# Patient Record
Sex: MALE | Race: WHITE | HISPANIC OR LATINO
[De-identification: names, ages, dates, MRNs, and addresses within clinical notes are randomized per-mention and may not be internally consistent; named-entity substitution may affect disease eponyms.]

---

## 2017-03-13 ENCOUNTER — RX RENEWAL (OUTPATIENT)
Age: 47
End: 2017-03-13

## 2017-03-17 ENCOUNTER — LABORATORY RESULT (OUTPATIENT)
Age: 47
End: 2017-03-17

## 2017-03-17 ENCOUNTER — APPOINTMENT (OUTPATIENT)
Dept: INTERNAL MEDICINE | Facility: CLINIC | Age: 47
End: 2017-03-17

## 2017-03-20 LAB
25(OH)D3 SERPL-MCNC: 33.1 NG/ML
ALBUMIN SERPL ELPH-MCNC: 4.1 G/DL
ALP BLD-CCNC: 69 U/L
ALT SERPL-CCNC: 16 U/L
ANION GAP SERPL CALC-SCNC: 18 MMOL/L
AST SERPL-CCNC: 28 U/L
BASOPHILS # BLD AUTO: 0.03 K/UL
BASOPHILS NFR BLD AUTO: 0.3 %
BILIRUB SERPL-MCNC: 0.3 MG/DL
BUN SERPL-MCNC: 67 MG/DL
CALCIUM SERPL-MCNC: 10.1 MG/DL
CHLORIDE SERPL-SCNC: 101 MMOL/L
CHOLEST SERPL-MCNC: 108 MG/DL
CHOLEST/HDLC SERPL: 3.1 RATIO
CO2 SERPL-SCNC: 22 MMOL/L
CREAT SERPL-MCNC: 4 MG/DL
EOSINOPHIL # BLD AUTO: 0.1 K/UL
EOSINOPHIL NFR BLD AUTO: 0.9 %
GLUCOSE SERPL-MCNC: 85 MG/DL
HBA1C MFR BLD HPLC: 5.8 %
HCT VFR BLD CALC: 32.2 %
HDLC SERPL-MCNC: 35 MG/DL
HGB BLD-MCNC: 9.9 G/DL
IMM GRANULOCYTES NFR BLD AUTO: 0.4 %
LDLC SERPL CALC-MCNC: 55 MG/DL
LYMPHOCYTES # BLD AUTO: 1.51 K/UL
LYMPHOCYTES NFR BLD AUTO: 13.6 %
MAN DIFF?: NORMAL
MCHC RBC-ENTMCNC: 30.6 PG
MCHC RBC-ENTMCNC: 30.7 GM/DL
MCV RBC AUTO: 99.4 FL
MONOCYTES # BLD AUTO: 1 K/UL
MONOCYTES NFR BLD AUTO: 9 %
NEUTROPHILS # BLD AUTO: 8.46 K/UL
NEUTROPHILS NFR BLD AUTO: 75.8 %
PLATELET # BLD AUTO: 368 K/UL
POTASSIUM SERPL-SCNC: 4.2 MMOL/L
PROT SERPL-MCNC: 8.2 G/DL
RBC # BLD: 3.24 M/UL
RBC # FLD: 14.7 %
SAVE SPECIMEN: NORMAL
SODIUM SERPL-SCNC: 141 MMOL/L
T3RU NFR SERPL: 1.06 INDEX
T4 SERPL-MCNC: 7 UG/DL
TRIGL SERPL-MCNC: 91 MG/DL
TSH SERPL-ACNC: 2.98 UIU/ML
URATE SERPL-MCNC: 7.2 MG/DL
WBC # FLD AUTO: 11.14 K/UL

## 2017-03-21 ENCOUNTER — LABORATORY RESULT (OUTPATIENT)
Age: 47
End: 2017-03-21

## 2017-03-21 ENCOUNTER — APPOINTMENT (OUTPATIENT)
Dept: NEPHROLOGY | Facility: CLINIC | Age: 47
End: 2017-03-21

## 2017-03-21 VITALS — HEART RATE: 72 BPM | DIASTOLIC BLOOD PRESSURE: 68 MMHG | RESPIRATION RATE: 14 BRPM | SYSTOLIC BLOOD PRESSURE: 140 MMHG

## 2017-03-23 LAB
ALBUMIN MFR SERPL ELPH: 50.8 %
ALBUMIN SERPL ELPH-MCNC: 4.2 G/DL
ALBUMIN SERPL-MCNC: 3.8 G/DL
ALBUMIN/GLOB SERPL: 1.1 RATIO
ALPHA1 GLOB MFR SERPL ELPH: 4.9 %
ALPHA1 GLOB SERPL ELPH-MCNC: 0.4 G/DL
ALPHA2 GLOB MFR SERPL ELPH: 12.6 %
ALPHA2 GLOB SERPL ELPH-MCNC: 0.9 G/DL
ANA PAT FLD IF-IMP: ABNORMAL
ANA SER IF-ACNC: ABNORMAL
ANION GAP SERPL CALC-SCNC: 19 MMOL/L
APPEARANCE: CLEAR
B-GLOBULIN MFR SERPL ELPH: 11.5 %
B-GLOBULIN SERPL ELPH-MCNC: 0.9 G/DL
BACTERIA: NEGATIVE
BILIRUBIN URINE: NEGATIVE
BLOOD URINE: ABNORMAL
BUN SERPL-MCNC: 61 MG/DL
C3 SERPL-MCNC: 139 MG/DL
C4 SERPL-MCNC: 30 MG/DL
CALCIUM SERPL-MCNC: 10 MG/DL
CHLORIDE SERPL-SCNC: 101 MMOL/L
CMV DNA SPEC QL NAA+PROBE: NOT DETECTED IU/ML
CMVPCR LOG: NOT DETECTED LOGIU/ML
CO2 SERPL-SCNC: 22 MMOL/L
COLOR: YELLOW
CREAT SERPL-MCNC: 4.08 MG/DL
CREAT SPEC-SCNC: 67 MG/DL
CREAT/PROT UR: 0.9 RATIO
DEPRECATED KAPPA LC FREE/LAMBDA SER: 3.87 RATIO
DSDNA AB SER-ACNC: <12 IU/ML
GAMMA GLOB FLD ELPH-MCNC: 1.5 G/DL
GAMMA GLOB MFR SERPL ELPH: 20.2 %
GLUCOSE QUALITATIVE U: 100 MG/DL
GLUCOSE SERPL-MCNC: 97 MG/DL
HBV CORE IGG+IGM SER QL: NONREACTIVE
HBV SURFACE AB SER QL: NONREACTIVE
HBV SURFACE AB SERPL IA-ACNC: <3 MIU/ML
HBV SURFACE AG SER QL: NONREACTIVE
HCV AB SER QL: NONREACTIVE
HCV S/CO RATIO: 0.14 S/CO
HYALINE CASTS: 1 /LPF
INTERPRETATION SERPL IEP-IMP: NORMAL
KAPPA LC CSF-MCNC: 4.94 MG/DL
KAPPA LC SERPL-MCNC: 19.1 MG/DL
KETONES URINE: NEGATIVE
LEUKOCYTE ESTERASE URINE: NEGATIVE
MICROSCOPIC-UA: NORMAL
MPO AB + PR3 PNL SER: NORMAL
NITRITE URINE: NEGATIVE
PH URINE: 7
PHOSPHATE SERPL-MCNC: 5.1 MG/DL
POTASSIUM SERPL-SCNC: 3.8 MMOL/L
PROT SERPL-MCNC: 7.4 G/DL
PROT SERPL-MCNC: 7.4 G/DL
PROT UR-MCNC: 60 MG/DL
PROTEIN URINE: 100 MG/DL
RED BLOOD CELLS URINE: 1 /HPF
SODIUM SERPL-SCNC: 142 MMOL/L
SPECIFIC GRAVITY URINE: 1.01
SQUAMOUS EPITHELIAL CELLS: 0 /HPF
UROBILINOGEN URINE: NORMAL MG/DL
WHITE BLOOD CELLS URINE: 2 /HPF

## 2017-03-24 LAB
BKV DNA SPEC QL NAA+PROBE: NORMAL
RPR SER-TITR: NORMAL

## 2017-04-12 ENCOUNTER — APPOINTMENT (OUTPATIENT)
Dept: INTERNAL MEDICINE | Facility: CLINIC | Age: 47
End: 2017-04-12

## 2017-04-12 VITALS — WEIGHT: 207 LBS | HEIGHT: 64 IN | BODY MASS INDEX: 35.34 KG/M2

## 2017-04-12 VITALS — DIASTOLIC BLOOD PRESSURE: 72 MMHG | SYSTOLIC BLOOD PRESSURE: 130 MMHG

## 2017-04-12 VITALS — DIASTOLIC BLOOD PRESSURE: 60 MMHG | SYSTOLIC BLOOD PRESSURE: 120 MMHG

## 2017-05-16 ENCOUNTER — APPOINTMENT (OUTPATIENT)
Dept: TRANSPLANT | Facility: CLINIC | Age: 47
End: 2017-05-16

## 2017-05-16 ENCOUNTER — APPOINTMENT (OUTPATIENT)
Dept: NEPHROLOGY | Facility: CLINIC | Age: 47
End: 2017-05-16

## 2017-05-26 ENCOUNTER — MEDICATION RENEWAL (OUTPATIENT)
Age: 47
End: 2017-05-26

## 2017-05-30 ENCOUNTER — MEDICATION RENEWAL (OUTPATIENT)
Age: 47
End: 2017-05-30

## 2017-06-21 ENCOUNTER — APPOINTMENT (OUTPATIENT)
Dept: NEPHROLOGY | Facility: CLINIC | Age: 47
End: 2017-06-21

## 2017-06-21 VITALS
DIASTOLIC BLOOD PRESSURE: 73 MMHG | OXYGEN SATURATION: 98 % | WEIGHT: 207.23 LBS | SYSTOLIC BLOOD PRESSURE: 140 MMHG | HEIGHT: 64 IN | BODY MASS INDEX: 35.38 KG/M2 | HEART RATE: 51 BPM

## 2017-06-28 LAB
24R-OH-CALCIDIOL SERPL-MCNC: 29.7 PG/ML
25(OH)D3 SERPL-MCNC: 42.1 NG/ML
ALBUMIN MFR SERPL ELPH: 52.2 %
ALBUMIN SERPL ELPH-MCNC: 4.1 G/DL
ALBUMIN SERPL-MCNC: 3.9 G/DL
ALBUMIN/GLOB SERPL: 1.1 RATIO
ALPHA1 GLOB MFR SERPL ELPH: 4.4 %
ALPHA1 GLOB SERPL ELPH-MCNC: 0.3 G/DL
ALPHA2 GLOB MFR SERPL ELPH: 12.1 %
ALPHA2 GLOB SERPL ELPH-MCNC: 0.9 G/DL
ANION GAP SERPL CALC-SCNC: 19 MMOL/L
B-GLOBULIN MFR SERPL ELPH: 10.8 %
B-GLOBULIN SERPL ELPH-MCNC: 0.8 G/DL
BASOPHILS # BLD AUTO: 0.04 K/UL
BASOPHILS NFR BLD AUTO: 0.4 %
BUN SERPL-MCNC: 60 MG/DL
CALCIUM SERPL-MCNC: 10.2 MG/DL
CALCIUM SERPL-MCNC: 10.2 MG/DL
CHLORIDE SERPL-SCNC: 102 MMOL/L
CO2 SERPL-SCNC: 21 MMOL/L
CREAT SERPL-MCNC: 4.19 MG/DL
DEPRECATED KAPPA LC FREE/LAMBDA SER: 2.62 RATIO
EOSINOPHIL # BLD AUTO: 0.12 K/UL
EOSINOPHIL NFR BLD AUTO: 1.1 %
GAMMA GLOB FLD ELPH-MCNC: 1.5 G/DL
GAMMA GLOB MFR SERPL ELPH: 20.5 %
GLUCOSE SERPL-MCNC: 113 MG/DL
HCT VFR BLD CALC: 28.2 %
HGB BLD-MCNC: 9.4 G/DL
IMM GRANULOCYTES NFR BLD AUTO: 0.5 %
INTERPRETATION SERPL IEP-IMP: NORMAL
KAPPA LC CSF-MCNC: 4.61 MG/DL
KAPPA LC SERPL-MCNC: 12.1 MG/DL
LYMPHOCYTES # BLD AUTO: 1.6 K/UL
LYMPHOCYTES NFR BLD AUTO: 14.6 %
M PROTEIN SPEC IFE-MCNC: NORMAL
MAN DIFF?: NORMAL
MCHC RBC-ENTMCNC: 31.8 PG
MCHC RBC-ENTMCNC: 33.3 GM/DL
MCV RBC AUTO: 95.3 FL
MONOCYTES # BLD AUTO: 1 K/UL
MONOCYTES NFR BLD AUTO: 9.1 %
NEUTROPHILS # BLD AUTO: 8.16 K/UL
NEUTROPHILS NFR BLD AUTO: 74.3 %
PARATHYROID HORMONE INTACT: 76 PG/ML
PHOSPHATE SERPL-MCNC: 5.7 MG/DL
PLATELET # BLD AUTO: 377 K/UL
POTASSIUM SERPL-SCNC: 4.1 MMOL/L
PROT SERPL-MCNC: 7.4 G/DL
PROT SERPL-MCNC: 7.4 G/DL
RBC # BLD: 2.96 M/UL
RBC # FLD: 14.2 %
SODIUM SERPL-SCNC: 142 MMOL/L
WBC # FLD AUTO: 10.97 K/UL

## 2017-07-20 PROBLEM — Z87.448 HISTORY OF HEMATURIA: Status: RESOLVED | Noted: 2017-07-20 | Resolved: 2017-07-20

## 2017-07-21 ENCOUNTER — APPOINTMENT (OUTPATIENT)
Dept: INTERNAL MEDICINE | Facility: CLINIC | Age: 47
End: 2017-07-21

## 2017-07-21 ENCOUNTER — LABORATORY RESULT (OUTPATIENT)
Age: 47
End: 2017-07-21

## 2017-07-21 ENCOUNTER — NON-APPOINTMENT (OUTPATIENT)
Age: 47
End: 2017-07-21

## 2017-07-21 VITALS — DIASTOLIC BLOOD PRESSURE: 73 MMHG | SYSTOLIC BLOOD PRESSURE: 140 MMHG

## 2017-07-21 VITALS — DIASTOLIC BLOOD PRESSURE: 60 MMHG | SYSTOLIC BLOOD PRESSURE: 120 MMHG

## 2017-07-21 VITALS — HEIGHT: 64 IN | BODY MASS INDEX: 35.68 KG/M2 | WEIGHT: 209 LBS

## 2017-07-21 DIAGNOSIS — Z87.448 PERSONAL HISTORY OF OTHER DISEASES OF URINARY SYSTEM: ICD-10-CM

## 2017-07-23 LAB
25(OH)D3 SERPL-MCNC: 40 NG/ML
ALBUMIN SERPL ELPH-MCNC: 3.8 G/DL
ALP BLD-CCNC: 65 U/L
ALT SERPL-CCNC: 12 U/L
ANION GAP SERPL CALC-SCNC: 21 MMOL/L
AST SERPL-CCNC: 25 U/L
BASOPHILS # BLD AUTO: 0.04 K/UL
BASOPHILS NFR BLD AUTO: 0.4 %
BILIRUB SERPL-MCNC: 0.4 MG/DL
BILIRUB UR QL STRIP: NORMAL
BUN SERPL-MCNC: 55 MG/DL
CALCIUM SERPL-MCNC: 10.3 MG/DL
CALCIUM SERPL-MCNC: 10.3 MG/DL
CHLORIDE SERPL-SCNC: 102 MMOL/L
CHOLEST SERPL-MCNC: 133 MG/DL
CHOLEST/HDLC SERPL: 4.3 RATIO
CLARITY UR: CLEAR
CO2 SERPL-SCNC: 21 MMOL/L
COLLECTION METHOD: NORMAL
CREAT SERPL-MCNC: 4.47 MG/DL
EOSINOPHIL # BLD AUTO: 0.11 K/UL
EOSINOPHIL NFR BLD AUTO: 1.1 %
GLUCOSE SERPL-MCNC: 90 MG/DL
GLUCOSE UR-MCNC: NORMAL
HBA1C MFR BLD HPLC: 5.6 %
HCG UR QL: 0.2 EU/DL
HCT VFR BLD CALC: 29.1 %
HDLC SERPL-MCNC: 31 MG/DL
HGB BLD-MCNC: 9.7 G/DL
HGB UR QL STRIP.AUTO: NORMAL
IMM GRANULOCYTES NFR BLD AUTO: 0.6 %
KETONES UR-MCNC: NORMAL
LDLC SERPL CALC-MCNC: 65 MG/DL
LEUKOCYTE ESTERASE UR QL STRIP: NORMAL
LYMPHOCYTES # BLD AUTO: 1.64 K/UL
LYMPHOCYTES NFR BLD AUTO: 15.9 %
MAN DIFF?: NORMAL
MCHC RBC-ENTMCNC: 32.2 PG
MCHC RBC-ENTMCNC: 33.3 GM/DL
MCV RBC AUTO: 96.7 FL
MONOCYTES # BLD AUTO: 0.95 K/UL
MONOCYTES NFR BLD AUTO: 9.2 %
NEUTROPHILS # BLD AUTO: 7.54 K/UL
NEUTROPHILS NFR BLD AUTO: 72.8 %
NITRITE UR QL STRIP: NORMAL
PARATHYROID HORMONE INTACT: 54 PG/ML
PH UR STRIP: 6.5
PHOSPHATE SERPL-MCNC: 4.6 MG/DL
PLATELET # BLD AUTO: 397 K/UL
POTASSIUM SERPL-SCNC: 3.7 MMOL/L
PROT SERPL-MCNC: 8 G/DL
PROT UR STRIP-MCNC: NORMAL
RBC # BLD: 3.01 M/UL
RBC # FLD: 14.5 %
SAVE SPECIMEN: NORMAL
SODIUM SERPL-SCNC: 144 MMOL/L
SP GR UR STRIP: 1.01
T3RU NFR SERPL: 0.99 INDEX
T4 SERPL-MCNC: 7.9 UG/DL
TRIGL SERPL-MCNC: 185 MG/DL
TSH SERPL-ACNC: 2.5 UIU/ML
URATE SERPL-MCNC: 7.7 MG/DL
WBC # FLD AUTO: 10.34 K/UL

## 2017-09-13 ENCOUNTER — APPOINTMENT (OUTPATIENT)
Dept: NEPHROLOGY | Facility: CLINIC | Age: 47
End: 2017-09-13
Payer: COMMERCIAL

## 2017-09-13 VITALS
WEIGHT: 202 LBS | HEIGHT: 64 IN | OXYGEN SATURATION: 97 % | DIASTOLIC BLOOD PRESSURE: 80 MMHG | BODY MASS INDEX: 34.49 KG/M2 | HEART RATE: 51 BPM | SYSTOLIC BLOOD PRESSURE: 141 MMHG

## 2017-09-13 VITALS — DIASTOLIC BLOOD PRESSURE: 80 MMHG | SYSTOLIC BLOOD PRESSURE: 142 MMHG

## 2017-09-13 PROCEDURE — 99214 OFFICE O/P EST MOD 30 MIN: CPT | Mod: GC

## 2017-09-15 LAB
25(OH)D3 SERPL-MCNC: 33.9 NG/ML
ALBUMIN SERPL ELPH-MCNC: 3.8 G/DL
ALP BLD-CCNC: 59 U/L
ALT SERPL-CCNC: 13 U/L
ANION GAP SERPL CALC-SCNC: 17 MMOL/L
AST SERPL-CCNC: 18 U/L
BASOPHILS # BLD AUTO: 0.03 K/UL
BASOPHILS NFR BLD AUTO: 0.3 %
BILIRUB SERPL-MCNC: 0.4 MG/DL
BUN SERPL-MCNC: 63 MG/DL
CALCIUM SERPL-MCNC: 10.5 MG/DL
CALCIUM SERPL-MCNC: 10.5 MG/DL
CHLORIDE SERPL-SCNC: 105 MMOL/L
CO2 SERPL-SCNC: 21 MMOL/L
CREAT SERPL-MCNC: 4.7 MG/DL
EOSINOPHIL # BLD AUTO: 0.15 K/UL
EOSINOPHIL NFR BLD AUTO: 1.3 %
GLUCOSE SERPL-MCNC: 90 MG/DL
HCT VFR BLD CALC: 28.8 %
HGB BLD-MCNC: 9.8 G/DL
IMM GRANULOCYTES NFR BLD AUTO: 0.4 %
LYMPHOCYTES # BLD AUTO: 1.68 K/UL
LYMPHOCYTES NFR BLD AUTO: 14.8 %
MAN DIFF?: NORMAL
MCHC RBC-ENTMCNC: 31.9 PG
MCHC RBC-ENTMCNC: 34 GM/DL
MCV RBC AUTO: 93.8 FL
MONOCYTES # BLD AUTO: 1 K/UL
MONOCYTES NFR BLD AUTO: 8.8 %
NEUTROPHILS # BLD AUTO: 8.44 K/UL
NEUTROPHILS NFR BLD AUTO: 74.4 %
PARATHYROID HORMONE INTACT: 56 PG/ML
PLATELET # BLD AUTO: 386 K/UL
POTASSIUM SERPL-SCNC: 4.3 MMOL/L
PROT SERPL-MCNC: 7.5 G/DL
RBC # BLD: 3.07 M/UL
RBC # FLD: 13.6 %
SODIUM SERPL-SCNC: 143 MMOL/L
WBC # FLD AUTO: 11.34 K/UL

## 2017-09-17 ENCOUNTER — RX RENEWAL (OUTPATIENT)
Age: 47
End: 2017-09-17

## 2017-09-21 ENCOUNTER — APPOINTMENT (OUTPATIENT)
Dept: DERMATOLOGY | Facility: CLINIC | Age: 47
End: 2017-09-21

## 2017-09-28 ENCOUNTER — APPOINTMENT (OUTPATIENT)
Dept: DERMATOLOGY | Facility: CLINIC | Age: 47
End: 2017-09-28
Payer: COMMERCIAL

## 2017-09-28 VITALS — SYSTOLIC BLOOD PRESSURE: 130 MMHG | DIASTOLIC BLOOD PRESSURE: 70 MMHG

## 2017-09-28 DIAGNOSIS — Z12.83 ENCOUNTER FOR SCREENING FOR MALIGNANT NEOPLASM OF SKIN: ICD-10-CM

## 2017-09-28 PROCEDURE — 99213 OFFICE O/P EST LOW 20 MIN: CPT

## 2017-10-25 ENCOUNTER — RX RENEWAL (OUTPATIENT)
Age: 47
End: 2017-10-25

## 2017-10-31 ENCOUNTER — APPOINTMENT (OUTPATIENT)
Dept: OPHTHALMOLOGY | Facility: CLINIC | Age: 47
End: 2017-10-31
Payer: COMMERCIAL

## 2017-10-31 PROCEDURE — 92012 INTRM OPH EXAM EST PATIENT: CPT

## 2017-11-30 ENCOUNTER — APPOINTMENT (OUTPATIENT)
Dept: INTERNAL MEDICINE | Facility: CLINIC | Age: 47
End: 2017-11-30
Payer: COMMERCIAL

## 2017-11-30 ENCOUNTER — MED ADMIN CHARGE (OUTPATIENT)
Age: 47
End: 2017-11-30

## 2017-11-30 PROCEDURE — G0008: CPT

## 2017-11-30 PROCEDURE — 90686 IIV4 VACC NO PRSV 0.5 ML IM: CPT

## 2017-12-20 ENCOUNTER — APPOINTMENT (OUTPATIENT)
Dept: NEPHROLOGY | Facility: CLINIC | Age: 47
End: 2017-12-20
Payer: COMMERCIAL

## 2017-12-20 VITALS
WEIGHT: 204 LBS | OXYGEN SATURATION: 99 % | DIASTOLIC BLOOD PRESSURE: 76 MMHG | SYSTOLIC BLOOD PRESSURE: 151 MMHG | HEIGHT: 64 IN | HEART RATE: 49 BPM | BODY MASS INDEX: 34.83 KG/M2

## 2017-12-20 VITALS — SYSTOLIC BLOOD PRESSURE: 154 MMHG | DIASTOLIC BLOOD PRESSURE: 86 MMHG

## 2017-12-20 PROCEDURE — 99215 OFFICE O/P EST HI 40 MIN: CPT | Mod: 25

## 2017-12-20 PROCEDURE — G0010: CPT

## 2017-12-20 PROCEDURE — 90740 HEPB VACC 3 DOSE IMMUNSUP IM: CPT

## 2017-12-22 LAB
25(OH)D3 SERPL-MCNC: 36 NG/ML
ALBUMIN SERPL ELPH-MCNC: 4.3 G/DL
ALP BLD-CCNC: 62 U/L
ALT SERPL-CCNC: 17 U/L
ANION GAP SERPL CALC-SCNC: 16 MMOL/L
AST SERPL-CCNC: 23 U/L
BASOPHILS # BLD AUTO: 0.03 K/UL
BASOPHILS NFR BLD AUTO: 0.3 %
BILIRUB SERPL-MCNC: 0.3 MG/DL
BUN SERPL-MCNC: 58 MG/DL
CALCIUM SERPL-MCNC: 10.3 MG/DL
CALCIUM SERPL-MCNC: 10.3 MG/DL
CHLORIDE SERPL-SCNC: 106 MMOL/L
CO2 SERPL-SCNC: 22 MMOL/L
CREAT SERPL-MCNC: 5.02 MG/DL
EOSINOPHIL # BLD AUTO: 0.11 K/UL
EOSINOPHIL NFR BLD AUTO: 1.1 %
FERRITIN SERPL-MCNC: 211 NG/ML
GLUCOSE SERPL-MCNC: 103 MG/DL
HBA1C MFR BLD HPLC: 5.5 %
HCT VFR BLD CALC: 29.4 %
HGB BLD-MCNC: 9.8 G/DL
IMM GRANULOCYTES NFR BLD AUTO: 0.5 %
IRON SATN MFR SERPL: 37 %
IRON SERPL-MCNC: 92 UG/DL
LYMPHOCYTES # BLD AUTO: 1.26 K/UL
LYMPHOCYTES NFR BLD AUTO: 13.1 %
MAGNESIUM SERPL-MCNC: 2.4 MG/DL
MAN DIFF?: NORMAL
MCHC RBC-ENTMCNC: 31.7 PG
MCHC RBC-ENTMCNC: 33.3 GM/DL
MCV RBC AUTO: 95.1 FL
MONOCYTES # BLD AUTO: 0.97 K/UL
MONOCYTES NFR BLD AUTO: 10.1 %
NEUTROPHILS # BLD AUTO: 7.19 K/UL
NEUTROPHILS NFR BLD AUTO: 74.9 %
PARATHYROID HORMONE INTACT: 99 PG/ML
PHOSPHATE SERPL-MCNC: 5.7 MG/DL
PLATELET # BLD AUTO: 413 K/UL
POTASSIUM SERPL-SCNC: 4.2 MMOL/L
PROT SERPL-MCNC: 7.8 G/DL
RBC # BLD: 3.09 M/UL
RBC # FLD: 13.9 %
SODIUM SERPL-SCNC: 144 MMOL/L
TIBC SERPL-MCNC: 251 UG/DL
UIBC SERPL-MCNC: 159 UG/DL
WBC # FLD AUTO: 9.61 K/UL

## 2017-12-26 ENCOUNTER — APPOINTMENT (OUTPATIENT)
Dept: INTERNAL MEDICINE | Facility: CLINIC | Age: 47
End: 2017-12-26
Payer: COMMERCIAL

## 2017-12-26 VITALS — HEIGHT: 64 IN | WEIGHT: 204 LBS | BODY MASS INDEX: 34.83 KG/M2

## 2017-12-26 VITALS — DIASTOLIC BLOOD PRESSURE: 74 MMHG | SYSTOLIC BLOOD PRESSURE: 110 MMHG

## 2017-12-26 VITALS — SYSTOLIC BLOOD PRESSURE: 110 MMHG | DIASTOLIC BLOOD PRESSURE: 70 MMHG

## 2017-12-26 PROCEDURE — 99214 OFFICE O/P EST MOD 30 MIN: CPT

## 2018-01-02 ENCOUNTER — RX RENEWAL (OUTPATIENT)
Age: 48
End: 2018-01-02

## 2018-01-06 ENCOUNTER — RX RENEWAL (OUTPATIENT)
Age: 48
End: 2018-01-06

## 2018-01-23 ENCOUNTER — APPOINTMENT (OUTPATIENT)
Dept: NEPHROLOGY | Facility: CLINIC | Age: 48
End: 2018-01-23
Payer: COMMERCIAL

## 2018-01-23 ENCOUNTER — RX RENEWAL (OUTPATIENT)
Age: 48
End: 2018-01-23

## 2018-01-23 VITALS
WEIGHT: 199.96 LBS | OXYGEN SATURATION: 99 % | BODY MASS INDEX: 34.14 KG/M2 | HEIGHT: 64 IN | SYSTOLIC BLOOD PRESSURE: 142 MMHG | DIASTOLIC BLOOD PRESSURE: 76 MMHG | HEART RATE: 65 BPM

## 2018-01-23 VITALS — SYSTOLIC BLOOD PRESSURE: 130 MMHG | DIASTOLIC BLOOD PRESSURE: 72 MMHG

## 2018-01-23 DIAGNOSIS — Z23 ENCOUNTER FOR IMMUNIZATION: ICD-10-CM

## 2018-01-23 PROCEDURE — G0010: CPT

## 2018-01-23 PROCEDURE — 99215 OFFICE O/P EST HI 40 MIN: CPT | Mod: 25

## 2018-01-23 PROCEDURE — 90740 HEPB VACC 3 DOSE IMMUNSUP IM: CPT | Mod: GA

## 2018-01-29 LAB
25(OH)D3 SERPL-MCNC: 33.5 NG/ML
ADJUSTED MITOGEN: 3.58 IU/ML
ADJUSTED TB AG: 0 IU/ML
ALBUMIN MFR SERPL ELPH: 53.1 %
ALBUMIN SERPL ELPH-MCNC: 3.8 G/DL
ALBUMIN SERPL-MCNC: 3.9 G/DL
ALBUMIN/GLOB SERPL: 1.1 RATIO
ALPHA1 GLOB MFR SERPL ELPH: 4.6 %
ALPHA1 GLOB SERPL ELPH-MCNC: 0.3 G/DL
ALPHA2 GLOB MFR SERPL ELPH: 11.6 %
ALPHA2 GLOB SERPL ELPH-MCNC: 0.9 G/DL
ANION GAP SERPL CALC-SCNC: 17 MMOL/L
B-GLOBULIN MFR SERPL ELPH: 10.7 %
B-GLOBULIN SERPL ELPH-MCNC: 0.8 G/DL
BASOPHILS # BLD AUTO: 0.03 K/UL
BASOPHILS NFR BLD AUTO: 0.4 %
BUN SERPL-MCNC: 46 MG/DL
CALCIUM SERPL-MCNC: 10.3 MG/DL
CALCIUM SERPL-MCNC: 10.3 MG/DL
CHLORIDE SERPL-SCNC: 105 MMOL/L
CO2 SERPL-SCNC: 22 MMOL/L
CREAT SERPL-MCNC: 5.09 MG/DL
CREAT SPEC-SCNC: 74 MG/DL
CREAT/PROT UR: 0.5 RATIO
DEPRECATED KAPPA LC FREE/LAMBDA SER: 1.83 RATIO
EOSINOPHIL # BLD AUTO: 0.08 K/UL
EOSINOPHIL NFR BLD AUTO: 1.1 %
FERRITIN SERPL-MCNC: 310 NG/ML
GAMMA GLOB FLD ELPH-MCNC: 1.5 G/DL
GAMMA GLOB MFR SERPL ELPH: 20 %
GLUCOSE SERPL-MCNC: 90 MG/DL
HCT VFR BLD CALC: 25 %
HGB BLD-MCNC: 8.3 G/DL
IMM GRANULOCYTES NFR BLD AUTO: 0.1 %
INTERPRETATION SERPL IEP-IMP: NORMAL
IRON SATN MFR SERPL: 66 %
IRON SERPL-MCNC: 145 UG/DL
KAPPA LC CSF-MCNC: 4.35 MG/DL
KAPPA LC SERPL-MCNC: 7.97 MG/DL
LYMPHOCYTES # BLD AUTO: 1.51 K/UL
LYMPHOCYTES NFR BLD AUTO: 21.2 %
M PROTEIN SPEC IFE-MCNC: NORMAL
M TB IFN-G BLD-IMP: NEGATIVE
MAN DIFF?: NORMAL
MCHC RBC-ENTMCNC: 32.4 PG
MCHC RBC-ENTMCNC: 33.2 GM/DL
MCV RBC AUTO: 97.7 FL
MONOCYTES # BLD AUTO: 0.39 K/UL
MONOCYTES NFR BLD AUTO: 5.5 %
NEUTROPHILS # BLD AUTO: 5.11 K/UL
NEUTROPHILS NFR BLD AUTO: 71.7 %
PARATHYROID HORMONE INTACT: 91 PG/ML
PHOSPHATE SERPL-MCNC: 4.3 MG/DL
PLATELET # BLD AUTO: 370 K/UL
POTASSIUM SERPL-SCNC: 4.8 MMOL/L
PROT SERPL-MCNC: 7.4 G/DL
PROT SERPL-MCNC: 7.4 G/DL
PROT UR-MCNC: 39 MG/DL
QUANTIFERON GOLD NIL: 0.02 IU/ML
RBC # BLD: 2.56 M/UL
RBC # FLD: 13.7 %
SODIUM SERPL-SCNC: 144 MMOL/L
TIBC SERPL-MCNC: 221 UG/DL
UIBC SERPL-MCNC: 76 UG/DL
WBC # FLD AUTO: 7.13 K/UL

## 2018-02-07 ENCOUNTER — APPOINTMENT (OUTPATIENT)
Dept: NEPHROLOGY | Facility: CLINIC | Age: 48
End: 2018-02-07
Payer: COMMERCIAL

## 2018-02-07 VITALS
BODY MASS INDEX: 34.44 KG/M2 | DIASTOLIC BLOOD PRESSURE: 50 MMHG | SYSTOLIC BLOOD PRESSURE: 127 MMHG | WEIGHT: 201.72 LBS | HEIGHT: 64 IN | OXYGEN SATURATION: 98 % | HEART RATE: 57 BPM

## 2018-02-07 PROCEDURE — 96372 THER/PROPH/DIAG INJ SC/IM: CPT

## 2018-02-07 RX ORDER — DARBEPOETIN ALFA 40 UG/ML
40 SOLUTION INTRAVENOUS; SUBCUTANEOUS
Qty: 0 | Refills: 0 | Status: COMPLETED | OUTPATIENT
Start: 2018-02-07

## 2018-02-07 RX ADMIN — DARBEPOETIN ALFA 40 MCG/ML: 40 SOLUTION INTRAVENOUS; SUBCUTANEOUS at 00:00

## 2018-02-28 ENCOUNTER — APPOINTMENT (OUTPATIENT)
Dept: NEPHROLOGY | Facility: CLINIC | Age: 48
End: 2018-02-28
Payer: COMMERCIAL

## 2018-02-28 VITALS
BODY MASS INDEX: 34.14 KG/M2 | HEART RATE: 49 BPM | HEIGHT: 64 IN | DIASTOLIC BLOOD PRESSURE: 64 MMHG | SYSTOLIC BLOOD PRESSURE: 131 MMHG | OXYGEN SATURATION: 99 % | WEIGHT: 199.96 LBS

## 2018-02-28 PROCEDURE — 96372 THER/PROPH/DIAG INJ SC/IM: CPT

## 2018-02-28 PROCEDURE — 99215 OFFICE O/P EST HI 40 MIN: CPT | Mod: 25

## 2018-03-02 LAB
25(OH)D3 SERPL-MCNC: 29.4 NG/ML
ALBUMIN SERPL ELPH-MCNC: 4.1 G/DL
ALP BLD-CCNC: 57 U/L
ALT SERPL-CCNC: 13 U/L
ANION GAP SERPL CALC-SCNC: 15 MMOL/L
APPEARANCE: CLEAR
AST SERPL-CCNC: 19 U/L
BACTERIA: NEGATIVE
BASOPHILS # BLD AUTO: 0.03 K/UL
BASOPHILS NFR BLD AUTO: 0.3 %
BILIRUB SERPL-MCNC: 0.3 MG/DL
BILIRUBIN URINE: NEGATIVE
BLOOD URINE: NEGATIVE
BUN SERPL-MCNC: 51 MG/DL
CALCIUM SERPL-MCNC: 10 MG/DL
CALCIUM SERPL-MCNC: 10 MG/DL
CHLORIDE SERPL-SCNC: 103 MMOL/L
CO2 SERPL-SCNC: 23 MMOL/L
COLOR: YELLOW
CREAT SERPL-MCNC: 4.46 MG/DL
EOSINOPHIL # BLD AUTO: 0.14 K/UL
EOSINOPHIL NFR BLD AUTO: 1.5 %
FERRITIN SERPL-MCNC: 245 NG/ML
GLUCOSE QUALITATIVE U: 100 MG/DL
GLUCOSE SERPL-MCNC: 98 MG/DL
HCT VFR BLD CALC: 25.7 %
HGB BLD-MCNC: 8.4 G/DL
HYALINE CASTS: 0 /LPF
IMM GRANULOCYTES NFR BLD AUTO: 0.3 %
IRON SATN MFR SERPL: 39 %
IRON SERPL-MCNC: 92 UG/DL
KETONES URINE: NEGATIVE
LDH SERPL-CCNC: 170 U/L
LEUKOCYTE ESTERASE URINE: NEGATIVE
LYMPHOCYTES # BLD AUTO: 1.61 K/UL
LYMPHOCYTES NFR BLD AUTO: 17.7 %
MAGNESIUM SERPL-MCNC: 2.3 MG/DL
MAN DIFF?: NORMAL
MCHC RBC-ENTMCNC: 32.4 PG
MCHC RBC-ENTMCNC: 32.7 GM/DL
MCV RBC AUTO: 99.2 FL
MICROSCOPIC-UA: NORMAL
MONOCYTES # BLD AUTO: 0.73 K/UL
MONOCYTES NFR BLD AUTO: 8 %
NEUTROPHILS # BLD AUTO: 6.56 K/UL
NEUTROPHILS NFR BLD AUTO: 72.2 %
NITRITE URINE: NEGATIVE
PARATHYROID HORMONE INTACT: 86 PG/ML
PH URINE: 7
PHOSPHATE SERPL-MCNC: 4.8 MG/DL
PLATELET # BLD AUTO: 287 K/UL
POTASSIUM SERPL-SCNC: 4.4 MMOL/L
PROT SERPL-MCNC: 7.1 G/DL
PROTEIN URINE: 30 MG/DL
RBC # BLD: 2.59 M/UL
RBC # FLD: 15.7 %
RED BLOOD CELLS URINE: 1 /HPF
SODIUM SERPL-SCNC: 141 MMOL/L
SPECIFIC GRAVITY URINE: 1.01
SQUAMOUS EPITHELIAL CELLS: 1 /HPF
TACROLIMUS SERPL-MCNC: <2 NG/ML
TIBC SERPL-MCNC: 238 UG/DL
UIBC SERPL-MCNC: 146 UG/DL
UROBILINOGEN URINE: NEGATIVE MG/DL
WBC # FLD AUTO: 9.1 K/UL
WHITE BLOOD CELLS URINE: 5 /HPF

## 2018-03-09 ENCOUNTER — APPOINTMENT (OUTPATIENT)
Dept: NEPHROLOGY | Facility: CLINIC | Age: 48
End: 2018-03-09
Payer: COMMERCIAL

## 2018-03-09 VITALS
WEIGHT: 196 LBS | OXYGEN SATURATION: 99 % | DIASTOLIC BLOOD PRESSURE: 68 MMHG | SYSTOLIC BLOOD PRESSURE: 133 MMHG | HEART RATE: 43 BPM | BODY MASS INDEX: 33.46 KG/M2 | HEIGHT: 64 IN

## 2018-03-09 PROCEDURE — 96372 THER/PROPH/DIAG INJ SC/IM: CPT

## 2018-03-09 RX ORDER — DARBEPOETIN ALFA 40 UG/ML
40 SOLUTION INTRAVENOUS; SUBCUTANEOUS
Qty: 0 | Refills: 0 | Status: COMPLETED | OUTPATIENT
Start: 2018-03-02

## 2018-03-12 ENCOUNTER — RX RENEWAL (OUTPATIENT)
Age: 48
End: 2018-03-12

## 2018-03-21 ENCOUNTER — APPOINTMENT (OUTPATIENT)
Dept: NEPHROLOGY | Facility: CLINIC | Age: 48
End: 2018-03-21

## 2018-03-23 ENCOUNTER — LABORATORY RESULT (OUTPATIENT)
Age: 48
End: 2018-03-23

## 2018-03-23 ENCOUNTER — APPOINTMENT (OUTPATIENT)
Dept: INTERNAL MEDICINE | Facility: CLINIC | Age: 48
End: 2018-03-23
Payer: COMMERCIAL

## 2018-03-23 ENCOUNTER — NON-APPOINTMENT (OUTPATIENT)
Age: 48
End: 2018-03-23

## 2018-03-23 VITALS
HEIGHT: 64 IN | DIASTOLIC BLOOD PRESSURE: 82 MMHG | BODY MASS INDEX: 33.8 KG/M2 | WEIGHT: 198 LBS | SYSTOLIC BLOOD PRESSURE: 134 MMHG

## 2018-03-23 VITALS — DIASTOLIC BLOOD PRESSURE: 70 MMHG | SYSTOLIC BLOOD PRESSURE: 120 MMHG

## 2018-03-23 PROCEDURE — 36415 COLL VENOUS BLD VENIPUNCTURE: CPT

## 2018-03-23 PROCEDURE — 99214 OFFICE O/P EST MOD 30 MIN: CPT | Mod: 25

## 2018-03-23 PROCEDURE — 93000 ELECTROCARDIOGRAM COMPLETE: CPT

## 2018-03-23 RX ADMIN — DARBEPOETIN ALFA 1 MCG/ML: 60 SOLUTION INTRAVENOUS; SUBCUTANEOUS at 00:00

## 2018-03-26 ENCOUNTER — APPOINTMENT (OUTPATIENT)
Dept: NEPHROLOGY | Facility: CLINIC | Age: 48
End: 2018-03-26

## 2018-03-26 ENCOUNTER — APPOINTMENT (OUTPATIENT)
Dept: NEPHROLOGY | Facility: CLINIC | Age: 48
End: 2018-03-26
Payer: COMMERCIAL

## 2018-03-26 VITALS
SYSTOLIC BLOOD PRESSURE: 154 MMHG | OXYGEN SATURATION: 100 % | WEIGHT: 200.62 LBS | DIASTOLIC BLOOD PRESSURE: 69 MMHG | HEART RATE: 51 BPM | BODY MASS INDEX: 34.25 KG/M2 | HEIGHT: 64 IN

## 2018-03-26 LAB
25(OH)D3 SERPL-MCNC: 32.6 NG/ML
ALBUMIN SERPL ELPH-MCNC: 3.9 G/DL
ALP BLD-CCNC: 60 U/L
ALT SERPL-CCNC: 10 U/L
ANION GAP SERPL CALC-SCNC: 16 MMOL/L
AST SERPL-CCNC: 25 U/L
BASOPHILS # BLD AUTO: 0.04 K/UL
BASOPHILS NFR BLD AUTO: 0.4 %
BILIRUB SERPL-MCNC: 0.2 MG/DL
BUN SERPL-MCNC: 66 MG/DL
CALCIUM SERPL-MCNC: 10.6 MG/DL
CALCIUM SERPL-MCNC: 10.6 MG/DL
CHLORIDE SERPL-SCNC: 106 MMOL/L
CHOLEST SERPL-MCNC: 95 MG/DL
CHOLEST/HDLC SERPL: 3.3 RATIO
CO2 SERPL-SCNC: 22 MMOL/L
CREAT SERPL-MCNC: 5.38 MG/DL
EOSINOPHIL # BLD AUTO: 0.11 K/UL
EOSINOPHIL NFR BLD AUTO: 1.2 %
FERRITIN SERPL-MCNC: 196 NG/ML
GLUCOSE SERPL-MCNC: 92 MG/DL
HBA1C MFR BLD HPLC: 5.2 %
HCT VFR BLD CALC: 27.5 %
HDLC SERPL-MCNC: 29 MG/DL
HGB BLD-MCNC: 8.6 G/DL
IMM GRANULOCYTES NFR BLD AUTO: 0.3 %
IRON SATN MFR SERPL: 24 %
IRON SERPL-MCNC: 60 UG/DL
LDLC SERPL CALC-MCNC: 46 MG/DL
LYMPHOCYTES # BLD AUTO: 0.9 K/UL
LYMPHOCYTES NFR BLD AUTO: 10.1 %
MAN DIFF?: NORMAL
MCHC RBC-ENTMCNC: 31.3 GM/DL
MCHC RBC-ENTMCNC: 32.7 PG
MCV RBC AUTO: 104.6 FL
MONOCYTES # BLD AUTO: 0.85 K/UL
MONOCYTES NFR BLD AUTO: 9.5 %
NEUTROPHILS # BLD AUTO: 7.01 K/UL
NEUTROPHILS NFR BLD AUTO: 78.5 %
PARATHYROID HORMONE INTACT: 84 PG/ML
PHOSPHATE SERPL-MCNC: 5.4 MG/DL
PLATELET # BLD AUTO: 371 K/UL
POTASSIUM SERPL-SCNC: 4.4 MMOL/L
PROT SERPL-MCNC: 7.7 G/DL
RBC # BLD: 2.63 M/UL
RBC # FLD: 16.7 %
SAVE SPECIMEN: NORMAL
SODIUM SERPL-SCNC: 144 MMOL/L
T3RU NFR SERPL: 0.99 INDEX
T4 SERPL-MCNC: 7.5 UG/DL
TIBC SERPL-MCNC: 250 UG/DL
TRIGL SERPL-MCNC: 100 MG/DL
TSH SERPL-ACNC: 3.59 UIU/ML
UIBC SERPL-MCNC: 190 UG/DL
URATE SERPL-MCNC: 7.3 MG/DL
WBC # FLD AUTO: 8.94 K/UL

## 2018-03-26 PROCEDURE — 96372 THER/PROPH/DIAG INJ SC/IM: CPT

## 2018-03-26 RX ORDER — DARBEPOETIN ALFA 60 UG/ML
60 SOLUTION INTRAVENOUS; SUBCUTANEOUS
Qty: 0 | Refills: 0 | Status: COMPLETED | OUTPATIENT
Start: 2018-03-23

## 2018-04-02 ENCOUNTER — APPOINTMENT (OUTPATIENT)
Dept: NEPHROLOGY | Facility: CLINIC | Age: 48
End: 2018-04-02
Payer: COMMERCIAL

## 2018-04-02 VITALS
HEART RATE: 50 BPM | SYSTOLIC BLOOD PRESSURE: 136 MMHG | WEIGHT: 199 LBS | BODY MASS INDEX: 33.97 KG/M2 | HEIGHT: 64 IN | DIASTOLIC BLOOD PRESSURE: 66 MMHG | OXYGEN SATURATION: 99 %

## 2018-04-02 PROCEDURE — 96372 THER/PROPH/DIAG INJ SC/IM: CPT | Mod: GC

## 2018-04-12 ENCOUNTER — APPOINTMENT (OUTPATIENT)
Dept: NEPHROLOGY | Facility: CLINIC | Age: 48
End: 2018-04-12
Payer: COMMERCIAL

## 2018-04-12 ENCOUNTER — APPOINTMENT (OUTPATIENT)
Dept: NEPHROLOGY | Facility: CLINIC | Age: 48
End: 2018-04-12

## 2018-04-12 VITALS
SYSTOLIC BLOOD PRESSURE: 137 MMHG | BODY MASS INDEX: 34.15 KG/M2 | HEART RATE: 53 BPM | HEIGHT: 64 IN | WEIGHT: 200 LBS | OXYGEN SATURATION: 98 % | DIASTOLIC BLOOD PRESSURE: 72 MMHG

## 2018-04-12 PROCEDURE — 96372 THER/PROPH/DIAG INJ SC/IM: CPT

## 2018-04-12 RX ORDER — DARBEPOETIN ALFA 60 UG/ML
60 SOLUTION INTRAVENOUS; SUBCUTANEOUS
Qty: 0 | Refills: 0 | Status: COMPLETED | OUTPATIENT
Start: 2018-04-12

## 2018-04-12 RX ADMIN — DARBEPOETIN ALFA MCG/ML: 60 SOLUTION INTRAVENOUS; SUBCUTANEOUS at 00:00

## 2018-04-24 LAB
ALBUMIN SERPL ELPH-MCNC: 3.8 G/DL
ANION GAP SERPL CALC-SCNC: 12 MMOL/L
BASOPHILS # BLD AUTO: 0.05 K/UL
BASOPHILS NFR BLD AUTO: 0.6 %
BUN SERPL-MCNC: 61 MG/DL
CALCIUM SERPL-MCNC: 10.3 MG/DL
CHLORIDE SERPL-SCNC: 104 MMOL/L
CO2 SERPL-SCNC: 25 MMOL/L
CREAT SERPL-MCNC: 4.6 MG/DL
EOSINOPHIL # BLD AUTO: 0.12 K/UL
EOSINOPHIL NFR BLD AUTO: 1.4 %
GLUCOSE SERPL-MCNC: 98 MG/DL
HCT VFR BLD CALC: 28.3 %
HGB BLD-MCNC: 9.3 G/DL
IMM GRANULOCYTES NFR BLD AUTO: 0.1 %
LYMPHOCYTES # BLD AUTO: 1.49 K/UL
LYMPHOCYTES NFR BLD AUTO: 17.2 %
MAN DIFF?: NORMAL
MCHC RBC-ENTMCNC: 32.9 GM/DL
MCHC RBC-ENTMCNC: 33 PG
MCV RBC AUTO: 100.4 FL
MONOCYTES # BLD AUTO: 0.7 K/UL
MONOCYTES NFR BLD AUTO: 8.1 %
NEUTROPHILS # BLD AUTO: 6.27 K/UL
NEUTROPHILS NFR BLD AUTO: 72.6 %
PHOSPHATE SERPL-MCNC: 4.7 MG/DL
PLATELET # BLD AUTO: 366 K/UL
POTASSIUM SERPL-SCNC: 4 MMOL/L
RBC # BLD: 2.82 M/UL
RBC # FLD: 14.6 %
SODIUM SERPL-SCNC: 141 MMOL/L
WBC # FLD AUTO: 8.64 K/UL

## 2018-05-23 ENCOUNTER — APPOINTMENT (OUTPATIENT)
Dept: NEPHROLOGY | Facility: CLINIC | Age: 48
End: 2018-05-23
Payer: COMMERCIAL

## 2018-05-23 VITALS
OXYGEN SATURATION: 99 % | HEART RATE: 45 BPM | DIASTOLIC BLOOD PRESSURE: 82 MMHG | BODY MASS INDEX: 34.33 KG/M2 | SYSTOLIC BLOOD PRESSURE: 147 MMHG | WEIGHT: 200 LBS

## 2018-05-23 PROCEDURE — 99215 OFFICE O/P EST HI 40 MIN: CPT | Mod: GC

## 2018-05-23 PROCEDURE — 96372 THER/PROPH/DIAG INJ SC/IM: CPT | Mod: GC

## 2018-05-23 RX ORDER — DARBEPOETIN ALFA 60 UG/ML
60 SOLUTION INTRAVENOUS; SUBCUTANEOUS
Qty: 0 | Refills: 0 | Status: COMPLETED | OUTPATIENT
Start: 2018-05-23

## 2018-05-23 RX ADMIN — DARBEPOETIN ALFA 60 MCG/ML: 60 SOLUTION INTRAVENOUS; SUBCUTANEOUS at 00:00

## 2018-05-25 LAB
25(OH)D3 SERPL-MCNC: 33.3 NG/ML
ALBUMIN SERPL ELPH-MCNC: 4.1 G/DL
ANION GAP SERPL CALC-SCNC: 14 MMOL/L
BASOPHILS # BLD AUTO: 0.03 K/UL
BASOPHILS NFR BLD AUTO: 0.4 %
BUN SERPL-MCNC: 61 MG/DL
CALCIUM SERPL-MCNC: 10.1 MG/DL
CALCIUM SERPL-MCNC: 10.1 MG/DL
CHLORIDE SERPL-SCNC: 107 MMOL/L
CO2 SERPL-SCNC: 23 MMOL/L
CREAT SERPL-MCNC: 4.8 MG/DL
EOSINOPHIL # BLD AUTO: 0.14 K/UL
EOSINOPHIL NFR BLD AUTO: 1.7 %
GLUCOSE SERPL-MCNC: 101 MG/DL
HCT VFR BLD CALC: 26.3 %
HGB BLD-MCNC: 8.7 G/DL
IMM GRANULOCYTES NFR BLD AUTO: 0.4 %
LYMPHOCYTES # BLD AUTO: 1.17 K/UL
LYMPHOCYTES NFR BLD AUTO: 14.5 %
MAN DIFF?: NORMAL
MCHC RBC-ENTMCNC: 32.7 PG
MCHC RBC-ENTMCNC: 33.1 GM/DL
MCV RBC AUTO: 98.9 FL
MONOCYTES # BLD AUTO: 0.84 K/UL
MONOCYTES NFR BLD AUTO: 10.4 %
NEUTROPHILS # BLD AUTO: 5.84 K/UL
NEUTROPHILS NFR BLD AUTO: 72.6 %
PARATHYROID HORMONE INTACT: 77 PG/ML
PHOSPHATE SERPL-MCNC: 5 MG/DL
PLATELET # BLD AUTO: 313 K/UL
POTASSIUM SERPL-SCNC: 4.4 MMOL/L
RBC # BLD: 2.66 M/UL
RBC # FLD: 14.1 %
SODIUM SERPL-SCNC: 144 MMOL/L
WBC # FLD AUTO: 8.05 K/UL

## 2018-05-31 ENCOUNTER — RX RENEWAL (OUTPATIENT)
Age: 48
End: 2018-05-31

## 2018-06-06 ENCOUNTER — APPOINTMENT (OUTPATIENT)
Dept: NEPHROLOGY | Facility: CLINIC | Age: 48
End: 2018-06-06
Payer: COMMERCIAL

## 2018-06-06 VITALS
DIASTOLIC BLOOD PRESSURE: 72 MMHG | WEIGHT: 204 LBS | HEIGHT: 64 IN | OXYGEN SATURATION: 98 % | BODY MASS INDEX: 34.83 KG/M2 | SYSTOLIC BLOOD PRESSURE: 144 MMHG | HEART RATE: 59 BPM

## 2018-06-06 PROCEDURE — 96372 THER/PROPH/DIAG INJ SC/IM: CPT

## 2018-06-06 RX ORDER — DARBEPOETIN ALFA 60 UG/ML
60 SOLUTION INTRAVENOUS; SUBCUTANEOUS
Qty: 0 | Refills: 0 | Status: COMPLETED | OUTPATIENT
Start: 2018-05-24

## 2018-06-07 RX ADMIN — DARBEPOETIN ALFA 60 MCG/ML: 60 SOLUTION INTRAVENOUS; SUBCUTANEOUS at 00:00

## 2018-06-11 LAB
ALBUMIN SERPL ELPH-MCNC: 4.1 G/DL
ANION GAP SERPL CALC-SCNC: 19 MMOL/L
BASOPHILS # BLD AUTO: 0.02 K/UL
BASOPHILS NFR BLD AUTO: 0.2 %
BUN SERPL-MCNC: 59 MG/DL
CALCIUM SERPL-MCNC: 9.8 MG/DL
CHLORIDE SERPL-SCNC: 107 MMOL/L
CO2 SERPL-SCNC: 19 MMOL/L
CREAT SERPL-MCNC: 4.63 MG/DL
EOSINOPHIL # BLD AUTO: 0.14 K/UL
EOSINOPHIL NFR BLD AUTO: 1.6 %
GLUCOSE SERPL-MCNC: 120 MG/DL
HCT VFR BLD CALC: 28.6 %
HGB BLD-MCNC: 9.2 G/DL
IMM GRANULOCYTES NFR BLD AUTO: 0.5 %
LYMPHOCYTES # BLD AUTO: 1.33 K/UL
LYMPHOCYTES NFR BLD AUTO: 15.6 %
MAN DIFF?: NORMAL
MCHC RBC-ENTMCNC: 32.2 GM/DL
MCHC RBC-ENTMCNC: 32.3 PG
MCV RBC AUTO: 100.4 FL
MONOCYTES # BLD AUTO: 0.68 K/UL
MONOCYTES NFR BLD AUTO: 8 %
NEUTROPHILS # BLD AUTO: 6.3 K/UL
NEUTROPHILS NFR BLD AUTO: 74.1 %
PHOSPHATE SERPL-MCNC: 5.8 MG/DL
PLATELET # BLD AUTO: 362 K/UL
POTASSIUM SERPL-SCNC: 4.9 MMOL/L
RBC # BLD: 2.85 M/UL
RBC # FLD: 14.5 %
SODIUM SERPL-SCNC: 145 MMOL/L
WBC # FLD AUTO: 8.51 K/UL

## 2018-06-19 ENCOUNTER — APPOINTMENT (OUTPATIENT)
Dept: NEPHROLOGY | Facility: CLINIC | Age: 48
End: 2018-06-19
Payer: COMMERCIAL

## 2018-06-19 VITALS
HEIGHT: 64 IN | WEIGHT: 205.47 LBS | DIASTOLIC BLOOD PRESSURE: 74 MMHG | OXYGEN SATURATION: 99 % | BODY MASS INDEX: 35.08 KG/M2 | SYSTOLIC BLOOD PRESSURE: 146 MMHG | HEART RATE: 51 BPM

## 2018-06-19 PROCEDURE — G0010: CPT

## 2018-06-19 PROCEDURE — 96372 THER/PROPH/DIAG INJ SC/IM: CPT

## 2018-06-19 PROCEDURE — 90740 HEPB VACC 3 DOSE IMMUNSUP IM: CPT

## 2018-06-19 RX ORDER — DARBEPOETIN ALFA 60 UG/ML
60 SOLUTION INTRAVENOUS; SUBCUTANEOUS
Qty: 0 | Refills: 0 | Status: COMPLETED | OUTPATIENT
Start: 2018-06-07

## 2018-07-18 PROBLEM — R79.89 AZOTEMIA: Status: RESOLVED | Noted: 2018-07-18 | Resolved: 2018-07-18

## 2018-07-18 PROBLEM — Z92.89 H/O ECHOCARDIOGRAM: Status: RESOLVED | Noted: 2018-07-18 | Resolved: 2018-07-18

## 2018-07-23 ENCOUNTER — LABORATORY RESULT (OUTPATIENT)
Age: 48
End: 2018-07-23

## 2018-07-23 ENCOUNTER — APPOINTMENT (OUTPATIENT)
Dept: INTERNAL MEDICINE | Facility: CLINIC | Age: 48
End: 2018-07-23
Payer: COMMERCIAL

## 2018-07-23 ENCOUNTER — NON-APPOINTMENT (OUTPATIENT)
Age: 48
End: 2018-07-23

## 2018-07-23 ENCOUNTER — MEDICATION RENEWAL (OUTPATIENT)
Age: 48
End: 2018-07-23

## 2018-07-23 VITALS
DIASTOLIC BLOOD PRESSURE: 83 MMHG | SYSTOLIC BLOOD PRESSURE: 138 MMHG | HEIGHT: 64.5 IN | BODY MASS INDEX: 33.73 KG/M2 | WEIGHT: 200 LBS

## 2018-07-23 VITALS — DIASTOLIC BLOOD PRESSURE: 72 MMHG | SYSTOLIC BLOOD PRESSURE: 132 MMHG

## 2018-07-23 DIAGNOSIS — Z92.89 PERSONAL HISTORY OF OTHER MEDICAL TREATMENT: ICD-10-CM

## 2018-07-23 DIAGNOSIS — R79.89 OTHER SPECIFIED ABNORMAL FINDINGS OF BLOOD CHEMISTRY: ICD-10-CM

## 2018-07-23 LAB
BILIRUB UR QL STRIP: NORMAL
CLARITY UR: CLEAR
COLLECTION METHOD: NORMAL
GLUCOSE UR-MCNC: 100
HCG UR QL: 0.2 EU/DL
HGB UR QL STRIP.AUTO: NORMAL
KETONES UR-MCNC: NORMAL
LEUKOCYTE ESTERASE UR QL STRIP: NORMAL
NITRITE UR QL STRIP: NORMAL
PH UR STRIP: 7
PROT UR STRIP-MCNC: 100
SP GR UR STRIP: 1.01

## 2018-07-23 PROCEDURE — 99396 PREV VISIT EST AGE 40-64: CPT | Mod: 25

## 2018-07-23 PROCEDURE — 93000 ELECTROCARDIOGRAM COMPLETE: CPT

## 2018-07-23 PROCEDURE — 82270 OCCULT BLOOD FECES: CPT

## 2018-07-23 PROCEDURE — 36415 COLL VENOUS BLD VENIPUNCTURE: CPT

## 2018-07-23 PROCEDURE — 81003 URINALYSIS AUTO W/O SCOPE: CPT | Mod: QW

## 2018-07-23 NOTE — PHYSICAL EXAM

## 2018-07-23 NOTE — HEALTH RISK ASSESSMENT
[Good] : ~his/her~  mood as  good [Two or more falls in past year] : Patient reported two or more falls in the past year [0] : 2) Feeling down, depressed, or hopeless: Not at all (0) [Hepatitis C test offered] : Hepatitis C test offered [] : No

## 2018-07-23 NOTE — PLAN
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 He is now on the transplant list. He has had a recent stress test which was negative. He is without any uremic symptoms. He is followed jointly by nephrology. Appropriate blood tests including a phosphorus and parathyroid hormone were drawn today\par \par He has a history of hypertension his blood pressure is under good control he has no orthostasis no medication changes\par \par We continued to treat him for his hypercholesterolemia. Her cholesterol profile has been drawn today medication changes May be made predicated on his results\par \par He does have a history of fatty liver he continues to endeavor to lose weight.\par \par He does have metabolic acidosis and is on bicarbonate for serum bicarbonate was low\par \par He has multiple cutaneous lesions he does followup with dermatology\par \par He is sexually active appropriate blood tests have been drawn

## 2018-07-23 NOTE — HISTORY OF PRESENT ILLNESS
[FreeTextEntry1] : This is a 47-year-old male for annual health assessment [de-identified] : Specifically we will address his history of chronic renal failure status post transplant hypertension cutaneous lesions fatty liver\par \par Patient feels remarkably well he has no systemic complaints

## 2018-08-03 LAB
FERRITIN SERPL-MCNC: 233 NG/ML
IRON SATN MFR SERPL: 30 %
IRON SERPL-MCNC: 67 UG/DL
TIBC SERPL-MCNC: 224 UG/DL
UIBC SERPL-MCNC: 157 UG/DL

## 2018-08-21 ENCOUNTER — APPOINTMENT (OUTPATIENT)
Dept: NEPHROLOGY | Facility: CLINIC | Age: 48
End: 2018-08-21
Payer: COMMERCIAL

## 2018-08-21 VITALS
WEIGHT: 201.72 LBS | BODY MASS INDEX: 34.02 KG/M2 | SYSTOLIC BLOOD PRESSURE: 150 MMHG | HEIGHT: 64.5 IN | HEART RATE: 58 BPM | OXYGEN SATURATION: 98 % | DIASTOLIC BLOOD PRESSURE: 91 MMHG

## 2018-08-21 PROCEDURE — 96372 THER/PROPH/DIAG INJ SC/IM: CPT

## 2018-08-22 ENCOUNTER — APPOINTMENT (OUTPATIENT)
Dept: NEPHROLOGY | Facility: CLINIC | Age: 48
End: 2018-08-22

## 2018-10-02 ENCOUNTER — MEDICATION RENEWAL (OUTPATIENT)
Age: 48
End: 2018-10-02

## 2018-10-22 ENCOUNTER — LABORATORY RESULT (OUTPATIENT)
Age: 48
End: 2018-10-22

## 2018-10-22 ENCOUNTER — RX RENEWAL (OUTPATIENT)
Age: 48
End: 2018-10-22

## 2018-10-22 ENCOUNTER — APPOINTMENT (OUTPATIENT)
Dept: INTERNAL MEDICINE | Facility: CLINIC | Age: 48
End: 2018-10-22
Payer: COMMERCIAL

## 2018-10-22 VITALS — WEIGHT: 201 LBS | HEIGHT: 64.5 IN | BODY MASS INDEX: 33.9 KG/M2

## 2018-10-22 LAB
25(OH)D3 SERPL-MCNC: 30 NG/ML
ALBUMIN SERPL ELPH-MCNC: 4.1 G/DL
ALP BLD-CCNC: 53 U/L
ALT SERPL-CCNC: 14 U/L
ANION GAP SERPL CALC-SCNC: 14 MMOL/L
AST SERPL-CCNC: 21 U/L
BASOPHILS # BLD AUTO: 0.02 K/UL
BASOPHILS NFR BLD AUTO: 0.3 %
BILIRUB SERPL-MCNC: 0.3 MG/DL
BUN SERPL-MCNC: 53 MG/DL
CALCIUM SERPL-MCNC: 9.5 MG/DL
CALCIUM SERPL-MCNC: 9.5 MG/DL
CHLORIDE SERPL-SCNC: 107 MMOL/L
CHOLEST SERPL-MCNC: 100 MG/DL
CHOLEST/HDLC SERPL: 3.7 RATIO
CO2 SERPL-SCNC: 24 MMOL/L
CREAT SERPL-MCNC: 4.34 MG/DL
EOSINOPHIL # BLD AUTO: 0.12 K/UL
EOSINOPHIL NFR BLD AUTO: 1.6 %
GLUCOSE SERPL-MCNC: 117 MG/DL
HBA1C MFR BLD HPLC: 5.5 %
HCT VFR BLD CALC: 26.1 %
HCV AB SER QL: NONREACTIVE
HCV S/CO RATIO: 0.11 S/CO
HDLC SERPL-MCNC: 27 MG/DL
HGB BLD-MCNC: 8.4 G/DL
HIV1+2 AB SPEC QL IA.RAPID: NONREACTIVE
IMM GRANULOCYTES NFR BLD AUTO: 0.5 %
LDLC SERPL CALC-MCNC: 50 MG/DL
LYMPHOCYTES # BLD AUTO: 1.31 K/UL
LYMPHOCYTES NFR BLD AUTO: 17.2 %
MAN DIFF?: NORMAL
MCHC RBC-ENTMCNC: 32.2 GM/DL
MCHC RBC-ENTMCNC: 32.4 PG
MCV RBC AUTO: 100.8 FL
MONOCYTES # BLD AUTO: 0.45 K/UL
MONOCYTES NFR BLD AUTO: 5.9 %
NEUTROPHILS # BLD AUTO: 5.69 K/UL
NEUTROPHILS NFR BLD AUTO: 74.5 %
PARATHYROID HORMONE INTACT: 248 PG/ML
PHOSPHATE SERPL-MCNC: 4.5 MG/DL
PLATELET # BLD AUTO: 377 K/UL
POTASSIUM SERPL-SCNC: 4.5 MMOL/L
PROT SERPL-MCNC: 7.4 G/DL
RBC # BLD: 2.59 M/UL
RBC # FLD: 14.7 %
SAVE SPECIMEN: NORMAL
SODIUM SERPL-SCNC: 144 MMOL/L
T PALLIDUM AB SER QL IA: NEGATIVE
T3RU NFR SERPL: 1.05 INDEX
T4 SERPL-MCNC: 7.6 UG/DL
TRIGL SERPL-MCNC: 117 MG/DL
TSH SERPL-ACNC: 4.78 UIU/ML
URATE SERPL-MCNC: 6.3 MG/DL
WBC # FLD AUTO: 7.63 K/UL

## 2018-10-22 PROCEDURE — G0008: CPT

## 2018-10-22 PROCEDURE — 90686 IIV4 VACC NO PRSV 0.5 ML IM: CPT

## 2018-10-22 PROCEDURE — 36415 COLL VENOUS BLD VENIPUNCTURE: CPT

## 2018-10-22 PROCEDURE — 99214 OFFICE O/P EST MOD 30 MIN: CPT | Mod: 25

## 2018-10-22 NOTE — PHYSICAL EXAM
[No Acute Distress] : no acute distress [Well Nourished] : well nourished [Well Developed] : well developed [Normal Sclera/Conjunctiva] : normal sclera/conjunctiva [No JVD] : no jugular venous distention [No Respiratory Distress] : no respiratory distress  [Clear to Auscultation] : lungs were clear to auscultation bilaterally [No Accessory Muscle Use] : no accessory muscle use [Normal Percussion] : the chest was normal to percussion [Normal Rate] : normal rate  [Regular Rhythm] : with a regular rhythm [Normal S1, S2] : normal S1 and S2 [No Edema] : there was no peripheral edema [No HSM] : no HSM [de-identified] : unchanged

## 2018-10-22 NOTE — HISTORY OF PRESENT ILLNESS
[FreeTextEntry1] : This is a 40-year-old male for evaluation and treatment of his azotemia status post transplant hypertension chronic anemia acidosis and fatty liver [de-identified] : Patient remains remarkably upbeat and feels well. He denies any uremic symptoms lethargy anorexia.\par \par He did lose weight but this stabilized

## 2018-10-22 NOTE — ASSESSMENT
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 Appropriate blood tests have been drawn\par \par He has a history of renal acidosis and is on bicarbonate serum bicarbonate of course has been obtained\par \par He has a history of hypertension his blood pressure is under excellent control he has no orthostasis no medication changes\par \par He has a history of anemia he is on erythropoietin Repeat CBC iron and ferritin have been drawn\par \par remains waiting for transplant he will be seeing renal tomorrow\par \par A flu shot will be given

## 2018-10-23 ENCOUNTER — APPOINTMENT (OUTPATIENT)
Dept: NEPHROLOGY | Facility: CLINIC | Age: 48
End: 2018-10-23
Payer: COMMERCIAL

## 2018-10-23 VITALS
OXYGEN SATURATION: 98 % | WEIGHT: 201 LBS | HEIGHT: 64.5 IN | SYSTOLIC BLOOD PRESSURE: 135 MMHG | BODY MASS INDEX: 33.9 KG/M2 | DIASTOLIC BLOOD PRESSURE: 68 MMHG

## 2018-10-23 PROCEDURE — 96372 THER/PROPH/DIAG INJ SC/IM: CPT | Mod: GC

## 2018-10-23 PROCEDURE — 99215 OFFICE O/P EST HI 40 MIN: CPT | Mod: 25,GC

## 2018-10-23 RX ORDER — DARBEPOETIN ALFA 40 UG/ML
40 SOLUTION INTRAVENOUS; SUBCUTANEOUS
Refills: 0 | Status: COMPLETED | OUTPATIENT
Start: 2018-10-23

## 2018-10-23 RX ADMIN — DARBEPOETIN ALFA 40 MCG/ML: 40 SOLUTION INTRAVENOUS; SUBCUTANEOUS at 00:00

## 2018-11-07 ENCOUNTER — APPOINTMENT (OUTPATIENT)
Dept: NEPHROLOGY | Facility: CLINIC | Age: 48
End: 2018-11-07
Payer: COMMERCIAL

## 2018-11-07 PROCEDURE — 96372 THER/PROPH/DIAG INJ SC/IM: CPT

## 2018-11-09 LAB
ALBUMIN SERPL ELPH-MCNC: 3.9 G/DL
ANION GAP SERPL CALC-SCNC: 16 MMOL/L
BASOPHILS # BLD AUTO: 0.02 K/UL
BASOPHILS NFR BLD AUTO: 0.3 %
BUN SERPL-MCNC: 60 MG/DL
CALCIUM SERPL-MCNC: 9.7 MG/DL
CHLORIDE SERPL-SCNC: 109 MMOL/L
CO2 SERPL-SCNC: 20 MMOL/L
CREAT SERPL-MCNC: 4.41 MG/DL
EOSINOPHIL # BLD AUTO: 0.09 K/UL
EOSINOPHIL NFR BLD AUTO: 1.2 %
GLUCOSE SERPL-MCNC: 104 MG/DL
HCT VFR BLD CALC: 26.4 %
HGB BLD-MCNC: 8.4 G/DL
IMM GRANULOCYTES NFR BLD AUTO: 0.4 %
LYMPHOCYTES # BLD AUTO: 1.18 K/UL
LYMPHOCYTES NFR BLD AUTO: 15.5 %
MAN DIFF?: NORMAL
MCHC RBC-ENTMCNC: 31.6 PG
MCHC RBC-ENTMCNC: 31.8 GM/DL
MCV RBC AUTO: 99.2 FL
MONOCYTES # BLD AUTO: 0.51 K/UL
MONOCYTES NFR BLD AUTO: 6.7 %
NEUTROPHILS # BLD AUTO: 5.8 K/UL
NEUTROPHILS NFR BLD AUTO: 75.9 %
PHOSPHATE SERPL-MCNC: 4.3 MG/DL
PLATELET # BLD AUTO: 368 K/UL
POTASSIUM SERPL-SCNC: 4.3 MMOL/L
RBC # BLD: 2.66 M/UL
RBC # FLD: 14.9 %
SODIUM SERPL-SCNC: 145 MMOL/L
WBC # FLD AUTO: 7.63 K/UL

## 2018-11-21 ENCOUNTER — APPOINTMENT (OUTPATIENT)
Dept: NEPHROLOGY | Facility: CLINIC | Age: 48
End: 2018-11-21
Payer: COMMERCIAL

## 2018-11-21 VITALS — RESPIRATION RATE: 14 BRPM | SYSTOLIC BLOOD PRESSURE: 140 MMHG | HEART RATE: 76 BPM | DIASTOLIC BLOOD PRESSURE: 70 MMHG

## 2018-11-21 PROCEDURE — 99215 OFFICE O/P EST HI 40 MIN: CPT | Mod: 25

## 2018-11-21 PROCEDURE — 96372 THER/PROPH/DIAG INJ SC/IM: CPT

## 2018-12-12 ENCOUNTER — APPOINTMENT (OUTPATIENT)
Dept: NEPHROLOGY | Facility: CLINIC | Age: 48
End: 2018-12-12
Payer: COMMERCIAL

## 2018-12-12 VITALS
DIASTOLIC BLOOD PRESSURE: 80 MMHG | HEART RATE: 58 BPM | SYSTOLIC BLOOD PRESSURE: 146 MMHG | OXYGEN SATURATION: 98 % | HEIGHT: 64.5 IN | BODY MASS INDEX: 34.58 KG/M2 | WEIGHT: 205.03 LBS

## 2018-12-12 PROCEDURE — 96372 THER/PROPH/DIAG INJ SC/IM: CPT

## 2018-12-14 LAB
ALBUMIN SERPL ELPH-MCNC: 3.9 G/DL
ANION GAP SERPL CALC-SCNC: 15 MMOL/L
BASOPHILS # BLD AUTO: 0.03 K/UL
BASOPHILS NFR BLD AUTO: 0.4 %
BUN SERPL-MCNC: 55 MG/DL
CALCIUM SERPL-MCNC: 9.8 MG/DL
CHLORIDE SERPL-SCNC: 110 MMOL/L
CO2 SERPL-SCNC: 20 MMOL/L
CREAT SERPL-MCNC: 3.67 MG/DL
EOSINOPHIL # BLD AUTO: 0.09 K/UL
EOSINOPHIL NFR BLD AUTO: 1.2 %
FERRITIN SERPL-MCNC: 221 NG/ML
GLUCOSE SERPL-MCNC: 90 MG/DL
HBV CORE IGG+IGM SER QL: NONREACTIVE
HBV CORE IGM SER QL: NONREACTIVE
HBV SURFACE AB SER QL: REACTIVE
HBV SURFACE AB SERPL IA-ACNC: 434.5 MIU/ML
HBV SURFACE AG SER QL: NONREACTIVE
HCT VFR BLD CALC: 27.1 %
HGB BLD-MCNC: 8.8 G/DL
IMM GRANULOCYTES NFR BLD AUTO: 0.4 %
IRON SATN MFR SERPL: 29 %
IRON SERPL-MCNC: 71 UG/DL
LYMPHOCYTES # BLD AUTO: 1 K/UL
LYMPHOCYTES NFR BLD AUTO: 13.4 %
MAN DIFF?: NORMAL
MCHC RBC-ENTMCNC: 32.5 GM/DL
MCHC RBC-ENTMCNC: 32.7 PG
MCV RBC AUTO: 100.7 FL
MONOCYTES # BLD AUTO: 0.39 K/UL
MONOCYTES NFR BLD AUTO: 5.2 %
NEUTROPHILS # BLD AUTO: 5.95 K/UL
NEUTROPHILS NFR BLD AUTO: 79.4 %
PHOSPHATE SERPL-MCNC: 4.3 MG/DL
PLATELET # BLD AUTO: 388 K/UL
POTASSIUM SERPL-SCNC: 4.7 MMOL/L
RBC # BLD: 2.69 M/UL
RBC # FLD: 14.7 %
SODIUM SERPL-SCNC: 145 MMOL/L
TIBC SERPL-MCNC: 241 UG/DL
UIBC SERPL-MCNC: 170 UG/DL
WBC # FLD AUTO: 7.49 K/UL

## 2019-01-02 ENCOUNTER — APPOINTMENT (OUTPATIENT)
Dept: NEPHROLOGY | Facility: CLINIC | Age: 49
End: 2019-01-02
Payer: COMMERCIAL

## 2019-01-02 VITALS — DIASTOLIC BLOOD PRESSURE: 70 MMHG | SYSTOLIC BLOOD PRESSURE: 148 MMHG

## 2019-01-02 PROCEDURE — 96372 THER/PROPH/DIAG INJ SC/IM: CPT

## 2019-01-21 ENCOUNTER — RX RENEWAL (OUTPATIENT)
Age: 49
End: 2019-01-21

## 2019-01-23 ENCOUNTER — APPOINTMENT (OUTPATIENT)
Dept: NEPHROLOGY | Facility: CLINIC | Age: 49
End: 2019-01-23
Payer: COMMERCIAL

## 2019-01-23 VITALS
HEART RATE: 57 BPM | BODY MASS INDEX: 34.4 KG/M2 | HEIGHT: 64.5 IN | SYSTOLIC BLOOD PRESSURE: 143 MMHG | OXYGEN SATURATION: 98 % | DIASTOLIC BLOOD PRESSURE: 63 MMHG | WEIGHT: 204 LBS

## 2019-01-23 PROCEDURE — 96372 THER/PROPH/DIAG INJ SC/IM: CPT

## 2019-01-23 PROCEDURE — 99214 OFFICE O/P EST MOD 30 MIN: CPT | Mod: 25,GC

## 2019-01-23 RX ORDER — DARBEPOETIN ALFA 40 UG/ML
40 SOLUTION INTRAVENOUS; SUBCUTANEOUS
Refills: 0 | Status: COMPLETED | OUTPATIENT
Start: 2019-01-23

## 2019-01-23 RX ADMIN — DARBEPOETIN ALFA 40 MCG/ML: 40 SOLUTION INTRAVENOUS; SUBCUTANEOUS at 00:00

## 2019-01-23 NOTE — PROCEDURE
[FreeTextEntry1] : Pt given Aranesp 40mcg in left deltoid.\par Tolerated [procedure well. \par BP stable.

## 2019-01-24 LAB
25(OH)D3 SERPL-MCNC: 29.9 NG/ML
ALBUMIN SERPL ELPH-MCNC: 4.2 G/DL
ANION GAP SERPL CALC-SCNC: 14 MMOL/L
APPEARANCE: CLEAR
BACTERIA: NEGATIVE
BASOPHILS # BLD AUTO: 0.03 K/UL
BASOPHILS NFR BLD AUTO: 0.4 %
BILIRUBIN URINE: NEGATIVE
BLOOD URINE: NEGATIVE
BUN SERPL-MCNC: 44 MG/DL
CALCIUM SERPL-MCNC: 10 MG/DL
CALCIUM SERPL-MCNC: 10 MG/DL
CHLORIDE SERPL-SCNC: 108 MMOL/L
CO2 SERPL-SCNC: 24 MMOL/L
COLOR: YELLOW
CREAT SERPL-MCNC: 4.32 MG/DL
CREAT SPEC-SCNC: 76 MG/DL
CREAT/PROT UR: 0.9 RATIO
EOSINOPHIL # BLD AUTO: 0.1 K/UL
EOSINOPHIL NFR BLD AUTO: 1.3 %
FERRITIN SERPL-MCNC: 213 NG/ML
GLUCOSE QUALITATIVE U: 100 MG/DL
GLUCOSE SERPL-MCNC: 96 MG/DL
HCT VFR BLD CALC: 29 %
HGB BLD-MCNC: 9.2 G/DL
HYALINE CASTS: 0 /LPF
IMM GRANULOCYTES NFR BLD AUTO: 0.1 %
IRON SATN MFR SERPL: 28 %
IRON SERPL-MCNC: 64 UG/DL
KETONES URINE: NEGATIVE
LEUKOCYTE ESTERASE URINE: NEGATIVE
LYMPHOCYTES # BLD AUTO: 1.03 K/UL
LYMPHOCYTES NFR BLD AUTO: 13.6 %
MAN DIFF?: NORMAL
MCHC RBC-ENTMCNC: 31.7 GM/DL
MCHC RBC-ENTMCNC: 32.1 PG
MCV RBC AUTO: 101 FL
MICROSCOPIC-UA: NORMAL
MONOCYTES # BLD AUTO: 0.55 K/UL
MONOCYTES NFR BLD AUTO: 7.3 %
NEUTROPHILS # BLD AUTO: 5.85 K/UL
NEUTROPHILS NFR BLD AUTO: 77.3 %
NITRITE URINE: NEGATIVE
PARATHYROID HORMONE INTACT: 254 PG/ML
PH URINE: 8
PHOSPHATE SERPL-MCNC: 4.6 MG/DL
PLATELET # BLD AUTO: 355 K/UL
POTASSIUM SERPL-SCNC: 4.9 MMOL/L
PROT UR-MCNC: 71 MG/DL
PROTEIN URINE: 100 MG/DL
RBC # BLD: 2.87 M/UL
RBC # FLD: 14.9 %
RED BLOOD CELLS URINE: 1 /HPF
SODIUM SERPL-SCNC: 146 MMOL/L
SPECIFIC GRAVITY URINE: 1.01
SQUAMOUS EPITHELIAL CELLS: 1 /HPF
TIBC SERPL-MCNC: 230 UG/DL
UIBC SERPL-MCNC: 166 UG/DL
UROBILINOGEN URINE: NEGATIVE MG/DL
WBC # FLD AUTO: 7.57 K/UL
WHITE BLOOD CELLS URINE: 4 /HPF

## 2019-01-24 NOTE — HISTORY OF PRESENT ILLNESS
[FreeTextEntry1] : Here for follow up.\par Here with mother.\par \par Feels well.\par Still working (is a ).\par Sleeping well\par Denies any leg edema, sob, or change in urinary frequency.\par Denies metallic taste in mouth; appetite is the same.\par Denies n/v.\par \par All other ROS negative.

## 2019-01-24 NOTE — ASSESSMENT
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\par - Renal transplant with likely CTG, 38 years\par - Uptrending/stable cr over time, no uremic symptoms \par - following with [place] for transplant\par - initial eval for PD catheter done; pt prefers PD if needed\par - following w/ HT, Jie\par - check renal panel, UA, urine pro/cr\par \par Immunosuppression\par - c/w pred 5 and imuran 150mg (refilled) \par - following w/ Derm for skin lesions\par \par HTN- controlled\par - cont atenolol, nifedipine and hydralazine 25mg tid (refilled)\par \par Anemia due to CKD \par - Hgb low to 8s\par - iron panel acceptable\par - given Aranesp 40mcg, last in 1/2\par - will give another Aranesp 40mcg today\par - check CBC\par \par elevated calcium on prior labs/hypercalcemia\par - last Ca wnl, though on higher end\par - last iPTH uptrended to 276; monitor for now\par - keep off rocaltrol\par - check vit D, Ca, pth \par \par HCM \par - PNA vaccine 2015\par - Influenza vaccine done 10/22/18\par - HBV series: Dec #1, Jan #2, June 19th #3; Hep B titres done in 12/2018

## 2019-01-24 NOTE — REVIEW OF SYSTEMS
[Skin Lesions] : skin lesion [Fever] : no fever [Chills] : no chills [Feeling Poorly] : not feeling poorly [Feeling Tired] : not feeling tired [Eye Pain] : no eye pain [Heart Rate Is Slow] : the heart rate was not slow [Heart Rate Is Fast] : the heart rate was not fast [Chest Pain] : no chest pain [Palpitations] : no palpitations [Lower Ext Edema] : no extremity edema [Shortness Of Breath] : no shortness of breath [Wheezing] : no wheezing [Cough] : no cough [SOB on Exertion] : no shortness of breath during exertion [Abdominal Pain] : no abdominal pain [Vomiting] : no vomiting [Constipation] : no constipation [Dysuria] : no dysuria [Arthralgias] : no arthralgias [Joint Pain] : no joint pain [Dizziness] : no dizziness

## 2019-01-24 NOTE — REASON FOR VISIT
[Follow-Up] : a follow-up visit [Procedure: _________] : a [unfilled] procedure visit [Parent] : parent [FreeTextEntry1] : CKD Stage 1V/V

## 2019-01-24 NOTE — PHYSICAL EXAM
[General Appearance - Alert] : alert [General Appearance - In No Acute Distress] : in no acute distress [General Appearance - Well Nourished] : well nourished [General Appearance - Well Developed] : well developed [Sclera] : the sclera and conjunctiva were normal [Outer Ear] : the ears and nose were normal in appearance [Neck Appearance] : the appearance of the neck was normal [Respiration, Rhythm And Depth] : normal respiratory rhythm and effort [Auscultation Breath Sounds / Voice Sounds] : lungs were clear to auscultation bilaterally [Apical Impulse] : the apical impulse was normal [Heart Rate And Rhythm] : heart rate was normal and rhythm regular [Heart Sounds] : normal S1 and S2 [Bowel Sounds] : normal bowel sounds [Abdomen Soft] : soft [No CVA Tenderness] : no ~M costovertebral angle tenderness [Skin Color & Pigmentation] : normal skin color and pigmentation [Skin Turgor] : normal skin turgor [] : no rash [Oriented To Time, Place, And Person] : oriented to person, place, and time [Exaggerated Use Of Accessory Muscles For Inspiration] : no accessory muscle use [Heart Sounds Pericardial Friction Rub] : no pericardial rub [Abdomen Tenderness] : non-tender [Involuntary Movements] : no involuntary movements were seen [No Focal Deficits] : no focal deficits [FreeTextEntry1] : multiple moles over torso, extremities

## 2019-01-24 NOTE — ASSESSMENT
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\par - Renal transplant with likely CTG, 38 years\par - Uptrending/stable cr over time, no uremic symptoms \par - following with [place] for transplant\par - initial eval for PD catheter done; pt prefers PD if needed\par - following w/ HT, Jie\par - check renal panel, UA, urine pro/cr\par \par Immunosuppression\par - c/w pred 5 and imuran 150mg (refilled) \par - following w/ Derm for skin lesions\par \par HTN- controlled\par - cont atenolol, nifedipine and hydralazine 25mg tid (refilled)\par \par Anemia due to CKD \par - Hgb low to 8s\par - iron panel acceptable\par - given Aranesp 40mcg, last in 1/2\par - will give another Aranesp 40mcg today\par - check CBC\par \par elevated calcium on prior labs/hypercalcemia\par - last Ca wnl, though on higher end\par - last iPTH uptrended to 276; monitor for now\par - keep off rocaltrol\par - check vit D, Ca, pth \par \par HCM \par - PNA vaccine 2015\par - Influenza vaccine done 10/22/18\par - HBV series: Dec #1, Jan #2, June 19th #3; Hep B titres done in 12/2018

## 2019-02-02 LAB
25(OH)D3 SERPL-MCNC: 27.5 NG/ML
ALBUMIN SERPL ELPH-MCNC: 4.3 G/DL
ALP BLD-CCNC: 62 U/L
ALT SERPL-CCNC: 14 U/L
ANION GAP SERPL CALC-SCNC: 26 MMOL/L
AST SERPL-CCNC: 19 U/L
BASOPHILS # BLD AUTO: 0.04 K/UL
BASOPHILS NFR BLD AUTO: 0.5 %
BILIRUB SERPL-MCNC: 0.4 MG/DL
BUN SERPL-MCNC: 63 MG/DL
CALCIUM SERPL-MCNC: 9.8 MG/DL
CALCIUM SERPL-MCNC: 9.8 MG/DL
CHLORIDE SERPL-SCNC: 106 MMOL/L
CHOLEST SERPL-MCNC: 122 MG/DL
CHOLEST/HDLC SERPL: 3.8 RATIO
CO2 SERPL-SCNC: 16 MMOL/L
CREAT SERPL-MCNC: 5.22 MG/DL
EOSINOPHIL # BLD AUTO: 0.07 K/UL
EOSINOPHIL NFR BLD AUTO: 1 %
FERRITIN SERPL-MCNC: 264 NG/ML
GLUCOSE SERPL-MCNC: 121 MG/DL
HBA1C MFR BLD HPLC: 5.5 %
HCT VFR BLD CALC: 25.3 %
HDLC SERPL-MCNC: 32 MG/DL
HGB BLD-MCNC: 8.1 G/DL
IMM GRANULOCYTES NFR BLD AUTO: 0.7 %
IRON SATN MFR SERPL: 41 %
IRON SERPL-MCNC: 96 UG/DL
LDLC SERPL CALC-MCNC: 61 MG/DL
LYMPHOCYTES # BLD AUTO: 1.42 K/UL
LYMPHOCYTES NFR BLD AUTO: 19.5 %
MAN DIFF?: NORMAL
MCHC RBC-ENTMCNC: 32 GM/DL
MCHC RBC-ENTMCNC: 32.8 PG
MCV RBC AUTO: 102.4 FL
MONOCYTES # BLD AUTO: 0.45 K/UL
MONOCYTES NFR BLD AUTO: 6.2 %
NEUTROPHILS # BLD AUTO: 5.25 K/UL
NEUTROPHILS NFR BLD AUTO: 72.1 %
PARATHYROID HORMONE INTACT: 276 PG/ML
PHOSPHATE SERPL-MCNC: 5 MG/DL
PLATELET # BLD AUTO: 374 K/UL
POTASSIUM SERPL-SCNC: 4.3 MMOL/L
PROT SERPL-MCNC: 7.1 G/DL
RBC # BLD: 2.47 M/UL
RBC # FLD: 15.4 %
SAVE SPECIMEN: NORMAL
SODIUM SERPL-SCNC: 147 MMOL/L
T3RU NFR SERPL: 1.04 INDEX
T4 SERPL-MCNC: 6.5 UG/DL
TIBC SERPL-MCNC: 237 UG/DL
TRIGL SERPL-MCNC: 147 MG/DL
TSH SERPL-ACNC: 2.67 UIU/ML
UIBC SERPL-MCNC: 141 UG/DL
URATE SERPL-MCNC: 7.4 MG/DL
WBC # FLD AUTO: 7.28 K/UL

## 2019-02-27 ENCOUNTER — APPOINTMENT (OUTPATIENT)
Dept: NEPHROLOGY | Facility: CLINIC | Age: 49
End: 2019-02-27
Payer: COMMERCIAL

## 2019-02-27 VITALS
BODY MASS INDEX: 34.21 KG/M2 | SYSTOLIC BLOOD PRESSURE: 133 MMHG | DIASTOLIC BLOOD PRESSURE: 65 MMHG | HEIGHT: 64.5 IN | OXYGEN SATURATION: 98 % | HEART RATE: 58 BPM | WEIGHT: 202.82 LBS

## 2019-02-27 DIAGNOSIS — E55.9 VITAMIN D DEFICIENCY, UNSPECIFIED: ICD-10-CM

## 2019-02-27 PROCEDURE — 99214 OFFICE O/P EST MOD 30 MIN: CPT | Mod: 25,GC

## 2019-02-27 PROCEDURE — 96372 THER/PROPH/DIAG INJ SC/IM: CPT

## 2019-02-27 RX ORDER — DARBEPOETIN ALFA 40 UG/ML
40 SOLUTION INTRAVENOUS; SUBCUTANEOUS
Refills: 0 | Status: COMPLETED | OUTPATIENT
Start: 2019-02-27

## 2019-02-27 RX ADMIN — DARBEPOETIN ALFA 40 MCG/ML: 40 SOLUTION INTRAVENOUS; SUBCUTANEOUS at 00:00

## 2019-02-27 NOTE — PROCEDURE
[FreeTextEntry1] : Pt given 40mcg Aranesp in left shoulder SQ.\par Aranesp given for anemia 2/2 CKD, and pt w/ CKDIV. \par BP stable.\par Pt tolerate procedure well.

## 2019-02-28 LAB
25(OH)D3 SERPL-MCNC: 29.1 NG/ML
ALBUMIN SERPL ELPH-MCNC: 4.2 G/DL
ANION GAP SERPL CALC-SCNC: 18 MMOL/L
BASOPHILS # BLD AUTO: 0.06 K/UL
BASOPHILS NFR BLD AUTO: 0.7 %
BUN SERPL-MCNC: 59 MG/DL
CALCIUM SERPL-MCNC: 9.6 MG/DL
CHLORIDE SERPL-SCNC: 102 MMOL/L
CO2 SERPL-SCNC: 20 MMOL/L
CREAT SERPL-MCNC: 4.31 MG/DL
CREAT SPEC-SCNC: 115 MG/DL
CREAT/PROT UR: 0.6 RATIO
EOSINOPHIL # BLD AUTO: 0.06 K/UL
EOSINOPHIL NFR BLD AUTO: 0.7 %
FERRITIN SERPL-MCNC: 307 NG/ML
GLUCOSE SERPL-MCNC: 104 MG/DL
HCT VFR BLD CALC: 27.4 %
HGB BLD-MCNC: 8.9 G/DL
IMM GRANULOCYTES NFR BLD AUTO: 0.4 %
IRON SATN MFR SERPL: 55 %
IRON SERPL-MCNC: 126 UG/DL
LYMPHOCYTES # BLD AUTO: 1.31 K/UL
LYMPHOCYTES NFR BLD AUTO: 15.5 %
MAN DIFF?: NORMAL
MCHC RBC-ENTMCNC: 32.5 GM/DL
MCHC RBC-ENTMCNC: 32.6 PG
MCV RBC AUTO: 100.4 FL
MONOCYTES # BLD AUTO: 0.51 K/UL
MONOCYTES NFR BLD AUTO: 6 %
NEUTROPHILS # BLD AUTO: 6.49 K/UL
NEUTROPHILS NFR BLD AUTO: 76.7 %
PHOSPHATE SERPL-MCNC: 5.1 MG/DL
PLATELET # BLD AUTO: 393 K/UL
POTASSIUM SERPL-SCNC: 4.7 MMOL/L
PROT UR-MCNC: 66 MG/DL
RBC # BLD: 2.73 M/UL
RBC # FLD: 14.2 %
SODIUM SERPL-SCNC: 140 MMOL/L
TIBC SERPL-MCNC: 229 UG/DL
UIBC SERPL-MCNC: 103 UG/DL
WBC # FLD AUTO: 8.46 K/UL

## 2019-02-28 RX ADMIN — DARBEPOETIN ALFA 60 MCG/ML: 60 SOLUTION INTRAVENOUS; SUBCUTANEOUS at 00:00

## 2019-02-28 NOTE — HISTORY OF PRESENT ILLNESS
[FreeTextEntry1] : Here for follow up.\par \par Feels well. Appetite is very good. Sleeping well. Denies any fatigue.\par Still working as a  with night shifts- no issues.  \par \par Denies any leg edema, sob, or change in urinary frequency.\par \par Has not seen any other doctors since last visit\par \par All other ROS negative.

## 2019-02-28 NOTE — REASON FOR VISIT
[Follow-Up] : a follow-up visit [Procedure: _________] : a [unfilled] procedure visit [FreeTextEntry1] : CKD Stage 1V/V

## 2019-02-28 NOTE — ASSESSMENT
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\par - Renal transplant with likely CTG, 38 years\par - Uptrending/stable cr over time, no uremic symptoms \par - following with [place] for transplant\par - initial eval for PD catheter done; pt prefers PD if needed\par - following w/ HT, Jie\par - check renal panel\par \par Immunosuppression\par - c/w pred 5 and imuran 150mg \par - following w/ Derm yearly for skin lesions\par \par HTN - controlled\par - c/w atenolol, nifedipine and hydralazine 25mg tid \par \par Anemia due to CKD \par - Hgb improved from 8 to 9 range\par - given Aranesp 40mcg today\par - check CBC, iron panel\par \par elevated calcium on prior labs/hypercalcemia\par - last Ca wnl, though on higher end\par - on low dose vitD supplement\par - last iPTH uptrended to 276; monitor for now\par - keep off rocaltrol\par - check vit D, Ca\par \par HCM \par - PNA vaccine 2015\par - Influenza vaccine done 10/22/18\par - HBV series: Dec #1, Jan #2, June 19th #3; Hep B titres done in 12/2018\par - Will see Derm this year

## 2019-02-28 NOTE — ASSESSMENT
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\par - Renal transplant with likely CTG, 38 years\par - Uptrending/stable cr over time, no uremic symptoms \par - following with [place] for transplant\par - initial eval for PD catheter done; pt prefers PD if needed\par - following w/ HT, Jie\par - check renal panel\par \par Immunosuppression\par - c/w pred 5 and imuran 150mg \par - following w/ Derm yearly for skin lesions\par \par HTN - controlled\par - c/w atenolol, nifedipine and hydralazine 25mg tid \par \par Anemia due to CKD \par - Hgb improved from 8 to 9 range\par - given Aranesp 40mcg today\par - check CBC, iron panel\par \par elevated calcium on prior labs/hypercalcemia\par - last Ca wnl, though on higher end\par - on low dose vitD supplement\par - last iPTH uptrended to 276; monitor for now\par - keep off rocaltrol\par - check vit D, Ca\par \par HCM \par - PNA vaccine 2015\par - Influenza vaccine done 10/22/18\par - HBV series: Dec #1, Jan #2, June 19th #3; Hep B titres done in 12/2018\par - Will see Derm this year

## 2019-02-28 NOTE — PHYSICAL EXAM
[General Appearance - Alert] : alert [General Appearance - In No Acute Distress] : in no acute distress [General Appearance - Well Nourished] : well nourished [General Appearance - Well Developed] : well developed [Sclera] : the sclera and conjunctiva were normal [Outer Ear] : the ears and nose were normal in appearance [Neck Appearance] : the appearance of the neck was normal [Respiration, Rhythm And Depth] : normal respiratory rhythm and effort [Exaggerated Use Of Accessory Muscles For Inspiration] : no accessory muscle use [Auscultation Breath Sounds / Voice Sounds] : lungs were clear to auscultation bilaterally [Apical Impulse] : the apical impulse was normal [Heart Rate And Rhythm] : heart rate was normal and rhythm regular [Heart Sounds] : normal S1 and S2 [Heart Sounds Pericardial Friction Rub] : no pericardial rub [Bowel Sounds] : normal bowel sounds [Abdomen Soft] : soft [Abdomen Tenderness] : non-tender [No CVA Tenderness] : no ~M costovertebral angle tenderness [Involuntary Movements] : no involuntary movements were seen [Skin Color & Pigmentation] : normal skin color and pigmentation [Skin Turgor] : normal skin turgor [] : no rash [No Focal Deficits] : no focal deficits [Oriented To Time, Place, And Person] : oriented to person, place, and time [Edema] : there was no peripheral edema [FreeTextEntry1] : multiple moles over torso, extremities

## 2019-03-01 ENCOUNTER — APPOINTMENT (OUTPATIENT)
Dept: INTERNAL MEDICINE | Facility: CLINIC | Age: 49
End: 2019-03-01
Payer: COMMERCIAL

## 2019-03-01 VITALS — BODY MASS INDEX: 32.72 KG/M2 | HEIGHT: 64.5 IN | WEIGHT: 194 LBS

## 2019-03-01 VITALS — SYSTOLIC BLOOD PRESSURE: 120 MMHG | DIASTOLIC BLOOD PRESSURE: 70 MMHG

## 2019-03-01 PROCEDURE — 99214 OFFICE O/P EST MOD 30 MIN: CPT

## 2019-03-01 NOTE — HISTORY OF PRESENT ILLNESS
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 He has lost a substantial amount of weight. He walks every day. He has no cardiovascular complaints

## 2019-03-01 NOTE — PHYSICAL EXAM
[No Acute Distress] : no acute distress [Well Nourished] : well nourished [Well Developed] : well developed [Normal Sclera/Conjunctiva] : normal sclera/conjunctiva [No JVD] : no jugular venous distention [Supple] : supple [No Lymphadenopathy] : no lymphadenopathy [No Respiratory Distress] : no respiratory distress  [Clear to Auscultation] : lungs were clear to auscultation bilaterally [No Accessory Muscle Use] : no accessory muscle use [Normal Rate] : normal rate  [Regular Rhythm] : with a regular rhythm [Normal S1, S2] : normal S1 and S2 [No Edema] : there was no peripheral edema [Soft] : abdomen soft [No HSM] : no HSM [No Focal Deficits] : no focal deficits [Normal Affect] : the affect was normal [de-identified] : unchanged

## 2019-03-01 NOTE — ASSESSMENT
[FreeTextEntry1] : This is an upbeat 48-year-old male whose history has been reviewed above\par \par He has a history of hypertension his blood pressure is under excellent control the support is due to his weight loss no medication changes\par \par He has a history of hypercholesterolemia remains on a statin for cholesterol profiles were obtained medication changes predicated on the results\par \par He has a history of multiple cutaneous lesions these are followed periodically by dermatology he has an appointment pending\par \par In terms of his weight he has made a significant dent in his overall mass he continues to exercise and diet.\par \par He is on the waiting list for a renal transplant at [facility]. He is contemplating peritoneal dialysis if necessary prior to transplantation\par \par I did review his latest laboratories creatinine is stable at 4.32\par \par Interestingly he is no longer acidotic he is on bicarbonate\par \par He will require erythropoietin for his anemia this has been scheduled

## 2019-03-13 ENCOUNTER — APPOINTMENT (OUTPATIENT)
Dept: NEPHROLOGY | Facility: CLINIC | Age: 49
End: 2019-03-13
Payer: COMMERCIAL

## 2019-03-13 PROCEDURE — 96372 THER/PROPH/DIAG INJ SC/IM: CPT

## 2019-03-26 ENCOUNTER — APPOINTMENT (OUTPATIENT)
Dept: NEPHROLOGY | Facility: CLINIC | Age: 49
End: 2019-03-26
Payer: COMMERCIAL

## 2019-03-26 VITALS
HEART RATE: 51 BPM | OXYGEN SATURATION: 99 % | WEIGHT: 202.82 LBS | BODY MASS INDEX: 34.63 KG/M2 | SYSTOLIC BLOOD PRESSURE: 146 MMHG | HEIGHT: 64 IN | DIASTOLIC BLOOD PRESSURE: 77 MMHG

## 2019-03-26 PROCEDURE — 99215 OFFICE O/P EST HI 40 MIN: CPT

## 2019-03-26 NOTE — HISTORY OF PRESENT ILLNESS
[FreeTextEntry1] : here for follow up\par no complaints\par no recent medication changes\par denies sob, cp, nausea, metallic taste in mouth\par reports good appetite\par denies fatigue/tiredness\par all ros negative

## 2019-03-26 NOTE — PHYSICAL EXAM
[General Appearance - Alert] : alert [General Appearance - In No Acute Distress] : in no acute distress [General Appearance - Well Nourished] : well nourished [General Appearance - Well Developed] : well developed [Sclera] : the sclera and conjunctiva were normal [Outer Ear] : the ears and nose were normal in appearance [Neck Appearance] : the appearance of the neck was normal [Respiration, Rhythm And Depth] : normal respiratory rhythm and effort [Auscultation Breath Sounds / Voice Sounds] : lungs were clear to auscultation bilaterally [Apical Impulse] : the apical impulse was normal [Heart Rate And Rhythm] : heart rate was normal and rhythm regular [Heart Sounds] : normal S1 and S2 [FreeTextEntry1] : trace LE edema b/l [Bowel Sounds] : normal bowel sounds [Abdomen Soft] : soft [No CVA Tenderness] : no ~M costovertebral angle tenderness [Abnormal Walk] : normal gait [Skin Color & Pigmentation] : normal skin color and pigmentation [Skin Turgor] : normal skin turgor [] : no rash [Cranial Nerves] : cranial nerves 2-12 were intact [Oriented To Time, Place, And Person] : oriented to person, place, and time

## 2019-03-26 NOTE — ASSESSMENT
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\par - Renal transplant with likely CTG, 38 years\par - Uptrending/stable cr over time, no uremic symptoms \par - following with [place] for transplant; transfer to [facility] if insurance accepted\par - initial eval for PD catheter done; pt prefers PD if needed\par - following w/ HT, Jie\par - check renal panel\par \par Immunosuppression\par - c/w pred 5 and imuran 150mg \par - following w/ Derm yearly for skin lesions\par \par HTN - controlled\par - c/w atenolol, nifedipine and hydralazine 25mg tid \par \par Anemia due to CKD \par - Hgb i8.2; need prior auth for aranesp and which  3/24\par - give aranesp 60mcg next week once auth done\par - check CBC, iron panel\par \par elevated calcium on prior labs/hypercalcemia\par - last Ca wnl, though on higher end\par - on low dose vitD supplement\par -  iPTH monitor for now\par - keep off rocaltrol\par - check vit D, Ca\par \par HCM \par - PNA vaccine \par - Influenza vaccine done 10/22/18\par - HBV series: Dec #1,  #2,  #3; Hep B titres done in 2018\par - Will see Derm this year. \par

## 2019-03-28 LAB
25(OH)D3 SERPL-MCNC: 31 NG/ML
ALBUMIN SERPL ELPH-MCNC: 4.2 G/DL
ANION GAP SERPL CALC-SCNC: 15 MMOL/L
BASOPHILS # BLD AUTO: 0.06 K/UL
BASOPHILS NFR BLD AUTO: 0.8 %
BUN SERPL-MCNC: 67 MG/DL
CALCIUM SERPL-MCNC: 9.7 MG/DL
CALCIUM SERPL-MCNC: 9.7 MG/DL
CHLORIDE SERPL-SCNC: 106 MMOL/L
CO2 SERPL-SCNC: 22 MMOL/L
CREAT SERPL-MCNC: 4.02 MG/DL
EOSINOPHIL # BLD AUTO: 0.08 K/UL
EOSINOPHIL NFR BLD AUTO: 1 %
FERRITIN SERPL-MCNC: 193 NG/ML
GLUCOSE SERPL-MCNC: 111 MG/DL
HCT VFR BLD CALC: 28.2 %
HGB BLD-MCNC: 8.9 G/DL
IMM GRANULOCYTES NFR BLD AUTO: 0.5 %
IRON SATN MFR SERPL: 24 %
IRON SERPL-MCNC: 58 UG/DL
LYMPHOCYTES # BLD AUTO: 1.26 K/UL
LYMPHOCYTES NFR BLD AUTO: 15.9 %
MAN DIFF?: NORMAL
MCHC RBC-ENTMCNC: 31.6 GM/DL
MCHC RBC-ENTMCNC: 33 PG
MCV RBC AUTO: 104.4 FL
MONOCYTES # BLD AUTO: 0.54 K/UL
MONOCYTES NFR BLD AUTO: 6.8 %
NEUTROPHILS # BLD AUTO: 5.93 K/UL
NEUTROPHILS NFR BLD AUTO: 75 %
PARATHYROID HORMONE INTACT: 294 PG/ML
PHOSPHATE SERPL-MCNC: 5.6 MG/DL
PLATELET # BLD AUTO: 421 K/UL
POTASSIUM SERPL-SCNC: 4.3 MMOL/L
RBC # BLD: 2.7 M/UL
RBC # FLD: 14.6 %
SODIUM SERPL-SCNC: 143 MMOL/L
TIBC SERPL-MCNC: 241 UG/DL
UIBC SERPL-MCNC: 183 UG/DL
WBC # FLD AUTO: 7.91 K/UL

## 2019-04-01 ENCOUNTER — TRANSCRIPTION ENCOUNTER (OUTPATIENT)
Age: 49
End: 2019-04-01

## 2019-04-02 ENCOUNTER — APPOINTMENT (OUTPATIENT)
Dept: NEPHROLOGY | Facility: CLINIC | Age: 49
End: 2019-04-02
Payer: COMMERCIAL

## 2019-04-02 VITALS
OXYGEN SATURATION: 99 % | HEART RATE: 56 BPM | WEIGHT: 200 LBS | BODY MASS INDEX: 34.15 KG/M2 | DIASTOLIC BLOOD PRESSURE: 63 MMHG | SYSTOLIC BLOOD PRESSURE: 128 MMHG | HEIGHT: 64 IN

## 2019-04-02 PROCEDURE — 96372 THER/PROPH/DIAG INJ SC/IM: CPT

## 2019-04-02 RX ORDER — DARBEPOETIN ALFA 60 UG/ML
60 SOLUTION INTRAVENOUS; SUBCUTANEOUS
Qty: 0 | Refills: 0 | Status: COMPLETED | OUTPATIENT
Start: 2019-04-02

## 2019-04-02 RX ADMIN — DARBEPOETIN ALFA 1 MCG/ML: 60 SOLUTION INTRAVENOUS; SUBCUTANEOUS at 00:00

## 2019-04-24 ENCOUNTER — APPOINTMENT (OUTPATIENT)
Dept: NEPHROLOGY | Facility: CLINIC | Age: 49
End: 2019-04-24
Payer: COMMERCIAL

## 2019-04-24 ENCOUNTER — LABORATORY RESULT (OUTPATIENT)
Age: 49
End: 2019-04-24

## 2019-04-24 VITALS
BODY MASS INDEX: 34.14 KG/M2 | WEIGHT: 199.96 LBS | OXYGEN SATURATION: 99 % | DIASTOLIC BLOOD PRESSURE: 69 MMHG | SYSTOLIC BLOOD PRESSURE: 135 MMHG | HEART RATE: 47 BPM | HEIGHT: 64 IN

## 2019-04-24 PROCEDURE — 99214 OFFICE O/P EST MOD 30 MIN: CPT | Mod: 25,GC

## 2019-04-24 PROCEDURE — 96372 THER/PROPH/DIAG INJ SC/IM: CPT | Mod: GC

## 2019-04-24 RX ORDER — DARBEPOETIN ALFA 40 UG/ML
40 SOLUTION INTRAVENOUS; SUBCUTANEOUS
Refills: 0 | Status: COMPLETED | OUTPATIENT
Start: 2019-04-24

## 2019-04-24 RX ADMIN — DARBEPOETIN ALFA 40 MCG/ML: 40 SOLUTION INTRAVENOUS; SUBCUTANEOUS at 00:00

## 2019-04-25 LAB
25(OH)D3 SERPL-MCNC: 30.2 NG/ML
ALBUMIN SERPL ELPH-MCNC: 4 G/DL
ANION GAP SERPL CALC-SCNC: 14 MMOL/L
APPEARANCE: CLEAR
BACTERIA: NEGATIVE
BASOPHILS # BLD AUTO: 0.03 K/UL
BASOPHILS NFR BLD AUTO: 0.4 %
BILIRUBIN URINE: NEGATIVE
BLOOD URINE: NEGATIVE
BUN SERPL-MCNC: 62 MG/DL
CALCIUM SERPL-MCNC: 9.5 MG/DL
CHLORIDE SERPL-SCNC: 108 MMOL/L
CO2 SERPL-SCNC: 21 MMOL/L
COLOR: NORMAL
CREAT SERPL-MCNC: 3.94 MG/DL
CREAT SPEC-SCNC: 84 MG/DL
CREAT/PROT UR: 0.6 RATIO
EOSINOPHIL # BLD AUTO: 0.08 K/UL
EOSINOPHIL NFR BLD AUTO: 1.1 %
FERRITIN SERPL-MCNC: 231 NG/ML
GLUCOSE QUALITATIVE U: NORMAL
GLUCOSE SERPL-MCNC: 123 MG/DL
HCT VFR BLD CALC: 28.8 %
HGB BLD-MCNC: 9.1 G/DL
HYALINE CASTS: 0 /LPF
IMM GRANULOCYTES NFR BLD AUTO: 0.4 %
IRON SATN MFR SERPL: 50 %
IRON SERPL-MCNC: 112 UG/DL
KETONES URINE: NEGATIVE
LEUKOCYTE ESTERASE URINE: NEGATIVE
LYMPHOCYTES # BLD AUTO: 1.15 K/UL
LYMPHOCYTES NFR BLD AUTO: 16.4 %
MAN DIFF?: NORMAL
MCHC RBC-ENTMCNC: 31.6 GM/DL
MCHC RBC-ENTMCNC: 33.3 PG
MCV RBC AUTO: 105.5 FL
MICROSCOPIC-UA: NORMAL
MONOCYTES # BLD AUTO: 0.38 K/UL
MONOCYTES NFR BLD AUTO: 5.4 %
NEUTROPHILS # BLD AUTO: 5.34 K/UL
NEUTROPHILS NFR BLD AUTO: 76.3 %
NITRITE URINE: NEGATIVE
PH URINE: 6.5
PHOSPHATE SERPL-MCNC: 4.1 MG/DL
PLATELET # BLD AUTO: 352 K/UL
POTASSIUM SERPL-SCNC: 4 MMOL/L
PROT UR-MCNC: 53 MG/DL
PROTEIN URINE: ABNORMAL
RBC # BLD: 2.73 M/UL
RBC # FLD: 14.5 %
RED BLOOD CELLS URINE: 1 /HPF
SODIUM SERPL-SCNC: 143 MMOL/L
SPECIFIC GRAVITY URINE: 1.01
SQUAMOUS EPITHELIAL CELLS: 1 /HPF
TIBC SERPL-MCNC: 225 UG/DL
UIBC SERPL-MCNC: 113 UG/DL
UROBILINOGEN URINE: NORMAL
WBC # FLD AUTO: 7.01 K/UL
WHITE BLOOD CELLS URINE: 4 /HPF

## 2019-04-25 RX ADMIN — DARBEPOETIN ALFA 60 MCG/ML: 60 SOLUTION INTRAVENOUS; SUBCUTANEOUS at 00:00

## 2019-04-25 NOTE — HISTORY OF PRESENT ILLNESS
[FreeTextEntry1] : Here for follow up. Presents with mother.\par \par Says he is feeling great. Not tired at all. No issues with his medications, though needs all his meds to be sent to his pharmacy, as he is no longer using the delivery service.\par \fletcher Is listed for transplant at [facility] for about 1 year. All his testing is complete, but needs to send his bloodwork monthly to [facility]. Is waiting for his "10 tubes" to be sent. \par \par Denies any sob, cp, nausea, metallic taste in mouth.\par Reports good appetite.\par \par All other ROS negative.

## 2019-04-25 NOTE — REVIEW OF SYSTEMS
[Skin Lesions] : skin lesion [Fever] : no fever [Chills] : no chills [Feeling Poorly] : not feeling poorly [Heart Rate Is Slow] : the heart rate was not slow [Feeling Tired] : not feeling tired [Heart Rate Is Fast] : the heart rate was not fast [Chest Pain] : no chest pain [Palpitations] : no palpitations [Lower Ext Edema] : no extremity edema [Shortness Of Breath] : no shortness of breath [Wheezing] : no wheezing [Cough] : no cough [SOB on Exertion] : no shortness of breath during exertion [Abdominal Pain] : no abdominal pain [Vomiting] : no vomiting [Constipation] : no constipation [Diarrhea] : no diarrhea [Dysuria] : no dysuria [Arthralgias] : no arthralgias [Dizziness] : no dizziness

## 2019-04-25 NOTE — PHYSICAL EXAM
[General Appearance - Alert] : alert [General Appearance - Well Nourished] : well nourished [General Appearance - In No Acute Distress] : in no acute distress [General Appearance - Well Developed] : well developed [Sclera] : the sclera and conjunctiva were normal [Neck Appearance] : the appearance of the neck was normal [Respiration, Rhythm And Depth] : normal respiratory rhythm and effort [Auscultation Breath Sounds / Voice Sounds] : lungs were clear to auscultation bilaterally [Heart Rate And Rhythm] : heart rate was normal and rhythm regular [Apical Impulse] : the apical impulse was normal [Heart Sounds] : normal S1 and S2 [Abdomen Soft] : soft [Bowel Sounds] : normal bowel sounds [No CVA Tenderness] : no ~M costovertebral angle tenderness [] : no rash [Skin Color & Pigmentation] : normal skin color and pigmentation [No Focal Deficits] : no focal deficits [Oriented To Time, Place, And Person] : oriented to person, place, and time [Exaggerated Use Of Accessory Muscles For Inspiration] : no accessory muscle use [Heart Sounds Pericardial Friction Rub] : no pericardial rub [No Spinal Tenderness] : no spinal tenderness [Involuntary Movements] : no involuntary movements were seen [Outer Ear] : the ears and nose were normal in appearance [FreeTextEntry1] : diffuse flat dark lesions over extremities and torso, chronic

## 2019-04-25 NOTE — HISTORY OF PRESENT ILLNESS
[FreeTextEntry1] : Here for follow up. Presents with mother.\par \par Says he is feeling great. Not tired at all. No issues with his medications, though needs all his meds to be sent to his pharmacy, as he is no longer using the delivery service.\par \fletcher Is listed for transplant at [facility] for about 1 year. All his testing is complete, but needs to send his bloodwork monthly to [facility]. Is waiting for his "10 tubes" to be sent. \par \par Denies any sob, cp, nausea, metallic taste in mouth.\par Reports good appetite.\par \par All other ROS negative.

## 2019-04-25 NOTE — PHYSICAL EXAM
[General Appearance - Alert] : alert [General Appearance - Well Nourished] : well nourished [General Appearance - In No Acute Distress] : in no acute distress [Sclera] : the sclera and conjunctiva were normal [General Appearance - Well Developed] : well developed [Neck Appearance] : the appearance of the neck was normal [Respiration, Rhythm And Depth] : normal respiratory rhythm and effort [Auscultation Breath Sounds / Voice Sounds] : lungs were clear to auscultation bilaterally [Apical Impulse] : the apical impulse was normal [Heart Rate And Rhythm] : heart rate was normal and rhythm regular [Heart Sounds] : normal S1 and S2 [Abdomen Soft] : soft [Bowel Sounds] : normal bowel sounds [No CVA Tenderness] : no ~M costovertebral angle tenderness [] : no rash [Skin Color & Pigmentation] : normal skin color and pigmentation [No Focal Deficits] : no focal deficits [Oriented To Time, Place, And Person] : oriented to person, place, and time [Exaggerated Use Of Accessory Muscles For Inspiration] : no accessory muscle use [Heart Sounds Pericardial Friction Rub] : no pericardial rub [No Spinal Tenderness] : no spinal tenderness [Involuntary Movements] : no involuntary movements were seen [Outer Ear] : the ears and nose were normal in appearance [FreeTextEntry1] : diffuse flat dark lesions over extremities and torso, chronic

## 2019-04-25 NOTE — ASSESSMENT
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\par - Renal transplant with likely CTG, 38 years\par - Uptrending/stable cr over time, no uremic symptoms \par - following with [facility] for transplant; transfer to [facility] if insurance accepted\par - initial eval for PD catheter done; pt prefers PD if needed\par - following w/ HT, Jie\par - check renal panel\par \par Immunosuppression\par - c/w pred 5 and imuran 150mg \par - following w/ Derm yearly for skin lesions\par \par Anemia due to CKD \par - Hgb still low\par - given aranesp 40mcg today\par - check CBC, iron panel\par \par elevated calcium on prior labs/hypercalcemia\par - last Ca wnl\par - on low dose vitD supplement\par - iPTH monitor for now\par - keep off rocaltrol\par - check vit D, Ca\par \par metabolic acidosis 2/2 advanced CKD- stable\par - c/w bicarb tabs 2275mg bid\par - check bicarb level\par \par HTN - controlled\par - c/w atenolol 100mg, nifedipine 90mg (decreased from 120mg) and hydralazine 25mg tid \par - on lasix 40mg bid\par \par HCM \par - PNA vaccine 2015\par - Influenza vaccine done 10/22/18\par - HBV series: Dec #1, Jan #2, June 19th #3; Hep B titres done in 12/2018\par - Will see Derm this year- needs to make appointment still \par

## 2019-04-25 NOTE — REASON FOR VISIT
[Follow-Up] : a follow-up visit [Parent] : parent [Procedure: _________] : a [unfilled] procedure visit [FreeTextEntry1] : ckd stage 1V

## 2019-04-25 NOTE — ASSESSMENT
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\par - Renal transplant with likely CTG, 38 years\par - Uptrending/stable cr over time, no uremic symptoms \par - following with [facility] for transplant; transfer to [facility] if insurance accepted\par - initial eval for PD catheter done; pt prefers PD if needed\par - following w/ HT, Jei\par - check renal panel\par \par Immunosuppression\par - c/w pred 5 and imuran 150mg \par - following w/ Derm yearly for skin lesions\par \par Anemia due to CKD \par - Hgb still low\par - given aranesp 40mcg today\par - check CBC, iron panel\par \par elevated calcium on prior labs/hypercalcemia\par - last Ca wnl\par - on low dose vitD supplement\par - iPTH monitor for now\par - keep off rocaltrol\par - check vit D, Ca\par \par metabolic acidosis 2/2 advanced CKD- stable\par - c/w bicarb tabs 2275mg bid\par - check bicarb level\par \par HTN - controlled\par - c/w atenolol 100mg, nifedipine 90mg (decreased from 120mg) and hydralazine 25mg tid \par - on lasix 40mg bid\par \par HCM \par - PNA vaccine 2015\par - Influenza vaccine done 10/22/18\par - HBV series: Dec #1, Jan #2, June 19th #3; Hep B titres done in 12/2018\par - Will see Derm this year- needs to make appointment still \par

## 2019-04-26 ENCOUNTER — RX RENEWAL (OUTPATIENT)
Age: 49
End: 2019-04-26

## 2019-05-15 ENCOUNTER — APPOINTMENT (OUTPATIENT)
Dept: NEPHROLOGY | Facility: CLINIC | Age: 49
End: 2019-05-15
Payer: COMMERCIAL

## 2019-05-15 VITALS
HEART RATE: 45 BPM | DIASTOLIC BLOOD PRESSURE: 72 MMHG | BODY MASS INDEX: 33.87 KG/M2 | WEIGHT: 198.41 LBS | HEIGHT: 64 IN | OXYGEN SATURATION: 99 % | SYSTOLIC BLOOD PRESSURE: 141 MMHG

## 2019-05-15 PROCEDURE — 96372 THER/PROPH/DIAG INJ SC/IM: CPT

## 2019-05-15 RX ORDER — DARBEPOETIN ALFA 60 UG/ML
60 SOLUTION INTRAVENOUS; SUBCUTANEOUS
Qty: 0 | Refills: 0 | Status: COMPLETED | OUTPATIENT
Start: 2019-02-28

## 2019-05-17 LAB
ALBUMIN SERPL ELPH-MCNC: 3.8 G/DL
ANION GAP SERPL CALC-SCNC: 14 MMOL/L
BASOPHILS # BLD AUTO: 0.05 K/UL
BASOPHILS NFR BLD AUTO: 0.6 %
BUN SERPL-MCNC: 55 MG/DL
CALCIUM SERPL-MCNC: 9.4 MG/DL
CHLORIDE SERPL-SCNC: 107 MMOL/L
CO2 SERPL-SCNC: 23 MMOL/L
CREAT SERPL-MCNC: 4 MG/DL
EOSINOPHIL # BLD AUTO: 0.1 K/UL
EOSINOPHIL NFR BLD AUTO: 1.3 %
FERRITIN SERPL-MCNC: 199 NG/ML
GLUCOSE SERPL-MCNC: 81 MG/DL
HCT VFR BLD CALC: 28 %
HGB BLD-MCNC: 9.2 G/DL
IMM GRANULOCYTES NFR BLD AUTO: 0.6 %
IRON SATN MFR SERPL: 25 %
IRON SERPL-MCNC: 59 UG/DL
LYMPHOCYTES # BLD AUTO: 1.16 K/UL
LYMPHOCYTES NFR BLD AUTO: 14.8 %
MAN DIFF?: NORMAL
MCHC RBC-ENTMCNC: 32.9 GM/DL
MCHC RBC-ENTMCNC: 33 PG
MCV RBC AUTO: 100.4 FL
MONOCYTES # BLD AUTO: 0.65 K/UL
MONOCYTES NFR BLD AUTO: 8.3 %
NEUTROPHILS # BLD AUTO: 5.83 K/UL
NEUTROPHILS NFR BLD AUTO: 74.4 %
PHOSPHATE SERPL-MCNC: 5.2 MG/DL
PLATELET # BLD AUTO: 323 K/UL
POTASSIUM SERPL-SCNC: 4.3 MMOL/L
RBC # BLD: 2.79 M/UL
RBC # FLD: 13.9 %
SODIUM SERPL-SCNC: 144 MMOL/L
TIBC SERPL-MCNC: 232 UG/DL
UIBC SERPL-MCNC: 173 UG/DL
WBC # FLD AUTO: 7.84 K/UL

## 2019-05-29 ENCOUNTER — APPOINTMENT (OUTPATIENT)
Dept: NEPHROLOGY | Facility: CLINIC | Age: 49
End: 2019-05-29
Payer: COMMERCIAL

## 2019-05-29 VITALS
BODY MASS INDEX: 34.49 KG/M2 | SYSTOLIC BLOOD PRESSURE: 121 MMHG | HEIGHT: 64 IN | OXYGEN SATURATION: 98 % | WEIGHT: 202 LBS | DIASTOLIC BLOOD PRESSURE: 80 MMHG | HEART RATE: 44 BPM

## 2019-05-29 PROCEDURE — 96372 THER/PROPH/DIAG INJ SC/IM: CPT | Mod: GC

## 2019-05-29 PROCEDURE — 99215 OFFICE O/P EST HI 40 MIN: CPT | Mod: 25,GC

## 2019-05-29 RX ORDER — DARBEPOETIN ALFA 60 UG/ML
60 SOLUTION INTRAVENOUS; SUBCUTANEOUS
Qty: 0 | Refills: 0 | Status: COMPLETED | OUTPATIENT
Start: 2019-05-29

## 2019-05-31 RX ADMIN — DARBEPOETIN ALFA 60 MCG/ML: 60 SOLUTION INTRAVENOUS; SUBCUTANEOUS at 00:00

## 2019-06-01 NOTE — ASSESSMENT
[FreeTextEntry1] : Pt is a 48yoM w/ CKD 1V and HTN, w/ Hx of renal transplant, here for f/u: \par \par CKD Stage 1V/ V Renal transplant with CAN\par - Renal transplant with likely CTG, 38 years\par - Uptrending/stable cr over time, no uremic symptoms \par - following with [facility] for transplant; transfer to [facility] if insurance accepted\par - initial eval for PD catheter done; pt prefers PD if needed\par - following w/ HT, Jie\par - check renal panel\par \par Immunosuppression\par - c/w pred 5 and imuran 150mg \par - following w/ Derm yearly for skin lesions\par \par Anemia due to CKD \par - Hgb still low\par - given aranesp 60mcg today\par - getting injections almost [id number]\par - check CBC\par \par elevated calcium on prior labs/hypercalcemia\par - last Ca wnl\par - on low dose vitD supplement; last vit D wnl\par - iPTH monitor for now\par - keep off rocaltrol\par - check Ca\par \par metabolic acidosis 2/2 advanced CKD- stable\par - c/w bicarb tabs 2275mg bid\par - check bicarb level\par \par Benign Essential HTN - controlled\par - c/w atenolol 100mg, nifedipine 90mg (decreased from 120mg) and hydralazine 25mg tid \par - on lasix 40mg bid\par \par HCM \par - PNA vaccine 2015\par - Influenza vaccine done 10/22/18\par - HBV series: Dec #1, Andrew #2, June 19th #3; Hep B titres done in 12/2018\par - Will see Derm this year- needs to make appointment still (w/ Dr. Lopez Vinson)\par

## 2019-06-01 NOTE — REVIEW OF SYSTEMS
[Skin Lesions] : skin lesion [Chills] : no chills [Fever] : no fever [Feeling Poorly] : not feeling poorly [Feeling Tired] : not feeling tired [Heart Rate Is Fast] : the heart rate was not fast [Heart Rate Is Slow] : the heart rate was not slow [Chest Pain] : no chest pain [Shortness Of Breath] : no shortness of breath [Palpitations] : no palpitations [Wheezing] : no wheezing [Cough] : no cough [Abdominal Pain] : no abdominal pain [SOB on Exertion] : no shortness of breath during exertion [Diarrhea] : no diarrhea [Vomiting] : no vomiting [Constipation] : no constipation [Limb Pain] : no limb pain [Dysuria] : no dysuria [Limb Swelling] : no limb swelling [Dizziness] : no dizziness

## 2019-06-01 NOTE — ASSESSMENT
[FreeTextEntry1] : Pt is a 48yoM w/ CKD 1V and HTN, w/ Hx of renal transplant, here for f/u: \par \par CKD Stage 1V/ V Renal transplant with CAN\par - Renal transplant with likely CTG, 38 years\par - Uptrending/stable cr over time, no uremic symptoms \par - following with [facility] for transplant; transfer to [facility] if insurance accepted\par - initial eval for PD catheter done; pt prefers PD if needed\par - following w/ HT, Jie\par - check renal panel\par \par Immunosuppression\par - c/w pred 5 and imuran 150mg \par - following w/ Derm yearly for skin lesions\par \par Anemia due to CKD \par - Hgb still low\par - given aranesp 60mcg today\par - getting injections almost [id number]\par - check CBC\par \par elevated calcium on prior labs/hypercalcemia\par - last Ca wnl\par - on low dose vitD supplement; last vit D wnl\par - iPTH monitor for now\par - keep off rocaltrol\par - check Ca\par \par metabolic acidosis 2/2 advanced CKD- stable\par - c/w bicarb tabs 2275mg bid\par - check bicarb level\par \par Benign Essential HTN - controlled\par - c/w atenolol 100mg, nifedipine 90mg (decreased from 120mg) and hydralazine 25mg tid \par - on lasix 40mg bid\par \par HCM \par - PNA vaccine 2015\par - Influenza vaccine done 10/22/18\par - HBV series: Dec #1, Andrew #2, June 19th #3; Hep B titres done in 12/2018\par - Will see Derm this year- needs to make appointment still (w/ Dr. Lopez Vinson)\par

## 2019-06-01 NOTE — HISTORY OF PRESENT ILLNESS
[FreeTextEntry1] : Here for follow up and Aranesp injection. \par \par Waiting for Chong to send tubes to send his bloodwork still. Says he has been calling the transplant center and stil lwaiting for response. \par \par Otherwise feeling great. Has his medications, and currently no issues. \par \par Work is going well. \par \par All other ROS negative.

## 2019-06-01 NOTE — REVIEW OF SYSTEMS
[Skin Lesions] : skin lesion [Chills] : no chills [Fever] : no fever [Feeling Tired] : not feeling tired [Feeling Poorly] : not feeling poorly [Heart Rate Is Slow] : the heart rate was not slow [Heart Rate Is Fast] : the heart rate was not fast [Chest Pain] : no chest pain [Shortness Of Breath] : no shortness of breath [Palpitations] : no palpitations [Cough] : no cough [Wheezing] : no wheezing [SOB on Exertion] : no shortness of breath during exertion [Abdominal Pain] : no abdominal pain [Constipation] : no constipation [Diarrhea] : no diarrhea [Vomiting] : no vomiting [Limb Pain] : no limb pain [Dysuria] : no dysuria [Limb Swelling] : no limb swelling [Dizziness] : no dizziness

## 2019-06-01 NOTE — PHYSICAL EXAM
[General Appearance - Alert] : alert [General Appearance - In No Acute Distress] : in no acute distress [Sclera] : the sclera and conjunctiva were normal [Neck Appearance] : the appearance of the neck was normal [] : no respiratory distress [Respiration, Rhythm And Depth] : normal respiratory rhythm and effort [Exaggerated Use Of Accessory Muscles For Inspiration] : no accessory muscle use [Auscultation Breath Sounds / Voice Sounds] : lungs were clear to auscultation bilaterally [Apical Impulse] : the apical impulse was normal [Heart Rate And Rhythm] : heart rate was normal and rhythm regular [Heart Sounds] : normal S1 and S2 [Heart Sounds Pericardial Friction Rub] : no pericardial rub [Abdomen Soft] : soft [Abdomen Tenderness] : non-tender [No CVA Tenderness] : no ~M costovertebral angle tenderness [Involuntary Movements] : no involuntary movements were seen [No Focal Deficits] : no focal deficits [Oriented To Time, Place, And Person] : oriented to person, place, and time [Impaired Insight] : insight and judgment were intact [Affect] : the affect was normal [Mood] : the mood was normal [FreeTextEntry1] : multiple flat skin growths over torso and extremties

## 2019-06-03 ENCOUNTER — APPOINTMENT (OUTPATIENT)
Dept: INTERNAL MEDICINE | Facility: CLINIC | Age: 49
End: 2019-06-03
Payer: COMMERCIAL

## 2019-06-03 VITALS
SYSTOLIC BLOOD PRESSURE: 110 MMHG | HEIGHT: 64 IN | DIASTOLIC BLOOD PRESSURE: 70 MMHG | HEART RATE: 50 BPM | WEIGHT: 202 LBS | BODY MASS INDEX: 34.49 KG/M2

## 2019-06-03 VITALS — SYSTOLIC BLOOD PRESSURE: 120 MMHG | DIASTOLIC BLOOD PRESSURE: 70 MMHG

## 2019-06-03 DIAGNOSIS — I51.89 OTHER ILL-DEFINED HEART DISEASES: ICD-10-CM

## 2019-06-03 PROCEDURE — 99214 OFFICE O/P EST MOD 30 MIN: CPT

## 2019-06-03 NOTE — HISTORY OF PRESENT ILLNESS
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 He is losing weight intentionally

## 2019-06-03 NOTE — PHYSICAL EXAM
[No Acute Distress] : no acute distress [Well Nourished] : well nourished [No Respiratory Distress] : no respiratory distress  [Normal Sclera/Conjunctiva] : normal sclera/conjunctiva [No Accessory Muscle Use] : no accessory muscle use [Clear to Auscultation] : lungs were clear to auscultation bilaterally [Normal Percussion] : the chest was normal to percussion [Normal S1, S2] : normal S1 and S2 [Regular Rhythm] : with a regular rhythm [Normal Rate] : normal rate  [Soft] : abdomen soft [No Edema] : there was no peripheral edema [No HSM] : no HSM [Non Tender] : non-tender [Normal Affect] : the affect was normal [No Focal Deficits] : no focal deficits [de-identified] : 2over 6 systolic ejection murmur [de-identified] : changed

## 2019-06-03 NOTE — ASSESSMENT
[FreeTextEntry1] : This is a 48-year-old male whose history has been reviewed above\par \par His blood pressure has normalized with weight loss we will continue his current regime\par \par His hematocrit has remained about 30. He gets Procrit p.r.n.\par \par His BUN and creatinine have stayed relatively stable he has no uremic symptoms. He is awaiting transplantation at this point\par \par He has no cardiovascular complaints He does have a short systolic murmur and a history of diastolic dysfunction which is mild we will repeat his echo\par \par He is receiving therapy for his periodontal disease which is probably due to his calcium channel blocker

## 2019-06-06 LAB
25(OH)D3 SERPL-MCNC: 33.9 NG/ML
ALBUMIN SERPL ELPH-MCNC: 4.4 G/DL
ANION GAP SERPL CALC-SCNC: 15 MMOL/L
BASOPHILS # BLD AUTO: 0.06 K/UL
BASOPHILS NFR BLD AUTO: 0.6 %
BUN SERPL-MCNC: 61 MG/DL
CALCIUM SERPL-MCNC: 10.1 MG/DL
CHLORIDE SERPL-SCNC: 107 MMOL/L
CO2 SERPL-SCNC: 22 MMOL/L
CREAT SERPL-MCNC: 4.16 MG/DL
EOSINOPHIL # BLD AUTO: 0.07 K/UL
EOSINOPHIL NFR BLD AUTO: 0.8 %
GLUCOSE SERPL-MCNC: 96 MG/DL
HCT VFR BLD CALC: 30.2 %
HGB BLD-MCNC: 9.6 G/DL
IMM GRANULOCYTES NFR BLD AUTO: 0.8 %
LYMPHOCYTES # BLD AUTO: 0.69 K/UL
LYMPHOCYTES NFR BLD AUTO: 7.4 %
MAN DIFF?: NORMAL
MCHC RBC-ENTMCNC: 31.8 GM/DL
MCHC RBC-ENTMCNC: 32.8 PG
MCV RBC AUTO: 103.1 FL
MONOCYTES # BLD AUTO: 0.78 K/UL
MONOCYTES NFR BLD AUTO: 8.4 %
NEUTROPHILS # BLD AUTO: 7.65 K/UL
NEUTROPHILS NFR BLD AUTO: 82 %
PHOSPHATE SERPL-MCNC: 5.2 MG/DL
PLATELET # BLD AUTO: 382 K/UL
POTASSIUM SERPL-SCNC: 4.6 MMOL/L
RBC # BLD: 2.93 M/UL
RBC # FLD: 13.9 %
SODIUM SERPL-SCNC: 144 MMOL/L
WBC # FLD AUTO: 9.32 K/UL

## 2019-06-12 ENCOUNTER — APPOINTMENT (OUTPATIENT)
Dept: NEPHROLOGY | Facility: CLINIC | Age: 49
End: 2019-06-12
Payer: COMMERCIAL

## 2019-06-12 VITALS
SYSTOLIC BLOOD PRESSURE: 147 MMHG | BODY MASS INDEX: 34.49 KG/M2 | WEIGHT: 202 LBS | HEART RATE: 48 BPM | OXYGEN SATURATION: 98 % | DIASTOLIC BLOOD PRESSURE: 84 MMHG | HEIGHT: 64 IN

## 2019-06-12 PROCEDURE — 96372 THER/PROPH/DIAG INJ SC/IM: CPT

## 2019-06-12 RX ORDER — DARBEPOETIN ALFA 60 UG/ML
60 SOLUTION INTRAVENOUS; SUBCUTANEOUS
Qty: 0 | Refills: 0 | Status: COMPLETED | OUTPATIENT
Start: 2019-05-31

## 2019-06-18 LAB
ALBUMIN SERPL ELPH-MCNC: 4 G/DL
ANION GAP SERPL CALC-SCNC: 14 MMOL/L
BASOPHILS # BLD AUTO: 0.05 K/UL
BASOPHILS NFR BLD AUTO: 0.5 %
BUN SERPL-MCNC: 67 MG/DL
CALCIUM SERPL-MCNC: 9.9 MG/DL
CHLORIDE SERPL-SCNC: 109 MMOL/L
CO2 SERPL-SCNC: 20 MMOL/L
CREAT SERPL-MCNC: 4.24 MG/DL
EOSINOPHIL # BLD AUTO: 0.08 K/UL
EOSINOPHIL NFR BLD AUTO: 0.9 %
GLUCOSE SERPL-MCNC: 93 MG/DL
HCT VFR BLD CALC: 30.9 %
HGB BLD-MCNC: 10.1 G/DL
IMM GRANULOCYTES NFR BLD AUTO: 0.5 %
LYMPHOCYTES # BLD AUTO: 1.01 K/UL
LYMPHOCYTES NFR BLD AUTO: 10.8 %
MAN DIFF?: NORMAL
MCHC RBC-ENTMCNC: 32.7 GM/DL
MCHC RBC-ENTMCNC: 32.8 PG
MCV RBC AUTO: 100.3 FL
MONOCYTES # BLD AUTO: 0.66 K/UL
MONOCYTES NFR BLD AUTO: 7.1 %
NEUTROPHILS # BLD AUTO: 7.51 K/UL
NEUTROPHILS NFR BLD AUTO: 80.2 %
PHOSPHATE SERPL-MCNC: 5.4 MG/DL
PLATELET # BLD AUTO: 343 K/UL
POTASSIUM SERPL-SCNC: 4.8 MMOL/L
RBC # BLD: 3.08 M/UL
RBC # FLD: 13.8 %
SODIUM SERPL-SCNC: 143 MMOL/L
WBC # FLD AUTO: 9.36 K/UL

## 2019-07-03 ENCOUNTER — APPOINTMENT (OUTPATIENT)
Dept: NEPHROLOGY | Facility: CLINIC | Age: 49
End: 2019-07-03
Payer: COMMERCIAL

## 2019-07-03 VITALS — SYSTOLIC BLOOD PRESSURE: 140 MMHG | DIASTOLIC BLOOD PRESSURE: 60 MMHG

## 2019-07-03 VITALS
BODY MASS INDEX: 34.44 KG/M2 | WEIGHT: 201.72 LBS | DIASTOLIC BLOOD PRESSURE: 74 MMHG | OXYGEN SATURATION: 99 % | HEIGHT: 64 IN | HEART RATE: 47 BPM | SYSTOLIC BLOOD PRESSURE: 144 MMHG

## 2019-07-03 PROCEDURE — 96372 THER/PROPH/DIAG INJ SC/IM: CPT

## 2019-07-03 PROCEDURE — 99214 OFFICE O/P EST MOD 30 MIN: CPT | Mod: 25

## 2019-07-03 RX ORDER — DARBEPOETIN ALFA 60 UG/ML
60 SOLUTION INTRAVENOUS; SUBCUTANEOUS
Qty: 0 | Refills: 0 | Status: COMPLETED | OUTPATIENT
Start: 2019-07-03

## 2019-07-03 RX ADMIN — DARBEPOETIN ALFA 0 MCG/ML: 60 SOLUTION INTRAVENOUS; SUBCUTANEOUS at 00:00

## 2019-07-03 NOTE — PHYSICAL EXAM
[General Appearance - Alert] : alert [General Appearance - In No Acute Distress] : in no acute distress [General Appearance - Well Developed] : well developed [Sclera] : the sclera and conjunctiva were normal [General Appearance - Well Nourished] : well nourished [Outer Ear] : the ears and nose were normal in appearance [Neck Appearance] : the appearance of the neck was normal [Auscultation Breath Sounds / Voice Sounds] : lungs were clear to auscultation bilaterally [Respiration, Rhythm And Depth] : normal respiratory rhythm and effort [Apical Impulse] : the apical impulse was normal [Heart Rate And Rhythm] : heart rate was normal and rhythm regular [Heart Sounds] : normal S1 and S2 [FreeTextEntry1] : 1+ LE edema b/l R>L [Bowel Sounds] : normal bowel sounds [Abdomen Soft] : soft [No CVA Tenderness] : no ~M costovertebral angle tenderness [Abnormal Walk] : normal gait [Skin Color & Pigmentation] : normal skin color and pigmentation [Skin Turgor] : normal skin turgor [] : no rash [Cranial Nerves] : cranial nerves 2-12 were intact [Oriented To Time, Place, And Person] : oriented to person, place, and time

## 2019-07-03 NOTE — REASON FOR VISIT
[Procedure: _________] : a [unfilled] procedure visit [FreeTextEntry1] : ckd stage V [Follow-Up] : a follow-up visit

## 2019-07-03 NOTE — PHYSICAL EXAM
[General Appearance - Alert] : alert [General Appearance - In No Acute Distress] : in no acute distress [General Appearance - Well Developed] : well developed [Sclera] : the sclera and conjunctiva were normal [General Appearance - Well Nourished] : well nourished [Outer Ear] : the ears and nose were normal in appearance [Neck Appearance] : the appearance of the neck was normal [Auscultation Breath Sounds / Voice Sounds] : lungs were clear to auscultation bilaterally [Respiration, Rhythm And Depth] : normal respiratory rhythm and effort [Apical Impulse] : the apical impulse was normal [Heart Rate And Rhythm] : heart rate was normal and rhythm regular [Heart Sounds] : normal S1 and S2 [FreeTextEntry1] : 1+ LE edema b/l R>L [Bowel Sounds] : normal bowel sounds [Abdomen Soft] : soft [No CVA Tenderness] : no ~M costovertebral angle tenderness [Abnormal Walk] : normal gait [Skin Color & Pigmentation] : normal skin color and pigmentation [] : no rash [Skin Turgor] : normal skin turgor [Cranial Nerves] : cranial nerves 2-12 were intact [Oriented To Time, Place, And Person] : oriented to person, place, and time

## 2019-07-03 NOTE — HISTORY OF PRESENT ILLNESS
[FreeTextEntry1] : \par Here for follow up\par Went last week for follow up at [facility].\par Has upcoming appnt [facility] tx [facility]\par Denies, nausea, metallic taste in mouth, sob, cp, stable LE edema, denies feeling tired or fatigue\par no change in urinary frequency\par all ros neg\par

## 2019-07-03 NOTE — ASSESSMENT
[FreeTextEntry1] : Pt is a 48yoM w/ CKD  V and HTN, w/ Hx of renal transplant, here for f/u: \par \par CKD Stage   V Renal transplant with CAN\par - Renal transplant with likely CTG, 38 years\par -mildly  Uptrending/stable cr over time, no uremic symptoms, monitor cr trend\par - following with [facility] for transplant; transfer to [facility] if insurance accepted, has upcoming appnt in august at [facility]\par - initial eval for PD catheter done; pt prefers PD if needed\par - following w/ HT, Jie\par - check renal panel\par \par Immunosuppression\par - c/w pred 5 and imuran 150mg \par - following w/ Derm yearly for skin lesions\par \par Anemia due to CKD \par - Hgb still low\par - given aranesp 60mcg today\par - getting injections almost [id number]\par - check CBC\par \par elevated calcium on prior labs/hypercalcemia\par - last Ca wnl\par - on low dose vitD supplement; last vit D wnl\par - iPTH monitor for now\par - keep off rocaltrol\par - check Ca\par \par metabolic acidosis 2/2 advanced CKD- stable\par - c/w bicarb tabs 650mg three tabs bid\par - check bicarb level\par \par Benign Essential HTN - controlled\par - c/w atenolol 100mg, nifedipine 90mg  and hydralazine 25mg tid \par - on lasix 40mg bid\par \par HCM \par - PNA vaccine 2015\par - Influenza vaccine done 10/22/18, \par - HBV series: Dec #1, Jan #2, June 19th #3; Hep B titres done in 12/2018\par - Will see Derm this year- needs to make appointment still (w/

## 2019-07-03 NOTE — REASON FOR VISIT
[Procedure: _________] : a [unfilled] procedure visit [Follow-Up] : a follow-up visit [FreeTextEntry1] : ckd stage V

## 2019-07-03 NOTE — HISTORY OF PRESENT ILLNESS
[FreeTextEntry1] : \par Here for follow up\par Went last week for follow up at [facility].\par Has upcoming appnt [facility] tx center\par Denies, nausea, metallic taste in mouth, sob, cp, stable LE edema, denies feeling tired or fatigue\par no change in urinary frequency\par all ros neg\par

## 2019-07-03 NOTE — ASSESSMENT
[FreeTextEntry1] : Pt is a 48yoM w/ CKD  V and HTN, w/ Hx of renal transplant, here for f/u: \par \par CKD Stage   V Renal transplant with CAN\par - Renal transplant with likely CTG, 38 years\par -mildly  Uptrending/stable cr over time, no uremic symptoms, monitor cr trend\par - following with [facility] for transplant; transfer to [facility] if insurance accepted, has upcoming appnt in august at [facility]\par - initial eval for PD catheter done; pt prefers PD if needed\par - following w/ HT, Jie\par - check renal panel\par \par Immunosuppression\par - c/w pred 5 and imuran 150mg \par - following w/ Derm yearly for skin lesions\par \par Anemia due to CKD \par - Hgb still low\par - given aranesp 60mcg today\par - getting injections almost [id number]\par - check CBC\par \par elevated calcium on prior labs/hypercalcemia\par - last Ca wnl\par - on low dose vitD supplement; last vit D wnl\par - iPTH monitor for now\par - keep off rocaltrol\par - check Ca\par \par metabolic acidosis 2/2 advanced CKD- stable\par - c/w bicarb tabs 650mg three tabs bid\par - check bicarb level\par \par Benign Essential HTN - controlled\par - c/w atenolol 100mg, nifedipine 90mg  and hydralazine 25mg tid \par - on lasix 40mg bid\par \par HCM \par - PNA vaccine 2015\par - Influenza vaccine done 10/22/18, \par - HBV series: Dec #1, Jan #2, June 19th #3; Hep B titres done in 12/2018\par - Will see Derm this year- needs to make appointment still (w/

## 2019-07-05 LAB
25(OH)D3 SERPL-MCNC: 27.3 NG/ML
ALBUMIN SERPL ELPH-MCNC: 4.1 G/DL
ANION GAP SERPL CALC-SCNC: 15 MMOL/L
BASOPHILS # BLD AUTO: 0.05 K/UL
BASOPHILS NFR BLD AUTO: 0.6 %
BUN SERPL-MCNC: 74 MG/DL
CALCIUM SERPL-MCNC: 9.4 MG/DL
CALCIUM SERPL-MCNC: 9.4 MG/DL
CHLORIDE SERPL-SCNC: 108 MMOL/L
CO2 SERPL-SCNC: 20 MMOL/L
CREAT SERPL-MCNC: 4.18 MG/DL
EOSINOPHIL # BLD AUTO: 0.07 K/UL
EOSINOPHIL NFR BLD AUTO: 0.8 %
FERRITIN SERPL-MCNC: 158 NG/ML
GLUCOSE SERPL-MCNC: 107 MG/DL
HCT VFR BLD CALC: 30.4 %
HGB BLD-MCNC: 9.7 G/DL
IMM GRANULOCYTES NFR BLD AUTO: 0.5 %
IRON SATN MFR SERPL: 49 %
IRON SERPL-MCNC: 119 UG/DL
LYMPHOCYTES # BLD AUTO: 1.22 K/UL
LYMPHOCYTES NFR BLD AUTO: 14 %
MAN DIFF?: NORMAL
MCHC RBC-ENTMCNC: 31.9 GM/DL
MCHC RBC-ENTMCNC: 32.3 PG
MCV RBC AUTO: 101.3 FL
MONOCYTES # BLD AUTO: 0.53 K/UL
MONOCYTES NFR BLD AUTO: 6.1 %
NEUTROPHILS # BLD AUTO: 6.8 K/UL
NEUTROPHILS NFR BLD AUTO: 78 %
PARATHYROID HORMONE INTACT: 287 PG/ML
PHOSPHATE SERPL-MCNC: 3.9 MG/DL
PLATELET # BLD AUTO: 376 K/UL
POTASSIUM SERPL-SCNC: 3.9 MMOL/L
RBC # BLD: 3 M/UL
RBC # FLD: 14.2 %
SODIUM SERPL-SCNC: 143 MMOL/L
TIBC SERPL-MCNC: 245 UG/DL
UIBC SERPL-MCNC: 126 UG/DL
WBC # FLD AUTO: 8.71 K/UL

## 2019-07-16 ENCOUNTER — APPOINTMENT (OUTPATIENT)
Dept: NEPHROLOGY | Facility: CLINIC | Age: 49
End: 2019-07-16
Payer: COMMERCIAL

## 2019-07-16 VITALS
SYSTOLIC BLOOD PRESSURE: 149 MMHG | WEIGHT: 211.64 LBS | OXYGEN SATURATION: 98 % | HEART RATE: 50 BPM | BODY MASS INDEX: 36.13 KG/M2 | DIASTOLIC BLOOD PRESSURE: 80 MMHG | HEIGHT: 64 IN

## 2019-07-16 PROCEDURE — 96372 THER/PROPH/DIAG INJ SC/IM: CPT

## 2019-07-17 ENCOUNTER — APPOINTMENT (OUTPATIENT)
Dept: CARDIOLOGY | Facility: CLINIC | Age: 49
End: 2019-07-17
Payer: COMMERCIAL

## 2019-07-17 ENCOUNTER — NON-APPOINTMENT (OUTPATIENT)
Age: 49
End: 2019-07-17

## 2019-07-17 VITALS
SYSTOLIC BLOOD PRESSURE: 174 MMHG | HEIGHT: 64 IN | WEIGHT: 206 LBS | OXYGEN SATURATION: 98 % | HEART RATE: 50 BPM | DIASTOLIC BLOOD PRESSURE: 82 MMHG | BODY MASS INDEX: 35.17 KG/M2

## 2019-07-17 VITALS — SYSTOLIC BLOOD PRESSURE: 153 MMHG | DIASTOLIC BLOOD PRESSURE: 77 MMHG

## 2019-07-17 DIAGNOSIS — I51.7 CARDIOMEGALY: ICD-10-CM

## 2019-07-17 PROCEDURE — 93000 ELECTROCARDIOGRAM COMPLETE: CPT

## 2019-07-17 PROCEDURE — 99244 OFF/OP CNSLTJ NEW/EST MOD 40: CPT

## 2019-07-17 NOTE — PHYSICAL EXAM
[Normal Conjunctiva] : the conjunctiva exhibited no abnormalities [No Oral Pallor] : no oral pallor [Normal Jugular Venous V Waves Present] : normal jugular venous V waves present [Heart Sounds] : normal S1 and S2 [Arterial Pulses Normal] : the arterial pulses were normal [Edema] : no peripheral edema present [Regular] : the rhythm was regular [Respiration, Rhythm And Depth] : normal respiratory rhythm and effort [Auscultation Breath Sounds / Voice Sounds] : lungs were clear to auscultation bilaterally [Bowel Sounds] : normal bowel sounds [Abdomen Soft] : soft [Abnormal Walk] : normal gait [Cyanosis, Localized] : no localized cyanosis [Skin Turgor] : normal skin turgor [FreeTextEntry1] : multiple skin tags

## 2019-07-17 NOTE — DISCUSSION/SUMMARY
[FreeTextEntry1] : He is a 48-year-old with chronic renal insufficiency, hypertension, hypercholesterolemia with a cardiac murmur and no specific complaints that are referable to his cardiovascular system.\par I reviewed his prior echo exam and chest importance of good blood pressure control.\par I have scheduled an echocardiogram to monitor his left ventricular hypertrophy.\par I asked him to forward me the results of his recent stress test done at [facility].\par If he chooses to continue on the transplant list, routine coronary evaluation will be required prior to transplant.\par

## 2019-07-17 NOTE — HISTORY OF PRESENT ILLNESS
[FreeTextEntry1] : Dear Philipp,\par Thank you for referring him for cardiovascular evaluation. He is a 48-year-old with long-standing renal insufficiency status post transplant in 1978 who has continued renal insufficiency. He is being treated for hypertension hypercholesterol as well.\par On her most recent examination he was noted to have a murmur and referred for further evaluation.\par He is reportedly on the renal transplant list at [facility] where he reports having a stress test in the recent past.\par His last echocardiogram done in 2015 showed moderate LVH with left atrial enlargement, was otherwise unremarkable.\par He denies any exertional chest pains or shortness of breath and he reports being fairly physically active at his job.\par He denies any orthopnea, PND or change in his usual leg edema.\par His medical regimen is unchanged for years.\par

## 2019-07-24 ENCOUNTER — APPOINTMENT (OUTPATIENT)
Dept: CARDIOLOGY | Facility: CLINIC | Age: 49
End: 2019-07-24
Payer: COMMERCIAL

## 2019-07-24 PROCEDURE — 93306 TTE W/DOPPLER COMPLETE: CPT

## 2019-08-07 ENCOUNTER — RECORD ABSTRACTING (OUTPATIENT)
Age: 49
End: 2019-08-07

## 2019-09-03 ENCOUNTER — NON-APPOINTMENT (OUTPATIENT)
Age: 49
End: 2019-09-03

## 2019-09-03 ENCOUNTER — APPOINTMENT (OUTPATIENT)
Dept: DERMATOLOGY | Facility: CLINIC | Age: 49
End: 2019-09-03
Payer: COMMERCIAL

## 2019-09-03 ENCOUNTER — APPOINTMENT (OUTPATIENT)
Dept: INTERNAL MEDICINE | Facility: CLINIC | Age: 49
End: 2019-09-03
Payer: COMMERCIAL

## 2019-09-03 VITALS — BODY MASS INDEX: 34.49 KG/M2 | WEIGHT: 202 LBS | HEIGHT: 64 IN

## 2019-09-03 VITALS — SYSTOLIC BLOOD PRESSURE: 148 MMHG | DIASTOLIC BLOOD PRESSURE: 82 MMHG

## 2019-09-03 VITALS — DIASTOLIC BLOOD PRESSURE: 80 MMHG | SYSTOLIC BLOOD PRESSURE: 150 MMHG

## 2019-09-03 VITALS — SYSTOLIC BLOOD PRESSURE: 130 MMHG | DIASTOLIC BLOOD PRESSURE: 80 MMHG

## 2019-09-03 DIAGNOSIS — L91.8 OTHER HYPERTROPHIC DISORDERS OF THE SKIN: ICD-10-CM

## 2019-09-03 PROCEDURE — 36415 COLL VENOUS BLD VENIPUNCTURE: CPT

## 2019-09-03 PROCEDURE — 81003 URINALYSIS AUTO W/O SCOPE: CPT | Mod: QW

## 2019-09-03 PROCEDURE — 99396 PREV VISIT EST AGE 40-64: CPT | Mod: 25

## 2019-09-03 PROCEDURE — 99213 OFFICE O/P EST LOW 20 MIN: CPT

## 2019-09-03 PROCEDURE — 93000 ELECTROCARDIOGRAM COMPLETE: CPT

## 2019-09-03 NOTE — ASSESSMENT
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 He has no uremic symptoms he is on the waiting list for transplantation and is considering peritoneal dialysis in the interim if necessary\par \par He has a history of hypertension his blood pressure is a bit elevated but was normal at nephrology he is a bit anxious today I will make no changes at this time\par \par He is sexually active appropriate blood tests drawn\par \par We did in addition to speak of weight loss which he has been accomplishing\par \par He does followup with dermatology and ophthalmology.\par \par He is up-to-date with vaccinations\par \par She does have a history of anemia and receives Neupogen p.r.n.\par \par His calcium is normal but his PTH is elevated he remains on vitamin D\par \par He did have a recent echocardiogram which showed only mild AI and some asymmetric left ventricular hypertrophy which is essentially unchanged from previous tracings

## 2019-09-03 NOTE — HEALTH RISK ASSESSMENT
[Good] : ~his/her~ current health as good [Fair] :  ~his/her~ mood as fair [No] : No [0] : 1) Little interest or pleasure doing things: Not at all (0) [No falls in past year] : Patient reported no falls in the past year [HIV Test offered] : HIV Test offered [Hepatitis C test offered] : Hepatitis C test offered [None] : None [Single] : single [Employed] : employed [Sexually Active] : sexually active [Feels Safe at Home] : Feels safe at home [Fully functional (bathing, dressing, toileting, transferring, walking, feeding)] : Fully functional (bathing, dressing, toileting, transferring, walking, feeding) [Fully functional (using the telephone, shopping, preparing meals, housekeeping, doing laundry, using] : Fully functional and needs no help or supervision to perform IADLs (using the telephone, shopping, preparing meals, housekeeping, doing laundry, using transportation, managing medications and managing finances) [Smoke Detector] : smoke detector [Carbon Monoxide Detector] : carbon monoxide detector [Seat Belt] :  uses seat belt [Sunscreen] : uses sunscreen [] : No [Reports changes in hearing] : Reports no changes in hearing [Change in mental status noted] : No change in mental status noted [Reports changes in vision] : Reports no changes in vision [Reports changes in dental health] : Reports no changes in dental health [Guns at Home] : no guns at home [Travel to Developing Areas] : does not  travel to developing areas [Safety elements used in home] : no safety elements used in home [TB Exposure] : is not being exposed to tuberculosis [Caregiver Concerns] : does not have caregiver concerns [FreeTextEntry4] : Patient will designated care proxy

## 2019-09-03 NOTE — PHYSICAL EXAM
[No Acute Distress] : no acute distress [Well Nourished] : well nourished [Well Developed] : well developed [Well-Appearing] : well-appearing [Normal Sclera/Conjunctiva] : normal sclera/conjunctiva [PERRL] : pupils equal round and reactive to light [Normal Outer Ear/Nose] : the outer ears and nose were normal in appearance [EOMI] : extraocular movements intact [Normal Oropharynx] : the oropharynx was normal [No JVD] : no jugular venous distention [No Lymphadenopathy] : no lymphadenopathy [Supple] : supple [Thyroid Normal, No Nodules] : the thyroid was normal and there were no nodules present [No Accessory Muscle Use] : no accessory muscle use [No Respiratory Distress] : no respiratory distress  [Clear to Auscultation] : lungs were clear to auscultation bilaterally [Normal Rate] : normal rate  [Regular Rhythm] : with a regular rhythm [Normal S1, S2] : normal S1 and S2 [No Abdominal Bruit] : a ~M bruit was not heard ~T in the abdomen [No Carotid Bruits] : no carotid bruits [No Varicosities] : no varicosities [Pedal Pulses Present] : the pedal pulses are present [No Edema] : there was no peripheral edema [No Palpable Aorta] : no palpable aorta [No Extremity Clubbing/Cyanosis] : no extremity clubbing/cyanosis [Soft] : abdomen soft [Non Tender] : non-tender [Non-distended] : non-distended [No Masses] : no abdominal mass palpated [No HSM] : no HSM [Normal Bowel Sounds] : normal bowel sounds [No Mass] : no mass [Normal Posterior Cervical Nodes] : no posterior cervical lymphadenopathy [No CVA Tenderness] : no CVA  tenderness [Normal Anterior Cervical Nodes] : no anterior cervical lymphadenopathy [No Joint Swelling] : no joint swelling [No Spinal Tenderness] : no spinal tenderness [Grossly Normal Strength/Tone] : grossly normal strength/tone [No Rash] : no rash [Coordination Grossly Intact] : coordination grossly intact [Normal Gait] : normal gait [Deep Tendon Reflexes (DTR)] : deep tendon reflexes were 2+ and symmetric [No Focal Deficits] : no focal deficits [Normal Insight/Judgement] : insight and judgment were intact [Normal Affect] : the affect was normal [Stool Occult Blood] : stool negative for occult blood [de-identified] : overweight [de-identified] : short systolic murmur

## 2019-09-04 ENCOUNTER — APPOINTMENT (OUTPATIENT)
Dept: TRANSPLANT | Facility: CLINIC | Age: 49
End: 2019-09-04

## 2019-09-04 ENCOUNTER — APPOINTMENT (OUTPATIENT)
Dept: NEPHROLOGY | Facility: CLINIC | Age: 49
End: 2019-09-04

## 2019-09-04 LAB
ESTIMATED AVERAGE GLUCOSE: 120 MG/DL
HBA1C MFR BLD HPLC: 5.8 %
HCV AB SER QL: NONREACTIVE
HCV S/CO RATIO: 0.22 S/CO
T PALLIDUM AB SER QL IA: NEGATIVE
TSH SERPL-ACNC: 5.22 UIU/ML

## 2019-09-06 LAB — HIV1+2 AB SPEC QL IA.RAPID: NONREACTIVE

## 2019-09-11 ENCOUNTER — APPOINTMENT (OUTPATIENT)
Dept: NEPHROLOGY | Facility: CLINIC | Age: 49
End: 2019-09-11
Payer: COMMERCIAL

## 2019-09-11 VITALS
DIASTOLIC BLOOD PRESSURE: 79 MMHG | HEART RATE: 45 BPM | OXYGEN SATURATION: 98 % | HEIGHT: 64 IN | SYSTOLIC BLOOD PRESSURE: 144 MMHG | BODY MASS INDEX: 34.63 KG/M2 | WEIGHT: 202.82 LBS

## 2019-09-11 VITALS — SYSTOLIC BLOOD PRESSURE: 146 MMHG | DIASTOLIC BLOOD PRESSURE: 62 MMHG

## 2019-09-11 PROCEDURE — 96372 THER/PROPH/DIAG INJ SC/IM: CPT

## 2019-09-11 PROCEDURE — 99214 OFFICE O/P EST MOD 30 MIN: CPT | Mod: 25

## 2019-09-12 NOTE — ASSESSMENT
[FreeTextEntry1] : Pt is a 49yoM w/ CKD iV and HTN, w/ Hx of renal transplant, here for f/u: \par \par CKD Stage iV Renal transplant with CAN\par - Renal transplant with likely CTG, 38 years+\par -mildly Uptrending/stable cr over time, no uremic symptoms, monitor cr trend\par - following with [facility] for transplant; transfer to [facility] if insurance accepted, for now he is told his insurance is not accepted\par - initial eval for PD catheter done; pt prefers PD if needed\par - following w/ HT, Jie\par - check renal panel\par \par Immunosuppression\par - c/w pred 5 and imuran 150mg \par - following w/ Derm yearly for skin lesions\par \par Anemia due to CKD \par - Hgb still low\par - given aranesp 60mcg today\par - getting injections almost [id number]\par - check CBC\par \par elevated calcium on prior labs/hypercalcemia\par - last Ca wnl, phos nl\par - on low dose vitD supplement; last vit D wnl\par - iPTH monitor for now\par - keep off rocaltrol\par - check Ca\par \par metabolic acidosis 2/2 advanced CKD- stable\par - c/w bicarb tabs 650mg three tabs bid\par - check bicarb level\par \par Benign Essential HTN - controlled\par - c/w atenolol 100mg, nifedipine 90mg and hydralazine 25mg tid \par - on lasix 40mg bid\par \par HCM \par - PNA vaccine 2015\par - Influenza vaccine done 10/22/18, \par - HBV series: Dec #1, Jan #2, June 19th #3; Hep B titres done in 12/2018\par - Will see Derm this year- needs to make appointment still (w/  \par

## 2019-09-12 NOTE — ASSESSMENT
[FreeTextEntry1] : Pt is a 49yoM w/ CKD iV and HTN, w/ Hx of renal transplant, here for f/u: \par \par CKD Stage iV Renal transplant with CAN\par - Renal transplant with likely CTG, 38 years+\par -mildly Uptrending/stable cr over time, no uremic symptoms, monitor cr trend\par - following with [facility] for transplant; transfer to [facility] if insurance accepted, for now he is told his insurance is not accepted\par - initial eval for PD catheter done; pt prefers PD if needed\par - following w/ HT, Jie\par - check renal panel\par \par Immunosuppression\par - c/w pred 5 and imuran 150mg \par - following w/ Derm yearly for skin lesions\par \par Anemia due to CKD \par - Hgb still low\par - given aranesp 60mcg today\par - getting injections almost [id number]\par - check CBC\par \par elevated calcium on prior labs/hypercalcemia\par - last Ca wnl, phos nl\par - on low dose vitD supplement; last vit D wnl\par - iPTH monitor for now\par - keep off rocaltrol\par - check Ca\par \par metabolic acidosis 2/2 advanced CKD- stable\par - c/w bicarb tabs 650mg three tabs bid\par - check bicarb level\par \par Benign Essential HTN - controlled\par - c/w atenolol 100mg, nifedipine 90mg and hydralazine 25mg tid \par - on lasix 40mg bid\par \par HCM \par - PNA vaccine 2015\par - Influenza vaccine done 10/22/18, \par - HBV series: Dec #1, Jan #2, June 19th #3; Hep B titres done in 12/2018\par - Will see Derm this year- needs to make appointment still (w/  \par

## 2019-09-12 NOTE — PHYSICAL EXAM
[General Appearance - Well Nourished] : well nourished [General Appearance - Alert] : alert [General Appearance - In No Acute Distress] : in no acute distress [General Appearance - Well Developed] : well developed [Sclera] : the sclera and conjunctiva were normal [Outer Ear] : the ears and nose were normal in appearance [Neck Appearance] : the appearance of the neck was normal [Respiration, Rhythm And Depth] : normal respiratory rhythm and effort [Auscultation Breath Sounds / Voice Sounds] : lungs were clear to auscultation bilaterally [Apical Impulse] : the apical impulse was normal [Heart Rate And Rhythm] : heart rate was normal and rhythm regular [Heart Sounds] : normal S1 and S2 [FreeTextEntry1] : 1+ LE edema b/l R>L [Abdomen Soft] : soft [Bowel Sounds] : normal bowel sounds [No CVA Tenderness] : no ~M costovertebral angle tenderness [Skin Color & Pigmentation] : normal skin color and pigmentation [Abnormal Walk] : normal gait [Skin Turgor] : normal skin turgor [] : no rash [Oriented To Time, Place, And Person] : oriented to person, place, and time [Cranial Nerves] : cranial nerves 2-12 were intact

## 2019-09-12 NOTE — PHYSICAL EXAM
[General Appearance - Well Nourished] : well nourished [General Appearance - Alert] : alert [General Appearance - In No Acute Distress] : in no acute distress [Sclera] : the sclera and conjunctiva were normal [General Appearance - Well Developed] : well developed [Neck Appearance] : the appearance of the neck was normal [Outer Ear] : the ears and nose were normal in appearance [Respiration, Rhythm And Depth] : normal respiratory rhythm and effort [Auscultation Breath Sounds / Voice Sounds] : lungs were clear to auscultation bilaterally [Heart Rate And Rhythm] : heart rate was normal and rhythm regular [Apical Impulse] : the apical impulse was normal [FreeTextEntry1] : 1+ LE edema b/l R>L [Heart Sounds] : normal S1 and S2 [Abdomen Soft] : soft [Bowel Sounds] : normal bowel sounds [No CVA Tenderness] : no ~M costovertebral angle tenderness [Skin Color & Pigmentation] : normal skin color and pigmentation [Abnormal Walk] : normal gait [] : no rash [Skin Turgor] : normal skin turgor [Cranial Nerves] : cranial nerves 2-12 were intact [Oriented To Time, Place, And Person] : oriented to person, place, and time

## 2019-09-12 NOTE — HISTORY OF PRESENT ILLNESS
[FreeTextEntry1] : Since last visit has seen PMD, Dr Hemphill, all stable\par \par No c/o fatigue, tired\par Denies metallic taste in mouth, denies nausea, vomiting, no weight loss/gain, good appetite\par denies sob\par urinating same amount, no change\par no burning or pain with urination\par same LE edema\par no changes in medications\par does not check BP at home\par other ros neg\par

## 2019-09-16 LAB
25(OH)D3 SERPL-MCNC: 22.7 NG/ML
ALBUMIN SERPL ELPH-MCNC: 3.9 G/DL
ANION GAP SERPL CALC-SCNC: 13 MMOL/L
BASOPHILS # BLD AUTO: 0.04 K/UL
BASOPHILS NFR BLD AUTO: 0.4 %
BUN SERPL-MCNC: 58 MG/DL
CALCIUM SERPL-MCNC: 9.8 MG/DL
CHLORIDE SERPL-SCNC: 109 MMOL/L
CO2 SERPL-SCNC: 20 MMOL/L
CREAT SERPL-MCNC: 4.17 MG/DL
EOSINOPHIL # BLD AUTO: 0.1 K/UL
EOSINOPHIL NFR BLD AUTO: 1 %
FERRITIN SERPL-MCNC: 215 NG/ML
GLUCOSE SERPL-MCNC: 115 MG/DL
HCT VFR BLD CALC: 28.3 %
HGB BLD-MCNC: 9.1 G/DL
IMM GRANULOCYTES NFR BLD AUTO: 0.9 %
IRON SATN MFR SERPL: 35 %
IRON SERPL-MCNC: 81 UG/DL
LYMPHOCYTES # BLD AUTO: 0.75 K/UL
LYMPHOCYTES NFR BLD AUTO: 7.7 %
MAN DIFF?: NORMAL
MCHC RBC-ENTMCNC: 31.7 PG
MCHC RBC-ENTMCNC: 32.2 GM/DL
MCV RBC AUTO: 98.6 FL
MONOCYTES # BLD AUTO: 0.59 K/UL
MONOCYTES NFR BLD AUTO: 6 %
NEUTROPHILS # BLD AUTO: 8.21 K/UL
NEUTROPHILS NFR BLD AUTO: 84 %
PHOSPHATE SERPL-MCNC: 3.6 MG/DL
PLATELET # BLD AUTO: 340 K/UL
POTASSIUM SERPL-SCNC: 4.4 MMOL/L
RBC # BLD: 2.87 M/UL
RBC # FLD: 14.6 %
SODIUM SERPL-SCNC: 142 MMOL/L
TIBC SERPL-MCNC: 229 UG/DL
UIBC SERPL-MCNC: 148 UG/DL
WBC # FLD AUTO: 9.78 K/UL

## 2019-09-24 ENCOUNTER — APPOINTMENT (OUTPATIENT)
Dept: NEPHROLOGY | Facility: CLINIC | Age: 49
End: 2019-09-24
Payer: COMMERCIAL

## 2019-09-24 VITALS
HEART RATE: 55 BPM | OXYGEN SATURATION: 98 % | HEIGHT: 64 IN | DIASTOLIC BLOOD PRESSURE: 78 MMHG | SYSTOLIC BLOOD PRESSURE: 147 MMHG | BODY MASS INDEX: 34.15 KG/M2 | WEIGHT: 200 LBS

## 2019-09-24 PROCEDURE — 96372 THER/PROPH/DIAG INJ SC/IM: CPT

## 2019-10-09 ENCOUNTER — APPOINTMENT (OUTPATIENT)
Dept: NEPHROLOGY | Facility: CLINIC | Age: 49
End: 2019-10-09
Payer: COMMERCIAL

## 2019-10-09 ENCOUNTER — MED ADMIN CHARGE (OUTPATIENT)
Age: 49
End: 2019-10-09

## 2019-10-09 VITALS
OXYGEN SATURATION: 98 % | HEIGHT: 64 IN | BODY MASS INDEX: 34.25 KG/M2 | DIASTOLIC BLOOD PRESSURE: 77 MMHG | SYSTOLIC BLOOD PRESSURE: 151 MMHG | WEIGHT: 200.62 LBS | HEART RATE: 53 BPM

## 2019-10-09 VITALS — DIASTOLIC BLOOD PRESSURE: 60 MMHG | SYSTOLIC BLOOD PRESSURE: 138 MMHG

## 2019-10-09 PROCEDURE — 96372 THER/PROPH/DIAG INJ SC/IM: CPT | Mod: GC

## 2019-10-09 PROCEDURE — 90686 IIV4 VACC NO PRSV 0.5 ML IM: CPT | Mod: GC

## 2019-10-09 PROCEDURE — 99214 OFFICE O/P EST MOD 30 MIN: CPT | Mod: 25,GC

## 2019-10-09 PROCEDURE — G0008: CPT | Mod: GC

## 2019-10-17 NOTE — PHYSICAL EXAM
[General Appearance - Alert] : alert [General Appearance - Well Developed] : well developed [General Appearance - In No Acute Distress] : in no acute distress [General Appearance - Well-Appearing] : healthy appearing [General Appearance - Well Nourished] : well nourished [Sclera] : the sclera and conjunctiva were normal [Oropharynx] : the oropharynx was normal [Neck Appearance] : the appearance of the neck was normal [Jugular Venous Distention Increased] : there was no jugular-venous distention [Respiration, Rhythm And Depth] : normal respiratory rhythm and effort [] : no respiratory distress [Auscultation Breath Sounds / Voice Sounds] : lungs were clear to auscultation bilaterally [Heart Rate And Rhythm] : heart rate was normal and rhythm regular [Exaggerated Use Of Accessory Muscles For Inspiration] : no accessory muscle use [Heart Sounds Gallop] : no gallops [Heart Sounds] : normal S1 and S2 [Murmurs] : no murmurs [Edema] : there was no peripheral edema [Heart Sounds Pericardial Friction Rub] : no pericardial rub [Bowel Sounds] : normal bowel sounds [No CVA Tenderness] : no ~M costovertebral angle tenderness [Abdomen Soft] : soft [Abdomen Tenderness] : non-tender [Abnormal Walk] : normal gait [Musculoskeletal - Swelling] : no joint swelling seen [No Spinal Tenderness] : no spinal tenderness [No Focal Deficits] : no focal deficits [Skin Turgor] : normal skin turgor [Oriented To Time, Place, And Person] : oriented to person, place, and time

## 2019-10-17 NOTE — REVIEW OF SYSTEMS
[Fever] : no fever [Chills] : no chills [Feeling Poorly] : not feeling poorly [Feeling Tired] : not feeling tired [Chest Pain] : no chest pain [Palpitations] : no palpitations [Lower Ext Edema] : no extremity edema [Shortness Of Breath] : no shortness of breath [Wheezing] : no wheezing [Cough] : no cough [SOB on Exertion] : no shortness of breath during exertion [Orthopnea] : no orthopnea [Abdominal Pain] : no abdominal pain [Vomiting] : no vomiting [Diarrhea] : no diarrhea [Constipation] : no constipation [Heartburn] : no heartburn [Dysuria] : no dysuria [Skin Lesions] : no skin lesions [Itching] : no itching [FreeTextEntry3] : no blurry vision [FreeTextEntry7] : no metallic taste in mouth [FreeTextEntry8] : no urinary freq/decrease in volume

## 2019-10-17 NOTE — ASSESSMENT
[FreeTextEntry1] : Pt is a 49yoM w/ CKD iV and HTN, w/ Hx of renal transplant, here for f/u: \par \par CKD Stage IV, hx  Renal transplant with CAN\par - Renal transplant with likely CTG, 38 years+\par - mildly Up trending/stable cr over time, no uremic symptoms, monitor cr trend\par - currently following/enlisted with [facility] for transplant; insurance not accepted to transfer to [facility] \par - initial evaluate for PD catheter done; pt prefers PD if needed\par - following w/ HT, Jie\par - check renal panel, urinalysis, Upro/cr ratio\par \par Immunosuppression\par - c/w pred 5 and imuran 150mg \par - following w/ Derm yearly for skin lesions\par \par Anemia due to CKD \par - Hgb stable 9-10\par - given aranesp 60mcg today 10/9\par - getting injections almost [id number]\par - check CBC, iron studies\par \par Hx elevated calcium on prior labs/hypercalcemia\par - last Ca wnl, phos nl\par - on low dose vitD supplement; last vit D wnl\par - iPTH monitor for now\par - keep off rocaltrol\par - check Ca, vit D level\par \par metabolic acidosis 2/2 advanced CKD- stable\par - c/w bicarb tabs 650mg three tabs bid\par - check bicarb level\par \par Benign Essential HTN - controlled\par - c/w atenolol 100mg, nifedipine 90mg and hydralazine 25mg tid\par - on lasix 40mg bid\par \par HCM \par - PNA vaccine 2015\par - Influenza vaccine done 10/9/19\par - HBV series: Dec #1, Jan #2, June 19th #3; Hep B titres done in 12/2018\par - Will see Derm this year- needs to make appointment still\par

## 2019-10-17 NOTE — HISTORY OF PRESENT ILLNESS
[FreeTextEntry1] : Last visit on 9/11 for routine f/u:\par \par No changes since last visit.  Patient has no compliant today and denies any uremic symptoms (loss appetite, nausea, vomiting, metallic taste, itching), chest pain, sob, abdominal pain, dysuria, urinary/bowel changes, fever, chills.  Still makes urine, no decrease in urine output.  Patient checks BP at home average 125-135/60s.  Compliant with all medications.  Patient wants flu vaccination today.

## 2019-10-17 NOTE — ASSESSMENT
[FreeTextEntry1] : Pt is a 49yoM w/ CKD iV and HTN, w/ Hx of renal transplant, here for f/u: \par \par CKD Stage IV, hx  Renal transplant with CAN\par - Renal transplant with likely CTG, 38 years+\par - mildly Up trending/stable cr over time, no uremic symptoms, monitor cr trend\par - currently following/enlisted with [facility] for transplant; insurance not accepted to transfer to [facility] \par - initial evaluate for PD catheter done; pt prefers PD if needed\par - following w/ HT, Jie\par - check renal panel, urinalysis, Upro/cr ratio\par \par Immunosuppression\par - c/w pred 5 and imuran 150mg \par - following w/ Derm yearly for skin lesions\par \par Anemia due to CKD \par - Hgb stable 9-10\par - given aranesp 60mcg today 10/9\par - getting injections almost [id number]\par - check CBC, iron studies\par \par Hx elevated calcium on prior labs/hypercalcemia\par - last Ca wnl, phos nl\par - on low dose vitD supplement; last vit D wnl\par - iPTH monitor for now\par - keep off rocaltrol\par - check Ca, vit D level\par \par metabolic acidosis 2/2 advanced CKD- stable\par - c/w bicarb tabs 650mg three tabs bid\par - check bicarb level\par \par Benign Essential HTN - controlled\par - c/w atenolol 100mg, nifedipine 90mg and hydralazine 25mg tid\par - on lasix 40mg bid\par \par HCM \par - PNA vaccine 2015\par - Influenza vaccine done 10/9/19\par - HBV series: Dec #1, Jan #2, June 19th #3; Hep B titres done in 12/2018\par - Will see Derm this year- needs to make appointment still\par

## 2019-10-17 NOTE — PHYSICAL EXAM
[General Appearance - Alert] : alert [General Appearance - Well Nourished] : well nourished [General Appearance - Well Developed] : well developed [General Appearance - Well-Appearing] : healthy appearing [General Appearance - In No Acute Distress] : in no acute distress [Sclera] : the sclera and conjunctiva were normal [Oropharynx] : the oropharynx was normal [Jugular Venous Distention Increased] : there was no jugular-venous distention [Neck Appearance] : the appearance of the neck was normal [Respiration, Rhythm And Depth] : normal respiratory rhythm and effort [] : no respiratory distress [Auscultation Breath Sounds / Voice Sounds] : lungs were clear to auscultation bilaterally [Heart Rate And Rhythm] : heart rate was normal and rhythm regular [Exaggerated Use Of Accessory Muscles For Inspiration] : no accessory muscle use [Murmurs] : no murmurs [Heart Sounds Gallop] : no gallops [Heart Sounds] : normal S1 and S2 [Edema] : there was no peripheral edema [Bowel Sounds] : normal bowel sounds [Heart Sounds Pericardial Friction Rub] : no pericardial rub [Abdomen Soft] : soft [No CVA Tenderness] : no ~M costovertebral angle tenderness [Abdomen Tenderness] : non-tender [Musculoskeletal - Swelling] : no joint swelling seen [Abnormal Walk] : normal gait [No Spinal Tenderness] : no spinal tenderness [No Focal Deficits] : no focal deficits [Skin Turgor] : normal skin turgor [Oriented To Time, Place, And Person] : oriented to person, place, and time

## 2019-10-17 NOTE — REVIEW OF SYSTEMS
[Fever] : no fever [Chills] : no chills [Feeling Poorly] : not feeling poorly [Feeling Tired] : not feeling tired [Chest Pain] : no chest pain [Palpitations] : no palpitations [Lower Ext Edema] : no extremity edema [Shortness Of Breath] : no shortness of breath [Wheezing] : no wheezing [Cough] : no cough [SOB on Exertion] : no shortness of breath during exertion [Orthopnea] : no orthopnea [Abdominal Pain] : no abdominal pain [Vomiting] : no vomiting [Constipation] : no constipation [Diarrhea] : no diarrhea [Heartburn] : no heartburn [Dysuria] : no dysuria [Skin Lesions] : no skin lesions [Itching] : no itching [FreeTextEntry3] : no blurry vision [FreeTextEntry7] : no metallic taste in mouth [FreeTextEntry8] : no urinary freq/decrease in volume

## 2019-10-18 LAB
25(OH)D3 SERPL-MCNC: 28.1 NG/ML
ALBUMIN SERPL ELPH-MCNC: 4.3 G/DL
ANION GAP SERPL CALC-SCNC: 16 MMOL/L
APPEARANCE: CLEAR
BACTERIA: NEGATIVE
BASOPHILS # BLD AUTO: 0.05 K/UL
BASOPHILS NFR BLD AUTO: 0.6 %
BILIRUBIN URINE: NEGATIVE
BLOOD URINE: NORMAL
BUN SERPL-MCNC: 62 MG/DL
CALCIUM SERPL-MCNC: 9.8 MG/DL
CHLORIDE SERPL-SCNC: 108 MMOL/L
CO2 SERPL-SCNC: 19 MMOL/L
COLOR: NORMAL
CREAT SERPL-MCNC: 4.22 MG/DL
CREAT SPEC-SCNC: 90 MG/DL
CREAT/PROT UR: 1 RATIO
EOSINOPHIL # BLD AUTO: 0.1 K/UL
EOSINOPHIL NFR BLD AUTO: 1.1 %
FERRITIN SERPL-MCNC: 196 NG/ML
GLUCOSE QUALITATIVE U: ABNORMAL
GLUCOSE SERPL-MCNC: 99 MG/DL
HCT VFR BLD CALC: 30 %
HGB BLD-MCNC: 9.8 G/DL
HYALINE CASTS: 0 /LPF
IMM GRANULOCYTES NFR BLD AUTO: 0.5 %
IRON SATN MFR SERPL: 33 %
IRON SERPL-MCNC: 83 UG/DL
KETONES URINE: NEGATIVE
LEUKOCYTE ESTERASE URINE: NEGATIVE
LYMPHOCYTES # BLD AUTO: 0.72 K/UL
LYMPHOCYTES NFR BLD AUTO: 8.3 %
MAN DIFF?: NORMAL
MCHC RBC-ENTMCNC: 32.7 GM/DL
MCHC RBC-ENTMCNC: 32.7 PG
MCV RBC AUTO: 100 FL
MICROSCOPIC-UA: NORMAL
MONOCYTES # BLD AUTO: 0.57 K/UL
MONOCYTES NFR BLD AUTO: 6.6 %
NEUTROPHILS # BLD AUTO: 7.22 K/UL
NEUTROPHILS NFR BLD AUTO: 82.9 %
NITRITE URINE: NEGATIVE
PH URINE: 7
PHOSPHATE SERPL-MCNC: 4.8 MG/DL
PLATELET # BLD AUTO: 367 K/UL
POTASSIUM SERPL-SCNC: 4.6 MMOL/L
PROT UR-MCNC: 87 MG/DL
PROTEIN URINE: ABNORMAL
RBC # BLD: 3 M/UL
RBC # FLD: 14.9 %
RED BLOOD CELLS URINE: 2 /HPF
SODIUM SERPL-SCNC: 143 MMOL/L
SPECIFIC GRAVITY URINE: 1.01
SQUAMOUS EPITHELIAL CELLS: 1 /HPF
TIBC SERPL-MCNC: 253 UG/DL
TRANSFERRIN SERPL-MCNC: 199 MG/DL
UIBC SERPL-MCNC: 170 UG/DL
UROBILINOGEN URINE: NORMAL
WBC # FLD AUTO: 8.7 K/UL
WHITE BLOOD CELLS URINE: 2 /HPF

## 2019-10-28 ENCOUNTER — APPOINTMENT (OUTPATIENT)
Dept: NEPHROLOGY | Facility: CLINIC | Age: 49
End: 2019-10-28
Payer: COMMERCIAL

## 2019-10-28 ENCOUNTER — RX CHANGE (OUTPATIENT)
Age: 49
End: 2019-10-28

## 2019-10-28 PROCEDURE — 96372 THER/PROPH/DIAG INJ SC/IM: CPT

## 2019-10-29 ENCOUNTER — RX CHANGE (OUTPATIENT)
Age: 49
End: 2019-10-29

## 2019-11-01 NOTE — PROCEDURE
[FreeTextEntry1] : aranesp 60mcg given sc right deltoid\par lot [id number]\par exp 6/21 \par vitals stable\par no complications

## 2019-11-06 ENCOUNTER — APPOINTMENT (OUTPATIENT)
Dept: NEPHROLOGY | Facility: CLINIC | Age: 49
End: 2019-11-06
Payer: COMMERCIAL

## 2019-11-06 VITALS
HEART RATE: 55 BPM | OXYGEN SATURATION: 97 % | WEIGHT: 205.03 LBS | SYSTOLIC BLOOD PRESSURE: 134 MMHG | HEIGHT: 64 IN | BODY MASS INDEX: 35 KG/M2 | DIASTOLIC BLOOD PRESSURE: 65 MMHG

## 2019-11-06 PROCEDURE — 99214 OFFICE O/P EST MOD 30 MIN: CPT | Mod: 25

## 2019-11-06 PROCEDURE — 96372 THER/PROPH/DIAG INJ SC/IM: CPT

## 2019-11-06 NOTE — HISTORY OF PRESENT ILLNESS
[FreeTextEntry1] : Here for follow up\par \par Checks BP at home; average 130-140/66; /66 today and last week 140/66\par States LE swelling is the same\par Patient denies nausea, metallic taste in mouth, change in appetite, sob, leg swelling, change in urine frequency or output\par Denies diarrhea or vomiting\par Denies any pain or burning with urination\par other ros neg

## 2019-11-06 NOTE — ASSESSMENT
[FreeTextEntry1] : Pt is a 49yoM w/ CKD iV and HTN, w/ Hx of renal transplant, here for f/u: \par \par CKD Stage IV, hx Renal transplant with CAN\par - Renal transplant with likely CTG, 38 years+\par - mildly Uptrending/stable cr over time, no uremic symptoms, monitor cr trend\par - currently following/enlisted with [facility] for transplant; insurance not accepted to transfer to [facility] \par - initial evaluate for PD catheter done; pt prefers PD if needed\par - following w/ HT, Jie\par - check renal panel\par \par Immunosuppression\par - c/w pred 5 and imuran 150mg \par - following w/ Derm yearly for skin lesions\par \par Anemia due to CKD \par - Hgb stable 9-10\par - given aranesp 60mcg today  \par - getting injections almost [id number]\par - check CBC, iron studies\par \par Hx elevated calcium on prior labs/hypercalcemia\par - last Ca wnl, phos nl\par - on low dose vitD supplement; last vit D wnl\par - iPTH monitor for now\par - keep off rocaltrol\par - check Ca, vit D level\par \par metabolic acidosis 2/2 advanced CKD- stable\par - c/w bicarb tabs 650mg three tabs bid\par - check bicarb level\par \par Benign Essential HTN - controlled\par - c/w atenolol 100mg, nifedipine 90mg and hydralazine 25mg tid\par - on lasix 40mg bid\par \par HCM \par - PNA vaccine 2015\par - Influenza vaccine done 10/9/19\par - HBV series: Dec #1, Jan #2, June 19th #3; Hep B titres done in 12/2018\par - Will see Derm this year- needs to make appointment still\par . \par  \par \par \par \par

## 2019-11-06 NOTE — PHYSICAL EXAM
[General Appearance - Alert] : alert [General Appearance - In No Acute Distress] : in no acute distress [General Appearance - Well Nourished] : well nourished [General Appearance - Well Developed] : well developed [Sclera] : the sclera and conjunctiva were normal [Outer Ear] : the ears and nose were normal in appearance [Neck Appearance] : the appearance of the neck was normal [Respiration, Rhythm And Depth] : normal respiratory rhythm and effort [Auscultation Breath Sounds / Voice Sounds] : lungs were clear to auscultation bilaterally [Apical Impulse] : the apical impulse was normal [Heart Rate And Rhythm] : heart rate was normal and rhythm regular [Heart Sounds] : normal S1 and S2 [FreeTextEntry1] : 1+ LE edema b/l R>L [Bowel Sounds] : normal bowel sounds [Abdomen Soft] : soft [No CVA Tenderness] : no ~M costovertebral angle tenderness [Abnormal Walk] : normal gait [Skin Color & Pigmentation] : normal skin color and pigmentation [Skin Turgor] : normal skin turgor [] : no rash [Cranial Nerves] : cranial nerves 2-12 were intact [Oriented To Time, Place, And Person] : oriented to person, place, and time

## 2019-11-06 NOTE — ASSESSMENT
[FreeTextEntry1] : Pt is a 49yoM w/ CKD iV and HTN, w/ Hx of renal transplant, here for f/u: \par \par CKD Stage IV, hx Renal transplant with CAN\par - Renal transplant with likely CTG, 38 years+\par - mildly Uptrending/stable cr over time, no uremic symptoms, monitor cr trend\par - currently following/enlisted with [facility] for transplant; insurance not accepted to transfer to [facility] \par - initial evaluate for PD catheter done; pt prefers PD if needed\par - following w/ HT, Jie\par - check renal panel\par \par Immunosuppression\par - c/w pred 5 and imuran 150mg \par - following w/ Derm yearly for skin lesions\par \par Anemia due to CKD \par - Hgb stable 9-10\par - given aranesp 60mcg today  \par - getting injections almost [id number]\par - check CBC, iron studies\par \par Hx elevated calcium on prior labs/hypercalcemia\par - last Ca wnl, phos nl\par - on low dose vitD supplement; last vit D wnl\par - iPTH monitor for now\par - keep off rocaltrol\par - check Ca, vit D level\par \par metabolic acidosis 2/2 advanced CKD- stable\par - c/w bicarb tabs 650mg three tabs bid\par - check bicarb level\par \par Benign Essential HTN - controlled\par - c/w atenolol 100mg, nifedipine 90mg and hydralazine 25mg tid\par - on lasix 40mg bid\par \par HCM \par - PNA vaccine 2015\par - Influenza vaccine done 10/9/19\par - HBV series: Dec #1, Jan #2, June 19th #3; Hep B titres done in 12/2018\par - Will see Derm this year- needs to make appointment still\par . \par  \par \par \par \par

## 2019-11-07 LAB
25(OH)D3 SERPL-MCNC: 23.7 NG/ML
ALBUMIN SERPL ELPH-MCNC: 4 G/DL
ANION GAP SERPL CALC-SCNC: 16 MMOL/L
BASOPHILS # BLD AUTO: 0.06 K/UL
BASOPHILS NFR BLD AUTO: 0.6 %
BUN SERPL-MCNC: 64 MG/DL
CALCIUM SERPL-MCNC: 9.3 MG/DL
CHLORIDE SERPL-SCNC: 103 MMOL/L
CO2 SERPL-SCNC: 21 MMOL/L
CREAT SERPL-MCNC: 4.53 MG/DL
EOSINOPHIL # BLD AUTO: 0.12 K/UL
EOSINOPHIL NFR BLD AUTO: 1.3 %
FERRITIN SERPL-MCNC: 165 NG/ML
GLUCOSE SERPL-MCNC: 115 MG/DL
HCT VFR BLD CALC: 28.9 %
HGB BLD-MCNC: 9.4 G/DL
IMM GRANULOCYTES NFR BLD AUTO: 1.1 %
IRON SATN MFR SERPL: 23 %
IRON SERPL-MCNC: 51 UG/DL
LYMPHOCYTES # BLD AUTO: 0.91 K/UL
LYMPHOCYTES NFR BLD AUTO: 9.7 %
MAN DIFF?: NORMAL
MCHC RBC-ENTMCNC: 32.5 GM/DL
MCHC RBC-ENTMCNC: 32.6 PG
MCV RBC AUTO: 100.3 FL
MONOCYTES # BLD AUTO: 0.8 K/UL
MONOCYTES NFR BLD AUTO: 8.5 %
NEUTROPHILS # BLD AUTO: 7.38 K/UL
NEUTROPHILS NFR BLD AUTO: 78.8 %
PHOSPHATE SERPL-MCNC: 5.1 MG/DL
PLATELET # BLD AUTO: 363 K/UL
POTASSIUM SERPL-SCNC: 4.2 MMOL/L
RBC # BLD: 2.88 M/UL
RBC # FLD: 14.3 %
SODIUM SERPL-SCNC: 140 MMOL/L
TIBC SERPL-MCNC: 225 UG/DL
UIBC SERPL-MCNC: 174 UG/DL
WBC # FLD AUTO: 9.37 K/UL

## 2019-11-27 ENCOUNTER — APPOINTMENT (OUTPATIENT)
Dept: NEPHROLOGY | Facility: CLINIC | Age: 49
End: 2019-11-27
Payer: COMMERCIAL

## 2019-11-27 VITALS
HEIGHT: 64 IN | BODY MASS INDEX: 35.38 KG/M2 | OXYGEN SATURATION: 98 % | WEIGHT: 207.23 LBS | SYSTOLIC BLOOD PRESSURE: 146 MMHG | DIASTOLIC BLOOD PRESSURE: 77 MMHG | HEART RATE: 56 BPM

## 2019-11-27 PROCEDURE — 96372 THER/PROPH/DIAG INJ SC/IM: CPT

## 2019-11-27 RX ORDER — DARBEPOETIN ALFA 40 UG/.4ML
40 INJECTION, SOLUTION INTRAVENOUS; SUBCUTANEOUS
Qty: 0 | Refills: 0 | Status: COMPLETED | OUTPATIENT
Start: 2019-11-27

## 2019-11-27 RX ADMIN — DARBEPOETIN ALFA 80 MCG/0.4ML: 40 SOLUTION INTRAVENOUS; SUBCUTANEOUS at 00:00

## 2019-12-04 ENCOUNTER — LABORATORY RESULT (OUTPATIENT)
Age: 49
End: 2019-12-04

## 2019-12-04 ENCOUNTER — APPOINTMENT (OUTPATIENT)
Dept: INTERNAL MEDICINE | Facility: CLINIC | Age: 49
End: 2019-12-04
Payer: COMMERCIAL

## 2019-12-04 VITALS
WEIGHT: 200 LBS | SYSTOLIC BLOOD PRESSURE: 140 MMHG | HEIGHT: 64 IN | BODY MASS INDEX: 34.15 KG/M2 | DIASTOLIC BLOOD PRESSURE: 70 MMHG

## 2019-12-04 PROCEDURE — 99214 OFFICE O/P EST MOD 30 MIN: CPT | Mod: 25

## 2019-12-04 PROCEDURE — 36415 COLL VENOUS BLD VENIPUNCTURE: CPT

## 2019-12-04 NOTE — PHYSICAL EXAM
[Normal Sclera/Conjunctiva] : normal sclera/conjunctiva [No JVD] : no jugular venous distention [No Edema] : there was no peripheral edema [Normal] : affect was normal and insight and judgment were intact [de-identified] : overweight [de-identified] : ultiple cutaneous lesions

## 2019-12-04 NOTE — ASSESSMENT
[FreeTextEntry1] : This is an upbeat 49-year-old male with chronic renal failure secondary to failing transplant at 42 years\par \par He has a history of hypertension his blood pressure is under excellent control he has no orthostasis no medication changes\par \par He has a metabolic  acidosis remains on bicarbonate his last serum bicarbonate done by renal was acceptable this was repeated since we were drawing bloods anyway\par \par He has a history of anemia he is on Epogen. A repeat CBC was obtained\par \par He has a history of fatty liver a liver profile was obtained he was also asked to lose weight\par \par He has a history of hypercholesterolemia remains on a statin a cholesterol profile was obtained medication changes may be predicated on the results

## 2019-12-04 NOTE — HISTORY OF PRESENT ILLNESS
[FreeTextEntry1] : This is a 49-year-old male for evaluation and treatment of his chronic renal disease status post transplant hypertension hypercholesterolemia fatty liver and weight [de-identified] : Patient remarkably has no complaints. His appetite is good has no pruritus and no abnormalities of taste\par \par

## 2019-12-05 LAB
25(OH)D3 SERPL-MCNC: 27.3 NG/ML
ALBUMIN SERPL ELPH-MCNC: 4.1 G/DL
ALP BLD-CCNC: 87 U/L
ALT SERPL-CCNC: 34 U/L
ANION GAP SERPL CALC-SCNC: 16 MMOL/L
AST SERPL-CCNC: 31 U/L
BASOPHILS # BLD AUTO: 0.05 K/UL
BASOPHILS NFR BLD AUTO: 0.6 %
BILIRUB SERPL-MCNC: 0.4 MG/DL
BUN SERPL-MCNC: 69 MG/DL
CALCIUM SERPL-MCNC: 9.9 MG/DL
CHLORIDE SERPL-SCNC: 103 MMOL/L
CHOLEST SERPL-MCNC: 111 MG/DL
CHOLEST/HDLC SERPL: 3.8 RATIO
CO2 SERPL-SCNC: 24 MMOL/L
CREAT SERPL-MCNC: 4.27 MG/DL
EOSINOPHIL # BLD AUTO: 0.07 K/UL
EOSINOPHIL NFR BLD AUTO: 0.8 %
ESTIMATED AVERAGE GLUCOSE: 108 MG/DL
GLUCOSE SERPL-MCNC: 117 MG/DL
HBA1C MFR BLD HPLC: 5.4 %
HCT VFR BLD CALC: 33.1 %
HDLC SERPL-MCNC: 29 MG/DL
HGB BLD-MCNC: 10.5 G/DL
IMM GRANULOCYTES NFR BLD AUTO: 0.8 %
LDLC SERPL CALC-MCNC: 38 MG/DL
LYMPHOCYTES # BLD AUTO: 1.04 K/UL
LYMPHOCYTES NFR BLD AUTO: 12.2 %
MAN DIFF?: NORMAL
MCHC RBC-ENTMCNC: 31.7 GM/DL
MCHC RBC-ENTMCNC: 32.4 PG
MCV RBC AUTO: 102.2 FL
MONOCYTES # BLD AUTO: 0.55 K/UL
MONOCYTES NFR BLD AUTO: 6.5 %
NEUTROPHILS # BLD AUTO: 6.71 K/UL
NEUTROPHILS NFR BLD AUTO: 79.1 %
PLATELET # BLD AUTO: 442 K/UL
POTASSIUM SERPL-SCNC: 4.6 MMOL/L
PROT SERPL-MCNC: 7 G/DL
RBC # BLD: 3.24 M/UL
RBC # FLD: 14.4 %
SAVE SPECIMEN: NORMAL
SODIUM SERPL-SCNC: 143 MMOL/L
T3RU NFR SERPL: 1 TBI
T4 SERPL-MCNC: 7.3 UG/DL
TRIGL SERPL-MCNC: 221 MG/DL
TSH SERPL-ACNC: 3.44 UIU/ML
URATE SERPL-MCNC: 8.6 MG/DL
WBC # FLD AUTO: 8.49 K/UL

## 2019-12-27 ENCOUNTER — RX CHANGE (OUTPATIENT)
Age: 49
End: 2019-12-27

## 2020-01-28 ENCOUNTER — APPOINTMENT (OUTPATIENT)
Dept: NEPHROLOGY | Facility: CLINIC | Age: 50
End: 2020-01-28

## 2020-01-29 ENCOUNTER — APPOINTMENT (OUTPATIENT)
Dept: NEPHROLOGY | Facility: CLINIC | Age: 50
End: 2020-01-29
Payer: COMMERCIAL

## 2020-01-29 VITALS
BODY MASS INDEX: 34.81 KG/M2 | DIASTOLIC BLOOD PRESSURE: 72 MMHG | SYSTOLIC BLOOD PRESSURE: 135 MMHG | OXYGEN SATURATION: 98 % | WEIGHT: 202.82 LBS | HEART RATE: 84 BPM

## 2020-01-29 PROCEDURE — 99214 OFFICE O/P EST MOD 30 MIN: CPT | Mod: GC

## 2020-01-29 RX ORDER — ATENOLOL 100 MG/1
100 TABLET ORAL
Qty: 90 | Refills: 3 | Status: DISCONTINUED | COMMUNITY
Start: 2018-01-02 | End: 2020-01-29

## 2020-01-31 LAB
25(OH)D3 SERPL-MCNC: 25.3 NG/ML
ALBUMIN SERPL ELPH-MCNC: 4.5 G/DL
ANION GAP SERPL CALC-SCNC: 15 MMOL/L
APPEARANCE: CLEAR
BACTERIA: NEGATIVE
BILIRUBIN URINE: NEGATIVE
BLOOD URINE: NORMAL
BUN SERPL-MCNC: 52 MG/DL
CALCIUM SERPL-MCNC: 9.9 MG/DL
CHLORIDE SERPL-SCNC: 106 MMOL/L
CHOLEST SERPL-MCNC: 117 MG/DL
CHOLEST/HDLC SERPL: 3.4 RATIO
CO2 SERPL-SCNC: 20 MMOL/L
COLOR: NORMAL
CREAT SERPL-MCNC: 3.98 MG/DL
CREAT SPEC-SCNC: 76 MG/DL
CREAT/PROT UR: 1.1 RATIO
FERRITIN SERPL-MCNC: 282 NG/ML
GLUCOSE QUALITATIVE U: NORMAL
GLUCOSE SERPL-MCNC: 99 MG/DL
HDLC SERPL-MCNC: 35 MG/DL
HYALINE CASTS: 0 /LPF
IRON SATN MFR SERPL: 42 %
IRON SERPL-MCNC: 91 UG/DL
KETONES URINE: NEGATIVE
LDLC SERPL CALC-MCNC: 51 MG/DL
LEUKOCYTE ESTERASE URINE: NEGATIVE
MICROSCOPIC-UA: NORMAL
NITRITE URINE: NEGATIVE
PH URINE: 7.5
PHOSPHATE SERPL-MCNC: 4.3 MG/DL
POTASSIUM SERPL-SCNC: 4.3 MMOL/L
PROT UR-MCNC: 84 MG/DL
PROTEIN URINE: ABNORMAL
RED BLOOD CELLS URINE: 0 /HPF
SODIUM SERPL-SCNC: 141 MMOL/L
SPECIFIC GRAVITY URINE: 1.01
SQUAMOUS EPITHELIAL CELLS: 1 /HPF
TIBC SERPL-MCNC: 218 UG/DL
TRIGL SERPL-MCNC: 157 MG/DL
UIBC SERPL-MCNC: 126 UG/DL
UROBILINOGEN URINE: NORMAL
WHITE BLOOD CELLS URINE: 4 /HPF

## 2020-02-14 LAB
BASOPHILS # BLD AUTO: 0.06 K/UL
BASOPHILS NFR BLD AUTO: 0.9 %
EOSINOPHIL # BLD AUTO: 0.08 K/UL
EOSINOPHIL NFR BLD AUTO: 1.2 %
HCT VFR BLD CALC: 24.8 %
HGB BLD-MCNC: 8.3 G/DL
IMM GRANULOCYTES NFR BLD AUTO: 0.6 %
LYMPHOCYTES # BLD AUTO: 1.02 K/UL
LYMPHOCYTES NFR BLD AUTO: 14.9 %
MAN DIFF?: NORMAL
MCHC RBC-ENTMCNC: 33.1 PG
MCHC RBC-ENTMCNC: 33.5 GM/DL
MCV RBC AUTO: 98.8 FL
MONOCYTES # BLD AUTO: 0.46 K/UL
MONOCYTES NFR BLD AUTO: 6.7 %
NEUTROPHILS # BLD AUTO: 5.2 K/UL
NEUTROPHILS NFR BLD AUTO: 75.7 %
PLATELET # BLD AUTO: 403 K/UL
RBC # BLD: 2.51 M/UL
RBC # FLD: 14.9 %
WBC # FLD AUTO: 6.86 K/UL

## 2020-02-19 ENCOUNTER — APPOINTMENT (OUTPATIENT)
Dept: NEPHROLOGY | Facility: CLINIC | Age: 50
End: 2020-02-19
Payer: COMMERCIAL

## 2020-02-19 VITALS — SYSTOLIC BLOOD PRESSURE: 150 MMHG | DIASTOLIC BLOOD PRESSURE: 80 MMHG | HEART RATE: 91 BPM | OXYGEN SATURATION: 98 %

## 2020-02-19 PROCEDURE — 96372 THER/PROPH/DIAG INJ SC/IM: CPT

## 2020-02-19 RX ORDER — DARBEPOETIN ALFA 60 UG/ML
60 SOLUTION INTRAVENOUS; SUBCUTANEOUS
Qty: 0 | Refills: 0 | Status: COMPLETED | OUTPATIENT
Start: 2020-02-19

## 2020-02-26 ENCOUNTER — APPOINTMENT (OUTPATIENT)
Dept: NEPHROLOGY | Facility: CLINIC | Age: 50
End: 2020-02-26
Payer: COMMERCIAL

## 2020-02-26 VITALS
OXYGEN SATURATION: 99 % | BODY MASS INDEX: 32.92 KG/M2 | DIASTOLIC BLOOD PRESSURE: 83 MMHG | HEART RATE: 88 BPM | SYSTOLIC BLOOD PRESSURE: 129 MMHG | WEIGHT: 191.8 LBS

## 2020-02-26 PROCEDURE — 96372 THER/PROPH/DIAG INJ SC/IM: CPT

## 2020-02-26 RX ORDER — DARBEPOETIN ALFA 60 UG/ML
60 SOLUTION INTRAVENOUS; SUBCUTANEOUS
Qty: 0 | Refills: 0 | Status: COMPLETED | OUTPATIENT
Start: 2020-02-14

## 2020-02-26 NOTE — HISTORY OF PRESENT ILLNESS
[FreeTextEntry1] : here for aranesp\par states not feeling well\par myalgias since yesterday, nausea\par denies chills, fever, sore throat\par appetite okay\par \par

## 2020-02-27 LAB
ALBUMIN SERPL ELPH-MCNC: 4.4 G/DL
ANION GAP SERPL CALC-SCNC: 15 MMOL/L
BASOPHILS # BLD AUTO: 0.04 K/UL
BASOPHILS NFR BLD AUTO: 0.6 %
BUN SERPL-MCNC: 59 MG/DL
CALCIUM SERPL-MCNC: 9.8 MG/DL
CHLORIDE SERPL-SCNC: 107 MMOL/L
CO2 SERPL-SCNC: 21 MMOL/L
CREAT SERPL-MCNC: 4.32 MG/DL
EOSINOPHIL # BLD AUTO: 0.06 K/UL
EOSINOPHIL NFR BLD AUTO: 0.9 %
FLU A RESULT: NOT DETECTED
FLU B RESULT: NOT DETECTED
GLUCOSE SERPL-MCNC: 120 MG/DL
HCT VFR BLD CALC: 25.5 %
HGB BLD-MCNC: 8.8 G/DL
IMM GRANULOCYTES NFR BLD AUTO: 0.6 %
LYMPHOCYTES # BLD AUTO: 0.75 K/UL
LYMPHOCYTES NFR BLD AUTO: 11.3 %
MAN DIFF?: NORMAL
MCHC RBC-ENTMCNC: 34.5 GM/DL
MCHC RBC-ENTMCNC: 35.3 PG
MCV RBC AUTO: 102.4 FL
MONOCYTES # BLD AUTO: 0.35 K/UL
MONOCYTES NFR BLD AUTO: 5.3 %
NEUTROPHILS # BLD AUTO: 5.38 K/UL
NEUTROPHILS NFR BLD AUTO: 81.3 %
PHOSPHATE SERPL-MCNC: 3.4 MG/DL
PLATELET # BLD AUTO: 327 K/UL
POTASSIUM SERPL-SCNC: 4.3 MMOL/L
RBC # BLD: 2.49 M/UL
RBC # FLD: 16.7 %
RSV RESULT: NOT DETECTED
SODIUM SERPL-SCNC: 143 MMOL/L
WBC # FLD AUTO: 6.62 K/UL

## 2020-03-04 ENCOUNTER — APPOINTMENT (OUTPATIENT)
Dept: NEPHROLOGY | Facility: CLINIC | Age: 50
End: 2020-03-04
Payer: COMMERCIAL

## 2020-03-04 VITALS
HEART RATE: 118 BPM | DIASTOLIC BLOOD PRESSURE: 73 MMHG | BODY MASS INDEX: 32.27 KG/M2 | OXYGEN SATURATION: 98 % | HEIGHT: 64 IN | SYSTOLIC BLOOD PRESSURE: 168 MMHG | WEIGHT: 189 LBS

## 2020-03-04 VITALS — SYSTOLIC BLOOD PRESSURE: 144 MMHG | DIASTOLIC BLOOD PRESSURE: 60 MMHG

## 2020-03-04 VITALS — HEART RATE: 80 BPM

## 2020-03-04 PROCEDURE — 99214 OFFICE O/P EST MOD 30 MIN: CPT | Mod: 25,GC

## 2020-03-04 PROCEDURE — 96372 THER/PROPH/DIAG INJ SC/IM: CPT

## 2020-03-05 LAB
ALBUMIN SERPL ELPH-MCNC: 4.4 G/DL
ANION GAP SERPL CALC-SCNC: 18 MMOL/L
BASOPHILS # BLD AUTO: 0.05 K/UL
BASOPHILS NFR BLD AUTO: 0.7 %
BUN SERPL-MCNC: 55 MG/DL
CALCIUM SERPL-MCNC: 9.9 MG/DL
CHLORIDE SERPL-SCNC: 103 MMOL/L
CO2 SERPL-SCNC: 22 MMOL/L
CREAT SERPL-MCNC: 4.44 MG/DL
EOSINOPHIL # BLD AUTO: 0.04 K/UL
EOSINOPHIL NFR BLD AUTO: 0.6 %
GLUCOSE SERPL-MCNC: 104 MG/DL
HCT VFR BLD CALC: 30.2 %
HGB BLD-MCNC: 9.6 G/DL
IMM GRANULOCYTES NFR BLD AUTO: 0.3 %
LYMPHOCYTES # BLD AUTO: 0.97 K/UL
LYMPHOCYTES NFR BLD AUTO: 14 %
MAN DIFF?: NORMAL
MCHC RBC-ENTMCNC: 31.8 GM/DL
MCHC RBC-ENTMCNC: 32.8 PG
MCV RBC AUTO: 103.1 FL
MONOCYTES # BLD AUTO: 0.42 K/UL
MONOCYTES NFR BLD AUTO: 6.1 %
NEUTROPHILS # BLD AUTO: 5.44 K/UL
NEUTROPHILS NFR BLD AUTO: 78.3 %
PHOSPHATE SERPL-MCNC: 3.5 MG/DL
PLATELET # BLD AUTO: 495 K/UL
POTASSIUM SERPL-SCNC: 4.1 MMOL/L
RBC # BLD: 2.93 M/UL
RBC # FLD: 16.2 %
SODIUM SERPL-SCNC: 142 MMOL/L
WBC # FLD AUTO: 6.94 K/UL

## 2020-03-05 NOTE — HISTORY OF PRESENT ILLNESS
[FreeTextEntry1] : Last seen in nephrology clinic on 2/26/2020 for Aranesp.  Since last visit, patient seen cardiologist (at [facility]) on 2/24/2020 who increased hydralazine dose 25 to 50 TID, added carvedilol (hasn’t started or picked it up from pharmacy yet) because elevated BP in office per patient.\par \par Today patient reports overall feeling well with recent malaise/nausea resolved/viral syndrome resolved.  Reports his mother diagnosed/hospitalized for pneumonia since last visit now improving and his sister diagnosed with bronchitis.  Otherwise patient reports energy level improved after last Aranesp dose and denies any chest pain, sob, fever, chills, cough, n/v/d, etc.  Checks BP at home with average BP 140s/60-70s.  Reports compliance with all medications. other ros neg

## 2020-03-05 NOTE — PHYSICAL EXAM
[General Appearance - Alert] : alert [General Appearance - In No Acute Distress] : in no acute distress [General Appearance - Well Nourished] : well nourished [General Appearance - Well Developed] : well developed [General Appearance - Well-Appearing] : healthy appearing [Sclera] : the sclera and conjunctiva were normal [Outer Ear] : the ears and nose were normal in appearance [Neck Appearance] : the appearance of the neck was normal [] : no respiratory distress [Respiration, Rhythm And Depth] : normal respiratory rhythm and effort [Exaggerated Use Of Accessory Muscles For Inspiration] : no accessory muscle use [Auscultation Breath Sounds / Voice Sounds] : lungs were clear to auscultation bilaterally [Heart Rate And Rhythm] : heart rate was normal and rhythm regular [Heart Sounds] : normal S1 and S2 [Heart Sounds Gallop] : no gallops [Murmurs] : no murmurs [Heart Sounds Pericardial Friction Rub] : no pericardial rub [Edema] : there was no peripheral edema [Bowel Sounds] : normal bowel sounds [Abdomen Soft] : soft [Abdomen Tenderness] : non-tender [No CVA Tenderness] : no ~M costovertebral angle tenderness [No Spinal Tenderness] : no spinal tenderness [Abnormal Walk] : normal gait [No Focal Deficits] : no focal deficits [Affect] : the affect was normal [Mood] : the mood was normal [FreeTextEntry1] : RLQ well healed surgical scar no erythema/tender/swelling noted

## 2020-03-05 NOTE — ASSESSMENT
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, now with CKD5\par - Renal transplant with likely CTG, 38 years+, baseline sCr 4-4.2\par - mildly Uptrending/stable cr over time, no uremic symptoms\par - currently following/listed with [facility] for transplant (awaiting list); insurance not accepted to transfer to [facility]\par - initial evaluate for PD catheter done; pt prefers PD if needed\par - following w/ HT, Jie\par - check renal panel\par \par Immunosuppression\par - c/w pred 5 and Imuran 150mg \par - following w/ Derm yearly for skin lesions\par \par Anemia due to CKD \par - Hgb baseline 9-10, currently 8.8 \par - today given Aranesp 60mcg currently [id number] drop H/H w/ anemia symptomatic\par - check CBC\par \par Hx elevated calcium on prior labs/hypercalcemia\par - last Ca wnl, phos nl, last Vit D wnl\par - off vit D supplement and Rocaltrol currently\par \par metabolic acidosis 2/2 advanced CKD- stable\par - c/w bicarb tabs 650 mg three tabs bid\par - check bicarb level\par \par Benign Essential HTN - controlled\par - private cardiologist d/c atenolol, plan start carvedilol\par - c/w nifedipine 90mg and hydralazine 50mg tid, lasix 40mg bid\par - cont monitor BP at home, keep log\par \par HCM \par - prior lipids (jan 2020) LDL 51/ \par - PNA vaccine 2015\par - Influenza vaccine done 10/9/19\par - HBV series: Dec #1, Jan #2, June 19th #3; Hep B titres done in 12/2018\par - Will see Derm this year- needs to make appointment still.

## 2020-03-05 NOTE — ASSESSMENT
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, now with CKD5\par - Renal transplant with likely CTG, 38 years+, baseline sCr 4-4.2\par - mildly Uptrending/stable cr over time, no uremic symptoms\par - currently following/listed with [facility] for transplant (awaiting list); insurance not accepted to transfer to [facility]\par - initial evaluate for PD catheter done; pt prefers PD if needed\par - following w/ HT, Jie\par - check renal panel\par \par Immunosuppression\par - c/w pred 5 and Imuran 150mg \par - following w/ Derm yearly for skin lesions\par \par Anemia due to CKD \par - Hgb baseline 9-10, currently 8.8 \par - today given Aranesp 60mcg currently [id number] drop H/H w/ anemia symptomatic\par - check CBC\par \par Hx elevated calcium on prior labs/hypercalcemia\par - last Ca wnl, phos nl, last Vit D wnl\par - off vit D supplement and Rocaltrol currently\par \par metabolic acidosis 2/2 advanced CKD- stable\par - c/w bicarb tabs 650 mg three tabs bid\par - check bicarb level\par \par Benign Essential HTN - controlled\par - private cardiologist d/c atenolol, plan start carvedilol\par - c/w nifedipine 90mg and hydralazine 50mg tid, lasix 40mg bid\par - cont monitor BP at home, keep log\par \par HCM \par - prior lipids (jan 2020) LDL 51/ \par - PNA vaccine 2015\par - Influenza vaccine done 10/9/19\par - HBV series: Dec #1, Jan #2, June 19th #3; Hep B titres done in 12/2018\par - Will see Derm this year- needs to make appointment still.

## 2020-03-05 NOTE — HISTORY OF PRESENT ILLNESS
[FreeTextEntry1] : Last seen in nephrology clinic on 2/26/2020 for Aranesp.  Since last visit, patient seen cardiologist (at [facility]) on 2/24/2020 who increased hydralazine dose 25 to 50 TID, added carvedilol (hasn’t started or picked it up from pharmacy yet) because elevated BP in office per patient.\par \par Today patient reports overall feeling well with recent malaise/nausea resolved/viral syndrome resolved.  Reports his mother diagnosed/hospitalized for pneumonia since last visit now improving and his sister diagnosed with bronchitis.  Otherwise patient reports energy level improved after last Aranesp dose and denies any chest pain, sob, fever, chills, cough, n/v/d, etc.  Checks BP at home with average BP 140s/60-70s.  Reports compliance with all medications. other ros neg

## 2020-03-05 NOTE — REVIEW OF SYSTEMS
[Fever] : no fever [Chills] : no chills [Feeling Poorly] : not feeling poorly [Feeling Tired] : not feeling tired [Sore Throat] : no sore throat [Chest Pain] : no chest pain [Palpitations] : no palpitations [Lower Ext Edema] : no extremity edema [Shortness Of Breath] : no shortness of breath [Wheezing] : no wheezing [Cough] : no cough [SOB on Exertion] : no shortness of breath during exertion [Orthopnea] : no orthopnea [Abdominal Pain] : no abdominal pain [Vomiting] : no vomiting [Constipation] : no constipation [Diarrhea] : no diarrhea [Dysuria] : no dysuria [FreeTextEntry3] : no blurry vision [FreeTextEntry8] : no hematuria

## 2020-03-11 ENCOUNTER — APPOINTMENT (OUTPATIENT)
Dept: NEPHROLOGY | Facility: CLINIC | Age: 50
End: 2020-03-11
Payer: COMMERCIAL

## 2020-03-11 VITALS
BODY MASS INDEX: 32.92 KG/M2 | HEART RATE: 78 BPM | DIASTOLIC BLOOD PRESSURE: 74 MMHG | SYSTOLIC BLOOD PRESSURE: 126 MMHG | OXYGEN SATURATION: 100 % | WEIGHT: 191.8 LBS

## 2020-03-11 VITALS — SYSTOLIC BLOOD PRESSURE: 122 MMHG | DIASTOLIC BLOOD PRESSURE: 78 MMHG

## 2020-03-11 PROCEDURE — 96372 THER/PROPH/DIAG INJ SC/IM: CPT

## 2020-03-11 RX ORDER — DARBEPOETIN ALFA 60 UG/ML
60 SOLUTION INTRAVENOUS; SUBCUTANEOUS
Qty: 0 | Refills: 0 | Status: COMPLETED | OUTPATIENT
Start: 2020-03-11

## 2020-03-11 RX ADMIN — DARBEPOETIN ALFA 60 MCG/ML: 60 SOLUTION INTRAVENOUS; SUBCUTANEOUS at 00:00

## 2020-04-01 ENCOUNTER — APPOINTMENT (OUTPATIENT)
Dept: INTERNAL MEDICINE | Facility: CLINIC | Age: 50
End: 2020-04-01
Payer: COMMERCIAL

## 2020-04-01 VITALS
DIASTOLIC BLOOD PRESSURE: 70 MMHG | SYSTOLIC BLOOD PRESSURE: 135 MMHG | BODY MASS INDEX: 28.93 KG/M2 | HEIGHT: 66 IN | WEIGHT: 180 LBS

## 2020-04-01 PROCEDURE — 99214 OFFICE O/P EST MOD 30 MIN: CPT

## 2020-04-01 NOTE — ASSESSMENT
[FreeTextEntry1] : This is a 49-year-old male whose history has been reviewed above\par \par He has history of hypertension his blood pressure recorded at home was 175/70 and 135/70. There was some discussion that he was to be started on carvedilol by a cardiologist . I told him review of his current blood pressure I don't think this is necessary and he has not started it because of insurance issues. I asked him to discuss this with cardiology before he makes any intervention. If the cardiologist still wants him to take it to call me first\par \par Terms of his renal function his creatinine is 4.4 at the beginning of March. This is remarkably stable with an estimated GFR of 17 which is unchanged from his last blood test.\par \par His serum bicarbonate is 18 which is acceptable we will continue his current dose \par \par He did receive a dose of erythropoietin prior to his last CBC his hematocrit is 30 and will be followed\par \par He reiterates remarkably free of any symptoms of uremia no pruritus shortness of breath loss of appetite. Continue current regime\par \par I did review his skin as best as possible there are no ominous lesions he does follow up with dermatology\par \par In terms of transplantation we continue to urge him to try to find a donor. At this point he is opting for peritoneal dialysis if necessary\par \par Calcium and phosphorus remained in balance

## 2020-04-01 NOTE — PHYSICAL EXAM
[Normal] : affect was normal and insight and judgment were intact [de-identified] : remains overweight [de-identified] : skin lesions look unchanged [de-identified] : grossly normal by observation

## 2020-04-01 NOTE — HISTORY OF PRESENT ILLNESS
[FreeTextEntry1] : This is a 49-year-old male for evaluation and treatment of his chronic renal failure hypertension dermatitis azotemia and difficulties with weight [de-identified] : Patient is remarkably symptom-free he denies any symptoms of uremia or fatigue\par \par He has recently been seen by full time renal and appropriate blood tests were drawn\par \par

## 2020-05-11 ENCOUNTER — RX RENEWAL (OUTPATIENT)
Age: 50
End: 2020-05-11

## 2020-05-11 ENCOUNTER — LABORATORY RESULT (OUTPATIENT)
Age: 50
End: 2020-05-11

## 2020-05-12 ENCOUNTER — APPOINTMENT (OUTPATIENT)
Dept: NEPHROLOGY | Facility: CLINIC | Age: 50
End: 2020-05-12
Payer: COMMERCIAL

## 2020-05-12 VITALS
WEIGHT: 178 LBS | DIASTOLIC BLOOD PRESSURE: 81 MMHG | OXYGEN SATURATION: 98 % | HEIGHT: 66 IN | BODY MASS INDEX: 28.61 KG/M2 | SYSTOLIC BLOOD PRESSURE: 158 MMHG | HEART RATE: 108 BPM

## 2020-05-12 LAB
25(OH)D3 SERPL-MCNC: 38 NG/ML
ALBUMIN SERPL ELPH-MCNC: 3.9 G/DL
ANION GAP SERPL CALC-SCNC: 15 MMOL/L
BASOPHILS # BLD AUTO: 0.02 K/UL
BASOPHILS NFR BLD AUTO: 0.5 %
BUN SERPL-MCNC: 61 MG/DL
CALCIUM SERPL-MCNC: 9.4 MG/DL
CALCIUM SERPL-MCNC: 9.4 MG/DL
CHLORIDE SERPL-SCNC: 104 MMOL/L
CO2 SERPL-SCNC: 22 MMOL/L
CREAT SERPL-MCNC: 3.71 MG/DL
EOSINOPHIL # BLD AUTO: 0.1 K/UL
EOSINOPHIL NFR BLD AUTO: 2.5 %
FERRITIN SERPL-MCNC: 532 NG/ML
GLUCOSE SERPL-MCNC: 132 MG/DL
HCT VFR BLD CALC: 22.9 %
HGB BLD-MCNC: 7.4 G/DL
IMM GRANULOCYTES NFR BLD AUTO: 0.3 %
IRON SATN MFR SERPL: 45 %
IRON SERPL-MCNC: 94 UG/DL
LYMPHOCYTES # BLD AUTO: 0.82 K/UL
LYMPHOCYTES NFR BLD AUTO: 20.6 %
MAN DIFF?: NORMAL
MCHC RBC-ENTMCNC: 32.3 GM/DL
MCHC RBC-ENTMCNC: 33.6 PG
MCV RBC AUTO: 104.1 FL
MONOCYTES # BLD AUTO: 0.17 K/UL
MONOCYTES NFR BLD AUTO: 4.3 %
NEUTROPHILS # BLD AUTO: 2.87 K/UL
NEUTROPHILS NFR BLD AUTO: 71.8 %
PARATHYROID HORMONE INTACT: 387 PG/ML
PHOSPHATE SERPL-MCNC: 4.6 MG/DL
PLATELET # BLD AUTO: 301 K/UL
POTASSIUM SERPL-SCNC: 4.4 MMOL/L
RBC # BLD: 2.2 M/UL
RBC # FLD: 14.3 %
SODIUM SERPL-SCNC: 141 MMOL/L
TIBC SERPL-MCNC: 209 UG/DL
UIBC SERPL-MCNC: 114 UG/DL
WBC # FLD AUTO: 3.99 K/UL

## 2020-05-12 PROCEDURE — 96372 THER/PROPH/DIAG INJ SC/IM: CPT

## 2020-05-12 RX ORDER — DARBEPOETIN ALFA 60 UG/.3ML
60 INJECTION, SOLUTION INTRAVENOUS; SUBCUTANEOUS ONCE
Qty: 1 | Refills: 0 | Status: COMPLETED | COMMUNITY
Start: 2020-05-12 | End: 2020-05-12

## 2020-05-12 NOTE — PROCEDURE
[FreeTextEntry1] : 60 mg of Aranesp injected SQ in the posterior aspect of the left upper arm. \par Patient's BP not a contraindication to JOSEPH. \par May 11 labs reviewed prior to administration.

## 2020-05-13 RX ORDER — DARBEPOETIN ALFA 60 UG/ML
60 SOLUTION INTRAVENOUS; SUBCUTANEOUS
Refills: 0 | Status: COMPLETED | OUTPATIENT
Start: 2020-05-13

## 2020-05-13 RX ADMIN — DARBEPOETIN ALFA 60 MCG/ML: 60 SOLUTION INTRAVENOUS; SUBCUTANEOUS at 00:00

## 2020-05-20 ENCOUNTER — APPOINTMENT (OUTPATIENT)
Dept: NEPHROLOGY | Facility: CLINIC | Age: 50
End: 2020-05-20
Payer: COMMERCIAL

## 2020-05-20 VITALS
HEART RATE: 99 BPM | BODY MASS INDEX: 28.47 KG/M2 | SYSTOLIC BLOOD PRESSURE: 137 MMHG | DIASTOLIC BLOOD PRESSURE: 70 MMHG | OXYGEN SATURATION: 99 % | WEIGHT: 176.37 LBS

## 2020-05-20 PROCEDURE — 96372 THER/PROPH/DIAG INJ SC/IM: CPT

## 2020-05-20 RX ORDER — DARBEPOETIN ALFA 60 UG/ML
60 SOLUTION INTRAVENOUS; SUBCUTANEOUS
Qty: 0 | Refills: 0 | Status: COMPLETED | OUTPATIENT
Start: 2019-07-05

## 2020-05-26 ENCOUNTER — APPOINTMENT (OUTPATIENT)
Dept: NEPHROLOGY | Facility: CLINIC | Age: 50
End: 2020-05-26
Payer: COMMERCIAL

## 2020-05-26 VITALS
SYSTOLIC BLOOD PRESSURE: 148 MMHG | DIASTOLIC BLOOD PRESSURE: 75 MMHG | HEART RATE: 84 BPM | OXYGEN SATURATION: 100 % | BODY MASS INDEX: 27.99 KG/M2 | WEIGHT: 174.16 LBS | HEIGHT: 66 IN

## 2020-05-26 PROCEDURE — 96372 THER/PROPH/DIAG INJ SC/IM: CPT

## 2020-06-03 ENCOUNTER — APPOINTMENT (OUTPATIENT)
Dept: NEPHROLOGY | Facility: CLINIC | Age: 50
End: 2020-06-03
Payer: COMMERCIAL

## 2020-06-03 VITALS
OXYGEN SATURATION: 100 % | BODY MASS INDEX: 28.57 KG/M2 | SYSTOLIC BLOOD PRESSURE: 140 MMHG | DIASTOLIC BLOOD PRESSURE: 74 MMHG | WEIGHT: 177 LBS | HEART RATE: 94 BPM

## 2020-06-03 PROCEDURE — 96372 THER/PROPH/DIAG INJ SC/IM: CPT

## 2020-06-03 RX ORDER — DARBEPOETIN ALFA 60 UG/ML
60 SOLUTION INTRAVENOUS; SUBCUTANEOUS
Qty: 0 | Refills: 0 | Status: COMPLETED | OUTPATIENT
Start: 2020-05-26

## 2020-06-10 ENCOUNTER — APPOINTMENT (OUTPATIENT)
Dept: NEPHROLOGY | Facility: CLINIC | Age: 50
End: 2020-06-10
Payer: COMMERCIAL

## 2020-06-10 VITALS
HEART RATE: 109 BPM | SYSTOLIC BLOOD PRESSURE: 146 MMHG | OXYGEN SATURATION: 100 % | DIASTOLIC BLOOD PRESSURE: 77 MMHG | WEIGHT: 176.37 LBS | BODY MASS INDEX: 28.34 KG/M2 | HEIGHT: 66 IN

## 2020-06-10 PROCEDURE — 96372 THER/PROPH/DIAG INJ SC/IM: CPT

## 2020-06-10 RX ADMIN — DARBEPOETIN ALFA 1 MCG/ML: 60 SOLUTION INTRAVENOUS; SUBCUTANEOUS at 00:00

## 2020-06-17 ENCOUNTER — APPOINTMENT (OUTPATIENT)
Dept: NEPHROLOGY | Facility: CLINIC | Age: 50
End: 2020-06-17
Payer: COMMERCIAL

## 2020-06-17 VITALS
HEART RATE: 80 BPM | SYSTOLIC BLOOD PRESSURE: 154 MMHG | BODY MASS INDEX: 28.28 KG/M2 | OXYGEN SATURATION: 98 % | HEIGHT: 66 IN | DIASTOLIC BLOOD PRESSURE: 79 MMHG | WEIGHT: 176 LBS

## 2020-06-17 PROCEDURE — 96372 THER/PROPH/DIAG INJ SC/IM: CPT

## 2020-06-17 RX ADMIN — DARBEPOETIN ALFA 1 MCG/ML: 60 SOLUTION INTRAVENOUS; SUBCUTANEOUS at 00:00

## 2020-06-17 NOTE — ASSESSMENT
[FreeTextEntry1] : aranesp 60mcg given sc left upper arm\par no cx\par rtc next week jamison as well as labs next week\par

## 2020-06-24 ENCOUNTER — APPOINTMENT (OUTPATIENT)
Dept: NEPHROLOGY | Facility: CLINIC | Age: 50
End: 2020-06-24
Payer: COMMERCIAL

## 2020-06-24 VITALS
DIASTOLIC BLOOD PRESSURE: 64 MMHG | OXYGEN SATURATION: 99 % | HEART RATE: 88 BPM | SYSTOLIC BLOOD PRESSURE: 127 MMHG | WEIGHT: 177.47 LBS | BODY MASS INDEX: 28.64 KG/M2

## 2020-06-24 PROCEDURE — 96372 THER/PROPH/DIAG INJ SC/IM: CPT

## 2020-06-24 RX ORDER — DARBEPOETIN ALFA 60 UG/ML
60 SOLUTION INTRAVENOUS; SUBCUTANEOUS
Qty: 0 | Refills: 0 | Status: COMPLETED | OUTPATIENT
Start: 2020-06-17

## 2020-06-26 NOTE — PROCEDURE
[FreeTextEntry1] : aranesp 60mcg given to pt sc by gertrude 6/24/20\par no complications\par rtc next week

## 2020-06-30 ENCOUNTER — APPOINTMENT (OUTPATIENT)
Dept: NEPHROLOGY | Facility: CLINIC | Age: 50
End: 2020-06-30
Payer: COMMERCIAL

## 2020-06-30 VITALS
HEART RATE: 73 BPM | SYSTOLIC BLOOD PRESSURE: 154 MMHG | BODY MASS INDEX: 27.81 KG/M2 | DIASTOLIC BLOOD PRESSURE: 81 MMHG | OXYGEN SATURATION: 99 % | WEIGHT: 173.06 LBS | HEIGHT: 66 IN

## 2020-06-30 LAB
ALBUMIN SERPL ELPH-MCNC: 4.3 G/DL
ANION GAP SERPL CALC-SCNC: 18 MMOL/L
BASOPHILS # BLD AUTO: 0.02 K/UL
BASOPHILS NFR BLD AUTO: 0.5 %
BUN SERPL-MCNC: 55 MG/DL
CALCIUM SERPL-MCNC: 9.7 MG/DL
CHLORIDE SERPL-SCNC: 103 MMOL/L
CO2 SERPL-SCNC: 21 MMOL/L
CREAT SERPL-MCNC: 3.94 MG/DL
EOSINOPHIL # BLD AUTO: 0.04 K/UL
EOSINOPHIL NFR BLD AUTO: 1 %
GLUCOSE SERPL-MCNC: 138 MG/DL
HCT VFR BLD CALC: 23.2 %
HGB BLD-MCNC: 7.5 G/DL
IMM GRANULOCYTES NFR BLD AUTO: 0.2 %
LYMPHOCYTES # BLD AUTO: 0.96 K/UL
LYMPHOCYTES NFR BLD AUTO: 22.9 %
MAN DIFF?: NORMAL
MCHC RBC-ENTMCNC: 32.3 GM/DL
MCHC RBC-ENTMCNC: 34.2 PG
MCV RBC AUTO: 105.9 FL
MONOCYTES # BLD AUTO: 0.21 K/UL
MONOCYTES NFR BLD AUTO: 5 %
NEUTROPHILS # BLD AUTO: 2.96 K/UL
NEUTROPHILS NFR BLD AUTO: 70.4 %
PHOSPHATE SERPL-MCNC: 3.9 MG/DL
PLATELET # BLD AUTO: 444 K/UL
POTASSIUM SERPL-SCNC: 4.2 MMOL/L
RBC # BLD: 2.19 M/UL
RBC # FLD: 15.9 %
SARS-COV-2 IGG SERPL IA-ACNC: 0.1 INDEX
SARS-COV-2 IGG SERPL QL IA: NEGATIVE
SODIUM SERPL-SCNC: 142 MMOL/L
WBC # FLD AUTO: 4.2 K/UL

## 2020-06-30 PROCEDURE — 96372 THER/PROPH/DIAG INJ SC/IM: CPT

## 2020-06-30 NOTE — PROCEDURE
[FreeTextEntry1] : aranesp 60mcg given to patient left upper arm no complications\par return weekly

## 2020-07-02 LAB
ALBUMIN SERPL ELPH-MCNC: 4.2 G/DL
ANION GAP SERPL CALC-SCNC: 18 MMOL/L
BASOPHILS # BLD AUTO: 0.04 K/UL
BASOPHILS NFR BLD AUTO: 0.8 %
BUN SERPL-MCNC: 51 MG/DL
CALCIUM SERPL-MCNC: 9.5 MG/DL
CHLORIDE SERPL-SCNC: 108 MMOL/L
CO2 SERPL-SCNC: 19 MMOL/L
CREAT SERPL-MCNC: 3.74 MG/DL
EOSINOPHIL # BLD AUTO: 0.09 K/UL
EOSINOPHIL NFR BLD AUTO: 1.7 %
FERRITIN SERPL-MCNC: 261 NG/ML
GLUCOSE SERPL-MCNC: 121 MG/DL
HCT VFR BLD CALC: 25.8 %
HGB BLD-MCNC: 8.4 G/DL
IMM GRANULOCYTES NFR BLD AUTO: 0.8 %
IRON SATN MFR SERPL: 33 %
IRON SERPL-MCNC: 74 UG/DL
LYMPHOCYTES # BLD AUTO: 0.94 K/UL
LYMPHOCYTES NFR BLD AUTO: 18.1 %
MAN DIFF?: NORMAL
MCHC RBC-ENTMCNC: 32.6 GM/DL
MCHC RBC-ENTMCNC: 35.7 PG
MCV RBC AUTO: 109.8 FL
MONOCYTES # BLD AUTO: 0.28 K/UL
MONOCYTES NFR BLD AUTO: 5.4 %
NEUTROPHILS # BLD AUTO: 3.8 K/UL
NEUTROPHILS NFR BLD AUTO: 73.2 %
PHOSPHATE SERPL-MCNC: 4.8 MG/DL
PLATELET # BLD AUTO: 410 K/UL
POTASSIUM SERPL-SCNC: 4.2 MMOL/L
RBC # BLD: 2.35 M/UL
RBC # FLD: 17.4 %
SODIUM SERPL-SCNC: 145 MMOL/L
TIBC SERPL-MCNC: 227 UG/DL
UIBC SERPL-MCNC: 152 UG/DL
WBC # FLD AUTO: 5.19 K/UL

## 2020-07-06 ENCOUNTER — APPOINTMENT (OUTPATIENT)
Dept: NEPHROLOGY | Facility: CLINIC | Age: 50
End: 2020-07-06
Payer: COMMERCIAL

## 2020-07-06 VITALS
DIASTOLIC BLOOD PRESSURE: 73 MMHG | BODY MASS INDEX: 27.92 KG/M2 | HEART RATE: 86 BPM | HEIGHT: 66 IN | WEIGHT: 173.72 LBS | OXYGEN SATURATION: 100 % | SYSTOLIC BLOOD PRESSURE: 147 MMHG

## 2020-07-06 DIAGNOSIS — N18.9 CHRONIC KIDNEY DISEASE, UNSPECIFIED: ICD-10-CM

## 2020-07-06 DIAGNOSIS — D63.1 CHRONIC KIDNEY DISEASE, UNSPECIFIED: ICD-10-CM

## 2020-07-06 PROCEDURE — 96372 THER/PROPH/DIAG INJ SC/IM: CPT

## 2020-07-06 RX ORDER — DARBEPOETIN ALFA 60 UG/ML
60 SOLUTION INTRAVENOUS; SUBCUTANEOUS
Qty: 0 | Refills: 0 | Status: COMPLETED | OUTPATIENT
Start: 2020-07-02

## 2020-07-06 NOTE — PROCEDURE
[FreeTextEntry1] : Aranesp 60mcg given to patients. NO immediate complications. He is to return next week.

## 2020-07-14 ENCOUNTER — APPOINTMENT (OUTPATIENT)
Dept: NEPHROLOGY | Facility: CLINIC | Age: 50
End: 2020-07-14
Payer: COMMERCIAL

## 2020-07-14 VITALS
DIASTOLIC BLOOD PRESSURE: 78 MMHG | HEART RATE: 81 BPM | BODY MASS INDEX: 27.22 KG/M2 | OXYGEN SATURATION: 99 % | SYSTOLIC BLOOD PRESSURE: 128 MMHG | WEIGHT: 168.65 LBS

## 2020-07-14 PROCEDURE — 96372 THER/PROPH/DIAG INJ SC/IM: CPT

## 2020-07-14 RX ORDER — DARBEPOETIN ALFA 60 UG/ML
60 SOLUTION INTRAVENOUS; SUBCUTANEOUS
Qty: 0 | Refills: 0 | Status: COMPLETED | OUTPATIENT
Start: 2020-07-02

## 2020-07-17 NOTE — ASSESSMENT
[FreeTextEntry1] : anemia in CKD \par \par -b 60 mcg s/c given in the Right upper arm \par - LOT # [id number]\par - NDC # [id number] \par \par

## 2020-07-29 ENCOUNTER — APPOINTMENT (OUTPATIENT)
Dept: INTERNAL MEDICINE | Facility: CLINIC | Age: 50
End: 2020-07-29
Payer: COMMERCIAL

## 2020-07-29 VITALS
TEMPERATURE: 98.7 F | WEIGHT: 174 LBS | SYSTOLIC BLOOD PRESSURE: 130 MMHG | HEIGHT: 66 IN | BODY MASS INDEX: 27.97 KG/M2 | DIASTOLIC BLOOD PRESSURE: 70 MMHG

## 2020-07-29 VITALS — DIASTOLIC BLOOD PRESSURE: 70 MMHG | SYSTOLIC BLOOD PRESSURE: 130 MMHG

## 2020-07-29 DIAGNOSIS — E66.9 OBESITY, UNSPECIFIED: ICD-10-CM

## 2020-07-29 PROCEDURE — 99214 OFFICE O/P EST MOD 30 MIN: CPT

## 2020-07-29 NOTE — HISTORY OF PRESENT ILLNESS
[FreeTextEntry1] : This is a 49-year-old male for evaluation and treatment of his chronic renal failure secondary to failing transplant. Hypertension hypercholesterolemia weight [de-identified] : She remains without complaints. He has lost 60 pounds he states through exercise. He has no uremic symptoms his appetite is good

## 2020-07-29 NOTE — ASSESSMENT
[FreeTextEntry1] : This is a 49-year-old male whose history has been reviewed above\par \par He has a history of chronic renal failure secondary to failing transplant.\par \par He did have blood test reviewed yesterday his creatinine has risen slightly. His bicarbonate is somewhat lower but relatively stable\par \par His hematocrit is 25 he will need erythropoietin\par \par His blood pressure remains under excellent control no medication changes\par \par I did ask him to call nephrology as he will need erythropoietin.\par \par He does have a short systolic murmur he was evaluated by cardiology at his transplant center. Echocardiogram done last year showed good systolic and diastolic function with some eccentric left ventricular hypertrophy.

## 2020-07-29 NOTE — PHYSICAL EXAM
[Normal Sclera/Conjunctiva] : normal sclera/conjunctiva [No Edema] : there was no peripheral edema [No Focal Deficits] : no focal deficits [Normal] : affect was normal and insight and judgment were intact [de-identified] : 2/6 systolic ejection murmur [de-identified] : unchanged

## 2020-07-30 LAB
ALBUMIN SERPL ELPH-MCNC: 4.1 G/DL
ANION GAP SERPL CALC-SCNC: 16 MMOL/L
BASOPHILS # BLD AUTO: 0.03 K/UL
BASOPHILS NFR BLD AUTO: 0.7 %
BUN SERPL-MCNC: 59 MG/DL
CALCIUM SERPL-MCNC: 9.4 MG/DL
CHLORIDE SERPL-SCNC: 104 MMOL/L
CO2 SERPL-SCNC: 20 MMOL/L
CREAT SERPL-MCNC: 4.13 MG/DL
EOSINOPHIL # BLD AUTO: 0.07 K/UL
EOSINOPHIL NFR BLD AUTO: 1.6 %
FERRITIN SERPL-MCNC: 231 NG/ML
GLUCOSE SERPL-MCNC: 138 MG/DL
HCT VFR BLD CALC: 24.5 %
HGB BLD-MCNC: 8.2 G/DL
IMM GRANULOCYTES NFR BLD AUTO: 0.2 %
IRON SATN MFR SERPL: 41 %
IRON SERPL-MCNC: 86 UG/DL
LYMPHOCYTES # BLD AUTO: 1.02 K/UL
LYMPHOCYTES NFR BLD AUTO: 23.1 %
MAN DIFF?: NORMAL
MCHC RBC-ENTMCNC: 33.5 GM/DL
MCHC RBC-ENTMCNC: 37.6 PG
MCV RBC AUTO: 112.4 FL
MONOCYTES # BLD AUTO: 0.19 K/UL
MONOCYTES NFR BLD AUTO: 4.3 %
NEUTROPHILS # BLD AUTO: 3.1 K/UL
NEUTROPHILS NFR BLD AUTO: 70.1 %
PHOSPHATE SERPL-MCNC: 4.8 MG/DL
PLATELET # BLD AUTO: 289 K/UL
POTASSIUM SERPL-SCNC: 3.7 MMOL/L
RBC # BLD: 2.18 M/UL
RBC # FLD: 14.3 %
SODIUM SERPL-SCNC: 140 MMOL/L
TIBC SERPL-MCNC: 212 UG/DL
UIBC SERPL-MCNC: 126 UG/DL
WBC # FLD AUTO: 4.42 K/UL

## 2020-08-04 ENCOUNTER — APPOINTMENT (OUTPATIENT)
Dept: NEPHROLOGY | Facility: CLINIC | Age: 50
End: 2020-08-04
Payer: COMMERCIAL

## 2020-08-04 VITALS
BODY MASS INDEX: 28.89 KG/M2 | OXYGEN SATURATION: 100 % | WEIGHT: 179 LBS | SYSTOLIC BLOOD PRESSURE: 117 MMHG | HEART RATE: 70 BPM | DIASTOLIC BLOOD PRESSURE: 75 MMHG

## 2020-08-04 PROCEDURE — 96372 THER/PROPH/DIAG INJ SC/IM: CPT

## 2020-08-04 RX ORDER — DARBEPOETIN ALFA 60 UG/ML
60 SOLUTION INTRAVENOUS; SUBCUTANEOUS
Qty: 0 | Refills: 0 | Status: COMPLETED | OUTPATIENT
Start: 2020-06-10

## 2020-08-05 RX ADMIN — DARBEPOETIN ALFA 0 MCG/ML: 60 SOLUTION INTRAVENOUS; SUBCUTANEOUS at 00:00

## 2020-08-12 ENCOUNTER — APPOINTMENT (OUTPATIENT)
Dept: NEPHROLOGY | Facility: CLINIC | Age: 50
End: 2020-08-12
Payer: COMMERCIAL

## 2020-08-12 VITALS
DIASTOLIC BLOOD PRESSURE: 78 MMHG | BODY MASS INDEX: 29.21 KG/M2 | SYSTOLIC BLOOD PRESSURE: 147 MMHG | OXYGEN SATURATION: 99 % | WEIGHT: 181 LBS | HEART RATE: 85 BPM

## 2020-08-12 PROCEDURE — 96372 THER/PROPH/DIAG INJ SC/IM: CPT

## 2020-08-12 RX ORDER — DARBEPOETIN ALFA 60 UG/ML
60 SOLUTION INTRAVENOUS; SUBCUTANEOUS
Qty: 0 | Refills: 0 | Status: COMPLETED | OUTPATIENT
Start: 2020-08-05

## 2020-09-02 ENCOUNTER — APPOINTMENT (OUTPATIENT)
Dept: NEPHROLOGY | Facility: CLINIC | Age: 50
End: 2020-09-02
Payer: COMMERCIAL

## 2020-09-02 VITALS
BODY MASS INDEX: 29.18 KG/M2 | WEIGHT: 180.78 LBS | SYSTOLIC BLOOD PRESSURE: 152 MMHG | DIASTOLIC BLOOD PRESSURE: 76 MMHG | HEART RATE: 89 BPM | OXYGEN SATURATION: 98 %

## 2020-09-02 VITALS — SYSTOLIC BLOOD PRESSURE: 139 MMHG | DIASTOLIC BLOOD PRESSURE: 70 MMHG

## 2020-09-02 PROCEDURE — 99214 OFFICE O/P EST MOD 30 MIN: CPT | Mod: 25,GC

## 2020-09-02 PROCEDURE — 96372 THER/PROPH/DIAG INJ SC/IM: CPT | Mod: GC

## 2020-09-03 LAB
25(OH)D3 SERPL-MCNC: 30.5 NG/ML
ALBUMIN SERPL ELPH-MCNC: 4.3 G/DL
ANION GAP SERPL CALC-SCNC: 16 MMOL/L
BASOPHILS # BLD AUTO: 0.03 K/UL
BASOPHILS NFR BLD AUTO: 0.5 %
BUN SERPL-MCNC: 59 MG/DL
CALCIUM SERPL-MCNC: 8.8 MG/DL
CALCIUM SERPL-MCNC: 8.8 MG/DL
CHLORIDE SERPL-SCNC: 110 MMOL/L
CO2 SERPL-SCNC: 16 MMOL/L
CREAT SERPL-MCNC: 3.67 MG/DL
EOSINOPHIL # BLD AUTO: 0.06 K/UL
EOSINOPHIL NFR BLD AUTO: 1 %
FERRITIN SERPL-MCNC: 337 NG/ML
GLUCOSE SERPL-MCNC: 106 MG/DL
HCT VFR BLD CALC: 25.2 %
HGB BLD-MCNC: 7.8 G/DL
IMM GRANULOCYTES NFR BLD AUTO: 0.3 %
IRON SATN MFR SERPL: 48 %
IRON SERPL-MCNC: 113 UG/DL
LYMPHOCYTES # BLD AUTO: 0.44 K/UL
LYMPHOCYTES NFR BLD AUTO: 7.6 %
MAGNESIUM SERPL-MCNC: 2.2 MG/DL
MAN DIFF?: NORMAL
MCHC RBC-ENTMCNC: 31 GM/DL
MCHC RBC-ENTMCNC: 33.6 PG
MCV RBC AUTO: 108.6 FL
MONOCYTES # BLD AUTO: 0.25 K/UL
MONOCYTES NFR BLD AUTO: 4.3 %
NEUTROPHILS # BLD AUTO: 4.98 K/UL
NEUTROPHILS NFR BLD AUTO: 86.3 %
PARATHYROID HORMONE INTACT: 315 PG/ML
PHOSPHATE 24H UR-MCNC: 35.6 MG/DL
PHOSPHATE SERPL-MCNC: 3.8 MG/DL
PLATELET # BLD AUTO: 351 K/UL
POTASSIUM SERPL-SCNC: 4.5 MMOL/L
RBC # BLD: 2.32 M/UL
RBC # FLD: 14.5 %
SODIUM SERPL-SCNC: 143 MMOL/L
TIBC SERPL-MCNC: 237 UG/DL
TRANSFERRIN SERPL-MCNC: 182 MG/DL
UIBC SERPL-MCNC: 124 UG/DL
WBC # FLD AUTO: 5.78 K/UL

## 2020-09-03 NOTE — HISTORY OF PRESENT ILLNESS
[FreeTextEntry1] : Pt is a 49yoM w/ CKD iV and HTN, w/ Hx LRRT 1978 now w/ CKD, here for f/u: \par \par Today patient report feels great without complaints\par Currently listed for renal transplant at [facility] which recently restarted program \par recent hgb drop despite being on Aranesp every 2 weeks, denies overt bleed, dark stool\par Denies any uremic symptoms, including nausea, vomiting, decrease appetite, weight loss, metallic taste, no swelling, sob\par ROS neg\par

## 2020-09-03 NOTE — REVIEW OF SYSTEMS
[Chills] : no chills [Feeling Poorly] : not feeling poorly [Fever] : no fever [Sore Throat] : no sore throat [Feeling Tired] : not feeling tired [Chest Pain] : no chest pain [Lower Ext Edema] : no extremity edema [Palpitations] : no palpitations [Leg Claudication] : no intermittent leg claudication [Shortness Of Breath] : no shortness of breath [Wheezing] : no wheezing [Cough] : no cough [SOB on Exertion] : no shortness of breath during exertion [Abdominal Pain] : no abdominal pain [Orthopnea] : no orthopnea [Vomiting] : no vomiting [Constipation] : no constipation [Diarrhea] : no diarrhea [Heartburn] : no heartburn [Melena] : no melena [Dysuria] : no dysuria [Incontinence] : no incontinence

## 2020-09-03 NOTE — PHYSICAL EXAM
[General Appearance - Alert] : alert [General Appearance - In No Acute Distress] : in no acute distress [General Appearance - Well Nourished] : well nourished [General Appearance - Well Developed] : well developed [General Appearance - Well-Appearing] : healthy appearing [PERRL With Normal Accommodation] : pupils were equal in size, round, and reactive to light [Sclera] : the sclera and conjunctiva were normal [Outer Ear] : the ears and nose were normal in appearance [] : no respiratory distress [Auscultation Breath Sounds / Voice Sounds] : lungs were clear to auscultation bilaterally [Respiration, Rhythm And Depth] : normal respiratory rhythm and effort [Exaggerated Use Of Accessory Muscles For Inspiration] : no accessory muscle use [Heart Rate And Rhythm] : heart rate was normal and rhythm regular [Heart Sounds] : normal S1 and S2 [Heart Sounds Gallop] : no gallops [Murmurs] : no murmurs [Bowel Sounds] : normal bowel sounds [Edema] : there was no peripheral edema [Heart Sounds Pericardial Friction Rub] : no pericardial rub [Abdomen Soft] : soft [Abdomen Tenderness] : non-tender [No Spinal Tenderness] : no spinal tenderness [No CVA Tenderness] : no ~M costovertebral angle tenderness [No Focal Deficits] : no focal deficits [Oriented To Time, Place, And Person] : oriented to person, place, and time [Impaired Insight] : insight and judgment were intact [FreeTextEntry1] : truncal obesity

## 2020-09-03 NOTE — REVIEW OF SYSTEMS
[Fever] : no fever [Chills] : no chills [Feeling Poorly] : not feeling poorly [Feeling Tired] : not feeling tired [Sore Throat] : no sore throat [Chest Pain] : no chest pain [Lower Ext Edema] : no extremity edema [Leg Claudication] : no intermittent leg claudication [Palpitations] : no palpitations [Shortness Of Breath] : no shortness of breath [Wheezing] : no wheezing [Cough] : no cough [SOB on Exertion] : no shortness of breath during exertion [Orthopnea] : no orthopnea [Abdominal Pain] : no abdominal pain [Diarrhea] : no diarrhea [Vomiting] : no vomiting [Constipation] : no constipation [Melena] : no melena [Heartburn] : no heartburn [Dysuria] : no dysuria [Incontinence] : no incontinence

## 2020-09-03 NOTE — ASSESSMENT
[FreeTextEntry1] : Pt is a 49yoM w/ CKD iV and HTN, w/ Hx LRRT 1978 now w/ CKD, here for f/u: \par \par Hx LRRT with CAN, now with CKD5\par - Renal transplant with likely CTG, 38 years+, baseline sCr 4-4.2\par - mildly Uptrending/stable cr over time, no uremic symptoms\par - currently following/listed with [facility] for transplant (awaiting list); insurance not accepted to transfer to [facility]\par - initial evaluate for PD catheter done; pt prefers PD if needed\par - following w/ HT, Jie\par - check renal panel\par \par Immunosuppression\par - c/w pred 5 and Imuran 150mg \par - following w/ Derm yearly for skin lesions\par \par Anemia due to CKD \par - Hgb baseline 9-10, decrease from 8.4 to 8.2\par - today increase Aranesp 100 mcg (from 60)\par - check CBC, iron study\par \par Hx elevated calcium on prior labs/hypercalcemia\par - last Ca wnl, phos nl, last Vit D wnl\par - off vit D supplement and Rocaltrol currently\par \par metabolic acidosis 2/2 advanced CKD- stable\par - c/w bicarb tabs 650 mg three tabs bid\par - check bicarb level\par \par Benign Essential HTN - controlled\par - private cardiologist d/c atenolol, plan start carvedilol\par - c/w nifedipine 90/30mg and lasix 40mg daily\par - cont monitor BP at home, keep log\par \par HCM \par - prior lipids (jan 2020) LDL 51/ \par - PNA vaccine 2015\par - Influenza vaccine done 10/9/19\par - HBV series: Dec #1, Jan #2, June 19th #3; Hep B titres done in 12/2018\par - Will see Derm this year- needs to make appointment still. \par

## 2020-09-03 NOTE — ASSESSMENT
[FreeTextEntry1] : Pt is a 49yoM w/ CKD iV and HTN, w/ Hx LRRT 1978 now w/ CKD, here for f/u: \par \par Hx LRRT with CAN, now with CKD5\par - Renal transplant with likely CTG, 38 years+, baseline sCr 4-4.2\par - mildly Uptrending/stable cr over time, no uremic symptoms\par - currently following/listed with [facility] for transplant (awaiting list); insurance not accepted to transfer to [facility]\par - initial evaluate for PD catheter done; pt prefers PD if needed\par - following w/ HT, Jie\par - check renal panel\par \par Immunosuppression\par - c/w pred 5 and Imuran 150mg \par - following w/ Derm yearly for skin lesions\par \par Anemia due to CKD \par - Hgb baseline 9-10, decrease from 8.4 to 8.2\par - today increase Aranesp 100 mcg (from 60)\par - check CBC, iron study\par \par Hx elevated calcium on prior labs/hypercalcemia\par - last Ca wnl, phos nl, last Vit D wnl\par - off vit D supplement and Rocaltrol currently\par \par metabolic acidosis 2/2 advanced CKD- stable\par - c/w bicarb tabs 650 mg three tabs bid\par - check bicarb level\par \par Benign Essential HTN - controlled\par - private cardiologist d/c atenolol, plan start carvedilol\par - c/w nifedipine 90/30mg and lasix 40mg daily\par - cont monitor BP at home, keep log\par \par HCM \par - prior lipids (jan 2020) LDL 51/ \par - PNA vaccine 2015\par - Influenza vaccine done 10/9/19\par - HBV series: Dec #1, Jan #2, June 19th #3; Hep B titres done in 12/2018\par - Will see Derm this year- needs to make appointment still. \par

## 2020-09-03 NOTE — HISTORY OF PRESENT ILLNESS
[FreeTextEntry1] : Pt is a 49yoM w/ CKD iV and HTN, w/ Hx LRRT 1978 now w/ CKD, here for f/u: \par \par Today patient report feels great without complaints\par Currently listed for renal transplant at [facility] which recently restarted program \par recent hgb drop despite being on Aranesp every 2 weeks, denies overt bleed, dark stool\par Denies any uremic symptoms, including nausea, vomiting, decrease appetite, weight loss, metallic taste, no swelling, sob\par ROS neg\par

## 2020-09-03 NOTE — PHYSICAL EXAM
[General Appearance - Alert] : alert [General Appearance - Well Nourished] : well nourished [General Appearance - In No Acute Distress] : in no acute distress [General Appearance - Well Developed] : well developed [General Appearance - Well-Appearing] : healthy appearing [PERRL With Normal Accommodation] : pupils were equal in size, round, and reactive to light [Sclera] : the sclera and conjunctiva were normal [Outer Ear] : the ears and nose were normal in appearance [] : no respiratory distress [Auscultation Breath Sounds / Voice Sounds] : lungs were clear to auscultation bilaterally [Heart Rate And Rhythm] : heart rate was normal and rhythm regular [Respiration, Rhythm And Depth] : normal respiratory rhythm and effort [Exaggerated Use Of Accessory Muscles For Inspiration] : no accessory muscle use [Heart Sounds Gallop] : no gallops [Heart Sounds] : normal S1 and S2 [Murmurs] : no murmurs [Heart Sounds Pericardial Friction Rub] : no pericardial rub [Edema] : there was no peripheral edema [Bowel Sounds] : normal bowel sounds [Abdomen Soft] : soft [Abdomen Tenderness] : non-tender [No Focal Deficits] : no focal deficits [No Spinal Tenderness] : no spinal tenderness [No CVA Tenderness] : no ~M costovertebral angle tenderness [Oriented To Time, Place, And Person] : oriented to person, place, and time [Impaired Insight] : insight and judgment were intact [FreeTextEntry1] : truncal obesity

## 2020-09-08 ENCOUNTER — APPOINTMENT (OUTPATIENT)
Dept: NEPHROLOGY | Facility: CLINIC | Age: 50
End: 2020-09-08
Payer: COMMERCIAL

## 2020-09-08 PROCEDURE — 96372 THER/PROPH/DIAG INJ SC/IM: CPT

## 2020-09-08 NOTE — PROCEDURE
[FreeTextEntry1] : Aranesp 100mcg given today right  upper arm(60+40)\par Pt reports BP 1304/83 this am

## 2020-09-15 ENCOUNTER — APPOINTMENT (OUTPATIENT)
Dept: NEPHROLOGY | Facility: CLINIC | Age: 50
End: 2020-09-15
Payer: COMMERCIAL

## 2020-09-15 PROCEDURE — G0008: CPT

## 2020-09-15 PROCEDURE — 96372 THER/PROPH/DIAG INJ SC/IM: CPT

## 2020-09-15 PROCEDURE — 90686 IIV4 VACC NO PRSV 0.5 ML IM: CPT

## 2020-09-16 LAB
ALBUMIN SERPL ELPH-MCNC: 4.3 G/DL
ANION GAP SERPL CALC-SCNC: 14 MMOL/L
BASOPHILS # BLD AUTO: 0.03 K/UL
BASOPHILS NFR BLD AUTO: 0.6 %
BUN SERPL-MCNC: 51 MG/DL
CALCIUM SERPL-MCNC: 9.6 MG/DL
CHLORIDE SERPL-SCNC: 107 MMOL/L
CO2 SERPL-SCNC: 22 MMOL/L
CREAT SERPL-MCNC: 3.66 MG/DL
EOSINOPHIL # BLD AUTO: 0.06 K/UL
EOSINOPHIL NFR BLD AUTO: 1.1 %
GLUCOSE SERPL-MCNC: 140 MG/DL
HCT VFR BLD CALC: 26.6 %
HGB BLD-MCNC: 8.3 G/DL
IMM GRANULOCYTES NFR BLD AUTO: 0.8 %
LYMPHOCYTES # BLD AUTO: 0.83 K/UL
LYMPHOCYTES NFR BLD AUTO: 15.8 %
MAN DIFF?: NORMAL
MCHC RBC-ENTMCNC: 31.2 GM/DL
MCHC RBC-ENTMCNC: 34.2 PG
MCV RBC AUTO: 109.5 FL
MONOCYTES # BLD AUTO: 0.23 K/UL
MONOCYTES NFR BLD AUTO: 4.4 %
NEUTROPHILS # BLD AUTO: 4.06 K/UL
NEUTROPHILS NFR BLD AUTO: 77.3 %
PHOSPHATE SERPL-MCNC: 4.1 MG/DL
PLATELET # BLD AUTO: 521 K/UL
POTASSIUM SERPL-SCNC: 4.6 MMOL/L
RBC # BLD: 2.43 M/UL
RBC # FLD: 15.2 %
SODIUM SERPL-SCNC: 143 MMOL/L
WBC # FLD AUTO: 5.25 K/UL

## 2020-09-21 ENCOUNTER — RX CHANGE (OUTPATIENT)
Age: 50
End: 2020-09-21

## 2020-09-23 ENCOUNTER — APPOINTMENT (OUTPATIENT)
Dept: NEPHROLOGY | Facility: CLINIC | Age: 50
End: 2020-09-23
Payer: COMMERCIAL

## 2020-09-23 VITALS
WEIGHT: 176.37 LBS | BODY MASS INDEX: 28.47 KG/M2 | SYSTOLIC BLOOD PRESSURE: 147 MMHG | HEART RATE: 78 BPM | DIASTOLIC BLOOD PRESSURE: 84 MMHG | OXYGEN SATURATION: 99 %

## 2020-09-23 PROCEDURE — 96372 THER/PROPH/DIAG INJ SC/IM: CPT

## 2020-09-23 RX ORDER — DARBEPOETIN ALFA 100 UG/ML
100 SOLUTION INTRAVENOUS; SUBCUTANEOUS
Qty: 0 | Refills: 0 | Status: COMPLETED | OUTPATIENT
Start: 2020-09-23

## 2020-09-23 RX ADMIN — DARBEPOETIN ALFA 1 MCG/ML: 100 SOLUTION INTRAVENOUS; SUBCUTANEOUS at 00:00

## 2020-09-25 ENCOUNTER — RX RENEWAL (OUTPATIENT)
Age: 50
End: 2020-09-25

## 2020-10-01 ENCOUNTER — APPOINTMENT (OUTPATIENT)
Dept: NEPHROLOGY | Facility: CLINIC | Age: 50
End: 2020-10-01
Payer: COMMERCIAL

## 2020-10-01 VITALS
HEART RATE: 85 BPM | DIASTOLIC BLOOD PRESSURE: 67 MMHG | SYSTOLIC BLOOD PRESSURE: 140 MMHG | WEIGHT: 176.37 LBS | OXYGEN SATURATION: 100 % | BODY MASS INDEX: 28.47 KG/M2

## 2020-10-01 PROCEDURE — 96372 THER/PROPH/DIAG INJ SC/IM: CPT

## 2020-10-06 NOTE — PROCEDURE
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 Fish results - t(10;11), ngs - No pathogenic genetic alteration was detected. Associated with neoplastic effusion of left lower lung. Status post Lopes catheter placement on 09/21.     Plan:   -Initiated 7+3 on 09/25 with Idarubicin and Cytarabine; today is day #12.   -Anticipated repeat bone marrow biopsy on 10/08/2020.   -Allopurinol 300 mg BID with IVF for TLS prophylaxis.   -Oppurtunistic infection prophylaxis with Acyclovir, Levofloxacin, and Voriconazole.   -Baseline ECHO on 09/16/20 with EF of 58%.   -Transfuse cyroglobulin if fibrinogen <150  -Transfuse if platelets <10, Hgb<7 and/or patient actively bleeding  -He is a stem cell transplant candidate. Sent HLA typing earlier in admission. Sent sibling contact info to transplant coordinator.  -aPTT, PT/INR, fibrinogen, uric acid, and phosphorous twice weekly to assess for TLS and DIC.   -See assessment for neoplastic effusion.    [FreeTextEntry1] : aranesp 60mcg given today by MA

## 2020-10-13 ENCOUNTER — APPOINTMENT (OUTPATIENT)
Dept: NEPHROLOGY | Facility: CLINIC | Age: 50
End: 2020-10-13
Payer: COMMERCIAL

## 2020-10-13 VITALS
HEART RATE: 81 BPM | DIASTOLIC BLOOD PRESSURE: 81 MMHG | OXYGEN SATURATION: 99 % | BODY MASS INDEX: 28.82 KG/M2 | SYSTOLIC BLOOD PRESSURE: 155 MMHG | WEIGHT: 178.57 LBS

## 2020-10-13 VITALS — SYSTOLIC BLOOD PRESSURE: 141 MMHG | DIASTOLIC BLOOD PRESSURE: 78 MMHG

## 2020-10-13 PROCEDURE — 96372 THER/PROPH/DIAG INJ SC/IM: CPT

## 2020-10-13 RX ORDER — DARBEPOETIN ALFA 100 UG/ML
100 SOLUTION INTRAVENOUS; SUBCUTANEOUS
Qty: 0 | Refills: 0 | Status: COMPLETED | OUTPATIENT
Start: 2020-10-13

## 2020-10-13 RX ADMIN — DARBEPOETIN ALFA 0 MCG/ML: 100 SOLUTION INTRAVENOUS; SUBCUTANEOUS at 00:00

## 2020-10-14 ENCOUNTER — LABORATORY RESULT (OUTPATIENT)
Age: 50
End: 2020-10-14

## 2020-10-16 LAB
BASOPHILS # BLD AUTO: 0.04 K/UL
BASOPHILS NFR BLD AUTO: 0.8 %
EOSINOPHIL # BLD AUTO: 0.06 K/UL
EOSINOPHIL NFR BLD AUTO: 1.3 %
FERRITIN SERPL-MCNC: 196 NG/ML
HCT VFR BLD CALC: 25.9 %
HGB BLD-MCNC: 8.1 G/DL
IMM GRANULOCYTES NFR BLD AUTO: 0.8 %
IRON SATN MFR SERPL: 57 %
IRON SERPL-MCNC: 129 UG/DL
LYMPHOCYTES # BLD AUTO: 1.13 K/UL
LYMPHOCYTES NFR BLD AUTO: 23.8 %
MAN DIFF?: NORMAL
MCHC RBC-ENTMCNC: 31.3 GM/DL
MCHC RBC-ENTMCNC: 34.3 PG
MCV RBC AUTO: 109.7 FL
MONOCYTES # BLD AUTO: 0.31 K/UL
MONOCYTES NFR BLD AUTO: 6.5 %
NEUTROPHILS # BLD AUTO: 3.16 K/UL
NEUTROPHILS NFR BLD AUTO: 66.8 %
PLATELET # BLD AUTO: 411 K/UL
RBC # BLD: 2.36 M/UL
RBC # FLD: 16.4 %
TIBC SERPL-MCNC: 224 UG/DL
UIBC SERPL-MCNC: 95 UG/DL
WBC # FLD AUTO: 4.74 K/UL

## 2020-10-20 ENCOUNTER — APPOINTMENT (OUTPATIENT)
Dept: NEPHROLOGY | Facility: CLINIC | Age: 50
End: 2020-10-20
Payer: COMMERCIAL

## 2020-10-20 VITALS
HEART RATE: 76 BPM | HEIGHT: 66 IN | OXYGEN SATURATION: 99 % | BODY MASS INDEX: 28.7 KG/M2 | WEIGHT: 178.57 LBS | SYSTOLIC BLOOD PRESSURE: 159 MMHG | DIASTOLIC BLOOD PRESSURE: 80 MMHG

## 2020-10-20 VITALS — SYSTOLIC BLOOD PRESSURE: 132 MMHG | DIASTOLIC BLOOD PRESSURE: 60 MMHG

## 2020-10-20 PROCEDURE — 96372 THER/PROPH/DIAG INJ SC/IM: CPT

## 2020-10-20 NOTE — PROCEDURE
[FreeTextEntry1] : aranesp 125mcg (25+100mcg) given sc in left deltoid\par no complications\par /60 left arm

## 2020-10-28 ENCOUNTER — LABORATORY RESULT (OUTPATIENT)
Age: 50
End: 2020-10-28

## 2020-10-28 ENCOUNTER — NON-APPOINTMENT (OUTPATIENT)
Age: 50
End: 2020-10-28

## 2020-10-28 ENCOUNTER — APPOINTMENT (OUTPATIENT)
Dept: NEPHROLOGY | Facility: CLINIC | Age: 50
End: 2020-10-28
Payer: COMMERCIAL

## 2020-10-28 VITALS
HEART RATE: 65 BPM | BODY MASS INDEX: 29 KG/M2 | DIASTOLIC BLOOD PRESSURE: 66 MMHG | SYSTOLIC BLOOD PRESSURE: 155 MMHG | OXYGEN SATURATION: 99 % | WEIGHT: 179.67 LBS

## 2020-10-28 VITALS — DIASTOLIC BLOOD PRESSURE: 72 MMHG | SYSTOLIC BLOOD PRESSURE: 140 MMHG

## 2020-10-28 PROCEDURE — 99072 ADDL SUPL MATRL&STAF TM PHE: CPT

## 2020-10-28 PROCEDURE — 99214 OFFICE O/P EST MOD 30 MIN: CPT | Mod: 25,GC

## 2020-10-28 PROCEDURE — 96372 THER/PROPH/DIAG INJ SC/IM: CPT | Mod: GC

## 2020-10-28 RX ORDER — DARBEPOETIN ALFA 100 UG/ML
100 SOLUTION INTRAVENOUS; SUBCUTANEOUS
Qty: 0 | Refills: 0 | Status: COMPLETED | OUTPATIENT
Start: 2020-10-28

## 2020-10-28 RX ORDER — DARBEPOETIN ALFA 25 UG/ML
25 SOLUTION INTRAVENOUS; SUBCUTANEOUS
Qty: 0 | Refills: 0 | Status: COMPLETED | OUTPATIENT
Start: 2020-10-28

## 2020-10-29 NOTE — HISTORY OF PRESENT ILLNESS
[FreeTextEntry1] : Pt is a 50yoM w/ CKD IV and HTN, w/ Hx LRRT 1978 now w/ CKD, here for f/u: \par Since last visit on 9/2/20 hasn't seen any physician nor required hospitalization\par \par Today patient reports feelling well without complaints\par Currently listed for renal transplant at [facility], on list recent echo unsure results.\par Denies any uremic symptoms, including nausea, vomiting, decrease appetite, weight loss, metallic taste, no swelling, sob\par ROS neg

## 2020-10-29 NOTE — PHYSICAL EXAM
[General Appearance - Alert] : alert [General Appearance - In No Acute Distress] : in no acute distress [General Appearance - Well Nourished] : well nourished [General Appearance - Well Developed] : well developed [General Appearance - Well-Appearing] : healthy appearing [Sclera] : the sclera and conjunctiva were normal [Outer Ear] : the ears and nose were normal in appearance [Neck Appearance] : the appearance of the neck was normal [] : no respiratory distress [Respiration, Rhythm And Depth] : normal respiratory rhythm and effort [Exaggerated Use Of Accessory Muscles For Inspiration] : no accessory muscle use [Auscultation Breath Sounds / Voice Sounds] : lungs were clear to auscultation bilaterally [Chest Palpation] : palpation of the chest revealed no abnormalities [Lungs Percussion] : the lungs were normal to percussion [Heart Rate And Rhythm] : heart rate was normal and rhythm regular [Heart Sounds] : normal S1 and S2 [Heart Sounds Gallop] : no gallops [Murmurs] : no murmurs [Heart Sounds Pericardial Friction Rub] : no pericardial rub [Pitting Edema] : pitting edema present [Bowel Sounds] : normal bowel sounds [Abdomen Soft] : soft [Abdomen Tenderness] : non-tender [No CVA Tenderness] : no ~M costovertebral angle tenderness [No Spinal Tenderness] : no spinal tenderness [Abnormal Walk] : normal gait [No Focal Deficits] : no focal deficits [Oriented To Time, Place, And Person] : oriented to person, place, and time [Impaired Insight] : insight and judgment were intact [FreeTextEntry1] : bilateral lower ext trace up to knee

## 2020-10-29 NOTE — HISTORY OF PRESENT ILLNESS
[FreeTextEntry1] : Pt is a 50yoM w/ CKD IV and HTN, w/ Hx LRRT 1978 now w/ CKD, here for f/u: \par Since last visit on 9/2/20 hasn't seen any physician nor required hospitalization\par \par Today patient reports feelling well without complaints\par Currently listed for renal transplant at [facility], on list recent echo unsure results.\par Denies any uremic symptoms, including nausea, vomiting, decrease appetite, weight loss, metallic taste, no swelling, sob\par ROS neg

## 2020-10-29 NOTE — ASSESSMENT
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, now with CKD 1V\par - Renal transplant with likely CTG, 38 years+, baseline sCr 3.6-3.9, last sCr 3.66 in Sept 2020\par - no uremic symptoms\par - currently following/listed with [facility] for transplant (awaiting list); insurance not accepted to transfer to     [facility]\par - initial evaluation for PD catheter done; pt prefers PD if needed\par - following w/ HT, Jie\par - check renal panel\par \par Immunosuppression\par - c/w pred 5 and Imuran 150mg \par - following w/ Derm yearly for skin lesions\par \par Anemia due to CKD \par - today increased Aranesp  to 125 mcg\par - check CBC, iron study\par \par Hx elevated calcium on prior labs/hypercalcemia\par - last Ca wnl, phos nl, last Vit D wnl\par - off vit D supplement and Rocaltrol currently\par \par metabolic acidosis 2/2 advanced CKD- stable\par - c/w bicarb tabs 650 mg three tabs bid\par - check bicarb level\par \par Benign Essential HTN - controlled\par - private cardiologist d/c atenolol, plan start carvedilol\par - c/w nifedipine 90/30mg and lasix 40mg daily\par - cont monitor BP at home, keep log\par \par HCM \par - prior lipids (jan 2020) LDL 51/ \par - PNA vaccine 2015\par - Influenza vaccine done 10/9/19; Sept 2020\par - HBV series: Dec #1, Jan #2, June 19th #3; Hep B titres done in 12/2018\par - Will see Derm this year- needs to make appointment still. \par

## 2020-10-29 NOTE — ASSESSMENT
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\par - initial evaluation for PD catheter done; pt prefers PD if needed\par - following w/ HT, Jie\par - check renal panel\par \par Immunosuppression\par - c/w pred 5 and Imuran 150mg \par - following w/ Derm yearly for skin lesions\par \par Anemia due to CKD \par - today increased Aranesp  to 125 mcg\par - check CBC, iron study\par \par Hx elevated calcium on prior labs/hypercalcemia\par - last Ca wnl, phos nl, last Vit D wnl\par - off vit D supplement and Rocaltrol currently\par \par metabolic acidosis 2/2 advanced CKD- stable\par - c/w bicarb tabs 650 mg three tabs bid\par - check bicarb level\par \par Benign Essential HTN - controlled\par - private cardiologist d/c atenolol, plan start carvedilol\par - c/w nifedipine 90/30mg and lasix 40mg daily\par - cont monitor BP at home, keep log\par \par HCM \par - prior lipids (jan 2020) LDL 51/ \par - PNA vaccine 2015\par - Influenza vaccine done 10/9/19; Sept 2020\par - HBV series: Dec #1, Jan #2, June 19th #3; Hep B titres done in 12/2018\par - Will see Derm this year- needs to make appointment still. \par

## 2020-10-29 NOTE — REVIEW OF SYSTEMS
[Fever] : no fever [Chills] : no chills [Feeling Poorly] : not feeling poorly [Feeling Tired] : not feeling tired [Sore Throat] : no sore throat [Chest Pain] : no chest pain [Palpitations] : no palpitations [Leg Claudication] : no intermittent leg claudication [Lower Ext Edema] : no extremity edema [Shortness Of Breath] : no shortness of breath [Wheezing] : no wheezing [Cough] : no cough [SOB on Exertion] : no shortness of breath during exertion [Orthopnea] : no orthopnea [Abdominal Pain] : no abdominal pain [Vomiting] : no vomiting [Constipation] : no constipation [Diarrhea] : no diarrhea [Heartburn] : no heartburn [Melena] : no melena [Dysuria] : no dysuria [FreeTextEntry3] : no blurry vision

## 2020-10-30 ENCOUNTER — APPOINTMENT (OUTPATIENT)
Dept: INTERNAL MEDICINE | Facility: CLINIC | Age: 50
End: 2020-10-30
Payer: COMMERCIAL

## 2020-10-30 ENCOUNTER — NON-APPOINTMENT (OUTPATIENT)
Age: 50
End: 2020-10-30

## 2020-10-30 ENCOUNTER — LABORATORY RESULT (OUTPATIENT)
Age: 50
End: 2020-10-30

## 2020-10-30 VITALS
DIASTOLIC BLOOD PRESSURE: 82 MMHG | TEMPERATURE: 97.7 F | BODY MASS INDEX: 28.99 KG/M2 | WEIGHT: 174 LBS | HEIGHT: 65 IN | SYSTOLIC BLOOD PRESSURE: 144 MMHG

## 2020-10-30 DIAGNOSIS — Z87.19 PERSONAL HISTORY OF OTHER DISEASES OF THE DIGESTIVE SYSTEM: ICD-10-CM

## 2020-10-30 DIAGNOSIS — L82.1 OTHER SEBORRHEIC KERATOSIS: ICD-10-CM

## 2020-10-30 LAB
25(OH)D3 SERPL-MCNC: 26.4 NG/ML
ALBUMIN SERPL ELPH-MCNC: 4.1 G/DL
ANION GAP SERPL CALC-SCNC: 12 MMOL/L
APPEARANCE: CLEAR
BACTERIA: NEGATIVE
BASOPHILS # BLD AUTO: 0 K/UL
BASOPHILS NFR BLD AUTO: 0 %
BILIRUBIN URINE: NEGATIVE
BLOOD URINE: NEGATIVE
BUN SERPL-MCNC: 46 MG/DL
CALCIUM SERPL-MCNC: 9.2 MG/DL
CHLORIDE SERPL-SCNC: 109 MMOL/L
CO2 SERPL-SCNC: 20 MMOL/L
COLOR: NORMAL
CREAT SERPL-MCNC: 3.47 MG/DL
EOSINOPHIL # BLD AUTO: 0.05 K/UL
EOSINOPHIL NFR BLD AUTO: 0.8 %
FERRITIN SERPL-MCNC: 141 NG/ML
GLUCOSE QUALITATIVE U: NEGATIVE
GLUCOSE SERPL-MCNC: 103 MG/DL
HCT VFR BLD CALC: 28.3 %
HGB BLD-MCNC: 9 G/DL
HYALINE CASTS: 0 /LPF
IRON SATN MFR SERPL: 48 %
IRON SERPL-MCNC: 101 UG/DL
KETONES URINE: NEGATIVE
LEUKOCYTE ESTERASE URINE: NEGATIVE
LYMPHOCYTES # BLD AUTO: 0.33 K/UL
LYMPHOCYTES NFR BLD AUTO: 5.2 %
MAGNESIUM SERPL-MCNC: 2.3 MG/DL
MAN DIFF?: NORMAL
MCHC RBC-ENTMCNC: 31.8 GM/DL
MCHC RBC-ENTMCNC: 35.3 PG
MCV RBC AUTO: 111 FL
MICROSCOPIC-UA: NORMAL
MONOCYTES # BLD AUTO: 0.06 K/UL
MONOCYTES NFR BLD AUTO: 0.9 %
NEUTROPHILS # BLD AUTO: 5.88 K/UL
NEUTROPHILS NFR BLD AUTO: 91.4 %
NITRITE URINE: NEGATIVE
PH URINE: 7
PHOSPHATE SERPL-MCNC: 4.2 MG/DL
PLATELET # BLD AUTO: 364 K/UL
POTASSIUM SERPL-SCNC: 4.5 MMOL/L
PROTEIN URINE: NORMAL
RBC # BLD: 2.55 M/UL
RBC # FLD: 16.3 %
RED BLOOD CELLS URINE: 0 /HPF
SODIUM SERPL-SCNC: 140 MMOL/L
SPECIFIC GRAVITY URINE: 1.01
SQUAMOUS EPITHELIAL CELLS: 1 /HPF
TIBC SERPL-MCNC: 213 UG/DL
UIBC SERPL-MCNC: 111 UG/DL
UROBILINOGEN URINE: NORMAL
WBC # FLD AUTO: 6.43 K/UL
WHITE BLOOD CELLS URINE: 1 /HPF

## 2020-10-30 PROCEDURE — 36415 COLL VENOUS BLD VENIPUNCTURE: CPT

## 2020-10-30 PROCEDURE — 90732 PPSV23 VACC 2 YRS+ SUBQ/IM: CPT

## 2020-10-30 PROCEDURE — 93000 ELECTROCARDIOGRAM COMPLETE: CPT

## 2020-10-30 PROCEDURE — 99396 PREV VISIT EST AGE 40-64: CPT | Mod: 25

## 2020-10-30 PROCEDURE — 99072 ADDL SUPL MATRL&STAF TM PHE: CPT

## 2020-10-30 PROCEDURE — G0009: CPT

## 2020-10-30 NOTE — PHYSICAL EXAM
[No Acute Distress] : no acute distress [Well Nourished] : well nourished [Well Developed] : well developed [Well-Appearing] : well-appearing [Normal Sclera/Conjunctiva] : normal sclera/conjunctiva [PERRL] : pupils equal round and reactive to light [EOMI] : extraocular movements intact [Normal Outer Ear/Nose] : the outer ears and nose were normal in appearance [Normal Oropharynx] : the oropharynx was normal [No JVD] : no jugular venous distention [No Lymphadenopathy] : no lymphadenopathy [Supple] : supple [Thyroid Normal, No Nodules] : the thyroid was normal and there were no nodules present [No Respiratory Distress] : no respiratory distress  [No Accessory Muscle Use] : no accessory muscle use [Clear to Auscultation] : lungs were clear to auscultation bilaterally [Normal Rate] : normal rate  [Regular Rhythm] : with a regular rhythm [Normal S1, S2] : normal S1 and S2 [No Murmur] : no murmur heard [No Carotid Bruits] : no carotid bruits [No Abdominal Bruit] : a ~M bruit was not heard ~T in the abdomen [No Varicosities] : no varicosities [Pedal Pulses Present] : the pedal pulses are present [No Edema] : there was no peripheral edema [No Palpable Aorta] : no palpable aorta [No Extremity Clubbing/Cyanosis] : no extremity clubbing/cyanosis [Soft] : abdomen soft [Non Tender] : non-tender [Non-distended] : non-distended [No Masses] : no abdominal mass palpated [No HSM] : no HSM [Normal Bowel Sounds] : normal bowel sounds [Normal Posterior Cervical Nodes] : no posterior cervical lymphadenopathy [Normal Anterior Cervical Nodes] : no anterior cervical lymphadenopathy [No CVA Tenderness] : no CVA  tenderness [No Spinal Tenderness] : no spinal tenderness [No Joint Swelling] : no joint swelling [Grossly Normal Strength/Tone] : grossly normal strength/tone [No Rash] : no rash [Coordination Grossly Intact] : coordination grossly intact [No Focal Deficits] : no focal deficits [Normal Gait] : normal gait [Normal Affect] : the affect was normal [Normal Insight/Judgement] : insight and judgment were intact [de-identified] : Overweight [de-identified] : multiple cutaneous lesions

## 2020-10-30 NOTE — HISTORY OF PRESENT ILLNESS
[FreeTextEntry1] : This is a 50-year-old male for annual health assessment\par Specifically we will evaluate his chronic renal failure secondary to failing transplant wait fatty liver multiple cutaneous lesions\par  [de-identified] : She feels well and is upbeat he has no systemic complaints. He continues to lose weight intentionally

## 2020-10-30 NOTE — HEALTH RISK ASSESSMENT
[Good] : ~his/her~ current health as good [Fair] :  ~his/her~ mood as fair [No] : No [No falls in past year] : Patient reported no falls in the past year [0] : 1) Little interest or pleasure doing things: Not at all (0) [None] : None [With Family] : lives with family [High School] : high school [Single] : single [Feels Safe at Home] : Feels safe at home [Smoke Detector] : smoke detector [Seat Belt] :  uses seat belt [] : No [Change in mental status noted] : No change in mental status noted [Sexually Active] : not sexually active [Reports changes in hearing] : Reports no changes in hearing [Reports changes in vision] : Reports no changes in vision [Reports changes in dental health] : Reports no changes in dental health [Guns at Home] : no guns at home [Safety elements used in home] : no safety elements used in home [Travel to Developing Areas] : does not  travel to developing areas [TB Exposure] : is not being exposed to tuberculosis [Caregiver Concerns] : does not have caregiver concerns

## 2020-10-30 NOTE — ASSESSMENT
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 He is seen by nephrology he has been on the transplant list but his creatinine has remained relatively stable\par \par He has a history of hypertension his blood pressure is slightly above goal but he continues to lose weight no intervention\par \par He does have left ventricular hypertrophy. Continue with blood pressure control I will have him followup with cardiology\par \par He remains anemic but his iron levels are fine\par \par He has dermatologic disease which is followed by dermatology\par \par Usually had diastolic dysfunction diagnosed this was not seen on his last echo was\par \par He is 50 years old it did ask him to inquire from nephrology whether or not he should have a colonoscopy we could do a fit testing he will let me know\par \par He will be given a pneumonia shot it has been 10 years

## 2020-10-31 LAB
CHOLEST SERPL-MCNC: 104 MG/DL
ESTIMATED AVERAGE GLUCOSE: 94 MG/DL
HBA1C MFR BLD HPLC: 4.9 %
HCV AB SER QL: NONREACTIVE
HCV S/CO RATIO: 0.12 S/CO
HDLC SERPL-MCNC: 38 MG/DL
HIV1+2 AB SPEC QL IA.RAPID: NONREACTIVE
LDLC SERPL CALC-MCNC: 44 MG/DL
NONHDLC SERPL-MCNC: 66 MG/DL
PSA SERPL-MCNC: 1.38 NG/ML
SAVE SPECIMEN: NORMAL
T PALLIDUM AB SER QL IA: NEGATIVE
T3RU NFR SERPL: 1.1 TBI
T4 SERPL-MCNC: 7.7 UG/DL
TRIGL SERPL-MCNC: 112 MG/DL
TSH SERPL-ACNC: 2.71 UIU/ML
URATE SERPL-MCNC: 6.6 MG/DL

## 2020-11-03 ENCOUNTER — APPOINTMENT (OUTPATIENT)
Dept: NEPHROLOGY | Facility: CLINIC | Age: 50
End: 2020-11-03
Payer: COMMERCIAL

## 2020-11-03 VITALS
OXYGEN SATURATION: 99 % | HEART RATE: 80 BPM | SYSTOLIC BLOOD PRESSURE: 146 MMHG | WEIGHT: 181.88 LBS | BODY MASS INDEX: 30.27 KG/M2 | DIASTOLIC BLOOD PRESSURE: 79 MMHG

## 2020-11-03 PROCEDURE — 96372 THER/PROPH/DIAG INJ SC/IM: CPT

## 2020-11-03 PROCEDURE — 99072 ADDL SUPL MATRL&STAF TM PHE: CPT

## 2020-11-03 RX ORDER — DARBEPOETIN ALFA 25 UG/ML
25 SOLUTION INTRAVENOUS; SUBCUTANEOUS
Qty: 0 | Refills: 0 | Status: COMPLETED | OUTPATIENT
Start: 2020-11-03

## 2020-11-03 RX ORDER — DARBEPOETIN ALFA 100 UG/ML
100 SOLUTION INTRAVENOUS; SUBCUTANEOUS
Qty: 0 | Refills: 0 | Status: COMPLETED | OUTPATIENT
Start: 2020-11-03

## 2020-11-03 RX ADMIN — DARBEPOETIN ALFA 1 MCG/ML: 25 SOLUTION INTRAVENOUS; SUBCUTANEOUS at 00:00

## 2020-11-03 RX ADMIN — DARBEPOETIN ALFA 1 MCG/ML: 100 SOLUTION INTRAVENOUS; SUBCUTANEOUS at 00:00

## 2020-11-11 ENCOUNTER — APPOINTMENT (OUTPATIENT)
Dept: NEPHROLOGY | Facility: CLINIC | Age: 50
End: 2020-11-11
Payer: COMMERCIAL

## 2020-11-11 VITALS
HEART RATE: 69 BPM | DIASTOLIC BLOOD PRESSURE: 87 MMHG | OXYGEN SATURATION: 98 % | BODY MASS INDEX: 30.19 KG/M2 | SYSTOLIC BLOOD PRESSURE: 158 MMHG | WEIGHT: 181.22 LBS | HEIGHT: 65 IN

## 2020-11-11 PROCEDURE — 96372 THER/PROPH/DIAG INJ SC/IM: CPT

## 2020-11-11 PROCEDURE — 99072 ADDL SUPL MATRL&STAF TM PHE: CPT

## 2020-11-11 RX ORDER — DARBEPOETIN ALFA 100 UG/ML
100 SOLUTION INTRAVENOUS; SUBCUTANEOUS
Qty: 0 | Refills: 0 | Status: COMPLETED | OUTPATIENT
Start: 2020-11-11

## 2020-11-11 RX ORDER — DARBEPOETIN ALFA 60 UG/ML
60 SOLUTION INTRAVENOUS; SUBCUTANEOUS
Qty: 0 | Refills: 0 | Status: COMPLETED | OUTPATIENT
Start: 2020-11-11

## 2020-11-11 RX ADMIN — DARBEPOETIN ALFA 150 MCG/ML: 100 SOLUTION INTRAVENOUS; SUBCUTANEOUS at 00:00

## 2020-11-11 RX ADMIN — DARBEPOETIN ALFA 1 MCG/ML: 100 SOLUTION INTRAVENOUS; SUBCUTANEOUS at 00:00

## 2020-11-11 RX ADMIN — DARBEPOETIN ALFA 1 MCG/ML: 60 SOLUTION INTRAVENOUS; SUBCUTANEOUS at 00:00

## 2020-11-11 NOTE — PROCEDURE
[FreeTextEntry1] : Aranesp 160mcg given SC to patient\par no complications\par next visit two weeks\par will check labs at that time as well\par possible lower aranesp 125mcg two weeks

## 2020-11-24 ENCOUNTER — APPOINTMENT (OUTPATIENT)
Dept: NEPHROLOGY | Facility: CLINIC | Age: 50
End: 2020-11-24
Payer: COMMERCIAL

## 2020-11-24 VITALS
BODY MASS INDEX: 30.12 KG/M2 | DIASTOLIC BLOOD PRESSURE: 77 MMHG | WEIGHT: 181 LBS | SYSTOLIC BLOOD PRESSURE: 164 MMHG | HEART RATE: 80 BPM | OXYGEN SATURATION: 99 %

## 2020-11-24 VITALS — DIASTOLIC BLOOD PRESSURE: 77 MMHG | SYSTOLIC BLOOD PRESSURE: 154 MMHG

## 2020-11-24 VITALS — DIASTOLIC BLOOD PRESSURE: 62 MMHG | SYSTOLIC BLOOD PRESSURE: 142 MMHG

## 2020-11-24 PROCEDURE — 96372 THER/PROPH/DIAG INJ SC/IM: CPT

## 2020-11-24 RX ORDER — DARBEPOETIN ALFA 100 UG/ML
100 SOLUTION INTRAVENOUS; SUBCUTANEOUS
Qty: 0 | Refills: 0 | Status: COMPLETED | OUTPATIENT
Start: 2020-11-23

## 2020-11-24 RX ORDER — DARBEPOETIN ALFA 25 UG/ML
25 SOLUTION INTRAVENOUS; SUBCUTANEOUS
Qty: 0 | Refills: 0 | Status: COMPLETED | OUTPATIENT
Start: 2020-11-23

## 2020-11-25 LAB
ALBUMIN SERPL ELPH-MCNC: 4 G/DL
ANION GAP SERPL CALC-SCNC: 14 MMOL/L
BASOPHILS # BLD AUTO: 0.04 K/UL
BASOPHILS NFR BLD AUTO: 0.7 %
BUN SERPL-MCNC: 63 MG/DL
CALCIUM SERPL-MCNC: 9.4 MG/DL
CHLORIDE SERPL-SCNC: 108 MMOL/L
CO2 SERPL-SCNC: 20 MMOL/L
CREAT SERPL-MCNC: 3.83 MG/DL
EOSINOPHIL # BLD AUTO: 0.09 K/UL
EOSINOPHIL NFR BLD AUTO: 1.5 %
FERRITIN SERPL-MCNC: 137 NG/ML
GLUCOSE SERPL-MCNC: 111 MG/DL
HCT VFR BLD CALC: 32.5 %
HGB BLD-MCNC: 10.2 G/DL
IMM GRANULOCYTES NFR BLD AUTO: 0.5 %
IRON SATN MFR SERPL: 70 %
IRON SERPL-MCNC: 153 UG/DL
LYMPHOCYTES # BLD AUTO: 0.75 K/UL
LYMPHOCYTES NFR BLD AUTO: 12.6 %
MAN DIFF?: NORMAL
MCHC RBC-ENTMCNC: 31.4 GM/DL
MCHC RBC-ENTMCNC: 34.7 PG
MCV RBC AUTO: 110.5 FL
MONOCYTES # BLD AUTO: 0.29 K/UL
MONOCYTES NFR BLD AUTO: 4.9 %
NEUTROPHILS # BLD AUTO: 4.73 K/UL
NEUTROPHILS NFR BLD AUTO: 79.8 %
PHOSPHATE SERPL-MCNC: 3.9 MG/DL
PLATELET # BLD AUTO: 353 K/UL
POTASSIUM SERPL-SCNC: 3.8 MMOL/L
RBC # BLD: 2.94 M/UL
RBC # FLD: 15.8 %
SODIUM SERPL-SCNC: 142 MMOL/L
TIBC SERPL-MCNC: 217 UG/DL
UIBC SERPL-MCNC: 64 UG/DL
WBC # FLD AUTO: 5.93 K/UL

## 2020-12-02 ENCOUNTER — NON-APPOINTMENT (OUTPATIENT)
Age: 50
End: 2020-12-02

## 2020-12-05 ENCOUNTER — INPATIENT (INPATIENT)
Facility: HOSPITAL | Age: 50
LOS: 12 days | Discharge: HOME | End: 2020-12-18
Attending: HOSPITALIST | Admitting: HOSPITALIST
Payer: COMMERCIAL

## 2020-12-05 VITALS
SYSTOLIC BLOOD PRESSURE: 149 MMHG | HEART RATE: 137 BPM | TEMPERATURE: 100 F | RESPIRATION RATE: 18 BRPM | OXYGEN SATURATION: 99 % | DIASTOLIC BLOOD PRESSURE: 71 MMHG

## 2020-12-05 DIAGNOSIS — I10 ESSENTIAL (PRIMARY) HYPERTENSION: ICD-10-CM

## 2020-12-05 DIAGNOSIS — N17.9 ACUTE KIDNEY FAILURE, UNSPECIFIED: ICD-10-CM

## 2020-12-05 DIAGNOSIS — U07.1 COVID-19: ICD-10-CM

## 2020-12-05 DIAGNOSIS — A41.9 SEPSIS, UNSPECIFIED ORGANISM: ICD-10-CM

## 2020-12-05 DIAGNOSIS — E87.2 ACIDOSIS: ICD-10-CM

## 2020-12-05 DIAGNOSIS — Z94.0 KIDNEY TRANSPLANT STATUS: Chronic | ICD-10-CM

## 2020-12-05 DIAGNOSIS — Z94.0 KIDNEY TRANSPLANT STATUS: ICD-10-CM

## 2020-12-05 LAB
ANION GAP SERPL CALC-SCNC: 14 MMO/L — SIGNIFICANT CHANGE UP (ref 7–14)
APPEARANCE UR: CLEAR — SIGNIFICANT CHANGE UP
BACTERIA # UR AUTO: NEGATIVE — SIGNIFICANT CHANGE UP
BASE EXCESS BLDV CALC-SCNC: -8 MMOL/L — SIGNIFICANT CHANGE UP
BILIRUB UR-MCNC: NEGATIVE — SIGNIFICANT CHANGE UP
BLOOD GAS VENOUS - CREATININE: 4.83 MG/DL — HIGH (ref 0.5–1.3)
BUN SERPL-MCNC: 63 MG/DL — HIGH (ref 7–23)
CALCIUM SERPL-MCNC: 7.9 MG/DL — LOW (ref 8.4–10.5)
CHLORIDE BLDV-SCNC: 110 MMOL/L — HIGH (ref 96–108)
CHLORIDE SERPL-SCNC: 101 MMOL/L — SIGNIFICANT CHANGE UP (ref 98–107)
CO2 SERPL-SCNC: 16 MMOL/L — LOW (ref 22–31)
COLOR SPEC: SIGNIFICANT CHANGE UP
CREAT SERPL-MCNC: 5.02 MG/DL — HIGH (ref 0.5–1.3)
DIFF PNL FLD: ABNORMAL
EPI CELLS # UR: 2 /HPF — SIGNIFICANT CHANGE UP (ref 0–5)
GAS PNL BLDV: 132 MMOL/L — LOW (ref 136–146)
GLUCOSE BLDV-MCNC: 120 MG/DL — HIGH (ref 70–99)
GLUCOSE SERPL-MCNC: 130 MG/DL — HIGH (ref 70–99)
GLUCOSE UR QL: ABNORMAL
HCO3 BLDV-SCNC: 18 MMOL/L — LOW (ref 20–27)
HCT VFR BLDV CALC: 29.9 % — LOW (ref 39–51)
HGB BLDV-MCNC: 9.7 G/DL — LOW (ref 13–17)
KETONES UR-MCNC: NEGATIVE — SIGNIFICANT CHANGE UP
LACTATE BLDV-MCNC: 0.9 MMOL/L — SIGNIFICANT CHANGE UP (ref 0.5–2)
LEUKOCYTE ESTERASE UR-ACNC: NEGATIVE — SIGNIFICANT CHANGE UP
NITRITE UR-MCNC: NEGATIVE — SIGNIFICANT CHANGE UP
PCO2 BLDV: 29 MMHG — LOW (ref 41–51)
PH BLDV: 7.36 PH — SIGNIFICANT CHANGE UP (ref 7.32–7.43)
PH UR: 6.5 — SIGNIFICANT CHANGE UP (ref 5–8)
PO2 BLDV: 65 MMHG — HIGH (ref 35–40)
POTASSIUM BLDV-SCNC: 3.1 MMOL/L — LOW (ref 3.4–4.5)
POTASSIUM SERPL-MCNC: 3.2 MMOL/L — LOW (ref 3.5–5.3)
POTASSIUM SERPL-SCNC: 3.2 MMOL/L — LOW (ref 3.5–5.3)
PROT UR-MCNC: ABNORMAL
RBC CASTS # UR COMP ASSIST: 6 /HPF — HIGH (ref 0–4)
SAO2 % BLDV: 90.9 % — HIGH (ref 60–85)
SODIUM SERPL-SCNC: 131 MMOL/L — LOW (ref 135–145)
SP GR SPEC: 1.01 — SIGNIFICANT CHANGE UP (ref 1.01–1.02)
UROBILINOGEN FLD QL: SIGNIFICANT CHANGE UP
WBC UR QL: 2 /HPF — SIGNIFICANT CHANGE UP (ref 0–5)

## 2020-12-05 PROCEDURE — 99223 1ST HOSP IP/OBS HIGH 75: CPT

## 2020-12-05 PROCEDURE — 99233 SBSQ HOSP IP/OBS HIGH 50: CPT

## 2020-12-05 PROCEDURE — 71045 X-RAY EXAM CHEST 1 VIEW: CPT | Mod: 26

## 2020-12-05 PROCEDURE — 99285 EMERGENCY DEPT VISIT HI MDM: CPT

## 2020-12-05 RX ORDER — HEPARIN SODIUM 5000 [USP'U]/ML
5000 INJECTION INTRAVENOUS; SUBCUTANEOUS EVERY 12 HOURS
Refills: 0 | Status: DISCONTINUED | OUTPATIENT
Start: 2020-12-05 | End: 2020-12-09

## 2020-12-05 RX ORDER — ACETAMINOPHEN 500 MG
650 TABLET ORAL EVERY 6 HOURS
Refills: 0 | Status: DISCONTINUED | OUTPATIENT
Start: 2020-12-05 | End: 2020-12-18

## 2020-12-05 RX ORDER — PIPERACILLIN AND TAZOBACTAM 4; .5 G/20ML; G/20ML
3.38 INJECTION, POWDER, LYOPHILIZED, FOR SOLUTION INTRAVENOUS ONCE
Refills: 0 | Status: DISCONTINUED | OUTPATIENT
Start: 2020-12-05 | End: 2020-12-05

## 2020-12-05 RX ORDER — ACETAMINOPHEN 500 MG
975 TABLET ORAL ONCE
Refills: 0 | Status: COMPLETED | OUTPATIENT
Start: 2020-12-05 | End: 2020-12-05

## 2020-12-05 RX ORDER — PIPERACILLIN AND TAZOBACTAM 4; .5 G/20ML; G/20ML
3.38 INJECTION, POWDER, LYOPHILIZED, FOR SOLUTION INTRAVENOUS ONCE
Refills: 0 | Status: COMPLETED | OUTPATIENT
Start: 2020-12-05 | End: 2020-12-05

## 2020-12-05 RX ORDER — POTASSIUM CHLORIDE 20 MEQ
10 PACKET (EA) ORAL ONCE
Refills: 0 | Status: COMPLETED | OUTPATIENT
Start: 2020-12-05 | End: 2020-12-05

## 2020-12-05 RX ORDER — SODIUM BICARBONATE 1 MEQ/ML
650 SYRINGE (ML) INTRAVENOUS
Refills: 0 | Status: DISCONTINUED | OUTPATIENT
Start: 2020-12-05 | End: 2020-12-05

## 2020-12-05 RX ORDER — ACETAMINOPHEN 500 MG
1000 TABLET ORAL ONCE
Refills: 0 | Status: COMPLETED | OUTPATIENT
Start: 2020-12-05 | End: 2020-12-05

## 2020-12-05 RX ORDER — SODIUM CHLORIDE 9 MG/ML
1000 INJECTION INTRAMUSCULAR; INTRAVENOUS; SUBCUTANEOUS ONCE
Refills: 0 | Status: COMPLETED | OUTPATIENT
Start: 2020-12-05 | End: 2020-12-05

## 2020-12-05 RX ORDER — SODIUM CHLORIDE 9 MG/ML
1000 INJECTION, SOLUTION INTRAVENOUS
Refills: 0 | Status: DISCONTINUED | OUTPATIENT
Start: 2020-12-05 | End: 2020-12-06

## 2020-12-05 RX ORDER — NIFEDIPINE 30 MG
120 TABLET, EXTENDED RELEASE 24 HR ORAL DAILY
Refills: 0 | Status: DISCONTINUED | OUTPATIENT
Start: 2020-12-05 | End: 2020-12-11

## 2020-12-05 RX ORDER — HYDRALAZINE HCL 50 MG
100 TABLET ORAL
Refills: 0 | Status: DISCONTINUED | OUTPATIENT
Start: 2020-12-05 | End: 2020-12-18

## 2020-12-05 RX ORDER — SODIUM BICARBONATE 1 MEQ/ML
650 SYRINGE (ML) INTRAVENOUS THREE TIMES A DAY
Refills: 0 | Status: DISCONTINUED | OUTPATIENT
Start: 2020-12-05 | End: 2020-12-06

## 2020-12-05 RX ORDER — AZATHIOPRINE 100 MG/1
150 TABLET ORAL DAILY
Refills: 0 | Status: DISCONTINUED | OUTPATIENT
Start: 2020-12-05 | End: 2020-12-06

## 2020-12-05 RX ORDER — VANCOMYCIN HCL 1 G
1000 VIAL (EA) INTRAVENOUS ONCE
Refills: 0 | Status: COMPLETED | OUTPATIENT
Start: 2020-12-05 | End: 2020-12-05

## 2020-12-05 RX ORDER — POTASSIUM CHLORIDE 20 MEQ
10 PACKET (EA) ORAL DAILY
Refills: 0 | Status: DISCONTINUED | OUTPATIENT
Start: 2020-12-05 | End: 2020-12-05

## 2020-12-05 RX ORDER — ATORVASTATIN CALCIUM 80 MG/1
20 TABLET, FILM COATED ORAL AT BEDTIME
Refills: 0 | Status: DISCONTINUED | OUTPATIENT
Start: 2020-12-05 | End: 2020-12-18

## 2020-12-05 RX ADMIN — SODIUM CHLORIDE 1000 MILLILITER(S): 9 INJECTION INTRAMUSCULAR; INTRAVENOUS; SUBCUTANEOUS at 02:43

## 2020-12-05 RX ADMIN — SODIUM CHLORIDE 1000 MILLILITER(S): 9 INJECTION INTRAMUSCULAR; INTRAVENOUS; SUBCUTANEOUS at 04:49

## 2020-12-05 RX ADMIN — Medication 650 MILLIGRAM(S): at 22:29

## 2020-12-05 RX ADMIN — HEPARIN SODIUM 5000 UNIT(S): 5000 INJECTION INTRAVENOUS; SUBCUTANEOUS at 17:29

## 2020-12-05 RX ADMIN — ATORVASTATIN CALCIUM 20 MILLIGRAM(S): 80 TABLET, FILM COATED ORAL at 22:20

## 2020-12-05 RX ADMIN — SODIUM CHLORIDE 100 MILLILITER(S): 9 INJECTION, SOLUTION INTRAVENOUS at 13:05

## 2020-12-05 RX ADMIN — Medication 5 MILLIGRAM(S): at 15:35

## 2020-12-05 RX ADMIN — Medication 10 MILLIEQUIVALENT(S): at 15:35

## 2020-12-05 RX ADMIN — Medication 650 MILLIGRAM(S): at 11:02

## 2020-12-05 RX ADMIN — AZATHIOPRINE 150 MILLIGRAM(S): 100 TABLET ORAL at 18:08

## 2020-12-05 RX ADMIN — Medication 1000 MILLIGRAM(S): at 23:25

## 2020-12-05 RX ADMIN — Medication 975 MILLIGRAM(S): at 02:43

## 2020-12-05 RX ADMIN — Medication 650 MILLIGRAM(S): at 12:20

## 2020-12-05 RX ADMIN — Medication 650 MILLIGRAM(S): at 18:10

## 2020-12-05 RX ADMIN — PIPERACILLIN AND TAZOBACTAM 200 GRAM(S): 4; .5 INJECTION, POWDER, LYOPHILIZED, FOR SOLUTION INTRAVENOUS at 02:43

## 2020-12-05 RX ADMIN — Medication 250 MILLIGRAM(S): at 03:45

## 2020-12-05 RX ADMIN — Medication 650 MILLIGRAM(S): at 17:32

## 2020-12-05 RX ADMIN — Medication 100 MILLIGRAM(S): at 18:08

## 2020-12-05 RX ADMIN — Medication 120 MILLIGRAM(S): at 13:49

## 2020-12-05 NOTE — PATIENT PROFILE ADULT - FALL HARM RISK TYPE OF ASSESSMENT
Patient s/p hysterectomy reports symptoms consistent with vaginal prolapse.  She reports longstanding symptoms but notes worsening over last week.  Patient denies inability to void or evacuate stool although does report splinting with bowel movements. She denies discomfort or bleeding.  Will discuss with Dr. Pan in AM for determination of need for immediate vs eventual office visit. Patient awaits return call.    admission

## 2020-12-05 NOTE — ED ADULT NURSE REASSESSMENT NOTE - NS ED NURSE REASSESS COMMENT FT1
Er pt AOX 3 pt Adm. for COVID 19  related symptoms of chills, fever, presently afebrile, some non prod. cough, resp. equaL 18-20b/min, ambulatory to Br.   Report given to primary RN Dylan.   Nikki Giraldo RN

## 2020-12-05 NOTE — H&P ADULT - NSHPREVIEWOFSYSTEMS_GEN_ALL_CORE
ROS:    Constitutional: [ ] fevers [ ] chills [ ] weight loss [ ] weight gain  HEENT: [ ] dry eyes [ ] eye irritation [ ] postnasal drip [ ] nasal congestion  CV: [ ] chest pain [ ] orthopnea [ ] palpitations [ ] murmur  Resp: [ ] cough [ ] shortness of breath [ ] dyspnea [ ] wheezing [ ] sputum [ ] hemoptysis  GI: [ ] nausea [ ] vomiting [x ] diarrhea [ ] constipation [ ] abd pain [ ] dysphagia   : [ ] dysuria [ ] nocturia [ ] hematuria [ ] increased urinary frequency  Musculoskeletal: [ ] back pain [ ] myalgias [ ] arthralgias [ ] fracture  Skin: [ ] rash [ ] itch  Neurological: [ ] headache [ ] dizziness [ ] syncope [ ] weakness [ ] numbness  Psychiatric: [ ] anxiety [ ] depression  Endocrine: [ ] diabetes [ ] thyroid problem  Hematologic/Lymphatic: [x ] anemia [ ] bleeding problem  Allergic/Immunologic: [ ] itchy eyes [ ] nasal discharge [ ] hives [ ] angioedema  [x ] All other systems negative  [ ] Unable to assess ROS because ________

## 2020-12-05 NOTE — CONSULT NOTE ADULT - PROBLEM SELECTOR RECOMMENDATION 9
Pt with ABRIL in the setting of COVID 19 infection, likely in ATN. Exact duration of ABRIL however unknown. Scr of 3.83 on on 11/24/20, on admission noted to have Scr of 5.3 on 12/5/20. UA with + protein, 6RBC and 2WBC, Suggest to send urine electrolytes and spot urine TP/CR. Can continue IV fluids. Will need to consider HD if renal failure continues to worsen. Monitor labs and urine output. Avoid NSAIDs, ACEI/ARBS, RCA and nephrotoxins. Dose medications as per eGFR. Pt with ABRIL in the setting of COVID 19 infection. Exact duration of ABRIL however unknown. Scr of 3.83 on on 11/24/20, on admission noted to have Scr of 5.3 on 12/5/20. UA with + protein, 6RBC and 2WBC, Suggest to send urine electrolytes and spot urine TP/CR. Can continue IV fluids. Will need to consider HD if renal failure continues to worsen. Monitor labs and urine output. Avoid NSAIDs, ACEI/ARBS, RCA and nephrotoxins. Dose medications as per eGFR.

## 2020-12-05 NOTE — H&P ADULT - ATTENDING COMMENTS
If unable to make decisions, patient wants his sister Sherine Tang to make decisions for him. Full code.   h: [phone]  C: [phone]

## 2020-12-05 NOTE — CONSULT NOTE ADULT - PROBLEM SELECTOR PROBLEM 1
Keep ACE wrap in place. RICE precautions. Tylenol/Ibuprofen as needed for pain. Follow-up with pediatric orthopedics tomorrow for reevaluation.   
Acute kidney injury

## 2020-12-05 NOTE — CONSULT NOTE ADULT - PROBLEM SELECTOR RECOMMENDATION 4
2/2 to ABRIL on CKD, last bicarb of 16. Increase sodium bicarb to 650 mg PO TID.    If any questions, please feel free to contact me     José Shabazz  Nephrology Fellow  [facility] Pager: [phone]  [facility] Pager: [phone]

## 2020-12-05 NOTE — ED ADULT NURSE NOTE - OBJECTIVE STATEMENT
Break Coverage RN: Patient presents to ED for cough, weakness, chills x 2 days with recent exposure to mother with COVID 19. Patient denies CP currently. Patient has VILLEGAS.  MD evaluated. IV placed back of LT forearm #20g

## 2020-12-05 NOTE — H&P ADULT - HISTORY OF PRESENT ILLNESS
50 y.o. M w/ a hx of renal transplant (1978) p/w chills, weakness x 2 days     Mother tested positive for COVID and has been in contact with her.   Works as a .     In the ED, patient was febrile to 101.7 and tachycardic to 130's.  recieved V/Z   50 y.o. M w/ a hx of renal transplant (1978) p/w chills, weakness x 2 days     Mother tested positive for COVID and has been in contact with her.   Works as a .    + F/C diarrhea x2 episodes    transplant 78: prednisone 5mg Q0D due today   htn   HLD    Procardia XL 120mg QD   Imuran 150mg QD   Lipitor 20mg QD   sODIUM BICARB 650mg 3tabs BID   Vit D 1,000 units QD   aranus 126mcg every 2 weeks  hydralazing 100mg BID   Lasix 40mg BID- Last dose 12/3 or 12/4     allergies:Sulfa- swell    Lives: with mother, sister and aunt  tobacco never   alcoho: never   drugs:none   [employer]- parking buses, feuling them    FH: Mother- DM2, ESRD on HD-     MD maker:  sister Sherine Tang   h: [phone]  C: [phone]  In the ED, patient was febrile to 101.7 and tachycardic to 130's.  recieved V/Z   50 y.o. M w/ a hx of HTN and renal transplant (8458) c/b CKDIV who presents with fevers, chills and weakness. Patient's mother tested positive for COVID and is currently admitted at [facility], patient lives in the same home as her. About 2 days ago, patient noted fevers and chills, weakness and two episodes of loose stool. Patient is usually on Lasix but in the setting of fevers, his nephrologist advised he stop taking it 1-2 days ago. Has been eating and drinking well, denies any lightheadedness.     In the ED, patient was febrile to 101.7 and tachycardic to 130's. Patient received Vancomycin and Zosyn. Found to be COVID +

## 2020-12-05 NOTE — ED PROVIDER NOTE - OBJECTIVE STATEMENT
50 year old male with a pmhx of renal transplant in 1978, presents to ED for evaluation of chills and weakness x2 days. Patient reports feeling like he has a fever but has not taken his temp. No other symptoms. No cough, vomiting, abd pain, diarrhea, or dysuria. Mother tested positive for COVID-19 and is currently hospitalized. Patient has been in contact with her prior to her getting ill. He works as a . Patient takes immunosuppressant for the renal transplant.

## 2020-12-05 NOTE — ED ADULT NURSE REASSESSMENT NOTE - NS ED NURSE REASSESS COMMENT FT1
Pt aaox4. Vital signs reassessed as noted. Pt states "feeling better" after medication administration. Pt repositioned for comfort. Call light within reach. Will continue to monitor.

## 2020-12-05 NOTE — CONSULT NOTE ADULT - SUBJECTIVE AND OBJECTIVE BOX
[facility] DIVISION OF KIDNEY DISEASES AND HYPERTENSION -- [phone]  -- INITIAL CONSULT NOTE  --------------------------------------------------------------------------------  HPI: Pt is a 51 y/o M w PMH of CKD4  and HTN, LRRT in 1978 on prednisone and Imuran, presented to [facility] for fever, chills and weakness. Admitted for COVID infection. Nephrology consulted for ABRIL.    Upon review of labs on [facility] HIE/[place], patient noted to have Scr of 3.83 on on 11/24/20, on admission noted to have Scr of 5.3 on 12/5/20.     Patient was seen and examined at bedside. Reported feeling well. Denies CP, SOB, fever, chills, nausea, vomiting, diarrhea, LE edema or dysuria.    PAST HISTORY  --------------------------------------------------------------------------------  PAST MEDICAL & SURGICAL HISTORY:  Hypertension    Renal transplant recipient    History of renal transplant      FAMILY HISTORY:  FH: kidney disease      PAST SOCIAL HISTORY:    ALLERGIES & MEDICATIONS  --------------------------------------------------------------------------------  Allergies    sulfa (Swelling)    Intolerances      Standing Inpatient Medications  atorvastatin 20 milliGRAM(s) Oral at bedtime  azaTHIOprine 150 milliGRAM(s) Oral daily  heparin   Injectable 5000 Unit(s) SubCutaneous every 12 hours  hydrALAZINE 100 milliGRAM(s) Oral two times a day  lactated ringers. 1000 milliLiter(s) IV Continuous <Continuous>  NIFEdipine  milliGRAM(s) Oral daily  potassium chloride    Tablet ER 10 milliEquivalent(s) Oral once  predniSONE   Tablet 5 milliGRAM(s) Oral every other day  sodium bicarbonate 650 milliGRAM(s) Oral two times a day    PRN Inpatient Medications  acetaminophen   Tablet .. 650 milliGRAM(s) Oral every 6 hours PRN      REVIEW OF SYSTEMS  --------------------------------------------------------------------------------  Gen: No fevers/chills  Respiratory: No dyspnea, cough  CV: No chest pain  GI: No abdominal pain, diarrhea  : No dysuria, hematuria  MSK: No  edema      All other systems were reviewed and are negative, except as noted.    VITALS/PHYSICAL EXAM  --------------------------------------------------------------------------------  T(C): 38.3 (12-05-20 @ 13:47), Max: 38.7 (12-05-20 @ 02:44)  HR: 74 (12-05-20 @ 13:47) (74 - 137)  BP: 137/75 (12-05-20 @ 13:47) (128/60 - 149/71)  RR: 18 (12-05-20 @ 13:47) (17 - 22)  SpO2: 97% (12-05-20 @ 13:47) (97% - 99%)  Wt(kg): --  Height (cm): 165.1 (12-05-20 @ 10:20)  Weight (kg): 76.3 (12-05-20 @ 10:20)  BMI (kg/m2): 28 (12-05-20 @ 10:20)  BSA (m2): 1.84 (12-05-20 @ 10:20)      12-05-20 @ 07:01  -  12-05-20 @ 15:00  --------------------------------------------------------  IN: 150 mL / OUT: 225 mL / NET: -75 mL      Physical Exam:  	Gen: NAD  	HEENT: MMM  	Pulm: CTA B/L, no crackles   	CV: S1S2  	Abd: Soft, +BS   	Ext: No LE edema B/L  	Neuro: Awake  	Skin: Warm and dry  	Vascular access: IV lines     LABS/STUDIES  --------------------------------------------------------------------------------              11.1   3.31  >-----------<  335      [12-05-20 @ 02:00]              34.2     131  |  101  |  63  ----------------------------<  130      [12-05-20 @ 05:45]  3.2   |  16  |  5.02        Ca     7.9     [12-05-20 @ 05:45]    TPro  7.1  /  Alb  3.7  /  TBili  0.4  /  DBili  x   /  AST  39  /  ALT  23  /  AlkPhos  76  [12-05-20 @ 02:00]    PT/INR: PT 13.7 , INR 1.20       [12-05-20 @ 02:00]  PTT: 35.8       [12-05-20 @ 02:00]      Creatinine Trend:  SCr 5.02 [12-05 @ 05:45]  SCr 5.30 [12-05 @ 02:00]    Urinalysis - [12-05-20 @ 13:24]      Color Light Yellow / Appearance Clear / SG 1.011 / pH 6.5      Gluc Trace / Ketone Negative  / Bili Negative / Urobili <2 mg/dL       Blood Small / Protein 100 mg/dL / Leuk Est Negative / Nitrite Negative      RBC 6 / WBC 2 / Hyaline  / Gran  / Sq Epi  / Non Sq Epi 2 / Bacteria Negative           [facility] DIVISION OF KIDNEY DISEASES AND HYPERTENSION -- [phone]  -- INITIAL CONSULT NOTE  --------------------------------------------------------------------------------  HPI: Pt is a 51 y/o M w PMH of CKD4  and HTN, LRRT in 1978 on prednisone and Imuran, presented to [facility] for fever, chills and weakness. Admitted for COVID infection. Nephrology consulted for ABRIL.    Upon review of labs on [facility] HIE/[place], patient noted to have Scr of 3.83 on on 11/24/20, on admission noted to have Scr of 5.3 on 12/5/20.     Patient was seen and examined at bedside. Reported feeling well. Denies CP, SOB, fever, chills, nausea, vomiting, diarrhea, LE edema or dysuria.    PAST HISTORY  --------------------------------------------------------------------------------  PAST MEDICAL & SURGICAL HISTORY:  Hypertension    Renal transplant recipient    History of renal transplant      FAMILY HISTORY:  FH: kidney disease      PAST SOCIAL HISTORY: works for [employer]    ALLERGIES & MEDICATIONS  --------------------------------------------------------------------------------  Allergies    sulfa (Swelling)    Intolerances      Standing Inpatient Medications  atorvastatin 20 milliGRAM(s) Oral at bedtime  azaTHIOprine 150 milliGRAM(s) Oral daily  heparin   Injectable 5000 Unit(s) SubCutaneous every 12 hours  hydrALAZINE 100 milliGRAM(s) Oral two times a day  lactated ringers. 1000 milliLiter(s) IV Continuous <Continuous>  NIFEdipine  milliGRAM(s) Oral daily  potassium chloride    Tablet ER 10 milliEquivalent(s) Oral once  predniSONE   Tablet 5 milliGRAM(s) Oral every other day  sodium bicarbonate 650 milliGRAM(s) Oral two times a day    PRN Inpatient Medications  acetaminophen   Tablet .. 650 milliGRAM(s) Oral every 6 hours PRN      REVIEW OF SYSTEMS  --------------------------------------------------------------------------------  Gen: +chills  Respiratory: No dyspnea, cough  CV: No chest pain  GI: No abdominal pain, diarrhea  : No dysuria, hematuria  MSK: No  edema      All other systems were reviewed and are negative, except as noted.    VITALS/PHYSICAL EXAM  --------------------------------------------------------------------------------  T(C): 38.3 (12-05-20 @ 13:47), Max: 38.7 (12-05-20 @ 02:44)  HR: 74 (12-05-20 @ 13:47) (74 - 137)  BP: 137/75 (12-05-20 @ 13:47) (128/60 - 149/71)  RR: 18 (12-05-20 @ 13:47) (17 - 22)  SpO2: 97% (12-05-20 @ 13:47) (97% - 99%)  Wt(kg): --  Height (cm): 165.1 (12-05-20 @ 10:20)  Weight (kg): 76.3 (12-05-20 @ 10:20)  BMI (kg/m2): 28 (12-05-20 @ 10:20)  BSA (m2): 1.84 (12-05-20 @ 10:20)      12-05-20 @ 07:01  -  12-05-20 @ 15:00  --------------------------------------------------------  IN: 150 mL / OUT: 225 mL / NET: -75 mL      Physical Exam:  	Gen: NAD  	HEENT: MMM  	Pulm: CTA B/L, no crackles   	CV: S1S2  	Abd: Soft, +BS   	Ext: No LE edema B/L  	Neuro: Awake  	Skin: Warm and dry  	Vascular access: IV lines     LABS/STUDIES  --------------------------------------------------------------------------------              11.1   3.31  >-----------<  335      [12-05-20 @ 02:00]              34.2     131  |  101  |  63  ----------------------------<  130      [12-05-20 @ 05:45]  3.2   |  16  |  5.02        Ca     7.9     [12-05-20 @ 05:45]    TPro  7.1  /  Alb  3.7  /  TBili  0.4  /  DBili  x   /  AST  39  /  ALT  23  /  AlkPhos  76  [12-05-20 @ 02:00]    PT/INR: PT 13.7 , INR 1.20       [12-05-20 @ 02:00]  PTT: 35.8       [12-05-20 @ 02:00]      Creatinine Trend:  SCr 5.02 [12-05 @ 05:45]  SCr 5.30 [12-05 @ 02:00]    Urinalysis - [12-05-20 @ 13:24]      Color Light Yellow / Appearance Clear / SG 1.011 / pH 6.5      Gluc Trace / Ketone Negative  / Bili Negative / Urobili <2 mg/dL       Blood Small / Protein 100 mg/dL / Leuk Est Negative / Nitrite Negative      RBC 6 / WBC 2 / Hyaline  / Gran  / Sq Epi  / Non Sq Epi 2 / Bacteria Negative

## 2020-12-05 NOTE — CONSULT NOTE ADULT - ASSESSMENT
ABRIL on CKD  S/p kidney transplant   Metabolic acidosis       Urinalysis - [12-05-20 @ 13:24]      Color Light Yellow / Appearance Clear / SG 1.011 / pH 6.5      Gluc Trace / Ketone Negative  / Bili Negative / Urobili <2 mg/dL       Blood Small / Protein 100 mg/dL / Leuk Est Negative / Nitrite Negative      RBC 6 / WBC 2 / Hyaline  / Gran  / Sq Epi  / Non Sq Epi 2 / Bacteria Negative ABRIL on CKD  S/p kidney transplant   Metabolic acidosis

## 2020-12-05 NOTE — H&P ADULT - NSHPSOCIALHISTORY_GEN_ALL_CORE
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

## 2020-12-05 NOTE — ED PROVIDER NOTE - ATTENDING CONTRIBUTION TO CARE
51 y/o M with h/o renal transplant on prednisone here with fever and chills x 2 days.  Pt reports that his mother who lives upstairs from him was admitted to [facility] for covid-19 infection yesterday.  For the past 2 days he has had fever, chills, and generalized weakness.  No HA, cough, cp, sob, abd pain, n/v/c/d, loss of taste or smell, rash.  Pt has been taking tylenol at home.  Well appearing, lying comfortably in stretcher, awake and alert, nontoxic.  Febrile, tachycardic, VSS.  NCAT dry mucosa.  Lungs cta bl.  Cards nl S1/S2, tachy, regular, no MRG.  Abd soft ntnd.  No pedal edema or calf tenderness.  Plan for labs incl cx as pt meets sirs criteria, high suspicion for covid 19 infection given known sick contacts, but as pt is immunosuppressed with cover with broad spectrum abx, cxr, ua, admit.

## 2020-12-05 NOTE — ED PROVIDER NOTE - CARE PLAN
Principal Discharge DX:	Sepsis, due to unspecified organism, unspecified whether acute organ dysfunction present  Secondary Diagnosis:	Acute kidney injury

## 2020-12-05 NOTE — ED PROVIDER NOTE - NS ED ROS FT
General: no fever, +chills, weakness  Eyes: no vision changes, no eye pain  Cardiovascular: no chest pain, no edema  Respiratory: no cough, no shortness of breath  Gastrointestinal: no abdominal pain, no nausea, no vomiting, no diarrhea  Genitourinary: no dysuria, no hematuria  Musculoskeletal: no muscle pain, no joint pain  Skin: no rash, no lesions  Neuro: no numbness, no tingling  Psych: no depression, no anxiety

## 2020-12-05 NOTE — H&P ADULT - NSHPLABSRESULTS_GEN_ALL_CORE
LABS:                        11.1   3.31  )-----------( 335      ( 05 Dec 2020 02:00 )             34.2     12-    131<L>  |  101  |  63<H>  ----------------------------<  130<H>  3.2<L>   |  16<L>  |  5.02<H>    Ca    7.9<L>      05 Dec 2020 05:45    TPro  7.1  /  Alb  3.7  /  TBili  0.4  /  DBili  x   /  AST  39  /  ALT  23  /  AlkPhos  76  12-05    PT/INR - ( 05 Dec 2020 02:00 )   PT: 13.7 SEC;   INR: 1.20          PTT - ( 05 Dec 2020 02:00 )  PTT:35.8 SEC      Urinalysis Basic - ( 05 Dec 2020 02:00 )    Color: YELLOW / Appearance: CLEAR / S.014 / pH: 6.5  Gluc: NEGATIVE / Ketone: NEGATIVE  / Bili: NEGATIVE / Urobili: NORMAL   Blood: NEGATIVE / Protein: 200 / Nitrite: NEGATIVE   Leuk Esterase: NEGATIVE / RBC: 3-5 / WBC 0-2   Sq Epi: OCC / Non Sq Epi: x / Bacteria: NEGATIVE          RADIOLOGY & ADDITIONAL TESTS:  Results Reviewed:   Imaging Personally Reviewed: CXray with no infiltrates  Electrocardiogram Personally Reviewed:    COORDINATION OF CARE:  Care Discussed with Consultants/Other Providers [Y/N]:  Prior or Outpatient Records Reviewed [Y/N]: Yes OP Nephro and PCP notes documenting baseline Cr, medications, PMH and transplant status

## 2020-12-05 NOTE — ED ADULT TRIAGE NOTE - CHIEF COMPLAINT QUOTE
Pt c/o chills and weakness. Reports did not take temp. Denies chest pain, sob, palpitations, n/v. PMHX kidney transplant, HTN, heart murmur. EKG in progress.

## 2020-12-05 NOTE — H&P ADULT - PROBLEM SELECTOR PLAN 2
ABRIL in setting of COVID infection. Has had normal PO intake, was taking Lasix until 1-2 days ago.   - Hold Lasix  - 1L IVF   - Nephrology c/s   - U/A, urine lytes, renal ultrasound  - Cont. sodium bicarb tabs  - Strict I/O, avoid nephrotoxic medications

## 2020-12-05 NOTE — ED PROVIDER NOTE - CLINICAL SUMMARY MEDICAL DECISION MAKING FREE TEXT BOX
49 y/o M with distant h/o renal transplant on chronic steroids here with 2 days fever.  No other sxs, pt with known covid -positive contacts (mother is hospitalized). Plan for labs incl cx as pt meets sirs criteria, high suspicion for covid 19 infection given known sick contacts, but as pt is immunosuppressed with cover with broad spectrum abx, cxr, ua, admit.

## 2020-12-05 NOTE — H&P ADULT - NSHPPHYSICALEXAM_GEN_ALL_CORE
GENERAL: NAD, well-developed  HEAD:  Atraumatic, Normocephalic  EYES: EOMI, PERRLA, conjunctiva and sclera clear  NECK: Supple, No JVD  CHEST/LUNG: Clear to auscultation bilaterally; No wheeze  HEART: Regular rate and rhythm; No murmurs, rubs, or gallops  ABDOMEN: Soft, Nontender, Nondistended; Bowel sounds present  EXTREMITIES:  2+ Peripheral Pulses, No clubbing, cyanosis, or edema  PSYCH: AAOx3  NEUROLOGY: non-focal  SKIN: No rashes or lesions
no

## 2020-12-05 NOTE — H&P ADULT - PROBLEM SELECTOR PLAN 3
S/p kidney transplant in 1973 c/b CKD IV working on getting on transplant list at [facility].   - Cont. Prednisone 5 every other day (due today_   - Cont Imuran   - Nephrology c/s S/p kidney transplant in 1973 c/b CKD IV working on getting on transplant list at [facility].   - Cont. Prednisone 5 every other day (due today)  - Will t/b re Imuran w/ Nephro   - Nephrology c/s

## 2020-12-05 NOTE — CONSULT NOTE ADULT - PROBLEM SELECTOR RECOMMENDATION 2
Continue Azathioprine 150 mg PO daily and prednisone 5 mg PO every other day. If patient's O2 requirement worsen, then suggest to hold immunosuppressions. Continue Azathioprine 150 mg PO daily and prednisone 5 mg PO every other day. If patient's O2 requirement worsen, then suggest to hold azathioprine.

## 2020-12-06 DIAGNOSIS — Z29.9 ENCOUNTER FOR PROPHYLACTIC MEASURES, UNSPECIFIED: ICD-10-CM

## 2020-12-06 LAB
ADD ON TEST-SPECIMEN IN LAB: SIGNIFICANT CHANGE UP
ANION GAP SERPL CALC-SCNC: 15 MMOL/L — HIGH (ref 7–14)
BUN SERPL-MCNC: 54 MG/DL — HIGH (ref 7–23)
CALCIUM SERPL-MCNC: 8.8 MG/DL — SIGNIFICANT CHANGE UP (ref 8.4–10.5)
CHLORIDE SERPL-SCNC: 102 MMOL/L — SIGNIFICANT CHANGE UP (ref 98–107)
CO2 SERPL-SCNC: 15 MMOL/L — LOW (ref 22–31)
CREAT SERPL-MCNC: 4.37 MG/DL — HIGH (ref 0.5–1.3)
GLUCOSE SERPL-MCNC: 113 MG/DL — HIGH (ref 70–99)
HCT VFR BLD CALC: 34.9 % — LOW (ref 39–50)
HGB BLD-MCNC: 11.3 G/DL — LOW (ref 13–17)
MCHC RBC-ENTMCNC: 32.4 GM/DL — SIGNIFICANT CHANGE UP (ref 32–36)
MCHC RBC-ENTMCNC: 33.9 PG — SIGNIFICANT CHANGE UP (ref 27–34)
MCV RBC AUTO: 104.8 FL — HIGH (ref 80–100)
NRBC # BLD: 0 /100 WBCS — SIGNIFICANT CHANGE UP
NRBC # FLD: 0 K/UL — SIGNIFICANT CHANGE UP
PLATELET # BLD AUTO: 336 K/UL — SIGNIFICANT CHANGE UP (ref 150–400)
POTASSIUM SERPL-MCNC: 4 MMOL/L — SIGNIFICANT CHANGE UP (ref 3.5–5.3)
POTASSIUM SERPL-SCNC: 4 MMOL/L — SIGNIFICANT CHANGE UP (ref 3.5–5.3)
RBC # BLD: 3.33 M/UL — LOW (ref 4.2–5.8)
RBC # FLD: 15.1 % — HIGH (ref 10.3–14.5)
SODIUM SERPL-SCNC: 132 MMOL/L — LOW (ref 135–145)
WBC # BLD: 5.54 K/UL — SIGNIFICANT CHANGE UP (ref 3.8–10.5)
WBC # FLD AUTO: 5.54 K/UL — SIGNIFICANT CHANGE UP (ref 3.8–10.5)

## 2020-12-06 PROCEDURE — 99233 SBSQ HOSP IP/OBS HIGH 50: CPT

## 2020-12-06 PROCEDURE — 93010 ELECTROCARDIOGRAM REPORT: CPT

## 2020-12-06 RX ORDER — ACETAMINOPHEN 500 MG
1000 TABLET ORAL ONCE
Refills: 0 | Status: COMPLETED | OUTPATIENT
Start: 2020-12-06 | End: 2020-12-06

## 2020-12-06 RX ORDER — ACETAMINOPHEN 500 MG
1000 TABLET ORAL ONCE
Refills: 0 | Status: DISCONTINUED | OUTPATIENT
Start: 2020-12-06 | End: 2020-12-06

## 2020-12-06 RX ORDER — SODIUM BICARBONATE 1 MEQ/ML
1950 SYRINGE (ML) INTRAVENOUS
Refills: 0 | Status: DISCONTINUED | OUTPATIENT
Start: 2020-12-06 | End: 2020-12-10

## 2020-12-06 RX ORDER — SODIUM CHLORIDE 9 MG/ML
1000 INJECTION, SOLUTION INTRAVENOUS
Refills: 0 | Status: DISCONTINUED | OUTPATIENT
Start: 2020-12-06 | End: 2020-12-08

## 2020-12-06 RX ADMIN — Medication 100 MILLIGRAM(S): at 06:49

## 2020-12-06 RX ADMIN — Medication 1950 MILLIGRAM(S): at 18:08

## 2020-12-06 RX ADMIN — Medication 100 MILLIGRAM(S): at 18:07

## 2020-12-06 RX ADMIN — Medication 1000 MILLIGRAM(S): at 13:15

## 2020-12-06 RX ADMIN — Medication 1000 MILLIGRAM(S): at 00:30

## 2020-12-06 RX ADMIN — HEPARIN SODIUM 5000 UNIT(S): 5000 INJECTION INTRAVENOUS; SUBCUTANEOUS at 06:49

## 2020-12-06 RX ADMIN — HEPARIN SODIUM 5000 UNIT(S): 5000 INJECTION INTRAVENOUS; SUBCUTANEOUS at 18:07

## 2020-12-06 RX ADMIN — Medication 650 MILLIGRAM(S): at 19:21

## 2020-12-06 RX ADMIN — Medication 400 MILLIGRAM(S): at 12:45

## 2020-12-06 RX ADMIN — Medication 650 MILLIGRAM(S): at 06:49

## 2020-12-06 RX ADMIN — Medication 120 MILLIGRAM(S): at 06:49

## 2020-12-06 RX ADMIN — ATORVASTATIN CALCIUM 20 MILLIGRAM(S): 80 TABLET, FILM COATED ORAL at 21:16

## 2020-12-06 NOTE — PROGRESS NOTE ADULT - SUBJECTIVE AND OBJECTIVE BOX
Patient is a 50y old  Male who presents with a chief complaint of     SUBJECTIVE / OVERNIGHT EVENTS: Patient seen and examined. Still febrile and with chills. Denies any cough or SOB. States that mother is also covid positive and currently in the hospital.     MEDICATIONS  (STANDING):  atorvastatin 20 milliGRAM(s) Oral at bedtime  heparin   Injectable 5000 Unit(s) SubCutaneous every 12 hours  hydrALAZINE 100 milliGRAM(s) Oral two times a day  lactated ringers. 1000 milliLiter(s) (100 mL/Hr) IV Continuous <Continuous>  NIFEdipine  milliGRAM(s) Oral daily  predniSONE   Tablet 5 milliGRAM(s) Oral every other day  sodium bicarbonate 1950 milliGRAM(s) Oral two times a day    MEDICATIONS  (PRN):  acetaminophen   Tablet .. 650 milliGRAM(s) Oral every 6 hours PRN Temp greater or equal to 38C (100.4F), Mild Pain (1 - 3)      I&O's Summary    05 Dec 2020 07:01  -  06 Dec 2020 07:00  --------------------------------------------------------  IN: 1400 mL / OUT: 1300 mL / NET: 100 mL    Vital Signs Last 24 Hrs  T(C): 37.5 (06 Dec 2020 13:03), Max: 39.1 (06 Dec 2020 10:56)  T(F): 99.5 (06 Dec 2020 13:03), Max: 102.3 (06 Dec 2020 10:56)  HR: 111 (06 Dec 2020 13:03) (74 - 115)  BP: 157/72 (06 Dec 2020 13:03) (136/64 - 159/73)  BP(mean): --  RR: 17 (06 Dec 2020 13:03) (16 - 18)  SpO2: 93% (06 Dec 2020 13:03) (93% - 98%)    PHYSICAL EXAM:  GENERAL: NAD, well-developed  HEAD:  Atraumatic, Normocephalic  EYES: EOMI, PERRLA, conjunctiva and sclera clear  NECK: Supple, No JVD  CHEST/LUNG: Clear to auscultation bilaterally; No wheeze  HEART: Regular rate and rhythm; No murmurs, rubs, or gallops  ABDOMEN: Soft, Nontender, Nondistended; Bowel sounds present  EXTREMITIES:  2+ Peripheral Pulses, No clubbing, cyanosis, or edema  PSYCH: AAOx3  NEUROLOGY: non-focal  SKIN: No rashes or lesions    LABS:                        11.3   5.54  )-----------( 336      ( 06 Dec 2020 07:54 )             34.9     12-06    132<L>  |  102  |  54<H>  ----------------------------<  113<H>  4.0   |  15<L>  |  4.37<H>    Ca    8.8      06 Dec 2020 07:54    TPro  7.1  /  Alb  3.7  /  TBili  0.4  /  DBili  x   /  AST  39  /  ALT  23  /  AlkPhos  76  12-05    PT/INR - ( 05 Dec 2020 02:00 )   PT: 13.7 SEC;   INR: 1.20          PTT - ( 05 Dec 2020 02:00 )  PTT:35.8 SEC      Urinalysis Basic - ( 05 Dec 2020 13:24 )    Color: Light Yellow / Appearance: Clear / S.011 / pH: x  Gluc: x / Ketone: Negative  / Bili: Negative / Urobili: <2 mg/dL   Blood: x / Protein: 100 mg/dL / Nitrite: Negative   Leuk Esterase: Negative / RBC: 6 /HPF / WBC 2 /HPF   Sq Epi: x / Non Sq Epi: 2 /HPF / Bacteria: Negative        RADIOLOGY & ADDITIONAL TESTS:    Imaging Personally Reviewed:    Consultant(s) Notes Reviewed:  Renal    Care Discussed with Consultants/Other Providers:    Assessment and Plan:

## 2020-12-06 NOTE — PROGRESS NOTE ADULT - PROBLEM SELECTOR PLAN 1
Pt with ABRIL in the setting of COVID 19 infection. Exact duration of ABRIL however unknown. Scr of 3.83 on on 11/24/20, on admission noted to have Scr of 5.3 on 12/5/20. UA with + protein, 6RBC and 2WBC. Scr improved today to 4.37 12/6/20. Suggest to send urine electrolytes and spot urine TP/CR. Continue IV fluids. Monitor labs and urine output. Avoid NSAIDs, ACEI/ARBS, RCA and nephrotoxins. Dose medications as per eGFR.

## 2020-12-06 NOTE — PROGRESS NOTE ADULT - ASSESSMENT
50 y.o. M w/ a hx of renal transplant (1973), CKDIV, HTN who presents with fever, found to be COVID+ and in ABRIL

## 2020-12-06 NOTE — PROGRESS NOTE ADULT - PROBLEM SELECTOR PLAN 1
-COVID +, currently not hypoxic and well appearing.   -persistent fevers at this time. Blood and urine cx unremarkable  -CXR clear  - cont to monitor, supplemental O2 PRN  -No indication for remdesivir or decadron at this time

## 2020-12-06 NOTE — PROGRESS NOTE ADULT - SUBJECTIVE AND OBJECTIVE BOX
[facility] DIVISION OF KIDNEY DISEASES AND HYPERTENSION -- FOLLOW UP NOTE  --------------------------------------------------------------------------------  Pt is a 49 y/o M w PMH of CKD4  and HTN, LRRT in 1978 on prednisone and Imuran, presented to [facility] for fever, chills and weakness. Admitted for COVID infection. Nephrology consulted for ABRIL.Upon review of labs on [facility] HIE/[place], patient noted to have Scr of 3.83 on on 11/24/20, on admission noted to have Scr of 5.3 on 12/5/20.     Patient spiked fever overnight, t max was 101.9. Patient was seen and examined at bedside. C/o of chills. Denies CP, SOB, nausea, vomiting, diarrhea, LE edema or dysuria.          PAST HISTORY  --------------------------------------------------------------------------------  No significant changes to PMH, PSH, FHx, SHx, unless otherwise noted    ALLERGIES & MEDICATIONS  --------------------------------------------------------------------------------  Allergies    sulfa (Swelling)    Intolerances      Standing Inpatient Medications  atorvastatin 20 milliGRAM(s) Oral at bedtime  azaTHIOprine 150 milliGRAM(s) Oral daily  heparin   Injectable 5000 Unit(s) SubCutaneous every 12 hours  hydrALAZINE 100 milliGRAM(s) Oral two times a day  lactated ringers. 1000 milliLiter(s) IV Continuous <Continuous>  NIFEdipine  milliGRAM(s) Oral daily  predniSONE   Tablet 5 milliGRAM(s) Oral every other day  sodium bicarbonate 650 milliGRAM(s) Oral three times a day    PRN Inpatient Medications  acetaminophen   Tablet .. 650 milliGRAM(s) Oral every 6 hours PRN      REVIEW OF SYSTEMS  --------------------------------------------------------------------------------  Gen: + fever and chills   Respiratory: No dyspnea, cough  CV: No chest pain  GI: No abdominal pain, diarrhea  : No dysuria, hematuria  MSK: No  edema    All other systems were reviewed and are negative, except as noted.    VITALS/PHYSICAL EXAM  --------------------------------------------------------------------------------  T(C): 38.2 (12-06-20 @ 06:23), Max: 38.8 (12-05-20 @ 19:38)  HR: 110 (12-06-20 @ 06:23) (74 - 110)  BP: 159/67 (12-06-20 @ 06:23) (136/64 - 159/67)  RR: 17 (12-06-20 @ 06:23) (16 - 20)  SpO2: 96% (12-06-20 @ 06:23) (96% - 99%)  Wt(kg): --  Height (cm): 165.1 (12-05-20 @ 10:20)  Weight (kg): 76.3 (12-05-20 @ 10:20)  BMI (kg/m2): 28 (12-05-20 @ 10:20)  BSA (m2): 1.84 (12-05-20 @ 10:20)      12-05-20 @ 07:01  -  12-06-20 @ 07:00  --------------------------------------------------------  IN: 1400 mL / OUT: 1300 mL / NET: 100 mL        Physical Exam:  	Gen: NAD  	HEENT: MMM  	Pulm: CTA B/L  	CV: S1S2  	Abd: Soft, +BS   	Ext: No LE edema B/L  	Neuro: Awake  	Skin: Warm and dry  	Vascular access: IV lines       LABS/STUDIES  --------------------------------------------------------------------------------              11.3   5.54  >-----------<  336      [12-06-20 @ 07:54]              34.9     132  |  102  |  54  ----------------------------<  113      [12-06-20 @ 07:54]  4.0   |  15  |  4.37        Ca     8.8     [12-06-20 @ 07:54]    TPro  7.1  /  Alb  3.7  /  TBili  0.4  /  DBili  x   /  AST  39  /  ALT  23  /  AlkPhos  76  [12-05-20 @ 02:00]    PT/INR: PT 13.7 , INR 1.20       [12-05-20 @ 02:00]  PTT: 35.8       [12-05-20 @ 02:00]      Creatinine Trend:  SCr 4.37 [12-06 @ 07:54]  SCr 5.02 [12-05 @ 05:45]  SCr 5.30 [12-05 @ 02:00]    Urinalysis - [12-05-20 @ 13:24]      Color Light Yellow / Appearance Clear / SG 1.011 / pH 6.5      Gluc Trace / Ketone Negative  / Bili Negative / Urobili <2 mg/dL       Blood Small / Protein 100 mg/dL / Leuk Est Negative / Nitrite Negative      RBC 6 / WBC 2 / Hyaline  / Gran  / Sq Epi  / Non Sq Epi 2 / Bacteria Negative           [facility] DIVISION OF KIDNEY DISEASES AND HYPERTENSION -- FOLLOW UP NOTE  --------------------------------------------------------------------------------  Pt is a 51 y/o M w PMH of CKD4  and HTN, LRRT in 1978 on prednisone and Imuran, presented to [facility] for fever, chills and weakness. Admitted for COVID infection. Nephrology consulted for ABRIL.Upon review of labs on [facility] HIE/[employer], patient noted to have Scr of 3.83 on on 11/24/20, on admission noted to have Scr of 5.3 on 12/5/20.     Patient spiked fever overnight, t max was 101.9. Patient was seen and examined at bedside. C/o of chills. Denies CP, SOB, nausea, vomiting, diarrhea, LE edema or dysuria.          PAST HISTORY  --------------------------------------------------------------------------------  No significant changes to PMH, PSH, FHx, SHx, unless otherwise noted    ALLERGIES & MEDICATIONS  --------------------------------------------------------------------------------  Allergies    sulfa (Swelling)    Intolerances      Standing Inpatient Medications  atorvastatin 20 milliGRAM(s) Oral at bedtime  azaTHIOprine 150 milliGRAM(s) Oral daily  heparin   Injectable 5000 Unit(s) SubCutaneous every 12 hours  hydrALAZINE 100 milliGRAM(s) Oral two times a day  lactated ringers. 1000 milliLiter(s) IV Continuous <Continuous>  NIFEdipine  milliGRAM(s) Oral daily  predniSONE   Tablet 5 milliGRAM(s) Oral every other day  sodium bicarbonate 650 milliGRAM(s) Oral three times a day    PRN Inpatient Medications  acetaminophen   Tablet .. 650 milliGRAM(s) Oral every 6 hours PRN      REVIEW OF SYSTEMS  --------------------------------------------------------------------------------  Gen: + fever and chills   Respiratory: No dyspnea, cough  CV: No chest pain  GI: No abdominal pain, diarrhea  : No dysuria, hematuria  MSK: No  edema    All other systems were reviewed and are negative, except as noted.    VITALS/PHYSICAL EXAM  --------------------------------------------------------------------------------  T(C): 38.2 (12-06-20 @ 06:23), Max: 38.8 (12-05-20 @ 19:38)  HR: 110 (12-06-20 @ 06:23) (74 - 110)  BP: 159/67 (12-06-20 @ 06:23) (136/64 - 159/67)  RR: 17 (12-06-20 @ 06:23) (16 - 20)  SpO2: 96% (12-06-20 @ 06:23) (96% - 99%)  Wt(kg): --  Height (cm): 165.1 (12-05-20 @ 10:20)  Weight (kg): 76.3 (12-05-20 @ 10:20)  BMI (kg/m2): 28 (12-05-20 @ 10:20)  BSA (m2): 1.84 (12-05-20 @ 10:20)      12-05-20 @ 07:01  -  12-06-20 @ 07:00  --------------------------------------------------------  IN: 1400 mL / OUT: 1300 mL / NET: 100 mL        Physical Exam:  	Gen: rigors shaking chills  	HEENT: MMM  	Pulm: CTA B/L  	CV: S1S2  	Abd: Soft, +BS   	Ext: No LE edema B/L  	Neuro: Awake  	Skin: Warm and dry  	Vascular access: IV lines       LABS/STUDIES  --------------------------------------------------------------------------------              11.3   5.54  >-----------<  336      [12-06-20 @ 07:54]              34.9     132  |  102  |  54  ----------------------------<  113      [12-06-20 @ 07:54]  4.0   |  15  |  4.37        Ca     8.8     [12-06-20 @ 07:54]    TPro  7.1  /  Alb  3.7  /  TBili  0.4  /  DBili  x   /  AST  39  /  ALT  23  /  AlkPhos  76  [12-05-20 @ 02:00]    PT/INR: PT 13.7 , INR 1.20       [12-05-20 @ 02:00]  PTT: 35.8       [12-05-20 @ 02:00]      Creatinine Trend:  SCr 4.37 [12-06 @ 07:54]  SCr 5.02 [12-05 @ 05:45]  SCr 5.30 [12-05 @ 02:00]    Urinalysis - [12-05-20 @ 13:24]      Color Light Yellow / Appearance Clear / SG 1.011 / pH 6.5      Gluc Trace / Ketone Negative  / Bili Negative / Urobili <2 mg/dL       Blood Small / Protein 100 mg/dL / Leuk Est Negative / Nitrite Negative      RBC 6 / WBC 2 / Hyaline  / Gran  / Sq Epi  / Non Sq Epi 2 / Bacteria Negative

## 2020-12-07 LAB
ALBUMIN SERPL ELPH-MCNC: 3 G/DL — LOW (ref 3.3–5)
ALP SERPL-CCNC: 59 U/L — SIGNIFICANT CHANGE UP (ref 40–120)
ALT FLD-CCNC: 17 U/L — SIGNIFICANT CHANGE UP (ref 4–41)
ANION GAP SERPL CALC-SCNC: 14 MMOL/L — SIGNIFICANT CHANGE UP (ref 7–14)
AST SERPL-CCNC: 38 U/L — SIGNIFICANT CHANGE UP (ref 4–40)
BILIRUB SERPL-MCNC: 0.3 MG/DL — SIGNIFICANT CHANGE UP (ref 0.2–1.2)
BUN SERPL-MCNC: 50 MG/DL — HIGH (ref 7–23)
CALCIUM SERPL-MCNC: 8.5 MG/DL — SIGNIFICANT CHANGE UP (ref 8.4–10.5)
CHLORIDE SERPL-SCNC: 107 MMOL/L — SIGNIFICANT CHANGE UP (ref 98–107)
CO2 SERPL-SCNC: 15 MMOL/L — LOW (ref 22–31)
CREAT SERPL-MCNC: 4.02 MG/DL — HIGH (ref 0.5–1.3)
GLUCOSE SERPL-MCNC: 93 MG/DL — SIGNIFICANT CHANGE UP (ref 70–99)
HCT VFR BLD CALC: 32.9 % — LOW (ref 39–50)
HGB BLD-MCNC: 10.8 G/DL — LOW (ref 13–17)
MCHC RBC-ENTMCNC: 32.8 GM/DL — SIGNIFICANT CHANGE UP (ref 32–36)
MCHC RBC-ENTMCNC: 33.6 PG — SIGNIFICANT CHANGE UP (ref 27–34)
MCV RBC AUTO: 102.5 FL — HIGH (ref 80–100)
NRBC # BLD: 0 /100 WBCS — SIGNIFICANT CHANGE UP
NRBC # FLD: 0 K/UL — SIGNIFICANT CHANGE UP
PLATELET # BLD AUTO: 334 K/UL — SIGNIFICANT CHANGE UP (ref 150–400)
POTASSIUM SERPL-MCNC: 4.1 MMOL/L — SIGNIFICANT CHANGE UP (ref 3.5–5.3)
POTASSIUM SERPL-SCNC: 4.1 MMOL/L — SIGNIFICANT CHANGE UP (ref 3.5–5.3)
PROT SERPL-MCNC: 6.5 G/DL — SIGNIFICANT CHANGE UP (ref 6–8.3)
RBC # BLD: 3.21 M/UL — LOW (ref 4.2–5.8)
RBC # FLD: 15.3 % — HIGH (ref 10.3–14.5)
SODIUM SERPL-SCNC: 136 MMOL/L — SIGNIFICANT CHANGE UP (ref 135–145)
WBC # BLD: 6.97 K/UL — SIGNIFICANT CHANGE UP (ref 3.8–10.5)
WBC # FLD AUTO: 6.97 K/UL — SIGNIFICANT CHANGE UP (ref 3.8–10.5)

## 2020-12-07 PROCEDURE — 76770 US EXAM ABDO BACK WALL COMP: CPT | Mod: 26

## 2020-12-07 PROCEDURE — 99233 SBSQ HOSP IP/OBS HIGH 50: CPT

## 2020-12-07 RX ORDER — ACETAMINOPHEN 500 MG
1000 TABLET ORAL ONCE
Refills: 0 | Status: COMPLETED | OUTPATIENT
Start: 2020-12-07 | End: 2020-12-07

## 2020-12-07 RX ADMIN — HEPARIN SODIUM 5000 UNIT(S): 5000 INJECTION INTRAVENOUS; SUBCUTANEOUS at 18:11

## 2020-12-07 RX ADMIN — Medication 1000 MILLIGRAM(S): at 02:10

## 2020-12-07 RX ADMIN — Medication 120 MILLIGRAM(S): at 05:34

## 2020-12-07 RX ADMIN — Medication 650 MILLIGRAM(S): at 06:19

## 2020-12-07 RX ADMIN — HEPARIN SODIUM 5000 UNIT(S): 5000 INJECTION INTRAVENOUS; SUBCUTANEOUS at 05:35

## 2020-12-07 RX ADMIN — Medication 1000 MILLIGRAM(S): at 00:37

## 2020-12-07 RX ADMIN — Medication 650 MILLIGRAM(S): at 18:21

## 2020-12-07 RX ADMIN — Medication 1950 MILLIGRAM(S): at 18:12

## 2020-12-07 RX ADMIN — Medication 100 MILLIGRAM(S): at 05:34

## 2020-12-07 RX ADMIN — Medication 1950 MILLIGRAM(S): at 05:34

## 2020-12-07 RX ADMIN — ATORVASTATIN CALCIUM 20 MILLIGRAM(S): 80 TABLET, FILM COATED ORAL at 21:08

## 2020-12-07 RX ADMIN — Medication 650 MILLIGRAM(S): at 12:46

## 2020-12-07 RX ADMIN — Medication 100 MILLIGRAM(S): at 18:11

## 2020-12-07 NOTE — PROGRESS NOTE ADULT - SUBJECTIVE AND OBJECTIVE BOX
[facility] Division of Hospital Medicine  Alanna Melendez DO  Pager (QASIM, 8A-5P): [phone]      Patient is a 50y old  Male who presents with a chief complaint of     SUBJECTIVE / OVERNIGHT EVENTS: Tmax 100.9. Reports feeling well. No complaints of chest pain, SOB, cough, N/V/D    MEDICATIONS  (STANDING):  atorvastatin 20 milliGRAM(s) Oral at bedtime  heparin   Injectable 5000 Unit(s) SubCutaneous every 12 hours  hydrALAZINE 100 milliGRAM(s) Oral two times a day  lactated ringers. 1000 milliLiter(s) (100 mL/Hr) IV Continuous <Continuous>  NIFEdipine  milliGRAM(s) Oral daily  predniSONE   Tablet 5 milliGRAM(s) Oral every other day  sodium bicarbonate 1950 milliGRAM(s) Oral two times a day    MEDICATIONS  (PRN):  acetaminophen   Tablet .. 650 milliGRAM(s) Oral every 6 hours PRN Temp greater or equal to 38C (100.4F), Mild Pain (1 - 3)      CAPILLARY BLOOD GLUCOSE        I&O's Summary    06 Dec 2020 07:01  -  07 Dec 2020 07:00  --------------------------------------------------------  IN: 1550 mL / OUT: 800 mL / NET: 750 mL    07 Dec 2020 07:01  -  07 Dec 2020 12:16  --------------------------------------------------------  IN: 300 mL / OUT: 0 mL / NET: 300 mL        PHYSICAL EXAM:  Vital Signs Last 24 Hrs  T(C): 37.6 (07 Dec 2020 09:34), Max: 39.3 (07 Dec 2020 00:09)  T(F): 99.6 (07 Dec 2020 09:34), Max: 102.8 (07 Dec 2020 00:09)  HR: 110 (07 Dec 2020 09:34) (89 - 113)  BP: 148/74 (07 Dec 2020 09:34) (134/66 - 157/72)  BP(mean): --  RR: 18 (07 Dec 2020 09:34) (17 - 18)  SpO2: 94% (07 Dec 2020 09:34) (93% - 94%)    GENERAL: NAD, on room air  EYES: sclera clear  CHEST/LUNG: normal work of breathing  HEART: tachycardic, no peripheral edema  ABDOMEN: Soft, Nontender, Nondistended  EXTREMITIES: WWP  PSYCH: Awake, alert, calm  NEUROLOGY: non-focal      LABS:                        10.8   6.97  )-----------( 334      ( 07 Dec 2020 07:22 )             32.9     12-    136  |  107  |  50<H>  ----------------------------<  93  4.1   |  15<L>  |  4.02<H>    Ca    8.5      07 Dec 2020 07:22  Phos  4.2     12-  Mg     2.0     12-    TPro  6.5  /  Alb  3.0<L>  /  TBili  0.3  /  DBili  x   /  AST  38  /  ALT  17  /  AlkPhos  59  12-07          Urinalysis Basic - ( 05 Dec 2020 13:24 )    Color: Light Yellow / Appearance: Clear / S.011 / pH: x  Gluc: x / Ketone: Negative  / Bili: Negative / Urobili: <2 mg/dL   Blood: x / Protein: 100 mg/dL / Nitrite: Negative   Leuk Esterase: Negative / RBC: 6 /HPF / WBC 2 /HPF   Sq Epi: x / Non Sq Epi: 2 /HPF / Bacteria: Negative        Culture - Blood (collected 05 Dec 2020 06:17)  Source: .Blood  Preliminary Report (06 Dec 2020 07:01):    No growth to date.    Culture - Blood (collected 05 Dec 2020 06:17)  Source: .Blood  Preliminary Report (06 Dec 2020 07:01):    No growth to date.    Culture - Urine (collected 05 Dec 2020 02:24)  Source: .Urine  Final Report (06 Dec 2020 02:20):    <10,000 CFU/mL Normal Urogenital Amy        RADIOLOGY & ADDITIONAL TESTS:  Results Reviewed:   Imaging Personally Reviewed:  Electrocardiogram Personally Reviewed:    COORDINATION OF CARE:  Care Discussed with Consultants/Other Providers [Y/N]:  Prior or Outpatient Records Reviewed [Y/N]:   [facility] Division of Hospital Medicine  Alanna Melendez DO  Pager (QASIM, 8A-5P): [phone]      Patient is a 50y old  Male who presents with a chief complaint of     SUBJECTIVE / OVERNIGHT EVENTS: Tmax 100.9. Reports feeling cold. No complaints of chest pain, SOB, cough, N/V/D    MEDICATIONS  (STANDING):  atorvastatin 20 milliGRAM(s) Oral at bedtime  heparin   Injectable 5000 Unit(s) SubCutaneous every 12 hours  hydrALAZINE 100 milliGRAM(s) Oral two times a day  lactated ringers. 1000 milliLiter(s) (100 mL/Hr) IV Continuous <Continuous>  NIFEdipine  milliGRAM(s) Oral daily  predniSONE   Tablet 5 milliGRAM(s) Oral every other day  sodium bicarbonate 1950 milliGRAM(s) Oral two times a day    MEDICATIONS  (PRN):  acetaminophen   Tablet .. 650 milliGRAM(s) Oral every 6 hours PRN Temp greater or equal to 38C (100.4F), Mild Pain (1 - 3)      CAPILLARY BLOOD GLUCOSE        I&O's Summary    06 Dec 2020 07:01  -  07 Dec 2020 07:00  --------------------------------------------------------  IN: 1550 mL / OUT: 800 mL / NET: 750 mL    07 Dec 2020 07:01  -  07 Dec 2020 12:16  --------------------------------------------------------  IN: 300 mL / OUT: 0 mL / NET: 300 mL        PHYSICAL EXAM:  Vital Signs Last 24 Hrs  T(C): 37.6 (07 Dec 2020 09:34), Max: 39.3 (07 Dec 2020 00:09)  T(F): 99.6 (07 Dec 2020 09:34), Max: 102.8 (07 Dec 2020 00:09)  HR: 110 (07 Dec 2020 09:34) (89 - 113)  BP: 148/74 (07 Dec 2020 09:34) (134/66 - 157/72)  BP(mean): --  RR: 18 (07 Dec 2020 09:34) (17 - 18)  SpO2: 94% (07 Dec 2020 09:34) (93% - 94%)    GENERAL: NAD, on room air but appears to be rigoring  EYES: sclera clear  CHEST/LUNG: normal work of breathing  HEART: tachycardic, no peripheral edema  ABDOMEN: Soft, Nontender, Nondistended  EXTREMITIES: WWP  PSYCH: Awake, alert, calm  NEUROLOGY: non-focal      LABS:                        10.8   6.97  )-----------( 334      ( 07 Dec 2020 07:22 )             32.9     12-    136  |  107  |  50<H>  ----------------------------<  93  4.1   |  15<L>  |  4.02<H>    Ca    8.5      07 Dec 2020 07:22  Phos  4.2     12-  Mg     2.0     12-06    TPro  6.5  /  Alb  3.0<L>  /  TBili  0.3  /  DBili  x   /  AST  38  /  ALT  17  /  AlkPhos  59  12-07          Urinalysis Basic - ( 05 Dec 2020 13:24 )    Color: Light Yellow / Appearance: Clear / S.011 / pH: x  Gluc: x / Ketone: Negative  / Bili: Negative / Urobili: <2 mg/dL   Blood: x / Protein: 100 mg/dL / Nitrite: Negative   Leuk Esterase: Negative / RBC: 6 /HPF / WBC 2 /HPF   Sq Epi: x / Non Sq Epi: 2 /HPF / Bacteria: Negative        Culture - Blood (collected 05 Dec 2020 06:17)  Source: .Blood  Preliminary Report (06 Dec 2020 07:01):    No growth to date.    Culture - Blood (collected 05 Dec 2020 06:17)  Source: .Blood  Preliminary Report (06 Dec 2020 07:01):    No growth to date.    Culture - Urine (collected 05 Dec 2020 02:24)  Source: .Urine  Final Report (06 Dec 2020 02:20):    <10,000 CFU/mL Normal Urogenital Amy        RADIOLOGY & ADDITIONAL TESTS:  Results Reviewed:   Imaging Personally Reviewed:  Electrocardiogram Personally Reviewed:    COORDINATION OF CARE:  Care Discussed with Consultants/Other Providers [Y/N]:  Prior or Outpatient Records Reviewed [Y/N]:

## 2020-12-07 NOTE — PROVIDER CONTACT NOTE (OTHER) - BACKGROUND
Patient has been febrile throughout shift Patient has been febrile throughout shift. History of HTN, renal transplant. COVID positive

## 2020-12-07 NOTE — PROGRESS NOTE ADULT - PROBLEM SELECTOR PLAN 2
-ABRIL in setting of COVID infection.   -Has had normal PO intake, was taking Lasix until 1-2 days ago.   -Hold Lasix, s/p IVF, Cr downtrending  - Cont. sodium bicarb tabs  - Strict I/O, avoid nephrotoxic medications  -Renal input appreciated

## 2020-12-07 NOTE — PROVIDER CONTACT NOTE (OTHER) - RECOMMENDATIONS
Provider recommended packing the patient with bags of ice and giving tylenol the next time it is due; the provider may consider increasing the tylenol dosage

## 2020-12-07 NOTE — PROGRESS NOTE ADULT - PROBLEM SELECTOR PLAN 1
-COVID +, currently not hypoxic and well appearing.   -persistent fevers at this time. Blood and urine cx unremarkable, CXR clear  - cont to monitor, supplemental O2 PRN  Check Ambulatory O2 sat on RA  -No indication for remdesivir or decadron at this time

## 2020-12-08 DIAGNOSIS — R19.7 DIARRHEA, UNSPECIFIED: ICD-10-CM

## 2020-12-08 LAB
ANION GAP SERPL CALC-SCNC: 16 MMOL/L — HIGH (ref 7–14)
BUN SERPL-MCNC: 54 MG/DL — HIGH (ref 7–23)
CALCIUM SERPL-MCNC: 8.8 MG/DL — SIGNIFICANT CHANGE UP (ref 8.4–10.5)
CHLORIDE SERPL-SCNC: 106 MMOL/L — SIGNIFICANT CHANGE UP (ref 98–107)
CO2 SERPL-SCNC: 14 MMOL/L — LOW (ref 22–31)
CREAT SERPL-MCNC: 4.27 MG/DL — HIGH (ref 0.5–1.3)
GLUCOSE SERPL-MCNC: 90 MG/DL — SIGNIFICANT CHANGE UP (ref 70–99)
HCT VFR BLD CALC: 32.7 % — LOW (ref 39–50)
HGB BLD-MCNC: 10.6 G/DL — LOW (ref 13–17)
MAGNESIUM SERPL-MCNC: 2.2 MG/DL — SIGNIFICANT CHANGE UP (ref 1.6–2.6)
MCHC RBC-ENTMCNC: 32.4 GM/DL — SIGNIFICANT CHANGE UP (ref 32–36)
MCHC RBC-ENTMCNC: 34.2 PG — HIGH (ref 27–34)
MCV RBC AUTO: 105.5 FL — HIGH (ref 80–100)
NRBC # BLD: 0 /100 WBCS — SIGNIFICANT CHANGE UP
NRBC # FLD: 0 K/UL — SIGNIFICANT CHANGE UP
PLATELET # BLD AUTO: 333 K/UL — SIGNIFICANT CHANGE UP (ref 150–400)
POTASSIUM SERPL-MCNC: 4.3 MMOL/L — SIGNIFICANT CHANGE UP (ref 3.5–5.3)
POTASSIUM SERPL-SCNC: 4.3 MMOL/L — SIGNIFICANT CHANGE UP (ref 3.5–5.3)
RBC # BLD: 3.1 M/UL — LOW (ref 4.2–5.8)
RBC # FLD: 16.1 % — HIGH (ref 10.3–14.5)
SODIUM SERPL-SCNC: 136 MMOL/L — SIGNIFICANT CHANGE UP (ref 135–145)
WBC # BLD: 9.54 K/UL — SIGNIFICANT CHANGE UP (ref 3.8–10.5)
WBC # FLD AUTO: 9.54 K/UL — SIGNIFICANT CHANGE UP (ref 3.8–10.5)

## 2020-12-08 PROCEDURE — 99233 SBSQ HOSP IP/OBS HIGH 50: CPT

## 2020-12-08 RX ORDER — ACETAMINOPHEN 500 MG
1000 TABLET ORAL ONCE
Refills: 0 | Status: COMPLETED | OUTPATIENT
Start: 2020-12-08 | End: 2020-12-08

## 2020-12-08 RX ORDER — SODIUM CHLORIDE 9 MG/ML
1000 INJECTION, SOLUTION INTRAVENOUS
Refills: 0 | Status: COMPLETED | OUTPATIENT
Start: 2020-12-08 | End: 2020-12-09

## 2020-12-08 RX ADMIN — Medication 400 MILLIGRAM(S): at 18:19

## 2020-12-08 RX ADMIN — Medication 400 MILLIGRAM(S): at 00:55

## 2020-12-08 RX ADMIN — Medication 5 MILLIGRAM(S): at 12:50

## 2020-12-08 RX ADMIN — Medication 100 MILLIGRAM(S): at 05:31

## 2020-12-08 RX ADMIN — Medication 100 MILLIGRAM(S): at 17:13

## 2020-12-08 RX ADMIN — Medication 120 MILLIGRAM(S): at 05:31

## 2020-12-08 RX ADMIN — ATORVASTATIN CALCIUM 20 MILLIGRAM(S): 80 TABLET, FILM COATED ORAL at 21:46

## 2020-12-08 RX ADMIN — Medication 1950 MILLIGRAM(S): at 17:13

## 2020-12-08 RX ADMIN — HEPARIN SODIUM 5000 UNIT(S): 5000 INJECTION INTRAVENOUS; SUBCUTANEOUS at 17:13

## 2020-12-08 RX ADMIN — HEPARIN SODIUM 5000 UNIT(S): 5000 INJECTION INTRAVENOUS; SUBCUTANEOUS at 05:32

## 2020-12-08 RX ADMIN — Medication 1950 MILLIGRAM(S): at 05:30

## 2020-12-08 RX ADMIN — Medication 650 MILLIGRAM(S): at 07:11

## 2020-12-08 RX ADMIN — SODIUM CHLORIDE 100 MILLILITER(S): 9 INJECTION, SOLUTION INTRAVENOUS at 12:50

## 2020-12-08 NOTE — PROGRESS NOTE ADULT - PROBLEM SELECTOR PLAN 2
-ABRIL on CKD in setting of COVID infection. Last Cr on 11/24/20: 3.83  -Has had normal PO intake, was taking Lasix until 1-2 days ago.   -Hold Lasix, c/w LR IVF, Cr slightly increased to 4.2 12/8  - Cont. sodium bicarb tabs  - Strict I/O, avoid nephrotoxic medications  -Renal input appreciated -ABRIL on CKD in setting of COVID infection. Last Cr on 11/24/20: 3.83  -Has had normal PO intake, was taking Lasix until 1-2 days ago.   -Hold Lasix, c/w LR IVF, Cr slightly increased to 4.2 12/8  - Cont. sodium bicarb tabs  - Strict I/O, avoid nephrotoxic medications  12/7 US kidneys unremarkable  -Renal input appreciated

## 2020-12-08 NOTE — PROGRESS NOTE ADULT - SUBJECTIVE AND OBJECTIVE BOX
[facility] Division of Hospital Medicine  Alanna Melendez DO  Pager (KENNY-F, 8A-5P): [phone]      Patient is a 50y old  Male who presents with a chief complaint of     SUBJECTIVE / OVERNIGHT EVENTS: Tmax 102.8. Feels better today, less chills. No pain, SOB, N/V, or abd pain but reports having black watery stools for past 2 days. States he hasn't gotten his prednisone in a while.    MEDICATIONS  (STANDING):  atorvastatin 20 milliGRAM(s) Oral at bedtime  heparin   Injectable 5000 Unit(s) SubCutaneous every 12 hours  hydrALAZINE 100 milliGRAM(s) Oral two times a day  lactated ringers. 1000 milliLiter(s) (100 mL/Hr) IV Continuous <Continuous>  NIFEdipine  milliGRAM(s) Oral daily  predniSONE   Tablet 5 milliGRAM(s) Oral every other day  sodium bicarbonate 1950 milliGRAM(s) Oral two times a day    MEDICATIONS  (PRN):  acetaminophen   Tablet .. 650 milliGRAM(s) Oral every 6 hours PRN Temp greater or equal to 38C (100.4F), Mild Pain (1 - 3)      CAPILLARY BLOOD GLUCOSE        I&O's Summary    07 Dec 2020 07:01  -  08 Dec 2020 07:00  --------------------------------------------------------  IN: 400 mL / OUT: 450 mL / NET: -50 mL        PHYSICAL EXAM:  Vital Signs Last 24 Hrs  T(C): 37.7 (08 Dec 2020 09:31), Max: 39.4 (07 Dec 2020 13:47)  T(F): 99.9 (08 Dec 2020 09:31), Max: 102.9 (07 Dec 2020 13:47)  HR: 120 (08 Dec 2020 09:31) (112 - 128)  BP: 137/84 (08 Dec 2020 09:31) (137/84 - 153/74)  BP(mean): --  RR: 18 (08 Dec 2020 09:31) (18 - 18)  SpO2: 91% (08 Dec 2020 09:31) (91% - 94%)    GENERAL: NAD, on room air   EYES: sclera clear  CHEST/LUNG: normal work of breathing  HEART: tachycardic, no peripheral edema  ABDOMEN: Soft, Nontender, Nondistended  EXTREMITIES: WWP  PSYCH: Awake, alert, calm  NEUROLOGY: non-focal    LABS:                        10.6   9.54  )-----------( 333      ( 08 Dec 2020 08:15 )             32.7     12-08    136  |  106  |  54<H>  ----------------------------<  90  4.3   |  14<L>  |  4.27<H>    Ca    8.8      08 Dec 2020 08:15  Mg     2.2     12-08    TPro  6.5  /  Alb  3.0<L>  /  TBili  0.3  /  DBili  x   /  AST  38  /  ALT  17  /  AlkPhos  59  12-07                RADIOLOGY & ADDITIONAL TESTS:  Results Reviewed:   Imaging Personally Reviewed:  Electrocardiogram Personally Reviewed:    COORDINATION OF CARE:  Care Discussed with Consultants/Other Providers [Y/N]:  Prior or Outpatient Records Reviewed [Y/N]:

## 2020-12-09 DIAGNOSIS — D62 ACUTE POSTHEMORRHAGIC ANEMIA: ICD-10-CM

## 2020-12-09 LAB
ANION GAP SERPL CALC-SCNC: 16 MMOL/L — HIGH (ref 7–14)
BLD GP AB SCN SERPL QL: NEGATIVE — SIGNIFICANT CHANGE UP
BUN SERPL-MCNC: 66 MG/DL — HIGH (ref 7–23)
CALCIUM SERPL-MCNC: 8.5 MG/DL — SIGNIFICANT CHANGE UP (ref 8.4–10.5)
CHLORIDE SERPL-SCNC: 103 MMOL/L — SIGNIFICANT CHANGE UP (ref 98–107)
CO2 SERPL-SCNC: 14 MMOL/L — LOW (ref 22–31)
CREAT SERPL-MCNC: 4.64 MG/DL — HIGH (ref 0.5–1.3)
CRP SERPL-MCNC: 338.3 MG/L — HIGH
CULTURE RESULTS: SIGNIFICANT CHANGE UP
D DIMER BLD IA.RAPID-MCNC: 2392 NG/ML DDU — HIGH
FERRITIN SERPL-MCNC: 3063 NG/ML — HIGH (ref 30–400)
GLUCOSE SERPL-MCNC: 92 MG/DL — SIGNIFICANT CHANGE UP (ref 70–99)
HCT VFR BLD CALC: 28.4 % — LOW (ref 39–50)
HCT VFR BLD CALC: 28.7 % — LOW (ref 39–50)
HGB BLD-MCNC: 9.5 G/DL — LOW (ref 13–17)
HGB BLD-MCNC: 9.5 G/DL — LOW (ref 13–17)
LDH SERPL L TO P-CCNC: 449 U/L — HIGH (ref 135–225)
MCHC RBC-ENTMCNC: 33.1 GM/DL — SIGNIFICANT CHANGE UP (ref 32–36)
MCHC RBC-ENTMCNC: 33.5 GM/DL — SIGNIFICANT CHANGE UP (ref 32–36)
MCHC RBC-ENTMCNC: 33.6 PG — SIGNIFICANT CHANGE UP (ref 27–34)
MCHC RBC-ENTMCNC: 33.7 PG — SIGNIFICANT CHANGE UP (ref 27–34)
MCV RBC AUTO: 100.4 FL — HIGH (ref 80–100)
MCV RBC AUTO: 101.8 FL — HIGH (ref 80–100)
MELD SCORE WITH DIALYSIS: 24 POINTS — SIGNIFICANT CHANGE UP
NRBC # BLD: 0 /100 WBCS — SIGNIFICANT CHANGE UP
NRBC # BLD: 0 /100 WBCS — SIGNIFICANT CHANGE UP
NRBC # FLD: 0 K/UL — SIGNIFICANT CHANGE UP
NRBC # FLD: 0 K/UL — SIGNIFICANT CHANGE UP
OB PNL STL: POSITIVE
PLATELET # BLD AUTO: 358 K/UL — SIGNIFICANT CHANGE UP (ref 150–400)
PLATELET # BLD AUTO: 362 K/UL — SIGNIFICANT CHANGE UP (ref 150–400)
POTASSIUM SERPL-MCNC: 4.3 MMOL/L — SIGNIFICANT CHANGE UP (ref 3.5–5.3)
POTASSIUM SERPL-SCNC: 4.3 MMOL/L — SIGNIFICANT CHANGE UP (ref 3.5–5.3)
PROCALCITONIN SERPL-MCNC: 6.37 NG/ML — HIGH (ref 0.02–0.1)
RBC # BLD: 2.82 M/UL — LOW (ref 4.2–5.8)
RBC # BLD: 2.83 M/UL — LOW (ref 4.2–5.8)
RBC # FLD: 16 % — HIGH (ref 10.3–14.5)
RBC # FLD: 16.1 % — HIGH (ref 10.3–14.5)
RH IG SCN BLD-IMP: POSITIVE — SIGNIFICANT CHANGE UP
SODIUM SERPL-SCNC: 133 MMOL/L — LOW (ref 135–145)
SPECIMEN SOURCE: SIGNIFICANT CHANGE UP
WBC # BLD: 8.73 K/UL — SIGNIFICANT CHANGE UP (ref 3.8–10.5)
WBC # BLD: 8.95 K/UL — SIGNIFICANT CHANGE UP (ref 3.8–10.5)
WBC # FLD AUTO: 8.73 K/UL — SIGNIFICANT CHANGE UP (ref 3.8–10.5)
WBC # FLD AUTO: 8.95 K/UL — SIGNIFICANT CHANGE UP (ref 3.8–10.5)

## 2020-12-09 PROCEDURE — 99233 SBSQ HOSP IP/OBS HIGH 50: CPT

## 2020-12-09 RX ORDER — ACETAMINOPHEN 500 MG
1000 TABLET ORAL ONCE
Refills: 0 | Status: COMPLETED | OUTPATIENT
Start: 2020-12-09 | End: 2020-12-09

## 2020-12-09 RX ORDER — SODIUM CHLORIDE 9 MG/ML
1000 INJECTION, SOLUTION INTRAVENOUS
Refills: 0 | Status: DISCONTINUED | OUTPATIENT
Start: 2020-12-09 | End: 2020-12-10

## 2020-12-09 RX ORDER — PANTOPRAZOLE SODIUM 20 MG/1
80 TABLET, DELAYED RELEASE ORAL ONCE
Refills: 0 | Status: COMPLETED | OUTPATIENT
Start: 2020-12-09 | End: 2020-12-09

## 2020-12-09 RX ORDER — PANTOPRAZOLE SODIUM 20 MG/1
8 TABLET, DELAYED RELEASE ORAL
Qty: 80 | Refills: 0 | Status: DISCONTINUED | OUTPATIENT
Start: 2020-12-09 | End: 2020-12-09

## 2020-12-09 RX ORDER — PANTOPRAZOLE SODIUM 20 MG/1
40 TABLET, DELAYED RELEASE ORAL EVERY 12 HOURS
Refills: 0 | Status: DISCONTINUED | OUTPATIENT
Start: 2020-12-09 | End: 2020-12-16

## 2020-12-09 RX ORDER — HEPARIN SODIUM 5000 [USP'U]/ML
5000 INJECTION INTRAVENOUS; SUBCUTANEOUS EVERY 12 HOURS
Refills: 0 | Status: DISCONTINUED | OUTPATIENT
Start: 2020-12-09 | End: 2020-12-10

## 2020-12-09 RX ADMIN — ATORVASTATIN CALCIUM 20 MILLIGRAM(S): 80 TABLET, FILM COATED ORAL at 21:26

## 2020-12-09 RX ADMIN — Medication 120 MILLIGRAM(S): at 06:03

## 2020-12-09 RX ADMIN — PANTOPRAZOLE SODIUM 10 MG/HR: 20 TABLET, DELAYED RELEASE ORAL at 11:15

## 2020-12-09 RX ADMIN — Medication 650 MILLIGRAM(S): at 22:30

## 2020-12-09 RX ADMIN — Medication 650 MILLIGRAM(S): at 21:26

## 2020-12-09 RX ADMIN — Medication 1950 MILLIGRAM(S): at 06:03

## 2020-12-09 RX ADMIN — HEPARIN SODIUM 5000 UNIT(S): 5000 INJECTION INTRAVENOUS; SUBCUTANEOUS at 06:03

## 2020-12-09 RX ADMIN — Medication 1000 MILLIGRAM(S): at 08:02

## 2020-12-09 RX ADMIN — PANTOPRAZOLE SODIUM 40 MILLIGRAM(S): 20 TABLET, DELAYED RELEASE ORAL at 17:57

## 2020-12-09 RX ADMIN — SODIUM CHLORIDE 100 MILLILITER(S): 9 INJECTION, SOLUTION INTRAVENOUS at 14:29

## 2020-12-09 RX ADMIN — PANTOPRAZOLE SODIUM 80 MILLIGRAM(S): 20 TABLET, DELAYED RELEASE ORAL at 11:15

## 2020-12-09 RX ADMIN — Medication 1950 MILLIGRAM(S): at 17:58

## 2020-12-09 RX ADMIN — HEPARIN SODIUM 5000 UNIT(S): 5000 INJECTION INTRAVENOUS; SUBCUTANEOUS at 17:57

## 2020-12-09 RX ADMIN — Medication 100 MILLIGRAM(S): at 17:57

## 2020-12-09 RX ADMIN — Medication 400 MILLIGRAM(S): at 06:03

## 2020-12-09 RX ADMIN — SODIUM CHLORIDE 100 MILLILITER(S): 9 INJECTION, SOLUTION INTRAVENOUS at 11:15

## 2020-12-09 RX ADMIN — Medication 100 MILLIGRAM(S): at 06:03

## 2020-12-09 RX ADMIN — Medication 650 MILLIGRAM(S): at 14:51

## 2020-12-09 NOTE — PROGRESS NOTE ADULT - PROBLEM SELECTOR PLAN 2
-COVID +, mildly hypoxemic on RA to 91. Will reattempt to wean off 1 L NC  -persistent fevers at this time. Blood and urine cx unremarkable, CXR clear  - cont to monitor, supplemental O2 PRN  -If O2 requirements increasing: switch prednisone to decadron. Hold off remdesevir given CKD  Trend D-dimer, ferritin

## 2020-12-09 NOTE — PROGRESS NOTE ADULT - PROBLEM SELECTOR PLAN 1
anemia likely multifactorial: Anemia of chronic disease from CKD however reports green/black watery stools for past 2-3 days and FOBT + concern for UGIB, hemodynamics stable  Hgb slowly downtrending, trend CBC q12, keep T/S active, hold HSQ for now  Clear liquids for now  If CBC remains stable, will restart HSQ given high Ddimer and hypercoagulability  Unlikely will be candidate for endoscopy given COVID status and currently stable  Email GI for evaluation  Started protonix gtt after protonix 80mg IVP x1. Will continue for 72h for now  suspect diarrhea in setting of COVID, will rule out Cdiff. Check GI PCR (not on stool softeners)

## 2020-12-09 NOTE — PROGRESS NOTE ADULT - ASSESSMENT
50 y.o. M w/ a hx of renal transplant (1973), CKD IV, HTN who presents with fever, found to be COVID+ and in ABRIL. Course c/b dark stools, concern for possible UGIB.

## 2020-12-09 NOTE — CONSULT NOTE ADULT - SUBJECTIVE AND OBJECTIVE BOX
Chief Complaint:  Patient is a 50y old  Male who presents with a chief complaint of     HPI:    Allergies:  sulfa (Swelling)      Home Medications:    Hospital Medications:  acetaminophen   Tablet .. 650 milliGRAM(s) Oral every 6 hours PRN  atorvastatin 20 milliGRAM(s) Oral at bedtime  hydrALAZINE 100 milliGRAM(s) Oral two times a day  lactated ringers. 1000 milliLiter(s) IV Continuous <Continuous>  NIFEdipine  milliGRAM(s) Oral daily  pantoprazole Infusion 8 mG/Hr IV Continuous <Continuous>  predniSONE   Tablet 5 milliGRAM(s) Oral every other day  sodium bicarbonate 1950 milliGRAM(s) Oral two times a day      PMHX/PSHX:  Hypertension    Renal transplant recipient    History of renal transplant        Family history:  FH: kidney disease        Denies family history of colon cancer/polyps, stomach cancer/polyps, pancreatic cancer/masses, liver cancer/disease, ovarian cancer and endometrial cancer.    Social History:   Tob: Denies  EtOH: Denies  Illicit Drugs: Denies    ROS:     General:  No wt loss, fevers, chills, night sweats, fatigue  Eyes:  Good vision, no reported pain  ENT:  No sore throat, pain, runny nose, dysphagia  CV:  No pain, palpitations, hypo/hypertension  Pulm:  No dyspnea, cough, tachypnea, wheezing  GI:  No pain, No nausea, No vomiting, No diarrhea, No constipation, No weight loss, No fever, No pruritis, No rectal bleeding, No tarry stools, No dysphagia,  :  No pain, bleeding, incontinence, nocturia  Muscle:  No pain, weakness  Neuro:  No weakness, tingling, memory problems  Psych:  No fatigue, insomnia, mood problems, depression  Endocrine:  No polyuria, polydipsia, cold/heat intolerance  Heme:  No petechiae, ecchymosis, easy bruisability  Skin:  No rash, tattoos, scars, edema    PHYSICAL EXAM:     GENERAL:  No acute distress  HEENT:  Normocephalic/atraumatic, no scleral icterus  CHEST:  Clear to auscultation bilaterally, no wheezes/rales/ronchi, no accessory muscle use  HEART:  Regular rate and rhythm, no murmurs/rubs/gallops  ABDOMEN:  Soft, non-tender, non-distended, normoactive bowel sounds,  no masses, no hepato-splenomegaly, no signs of chronic liver disease  EXTREMITIES: No cyanosis, clubbing, or edema  SKIN:  No rash/erythema/ecchymoses/petechiae/wounds/abscess/warm/dry  NEURO:  Alert and oriented x 3, no asterixis    Vital Signs:  Vital Signs Last 24 Hrs  T(C): 37.6 (09 Dec 2020 09:19), Max: 39.4 (08 Dec 2020 17:09)  T(F): 99.6 (09 Dec 2020 09:19), Max: 103 (08 Dec 2020 17:09)  HR: 104 (09 Dec 2020 09:19) (93 - 124)  BP: 131/69 (09 Dec 2020 09:19) (120/67 - 144/74)  BP(mean): --  RR: 18 (09 Dec 2020 09:19) (18 - 18)  SpO2: 93% (09 Dec 2020 09:19) (93% - 95%)  Daily     Daily     LABS:                        9.5    8.73  )-----------( 358      ( 09 Dec 2020 08:03 )             28.7     Mean Cell Volume: 101.8 fL (12-09-20 @ 08:03)    12-09    133<L>  |  103  |  66<H>  ----------------------------<  92  4.3   |  14<L>  |  4.64<H>    Ca    8.5      09 Dec 2020 08:03  Mg     2.2     12-08    TPro  x   /  Alb  x   /  TBili  0.2  /  DBili  x   /  AST  x   /  ALT  x   /  AlkPhos  x   12-09      PT/INR - ( 09 Dec 2020 08:03 )   PT: 12.4 sec;   INR: 1.09 ratio                                     9.5    8.73  )-----------( 358      ( 09 Dec 2020 08:03 )             28.7                         10.6   9.54  )-----------( 333      ( 08 Dec 2020 08:15 )             32.7                         10.8   6.97  )-----------( 334      ( 07 Dec 2020 07:22 )             32.9       Imaging:           Chief Complaint:  Patient is a 50y old  Male who presents with a chief complaint of COVID    HPI:  49 yo M w/ PMHx HTN, CKD IV s/p renal transplant on pred/imuran presented 12/5 for SOB and fever, found to be covid positive. During hospital course patient not requiring supplemental oxygen, however continues to be febrile. During hospitalization, patient w/ acute on chronic anemia, Hg downtrending 11 -> 9. On 12/8 developed several episodes of darker stool. Also having loose stool, liquid. States on 12/9 stools have changed from dark brown/black to green. No abd pain. No n/v. No prior EGD/colonoscopy. Not on antiplatelet/anticoagulation. No FHx of GI malignancy.     On labs, patient's Hg 11 ->9, , INR wnl. Elevated inflammatory markers (LDH, D-dimer, CRP)    Allergies:  sulfa (Swelling)      Home Medications:    Hospital Medications:  acetaminophen   Tablet .. 650 milliGRAM(s) Oral every 6 hours PRN  atorvastatin 20 milliGRAM(s) Oral at bedtime  hydrALAZINE 100 milliGRAM(s) Oral two times a day  lactated ringers. 1000 milliLiter(s) IV Continuous <Continuous>  NIFEdipine  milliGRAM(s) Oral daily  pantoprazole Infusion 8 mG/Hr IV Continuous <Continuous>  predniSONE   Tablet 5 milliGRAM(s) Oral every other day  sodium bicarbonate 1950 milliGRAM(s) Oral two times a day      PMHX/PSHX:  Hypertension    Renal transplant recipient    History of renal transplant        Family history:  FH: kidney disease        Denies family history of colon cancer/polyps, stomach cancer/polyps, pancreatic cancer/masses, liver cancer/disease, ovarian cancer and endometrial cancer.    Social History:   Tob: Denies  EtOH: Denies  Illicit Drugs: Denies    ROS:     General:  No wt loss, fevers, chills, night sweats, fatigue  Eyes:  Good vision, no reported pain  ENT:  No sore throat, pain, runny nose, dysphagia  CV:  No pain, palpitations, hypo/hypertension  Pulm:  No dyspnea, cough, tachypnea, wheezing  GI:  No pain, No nausea, No vomiting, No diarrhea, No constipation, No weight loss, No fever, No pruritis, No rectal bleeding, No tarry stools, No dysphagia,  :  No pain, bleeding, incontinence, nocturia  Muscle:  No pain, weakness  Neuro:  No weakness, tingling, memory problems  Psych:  No fatigue, insomnia, mood problems, depression  Endocrine:  No polyuria, polydipsia, cold/heat intolerance  Heme:  No petechiae, ecchymosis, easy bruisability  Skin:  No rash, tattoos, scars, edema    PHYSICAL EXAM:     GENERAL:  mild distress  HEENT:  Normocephalic/atraumatic, no scleral icterus  CHEST:  Clear to auscultation bilaterally, no wheezes/rales/ronchi, no accessory muscle use  HEART:  Regular rate and rhythm, no murmurs/rubs/gallops  ABDOMEN:  Soft, non-tender, non-distended, normoactive bowel sounds,  no masses, no hepato-splenomegaly, no signs of chronic liver disease  RECTAL: green liquid stool   EXTREMITIES: No cyanosis, clubbing, or edema  SKIN:  No rash/erythema/ecchymoses/petechiae/wounds/abscess/warm/dry  NEURO:  Alert and oriented x 3, no asterixis    Vital Signs:  Vital Signs Last 24 Hrs  T(C): 37.6 (09 Dec 2020 09:19), Max: 39.4 (08 Dec 2020 17:09)  T(F): 99.6 (09 Dec 2020 09:19), Max: 103 (08 Dec 2020 17:09)  HR: 104 (09 Dec 2020 09:19) (93 - 124)  BP: 131/69 (09 Dec 2020 09:19) (120/67 - 144/74)  BP(mean): --  RR: 18 (09 Dec 2020 09:19) (18 - 18)  SpO2: 93% (09 Dec 2020 09:19) (93% - 95%)  Daily     Daily     LABS:                        9.5    8.73  )-----------( 358      ( 09 Dec 2020 08:03 )             28.7     Mean Cell Volume: 101.8 fL (12-09-20 @ 08:03)    12-09    133<L>  |  103  |  66<H>  ----------------------------<  92  4.3   |  14<L>  |  4.64<H>    Ca    8.5      09 Dec 2020 08:03  Mg     2.2     12-08    TPro  x   /  Alb  x   /  TBili  0.2  /  DBili  x   /  AST  x   /  ALT  x   /  AlkPhos  x   12-09      PT/INR - ( 09 Dec 2020 08:03 )   PT: 12.4 sec;   INR: 1.09 ratio                                     9.5    8.73  )-----------( 358      ( 09 Dec 2020 08:03 )             28.7                         10.6   9.54  )-----------( 333      ( 08 Dec 2020 08:15 )             32.7                         10.8   6.97  )-----------( 334      ( 07 Dec 2020 07:22 )             32.9       Imaging:

## 2020-12-09 NOTE — PROGRESS NOTE ADULT - SUBJECTIVE AND OBJECTIVE BOX
[facility] Division of Hospital Medicine  Alanna Melendez DO  Pager (KENNY-F, 8A-5P): [phone]      Patient is a 50y old  Male who presents with a chief complaint of     SUBJECTIVE / OVERNIGHT EVENTS: Tmax 102.1. Continues to have diarrhea and states they are black in color but not sure, could also be green? Per RN, appeared dark green. Denies chest pain, SOB, N/V, abdominal pain. Chills are improved    MEDICATIONS  (STANDING):  atorvastatin 20 milliGRAM(s) Oral at bedtime  hydrALAZINE 100 milliGRAM(s) Oral two times a day  NIFEdipine  milliGRAM(s) Oral daily  pantoprazole Infusion 8 mG/Hr (10 mL/Hr) IV Continuous <Continuous>  predniSONE   Tablet 5 milliGRAM(s) Oral every other day  sodium bicarbonate 1950 milliGRAM(s) Oral two times a day    MEDICATIONS  (PRN):  acetaminophen   Tablet .. 650 milliGRAM(s) Oral every 6 hours PRN Temp greater or equal to 38C (100.4F), Mild Pain (1 - 3)      CAPILLARY BLOOD GLUCOSE        I&O's Summary    08 Dec 2020 07:01  -  09 Dec 2020 07:00  --------------------------------------------------------  IN: 1100 mL / OUT: 2 mL / NET: 1098 mL        PHYSICAL EXAM:  Vital Signs Last 24 Hrs  T(C): 37.6 (09 Dec 2020 09:19), Max: 39.4 (08 Dec 2020 17:09)  T(F): 99.6 (09 Dec 2020 09:19), Max: 103 (08 Dec 2020 17:09)  HR: 104 (09 Dec 2020 09:19) (93 - 124)  BP: 131/69 (09 Dec 2020 09:19) (120/67 - 144/74)  BP(mean): --  RR: 18 (09 Dec 2020 09:19) (18 - 18)  SpO2: 93% (09 Dec 2020 09:19) (93% - 95%)    GENERAL: NAD, on 1L NC  EYES: sclera clear  CHEST/LUNG: normal work of breathing  HEART: tachycardic, no peripheral edema  ABDOMEN: Soft, Nontender, Nondistended  EXTREMITIES: WWP  PSYCH: Awake, alert, calm  NEUROLOGY: non-focal    LABS:                        9.5    8.73  )-----------( 358      ( 09 Dec 2020 08:03 )             28.7     12-09    133<L>  |  103  |  66<H>  ----------------------------<  92  4.3   |  14<L>  |  4.64<H>    Ca    8.5      09 Dec 2020 08:03  Mg     2.2     12-08    TPro  x   /  Alb  x   /  TBili  0.2  /  DBili  x   /  AST  x   /  ALT  x   /  AlkPhos  x   12-09    PT/INR - ( 09 Dec 2020 08:03 )   PT: 12.4 sec;   INR: 1.09 ratio                     RADIOLOGY & ADDITIONAL TESTS:  Results Reviewed:   Imaging Personally Reviewed:  Electrocardiogram Personally Reviewed:    COORDINATION OF CARE:  Care Discussed with Consultants/Other Providers [Y/N]:  Prior or Outpatient Records Reviewed [Y/N]:

## 2020-12-09 NOTE — CONSULT NOTE ADULT - ASSESSMENT
Impression:  51 yo M w/ PMHx HTN, CKD s/p renal transplant on pred/imuran presenting 12/5 w/ COVID. GI consulted for worsening anemia and dark stools    # anemia - acute on chronic anemia, likely from inflammation of COVID, may have GIB associated with COVID. Report of dark stools may be melena, however currently resolved. Will treat empirically with PPI for possible PUD, and trend CBC.    # COVID    Recommendations:   - trend CBC, transfuse for goal>7  - start pantoprazole 40mg IV BID empirically, when stable for discharge can switch to PO BID x 4 weeks, and should follow up with GI as outpatient   - avoid NSAIDS  - no plan for endoscopy at that time   - rest of care per primary team    Thank you for this interesting consult. GI will continue to follow.     Kishan Chou, PGY4  Gastroenterology Fellow  Available on Microsoft Teams  [phone] (View[employer] Short Range Pager)  [phone] (Long Range Pager)    After 5pm, please contact the on-call GI fellow. [phone]

## 2020-12-10 LAB
ADD ON TEST-SPECIMEN IN LAB: SIGNIFICANT CHANGE UP
ANION GAP SERPL CALC-SCNC: 15 MMOL/L — HIGH (ref 7–14)
BUN SERPL-MCNC: 73 MG/DL — HIGH (ref 7–23)
C DIFF BY PCR RESULT: SIGNIFICANT CHANGE UP
C DIFF TOX GENS STL QL NAA+PROBE: SIGNIFICANT CHANGE UP
CALCIUM SERPL-MCNC: 8 MG/DL — LOW (ref 8.4–10.5)
CHLORIDE SERPL-SCNC: 106 MMOL/L — SIGNIFICANT CHANGE UP (ref 98–107)
CO2 SERPL-SCNC: 14 MMOL/L — LOW (ref 22–31)
CREAT SERPL-MCNC: 5.26 MG/DL — HIGH (ref 0.5–1.3)
D DIMER BLD IA.RAPID-MCNC: 2158 NG/ML DDU — HIGH
FERRITIN SERPL-MCNC: 3635 NG/ML — HIGH (ref 30–400)
GLUCOSE SERPL-MCNC: 91 MG/DL — SIGNIFICANT CHANGE UP (ref 70–99)
HCT VFR BLD CALC: 27 % — LOW (ref 39–50)
HGB BLD-MCNC: 8.8 G/DL — LOW (ref 13–17)
MAGNESIUM SERPL-MCNC: 2.1 MG/DL — SIGNIFICANT CHANGE UP (ref 1.6–2.6)
MCHC RBC-ENTMCNC: 32.6 GM/DL — SIGNIFICANT CHANGE UP (ref 32–36)
MCHC RBC-ENTMCNC: 33.7 PG — SIGNIFICANT CHANGE UP (ref 27–34)
MCV RBC AUTO: 103.4 FL — HIGH (ref 80–100)
NRBC # BLD: 0 /100 WBCS — SIGNIFICANT CHANGE UP
NRBC # FLD: 0 K/UL — SIGNIFICANT CHANGE UP
NT-PROBNP SERPL-SCNC: 3977 PG/ML — HIGH
PHOSPHATE SERPL-MCNC: 3.4 MG/DL — SIGNIFICANT CHANGE UP (ref 2.5–4.5)
PLATELET # BLD AUTO: 294 K/UL — SIGNIFICANT CHANGE UP (ref 150–400)
POTASSIUM SERPL-MCNC: 3.9 MMOL/L — SIGNIFICANT CHANGE UP (ref 3.5–5.3)
POTASSIUM SERPL-SCNC: 3.9 MMOL/L — SIGNIFICANT CHANGE UP (ref 3.5–5.3)
RBC # BLD: 2.61 M/UL — LOW (ref 4.2–5.8)
RBC # FLD: 16.3 % — HIGH (ref 10.3–14.5)
SODIUM SERPL-SCNC: 135 MMOL/L — SIGNIFICANT CHANGE UP (ref 135–145)
WBC # BLD: 5.55 K/UL — SIGNIFICANT CHANGE UP (ref 3.8–10.5)
WBC # FLD AUTO: 5.55 K/UL — SIGNIFICANT CHANGE UP (ref 3.8–10.5)

## 2020-12-10 PROCEDURE — 99231 SBSQ HOSP IP/OBS SF/LOW 25: CPT

## 2020-12-10 PROCEDURE — 93306 TTE W/DOPPLER COMPLETE: CPT | Mod: 26

## 2020-12-10 PROCEDURE — 71045 X-RAY EXAM CHEST 1 VIEW: CPT | Mod: 26

## 2020-12-10 PROCEDURE — 99233 SBSQ HOSP IP/OBS HIGH 50: CPT | Mod: GC

## 2020-12-10 PROCEDURE — 99233 SBSQ HOSP IP/OBS HIGH 50: CPT

## 2020-12-10 RX ORDER — DEXAMETHASONE 0.5 MG/5ML
6 ELIXIR ORAL DAILY
Refills: 0 | Status: DISCONTINUED | OUTPATIENT
Start: 2020-12-10 | End: 2020-12-10

## 2020-12-10 RX ORDER — DEXAMETHASONE 0.5 MG/5ML
6 ELIXIR ORAL DAILY
Refills: 0 | Status: DISCONTINUED | OUTPATIENT
Start: 2020-12-10 | End: 2020-12-18

## 2020-12-10 RX ORDER — DEXAMETHASONE 0.5 MG/5ML
6 ELIXIR ORAL ONCE
Refills: 0 | Status: DISCONTINUED | OUTPATIENT
Start: 2020-12-10 | End: 2020-12-10

## 2020-12-10 RX ORDER — ALBUTEROL 90 UG/1
2 AEROSOL, METERED ORAL EVERY 6 HOURS
Refills: 0 | Status: COMPLETED | OUTPATIENT
Start: 2020-12-10 | End: 2020-12-14

## 2020-12-10 RX ORDER — HEPARIN SODIUM 5000 [USP'U]/ML
5000 INJECTION INTRAVENOUS; SUBCUTANEOUS EVERY 8 HOURS
Refills: 0 | Status: DISCONTINUED | OUTPATIENT
Start: 2020-12-10 | End: 2020-12-11

## 2020-12-10 RX ORDER — SODIUM BICARBONATE 1 MEQ/ML
1300 SYRINGE (ML) INTRAVENOUS THREE TIMES A DAY
Refills: 0 | Status: DISCONTINUED | OUTPATIENT
Start: 2020-12-10 | End: 2020-12-18

## 2020-12-10 RX ORDER — FUROSEMIDE 40 MG
80 TABLET ORAL ONCE
Refills: 0 | Status: COMPLETED | OUTPATIENT
Start: 2020-12-10 | End: 2020-12-10

## 2020-12-10 RX ORDER — BUMETANIDE 0.25 MG/ML
1 INJECTION INTRAMUSCULAR; INTRAVENOUS EVERY 12 HOURS
Refills: 0 | Status: DISCONTINUED | OUTPATIENT
Start: 2020-12-10 | End: 2020-12-10

## 2020-12-10 RX ADMIN — Medication 5 MILLIGRAM(S): at 11:09

## 2020-12-10 RX ADMIN — Medication 650 MILLIGRAM(S): at 12:45

## 2020-12-10 RX ADMIN — HEPARIN SODIUM 5000 UNIT(S): 5000 INJECTION INTRAVENOUS; SUBCUTANEOUS at 21:10

## 2020-12-10 RX ADMIN — Medication 650 MILLIGRAM(S): at 05:18

## 2020-12-10 RX ADMIN — Medication 80 MILLIGRAM(S): at 17:27

## 2020-12-10 RX ADMIN — HEPARIN SODIUM 5000 UNIT(S): 5000 INJECTION INTRAVENOUS; SUBCUTANEOUS at 14:57

## 2020-12-10 RX ADMIN — Medication 1950 MILLIGRAM(S): at 05:18

## 2020-12-10 RX ADMIN — Medication 100 MILLIGRAM(S): at 05:18

## 2020-12-10 RX ADMIN — Medication 650 MILLIGRAM(S): at 11:10

## 2020-12-10 RX ADMIN — ATORVASTATIN CALCIUM 20 MILLIGRAM(S): 80 TABLET, FILM COATED ORAL at 21:10

## 2020-12-10 RX ADMIN — Medication 6 MILLIGRAM(S): at 17:27

## 2020-12-10 RX ADMIN — Medication 120 MILLIGRAM(S): at 05:18

## 2020-12-10 RX ADMIN — Medication 600 MILLIGRAM(S): at 18:07

## 2020-12-10 RX ADMIN — Medication 1300 MILLIGRAM(S): at 21:10

## 2020-12-10 RX ADMIN — Medication 100 MILLIGRAM(S): at 17:28

## 2020-12-10 RX ADMIN — SODIUM CHLORIDE 100 MILLILITER(S): 9 INJECTION, SOLUTION INTRAVENOUS at 09:03

## 2020-12-10 RX ADMIN — Medication 650 MILLIGRAM(S): at 07:03

## 2020-12-10 RX ADMIN — ALBUTEROL 2 PUFF(S): 90 AEROSOL, METERED ORAL at 16:15

## 2020-12-10 RX ADMIN — PANTOPRAZOLE SODIUM 40 MILLIGRAM(S): 20 TABLET, DELAYED RELEASE ORAL at 05:18

## 2020-12-10 RX ADMIN — HEPARIN SODIUM 5000 UNIT(S): 5000 INJECTION INTRAVENOUS; SUBCUTANEOUS at 05:18

## 2020-12-10 RX ADMIN — PANTOPRAZOLE SODIUM 40 MILLIGRAM(S): 20 TABLET, DELAYED RELEASE ORAL at 17:28

## 2020-12-10 NOTE — PROGRESS NOTE ADULT - SUBJECTIVE AND OBJECTIVE BOX
[facility] Division of Hospital Medicine  Alanna Melendez DO  Pager (QASIM, 8A-5P): [phone]      Patient is a 50y old  Male who presents with a chief complaint of covid (09 Dec 2020 14:20)      SUBJECTIVE / OVERNIGHT EVENTS: Tmax 100.5. Desaturating on NC and required increase. Reports having cough and difficulty bringing up phlegm. Feels diarrhea is getting better, had 2 episodes since midnight which were clear and no black or green stools. No chest pain, doesn't feel SOB, no chills.    MEDICATIONS  (STANDING):  ALBUTerol    90 MICROgram(s) HFA Inhaler 2 Puff(s) Inhalation every 6 hours  atorvastatin 20 milliGRAM(s) Oral at bedtime  guaiFENesin  milliGRAM(s) Oral every 12 hours  heparin   Injectable 5000 Unit(s) SubCutaneous every 8 hours  hydrALAZINE 100 milliGRAM(s) Oral two times a day  NIFEdipine  milliGRAM(s) Oral daily  pantoprazole  Injectable 40 milliGRAM(s) IV Push every 12 hours  predniSONE   Tablet 5 milliGRAM(s) Oral every other day  sodium bicarbonate 1950 milliGRAM(s) Oral two times a day    MEDICATIONS  (PRN):  acetaminophen   Tablet .. 650 milliGRAM(s) Oral every 6 hours PRN Temp greater or equal to 38C (100.4F), Mild Pain (1 - 3)      CAPILLARY BLOOD GLUCOSE        I&O's Summary    09 Dec 2020 07:01  -  10 Dec 2020 07:00  --------------------------------------------------------  IN: 800 mL / OUT: 201 mL / NET: 599 mL    10 Dec 2020 07:01  -  10 Dec 2020 12:17  --------------------------------------------------------  IN: 400 mL / OUT: 0 mL / NET: 400 mL        PHYSICAL EXAM:  Vital Signs Last 24 Hrs  T(C): 38.2 (10 Dec 2020 10:59), Max: 39.6 (09 Dec 2020 14:22)  T(F): 100.7 (10 Dec 2020 10:59), Max: 103.2 (09 Dec 2020 14:22)  HR: 110 (10 Dec 2020 10:59) (98 - 125)  BP: 141/60 (10 Dec 2020 10:59) (119/60 - 141/60)  BP(mean): --  RR: 18 (10 Dec 2020 10:59) (18 - 18)  SpO2: 92% (10 Dec 2020 10:59) (88% - 93%)    GENERAL: NAD, on 5L NC  EYES: sclera clear  CHEST/LUNG: slightly tachypneic  HEART: tachycardic, no peripheral edema  ABDOMEN: Soft, Nontender, Nondistended  EXTREMITIES: WWP  PSYCH: Awake, alert, calm  NEUROLOGY: non-focal    LABS:                        8.8    5.55  )-----------( 294      ( 10 Dec 2020 09:06 )             27.0     12-10    135  |  106  |  73<H>  ----------------------------<  91  3.9   |  14<L>  |  5.26<H>    Ca    8.0<L>      10 Dec 2020 09:06  Phos  3.4     12-10  Mg     2.1     12-10    TPro  x   /  Alb  x   /  TBili  0.2  /  DBili  x   /  AST  x   /  ALT  x   /  AlkPhos  x   12-09    PT/INR - ( 09 Dec 2020 08:03 )   PT: 12.4 sec;   INR: 1.09 ratio                   GI PCR Panel, Stool (collected 09 Dec 2020 15:09)  Source: .Stool Feces  Final Report (09 Dec 2020 19:05):    GI PCR Results: NOT detected    *******Please Note:*******    GI panel PCR evaluates for:    Campylobacter, Plesiomonas shigelloides, Salmonella,    Vibrio, Yersinia enterocolitica, Enteroaggregative    Escherichia coli (EAEC), Enteropathogenic E.coli (EPEC),    Enterotoxigenic E. coli (ETEC) lt/st, Shiga-like    toxin-producing E. coli (STEC) stx1/stx2,    Shigella/ Enteroinvasive E. coli (EIEC), Cryptosporidium,    Cyclospora cayetanensis, Entamoeba histolytica,    Giardia lamblia, Adenovirus F 40/41, Astrovirus,    Norovirus GI/GII, Rotavirus A, Sapovirus        RADIOLOGY & ADDITIONAL TESTS:  Results Reviewed:   Imaging Personally Reviewed:  Electrocardiogram Personally Reviewed:    COORDINATION OF CARE:  Care Discussed with Consultants/Other Providers [Y/N]:  Prior or Outpatient Records Reviewed [Y/N]:

## 2020-12-10 NOTE — PROGRESS NOTE ADULT - SUBJECTIVE AND OBJECTIVE BOX
Medicine Subsequent Hospital Care Note- ACP    CC: Desaturation on continuous pulse oximetry     HPI/Subjective: Patient complaining of chills and slightly worsening SOB.     Vital Signs:  Vital Signs Last 24 Hrs  T(C): 37.2 (12-10-20 @ 15:30), Max: 38.2 (12-10-20 @ 10:59)  T(F): 99 (12-10-20 @ 15:30), Max: 100.7 (12-10-20 @ 10:59)  HR: 103 (12-10-20 @ 16:05) (98 - 124)  BP: 118/58 (12-10-20 @ 15:30) (118/58 - 141/60)  RR: 19 (12-10-20 @ 15:51) (18 - 20)  SpO2: 99% (12-10-20 @ 16:05) (84% - 99%) on (O2)    Physical Exam   General: Laying in bed comfortable in NAD  Neuro: A&O x4   Lungs: Respirations regular. Coarse breath sounds.   CV: RRR. +S1, S2   LE: No LE edema     Relevant labs, radiology and Medications reviewed                        8.8    5.55  )-----------( 294      ( 10 Dec 2020 09:06 )             27.0     12-10    135  |  106  |  73<H>  ----------------------------<  91  3.9   |  14<L>  |  5.26<H>    Ca    8.0<L>      10 Dec 2020 09:06  Phos  3.4     12-10  Mg     2.1     12-10    TPro  x   /  Alb  x   /  TBili  0.2  /  DBili  x   /  AST  x   /  ALT  x   /  AlkPhos  x   12-09    PT/INR - ( 09 Dec 2020 08:03 )   PT: 12.4 sec;   INR: 1.09 ratio           MEDICATIONS  (STANDING):  ALBUTerol    90 MICROgram(s) HFA Inhaler 2 Puff(s) Inhalation every 6 hours  atorvastatin 20 milliGRAM(s) Oral at bedtime  dexAMETHasone  Injectable 6 milliGRAM(s) IV Push daily  furosemide   Injectable 80 milliGRAM(s) IV Push once  guaiFENesin  milliGRAM(s) Oral every 12 hours  heparin   Injectable 5000 Unit(s) SubCutaneous every 8 hours  hydrALAZINE 100 milliGRAM(s) Oral two times a day  NIFEdipine  milliGRAM(s) Oral daily  pantoprazole  Injectable 40 milliGRAM(s) IV Push every 12 hours  sodium bicarbonate 1300 milliGRAM(s) Oral three times a day    MEDICATIONS  (PRN):  acetaminophen   Tablet .. 650 milliGRAM(s) Oral every 6 hours PRN Temp greater or equal to 38C (100.4F), Mild Pain (1 - 3)    I&O's Summary    09 Dec 2020 07:01  -  10 Dec 2020 07:00  --------------------------------------------------------  IN: 800 mL / OUT: 201 mL / NET: 599 mL    10 Dec 2020 07:01  -  10 Dec 2020 16:55  --------------------------------------------------------  IN: 400 mL / OUT: 0 mL / NET: 400 mL      Assessment and Plan     51 y/o M w/ a hx of renal transplant (1973), CKD IV, HTN who presents with fever, found to be COVID+ and with ABRIL. Today, patient with increasing O2 demands and worsening renal function. Overnight, patient on 1-2L NC which was increased to 5-6L throughout shift. Continuous pulse oximetry with O2 saturation 85-89% (reviewed personally). The patient is comfortable however feels more SOB. Received fluids this admission for ABRIL. Kidney function now worsening.     #Hypoxia   -CXR with new diffuse bilateral patchy opacities   -No pulm edema noted on CXR and proBNP pending however patient received fluids for ABRIL   -Discussed with Dr. Meelndez and nephrology- will treat for possible volume overload with diuretic and observe clinical response   -BIPAP 12/5 with 100% FiO2 initiated. Wean as tolerated   -Continue with continuous pulse ox   -Not a candidate for remdesivir given renal function  -Started on dexamethasone     #ABRIL on CKD  -Appreciate nephrology recommendations  -Lasix trial   -May require dialysis if renal function worsens     Above d/w Dr. Melendez.     [employer] [facility]   [id number]     [X ]Low complexity/risk ( Time> 15min)

## 2020-12-10 NOTE — PROGRESS NOTE ADULT - ASSESSMENT
Pt. with ABRIL on CKD in the setting of COVID 19 Pt. with history of kidney transplantation, now with ABRIL on CKD in the setting of COVID-19.

## 2020-12-10 NOTE — PROGRESS NOTE ADULT - ASSESSMENT
50 y.o. M w/ a hx of renal transplant (1973), CKD IV, HTN who presents with fever, found to be COVID+ and in ABRIL. Course c/b dark stools now improved but with worsening acute hypoxic RF.

## 2020-12-10 NOTE — PROGRESS NOTE ADULT - PROBLEM SELECTOR PLAN 2
Pt. with metabolic acidosis in the setting of ABRIL on CKD. Pt. currently on PO Bicarbonate. Recommend increasing dose to 1300mg TID. Monitor CO2.    If you have any questions, please feel free to contact me  Binu Mendoza  Nephrology Fellow  [phone]  (After 5pm or on weekends please page the on-call fellow) Pt. with metabolic acidosis in the setting of ABRIL on CKD. Serum CO2 low at 14. Pt. on oral sodium bicarbonate therapy. Monitor CO2.    If you have any questions, please feel free to contact me  Binu Mendoza  Nephrology Fellow  [phone]  (After 5pm or on weekends please page the on-call fellow)

## 2020-12-10 NOTE — PROGRESS NOTE ADULT - PROBLEM SELECTOR PLAN 1
Pt with ABRIL in the setting of COVID 19 infection. Scr of 3.83 on on 11/24/20, on admission noted to have Scr of 5.3 on 12/5/20. Scr improved today to 4.02 on 12/7/20. Pt. still spiking fevers and Scr has is elevated/stable at 5.26 today. Pt. has an allograft ~ 40 years old and has underlying advanced CKD. Pt. is aware that he will need hemodialysis if renal failure continues to worsen. Pt. appeared in mild respiratory distress at the time of evaluation. Recommend Lasix 80mg IV x 1 and monitor clinical response. Monitor labs and urine output. Avoid NSAIDs, ACEI/ARBS and nephrotoxins. Dose medications as per eGFR. Pt. with long standing history of kidney transplantation (1978), now with ABRIL on CKD in the setting of COVID-19. Scr was 3.83 on 11/24/20, increased to 5.3 on 12/5/20. Scr elevated at 5.26 today. Pt. appeared in mild respiratory distress at the time of evaluation. Recommend Lasix 80 mg IV x 1 and monitor clinical response. Will need to initiate HD if renal failure continues to worsen. Pt. agreeable to initiate HD if needed. Monitor labs and urine output. Avoid any potential nephrotoxins. Dose medications as per eGFR Pt. with long standing history of kidney transplantation (1978), now with ABRIL on CKD in the setting of COVID-19. Scr was 3.83 on 11/24/20, increased to 5.3 on 12/5/20. Scr elevated at 5.26 today. Pt. appeared in mild respiratory distress at the time of evaluation. Recommend Lasix 80 mg IV x 1 and monitor clinical response. Will need to initiate HD if renal failure continues to worsen. Pt. agreeable to initiate HD if needed. Pt. on oral prednisone for kidney transplantation. Monitor labs and urine output. Avoid any potential nephrotoxins. Dose medications as per eGFR

## 2020-12-10 NOTE — PROGRESS NOTE ADULT - PROBLEM SELECTOR PLAN 2
anemia likely multifactorial: Anemia of chronic disease from CKD however reports green/black watery stools for past 2-3 days and FOBT + concern for UGIB, hemodynamics stable, CBC stable, no further dark stools, diarrhea improving  GI PCR negative  continue protonix IVP BID  Appreciate GI recs

## 2020-12-10 NOTE — PROGRESS NOTE ADULT - PROBLEM SELECTOR PLAN 1
-COVID + now with worsening acute hypoxic respiratory failure on NC  Concern for concomitant pulmonary edema. Check CXR, proBNP  -persistent fevers at this time. Blood and urine cx unremarkable  switch prednisone to decadron. Hold off remdesevir given CKD  Trend D-dimer, ferritin  VTE ppx: HSQ 5000U q8 patient with viral sepsis 2/2 COVID present on admission now with worsening acute hypoxic respiratory failure on NC  Concern for concomitant pulmonary edema. Check CXR, proBNP  -persistent fevers at this time. Blood and urine cx unremarkable  switch prednisone to decadron. Hold off remdesevir given CKD  Trend D-dimer, ferritin  VTE ppx: HSQ 5000U q8

## 2020-12-10 NOTE — PROGRESS NOTE ADULT - SUBJECTIVE AND OBJECTIVE BOX
[facility] Division of Kidney Diseases & Hypertension  FOLLOW UP NOTE  [phone]--------------------------------------------------------------------------------    HPI : 50 year old male with ESRD s/p LRRT and now with CKD4 admitted with COVID-19. Nephrology team following ABRIL on CKD. Upon review of labs on [facility]/[place], patient noted to have Scr of 3.83 on on 11/24/20 which is similar to his baseline. On admission pt. noted to have Scr of 5.3 on 12/5/20. Scr has remained elevated/stable at 5.26.    Patient was seen and examined at bedside today. Pt. still with fevers, chills and shortness of breath. Pt. reports he is making good urine and denied any dysuria, chest pain, SOB.     PAST HISTORY  --------------------------------------------------------------------------------  No significant changes to PMH, PSH, FHx, SHx, unless otherwise noted    ALLERGIES & MEDICATIONS  --------------------------------------------------------------------------------  Allergies    sulfa (Swelling)    Intolerances    Standing Inpatient Medications  ALBUTerol    90 MICROgram(s) HFA Inhaler 2 Puff(s) Inhalation every 6 hours  atorvastatin 20 milliGRAM(s) Oral at bedtime  guaiFENesin  milliGRAM(s) Oral every 12 hours  heparin   Injectable 5000 Unit(s) SubCutaneous every 8 hours  hydrALAZINE 100 milliGRAM(s) Oral two times a day  NIFEdipine  milliGRAM(s) Oral daily  pantoprazole  Injectable 40 milliGRAM(s) IV Push every 12 hours  predniSONE   Tablet 5 milliGRAM(s) Oral every other day  sodium bicarbonate 1950 milliGRAM(s) Oral two times a day    REVIEW OF SYSTEMS  --------------------------------------------------------------------------------  Gen: + lethargy, + fatigue  Respiratory: + dyspnea  CV: No chest pain  GI: No abdominal pain  MSK: + LE edema  Neuro: No dizziness    All other systems were reviewed and are negative, except as noted.    VITALS/PHYSICAL EXAM  --------------------------------------------------------------------------------  T(C): 37.4 (12-10-20 @ 12:40), Max: 38.2 (12-10-20 @ 10:59)  HR: 110 (12-10-20 @ 10:59) (98 - 124)  BP: 141/60 (12-10-20 @ 10:59) (119/60 - 141/60)  RR: 18 (12-10-20 @ 10:59) (18 - 18)  SpO2: 92% (12-10-20 @ 10:59) (91% - 93%)  Wt(kg): --    12-09-20 @ 07:01  -  12-10-20 @ 07:00  --------------------------------------------------------  IN: 800 mL / OUT: 201 mL / NET: 599 mL    12-10-20 @ 07:01  -  12-10-20 @ 15:31  --------------------------------------------------------  IN: 400 mL / OUT: 0 mL / NET: 400 mL    Physical Exam:              Gen: rigors shaking chills  	HEENT: No JVD, Anicteric  	Pulm: Diminished air entry at B/L bases  	CV: S1S2  	Abd: Soft, +BS   	Ext: No LE edema B/L  	Neuro: Awake  	Skin: Warm and dry    LABS/STUDIES  --------------------------------------------------------------------------------              8.8    5.55  >-----------<  294      [12-10-20 @ 09:06]              27.0     135  |  106  |  73  ----------------------------<  91      [12-10-20 @ 09:06]  3.9   |  14  |  5.26        Ca     8.0     [12-10-20 @ 09:06]      Mg     2.1     [12-10-20 @ 09:06]      Phos  3.4     [12-10-20 @ 09:06]    Creatinine Trend:  SCr 5.26 [12-10 @ 09:06]  SCr 4.64 [12-09 @ 08:03]  SCr 4.27 [12-08 @ 08:15]  SCr 4.02 [12-07 @ 07:22]  SCr 4.37 [12-06 @ 07:54]    Urinalysis - [12-05-20 @ 13:24]      Color Light Yellow / Appearance Clear / SG 1.011 / pH 6.5      Gluc Trace / Ketone Negative  / Bili Negative / Urobili <2 mg/dL       Blood Small / Protein 100 mg/dL / Leuk Est Negative / Nitrite Negative      RBC 6 / WBC 2 / Hyaline  / Gran  / Sq Epi  / Non Sq Epi 2 / Bacteria Negative [facility] Division of Kidney Diseases & Hypertension  FOLLOW UP NOTE  [phone]--------------------------------------------------------------------------------    HPI: 50-year-old male with long standing history of kidney transplantation, advanced CKD admitted with COVID-19. Nephrology team following ABRIL on CKD. Upon review of labs on [facility]/[place], patient noted to have Scr of 3.83 on 11/24/20. On admission pt. noted to have Scr of 5.3 on 12/5/20. Scr has remained elevated/stable at 5.26 today.    Pt. seen and examined at bedside today. Pt. complains of fever, chills and shortness of breath. Pt. reports he is making good urine and denied dysuria or chest pain.     PAST HISTORY  --------------------------------------------------------------------------------  No significant changes to PMH, PSH, FHx, SHx, unless otherwise noted    ALLERGIES & MEDICATIONS  --------------------------------------------------------------------------------  Allergies    sulfa (Swelling)    Intolerances    Standing Inpatient Medications  ALBUTerol    90 MICROgram(s) HFA Inhaler 2 Puff(s) Inhalation every 6 hours  atorvastatin 20 milliGRAM(s) Oral at bedtime  guaiFENesin  milliGRAM(s) Oral every 12 hours  heparin   Injectable 5000 Unit(s) SubCutaneous every 8 hours  hydrALAZINE 100 milliGRAM(s) Oral two times a day  NIFEdipine  milliGRAM(s) Oral daily  pantoprazole  Injectable 40 milliGRAM(s) IV Push every 12 hours  predniSONE   Tablet 5 milliGRAM(s) Oral every other day  sodium bicarbonate 1950 milliGRAM(s) Oral two times a day    REVIEW OF SYSTEMS  --------------------------------------------------------------------------------  Gen: + lethargy, + fatigue  Respiratory: + dyspnea  CV: No chest pain  GI: No abdominal pain  MSK: No LE edema  Neuro: No dizziness    All other systems were reviewed and are negative, except as noted.    VITALS/PHYSICAL EXAM  --------------------------------------------------------------------------------  T(C): 37.4 (12-10-20 @ 12:40), Max: 38.2 (12-10-20 @ 10:59)  HR: 110 (12-10-20 @ 10:59) (98 - 124)  BP: 141/60 (12-10-20 @ 10:59) (119/60 - 141/60)  RR: 18 (12-10-20 @ 10:59) (18 - 18)  SpO2: 92% (12-10-20 @ 10:59) (91% - 93%)  Wt(kg): --    12-09-20 @ 07:01  -  12-10-20 @ 07:00  --------------------------------------------------------  IN: 800 mL / OUT: 201 mL / NET: 599 mL    12-10-20 @ 07:01  -  12-10-20 @ 15:31  --------------------------------------------------------  IN: 400 mL / OUT: 0 mL / NET: 400 mL    Physical Exam:              Gen: anxious, in mild respiratory distress  	HEENT: No JVD  	Pulm: Diminished air entry at B/L bases  	CV: S1S2+  	Abd: Soft, +BS   	Ext: No LE edema B/L  	Neuro: Awake, alert  	Skin: Warm and dry    LABS/STUDIES  --------------------------------------------------------------------------------              8.8    5.55  >-----------<  294      [12-10-20 @ 09:06]              27.0     135  |  106  |  73  ----------------------------<  91      [12-10-20 @ 09:06]  3.9   |  14  |  5.26        Ca     8.0     [12-10-20 @ 09:06]      Mg     2.1     [12-10-20 @ 09:06]      Phos  3.4     [12-10-20 @ 09:06]    Creatinine Trend:  SCr 5.26 [12-10 @ 09:06]  SCr 4.64 [12-09 @ 08:03]  SCr 4.27 [12-08 @ 08:15]  SCr 4.02 [12-07 @ 07:22]  SCr 4.37 [12-06 @ 07:54]    Urinalysis - [12-05-20 @ 13:24]      Color Light Yellow / Appearance Clear / SG 1.011 / pH 6.5      Gluc Trace / Ketone Negative  / Bili Negative / Urobili <2 mg/dL       Blood Small / Protein 100 mg/dL / Leuk Est Negative / Nitrite Negative      RBC 6 / WBC 2 / Hyaline  / Gran  / Sq Epi  / Non Sq Epi 2 / Bacteria Negative

## 2020-12-11 DIAGNOSIS — N18.9 CHRONIC KIDNEY DISEASE, UNSPECIFIED: ICD-10-CM

## 2020-12-11 LAB
ALBUMIN SERPL ELPH-MCNC: 2.5 G/DL — LOW (ref 3.3–5)
ALP SERPL-CCNC: 57 U/L — SIGNIFICANT CHANGE UP (ref 40–120)
ALT FLD-CCNC: 24 U/L — SIGNIFICANT CHANGE UP (ref 4–41)
ANION GAP SERPL CALC-SCNC: 17 MMOL/L — HIGH (ref 7–14)
ANION GAP SERPL CALC-SCNC: 17 MMOL/L — HIGH (ref 7–14)
AST SERPL-CCNC: 62 U/L — HIGH (ref 4–40)
BASOPHILS # BLD AUTO: 0 K/UL — SIGNIFICANT CHANGE UP (ref 0–0.2)
BASOPHILS NFR BLD AUTO: 0 % — SIGNIFICANT CHANGE UP (ref 0–2)
BILIRUB SERPL-MCNC: 0.2 MG/DL — SIGNIFICANT CHANGE UP (ref 0.2–1.2)
BLD GP AB SCN SERPL QL: NEGATIVE — SIGNIFICANT CHANGE UP
BUN SERPL-MCNC: 102 MG/DL — HIGH (ref 7–23)
BUN SERPL-MCNC: 92 MG/DL — HIGH (ref 7–23)
CALCIUM SERPL-MCNC: 8.5 MG/DL — SIGNIFICANT CHANGE UP (ref 8.4–10.5)
CALCIUM SERPL-MCNC: 8.5 MG/DL — SIGNIFICANT CHANGE UP (ref 8.4–10.5)
CHLORIDE SERPL-SCNC: 104 MMOL/L — SIGNIFICANT CHANGE UP (ref 98–107)
CHLORIDE SERPL-SCNC: 107 MMOL/L — SIGNIFICANT CHANGE UP (ref 98–107)
CO2 SERPL-SCNC: 14 MMOL/L — LOW (ref 22–31)
CO2 SERPL-SCNC: 15 MMOL/L — LOW (ref 22–31)
CREAT SERPL-MCNC: 6.46 MG/DL — HIGH (ref 0.5–1.3)
CREAT SERPL-MCNC: 6.79 MG/DL — HIGH (ref 0.5–1.3)
EOSINOPHIL # BLD AUTO: 0 K/UL — SIGNIFICANT CHANGE UP (ref 0–0.5)
EOSINOPHIL NFR BLD AUTO: 0 % — SIGNIFICANT CHANGE UP (ref 0–6)
GLUCOSE SERPL-MCNC: 126 MG/DL — HIGH (ref 70–99)
GLUCOSE SERPL-MCNC: 134 MG/DL — HIGH (ref 70–99)
HCT VFR BLD CALC: 24.9 % — LOW (ref 39–50)
HGB BLD-MCNC: 7.9 G/DL — LOW (ref 13–17)
IANC: 3.75 K/UL — SIGNIFICANT CHANGE UP (ref 1.5–8.5)
LYMPHOCYTES # BLD AUTO: 0.11 K/UL — LOW (ref 1–3.3)
LYMPHOCYTES # BLD AUTO: 2.6 % — LOW (ref 13–44)
MAGNESIUM SERPL-MCNC: 2.8 MG/DL — HIGH (ref 1.6–2.6)
MCHC RBC-ENTMCNC: 31.7 GM/DL — LOW (ref 32–36)
MCHC RBC-ENTMCNC: 32.9 PG — SIGNIFICANT CHANGE UP (ref 27–34)
MCV RBC AUTO: 103.8 FL — HIGH (ref 80–100)
MONOCYTES # BLD AUTO: 0.14 K/UL — SIGNIFICANT CHANGE UP (ref 0–0.9)
MONOCYTES NFR BLD AUTO: 3.4 % — SIGNIFICANT CHANGE UP (ref 2–14)
NEUTROPHILS # BLD AUTO: 3.9 K/UL — SIGNIFICANT CHANGE UP (ref 1.8–7.4)
NEUTROPHILS NFR BLD AUTO: 92.2 % — HIGH (ref 43–77)
PHOSPHATE SERPL-MCNC: 6.2 MG/DL — HIGH (ref 2.5–4.5)
PLATELET # BLD AUTO: 319 K/UL — SIGNIFICANT CHANGE UP (ref 150–400)
POTASSIUM SERPL-MCNC: 4.4 MMOL/L — SIGNIFICANT CHANGE UP (ref 3.5–5.3)
POTASSIUM SERPL-MCNC: 5.1 MMOL/L — SIGNIFICANT CHANGE UP (ref 3.5–5.3)
POTASSIUM SERPL-SCNC: 4.4 MMOL/L — SIGNIFICANT CHANGE UP (ref 3.5–5.3)
POTASSIUM SERPL-SCNC: 5.1 MMOL/L — SIGNIFICANT CHANGE UP (ref 3.5–5.3)
PROT SERPL-MCNC: 6.2 G/DL — SIGNIFICANT CHANGE UP (ref 6–8.3)
RBC # BLD: 2.4 M/UL — LOW (ref 4.2–5.8)
RBC # FLD: 16.1 % — HIGH (ref 10.3–14.5)
RH IG SCN BLD-IMP: POSITIVE — SIGNIFICANT CHANGE UP
SODIUM SERPL-SCNC: 136 MMOL/L — SIGNIFICANT CHANGE UP (ref 135–145)
SODIUM SERPL-SCNC: 138 MMOL/L — SIGNIFICANT CHANGE UP (ref 135–145)
WBC # BLD: 4.23 K/UL — SIGNIFICANT CHANGE UP (ref 3.8–10.5)
WBC # FLD AUTO: 4.23 K/UL — SIGNIFICANT CHANGE UP (ref 3.8–10.5)

## 2020-12-11 PROCEDURE — 99233 SBSQ HOSP IP/OBS HIGH 50: CPT | Mod: GC

## 2020-12-11 PROCEDURE — 99233 SBSQ HOSP IP/OBS HIGH 50: CPT

## 2020-12-11 RX ORDER — BUMETANIDE 0.25 MG/ML
2 INJECTION INTRAMUSCULAR; INTRAVENOUS
Refills: 0 | Status: DISCONTINUED | OUTPATIENT
Start: 2020-12-11 | End: 2020-12-16

## 2020-12-11 RX ORDER — BUMETANIDE 0.25 MG/ML
2 INJECTION INTRAMUSCULAR; INTRAVENOUS
Refills: 0 | Status: DISCONTINUED | OUTPATIENT
Start: 2020-12-11 | End: 2020-12-11

## 2020-12-11 RX ORDER — HEPARIN SODIUM 5000 [USP'U]/ML
5000 INJECTION INTRAVENOUS; SUBCUTANEOUS EVERY 8 HOURS
Refills: 0 | Status: DISCONTINUED | OUTPATIENT
Start: 2020-12-11 | End: 2020-12-13

## 2020-12-11 RX ADMIN — Medication 100 MILLIGRAM(S): at 18:41

## 2020-12-11 RX ADMIN — HEPARIN SODIUM 5000 UNIT(S): 5000 INJECTION INTRAVENOUS; SUBCUTANEOUS at 18:40

## 2020-12-11 RX ADMIN — Medication 1300 MILLIGRAM(S): at 15:31

## 2020-12-11 RX ADMIN — ALBUTEROL 2 PUFF(S): 90 AEROSOL, METERED ORAL at 10:18

## 2020-12-11 RX ADMIN — ALBUTEROL 2 PUFF(S): 90 AEROSOL, METERED ORAL at 21:30

## 2020-12-11 RX ADMIN — ATORVASTATIN CALCIUM 20 MILLIGRAM(S): 80 TABLET, FILM COATED ORAL at 21:31

## 2020-12-11 RX ADMIN — Medication 1300 MILLIGRAM(S): at 21:31

## 2020-12-11 RX ADMIN — Medication 600 MILLIGRAM(S): at 18:41

## 2020-12-11 RX ADMIN — Medication 6 MILLIGRAM(S): at 18:40

## 2020-12-11 RX ADMIN — ALBUTEROL 2 PUFF(S): 90 AEROSOL, METERED ORAL at 05:41

## 2020-12-11 RX ADMIN — BUMETANIDE 2 MILLIGRAM(S): 0.25 INJECTION INTRAMUSCULAR; INTRAVENOUS at 19:02

## 2020-12-11 RX ADMIN — Medication 600 MILLIGRAM(S): at 05:57

## 2020-12-11 RX ADMIN — Medication 1300 MILLIGRAM(S): at 05:41

## 2020-12-11 RX ADMIN — PANTOPRAZOLE SODIUM 40 MILLIGRAM(S): 20 TABLET, DELAYED RELEASE ORAL at 18:40

## 2020-12-11 RX ADMIN — ALBUTEROL 2 PUFF(S): 90 AEROSOL, METERED ORAL at 17:57

## 2020-12-11 RX ADMIN — ALBUTEROL 2 PUFF(S): 90 AEROSOL, METERED ORAL at 00:06

## 2020-12-11 RX ADMIN — PANTOPRAZOLE SODIUM 40 MILLIGRAM(S): 20 TABLET, DELAYED RELEASE ORAL at 05:41

## 2020-12-11 RX ADMIN — HEPARIN SODIUM 5000 UNIT(S): 5000 INJECTION INTRAVENOUS; SUBCUTANEOUS at 05:41

## 2020-12-11 NOTE — PROGRESS NOTE ADULT - PROBLEM SELECTOR PLAN 2
Pt. with metabolic acidosis in the setting of ABRIL on CKD. Serum CO2 low at 15. Pt. on oral sodium bicarbonate therapy. Monitor CO2.    If you have any questions, please feel free to contact me  Binu Mendoza  Nephrology Fellow  [phone]  (After 5pm or on weekends please page the on-call fellow)

## 2020-12-11 NOTE — PROGRESS NOTE ADULT - PROBLEM SELECTOR PLAN 1
-ABRIL on CKD worsening in setting of COVID infection, will likely need HD-pt agreeable  -S/p kidney transplant in 1973 c/b CKD IV, was pending placement on transplant list at [facility].   - off azathioprine given active infection with fevers  Currently with no urgent need for RRT as electrolytes stable, nml mental status, nonoliguric  S/p lasix 80 IVP x1 on 12/10, start bumex 2mg IVP BID, strict I/Os  IR to be consulted for PermCath placement for 12/14  Monitor electrolytes, BMP BID  - Cont. sodium bicarb tabs  avoid nephrotoxic medications, 12/7 US kidneys unremarkable  Discussed with renal, Dr. Carter

## 2020-12-11 NOTE — PROGRESS NOTE ADULT - SUBJECTIVE AND OBJECTIVE BOX
[facility] Division of Hospital Medicine  Alanna Melendez DO  Pager (QASIM, 8A-5P): [phone]      Patient is a 50y old  Male who presents with a chief complaint of COVID (10 Dec 2020 16:54)      SUBJECTIVE / OVERNIGHT EVENTS: No acute events overnight. Reports feeling better, less SOB on NRB. No chest pain, N/V, and diarrhea is improved.    MEDICATIONS  (STANDING):  ALBUTerol    90 MICROgram(s) HFA Inhaler 2 Puff(s) Inhalation every 6 hours  atorvastatin 20 milliGRAM(s) Oral at bedtime  buMETAnide IVPB 2 milliGRAM(s) IV Intermittent two times a day  dexAMETHasone  Injectable 6 milliGRAM(s) IV Push daily  guaiFENesin  milliGRAM(s) Oral every 12 hours  hydrALAZINE 100 milliGRAM(s) Oral two times a day  NIFEdipine  milliGRAM(s) Oral daily  pantoprazole  Injectable 40 milliGRAM(s) IV Push every 12 hours  sodium bicarbonate 1300 milliGRAM(s) Oral three times a day    MEDICATIONS  (PRN):  acetaminophen   Tablet .. 650 milliGRAM(s) Oral every 6 hours PRN Temp greater or equal to 38C (100.4F), Mild Pain (1 - 3)      CAPILLARY BLOOD GLUCOSE      POCT Blood Glucose.: 119 mg/dL (11 Dec 2020 10:03)    I&O's Summary    10 Dec 2020 07:01  -  11 Dec 2020 07:00  --------------------------------------------------------  IN: 1000 mL / OUT: 800 mL / NET: 200 mL    11 Dec 2020 07:01  -  11 Dec 2020 12:37  --------------------------------------------------------  IN: 0 mL / OUT: 150 mL / NET: -150 mL        PHYSICAL EXAM:  Vital Signs Last 24 Hrs  T(C): 36.9 (11 Dec 2020 05:27), Max: 37.7 (10 Dec 2020 21:02)  T(F): 98.4 (11 Dec 2020 05:27), Max: 99.8 (10 Dec 2020 21:02)  HR: 81 (11 Dec 2020 05:27) (81 - 107)  BP: 103/50 (11 Dec 2020 05:27) (103/50 - 130/54)  BP(mean): --  RR: 20 (11 Dec 2020 05:27) (18 - 20)  SpO2: 98% (11 Dec 2020 05:27) (84% - 100%)    GENERAL: NAD, on NRB, appears comfortable sitting in bedside chair  EYES: sclera clear  CHEST/LUNG: normal WOB, speaking in full sentences  HEART: normal rate, trace pedal edema  ABDOMEN: Soft, Nontender, Nondistended  EXTREMITIES: WWP  PSYCH: Awake, alert, calm  NEUROLOGY: non-focal    LABS:                        7.9    4.23  )-----------( 319      ( 11 Dec 2020 07:52 )             24.9     12-11    136  |  104  |  92<H>  ----------------------------<  126<H>  4.4   |  15<L>  |  6.46<H>    Ca    8.5      11 Dec 2020 07:52  Phos  3.4     12-10  Mg     2.1     12-10    TPro  6.2  /  Alb  2.5<L>  /  TBili  0.2  /  DBili  x   /  AST  62<H>  /  ALT  24  /  AlkPhos  57  12-11              GI PCR Panel, Stool (collected 09 Dec 2020 15:09)  Source: .Stool Feces  Final Report (09 Dec 2020 19:05):    GI PCR Results: NOT detected    *******Please Note:*******    GI panel PCR evaluates for:    Campylobacter, Plesiomonas shigelloides, Salmonella,    Vibrio, Yersinia enterocolitica, Enteroaggregative    Escherichia coli (EAEC), Enteropathogenic E.coli (EPEC),    Enterotoxigenic E. coli (ETEC) lt/st, Shiga-like    toxin-producing E. coli (STEC) stx1/stx2,    Shigella/ Enteroinvasive E. coli (EIEC), Cryptosporidium,    Cyclospora cayetanensis, Entamoeba histolytica,    Giardia lamblia, Adenovirus F 40/41, Astrovirus,    Norovirus GI/GII, Rotavirus A, Sapovirus        RADIOLOGY & ADDITIONAL TESTS:  Results Reviewed:   Imaging Personally Reviewed:  Electrocardiogram Personally Reviewed:    COORDINATION OF CARE:  Care Discussed with Consultants/Other Providers [Y/N]:  Prior or Outpatient Records Reviewed [Y/N]:

## 2020-12-11 NOTE — PROGRESS NOTE ADULT - SUBJECTIVE AND OBJECTIVE BOX
[facility] Division of Kidney Diseases & Hypertension  FOLLOW UP NOTE  [phone]--------------------------------------------------------------------------------    HPI: 50-year-old male with long standing history of kidney transplantation, advanced CKD admitted with COVID-19. Nephrology team following ABRIL on CKD. Upon review of labs on [facility]/[place], patient noted to have Scr of 3.83 on 11/24/20. On admission pt. noted to have Scr of 5.3 on 12/5/20. Scr has remained elevated/stable at 5.26 today.    Pt. seen and examined at bedside today. Pt. states shortness of breath has improved , however still feels weak. Pt. reports he is making good urine and denied dysuria or chest pain.     PAST HISTORY  --------------------------------------------------------------------------------  No significant changes to PMH, PSH, FHx, SHx, unless otherwise noted    ALLERGIES & MEDICATIONS  --------------------------------------------------------------------------------  Allergies    sulfa (Swelling)    Intolerances    Standing Inpatient Medications  ALBUTerol    90 MICROgram(s) HFA Inhaler 2 Puff(s) Inhalation every 6 hours  atorvastatin 20 milliGRAM(s) Oral at bedtime  buMETAnide IVPB 2 milliGRAM(s) IV Intermittent two times a day  dexAMETHasone  Injectable 6 milliGRAM(s) IV Push daily  guaiFENesin  milliGRAM(s) Oral every 12 hours  hydrALAZINE 100 milliGRAM(s) Oral two times a day  NIFEdipine  milliGRAM(s) Oral daily  pantoprazole  Injectable 40 milliGRAM(s) IV Push every 12 hours  sodium bicarbonate 1300 milliGRAM(s) Oral three times a day    REVIEW OF SYSTEMS  --------------------------------------------------------------------------------    VITALS/PHYSICAL EXAM  --------------------------------------------------------------------------------  T(C): 36.9 (12-11-20 @ 05:27), Max: 37.7 (12-10-20 @ 21:02)  HR: 81 (12-11-20 @ 05:27) (81 - 107)  BP: 103/50 (12-11-20 @ 05:27) (103/50 - 130/54)  RR: 20 (12-11-20 @ 05:27) (18 - 20)  SpO2: 98% (12-11-20 @ 05:27) (84% - 100%)  Wt(kg): --    12-10-20 @ 07:01  -  12-11-20 @ 07:00  --------------------------------------------------------  IN: 1000 mL / OUT: 800 mL / NET: 200 mL    12-11-20 @ 07:01  -  12-11-20 @ 12:47  --------------------------------------------------------  IN: 0 mL / OUT: 150 mL / NET: -150 mL    Physical Exam:    LABS/STUDIES  --------------------------------------------------------------------------------              7.9    4.23  >-----------<  319      [12-11-20 @ 07:52]              24.9     136  |  104  |  92  ----------------------------<  126      [12-11-20 @ 07:52]  4.4   |  15  |  6.46        Ca     8.5     [12-11-20 @ 07:52]      Mg     2.1     [12-10-20 @ 09:06]      Phos  3.4     [12-10-20 @ 09:06]    Creatinine Trend:  SCr 6.46 [12-11 @ 07:52]  SCr 5.26 [12-10 @ 09:06]  SCr 4.64 [12-09 @ 08:03]  SCr 4.27 [12-08 @ 08:15]  SCr 4.02 [12-07 @ 07:22]    Urinalysis - [12-05-20 @ 13:24]      Color Light Yellow / Appearance Clear / SG 1.011 / pH 6.5      Gluc Trace / Ketone Negative  / Bili Negative / Urobili <2 mg/dL       Blood Small / Protein 100 mg/dL / Leuk Est Negative / Nitrite Negative      RBC 6 / WBC 2 / Hyaline  / Gran  / Sq Epi  / Non Sq Epi 2 / Bacteria Negative [facility] Division of Kidney Diseases & Hypertension  FOLLOW UP NOTE  [phone]--------------------------------------------------------------------------------    HPI: 50-year-old male with long standing history of kidney transplantation, advanced CKD admitted with COVID-19. Nephrology team following ABRIL on CKD. Upon review of labs on [facility]/[place], patient noted to have Scr of 3.83 on 11/24/20. On admission pt. noted to have Scr of 5.3 on 12/5/20. Scr has increased to 6.46 today.    Pt. seen and examined at bedside today. Pt. states shortness of breath has improved , however still feels weak. Pt. reports he is making good urine and denied dysuria or chest pain.     PAST HISTORY  --------------------------------------------------------------------------------  No significant changes to PMH, PSH, FHx, SHx, unless otherwise noted    ALLERGIES & MEDICATIONS  --------------------------------------------------------------------------------  Allergies    sulfa (Swelling)    Intolerances    Standing Inpatient Medications  ALBUTerol    90 MICROgram(s) HFA Inhaler 2 Puff(s) Inhalation every 6 hours  atorvastatin 20 milliGRAM(s) Oral at bedtime  buMETAnide IVPB 2 milliGRAM(s) IV Intermittent two times a day  dexAMETHasone  Injectable 6 milliGRAM(s) IV Push daily  guaiFENesin  milliGRAM(s) Oral every 12 hours  hydrALAZINE 100 milliGRAM(s) Oral two times a day  NIFEdipine  milliGRAM(s) Oral daily  pantoprazole  Injectable 40 milliGRAM(s) IV Push every 12 hours  sodium bicarbonate 1300 milliGRAM(s) Oral three times a day    REVIEW OF SYSTEMS  --------------------------------------------------------------------------------  Gen: +fatigue  Respiratory: + dyspnea (improved)  CV: No chest pain  GI: No abdominal pain  MSK: No LE edema  Neuro: No dizziness    All other systems were reviewed and are negative, except as noted.    VITALS/PHYSICAL EXAM  --------------------------------------------------------------------------------  T(C): 36.9 (12-11-20 @ 05:27), Max: 37.7 (12-10-20 @ 21:02)  HR: 81 (12-11-20 @ 05:27) (81 - 107)  BP: 103/50 (12-11-20 @ 05:27) (103/50 - 130/54)  RR: 20 (12-11-20 @ 05:27) (18 - 20)  SpO2: 98% (12-11-20 @ 05:27) (84% - 100%)  Wt(kg): --    12-10-20 @ 07:01  -  12-11-20 @ 07:00  --------------------------------------------------------  IN: 1000 mL / OUT: 800 mL / NET: 200 mL    12-11-20 @ 07:01  -  12-11-20 @ 12:47  --------------------------------------------------------  IN: 0 mL / OUT: 150 mL / NET: -150 mL    Physical Exam:              Gen: On non-rebreather in NAD  	HEENT: No JVD  	Pulm: Fair air entry on anterior auscultation  	CV: S1S2+  	Abd: Soft, +BS   	Ext: No LE edema B/L  	Neuro: Awake, alert  	Skin: Warm and dry    LABS/STUDIES  --------------------------------------------------------------------------------              7.9    4.23  >-----------<  319      [12-11-20 @ 07:52]              24.9     136  |  104  |  92  ----------------------------<  126      [12-11-20 @ 07:52]  4.4   |  15  |  6.46        Ca     8.5     [12-11-20 @ 07:52]      Mg     2.1     [12-10-20 @ 09:06]      Phos  3.4     [12-10-20 @ 09:06]    Creatinine Trend:  SCr 6.46 [12-11 @ 07:52]  SCr 5.26 [12-10 @ 09:06]  SCr 4.64 [12-09 @ 08:03]  SCr 4.27 [12-08 @ 08:15]  SCr 4.02 [12-07 @ 07:22]    Urinalysis - [12-05-20 @ 13:24]      Color Light Yellow / Appearance Clear / SG 1.011 / pH 6.5      Gluc Trace / Ketone Negative  / Bili Negative / Urobili <2 mg/dL       Blood Small / Protein 100 mg/dL / Leuk Est Negative / Nitrite Negative      RBC 6 / WBC 2 / Hyaline  / Gran  / Sq Epi  / Non Sq Epi 2 / Bacteria Negative

## 2020-12-11 NOTE — CONSULT NOTE ADULT - SUBJECTIVE AND OBJECTIVE BOX
----------------------------------------------------------  Interventional Radiology Brief Consult Note  -----------------------------------------------------------    Reason for Referral:  Tunnelled dialysis catheter placement    Clinical Summary: 50 y.o. M w/ a hx of HTN and renal transplant (5158) c/b CKDIV who presents with fevers, chills and weakness. Patient's mother tested positive for COVID and is currently admitted at [facility], patient lives in the same home as her. About 2 days ago, patient noted fevers and chills, weakness and two episodes of loose stool. Patient is usually on Lasix but in the setting of fevers, his nephrologist advised he stop taking it 1-2 days ago. Has been eating and drinking well, denies any lightheadedness.     In the ED, patient was febrile to 101.7 and tachycardic to 130's. Patient received Vancomycin and Zosyn. Found to be COVID +    Vitals:  T(C): 36.6 (12-11-20 @ 13:13), Max: 37.7 (12-10-20 @ 21:02)  HR: 81 (12-11-20 @ 13:13) (81 - 107)  BP: 105/59 (12-11-20 @ 13:13) (103/50 - 130/54)  RR: 19 (12-11-20 @ 13:13) (18 - 20)  SpO2: 96% (12-11-20 @ 13:13) (84% - 100%)    Labs:           7.9  4.23)-----(319     (12-11-20 @ 07:52)         24.9     136 | 104 | 92  --------------------< 126     (12-11-20 @ 07:52)  4.4 | 15 | 6.46       PT: 12.4 12-09-20 @ 08:03  aPTT: -- 12-09-20 @ 08:03   INR: 1.09 12-09-20 @ 08:03      Assessment: 50y Male with history of long functioning kidney transplant now with renal insufficiency and COVID positive needing vascular access for HD.    Recommendations:  - NPO midnight 12/13/2020  - Hold heparin midnight 12/13/2020  - CBC, BMP and coags am 12/14/2020  - Primary team to place order under Dr. Walden  - Primary team to place pre-procedure note  - Plan for 12/14/2020  - D/w Dr. Walden

## 2020-12-11 NOTE — PROGRESS NOTE ADULT - PROBLEM SELECTOR PLAN 2
patient with viral sepsis 2/2 COVID present on admission now with worsening acute hypoxic respiratory failure on NRB  Concern for concomitant pulmonary edema and notable elevated proBNP  S/p lasix 80 IVP x1 on 12/10, start bumex 2mg IVP BID, strict I/Os  -persistent fevers at this time. Blood and urine cx unremarkable  switched prednisone to decadron. Hold off remdesevir given CKD  Trend D-dimer, ferritin  VTE ppx: HSQ 5000U q8

## 2020-12-11 NOTE — PROGRESS NOTE ADULT - PROBLEM SELECTOR PLAN 1
Pt. with long standing history of kidney transplantation (1978), now with ABRIL on CKD in the setting of COVID-19. Scr was 3.83 on 11/24/20, increased to 5.3 on 12/5/20. Scr elevated at 6.46 today. Pt. received IV lasix 80 mg yesterday for mild respiratory distress. Pt breathing slightly better today. Labs reviewed. Recommend continuing diuretic therapy with IV Bumex 2mg BID. Pt. agreeable to initiating HD if renal failure continues to worsen. Pt. previously on oral prednisone for kidney transplantation and currently on Dexamethasone for COVID-19. Monitor labs and urine output. Avoid any potential nephrotoxins. Dose medications as per eGFR Pt. with long standing history of kidney transplantation (1978), now with ABRIL on CKD in the setting of COVID-19. Scr was 3.83 on 11/24/20, increased to 5.3 on 12/5/20. Scr increased further to 6.46 today. Pt. received IV Lasix 80 mg yesterday for  respiratory distress, clinically improved today. Labs reviewed. Recommend continuing diuretic therapy with IV Bumex 2mg BID. Pt. agreeable to initiating HD if renal failure continues to worsen. Pt. was on oral prednisone for kidney transplantation until 12/10/20. Pt. currently on IV Dexamethasone for COVID-19. Monitor labs and urine output. Avoid any potential nephrotoxins. Dose medications as per eGFR

## 2020-12-11 NOTE — PROGRESS NOTE ADULT - ASSESSMENT
50 y.o. M w/ a hx of renal transplant (1973), CKD IV, HTN who presents with fever, found to be COVID+ and in ABRIL. Course c/b dark stools now improved but with worsening acute hypoxic RF and renal failure trending during HD dependence. 50 y.o. M w/ a hx of renal transplant (1973), CKD IV, HTN who presents with fever, found to be COVID+ and in ABRIL. Course c/b dark stools now improved but with worsening acute hypoxic RF and renal failure trending toward HD dependence.

## 2020-12-12 LAB
ANION GAP SERPL CALC-SCNC: 18 MMOL/L — HIGH (ref 7–14)
BUN SERPL-MCNC: 112 MG/DL — HIGH (ref 7–23)
CALCIUM SERPL-MCNC: 8.4 MG/DL — SIGNIFICANT CHANGE UP (ref 8.4–10.5)
CHLORIDE SERPL-SCNC: 108 MMOL/L — HIGH (ref 98–107)
CO2 SERPL-SCNC: 13 MMOL/L — LOW (ref 22–31)
CREAT SERPL-MCNC: 6.66 MG/DL — HIGH (ref 0.5–1.3)
CRP SERPL-MCNC: 175.6 MG/L — HIGH
D DIMER BLD IA.RAPID-MCNC: 1288 NG/ML DDU — HIGH
FERRITIN SERPL-MCNC: 3965 NG/ML — HIGH (ref 30–400)
GLUCOSE SERPL-MCNC: 116 MG/DL — HIGH (ref 70–99)
HCT VFR BLD CALC: 24.8 % — LOW (ref 39–50)
HGB BLD-MCNC: 8.3 G/DL — LOW (ref 13–17)
IRON SATN MFR SERPL: 48 % — SIGNIFICANT CHANGE UP (ref 14–50)
IRON SATN MFR SERPL: 54 UG/DL — SIGNIFICANT CHANGE UP (ref 45–165)
LDH SERPL L TO P-CCNC: 490 U/L — HIGH (ref 135–225)
MCHC RBC-ENTMCNC: 33.5 GM/DL — SIGNIFICANT CHANGE UP (ref 32–36)
MCHC RBC-ENTMCNC: 34.3 PG — HIGH (ref 27–34)
MCV RBC AUTO: 102.5 FL — HIGH (ref 80–100)
NRBC # BLD: 0 /100 WBCS — SIGNIFICANT CHANGE UP
NRBC # FLD: 0 K/UL — SIGNIFICANT CHANGE UP
PLATELET # BLD AUTO: 397 K/UL — SIGNIFICANT CHANGE UP (ref 150–400)
POTASSIUM SERPL-MCNC: 5.1 MMOL/L — SIGNIFICANT CHANGE UP (ref 3.5–5.3)
POTASSIUM SERPL-SCNC: 5.1 MMOL/L — SIGNIFICANT CHANGE UP (ref 3.5–5.3)
PROCALCITONIN SERPL-MCNC: 4.28 NG/ML — HIGH (ref 0.02–0.1)
RBC # BLD: 2.42 M/UL — LOW (ref 4.2–5.8)
RBC # FLD: 16.3 % — HIGH (ref 10.3–14.5)
SODIUM SERPL-SCNC: 139 MMOL/L — SIGNIFICANT CHANGE UP (ref 135–145)
TIBC SERPL-MCNC: 112 UG/DL — LOW (ref 220–430)
UIBC SERPL-MCNC: 58 UG/DL — LOW (ref 110–370)
WBC # BLD: 3.95 K/UL — SIGNIFICANT CHANGE UP (ref 3.8–10.5)
WBC # FLD AUTO: 3.95 K/UL — SIGNIFICANT CHANGE UP (ref 3.8–10.5)

## 2020-12-12 PROCEDURE — 99233 SBSQ HOSP IP/OBS HIGH 50: CPT | Mod: GC

## 2020-12-12 PROCEDURE — 99233 SBSQ HOSP IP/OBS HIGH 50: CPT

## 2020-12-12 RX ADMIN — Medication 1300 MILLIGRAM(S): at 13:52

## 2020-12-12 RX ADMIN — Medication 100 MILLIGRAM(S): at 17:51

## 2020-12-12 RX ADMIN — HEPARIN SODIUM 5000 UNIT(S): 5000 INJECTION INTRAVENOUS; SUBCUTANEOUS at 09:14

## 2020-12-12 RX ADMIN — Medication 1300 MILLIGRAM(S): at 21:41

## 2020-12-12 RX ADMIN — Medication 600 MILLIGRAM(S): at 05:50

## 2020-12-12 RX ADMIN — PANTOPRAZOLE SODIUM 40 MILLIGRAM(S): 20 TABLET, DELAYED RELEASE ORAL at 18:51

## 2020-12-12 RX ADMIN — Medication 1300 MILLIGRAM(S): at 05:50

## 2020-12-12 RX ADMIN — ALBUTEROL 2 PUFF(S): 90 AEROSOL, METERED ORAL at 16:49

## 2020-12-12 RX ADMIN — Medication 600 MILLIGRAM(S): at 17:51

## 2020-12-12 RX ADMIN — ALBUTEROL 2 PUFF(S): 90 AEROSOL, METERED ORAL at 21:41

## 2020-12-12 RX ADMIN — BUMETANIDE 2 MILLIGRAM(S): 0.25 INJECTION INTRAMUSCULAR; INTRAVENOUS at 05:57

## 2020-12-12 RX ADMIN — HEPARIN SODIUM 5000 UNIT(S): 5000 INJECTION INTRAVENOUS; SUBCUTANEOUS at 17:51

## 2020-12-12 RX ADMIN — HEPARIN SODIUM 5000 UNIT(S): 5000 INJECTION INTRAVENOUS; SUBCUTANEOUS at 00:16

## 2020-12-12 RX ADMIN — ALBUTEROL 2 PUFF(S): 90 AEROSOL, METERED ORAL at 05:51

## 2020-12-12 RX ADMIN — Medication 100 MILLIGRAM(S): at 05:50

## 2020-12-12 RX ADMIN — PANTOPRAZOLE SODIUM 40 MILLIGRAM(S): 20 TABLET, DELAYED RELEASE ORAL at 05:50

## 2020-12-12 RX ADMIN — Medication 6 MILLIGRAM(S): at 17:51

## 2020-12-12 RX ADMIN — ALBUTEROL 2 PUFF(S): 90 AEROSOL, METERED ORAL at 09:15

## 2020-12-12 RX ADMIN — ATORVASTATIN CALCIUM 20 MILLIGRAM(S): 80 TABLET, FILM COATED ORAL at 21:41

## 2020-12-12 RX ADMIN — BUMETANIDE 2 MILLIGRAM(S): 0.25 INJECTION INTRAMUSCULAR; INTRAVENOUS at 17:52

## 2020-12-12 NOTE — PROGRESS NOTE ADULT - PROBLEM SELECTOR PLAN 1
-ABRIL on CKD worsening in setting of COVID infection, will likely need HD  -S/p kidney transplant in 1973 c/b CKD IV, was pending placement on transplant list at [facility].   - off azathioprine given active infection with fevers  Currently with no urgent need for RRT as electrolytes stable, nml mental status, nonoliguric  S/p lasix 80 IVP x1 on 12/10, c/w bumex 2mg IVP BID, strict I/Os  IR PermCath placement for 12/14, NPO pMN Sun  Monitor electrolytes, BMP BID  - Cont. sodium bicarb tabs  avoid nephrotoxic medications, 12/7 US kidneys unremarkable -ABRIL on CKD worsening in setting of COVID infection, will likely need HD  Cr seems to be plateaued for now around 6.6  -S/p kidney transplant in 1973 c/b CKD IV, was pending placement on transplant list at [facility].   - off azathioprine given active infection with fevers  Currently with no urgent need for RRT as electrolytes stable, nml mental status, nonoliguric  S/p lasix 80 IVP x1 on 12/10, c/w bumex 2mg IVP BID, strict I/Os  IR PermCath placement for 12/14 then plans to start HD, NPO pMN Sun  Monitor electrolytes, BMP BID. Maintain low K, low phos diet  - Cont. sodium bicarb tabs  avoid nephrotoxic medications, 12/7 US kidneys unremarkable  Renal following

## 2020-12-12 NOTE — PROGRESS NOTE ADULT - PROBLEM SELECTOR PLAN 1
Pt. with long standing history of kidney transplantation (1978), now with ABRIL on CKD in the setting of COVID-19. Scr was 3.83 on 11/24/20, increased to 5.3 on 12/5/20. Scr increased to 6.66 today. Pt. received IV Lasix 80 mg on 12/10 for respiratory distress with good clinical response. Labs reviewed. Recommend continuing diuretic therapy with IV Bumex 2mg BID. Pt. agreeable to initiating HD. IR consulted with plans for HD catheter placement on Monday. Pt. was on oral prednisone for kidney transplantation until 12/10/20. Pt. currently on IV Dexamethasone for COVID-19. Monitor labs and urine output. Avoid any potential nephrotoxins. Dose medications as per eGFR Pt. with long standing history of kidney transplantation (1978), now with ABRIL on CKD in the setting of COVID-19. Scr was 3.83 on 11/24/20, increased to 5.3 on 12/5/20. Scr increased to 6.66 today. Pt. received IV Lasix 80 mg on 12/10 for respiratory distress with good clinical response. Labs reviewed. Recommend continuing diuretic therapy with IV Bumex 2mg BID. Pt. agreeable to initiate HD after HD catheter placement by IR on Monday. Pt. was on oral prednisone for kidney transplantation until 12/10/20. Pt. currently on IV Dexamethasone for COVID-19. Monitor labs and urine output. Avoid any potential nephrotoxins. Dose medications as per eGFR

## 2020-12-12 NOTE — PROGRESS NOTE ADULT - PROBLEM SELECTOR PLAN 2
patient with viral sepsis 2/2 COVID present on admission now with worsening acute hypoxic respiratory failure on NRB  Concern for concomitant pulmonary edema in setting of worsening renal failure  S/p lasix 80 IVP x1 on 12/10, c/w bumex 2mg IVP BID, strict I/Os  -persistent fevers at this time. Blood and urine cx unremarkable  c/w decadron. Holding off remdesevir given renal failure  Trend D-dimer, ferritin  VTE ppx: HSQ 5000U q8 patient with viral sepsis 2/2 COVID present on admission now with worsening acute hypoxic respiratory failure on NRB. Attempt 5L NC today  Concern for concomitant pulmonary edema in setting of worsening renal failure  S/p lasix 80 IVP x1 on 12/10, c/w bumex 2mg IVP BID, strict I/Os  -persistent fevers at this time. Blood and urine cx unremarkable  c/w decadron. Holding off remdesevir given renal failure  Trend D-dimer, ferritin. Ddimer downtrending  VTE ppx: HSQ 5000U q8

## 2020-12-12 NOTE — PROGRESS NOTE ADULT - SUBJECTIVE AND OBJECTIVE BOX
[facility] Division of Kidney Diseases & Hypertension  FOLLOW UP NOTE  [phone]--------------------------------------------------------------------------------    HPI: 50-year-old male with long standing history of kidney transplantation, advanced CKD admitted with COVID-19. Nephrology team following ABRIL on CKD. Upon review of labs on [facility], patient noted to have Scr of 3.83 on 11/24/20. On admission pt. noted to have Scr of 5.3 on 12/5/20. Scr has increased to 6.66 today.    Pt. seen and examined at bedside today. Pt. on NRB at the time of evaluation. Pt. states shortness of breath has improved , however still feels weak. Pt. reports he is making good urine and denied dysuria or chest pain.     PAST HISTORY  --------------------------------------------------------------------------------  No significant changes to PMH, PSH, FHx, SHx, unless otherwise noted    ALLERGIES & MEDICATIONS  --------------------------------------------------------------------------------  Allergies    sulfa (Swelling)    Intolerances    Standing Inpatient Medications  ALBUTerol    90 MICROgram(s) HFA Inhaler 2 Puff(s) Inhalation every 6 hours  atorvastatin 20 milliGRAM(s) Oral at bedtime  buMETAnide Injectable 2 milliGRAM(s) IV Push two times a day  dexAMETHasone  Injectable 6 milliGRAM(s) IV Push daily  guaiFENesin  milliGRAM(s) Oral every 12 hours  heparin   Injectable 5000 Unit(s) SubCutaneous every 8 hours  hydrALAZINE 100 milliGRAM(s) Oral two times a day  pantoprazole  Injectable 40 milliGRAM(s) IV Push every 12 hours  sodium bicarbonate 1300 milliGRAM(s) Oral three times a day    REVIEW OF SYSTEMS  --------------------------------------------------------------------------------  Gen: +fatigue  Respiratory: + dyspnea (improved)  CV: No chest pain  GI: No abdominal pain  MSK: No LE edema  Neuro: No dizziness    All other systems were reviewed and are negative, except as noted.    VITALS/PHYSICAL EXAM  --------------------------------------------------------------------------------  T(C): 36.7 (12-12-20 @ 05:35), Max: 37.3 (12-11-20 @ 21:27)  HR: 84 (12-12-20 @ 07:40) (80 - 89)  BP: 125/64 (12-12-20 @ 05:35) (105/59 - 125/64)  RR: 18 (12-12-20 @ 05:35) (18 - 19)  SpO2: 97% (12-12-20 @ 07:40) (95% - 97%)  Wt(kg): --    12-11-20 @ 07:01  -  12-12-20 @ 07:00  --------------------------------------------------------  IN: 0 mL / OUT: 550 mL / NET: -550 mL    Physical Exam:              Gen: On non-rebreather in NAD  	HEENT: No JVD  	Pulm: Fair air entry on anterior auscultation  	CV: S1S2+  	Abd: Soft, +BS   	Ext: No LE edema B/L  	Neuro: Awake, alert  	Skin: Warm and dry    LABS/STUDIES  --------------------------------------------------------------------------------              8.3    3.95  >-----------<  397      [12-12-20 @ 07:50]              24.8     139  |  108  |  112  ----------------------------<  116      [12-12-20 @ 07:50]  5.1   |  13  |  6.66        Ca     8.4     [12-12-20 @ 07:50]      Mg     2.8     [12-11-20 @ 20:15]      Phos  6.2     [12-11-20 @ 20:15]    Creatinine Trend:  SCr 6.66 [12-12 @ 07:50]  SCr 6.79 [12-11 @ 20:15]  SCr 6.46 [12-11 @ 07:52]  SCr 5.26 [12-10 @ 09:06]  SCr 4.64 [12-09 @ 08:03]    Urinalysis - [12-05-20 @ 13:24]      Color Light Yellow / Appearance Clear / SG 1.011 / pH 6.5      Gluc Trace / Ketone Negative  / Bili Negative / Urobili <2 mg/dL       Blood Small / Protein 100 mg/dL / Leuk Est Negative / Nitrite Negative      RBC 6 / WBC 2 / Hyaline  / Gran  / Sq Epi  / Non Sq Epi 2 / Bacteria Negative

## 2020-12-12 NOTE — PROGRESS NOTE ADULT - SUBJECTIVE AND OBJECTIVE BOX
INCOMPLETE [facility] Division of Hospital Medicine  Alanna Melendez DO  Pager (QASIM, 8A-5P): [phone]      Patient is a 50y old  Male who presents with a chief complaint of COVID (10 Dec 2020 16:54)      SUBJECTIVE / OVERNIGHT EVENTS: No acute events overnight. Denies chest pain, SOB, N/V. Still making urine. No diarrhea    MEDICATIONS  (STANDING):  ALBUTerol    90 MICROgram(s) HFA Inhaler 2 Puff(s) Inhalation every 6 hours  atorvastatin 20 milliGRAM(s) Oral at bedtime  buMETAnide Injectable 2 milliGRAM(s) IV Push two times a day  dexAMETHasone  Injectable 6 milliGRAM(s) IV Push daily  guaiFENesin  milliGRAM(s) Oral every 12 hours  heparin   Injectable 5000 Unit(s) SubCutaneous every 8 hours  hydrALAZINE 100 milliGRAM(s) Oral two times a day  pantoprazole  Injectable 40 milliGRAM(s) IV Push every 12 hours  sodium bicarbonate 1300 milliGRAM(s) Oral three times a day    MEDICATIONS  (PRN):  acetaminophen   Tablet .. 650 milliGRAM(s) Oral every 6 hours PRN Temp greater or equal to 38C (100.4F), Mild Pain (1 - 3)      CAPILLARY BLOOD GLUCOSE        I&O's Summary    11 Dec 2020 07:01  -  12 Dec 2020 07:00  --------------------------------------------------------  IN: 0 mL / OUT: 550 mL / NET: -550 mL    12 Dec 2020 07:01  -  12 Dec 2020 11:49  --------------------------------------------------------  IN: 0 mL / OUT: 325 mL / NET: -325 mL        PHYSICAL EXAM:  Vital Signs Last 24 Hrs  T(C): 36.7 (12 Dec 2020 05:35), Max: 37.3 (11 Dec 2020 21:27)  T(F): 98 (12 Dec 2020 05:35), Max: 99.1 (11 Dec 2020 21:27)  HR: 84 (12 Dec 2020 07:40) (80 - 89)  BP: 125/64 (12 Dec 2020 05:35) (105/59 - 125/64)  BP(mean): --  RR: 18 (12 Dec 2020 05:35) (18 - 19)  SpO2: 97% (12 Dec 2020 07:40) (95% - 97%)    GENERAL: NAD, on NRB, appears comfortable  EYES: sclera clear  CHEST/LUNG: normal WOB, speaking in full sentences  HEART: normal rate, trace pedal edema  ABDOMEN: Soft, Nontender, Nondistended  EXTREMITIES: WWP  PSYCH: Awake, alert, calm  NEUROLOGY: non-focal    LABS:                        8.3    3.95  )-----------( 397      ( 12 Dec 2020 07:50 )             24.8     12-12    139  |  108<H>  |  112<H>  ----------------------------<  116<H>  5.1   |  13<L>  |  6.66<H>    Ca    8.4      12 Dec 2020 07:50  Phos  6.2     12-11  Mg     2.8     12-11    TPro  6.2  /  Alb  2.5<L>  /  TBili  0.2  /  DBili  x   /  AST  62<H>  /  ALT  24  /  AlkPhos  57  12-11              GI PCR Panel, Stool (collected 09 Dec 2020 15:09)  Source: .Stool Feces  Final Report (09 Dec 2020 19:05):    GI PCR Results: NOT detected    *******Please Note:*******    GI panel PCR evaluates for:    Campylobacter, Plesiomonas shigelloides, Salmonella,    Vibrio, Yersinia enterocolitica, Enteroaggregative    Escherichia coli (EAEC), Enteropathogenic E.coli (EPEC),    Enterotoxigenic E. coli (ETEC) lt/st, Shiga-like    toxin-producing E. coli (STEC) stx1/stx2,    Shigella/ Enteroinvasive E. coli (EIEC), Cryptosporidium,    Cyclospora cayetanensis, Entamoeba histolytica,    Giardia lamblia, Adenovirus F 40/41, Astrovirus,    Norovirus GI/GII, Rotavirus A, Sapovirus        RADIOLOGY & ADDITIONAL TESTS:  Results Reviewed:   Imaging Personally Reviewed:  Electrocardiogram Personally Reviewed:    COORDINATION OF CARE:  Care Discussed with Consultants/Other Providers [Y/N]:  Prior or Outpatient Records Reviewed [Y/N]:

## 2020-12-12 NOTE — PROGRESS NOTE ADULT - ASSESSMENT
50 y.o. M w/ a hx of renal transplant (1973), CKD IV, HTN who presents with fever, found to be COVID+ and in ABRIL. Course c/b dark stools now improved but with worsening acute hypoxic RF and renal failure trending toward HD dependence.

## 2020-12-12 NOTE — PROGRESS NOTE ADULT - PROBLEM SELECTOR PLAN 2
Pt. with metabolic acidosis in the setting of ABRIL on CKD. An additional component to respiratory alkalosis could be contributory. Serum CO2 low at 13. Pt. on oral sodium bicarbonate therapy. Monitor CO2.    If you have any questions, please feel free to contact me  Binu Mendoza  Nephrology Fellow  [phone]  (After 5pm or on weekends please page the on-call fellow) Pt. with metabolic acidosis in the setting of ABRIL on CKD. Serum CO2 low at 13. Pt. on oral sodium bicarbonate therapy. Monitor CO2.    If you have any questions, please feel free to contact me  Binu Mendoza  Nephrology Fellow  [phone]  (After 5pm or on weekends please page the on-call fellow)

## 2020-12-13 LAB
ANION GAP SERPL CALC-SCNC: 16 MMOL/L — HIGH (ref 7–14)
BUN SERPL-MCNC: 111 MG/DL — HIGH (ref 7–23)
CALCIUM SERPL-MCNC: 8.9 MG/DL — SIGNIFICANT CHANGE UP (ref 8.4–10.5)
CHLORIDE SERPL-SCNC: 111 MMOL/L — HIGH (ref 98–107)
CO2 SERPL-SCNC: 18 MMOL/L — LOW (ref 22–31)
CREAT SERPL-MCNC: 5.9 MG/DL — HIGH (ref 0.5–1.3)
GLUCOSE SERPL-MCNC: 107 MG/DL — HIGH (ref 70–99)
HCT VFR BLD CALC: 28.5 % — LOW (ref 39–50)
HGB BLD-MCNC: 9.3 G/DL — LOW (ref 13–17)
MCHC RBC-ENTMCNC: 32.6 GM/DL — SIGNIFICANT CHANGE UP (ref 32–36)
MCHC RBC-ENTMCNC: 34.1 PG — HIGH (ref 27–34)
MCV RBC AUTO: 104.4 FL — HIGH (ref 80–100)
NRBC # BLD: 0 /100 WBCS — SIGNIFICANT CHANGE UP
NRBC # FLD: 0 K/UL — SIGNIFICANT CHANGE UP
PLATELET # BLD AUTO: 420 K/UL — HIGH (ref 150–400)
POTASSIUM SERPL-MCNC: 4.9 MMOL/L — SIGNIFICANT CHANGE UP (ref 3.5–5.3)
POTASSIUM SERPL-SCNC: 4.9 MMOL/L — SIGNIFICANT CHANGE UP (ref 3.5–5.3)
PROCALCITONIN SERPL-MCNC: 2.73 NG/ML — HIGH (ref 0.02–0.1)
RBC # BLD: 2.73 M/UL — LOW (ref 4.2–5.8)
RBC # FLD: 16.1 % — HIGH (ref 10.3–14.5)
SODIUM SERPL-SCNC: 145 MMOL/L — SIGNIFICANT CHANGE UP (ref 135–145)
WBC # BLD: 2.41 K/UL — LOW (ref 3.8–10.5)
WBC # FLD AUTO: 2.41 K/UL — LOW (ref 3.8–10.5)

## 2020-12-13 PROCEDURE — 99232 SBSQ HOSP IP/OBS MODERATE 35: CPT | Mod: GC

## 2020-12-13 PROCEDURE — 99233 SBSQ HOSP IP/OBS HIGH 50: CPT

## 2020-12-13 RX ORDER — HEPARIN SODIUM 5000 [USP'U]/ML
5000 INJECTION INTRAVENOUS; SUBCUTANEOUS EVERY 8 HOURS
Refills: 0 | Status: DISCONTINUED | OUTPATIENT
Start: 2020-12-13 | End: 2020-12-13

## 2020-12-13 RX ADMIN — ALBUTEROL 2 PUFF(S): 90 AEROSOL, METERED ORAL at 17:52

## 2020-12-13 RX ADMIN — Medication 6 MILLIGRAM(S): at 17:54

## 2020-12-13 RX ADMIN — Medication 600 MILLIGRAM(S): at 17:55

## 2020-12-13 RX ADMIN — ALBUTEROL 2 PUFF(S): 90 AEROSOL, METERED ORAL at 05:34

## 2020-12-13 RX ADMIN — Medication 100 MILLIGRAM(S): at 17:54

## 2020-12-13 RX ADMIN — BUMETANIDE 2 MILLIGRAM(S): 0.25 INJECTION INTRAMUSCULAR; INTRAVENOUS at 17:54

## 2020-12-13 RX ADMIN — PANTOPRAZOLE SODIUM 40 MILLIGRAM(S): 20 TABLET, DELAYED RELEASE ORAL at 05:33

## 2020-12-13 RX ADMIN — HEPARIN SODIUM 5000 UNIT(S): 5000 INJECTION INTRAVENOUS; SUBCUTANEOUS at 01:04

## 2020-12-13 RX ADMIN — Medication 1300 MILLIGRAM(S): at 05:33

## 2020-12-13 RX ADMIN — Medication 100 MILLIGRAM(S): at 05:33

## 2020-12-13 RX ADMIN — Medication 1300 MILLIGRAM(S): at 13:24

## 2020-12-13 RX ADMIN — BUMETANIDE 2 MILLIGRAM(S): 0.25 INJECTION INTRAMUSCULAR; INTRAVENOUS at 05:34

## 2020-12-13 RX ADMIN — PANTOPRAZOLE SODIUM 40 MILLIGRAM(S): 20 TABLET, DELAYED RELEASE ORAL at 17:54

## 2020-12-13 RX ADMIN — HEPARIN SODIUM 5000 UNIT(S): 5000 INJECTION INTRAVENOUS; SUBCUTANEOUS at 13:24

## 2020-12-13 RX ADMIN — ALBUTEROL 2 PUFF(S): 90 AEROSOL, METERED ORAL at 21:56

## 2020-12-13 RX ADMIN — ALBUTEROL 2 PUFF(S): 90 AEROSOL, METERED ORAL at 10:09

## 2020-12-13 RX ADMIN — Medication 1300 MILLIGRAM(S): at 21:56

## 2020-12-13 RX ADMIN — ATORVASTATIN CALCIUM 20 MILLIGRAM(S): 80 TABLET, FILM COATED ORAL at 21:56

## 2020-12-13 RX ADMIN — Medication 600 MILLIGRAM(S): at 05:33

## 2020-12-13 NOTE — PROGRESS NOTE ADULT - PROBLEM SELECTOR PLAN 1
Pt. with long standing history of kidney transplantation (1978), now with ABRIL on CKD in the setting of COVID-19. Scr was 3.83 on 11/24/20, increased to 5.3 on 12/5/20. Scr peaked at 6.79 on 12/11. Pt. initiated on IV diuretic therapy for respiratory distress/volume overload with good clinical response. Scr elevated/stable at 5.90 today. Recommend continuing diuretic therapy with IV Bumex 2mg BID. Pt. agreeable to initiate HD after HD catheter placement by IR on Monday. Pt. was on oral prednisone for kidney transplantation until 12/10/20. Pt. currently on IV Dexamethasone for COVID-19. Monitor labs and urine output. Avoid any potential nephrotoxins. Dose medications as per eGFR

## 2020-12-13 NOTE — PROGRESS NOTE ADULT - SUBJECTIVE AND OBJECTIVE BOX
[facility] Division of Hospital Medicine  Alanna Melendez DO  Pager (KENNY-F, 8A-5P): [phone]      Patient is a 50y old  Male who presents with a chief complaint of COVID (10 Dec 2020 16:54)      SUBJECTIVE / OVERNIGHT EVENTS: No acute events overnight. Feels better, still making good amount of urine. Denies chest pain, SOB, N/V/D.    MEDICATIONS  (STANDING):  ALBUTerol    90 MICROgram(s) HFA Inhaler 2 Puff(s) Inhalation every 6 hours  atorvastatin 20 milliGRAM(s) Oral at bedtime  buMETAnide Injectable 2 milliGRAM(s) IV Push two times a day  dexAMETHasone  Injectable 6 milliGRAM(s) IV Push daily  guaiFENesin  milliGRAM(s) Oral every 12 hours  heparin   Injectable 5000 Unit(s) SubCutaneous every 8 hours  hydrALAZINE 100 milliGRAM(s) Oral two times a day  pantoprazole  Injectable 40 milliGRAM(s) IV Push every 12 hours  sodium bicarbonate 1300 milliGRAM(s) Oral three times a day    MEDICATIONS  (PRN):  acetaminophen   Tablet .. 650 milliGRAM(s) Oral every 6 hours PRN Temp greater or equal to 38C (100.4F), Mild Pain (1 - 3)      CAPILLARY BLOOD GLUCOSE        I&O's Summary    12 Dec 2020 07:01  -  13 Dec 2020 07:00  --------------------------------------------------------  IN: 290 mL / OUT: 2170 mL / NET: -1880 mL        PHYSICAL EXAM:  Vital Signs Last 24 Hrs  T(C): 36.7 (13 Dec 2020 09:00), Max: 37.7 (12 Dec 2020 21:47)  T(F): 98 (13 Dec 2020 09:00), Max: 99.9 (12 Dec 2020 21:47)  HR: 86 (13 Dec 2020 09:00) (68 - 99)  BP: 137/88 (13 Dec 2020 09:00) (132/69 - 146/73)  BP(mean): --  RR: 18 (13 Dec 2020 09:00) (18 - 21)  SpO2: 99% (13 Dec 2020 09:00) (91% - 99%)    GENERAL: NAD, seen off NRB and on RA (states he is about to eat)  EYES: sclera clear  CHEST/LUNG: normal WOB, speaking in full sentences  HEART: normal rate, trace pedal edema  ABDOMEN: Soft, Nontender, Nondistended  EXTREMITIES: WWP  PSYCH: Awake, alert, calm  NEUROLOGY: no focal deficits    LABS:                        9.3    2.41  )-----------( 420      ( 13 Dec 2020 07:41 )             28.5     12-13    145  |  111<H>  |  111<H>  ----------------------------<  107<H>  4.9   |  18<L>  |  5.90<H>    Ca    8.9      13 Dec 2020 07:41  Phos  6.2     12-11  Mg     2.8     12-11                  RADIOLOGY & ADDITIONAL TESTS:  Results Reviewed:   Imaging Personally Reviewed:  Electrocardiogram Personally Reviewed:    COORDINATION OF CARE:  Care Discussed with Consultants/Other Providers [Y/N]:  Prior or Outpatient Records Reviewed [Y/N]:

## 2020-12-13 NOTE — CHART NOTE - NSCHARTNOTEFT_GEN_A_CORE
Patient Age: 50    Patient Gender: male    Procedure (including site/side if known): permacath placement for HD    Diagnosis/Indication: ESRD, COVID+    Interventional Radiology Attending Physician:     Ordering Attending Physician: Dr murillo    Pertinent medical history: COVID+, hx of renal transplant (1973), CKD IV, HTN     Pertinent Labs: H&H 8.3/24, wbc 3.9, plt 397      Patient and Family aware? yes      Attending/Resident/NP/PA    Contact: Tiffanie Senior NP                              Date:      12/13/20                              time: 40

## 2020-12-13 NOTE — PROGRESS NOTE ADULT - PROBLEM SELECTOR PLAN 1
-ABRIL on CKD worsening in setting of COVID infection, will likely need HD  IR PermCath placement for 12/14 then plans to start HD, NPO pMN Sun  Cr downtrending to 5.9, if continues to downtrend can hold off IR permcath  -S/p kidney transplant in 1973 c/b CKD IV, was pending placement on transplant list at [facility]. Off azathioprine given active infection with fevers  Currently with no urgent need for RRT as electrolytes stable, nml mental status, nonoliguric  S/p lasix 80 IVP x1 on 12/10, c/w bumex 2mg IVP BID, strict I/Os  Monitor electrolytes, BMP BID. Maintain low K, low phos diet  - Cont. sodium bicarb tabs  avoid nephrotoxic medications, 12/7 US kidneys unremarkable  Renal following

## 2020-12-13 NOTE — PROGRESS NOTE ADULT - PROBLEM SELECTOR PLAN 2
patient with viral sepsis 2/2 COVID present on admission now with worsening acute hypoxic respiratory failure on NRB. Attempt 5L NC today  Concern for concomitant pulmonary edema in setting of worsening renal failure  S/p lasix 80 IVP x1 on 12/10, c/w bumex 2mg IVP BID, strict I/Os  -persistent fevers at this time. Blood and urine cx unremarkable  c/w decadron. Holding off remdesevir given renal failure  Trend D-dimer, ferritin. Ddimer downtrending  VTE ppx: HSQ 5000U q8

## 2020-12-13 NOTE — PROGRESS NOTE ADULT - SUBJECTIVE AND OBJECTIVE BOX
[facility] Division of Kidney Diseases & Hypertension  FOLLOW UP NOTE  [phone]--------------------------------------------------------------------------------    HPI: 50-year-old male with long standing history of kidney transplantation, advanced CKD admitted with COVID-19. Nephrology team following ABRIL on CKD. Upon review of labs on [facility], patient noted to have Scr of 3.83 on 11/24/20. On admission pt. noted to have Scr of 5.3 on 12/5/20. Scr peaked at 6.79 on 12/11. Pt. was initiated on IV bumex given concerns over volume overload and oliguria. Pt. currently non-oliguric with ~2L UOP in 24 hours. Scr elevated/stable at 5.9 today.    Pt. seen and examined at bedside today. Pt. on NRB at the time of evaluation. Pt. states shortness of breath has improved , however still feels weak.     PAST HISTORY  --------------------------------------------------------------------------------  No significant changes to PMH, PSH, FHx, SHx, unless otherwise noted    ALLERGIES & MEDICATIONS  --------------------------------------------------------------------------------  Allergies    sulfa (Swelling)    Intolerances    Standing Inpatient Medications  ALBUTerol    90 MICROgram(s) HFA Inhaler 2 Puff(s) Inhalation every 6 hours  atorvastatin 20 milliGRAM(s) Oral at bedtime  buMETAnide Injectable 2 milliGRAM(s) IV Push two times a day  dexAMETHasone  Injectable 6 milliGRAM(s) IV Push daily  guaiFENesin  milliGRAM(s) Oral every 12 hours  heparin   Injectable 5000 Unit(s) SubCutaneous every 8 hours  hydrALAZINE 100 milliGRAM(s) Oral two times a day  pantoprazole  Injectable 40 milliGRAM(s) IV Push every 12 hours  sodium bicarbonate 1300 milliGRAM(s) Oral three times a day    REVIEW OF SYSTEMS  --------------------------------------------------------------------------------  Gen: +fatigue  Respiratory: + dyspnea (improved)  CV: No chest pain  GI: No abdominal pain  MSK: No LE edema  Neuro: No dizziness    All other systems were reviewed and are negative, except as noted.    VITALS/PHYSICAL EXAM  --------------------------------------------------------------------------------  T(C): 36.5 (12-13-20 @ 05:28), Max: 37.7 (12-12-20 @ 21:47)  HR: 68 (12-13-20 @ 05:28) (68 - 99)  BP: 145/82 (12-13-20 @ 05:28) (132/69 - 146/73)  RR: 18 (12-13-20 @ 05:28) (18 - 21)  SpO2: 99% (12-13-20 @ 05:28) (88% - 99%)  Wt(kg): --    12-12-20 @ 07:01  -  12-13-20 @ 07:00  --------------------------------------------------------  IN: 290 mL / OUT: 2170 mL / NET: -1880 mL    Physical Exam:              Gen: On non-rebreather in NAD  	HEENT: No JVD  	Pulm: Fair air entry on anterior auscultation  	CV: S1S2+  	Abd: Soft, +BS   	Ext: No LE edema B/L  	Neuro: Awake, alert  	Skin: Warm and dry    LABS/STUDIES  --------------------------------------------------------------------------------              9.3    2.41  >-----------<  420      [12-13-20 @ 07:41]              28.5     145  |  111  |  111  ----------------------------<  107      [12-13-20 @ 07:41]  4.9   |  18  |  5.90        Ca     8.9     [12-13-20 @ 07:41]      Mg     2.8     [12-11-20 @ 20:15]      Phos  6.2     [12-11-20 @ 20:15]    Creatinine Trend:  SCr 5.90 [12-13 @ 07:41]  SCr 6.66 [12-12 @ 07:50]  SCr 6.79 [12-11 @ 20:15]  SCr 6.46 [12-11 @ 07:52]  SCr 5.26 [12-10 @ 09:06] [facility] Division of Kidney Diseases & Hypertension  FOLLOW UP NOTE  [phone]--------------------------------------------------------------------------------    HPI: 50-year-old male with long standing history of kidney transplantation, advanced CKD admitted with COVID-19. Nephrology team following ABRIL on CKD. Upon review of labs on [facility], patient noted to have Scr of 3.83 on 11/24/20. On admission pt. noted to have Scr of 5.3 on 12/5/20. Scr peaked at 6.79 on 12/11. Pt. was initiated on IV Bumex for volume overload and oliguria. Pt. currently non-oliguric with ~2L UOP in 24 hours. Scr elevated/stable at 5.9 today.    Pt. seen and examined at bedside today. Pt. on NRB at the time of evaluation. Pt. states shortness of breath has improved , however still feels weak.     PAST HISTORY  --------------------------------------------------------------------------------  No significant changes to PMH, PSH, FHx, SHx, unless otherwise noted    ALLERGIES & MEDICATIONS  --------------------------------------------------------------------------------  Allergies    sulfa (Swelling)    Intolerances    Standing Inpatient Medications  ALBUTerol    90 MICROgram(s) HFA Inhaler 2 Puff(s) Inhalation every 6 hours  atorvastatin 20 milliGRAM(s) Oral at bedtime  buMETAnide Injectable 2 milliGRAM(s) IV Push two times a day  dexAMETHasone  Injectable 6 milliGRAM(s) IV Push daily  guaiFENesin  milliGRAM(s) Oral every 12 hours  heparin   Injectable 5000 Unit(s) SubCutaneous every 8 hours  hydrALAZINE 100 milliGRAM(s) Oral two times a day  pantoprazole  Injectable 40 milliGRAM(s) IV Push every 12 hours  sodium bicarbonate 1300 milliGRAM(s) Oral three times a day    REVIEW OF SYSTEMS  --------------------------------------------------------------------------------  Gen: +fatigue  Respiratory: + dyspnea (improved)  CV: No chest pain  GI: No abdominal pain  MSK: No LE edema  Neuro: No dizziness    All other systems were reviewed and are negative, except as noted.    VITALS/PHYSICAL EXAM  --------------------------------------------------------------------------------  T(C): 36.5 (12-13-20 @ 05:28), Max: 37.7 (12-12-20 @ 21:47)  HR: 68 (12-13-20 @ 05:28) (68 - 99)  BP: 145/82 (12-13-20 @ 05:28) (132/69 - 146/73)  RR: 18 (12-13-20 @ 05:28) (18 - 21)  SpO2: 99% (12-13-20 @ 05:28) (88% - 99%)  Wt(kg): --    12-12-20 @ 07:01  -  12-13-20 @ 07:00  --------------------------------------------------------  IN: 290 mL / OUT: 2170 mL / NET: -1880 mL    Physical Exam:              Gen: On non-rebreather in NAD  	HEENT: No JVD  	Pulm: Fair air entry on anterior auscultation  	CV: S1S2+  	Abd: Soft, +BS   	Ext: No LE edema B/L  	Neuro: Awake, alert  	Skin: Warm and dry    LABS/STUDIES  --------------------------------------------------------------------------------              9.3    2.41  >-----------<  420      [12-13-20 @ 07:41]              28.5     145  |  111  |  111  ----------------------------<  107      [12-13-20 @ 07:41]  4.9   |  18  |  5.90        Ca     8.9     [12-13-20 @ 07:41]      Mg     2.8     [12-11-20 @ 20:15]      Phos  6.2     [12-11-20 @ 20:15]    Creatinine Trend:  SCr 5.90 [12-13 @ 07:41]  SCr 6.66 [12-12 @ 07:50]  SCr 6.79 [12-11 @ 20:15]  SCr 6.46 [12-11 @ 07:52]  SCr 5.26 [12-10 @ 09:06]

## 2020-12-13 NOTE — PROGRESS NOTE ADULT - PROBLEM SELECTOR PLAN 2
Pt. with metabolic acidosis in the setting of ABRIL on CKD. Serum CO2 improved to 18 today. Pt. on oral sodium bicarbonate therapy. Monitor CO2.    If you have any questions, please feel free to contact me  Binu Mendoza  Nephrology Fellow  [phone]  (After 5pm or on weekends please page the on-call fellow)

## 2020-12-14 LAB
ANION GAP SERPL CALC-SCNC: 18 MMOL/L — HIGH (ref 7–14)
BUN SERPL-MCNC: 109 MG/DL — HIGH (ref 7–23)
CALCIUM SERPL-MCNC: 9.1 MG/DL — SIGNIFICANT CHANGE UP (ref 8.4–10.5)
CHLORIDE SERPL-SCNC: 106 MMOL/L — SIGNIFICANT CHANGE UP (ref 98–107)
CO2 SERPL-SCNC: 18 MMOL/L — LOW (ref 22–31)
CREAT SERPL-MCNC: 5.23 MG/DL — HIGH (ref 0.5–1.3)
GLUCOSE SERPL-MCNC: 97 MG/DL — SIGNIFICANT CHANGE UP (ref 70–99)
HCT VFR BLD CALC: 31.3 % — LOW (ref 39–50)
HGB BLD-MCNC: 10.3 G/DL — LOW (ref 13–17)
MCHC RBC-ENTMCNC: 32.9 GM/DL — SIGNIFICANT CHANGE UP (ref 32–36)
MCHC RBC-ENTMCNC: 33.3 PG — SIGNIFICANT CHANGE UP (ref 27–34)
MCV RBC AUTO: 101.3 FL — HIGH (ref 80–100)
NRBC # BLD: 0 /100 WBCS — SIGNIFICANT CHANGE UP
NRBC # FLD: 0 K/UL — SIGNIFICANT CHANGE UP
PLATELET # BLD AUTO: 454 K/UL — HIGH (ref 150–400)
POTASSIUM SERPL-MCNC: 5.2 MMOL/L — SIGNIFICANT CHANGE UP (ref 3.5–5.3)
POTASSIUM SERPL-SCNC: 5.2 MMOL/L — SIGNIFICANT CHANGE UP (ref 3.5–5.3)
RBC # BLD: 3.09 M/UL — LOW (ref 4.2–5.8)
RBC # FLD: 15.8 % — HIGH (ref 10.3–14.5)
SODIUM SERPL-SCNC: 142 MMOL/L — SIGNIFICANT CHANGE UP (ref 135–145)
WBC # BLD: 1.91 K/UL — LOW (ref 3.8–10.5)
WBC # FLD AUTO: 1.91 K/UL — LOW (ref 3.8–10.5)

## 2020-12-14 PROCEDURE — 99232 SBSQ HOSP IP/OBS MODERATE 35: CPT | Mod: GC

## 2020-12-14 PROCEDURE — 99233 SBSQ HOSP IP/OBS HIGH 50: CPT

## 2020-12-14 RX ADMIN — ALBUTEROL 2 PUFF(S): 90 AEROSOL, METERED ORAL at 10:28

## 2020-12-14 RX ADMIN — Medication 1300 MILLIGRAM(S): at 06:40

## 2020-12-14 RX ADMIN — PANTOPRAZOLE SODIUM 40 MILLIGRAM(S): 20 TABLET, DELAYED RELEASE ORAL at 06:40

## 2020-12-14 RX ADMIN — BUMETANIDE 2 MILLIGRAM(S): 0.25 INJECTION INTRAMUSCULAR; INTRAVENOUS at 18:22

## 2020-12-14 RX ADMIN — Medication 100 MILLIGRAM(S): at 08:07

## 2020-12-14 RX ADMIN — PANTOPRAZOLE SODIUM 40 MILLIGRAM(S): 20 TABLET, DELAYED RELEASE ORAL at 18:22

## 2020-12-14 RX ADMIN — Medication 1300 MILLIGRAM(S): at 13:22

## 2020-12-14 RX ADMIN — ALBUTEROL 2 PUFF(S): 90 AEROSOL, METERED ORAL at 06:41

## 2020-12-14 RX ADMIN — Medication 600 MILLIGRAM(S): at 06:40

## 2020-12-14 RX ADMIN — Medication 100 MILLIGRAM(S): at 18:22

## 2020-12-14 RX ADMIN — Medication 6 MILLIGRAM(S): at 18:22

## 2020-12-14 RX ADMIN — ATORVASTATIN CALCIUM 20 MILLIGRAM(S): 80 TABLET, FILM COATED ORAL at 23:28

## 2020-12-14 RX ADMIN — Medication 1300 MILLIGRAM(S): at 23:28

## 2020-12-14 RX ADMIN — BUMETANIDE 2 MILLIGRAM(S): 0.25 INJECTION INTRAMUSCULAR; INTRAVENOUS at 06:40

## 2020-12-14 NOTE — PROGRESS NOTE ADULT - SUBJECTIVE AND OBJECTIVE BOX
Call received from Nephrology fellow. As patient's creatinine is improving, they would like to hold off with IR placement of shiley catheter  for today. We  will continue to monitor creatinine level. Shiley catheter may be needed in the future.

## 2020-12-14 NOTE — PROGRESS NOTE ADULT - PROBLEM SELECTOR PLAN 2
Pt. with metabolic acidosis in the setting of ABRIL on CKD. Serum CO2 improved/stable at 18 today. Pt. on oral sodium bicarbonate therapy. Monitor CO2.    If any questions, please feel free to contact me  Benigno Romano   Nephrology Fellow  [phone]  (After 5 pm or on weekends please page the on-call fellow)

## 2020-12-14 NOTE — PROGRESS NOTE ADULT - PROBLEM SELECTOR PLAN 1
-ABRIL on CKD worsening in setting of COVID infection,   renal fxn improving, creat downtrending to 5.2 today, hold off IR shiley or dialysis for now per nephrology  -S/p kidney transplant in 1978 c/b CKD IV, was pending placement on transplant list at [facility]. Off azathioprine given active infection with fevers  Currently with no urgent need for RRT as electrolytes stable, nml mental status, nonoliguric  S/p lasix 80 IVP x1 on 12/10, c/w bumex 2mg IVP BID, strict I/Os  Monitor electrolytes, BMP BID. Maintain low K, low phos diet  - Cont. sodium bicarb tabs  avoid nephrotoxic medications, 12/7 US kidneys unremarkable  Renal following

## 2020-12-14 NOTE — PROVIDER CONTACT NOTE (OTHER) - ASSESSMENT
AOx4, febrile otherwise asymptomatic
O2 84% on 6L NC. pt c/o chills, , unable to get O2 to reach 90%
Patient A&Ox4, off and on febrile. Currently on room air Spo2 94%.
Patient A&Ox4. Febrile, on 1 liter nasal cannula, tachycardic.
Patient A+Ox4. Temp 100.5, , /63, RR 18, O2 93%
Patient A+Ox4. Temp 102.8, , /76, RR 18, O2 sat 94%
Patient resting comfortably in bed, denies any lightheadedness or dizziness. Denies chest pain, discomfort, on 5L NC, vital signs stable otherwise.
Patient temp is 102.8 and HR is 113
Patient's HR is 113; temp 99.6
Patient's temp is 100.5.
Patient's temp is 100.9
Pt HR 49 during AM vitals, awake alert & oriented. pt observed emotional state was tearful and reluctant. asked to speak with provider for updates on plan of care for kidney treatments
Pt feel warms with a temperature of 102.3
Pt stable expect febrile.
Pt. A&OX4. All other V/S stable; satting well on RA; denies any chest pain or SOB; mainly c/o fatigue. Febrile w/ temp of 101.2
oral temp 100.4, , O2 on 5L NC 91/92%, unable to tolerate wean down at this time
patient shivering, patient A&Ox4. Temp 103.2, tachy into 130's
Patient A&Ox4, given PO Tylenol at 12:45, patient on 1 liter nasal cannula. tachycardic to 120s.

## 2020-12-14 NOTE — PROVIDER CONTACT NOTE (OTHER) - ACTION/TREATMENT ORDERED:
Provider made aware. Give IV tylenol per provider order.
Provider made aware. Per provider give patients ice packs, too early to give another dose of tylenol.
Provider made aware. Per provider ok to give Tylenol early. Provider to RN order in chart.
paged provider, awaiting response
provider return page, monitor pt, make sure he hydrated and eating. Repeat temps and ensure temps are trending down
CHESTER Ferguson made aware. Ice packs & bags continue to remain on pt. Awaiting order for a sooner dose of Tylenol. No acute distress. Will continue to monitor.
Continue to monitor patient
MD/PA spoke with pt and updated pt on plan of care. pt felt better and agreed for [facility] placement.
PRN Tylenol, continue to monitor, hold off wean down O2 at this time
Per provider give PO tylenol.
Provider aware, awaiting action.
Provider aware, continue to monitor, place ice bags on patient and continue to monitor the patient.
Provider aware, recheck TEMP.
Provider made aware, per provider give PO Tylenol.
Provider paged.
Will give the IV tylenol; will reassess in 1 hour
Will give tylenol early and place icepacks on patient; will reassess in 1 hour
Will give tylenol; will reassess in 1 hour
Will keep bags of ice on the patient; will reassess the patient's temp and give tylenol the next time it is due
Will keep ice packs on the patient; will continue to monitor
Will put the patient on tele; will continue to monitor
placed pt on non-rebreather mask at 15L until respiratory team sets up bi-pap settings for pt. continue to monitor pt

## 2020-12-14 NOTE — PROVIDER CONTACT NOTE (OTHER) - SITUATION
Patient on tele monitoring, heart rate dropping to 40s briefly, not sustaining. Patient comes back up instantly. Upon waking up, patient returns to baseline.

## 2020-12-14 NOTE — PROGRESS NOTE ADULT - PROBLEM SELECTOR PLAN 2
patient with viral sepsis 2/2 COVID present on admission now with worsening acute hypoxic respiratory failure on NRB. Attempt 5L NC today  Concern for concomitant pulmonary edema in setting of worsening renal failure  S/p lasix 80 IVP x1 on 12/10, c/w bumex 2mg IVP BID, strict I/Os  -persistent fevers at this time. Blood and urine cx unremarkable  c/w decadron x10 day course. Holding off remdesevir given renal failure  Trend D-dimer, ferritin. Ddimer downtrending  VTE ppx: HSQ 5000U q8 patient with viral sepsis 2/2 COVID present on admission now with worsening acute hypoxic respiratory failure, was on NRB, now on 3L NC, wean as able  Concern for concomitant pulmonary edema in setting of worsening renal failure  S/p lasix 80 IVP x1 on 12/10, c/w bumex 2mg IVP BID, strict I/Os  -persistent fevers at this time. Blood and urine cx unremarkable  c/w decadron x10 day course. Holding off remdesevir given renal failure  Trend D-dimer, ferritin. Ddimer downtrending  VTE ppx: HSQ 5000U q8

## 2020-12-14 NOTE — PROGRESS NOTE ADULT - SUBJECTIVE AND OBJECTIVE BOX
Dr. Kirstin Howard  Pager [phone]    PROGRESS NOTE:     Patient is a 50y old  Male who presents with a chief complaint of COVID (10 Dec 2020 16:54)      SUBJECTIVE / OVERNIGHT EVENTS: pt reports renal transplant in 1978 from his mother, never on dialysis, reluctant about initiating dialysis  ADDITIONAL REVIEW OF SYSTEMS: breathing okay, on 3L    MEDICATIONS  (STANDING):  atorvastatin 20 milliGRAM(s) Oral at bedtime  buMETAnide Injectable 2 milliGRAM(s) IV Push two times a day  dexAMETHasone  Injectable 6 milliGRAM(s) IV Push daily  hydrALAZINE 100 milliGRAM(s) Oral two times a day  pantoprazole  Injectable 40 milliGRAM(s) IV Push every 12 hours  sodium bicarbonate 1300 milliGRAM(s) Oral three times a day    MEDICATIONS  (PRN):  acetaminophen   Tablet .. 650 milliGRAM(s) Oral every 6 hours PRN Temp greater or equal to 38C (100.4F), Mild Pain (1 - 3)      CAPILLARY BLOOD GLUCOSE        I&O's Summary    13 Dec 2020 07:01  -  14 Dec 2020 07:00  --------------------------------------------------------  IN: 900 mL / OUT: 1900 mL / NET: -1000 mL    14 Dec 2020 07:01  -  14 Dec 2020 13:51  --------------------------------------------------------  IN: 0 mL / OUT: 400 mL / NET: -400 mL        PHYSICAL EXAM:  Vital Signs Last 24 Hrs  T(C): 36.7 (14 Dec 2020 13:19), Max: 37.1 (13 Dec 2020 20:52)  T(F): 98 (14 Dec 2020 13:19), Max: 98.7 (13 Dec 2020 20:52)  HR: 85 (14 Dec 2020 13:19) (45 - 85)  BP: 128/89 (14 Dec 2020 13:19) (128/71 - 140/84)  BP(mean): --  RR: 18 (14 Dec 2020 13:19) (18 - 18)  SpO2: 97% (14 Dec 2020 13:19) (94% - 97%)  GENERAL: NAD, on 3L  EYES: sclera clear  CHEST/LUNG: normal WOB, basilar crackle, speaking in full sentences  HEART: normal rate, trace pedal edema  ABDOMEN: Soft, Nontender, Nondistended  EXTREMITIES: WWP  PSYCH: Awake, alert, calm  NEUROLOGY: no focal deficits    LABS:                        10.3   1.91  )-----------( 454      ( 14 Dec 2020 09:01 )             31.3     12-14    142  |  106  |  109<H>  ----------------------------<  97  5.2   |  18<L>  |  5.23<H>    Ca    9.1      14 Dec 2020 09:01      RADIOLOGY & ADDITIONAL TESTS:  Results Reviewed:   Imaging Personally Reviewed:    Electrocardiogram Personally Reviewed:    COORDINATION OF CARE:  Care Discussed with Consultants/Other Providers [Y/N]: sanket Salinas, hold off dialysis catheter for now, renal fxn improving  Prior or Outpatient Records Reviewed [Y/N]:

## 2020-12-14 NOTE — PROGRESS NOTE ADULT - PROBLEM SELECTOR PLAN 1
Pt. with long standing history of kidney transplantation (1978), now with ABRIL on CKD in the setting of COVID-19. Scr was 3.83 on 11/24/20, increased to 5.3 on 12/5/20. Scr peaked at 6.79 on 12/11. Pt. initiated on IV diuretic therapy for respiratory distress/volume overload with good clinical response. Scr improved to 5.23 today. Labs reveiwied, no plans to initiate HD today. Pt. aware he will require dialytic therapy in near future. Recommend continuing diuretic therapy with IV Bumex 2mg BID. Pt. was on oral prednisone for kidney transplantation until 12/10/20. Pt. currently on IV Dexamethasone for COVID-19. Monitor labs and urine output. Avoid any potential nephrotoxins. Dose medications as per eGFR Pt. with long standing history of kidney transplantation (1978), now with ABRIL on CKD in the setting of COVID-19. Scr was 3.83 on 11/24/20, increased to 5.3 on 12/5/20. Scr peaked at 6.79 on 12/11. Pt. initiated on IV diuretic therapy for respiratory distress/volume overload with good clinical response. Scr decreased to 5.23 today. Pt. non-oliguric, maintaining good UOP. Pt. with likely resolving ABRIL. Discontinue any plan for HD catheter placement/HD at present. Continue diuretic therapy with IV Bumex 2mg BID. Pt. was on oral prednisone for kidney transplantation until 12/10/20. Pt. currently on IV Dexamethasone for COVID-19. Monitor labs and urine output. Avoid any potential nephrotoxins. Dose medications as per eGFR

## 2020-12-14 NOTE — PROVIDER CONTACT NOTE (OTHER) - SITUATION
pt plan to go for permacath placement today to begin sialysis r/t kidney failure. pt this AM during vitals verbalized to me in tears his reluctance to do procedure.

## 2020-12-14 NOTE — PROGRESS NOTE ADULT - SUBJECTIVE AND OBJECTIVE BOX
[facility] DIVISION OF KIDNEY DISEASES AND HYPERTENSION -- FOLLOW UP NOTE  --------------------------------------------------------------------------------  Chief Complaint:    24 hour events/subjective:        PAST HISTORY  --------------------------------------------------------------------------------  No significant changes to PMH, PSH, FHx, SHx, unless otherwise noted    ALLERGIES & MEDICATIONS  --------------------------------------------------------------------------------  Allergies    sulfa (Swelling)    Intolerances      Standing Inpatient Medications  atorvastatin 20 milliGRAM(s) Oral at bedtime  buMETAnide Injectable 2 milliGRAM(s) IV Push two times a day  dexAMETHasone  Injectable 6 milliGRAM(s) IV Push daily  hydrALAZINE 100 milliGRAM(s) Oral two times a day  pantoprazole  Injectable 40 milliGRAM(s) IV Push every 12 hours  sodium bicarbonate 1300 milliGRAM(s) Oral three times a day    REVIEW OF SYSTEMS  --------------------------------------------------------------------------------      All other systems were reviewed and are negative, except as noted.    VITALS/PHYSICAL EXAM  --------------------------------------------------------------------------------  T(C): 36.4 (12-14-20 @ 09:17), Max: 37.1 (12-13-20 @ 20:52)  HR: 49 (12-14-20 @ 09:17) (45 - 72)  BP: 140/84 (12-14-20 @ 09:17) (128/71 - 140/84)  RR: 18 (12-14-20 @ 09:17) (18 - 18)  SpO2: 97% (12-14-20 @ 09:17) (94% - 97%)  Wt(kg): --    12-13-20 @ 07:01  -  12-14-20 @ 07:00  --------------------------------------------------------  IN: 900 mL / OUT: 1900 mL / NET: -1000 mL    12-14-20 @ 07:01  -  12-14-20 @ 12:12  --------------------------------------------------------  IN: 0 mL / OUT: 400 mL / NET: -400 mL    Physical Exam:  	 Gen: On NC, NAD  	HEENT: No JVD  	Pulm: Fair air entry on anterior auscultation  	CV: S1S2+  	Abd: Soft, +BS   	Ext: No LE edema B/L  	Neuro: Awake, alert  	Skin: Warm and dry    LABS/STUDIES  --------------------------------------------------------------------------------              10.3   1.91  >-----------<  454      [12-14-20 @ 09:01]              31.3     142  |  106  |  109  ----------------------------<  97      [12-14-20 @ 09:01]  5.2   |  18  |  5.23        Ca     9.1     [12-14-20 @ 09:01]    Creatinine Trend:  SCr 5.23 [12-14 @ 09:01]  SCr 5.90 [12-13 @ 07:41]  SCr 6.66 [12-12 @ 07:50]  SCr 6.79 [12-11 @ 20:15]  SCr 6.46 [12-11 @ 07:52]       [facility] DIVISION OF KIDNEY DISEASES AND HYPERTENSION -- FOLLOW UP NOTE  --------------------------------------------------------------------------------  HPI: 50-year-old male with long standing history of kidney transplantation, advanced CKD admitted with COVID-19. Nephrology team following ABRIL on CKD. Upon review of labs on [facility]/[place], patient noted to have Scr of 3.83 on 11/24/20. On admission pt. noted to have Scr of 5.3 on 12/5/20. Scr peaked at 6.79 on 12/11. Pt. was initiated on IV Bumex for volume overload and oliguria. Pt. currently non-oliguric with ~1.9L UOP in 24 hours. Scr elevated/stable at 5.23 today.    Pt. seen and examined at bedside today. Pt. on NC at the time of evaluation. Pt. endorses improvement in SOB.     PAST HISTORY  --------------------------------------------------------------------------------  No significant changes to PMH, PSH, FHx, SHx, unless otherwise noted    ALLERGIES & MEDICATIONS  --------------------------------------------------------------------------------  Allergies    sulfa (Swelling)    Intolerances    Standing Inpatient Medications  atorvastatin 20 milliGRAM(s) Oral at bedtime  buMETAnide Injectable 2 milliGRAM(s) IV Push two times a day  dexAMETHasone  Injectable 6 milliGRAM(s) IV Push daily  hydrALAZINE 100 milliGRAM(s) Oral two times a day  pantoprazole  Injectable 40 milliGRAM(s) IV Push every 12 hours  sodium bicarbonate 1300 milliGRAM(s) Oral three times a day    REVIEW OF SYSTEMS  --------------------------------------------------------------------------------  Gen: +fatigue  Respiratory: + dyspnea (improved)  CV: No chest pain  GI: No abdominal pain  MSK: No LE edema  Neuro: No dizziness    All other systems were reviewed and are negative, except as noted.    VITALS/PHYSICAL EXAM  --------------------------------------------------------------------------------  T(C): 36.4 (12-14-20 @ 09:17), Max: 37.1 (12-13-20 @ 20:52)  HR: 49 (12-14-20 @ 09:17) (45 - 72)  BP: 140/84 (12-14-20 @ 09:17) (128/71 - 140/84)  RR: 18 (12-14-20 @ 09:17) (18 - 18)  SpO2: 97% (12-14-20 @ 09:17) (94% - 97%)  Wt(kg): --    12-13-20 @ 07:01  -  12-14-20 @ 07:00  --------------------------------------------------------  IN: 900 mL / OUT: 1900 mL / NET: -1000 mL    12-14-20 @ 07:01  -  12-14-20 @ 12:12  --------------------------------------------------------  IN: 0 mL / OUT: 400 mL / NET: -400 mL    Physical Exam:  	 Gen: On NC, NAD  	HEENT: No JVD  	Pulm: Fair air entry on anterior auscultation  	CV: S1S2+  	Abd: Soft, +BS   	Ext: No LE edema B/L  	Neuro: Awake, alert  	Skin: Warm and dry    LABS/STUDIES  --------------------------------------------------------------------------------              10.3   1.91  >-----------<  454      [12-14-20 @ 09:01]              31.3     142  |  106  |  109  ----------------------------<  97      [12-14-20 @ 09:01]  5.2   |  18  |  5.23        Ca     9.1     [12-14-20 @ 09:01]    Creatinine Trend:  SCr 5.23 [12-14 @ 09:01]  SCr 5.90 [12-13 @ 07:41]  SCr 6.66 [12-12 @ 07:50]  SCr 6.79 [12-11 @ 20:15]  SCr 6.46 [12-11 @ 07:52]       [facility] DIVISION OF KIDNEY DISEASES AND HYPERTENSION -- FOLLOW UP NOTE  --------------------------------------------------------------------------------  HPI: 50-year-old male with long standing history of kidney transplantation, advanced CKD admitted with COVID-19. Nephrology team following ABRIL on CKD. Upon review of labs on [facility]/[place], patient noted to have Scr of 3.83 on 11/24/20. On admission pt. noted to have Scr of 5.3 on 12/5/20. Scr peaked at 6.79 on 12/11. Pt. was initiated on IV Bumex for volume overload and oliguria. Pt. currently non-oliguric with ~1.9L UOP in 24 hours. Scr decreased to 5.23 today.    Pt. seen and examined at bedside today. Pt. on NC at the time of evaluation. Pt. endorses improvement in SOB.     PAST HISTORY  --------------------------------------------------------------------------------  No significant changes to PMH, PSH, FHx, SHx, unless otherwise noted    ALLERGIES & MEDICATIONS  --------------------------------------------------------------------------------  Allergies    sulfa (Swelling)    Intolerances    Standing Inpatient Medications  atorvastatin 20 milliGRAM(s) Oral at bedtime  buMETAnide Injectable 2 milliGRAM(s) IV Push two times a day  dexAMETHasone  Injectable 6 milliGRAM(s) IV Push daily  hydrALAZINE 100 milliGRAM(s) Oral two times a day  pantoprazole  Injectable 40 milliGRAM(s) IV Push every 12 hours  sodium bicarbonate 1300 milliGRAM(s) Oral three times a day    REVIEW OF SYSTEMS  --------------------------------------------------------------------------------  Gen: +fatigue  Respiratory: + dyspnea (improved)  CV: No chest pain  GI: No abdominal pain  MSK: No LE edema  Neuro: No dizziness    All other systems were reviewed and are negative, except as noted.    VITALS/PHYSICAL EXAM  --------------------------------------------------------------------------------  T(C): 36.4 (12-14-20 @ 09:17), Max: 37.1 (12-13-20 @ 20:52)  HR: 49 (12-14-20 @ 09:17) (45 - 72)  BP: 140/84 (12-14-20 @ 09:17) (128/71 - 140/84)  RR: 18 (12-14-20 @ 09:17) (18 - 18)  SpO2: 97% (12-14-20 @ 09:17) (94% - 97%)  Wt(kg): --    12-13-20 @ 07:01  -  12-14-20 @ 07:00  --------------------------------------------------------  IN: 900 mL / OUT: 1900 mL / NET: -1000 mL    12-14-20 @ 07:01  -  12-14-20 @ 12:12  --------------------------------------------------------  IN: 0 mL / OUT: 400 mL / NET: -400 mL    Physical Exam:  	 Gen: On NC, NAD  	HEENT: No JVD  	Pulm: Fair air entry on anterior auscultation  	CV: S1S2+  	Abd: Soft, +BS   	Ext: No LE edema B/L  	Neuro: Awake, alert  	Skin: Warm and dry    LABS/STUDIES  --------------------------------------------------------------------------------              10.3   1.91  >-----------<  454      [12-14-20 @ 09:01]              31.3     142  |  106  |  109  ----------------------------<  97      [12-14-20 @ 09:01]  5.2   |  18  |  5.23        Ca     9.1     [12-14-20 @ 09:01]    Creatinine Trend:  SCr 5.23 [12-14 @ 09:01]  SCr 5.90 [12-13 @ 07:41]  SCr 6.66 [12-12 @ 07:50]  SCr 6.79 [12-11 @ 20:15]  SCr 6.46 [12-11 @ 07:52]

## 2020-12-15 LAB
ANION GAP SERPL CALC-SCNC: 17 MMOL/L — HIGH (ref 7–14)
BUN SERPL-MCNC: 119 MG/DL — HIGH (ref 7–23)
CALCIUM SERPL-MCNC: 9.1 MG/DL — SIGNIFICANT CHANGE UP (ref 8.4–10.5)
CHLORIDE SERPL-SCNC: 104 MMOL/L — SIGNIFICANT CHANGE UP (ref 98–107)
CO2 SERPL-SCNC: 18 MMOL/L — LOW (ref 22–31)
CREAT SERPL-MCNC: 5.12 MG/DL — HIGH (ref 0.5–1.3)
GLUCOSE SERPL-MCNC: 117 MG/DL — HIGH (ref 70–99)
HCT VFR BLD CALC: 30.8 % — LOW (ref 39–50)
HGB BLD-MCNC: 10 G/DL — LOW (ref 13–17)
MCHC RBC-ENTMCNC: 32.5 GM/DL — SIGNIFICANT CHANGE UP (ref 32–36)
MCHC RBC-ENTMCNC: 33.1 PG — SIGNIFICANT CHANGE UP (ref 27–34)
MCV RBC AUTO: 102 FL — HIGH (ref 80–100)
NRBC # BLD: 0 /100 WBCS — SIGNIFICANT CHANGE UP
NRBC # FLD: 0 K/UL — SIGNIFICANT CHANGE UP
PLATELET # BLD AUTO: 434 K/UL — HIGH (ref 150–400)
POTASSIUM SERPL-MCNC: 5.3 MMOL/L — SIGNIFICANT CHANGE UP (ref 3.5–5.3)
POTASSIUM SERPL-SCNC: 5.3 MMOL/L — SIGNIFICANT CHANGE UP (ref 3.5–5.3)
RBC # BLD: 3.02 M/UL — LOW (ref 4.2–5.8)
RBC # FLD: 15.4 % — HIGH (ref 10.3–14.5)
SODIUM SERPL-SCNC: 139 MMOL/L — SIGNIFICANT CHANGE UP (ref 135–145)
WBC # BLD: 2.6 K/UL — LOW (ref 3.8–10.5)
WBC # FLD AUTO: 2.6 K/UL — LOW (ref 3.8–10.5)

## 2020-12-15 PROCEDURE — 99233 SBSQ HOSP IP/OBS HIGH 50: CPT

## 2020-12-15 PROCEDURE — 99232 SBSQ HOSP IP/OBS MODERATE 35: CPT | Mod: GC

## 2020-12-15 RX ADMIN — Medication 6 MILLIGRAM(S): at 17:10

## 2020-12-15 RX ADMIN — Medication 100 MILLIGRAM(S): at 07:19

## 2020-12-15 RX ADMIN — BUMETANIDE 2 MILLIGRAM(S): 0.25 INJECTION INTRAMUSCULAR; INTRAVENOUS at 17:10

## 2020-12-15 RX ADMIN — Medication 1300 MILLIGRAM(S): at 21:23

## 2020-12-15 RX ADMIN — PANTOPRAZOLE SODIUM 40 MILLIGRAM(S): 20 TABLET, DELAYED RELEASE ORAL at 17:10

## 2020-12-15 RX ADMIN — Medication 1300 MILLIGRAM(S): at 07:19

## 2020-12-15 RX ADMIN — PANTOPRAZOLE SODIUM 40 MILLIGRAM(S): 20 TABLET, DELAYED RELEASE ORAL at 07:19

## 2020-12-15 RX ADMIN — Medication 1300 MILLIGRAM(S): at 14:24

## 2020-12-15 RX ADMIN — BUMETANIDE 2 MILLIGRAM(S): 0.25 INJECTION INTRAMUSCULAR; INTRAVENOUS at 07:19

## 2020-12-15 RX ADMIN — ATORVASTATIN CALCIUM 20 MILLIGRAM(S): 80 TABLET, FILM COATED ORAL at 21:23

## 2020-12-15 RX ADMIN — Medication 100 MILLIGRAM(S): at 17:12

## 2020-12-15 NOTE — DIETITIAN INITIAL EVALUATION ADULT. - PERTINENT MEDS FT
MEDICATIONS  (STANDING):  atorvastatin 20 milliGRAM(s) Oral at bedtime  buMETAnide Injectable 2 milliGRAM(s) IV Push two times a day  dexAMETHasone  Injectable 6 milliGRAM(s) IV Push daily  hydrALAZINE 100 milliGRAM(s) Oral two times a day  pantoprazole  Injectable 40 milliGRAM(s) IV Push every 12 hours  sodium bicarbonate 1300 milliGRAM(s) Oral three times a day    MEDICATIONS  (PRN):  acetaminophen   Tablet .. 650 milliGRAM(s) Oral every 6 hours PRN Temp greater or equal to 38C (100.4F), Mild Pain (1 - 3)

## 2020-12-15 NOTE — PROGRESS NOTE ADULT - SUBJECTIVE AND OBJECTIVE BOX
[facility] DIVISION OF KIDNEY DISEASES AND HYPERTENSION -- FOLLOW UP NOTE  --------------------------------------------------------------------------------  HPI: 50-year-old male with long standing history of kidney transplantation, advanced CKD admitted with COVID-19. Nephrology team following ABRIL on CKD. Upon review of labs on [facility]/[place], patient noted to have Scr of 3.83 on 11/24/20. On admission pt. noted to have Scr of 5.3 on 12/5/20. Scr peaked at 6.79 on 12/11. Pt. was initiated on IV Bumex for volume overload and oliguria. Pt. currently non-oliguric with ~1.5L UOP in 24 hours. Scr decreased to 5.12 today.    Pt. seen and examined at bedside today. Pt. on room air, denies any shortness of breath.    PAST HISTORY  --------------------------------------------------------------------------------  No significant changes to PMH, PSH, FHx, SHx, unless otherwise noted    ALLERGIES & MEDICATIONS  --------------------------------------------------------------------------------  Allergies    sulfa (Swelling)    Intolerances    Standing Inpatient Medications  atorvastatin 20 milliGRAM(s) Oral at bedtime  buMETAnide Injectable 2 milliGRAM(s) IV Push two times a day  dexAMETHasone  Injectable 6 milliGRAM(s) IV Push daily  hydrALAZINE 100 milliGRAM(s) Oral two times a day  pantoprazole  Injectable 40 milliGRAM(s) IV Push every 12 hours  sodium bicarbonate 1300 milliGRAM(s) Oral three times a day    REVIEW OF SYSTEMS  --------------------------------------------------------------------------------  Gen: +fatigue  Respiratory: no dyspnea  CV: No chest pain  GI: No abdominal pain  MSK: No LE edema  Neuro: No dizziness    All other systems were reviewed and are negative, except as noted.    VITALS/PHYSICAL EXAM  --------------------------------------------------------------------------------  T(C): 36.8 (12-15-20 @ 10:08), Max: 36.8 (12-14-20 @ 18:19)  HR: 85 (12-15-20 @ 10:08) (70 - 85)  BP: 129/73 (12-15-20 @ 10:08) (128/80 - 134/83)  RR: 18 (12-15-20 @ 10:08) (18 - 20)  SpO2: 99% (12-15-20 @ 10:08) (96% - 99%)  Wt(kg): --    12-14-20 @ 07:01  -  12-15-20 @ 07:00  --------------------------------------------------------  IN: 1300 mL / OUT: 1500 mL / NET: -200 mL    12-15-20 @ 07:01  -  12-15-20 @ 12:44  --------------------------------------------------------  IN: 240 mL / OUT: 300 mL / NET: -60 mL    Physical Exam:  	 Gen: NAD, speaking full sentences  	HEENT: No JVD  	Pulm: Fair air entry on anterior auscultation  	CV: S1S2+  	Abd: Soft, +BS   	Ext: No LE edema B/L  	Neuro: Awake, alert  	Skin: Warm and dry    LABS/STUDIES  --------------------------------------------------------------------------------              10.0   2.60  >-----------<  434      [12-15-20 @ 06:15]              30.8     139  |  104  |  119  ----------------------------<  117      [12-15-20 @ 06:15]  5.3   |  18  |  5.12        Ca     9.1     [12-15-20 @ 06:15]    Creatinine Trend:  SCr 5.12 [12-15 @ 06:15]  SCr 5.23 [12-14 @ 09:01]  SCr 5.90 [12-13 @ 07:41]  SCr 6.66 [12-12 @ 07:50]  SCr 6.79 [12-11 @ 20:15] [facility] DIVISION OF KIDNEY DISEASES AND HYPERTENSION -- FOLLOW UP NOTE  --------------------------------------------------------------------------------  HPI: 50-year-old male with long standing history of kidney transplantation, advanced CKD admitted with COVID-19. Nephrology team following ABRIL on CKD. Upon review of labs on [facility]/[place], patient noted to have Scr of 3.83 on 11/24/20. On admission, pt. noted to have Scr of 5.3 on 12/5/20. Scr peaked at 6.79 on 12/11. Pt. was initiated on IV Bumex for volume overload and oliguria. Pt. currently non-oliguric with ~1.5L UOP in 24 hours. Scr decreased to 5.12 today.    Pt. seen and examined at bedside today. Pt. on room air, denies any shortness of breath.    PAST HISTORY  --------------------------------------------------------------------------------  No significant changes to PMH, PSH, FHx, SHx, unless otherwise noted    ALLERGIES & MEDICATIONS  --------------------------------------------------------------------------------  Allergies    sulfa (Swelling)    Intolerances    Standing Inpatient Medications  atorvastatin 20 milliGRAM(s) Oral at bedtime  buMETAnide Injectable 2 milliGRAM(s) IV Push two times a day  dexAMETHasone  Injectable 6 milliGRAM(s) IV Push daily  hydrALAZINE 100 milliGRAM(s) Oral two times a day  pantoprazole  Injectable 40 milliGRAM(s) IV Push every 12 hours  sodium bicarbonate 1300 milliGRAM(s) Oral three times a day    REVIEW OF SYSTEMS  --------------------------------------------------------------------------------  Gen: +fatigue  Respiratory: no dyspnea  CV: No chest pain  GI: No abdominal pain  MSK: No LE edema  Neuro: No dizziness    All other systems were reviewed and are negative, except as noted.    VITALS/PHYSICAL EXAM  --------------------------------------------------------------------------------  T(C): 36.8 (12-15-20 @ 10:08), Max: 36.8 (12-14-20 @ 18:19)  HR: 85 (12-15-20 @ 10:08) (70 - 85)  BP: 129/73 (12-15-20 @ 10:08) (128/80 - 134/83)  RR: 18 (12-15-20 @ 10:08) (18 - 20)  SpO2: 99% (12-15-20 @ 10:08) (96% - 99%)  Wt(kg): --    12-14-20 @ 07:01  -  12-15-20 @ 07:00  --------------------------------------------------------  IN: 1300 mL / OUT: 1500 mL / NET: -200 mL    12-15-20 @ 07:01  -  12-15-20 @ 12:44  --------------------------------------------------------  IN: 240 mL / OUT: 300 mL / NET: -60 mL    Physical Exam:  	 Gen: NAD, speaking full sentences  	HEENT: No JVD  	Pulm: Fair air entry on anterior auscultation  	CV: S1S2+  	Abd: Soft, +BS   	Ext: No LE edema B/L  	Neuro: Awake, alert  	Skin: Warm and dry    LABS/STUDIES  --------------------------------------------------------------------------------              10.0   2.60  >-----------<  434      [12-15-20 @ 06:15]              30.8     139  |  104  |  119  ----------------------------<  117      [12-15-20 @ 06:15]  5.3   |  18  |  5.12        Ca     9.1     [12-15-20 @ 06:15]    Creatinine Trend:  SCr 5.12 [12-15 @ 06:15]  SCr 5.23 [12-14 @ 09:01]  SCr 5.90 [12-13 @ 07:41]  SCr 6.66 [12-12 @ 07:50]  SCr 6.79 [12-11 @ 20:15]

## 2020-12-15 NOTE — DIETITIAN INITIAL EVALUATION ADULT. - PROBLEM SELECTOR PLAN 3
S/p kidney transplant in 1973 c/b CKD IV working on getting on transplant list at [facility].   - Cont. Prednisone 5 every other day (due today)  - Will t/b re Imuran w/ Nephro   - Nephrology c/s

## 2020-12-15 NOTE — PROGRESS NOTE ADULT - PROBLEM SELECTOR PLAN 2
Pt. with metabolic acidosis in the setting of ABRIL on CKD. Serum CO2 improved/stable at 18 today. Pt. on oral sodium bicarbonate therapy. Monitor CO2.    If any questions, please feel free to contact me  Benigno Romano   Nephrology Fellow  [phone]  (After 5 pm or on weekends please page the on-call fellow) Pt. with metabolic acidosis in the setting of ABRIL on CKD. Serum CO2 low/stable at 18 today. Pt. on oral sodium bicarbonate therapy. Monitor CO2.    If any questions, please feel free to contact me  Benigno Romano   Nephrology Fellow  [phone]  (After 5 pm or on weekends please page the on-call fellow)

## 2020-12-15 NOTE — DIETITIAN INITIAL EVALUATION ADULT. - PERTINENT LABORATORY DATA
12-15 Na139 mmol/L Glu 117 mg/dL<H> K+ 5.3 mmol/L Cr  5.12 mg/dL<H>  mg/dL<H> 12-11 Phos 6.2 mg/dL<H> 12-11 Alb 2.5 g/dL<L>

## 2020-12-15 NOTE — DIETITIAN INITIAL EVALUATION ADULT. - OTHER INFO
Unable to conduct face-to-face interview or nutrition-focused physical exam due to COVID19 contact precautions to mitigate spread of coronavirus. RDN interviewed with patient via in-house phone.    Per patient, appetite has been good prior to admission, and current appetite remains good with PO % reported on in-house meals. Patient denies chewing/swallowing difficulties at meals. Also denies N/V/C/D at this time, states his diarrhea had resolved. Patient endorsed he previously received renal diet education in the past. RD reinforced renal diet recommendation with patient over the phone, patient verbalized understanding but declined written materials/handouts offered at this time. All questions were answered at this time. Patient has no complain on current diet order.     Skin: No pressure injuries   Edema: None noted.

## 2020-12-15 NOTE — PROGRESS NOTE ADULT - PROBLEM SELECTOR PLAN 2
patient with viral sepsis 2/2 COVID present on admission now with worsening acute hypoxic respiratory failure, was on NRB, now on 2L NC, wean as able  Concern for concomitant pulmonary edema in setting of worsening renal failure  S/p lasix 80 IVP x1 on 12/10, c/w bumex 2mg IVP BID, strict I/Os  -persistent fevers at this time. Blood and urine cx unremarkable  c/w decadron x10 day course. Holding off remdesevir given renal failure  Trend D-dimer, ferritin. Ddimer downtrending  VTE ppx: HSQ 5000U q8 patient with viral sepsis 2/2 COVID present on admission now with worsening acute hypoxic respiratory failure, was on NRB, now on 2L NC, wean as able  Concern for concomitant pulmonary edema in setting of worsening renal failure  S/p lasix 80 IVP x1 on 12/10, c/w bumex 2mg IVP BID, strict I/Os  - Blood and urine cx unremarkable  c/w decadron x10 day course. Holding off remdesevir given renal failure  Trend D-dimer, ferritin. Ddimer downtrending  VTE ppx: HSQ 5000U q8

## 2020-12-15 NOTE — PROGRESS NOTE ADULT - SUBJECTIVE AND OBJECTIVE BOX
Dr. Kirstin Howard  Pager [phone]    PROGRESS NOTE:     Patient is a 50y old  Male who presents with a chief complaint of covid, ranjan on ckd (14 Dec 2020 13:50)      SUBJECTIVE / OVERNIGHT EVENTS: pt is breathing better, reports good appetite and making urine  ADDITIONAL REVIEW OF SYSTEMS: afebrile     MEDICATIONS  (STANDING):  atorvastatin 20 milliGRAM(s) Oral at bedtime  buMETAnide Injectable 2 milliGRAM(s) IV Push two times a day  dexAMETHasone  Injectable 6 milliGRAM(s) IV Push daily  hydrALAZINE 100 milliGRAM(s) Oral two times a day  pantoprazole  Injectable 40 milliGRAM(s) IV Push every 12 hours  sodium bicarbonate 1300 milliGRAM(s) Oral three times a day    MEDICATIONS  (PRN):  acetaminophen   Tablet .. 650 milliGRAM(s) Oral every 6 hours PRN Temp greater or equal to 38C (100.4F), Mild Pain (1 - 3)      CAPILLARY BLOOD GLUCOSE        I&O's Summary    14 Dec 2020 07:01  -  15 Dec 2020 07:00  --------------------------------------------------------  IN: 1300 mL / OUT: 1500 mL / NET: -200 mL    15 Dec 2020 07:01  -  15 Dec 2020 12:30  --------------------------------------------------------  IN: 240 mL / OUT: 300 mL / NET: -60 mL        PHYSICAL EXAM:  Vital Signs Last 24 Hrs  T(C): 36.8 (15 Dec 2020 10:08), Max: 36.8 (14 Dec 2020 18:19)  T(F): 98.3 (15 Dec 2020 10:08), Max: 98.3 (14 Dec 2020 18:19)  HR: 85 (15 Dec 2020 10:08) (70 - 85)  BP: 129/73 (15 Dec 2020 10:08) (128/80 - 134/83)  BP(mean): --  RR: 18 (15 Dec 2020 10:08) (18 - 20)  SpO2: 99% (15 Dec 2020 10:08) (96% - 99%)  GENERAL: NAD, on 2L  EYES: sclera clear  CHEST/LUNG: normal WOB, lungs mainly clear, speaking in full sentences  HEART: normal rate, no leg edema  ABDOMEN: Soft, Nontender, Nondistended  EXTREMITIES: WWP  PSYCH: Awake, alert, calm  NEUROLOGY: no focal deficits    LABS:                        10.0   2.60  )-----------( 434      ( 15 Dec 2020 06:15 )             30.8     12-15    139  |  104  |  119<H>  ----------------------------<  117<H>  5.3   |  18<L>  |  5.12<H>    Ca    9.1      15 Dec 2020 06:15      RADIOLOGY & ADDITIONAL TESTS:  Results Reviewed:   Imaging Personally Reviewed:  Electrocardiogram Personally Reviewed:    COORDINATION OF CARE:  Care Discussed with Consultants/Other Providers [Y/N]: nephrology Dr. Carter, renal fxn improving, no plan for dialysis  Prior or Outpatient Records Reviewed [Y/N]:

## 2020-12-15 NOTE — PROGRESS NOTE ADULT - PROBLEM SELECTOR PLAN 1
-ABRIL on CKD worsening in setting of COVID infection,   renal fxn improving, creat downtrending to 5.1 today, hold off IR shiley or dialysis for now, d/w nephrology  -S/p kidney transplant in 1978 c/b CKD IV, was pending placement on transplant list at [facility]. Off azathioprine given active infection with fevers  Currently with no urgent need for RRT as electrolytes stable, nml mental status, nonoliguric  S/p lasix 80 IVP x1 on 12/10, c/w bumex 2mg IVP BID, strict I/Os  c/w po bicarb for metabolic acidosis d/t advanced renal disease  Monitor electrolytes, BMP BID. Maintain low K, low phos diet  - Cont. sodium bicarb tabs  avoid nephrotoxic medications, 12/7  kidneys unremarkable  -F/U nephrology

## 2020-12-15 NOTE — DIETITIAN INITIAL EVALUATION ADULT. - ADD RECOMMEND
1. Continue current diet order, which remains appropriate at this time. 2. Monitor weights, labs, BM's, skin integrity, PO intake/tolerance. 3. Reinforce diet education with patient as needed.

## 2020-12-15 NOTE — PROGRESS NOTE ADULT - PROBLEM SELECTOR PLAN 1
Pt. with long standing history of kidney transplantation (1978), now with ABRIL on CKD in the setting of COVID-19. Scr was 3.83 on 11/24/20, increased to 5.3 on 12/5/20. Scr peaked at 6.79 on 12/11. Pt. initiated on IV diuretic therapy for respiratory distress/volume overload with good clinical response. Scr decreased to 5.12 today. Pt. non-oliguric, maintaining good UOP. Pt. with likely resolving ABRIL. Continue diuretic therapy with IV Bumex 2mg BID. Pt. was on oral prednisone for kidney transplantation until 12/10/20. Pt. currently on IV Dexamethasone for COVID-19. Monitor labs and urine output. Avoid any potential nephrotoxins. Dose medications as per eGFR Pt. with long standing history of kidney transplantation (1978), now with ABRIL on CKD in the setting of COVID-19. Scr was 3.83 on 11/24/20, increased to 5.3 on 12/5/20. Scr peaked at 6.79 on 12/11. Pt. initiated on IV diuretic therapy for respiratory distress/volume overload with good clinical response. Scr decreased to 5.12 today. Pt. non-oliguric, maintaining good UOP. Pt. with resolving ABRIL. Continue diuretic therapy with IV Bumex 2mg BID. Pt. was on oral prednisone for kidney transplantation until 12/10/20. Pt. currently on IV Dexamethasone for COVID-19. Monitor labs and urine output. Avoid any potential nephrotoxins. Dose medications as per eGFR

## 2020-12-16 ENCOUNTER — TRANSCRIPTION ENCOUNTER (OUTPATIENT)
Age: 50
End: 2020-12-16

## 2020-12-16 ENCOUNTER — APPOINTMENT (OUTPATIENT)
Dept: NEPHROLOGY | Facility: CLINIC | Age: 50
End: 2020-12-16

## 2020-12-16 DIAGNOSIS — E83.39 OTHER DISORDERS OF PHOSPHORUS METABOLISM: ICD-10-CM

## 2020-12-16 LAB
ALBUMIN SERPL ELPH-MCNC: 2.8 G/DL — LOW (ref 3.3–5)
ALP SERPL-CCNC: 80 U/L — SIGNIFICANT CHANGE UP (ref 40–120)
ALT FLD-CCNC: 107 U/L — HIGH (ref 4–41)
ANION GAP SERPL CALC-SCNC: 16 MMOL/L — HIGH (ref 7–14)
AST SERPL-CCNC: 70 U/L — HIGH (ref 4–40)
BASOPHILS # BLD AUTO: 0 K/UL — SIGNIFICANT CHANGE UP (ref 0–0.2)
BASOPHILS NFR BLD AUTO: 0 % — SIGNIFICANT CHANGE UP (ref 0–2)
BILIRUB SERPL-MCNC: 0.3 MG/DL — SIGNIFICANT CHANGE UP (ref 0.2–1.2)
BUN SERPL-MCNC: 116 MG/DL — HIGH (ref 7–23)
CALCIUM SERPL-MCNC: 9.1 MG/DL — SIGNIFICANT CHANGE UP (ref 8.4–10.5)
CHLORIDE SERPL-SCNC: 104 MMOL/L — SIGNIFICANT CHANGE UP (ref 98–107)
CO2 SERPL-SCNC: 18 MMOL/L — LOW (ref 22–31)
CREAT SERPL-MCNC: 4.94 MG/DL — HIGH (ref 0.5–1.3)
CRP SERPL-MCNC: 20.6 MG/L — HIGH
D DIMER BLD IA.RAPID-MCNC: 1857 NG/ML DDU — HIGH
EOSINOPHIL # BLD AUTO: 0.01 K/UL — SIGNIFICANT CHANGE UP (ref 0–0.5)
EOSINOPHIL NFR BLD AUTO: 0.3 % — SIGNIFICANT CHANGE UP (ref 0–6)
GLUCOSE SERPL-MCNC: 122 MG/DL — HIGH (ref 70–99)
HCT VFR BLD CALC: 29.7 % — LOW (ref 39–50)
HGB BLD-MCNC: 9.9 G/DL — LOW (ref 13–17)
IANC: 2.24 K/UL — SIGNIFICANT CHANGE UP (ref 1.5–8.5)
IMM GRANULOCYTES NFR BLD AUTO: 1.4 % — SIGNIFICANT CHANGE UP (ref 0–1.5)
LYMPHOCYTES # BLD AUTO: 0.29 K/UL — LOW (ref 1–3.3)
LYMPHOCYTES # BLD AUTO: 9.9 % — LOW (ref 13–44)
MAGNESIUM SERPL-MCNC: 2.7 MG/DL — HIGH (ref 1.6–2.6)
MCHC RBC-ENTMCNC: 33.3 GM/DL — SIGNIFICANT CHANGE UP (ref 32–36)
MCHC RBC-ENTMCNC: 33.8 PG — SIGNIFICANT CHANGE UP (ref 27–34)
MCV RBC AUTO: 101.4 FL — HIGH (ref 80–100)
MONOCYTES # BLD AUTO: 0.34 K/UL — SIGNIFICANT CHANGE UP (ref 0–0.9)
MONOCYTES NFR BLD AUTO: 11.6 % — SIGNIFICANT CHANGE UP (ref 2–14)
NEUTROPHILS # BLD AUTO: 2.24 K/UL — SIGNIFICANT CHANGE UP (ref 1.8–7.4)
NEUTROPHILS NFR BLD AUTO: 76.8 % — SIGNIFICANT CHANGE UP (ref 43–77)
NRBC # BLD: 0 /100 WBCS — SIGNIFICANT CHANGE UP
NRBC # FLD: 0 K/UL — SIGNIFICANT CHANGE UP
PHOSPHATE SERPL-MCNC: 5.9 MG/DL — HIGH (ref 2.5–4.5)
PLATELET # BLD AUTO: 437 K/UL — HIGH (ref 150–400)
POTASSIUM SERPL-MCNC: 4.9 MMOL/L — SIGNIFICANT CHANGE UP (ref 3.5–5.3)
POTASSIUM SERPL-SCNC: 4.9 MMOL/L — SIGNIFICANT CHANGE UP (ref 3.5–5.3)
PROT SERPL-MCNC: 6.9 G/DL — SIGNIFICANT CHANGE UP (ref 6–8.3)
RBC # BLD: 2.93 M/UL — LOW (ref 4.2–5.8)
RBC # FLD: 15.2 % — HIGH (ref 10.3–14.5)
SODIUM SERPL-SCNC: 138 MMOL/L — SIGNIFICANT CHANGE UP (ref 135–145)
WBC # BLD: 2.92 K/UL — LOW (ref 3.8–10.5)
WBC # FLD AUTO: 2.92 K/UL — LOW (ref 3.8–10.5)

## 2020-12-16 PROCEDURE — 99233 SBSQ HOSP IP/OBS HIGH 50: CPT

## 2020-12-16 RX ORDER — BUMETANIDE 0.25 MG/ML
2 INJECTION INTRAMUSCULAR; INTRAVENOUS
Refills: 0 | Status: DISCONTINUED | OUTPATIENT
Start: 2020-12-16 | End: 2020-12-17

## 2020-12-16 RX ORDER — PANTOPRAZOLE SODIUM 20 MG/1
40 TABLET, DELAYED RELEASE ORAL
Refills: 0 | Status: DISCONTINUED | OUTPATIENT
Start: 2020-12-16 | End: 2020-12-18

## 2020-12-16 RX ORDER — HEPARIN SODIUM 5000 [USP'U]/ML
5000 INJECTION INTRAVENOUS; SUBCUTANEOUS EVERY 8 HOURS
Refills: 0 | Status: DISCONTINUED | OUTPATIENT
Start: 2020-12-16 | End: 2020-12-18

## 2020-12-16 RX ORDER — SEVELAMER CARBONATE 2400 MG/1
800 POWDER, FOR SUSPENSION ORAL
Refills: 0 | Status: DISCONTINUED | OUTPATIENT
Start: 2020-12-16 | End: 2020-12-17

## 2020-12-16 RX ADMIN — SEVELAMER CARBONATE 800 MILLIGRAM(S): 2400 POWDER, FOR SUSPENSION ORAL at 18:33

## 2020-12-16 RX ADMIN — ATORVASTATIN CALCIUM 20 MILLIGRAM(S): 80 TABLET, FILM COATED ORAL at 21:01

## 2020-12-16 RX ADMIN — Medication 6 MILLIGRAM(S): at 17:56

## 2020-12-16 RX ADMIN — HEPARIN SODIUM 5000 UNIT(S): 5000 INJECTION INTRAVENOUS; SUBCUTANEOUS at 21:01

## 2020-12-16 RX ADMIN — HEPARIN SODIUM 5000 UNIT(S): 5000 INJECTION INTRAVENOUS; SUBCUTANEOUS at 13:30

## 2020-12-16 RX ADMIN — Medication 1300 MILLIGRAM(S): at 13:31

## 2020-12-16 RX ADMIN — Medication 100 MILLIGRAM(S): at 17:56

## 2020-12-16 RX ADMIN — Medication 1300 MILLIGRAM(S): at 05:01

## 2020-12-16 RX ADMIN — SEVELAMER CARBONATE 800 MILLIGRAM(S): 2400 POWDER, FOR SUSPENSION ORAL at 12:23

## 2020-12-16 RX ADMIN — BUMETANIDE 2 MILLIGRAM(S): 0.25 INJECTION INTRAMUSCULAR; INTRAVENOUS at 05:04

## 2020-12-16 RX ADMIN — PANTOPRAZOLE SODIUM 40 MILLIGRAM(S): 20 TABLET, DELAYED RELEASE ORAL at 05:05

## 2020-12-16 RX ADMIN — Medication 1300 MILLIGRAM(S): at 21:01

## 2020-12-16 RX ADMIN — Medication 100 MILLIGRAM(S): at 05:01

## 2020-12-16 RX ADMIN — PANTOPRAZOLE SODIUM 40 MILLIGRAM(S): 20 TABLET, DELAYED RELEASE ORAL at 18:33

## 2020-12-16 RX ADMIN — BUMETANIDE 2 MILLIGRAM(S): 0.25 INJECTION INTRAMUSCULAR; INTRAVENOUS at 17:56

## 2020-12-16 NOTE — DISCHARGE NOTE PROVIDER - HOSPITAL COURSE
50 y.o. M w/ a hx of renal transplant (1973), CKDIV, HTN who presents with fever/cough, found to be COVID+    Hospital Course:     49 YO M w/ a hx of renal transplant (1973), CKD IV, HTN who presents with fever, found to be COVID+ and in ABRIL. Course c/b dark stools now improved but with worsening acute hypoxic RF and renal failure, now improved with diuresis     Acute kidney injury.  Plan: -ABRIL on CKD worsening in setting of COVID infection,   renal fxn improving, creat downtrending to 4.2, close to baseline creat ~3.8  No indication for RRT  -S/p kidney transplant in 1978 c/b CKD IV, was pending placement on transplant list at [facility].   c/w po bicarb for metabolic acidosis d/t advanced renal disease  resume home imuran 150 mg qd,  prednisone 5 mg po qod on 12/21 after pt completes decadron course, lasix 40 mg qd, will give aranesp 100 mcg sc x1 prior to discharge  patient can be discharged home today, needs outpt f/u nephrology clinic, call [phone] to make appointment  -Email to [email or web address] on discharge for close f/u  - Cont. sodium bicarb tabs  avoid nephrotoxic medications, 12/7 US kidneys unremarkable  -c/w renvela for hyperphosphatemia  -case d/w nephrology fellow  -discharge patient home today, may  need to arrange transportation, f/u CM.     2019 novel coronavirus disease (COVID-19).  patient with viral sepsis secondary to COVID present on admission now with worsening acute hypoxic respiratory failure, now weaned to RA, check pulse ox with ambulation  [facility]  Concern for concomitant pulmonary edema in setting of worsening renal failure  S/p lasix 80 IVP x1 on 12/10, improved with bumex diuresis, now on po bumex  - Blood and urine cx unremarkable  c/w decadron x10 day course through 12/20. Holding off remdesevir given renal failure  Trend D-dimer, ferritin. Ddimer uptrending to 1857  VTE ppx: HSQ 5000U q8, on discharge, needs ASA 81 mg qd x30 days for extended DVT ppx.    Anemia secondary to renal failure  H/H stable in 9s  anemia likely multifactorial: Anemia of chronic disease from CKD however previously reported black stools and FOBT + now resolved  iron panel reviewed, transfuse for Hgb<7, keep T/S active  GI PCR negative, C diff negative  po protonix, outpt GI F/U as outpatient. No plan for inpt endoscopy.     Hypertension.    BP controlled, avoid hypotension so as not to decrease renal perfusion  continue Hydralazine  discontinue home nifedipine (procardia)         51 YO M w/ a hx of renal transplant (1973), CKD IV, HTN who presents with fever, found to be COVID+ and in ABRIL. Course c/b dark stools now improved but with worsening acute hypoxic RF and renal failure, now improved with diuresis    Acute kidney injury.    ABRIL on CKD worsening in setting of COVID infection,   renal fxn improving, creat downtrending to 4.2, close to baseline creat ~3.8  No indication for RRT  S/p kidney transplant in 1978 c/b CKD IV, was pending placement on transplant list at [facility].   c/w po bicarb for metabolic acidosis d/t advanced renal disease  resume home imuran 150 mg qd,  prednisone 5 mg po qod on 12/21 after pt completes decadron course, lasix 40 mg qd, will give aranesp 100 mcg sc x1 prior to discharge  patient can be discharged home today, needs outpt f/u nephrology clinic, call [phone] to make appointment  Email to [email or web address] on discharge for close f/u  Cont. sodium bicarb tabs  avoid nephrotoxic medications, 12/7 US kidneys unremarkable  -c/w renvela for hyperphosphatemia    2019 novel coronavirus disease (COVID-19).  patient with viral sepsis secondary to COVID present on admission now with worsening acute hypoxic respiratory failure, now weaned to RA, check pulse ox with ambulation  [facility] for close f/u  Concern for concomitant pulmonary edema in setting of worsening renal failure  S/p lasix 80 IVP x1 on 12/10, improved with bumex diuresis, now on po bumex   Blood and urine cx unremarkable  c/w decadron x10 day course through 12/20. Holding off remdesevir given renal failure  Trend D-dimer, ferritin. Ddimer uptrending to 1857  VTE ppx: HSQ 5000U q8, on discharge, needs ASA 81 mg qd x30 days for extended DVT ppx.    Anemia secondary to renal failure  H/H stable in 9s  anemia likely multifactorial: Anemia of chronic disease from CKD however previously reported black stools and FOBT + now resolved  iron panel reviewed, transfuse for Hgb<7, keep T/S active  GI PCR negative, C diff negative  po protonix, outpt GI F/U as outpatient. No plan for inpt endoscopy.     Hypertension.    BP controlled, avoid hypotension so as not to decrease renal perfusion  continue Hydralazine  discontinue home nifedipine (procardia)    12/18: Case discussed with Dr. Howard. Patient is stable and optimized for discharge. Medications reviewed and sent to agreed upon pharmacy.        51 YO M w/ a hx of renal transplant (1973), CKD IV, HTN who presents with fever, found to be COVID+ and in ABRIL. Course c/b dark stools now improved but with worsening acute hypoxic RF and renal failure, now improved with diuresis    Acute kidney injury.    ABRIL on CKD worsening in setting of COVID infection,   renal fxn improving, creat downtrending to 4.2, close to baseline creat ~3.8  No indication for RRT  S/p kidney transplant in 1978 c/b CKD IV, was pending placement on transplant list at [facility].   c/w po bicarb for metabolic acidosis d/t advanced renal disease  resume home imuran 150 mg qd,  prednisone 5 mg po qod on 12/21 after pt completes decadron course, lasix 40 mg qd, will give aranesp 100 mcg sc x1 prior to discharge  patient can be discharged home today, needs outpt f/u nephrology clinic, call [phone] to make appointment  Email to [email or web address] on discharge for close f/u  Cont. sodium bicarb tabs  avoid nephrotoxic medications, 12/7 US kidneys unremarkable  -c/w renvela for hyperphosphatemia    2019 novel coronavirus disease (COVID-19).  patient with viral sepsis secondary to COVID present on admission now with worsening acute hypoxic respiratory failure, now weaned to RA, check pulse ox with ambulation  [facility]  Concern for concomitant pulmonary edema in setting of worsening renal failure  S/p lasix 80 IVP x1 on 12/10, improved with bumex diuresis, now on po bumex   Blood and urine cx unremarkable  c/w decadron x10 day course through 12/20. Holding off remdesevir given renal failure  Trend D-dimer, ferritin. Ddimer uptrending to 1857  VTE ppx: HSQ 5000U q8, on discharge, needs ASA 81 mg qd x30 days for extended DVT ppx.    Anemia secondary to renal failure  H/H stable in 9s  anemia likely multifactorial: Anemia of chronic disease from CKD however previously reported black stools and FOBT + now resolved  iron panel reviewed, transfuse for Hgb<7, keep T/S active  GI PCR negative, C diff negative  po protonix, outpt GI F/U as outpatient. No plan for inpt endoscopy.     Hypertension.    BP controlled, avoid hypotension so as not to decrease renal perfusion  continue Hydralazine  discontinue home nifedipine (procardia)    12/18: Case discussed with Dr. Howard. Patient is stable and optimized for discharge. Medications reviewed with Dr. Howard and sent to agreed upon pharmacy.

## 2020-12-16 NOTE — PROGRESS NOTE ADULT - PROBLEM SELECTOR PLAN 2
patient with viral sepsis 2/2 COVID present on admission now with worsening acute hypoxic respiratory failure, was on NRB, now on 2L NC, wean as able  Concern for concomitant pulmonary edema in setting of worsening renal failure  S/p lasix 80 IVP x1 on 12/10  on iv bumex 2 mg bid, change to po 2 mg bid, strict I/Os  - Blood and urine cx unremarkable  c/w decadron x10 day course through 12/20. Holding off remdesevir given renal failure  Trend D-dimer, ferritin. Ddimer uptrending to 1857  VTE ppx: HSQ 5000U q8

## 2020-12-16 NOTE — PROGRESS NOTE ADULT - SUBJECTIVE AND OBJECTIVE BOX
Dr. Kirstin Howard  Pager [phone]    PROGRESS NOTE:     Patient is a 50y old  Male who presents with a chief complaint of 50 y.o. M w/ a hx of renal transplant (1973), CKD IV, HTN who presents with fever, found to be COVID+ and in ABRIL. Course c/b dark stools now improved but with worsening acute hypoxic RF and renal failure trending toward HD dependence. (15 Dec 2020 17:28)      SUBJECTIVE / OVERNIGHT EVENTS: pt reports breathing better, appetite improving  ADDITIONAL REVIEW OF SYSTEMS: afebrile     MEDICATIONS  (STANDING):  atorvastatin 20 milliGRAM(s) Oral at bedtime  buMETAnide 2 milliGRAM(s) Oral two times a day  dexAMETHasone  Injectable 6 milliGRAM(s) IV Push daily  heparin   Injectable 5000 Unit(s) SubCutaneous every 8 hours  hydrALAZINE 100 milliGRAM(s) Oral two times a day  pantoprazole  Injectable 40 milliGRAM(s) IV Push every 12 hours  sevelamer carbonate 800 milliGRAM(s) Oral three times a day with meals  sodium bicarbonate 1300 milliGRAM(s) Oral three times a day    MEDICATIONS  (PRN):  acetaminophen   Tablet .. 650 milliGRAM(s) Oral every 6 hours PRN Temp greater or equal to 38C (100.4F), Mild Pain (1 - 3)      CAPILLARY BLOOD GLUCOSE        I&O's Summary    15 Dec 2020 07:01  -  16 Dec 2020 07:00  --------------------------------------------------------  IN: 1130 mL / OUT: 1775 mL / NET: -645 mL    16 Dec 2020 07:01  -  16 Dec 2020 12:30  --------------------------------------------------------  IN: 160 mL / OUT: 300 mL / NET: -140 mL        PHYSICAL EXAM:  Vital Signs Last 24 Hrs  T(C): 36.2 (16 Dec 2020 10:28), Max: 36.6 (16 Dec 2020 04:53)  T(F): 97.1 (16 Dec 2020 10:28), Max: 97.9 (16 Dec 2020 04:53)  HR: 83 (16 Dec 2020 10:28) (65 - 91)  BP: 131/71 (16 Dec 2020 10:28) (118/72 - 137/78)  BP(mean): --  RR: 18 (16 Dec 2020 10:28) (18 - 18)  SpO2: 98% (16 Dec 2020 10:28) (98% - 99%)  GENERAL: NAD, on 2L  EYES: sclera clear  CHEST/LUNG: normal WOB, lungs mainly clear, speaking in full sentences  HEART: normal rate, no leg edema  ABDOMEN: Soft, Nontender, Nondistended  EXTREMITIES: WWP  PSYCH: Awake, alert, calm  NEUROLOGY: no focal deficits    LABS:                        9.9    2.92  )-----------( 437      ( 16 Dec 2020 05:42 )             29.7     12-16    138  |  104  |  116<H>  ----------------------------<  122<H>  4.9   |  18<L>  |  4.94<H>    Ca    9.1      16 Dec 2020 05:42  Phos  5.9     12-16  Mg     2.7     12-16    TPro  6.9  /  Alb  2.8<L>  /  TBili  0.3  /  DBili  x   /  AST  70<H>  /  ALT  107<H>  /  AlkPhos  80  12-16      RADIOLOGY & ADDITIONAL TESTS:  Results Reviewed:   Imaging Personally Reviewed:  Electrocardiogram Personally Reviewed:    COORDINATION OF CARE:  Care Discussed with Consultants/Other Providers [Y/N]: acp Lulú, trend renal fxn, add renvela and change bumex to po  Prior or Outpatient Records Reviewed [Y/N]:

## 2020-12-16 NOTE — PROGRESS NOTE ADULT - PROBLEM SELECTOR PLAN 1
-ABRIL on CKD worsening in setting of COVID infection,   renal fxn improving, creat downtrending to 4.9 today, baseline creat ~3.8   hold off IR shiley or dialysis for now, d/w nephrology  -S/p kidney transplant in 1978 c/b CKD IV, was pending placement on transplant list at [facility]. Off azathioprine given active infection with fevers  Currently with no urgent need for RRT as electrolytes stable, nml mental status, nonoliguric  S/p lasix 80 IVP x1 on 12/10, c/w bumex 2mg IVP BID, strict I/Os  c/w po bicarb for metabolic acidosis d/t advanced renal disease  Monitor electrolytes, BMP BID. Maintain low K, low phos diet  - Cont. sodium bicarb tabs  avoid nephrotoxic medications, 12/7 US kidneys unremarkable  -add renvela for hyperphosphatemia  -change bumex to po 2 mg bid  -F/U nephrology

## 2020-12-16 NOTE — PROGRESS NOTE ADULT - PROBLEM SELECTOR PLAN 1
Pt. with long standing history of kidney transplantation (1978), now with ABRIL on CKD in the setting of COVID-19. Scr was 3.83 on 11/24/20, increased to 5.3 on 12/5/20. Scr peaked at 6.79 on 12/11. Pt. initiated on IV diuretic therapy for respiratory distress/volume overload with good clinical response. Scr decreased to 4.94 today. Pt. non-oliguric, maintaining good UOP. Pt. with resolving ABRIL. Transition diuretic therapy with PO Bumex 2mg BID. Pt. was on oral prednisone for kidney transplantation until 12/10/20. Pt. currently on IV Dexamethasone for COVID-19. Monitor labs and urine output. Avoid any potential nephrotoxins. Dose medications as per eGFR

## 2020-12-16 NOTE — DISCHARGE NOTE PROVIDER - NSDCMRMEDTOKEN_GEN_ALL_CORE_FT
Aranesp 100 mcg/mL injectable solution: 1 dose(s) injectable 2 times a week  ergocalciferol 2000 intl units (50 mcg) oral capsule: 0.5 tab(s) orally once a day  hydrALAZINE 100 mg oral tablet: 1 tab(s) orally 2 times a day  Imuran: 150 milligram(s) orally once a day  Lasix 40 mg oral tablet: 40 milligram(s) orally 2 times a day  Lipitor 20 mg oral tablet: 1 tab(s) orally once a day  predniSONE 5 mg oral tablet: 1 tab(s) orally 3 to 4 times a week  Procardia XL: 120 milligram(s) orally once a day  sodium bicarbonate 650 mg oral tablet: 3 tab(s) orally 2 times a day   aspirin 81 mg oral delayed release tablet: 1 tab(s) orally once a day   dexamethasone 6 mg oral tablet: 1 tab(s) orally once a day     until 12/20  hydrALAZINE 100 mg oral tablet: 1 tab(s) orally 2 times a day  Imuran: 150 milligram(s) orally once a day  Lasix 40 mg oral tablet: 1 tab(s) orally once a day   Lipitor 20 mg oral tablet: 1 tab(s) orally once a day  pantoprazole 40 mg oral delayed release tablet: 1 tab(s) orally 2 times a day  predniSONE 5 mg oral delayed release tablet: 1 tab(s) orally every other day     START 12/21  sevelamer carbonate 800 mg oral tablet: 2 tab(s) orally 3 times a day (with meals)  sodium bicarbonate 650 mg oral tablet: 3 tab(s) orally 2 times a day

## 2020-12-16 NOTE — PROGRESS NOTE ADULT - ASSESSMENT
50 y.o. M w/ a hx of renal transplant (1973), CKD IV, HTN who presents with fever, found to be COVID+ and in ABRIL. Course c/b dark stools now improved but with worsening acute hypoxic RF and renal failure, now improved with diuresis

## 2020-12-16 NOTE — DISCHARGE NOTE PROVIDER - NSDCCPCAREPLAN_GEN_ALL_CORE_FT
PRINCIPAL DISCHARGE DIAGNOSIS  Diagnosis: Sepsis, due to unspecified organism, unspecified whether acute organ dysfunction present  Assessment and Plan of Treatment: You have been diagnosed with the COVID-19 virus during your hospital stay. You must self quarantine to complete a 14 day time period.  Monitor for fevers, shortness of breath and cough primarily.  Monitor your temperature daily to not any changes and increases.    It has been determined that you no longer need hospitalization and can recover while remaining in self-quarantine at home. You should follow the prevention steps below until a healthcare provider or local or state health department says you can return to your normal activities.  1. You should restrict activities outside your home, except for getting medical care.  2. Do not go to work, school, or public areas.  3. Avoid using public transportation, ride-sharing, or taxis.  4. Separate yourself from other people and animals in your home.  5. Call ahead before visiting your doctor.  6. Wear a facemask.  7. Cover your coughs and sneezes.  8. Clean your hands often.  9. Avoid sharing personal household items.  10. Clean all “high-touch” surfaces everyday.  11. Monitor your symptoms.  If you have a medical emergency and need to call 911, notify the dispatch personnel that you have COVID-19 If possible, put on a facemask before emergency medical services arrive.  12. Stopping home isolation.  Patients with confirmed COVID-19 should remain under home isolation precautions for 14 days since the positive COVID-19 test and until the risk of secondary transmission to others is thought to be low. The decision to discontinue home isolation precautions should be made on a case-by-case basis, in consultation with healthcare providers and state and local health departments. Your [facility] Department of Health can be reached at [phone] for further information about COVID-19.        SECONDARY DISCHARGE DIAGNOSES  Diagnosis: Acute kidney injury  Assessment and Plan of Treatment: Your creatinine level on day of discharge is______.  Follow up with your nephrologist within 2 weeks of discharge for further medical management.     PRINCIPAL DISCHARGE DIAGNOSIS  Diagnosis: Sepsis, due to unspecified organism, unspecified whether acute organ dysfunction present  Assessment and Plan of Treatment: You have been diagnosed with the COVID-19 virus during your hospital stay. You must self quarantine to complete a 14 day time period.  Monitor for fevers, shortness of breath and cough primarily.  Monitor your temperature daily to not any changes and increases.    It has been determined that you no longer need hospitalization and can recover while remaining in self-quarantine at home. You should follow the prevention steps below until a healthcare provider or local or state health department says you can return to your normal activities.  1. You should restrict activities outside your home, except for getting medical care.  2. Do not go to work, school, or public areas.  3. Avoid using public transportation, ride-sharing, or taxis.  4. Separate yourself from other people and animals in your home.  5. Call ahead before visiting your doctor.  6. Wear a facemask.  7. Cover your coughs and sneezes.  8. Clean your hands often.  9. Avoid sharing personal household items.  10. Clean all “high-touch” surfaces everyday.  11. Monitor your symptoms.  If you have a medical emergency and need to call 911, notify the dispatch personnel that you have COVID-19 If possible, put on a facemask before emergency medical services arrive.  12. Stopping home isolation.  Patients with confirmed COVID-19 should remain under home isolation precautions for 14 days since the positive COVID-19 test and until the risk of secondary transmission to others is thought to be low. The decision to discontinue home isolation precautions should be made on a case-by-case basis, in consultation with healthcare providers and state and local health departments. Your [facility] Department of Health can be reached at [phone] for further information about COVID-19.        SECONDARY DISCHARGE DIAGNOSES  Diagnosis: Acute kidney injury  Assessment and Plan of Treatment: Your creatinine level on day of discharge is 4.24.  Follow up with your nephrologist within 2 weeks of discharge for further medical management.

## 2020-12-16 NOTE — DISCHARGE NOTE PROVIDER - PROVIDER TOKENS
PROVIDER:[TOKEN:[[id number]]] PROVIDER:[TOKEN:[[id number]]],FREE:[LAST:[GI],PHONE:[(   )    -],FAX:[(   )    -],ADDRESS:[Follow up with GI consult outpatient within 1-2 weeks]],FREE:[LAST:[nephrology],PHONE:[(   )    -],FAX:[(   )    -],ADDRESS:[outpt f/u nephrology clinic, call [phone] to make appointment]]

## 2020-12-16 NOTE — DISCHARGE NOTE PROVIDER - CARE PROVIDERS DIRECT ADDRESSES
,[email or web address],DirectAddress_Unknown,DirectAddress_Unknown

## 2020-12-16 NOTE — PROGRESS NOTE ADULT - PROBLEM SELECTOR PLAN 2
Pt. with metabolic acidosis in the setting of ABRIL on CKD. Serum CO2 low/stable at 18 today. Pt. on oral sodium bicarbonate 1300 mg TID. Monitor CO2.

## 2020-12-16 NOTE — DISCHARGE NOTE PROVIDER - CARE PROVIDER_API CALL
Karena Us  INTERNAL MEDICINE  [address] [place] [address]  Phone: [phone]  Fax: [phone]  Follow Up Time:    Karena Us  INTERNAL MEDICINE  [address] [place] [address] [address] [place] [address]  Phone: [phone]  Fax: [phone]  Follow Up Time:     GI,   Follow up with GI consult outpatient within 1-2 weeks  Phone: (   )    -  Fax: (   )    -  Follow Up Time:     nephrology,   outpt f/u nephrology clinic, call [phone] to make appointment  Phone: (   )    -  Fax: (   )    -  Follow Up Time:

## 2020-12-16 NOTE — PROGRESS NOTE ADULT - SUBJECTIVE AND OBJECTIVE BOX
[facility] DIVISION OF KIDNEY DISEASES AND HYPERTENSION -- FOLLOW UP NOTE  --------------------------------------------------------------------------------  HPI: 50-year-old male with long standing history of kidney transplantation, advanced CKD admitted with COVID-19. Nephrology team following ABRIL on CKD. Upon review of labs on [facility], patient noted to have Scr of 3.83 on 11/24/20. On admission, pt. noted to have Scr of 5.3 on 12/5/20. Scr peaked at 6.79 on 12/11. Pt. was initiated on IV Bumex for volume overload and oliguria. Pt. currently non-oliguric with ~1.5L UOP in 24 hours. Scr decreased to 4.94 today.    Pt. seen and examined at bedside today. Pt. on room air, denies any shortness of breath. Eating during evaluation.    PAST HISTORY  --------------------------------------------------------------------------------  No significant changes to PMH, PSH, FHx, SHx, unless otherwise noted    ALLERGIES & MEDICATIONS  --------------------------------------------------------------------------------  Allergies    sulfa (Swelling)    Intolerances    Standing Inpatient Medications  atorvastatin 20 milliGRAM(s) Oral at bedtime  buMETAnide Injectable 2 milliGRAM(s) IV Push two times a day  dexAMETHasone  Injectable 6 milliGRAM(s) IV Push daily  heparin   Injectable 5000 Unit(s) SubCutaneous every 8 hours  hydrALAZINE 100 milliGRAM(s) Oral two times a day  pantoprazole  Injectable 40 milliGRAM(s) IV Push every 12 hours  sevelamer carbonate 800 milliGRAM(s) Oral three times a day with meals  sodium bicarbonate 1300 milliGRAM(s) Oral three times a day    REVIEW OF SYSTEMS  --------------------------------------------------------------------------------  Gen: +fatigue  Respiratory: no dyspnea  CV: No chest pain  GI: No abdominal pain  MSK: No LE edema  Neuro: No dizziness    All other systems were reviewed and are negative, except as noted.    VITALS/PHYSICAL EXAM  --------------------------------------------------------------------------------  T(C): 36.6 (12-16-20 @ 05:17), Max: 36.8 (12-15-20 @ 10:08)  HR: 65 (12-16-20 @ 05:17) (65 - 91)  BP: 118/72 (12-16-20 @ 05:17) (118/72 - 137/78)  RR: 18 (12-16-20 @ 05:17) (18 - 18)  SpO2: 99% (12-16-20 @ 05:17) (98% - 99%)  Wt(kg): --    12-15-20 @ 07:01  -  12-16-20 @ 07:00  --------------------------------------------------------  IN: 1130 mL / OUT: 1775 mL / NET: -645 mL    Physical Exam:  	 Gen: NAD, speaking full sentences  	HEENT: No JVD  	Pulm: Fair air entry on anterior auscultation  	CV: S1S2+  	Abd: Soft, +BS   	Ext: No LE edema B/L  	Neuro: Awake, alert  	Skin: Warm and dry    LABS/STUDIES  --------------------------------------------------------------------------------              9.9    2.92  >-----------<  437      [12-16-20 @ 05:42]              29.7     138  |  104  |  116  ----------------------------<  122      [12-16-20 @ 05:42]  4.9   |  18  |  4.94        Ca     9.1     [12-16-20 @ 05:42]      Mg     2.7     [12-16-20 @ 05:42]      Phos  5.9     [12-16-20 @ 05:42]    Creatinine Trend:  SCr 4.94 [12-16 @ 05:42]  SCr 5.12 [12-15 @ 06:15]  SCr 5.23 [12-14 @ 09:01]  SCr 5.90 [12-13 @ 07:41]  SCr 6.66 [12-12 @ 07:50]

## 2020-12-17 LAB
ANION GAP SERPL CALC-SCNC: 16 MMOL/L — HIGH (ref 7–14)
BASOPHILS # BLD AUTO: 0 K/UL — SIGNIFICANT CHANGE UP (ref 0–0.2)
BASOPHILS NFR BLD AUTO: 0 % — SIGNIFICANT CHANGE UP (ref 0–2)
BUN SERPL-MCNC: 114 MG/DL — HIGH (ref 7–23)
CALCIUM SERPL-MCNC: 9.1 MG/DL — SIGNIFICANT CHANGE UP (ref 8.4–10.5)
CHLORIDE SERPL-SCNC: 101 MMOL/L — SIGNIFICANT CHANGE UP (ref 98–107)
CO2 SERPL-SCNC: 20 MMOL/L — LOW (ref 22–31)
CREAT SERPL-MCNC: 4.64 MG/DL — HIGH (ref 0.5–1.3)
EOSINOPHIL # BLD AUTO: 0.01 K/UL — SIGNIFICANT CHANGE UP (ref 0–0.5)
EOSINOPHIL NFR BLD AUTO: 0.3 % — SIGNIFICANT CHANGE UP (ref 0–6)
GLUCOSE SERPL-MCNC: 121 MG/DL — HIGH (ref 70–99)
HCT VFR BLD CALC: 29.2 % — LOW (ref 39–50)
HGB BLD-MCNC: 9.5 G/DL — LOW (ref 13–17)
IANC: 2.69 K/UL — SIGNIFICANT CHANGE UP (ref 1.5–8.5)
IMM GRANULOCYTES NFR BLD AUTO: 2.6 % — HIGH (ref 0–1.5)
LYMPHOCYTES # BLD AUTO: 0.35 K/UL — LOW (ref 1–3.3)
LYMPHOCYTES # BLD AUTO: 10 % — LOW (ref 13–44)
MAGNESIUM SERPL-MCNC: 2.6 MG/DL — SIGNIFICANT CHANGE UP (ref 1.6–2.6)
MCHC RBC-ENTMCNC: 32.5 GM/DL — SIGNIFICANT CHANGE UP (ref 32–36)
MCHC RBC-ENTMCNC: 33.2 PG — SIGNIFICANT CHANGE UP (ref 27–34)
MCV RBC AUTO: 102.1 FL — HIGH (ref 80–100)
MONOCYTES # BLD AUTO: 0.36 K/UL — SIGNIFICANT CHANGE UP (ref 0–0.9)
MONOCYTES NFR BLD AUTO: 10.3 % — SIGNIFICANT CHANGE UP (ref 2–14)
NEUTROPHILS # BLD AUTO: 2.69 K/UL — SIGNIFICANT CHANGE UP (ref 1.8–7.4)
NEUTROPHILS NFR BLD AUTO: 76.8 % — SIGNIFICANT CHANGE UP (ref 43–77)
NRBC # BLD: 0 /100 WBCS — SIGNIFICANT CHANGE UP
NRBC # FLD: 0 K/UL — SIGNIFICANT CHANGE UP
PHOSPHATE SERPL-MCNC: 6.3 MG/DL — HIGH (ref 2.5–4.5)
PLATELET # BLD AUTO: 418 K/UL — HIGH (ref 150–400)
POTASSIUM SERPL-MCNC: 4.3 MMOL/L — SIGNIFICANT CHANGE UP (ref 3.5–5.3)
POTASSIUM SERPL-SCNC: 4.3 MMOL/L — SIGNIFICANT CHANGE UP (ref 3.5–5.3)
RBC # BLD: 2.86 M/UL — LOW (ref 4.2–5.8)
RBC # FLD: 15 % — HIGH (ref 10.3–14.5)
SODIUM SERPL-SCNC: 137 MMOL/L — SIGNIFICANT CHANGE UP (ref 135–145)
WBC # BLD: 3.5 K/UL — LOW (ref 3.8–10.5)
WBC # FLD AUTO: 3.5 K/UL — LOW (ref 3.8–10.5)

## 2020-12-17 PROCEDURE — 99233 SBSQ HOSP IP/OBS HIGH 50: CPT

## 2020-12-17 RX ORDER — SEVELAMER CARBONATE 2400 MG/1
1600 POWDER, FOR SUSPENSION ORAL
Refills: 0 | Status: DISCONTINUED | OUTPATIENT
Start: 2020-12-17 | End: 2020-12-18

## 2020-12-17 RX ADMIN — HEPARIN SODIUM 5000 UNIT(S): 5000 INJECTION INTRAVENOUS; SUBCUTANEOUS at 05:31

## 2020-12-17 RX ADMIN — HEPARIN SODIUM 5000 UNIT(S): 5000 INJECTION INTRAVENOUS; SUBCUTANEOUS at 22:25

## 2020-12-17 RX ADMIN — SEVELAMER CARBONATE 800 MILLIGRAM(S): 2400 POWDER, FOR SUSPENSION ORAL at 09:53

## 2020-12-17 RX ADMIN — SEVELAMER CARBONATE 1600 MILLIGRAM(S): 2400 POWDER, FOR SUSPENSION ORAL at 17:42

## 2020-12-17 RX ADMIN — PANTOPRAZOLE SODIUM 40 MILLIGRAM(S): 20 TABLET, DELAYED RELEASE ORAL at 17:42

## 2020-12-17 RX ADMIN — HEPARIN SODIUM 5000 UNIT(S): 5000 INJECTION INTRAVENOUS; SUBCUTANEOUS at 13:00

## 2020-12-17 RX ADMIN — Medication 1300 MILLIGRAM(S): at 22:25

## 2020-12-17 RX ADMIN — Medication 1300 MILLIGRAM(S): at 13:00

## 2020-12-17 RX ADMIN — Medication 100 MILLIGRAM(S): at 17:42

## 2020-12-17 RX ADMIN — Medication 6 MILLIGRAM(S): at 17:42

## 2020-12-17 RX ADMIN — ATORVASTATIN CALCIUM 20 MILLIGRAM(S): 80 TABLET, FILM COATED ORAL at 22:25

## 2020-12-17 RX ADMIN — BUMETANIDE 2 MILLIGRAM(S): 0.25 INJECTION INTRAMUSCULAR; INTRAVENOUS at 05:31

## 2020-12-17 RX ADMIN — Medication 1300 MILLIGRAM(S): at 05:31

## 2020-12-17 RX ADMIN — Medication 100 MILLIGRAM(S): at 05:31

## 2020-12-17 RX ADMIN — SEVELAMER CARBONATE 800 MILLIGRAM(S): 2400 POWDER, FOR SUSPENSION ORAL at 12:56

## 2020-12-17 RX ADMIN — PANTOPRAZOLE SODIUM 40 MILLIGRAM(S): 20 TABLET, DELAYED RELEASE ORAL at 05:32

## 2020-12-17 NOTE — PROGRESS NOTE ADULT - SUBJECTIVE AND OBJECTIVE BOX
Dr. Kirstin Howard  Pager [phone]    PROGRESS NOTE:     Patient is a 50y old  Male who presents with a chief complaint of covid, ranjan on ckd (16 Dec 2020 12:30)      SUBJECTIVE / OVERNIGHT EVENTS: pt breathing better, on 2L, afebrile  ADDITIONAL REVIEW OF SYSTEMS: no chest pain    MEDICATIONS  (STANDING):  atorvastatin 20 milliGRAM(s) Oral at bedtime  buMETAnide 2 milliGRAM(s) Oral two times a day  dexAMETHasone  Injectable 6 milliGRAM(s) IV Push daily  heparin   Injectable 5000 Unit(s) SubCutaneous every 8 hours  hydrALAZINE 100 milliGRAM(s) Oral two times a day  pantoprazole    Tablet 40 milliGRAM(s) Oral two times a day  sevelamer carbonate 800 milliGRAM(s) Oral three times a day with meals  sodium bicarbonate 1300 milliGRAM(s) Oral three times a day    MEDICATIONS  (PRN):  acetaminophen   Tablet .. 650 milliGRAM(s) Oral every 6 hours PRN Temp greater or equal to 38C (100.4F), Mild Pain (1 - 3)      CAPILLARY BLOOD GLUCOSE        I&O's Summary    16 Dec 2020 07:01  -  17 Dec 2020 07:00  --------------------------------------------------------  IN: 965 mL / OUT: 1730 mL / NET: -765 mL        PHYSICAL EXAM:  Vital Signs Last 24 Hrs  T(C): 36.7 (17 Dec 2020 08:24), Max: 36.9 (16 Dec 2020 20:46)  T(F): 98.1 (17 Dec 2020 08:24), Max: 98.5 (16 Dec 2020 20:46)  HR: 95 (17 Dec 2020 08:24) (74 - 95)  BP: 137/85 (17 Dec 2020 08:24) (115/81 - 137/85)  BP(mean): --  RR: 17 (17 Dec 2020 08:24) (16 - 18)  SpO2: 98% (17 Dec 2020 08:24) (97% - 100%)  GENERAL: NAD, on 2L  EYES: sclera clear  CHEST/LUNG: normal WOB, lungs clear  HEART: normal rate, no leg edema  ABDOMEN: Soft, Nontender, Nondistended  EXTREMITIES: WWP  PSYCH: Awake, alert, calm  NEUROLOGY: no focal deficits    LABS:                        9.5    3.50  )-----------( 418      ( 17 Dec 2020 07:11 )             29.2     12-17    137  |  101  |  114<H>  ----------------------------<  121<H>  4.3   |  20<L>  |  4.64<H>    Ca    9.1      17 Dec 2020 07:11  Phos  6.3     12-17  Mg     2.6     12-17    TPro  6.9  /  Alb  2.8<L>  /  TBili  0.3  /  DBili  x   /  AST  70<H>  /  ALT  107<H>  /  AlkPhos  80  12-16    RADIOLOGY & ADDITIONAL TESTS:  Results Reviewed:   Imaging Personally Reviewed:    Electrocardiogram Personally Reviewed:    COORDINATION OF CARE:  Care Discussed with Consultants/Other Providers [Y/N]:  Prior or Outpatient Records Reviewed [Y/N]:

## 2020-12-17 NOTE — PROGRESS NOTE ADULT - PROBLEM SELECTOR PLAN 2
patient with viral sepsis 2/2 COVID present on admission now with worsening acute hypoxic respiratory failure, was on NRB, now on 2L NC, wean as able  will need home O2, email to [email or web address] on discharge for close f/u  Concern for concomitant pulmonary edema in setting of worsening renal failure  S/p lasix 80 IVP x1 on 12/10, improved with bumex diuresis, now on po bumex  - Blood and urine cx unremarkable  c/w decadron x10 day course through 12/20. Holding off remdesevir given renal failure  Trend D-dimer, ferritin. Ddimer uptrending to 1857  VTE ppx: HSQ 5000U q8

## 2020-12-17 NOTE — PROGRESS NOTE ADULT - PROBLEM SELECTOR PLAN 2
Pt. with metabolic acidosis in the setting of ABRIL on CKD. Serum CO2 improved to 18 today. Pt. on oral sodium bicarbonate 1300 mg TID. Monitor CO2.

## 2020-12-17 NOTE — PROGRESS NOTE ADULT - PROBLEM SELECTOR PLAN 1
Pt. with long standing history of kidney transplantation (1978), now with ABRIL on CKD in the setting of COVID-19. Scr was 3.83 on 11/24/20, increased to 5.3 on 12/5/20. Scr peaked at 6.79 on 12/11. Pt. initiated on IV diuretic therapy for respiratory distress/volume overload with good clinical response. Pt. now transitioned to PO Bumex. Scr decreased to 4.64 today. Pt. non-oliguric, maintaining good UOP. Pt. with resolving ABRIL. Pt. was on oral prednisone for kidney transplantation until 12/10/20. Pt. currently on IV Dexamethasone for COVID-19. On discharge, will need to transition back to PO Prednisone. Monitor labs and urine output. Avoid any potential nephrotoxins. Dose medications as per eGFR Pt. with long standing history of kidney transplantation (1978), now with ABRIL on CKD in the setting of COVID-19. Scr was 3.83 on 11/24/20, increased to 5.3 on 12/5/20. Scr peaked at 6.79 on 12/11. Pt was initiated on IV diuretic therapy for respiratory distress/volume overload with good clinical response. Pt. now transitioned to PO Bumex but would hold for today.  Scr decreased to 4.64 today. Pt. non-oliguric, maintaining good UOP. Pt. with resolving ABRIL. Pt. was on oral prednisone for kidney transplantation until 12/10/20. Pt. currently on IV Dexamethasone for COVID-19. On discharge, will need to transition back to PO Prednisone. Recommend hold diuretics for today and re evaluate tomorrow.  Monitor labs and urine output. Avoid any potential nephrotoxins. Dose medications as per eGFR

## 2020-12-17 NOTE — PROGRESS NOTE ADULT - PROBLEM SELECTOR PLAN 1
-ABRIL on CKD worsening in setting of COVID infection,   renal fxn improving, creat downtrending to 4.6 today, baseline creat ~3.8   hold off IR shiley or dialysis for now, d/w nephrology  -S/p kidney transplant in 1978 c/b CKD IV, was pending placement on transplant list at [facility]. Off azathioprine given active infection with fevers  Currently with no urgent need for RRT as electrolytes stable, nml mental status, nonoliguric  S/p lasix 80 IVP x1 on 12/10, c/w bumex 2mg IVP BID, strict I/Os  c/w po bicarb for metabolic acidosis d/t advanced renal disease  Monitor electrolytes, BMP BID. Maintain low K, low phos diet  - Cont. sodium bicarb tabs  avoid nephrotoxic medications, 12/7  kidneys unremarkable  -c/w renvela for hyperphosphatemia  -changed bumex to 2 mg po bid  -F/U nephrology  -d/c plan this weekend on home oxygen, f/u CM

## 2020-12-18 ENCOUNTER — TRANSCRIPTION ENCOUNTER (OUTPATIENT)
Age: 50
End: 2020-12-18

## 2020-12-18 VITALS
OXYGEN SATURATION: 98 % | HEART RATE: 86 BPM | DIASTOLIC BLOOD PRESSURE: 73 MMHG | RESPIRATION RATE: 18 BRPM | SYSTOLIC BLOOD PRESSURE: 131 MMHG | TEMPERATURE: 99 F

## 2020-12-18 LAB
ANION GAP SERPL CALC-SCNC: 16 MMOL/L — HIGH (ref 7–14)
BUN SERPL-MCNC: 103 MG/DL — HIGH (ref 7–23)
CALCIUM SERPL-MCNC: 9 MG/DL — SIGNIFICANT CHANGE UP (ref 8.4–10.5)
CHLORIDE SERPL-SCNC: 101 MMOL/L — SIGNIFICANT CHANGE UP (ref 98–107)
CO2 SERPL-SCNC: 19 MMOL/L — LOW (ref 22–31)
CREAT SERPL-MCNC: 4.24 MG/DL — HIGH (ref 0.5–1.3)
GLUCOSE SERPL-MCNC: 118 MG/DL — HIGH (ref 70–99)
HCT VFR BLD CALC: 25.6 % — LOW (ref 39–50)
HGB BLD-MCNC: 8.8 G/DL — LOW (ref 13–17)
MCHC RBC-ENTMCNC: 34.2 PG — HIGH (ref 27–34)
MCHC RBC-ENTMCNC: 34.4 GM/DL — SIGNIFICANT CHANGE UP (ref 32–36)
MCV RBC AUTO: 99.6 FL — SIGNIFICANT CHANGE UP (ref 80–100)
NRBC # BLD: 0 /100 WBCS — SIGNIFICANT CHANGE UP
NRBC # FLD: 0 K/UL — SIGNIFICANT CHANGE UP
PLATELET # BLD AUTO: 407 K/UL — HIGH (ref 150–400)
POTASSIUM SERPL-MCNC: 4.5 MMOL/L — SIGNIFICANT CHANGE UP (ref 3.5–5.3)
POTASSIUM SERPL-SCNC: 4.5 MMOL/L — SIGNIFICANT CHANGE UP (ref 3.5–5.3)
RBC # BLD: 2.57 M/UL — LOW (ref 4.2–5.8)
RBC # FLD: 14.6 % — HIGH (ref 10.3–14.5)
SODIUM SERPL-SCNC: 136 MMOL/L — SIGNIFICANT CHANGE UP (ref 135–145)
WBC # BLD: 4.4 K/UL — SIGNIFICANT CHANGE UP (ref 3.8–10.5)
WBC # FLD AUTO: 4.4 K/UL — SIGNIFICANT CHANGE UP (ref 3.8–10.5)

## 2020-12-18 PROCEDURE — 99239 HOSP IP/OBS DSCHRG MGMT >30: CPT

## 2020-12-18 PROCEDURE — 99233 SBSQ HOSP IP/OBS HIGH 50: CPT

## 2020-12-18 RX ORDER — FUROSEMIDE 40 MG
1 TABLET ORAL
Qty: 30 | Refills: 0
Start: 2020-12-18 | End: 2021-01-16

## 2020-12-18 RX ORDER — PANTOPRAZOLE SODIUM 20 MG/1
1 TABLET, DELAYED RELEASE ORAL
Qty: 0 | Refills: 0 | DISCHARGE
Start: 2020-12-18 | End: 2021-01-16

## 2020-12-18 RX ORDER — ERGOCALCIFEROL 1.25 MG/1
0.5 CAPSULE ORAL
Qty: 0 | Refills: 0 | DISCHARGE

## 2020-12-18 RX ORDER — SEVELAMER CARBONATE 2400 MG/1
2 POWDER, FOR SUSPENSION ORAL
Qty: 180 | Refills: 0
Start: 2020-12-18 | End: 2021-01-16

## 2020-12-18 RX ORDER — PANTOPRAZOLE SODIUM 20 MG/1
1 TABLET, DELAYED RELEASE ORAL
Qty: 60 | Refills: 0
Start: 2020-12-18 | End: 2021-01-16

## 2020-12-18 RX ORDER — PREDNISOLONE 5 MG
1 TABLET ORAL
Qty: 15 | Refills: 0
Start: 2020-12-18 | End: 2021-01-16

## 2020-12-18 RX ORDER — DARBEPOETIN ALFA IN POLYSORBAT 200MCG/0.4
100 PEN INJECTOR (ML) SUBCUTANEOUS ONCE
Refills: 0 | Status: DISCONTINUED | OUTPATIENT
Start: 2020-12-18 | End: 2020-12-18

## 2020-12-18 RX ORDER — DEXAMETHASONE 0.5 MG/5ML
1 ELIXIR ORAL
Qty: 2 | Refills: 0
Start: 2020-12-18 | End: 2020-12-19

## 2020-12-18 RX ORDER — DARBEPOETIN ALFA IN POLYSORBAT 200MCG/0.4
1 PEN INJECTOR (ML) SUBCUTANEOUS
Qty: 0 | Refills: 0 | DISCHARGE

## 2020-12-18 RX ORDER — FUROSEMIDE 40 MG
40 TABLET ORAL
Qty: 0 | Refills: 0 | DISCHARGE

## 2020-12-18 RX ORDER — ASPIRIN/CALCIUM CARB/MAGNESIUM 324 MG
1 TABLET ORAL
Qty: 30 | Refills: 0
Start: 2020-12-18 | End: 2021-01-16

## 2020-12-18 RX ORDER — NIFEDIPINE 30 MG
120 TABLET, EXTENDED RELEASE 24 HR ORAL
Qty: 0 | Refills: 0 | DISCHARGE

## 2020-12-18 RX ORDER — FUROSEMIDE 40 MG
1 TABLET ORAL
Qty: 0 | Refills: 0 | DISCHARGE
Start: 2020-12-18 | End: 2021-01-16

## 2020-12-18 RX ADMIN — HEPARIN SODIUM 5000 UNIT(S): 5000 INJECTION INTRAVENOUS; SUBCUTANEOUS at 05:30

## 2020-12-18 RX ADMIN — PANTOPRAZOLE SODIUM 40 MILLIGRAM(S): 20 TABLET, DELAYED RELEASE ORAL at 17:21

## 2020-12-18 RX ADMIN — SEVELAMER CARBONATE 1600 MILLIGRAM(S): 2400 POWDER, FOR SUSPENSION ORAL at 17:19

## 2020-12-18 RX ADMIN — Medication 1300 MILLIGRAM(S): at 05:30

## 2020-12-18 RX ADMIN — SEVELAMER CARBONATE 1600 MILLIGRAM(S): 2400 POWDER, FOR SUSPENSION ORAL at 12:06

## 2020-12-18 RX ADMIN — Medication 100 MILLIGRAM(S): at 17:20

## 2020-12-18 RX ADMIN — Medication 100 MILLIGRAM(S): at 05:30

## 2020-12-18 RX ADMIN — HEPARIN SODIUM 5000 UNIT(S): 5000 INJECTION INTRAVENOUS; SUBCUTANEOUS at 13:00

## 2020-12-18 RX ADMIN — Medication 1300 MILLIGRAM(S): at 13:00

## 2020-12-18 RX ADMIN — SEVELAMER CARBONATE 1600 MILLIGRAM(S): 2400 POWDER, FOR SUSPENSION ORAL at 08:30

## 2020-12-18 RX ADMIN — Medication 6 MILLIGRAM(S): at 17:19

## 2020-12-18 RX ADMIN — PANTOPRAZOLE SODIUM 40 MILLIGRAM(S): 20 TABLET, DELAYED RELEASE ORAL at 05:31

## 2020-12-18 NOTE — PROGRESS NOTE ADULT - PROVIDER SPECIALTY LIST ADULT
Hospitalist
Internal Medicine
Nephrology
Hospitalist
Nephrology
Hospitalist

## 2020-12-18 NOTE — PROGRESS NOTE ADULT - PROBLEM SELECTOR PLAN 1
-ABRIL on CKD worsening in setting of COVID infection,   renal fxn improving, creat downtrending to 4.2, close to baseline creat ~3.8  No indication for RRT  -S/p kidney transplant in 1978 c/b CKD IV, was pending placement on transplant list at [facility].   c/w po bicarb for metabolic acidosis d/t advanced renal disease  resume home imuran 150 mg qd,  prednisone 5 mg po qod on 12/21 after pt completes decadron course, lasix 40 mg qd, will give aranesp 100 mcg sc x1 prior to discharge  patient can be discharged home today, needs outpt f/u nephrology clinic, call [phone] to make appointment  -Email to [email or web address] on discharge for close f/u  - Cont. sodium bicarb tabs  avoid nephrotoxic medications, 12/7  kidneys unremarkable  -c/w renvela for hyperphosphatemia  -case d/w nephrology fellow  -discharge patient home today, may  need to arrange transportation, f/u CM

## 2020-12-18 NOTE — PROGRESS NOTE ADULT - SUBJECTIVE AND OBJECTIVE BOX
Dr. Kirstin Howard  Pager [phone]    PROGRESS NOTE:     Patient is a 50y old  Male who presents with a chief complaint of covid, hypoxia, ranjan (17 Dec 2020 09:18)      SUBJECTIVE / OVERNIGHT EVENTS: pt denies chest pain or sob   ADDITIONAL REVIEW OF SYSTEMS: on RA, feels stronger     MEDICATIONS  (STANDING):  atorvastatin 20 milliGRAM(s) Oral at bedtime  darbepoetin Injectable Syringe 100 MICROGram(s) SubCutaneous once  dexAMETHasone  Injectable 6 milliGRAM(s) IV Push daily  heparin   Injectable 5000 Unit(s) SubCutaneous every 8 hours  hydrALAZINE 100 milliGRAM(s) Oral two times a day  pantoprazole    Tablet 40 milliGRAM(s) Oral two times a day  sevelamer carbonate 1600 milliGRAM(s) Oral three times a day with meals  sodium bicarbonate 1300 milliGRAM(s) Oral three times a day    MEDICATIONS  (PRN):  acetaminophen   Tablet .. 650 milliGRAM(s) Oral every 6 hours PRN Temp greater or equal to 38C (100.4F), Mild Pain (1 - 3)      CAPILLARY BLOOD GLUCOSE        I&O's Summary    17 Dec 2020 07:01  -  18 Dec 2020 07:00  --------------------------------------------------------  IN: 1105 mL / OUT: 1240 mL / NET: -135 mL    18 Dec 2020 07:01  -  18 Dec 2020 11:47  --------------------------------------------------------  IN: 250 mL / OUT: 700 mL / NET: -450 mL        PHYSICAL EXAM:  Vital Signs Last 24 Hrs  T(C): 37 (18 Dec 2020 10:00), Max: 37 (17 Dec 2020 20:37)  T(F): 98.6 (18 Dec 2020 10:00), Max: 98.6 (17 Dec 2020 20:37)  HR: 150 (18 Dec 2020 11:00) (75 - 150)  BP: 138/63 (18 Dec 2020 10:00) (122/89 - 138/63)  BP(mean): --  RR: 17 (18 Dec 2020 10:00) (16 - 18)  SpO2: 98% (18 Dec 2020 11:00) (92% - 99%)  GENERAL: NAD, on RA  EYES: sclera clear  CHEST/LUNG: normal WOB, lungs clear  HEART: normal rate, no leg edema  ABDOMEN: Soft, Nontender, Nondistended  EXTREMITIES: WWP  PSYCH: Awake, alert, calm  NEUROLOGY: no focal deficits    LABS:                        8.8    4.40  )-----------( 407      ( 18 Dec 2020 07:32 )             25.6     12-18    136  |  101  |  103<H>  ----------------------------<  118<H>  4.5   |  19<L>  |  4.24<H>    Ca    9.0      18 Dec 2020 07:32  Phos  6.3     12-17  Mg     2.6     12-17      RADIOLOGY & ADDITIONAL TESTS:  Results Reviewed:   Imaging Personally Reviewed:  Electrocardiogram Personally Reviewed:    COORDINATION OF CARE:  Care Discussed with Consultants/Other Providers [Y/N]: sanket Waldrop, d/c home today, renal fellow Dr. Romano, aranesp 100 mcg x1, resume prednisone 5 mg qod, imduran 150 mg qd, outpt f/u renal group [phone]   Prior or Outpatient Records Reviewed [Y/N]:

## 2020-12-18 NOTE — PROGRESS NOTE ADULT - PROBLEM SELECTOR PLAN 1
Problem: Pressure Ulcer, Risk for  Goal: # Skin remains intact  Outcome: Outcome Met, Continue evaluating goal progress toward completion  No new skin abnormalities.    Problem: At Risk for Falls  Goal: # Patient does not fall  Outcome: Outcome Met, Continue evaluating goal progress toward completion  Patient remains fall free. Call light within reach.       Pt. with long standing history of kidney transplantation (1978), now with ABRIL on CKD in the setting of COVID-19. Scr was 3.83 on 11/24/20, increased to 5.3 on 12/5/20. Scr peaked at 6.79 on 12/11. Pt was initiated on IV diuretic therapy for respiratory distress/volume overload with good clinical response. Pt. now off diuretics.  Scr decreased to 4.24 today. Pt. non-oliguric, maintaining good UOP. Pt. with resolving ABRIL. Continue to hold diuretics. Pt. was on oral prednisone for kidney transplantation until 12/10/20. Pt. currently on IV Dexamethasone for COVID-19. On discharge, will need to transition back to PO Prednisone, restart Imuran, and resume home Lasix 40 mg PO daily. Monitor labs and urine output. Avoid any potential nephrotoxins. Dose medications as per eGFR

## 2020-12-18 NOTE — PROGRESS NOTE ADULT - ATTENDING COMMENTS
Case discussed with patient and dr pinedo.  plan for prednisone 5 and restart imuran upon discharge.  also discharge on 40 of lasix (home dose).
agree with change to PO diuretics as above.
dispo: d/c home today  Time spent on discharge 40 minutes coordinating discharge plan and discussing with patient and family.
The patient looked particularly unwell today.  Given the patient's advanced CKD and allograft dysfunction the risk/benefit at this time favors discontinuing the azathioprine.  He is rigoring with high grade fevers.
DC planning once Cr continues to downtrending and fever curve downtrends. Possible DC home tmw
Offered to speak to family but patient declined

## 2020-12-18 NOTE — PROGRESS NOTE ADULT - PROBLEM SELECTOR PROBLEM 5
DVT prophylaxis
Hypertension
DVT prophylaxis

## 2020-12-18 NOTE — PROGRESS NOTE ADULT - PROBLEM SELECTOR PROBLEM 2
Metabolic acidosis
2019 novel coronavirus disease (COVID-19)
Acute blood loss anemia
Acute kidney injury
Acute kidney injury
Metabolic acidosis
Acute kidney injury
Metabolic acidosis
2019 novel coronavirus disease (COVID-19)

## 2020-12-18 NOTE — PROGRESS NOTE ADULT - ASSESSMENT
Pt. with history of kidney transplantation, now with resolving ABRIL on CKD in the setting of COVID-19.

## 2020-12-18 NOTE — PROGRESS NOTE ADULT - PROBLEM SELECTOR PROBLEM 4
Hypertension
Hypertension, unspecified type
Renal transplant recipient
Hypertension

## 2020-12-18 NOTE — DISCHARGE NOTE NURSING/CASE MANAGEMENT/SOCIAL WORK - PATIENT PORTAL LINK FT
You can access the FollowMyHealth Patient Portal offered by [facility] by registering at the following website: [email or web address]. By joining [employer]’s FollowMyHealth portal, you will also be able to view your health information using other applications (apps) compatible with our system.

## 2020-12-18 NOTE — PROGRESS NOTE ADULT - PROBLEM SELECTOR PROBLEM 1
Acute kidney injury
2019 novel coronavirus disease (COVID-19)
Acute blood loss anemia
Acute kidney injury
Acute kidney injury superimposed on chronic kidney disease
2019 novel coronavirus disease (COVID-19)
Acute kidney injury superimposed on chronic kidney disease
Acute kidney injury
Acute kidney injury superimposed on chronic kidney disease

## 2020-12-18 NOTE — PROGRESS NOTE ADULT - SUBJECTIVE AND OBJECTIVE BOX
[facility] DIVISION OF KIDNEY DISEASES AND HYPERTENSION -- FOLLOW UP NOTE  --------------------------------------------------------------------------------  HPI: 50-year-old male with long standing history of kidney transplantation, advanced CKD admitted with COVID-19. Nephrology team following ABRIL on CKD. Upon review of labs on [facility], patient noted to have Scr of 3.83 on 11/24/20. On admission, pt. noted to have Scr of 5.3 on 12/5/20. Scr peaked at 6.79 on 12/11. Pt. was initiated on IV Bumex for volume overload and oliguria, transitioned to PO Bumex. Diuretics discontinued yesterday. Pt. currently non-oliguric with ~1.2L UOP in 24 hours. Scr decreased to 4.24 today.    Pt. seen and examined at bedside today. Pt. on room air, denies any shortness of breath.     PAST HISTORY  --------------------------------------------------------------------------------  No significant changes to PMH, PSH, FHx, SHx, unless otherwise noted    ALLERGIES & MEDICATIONS  --------------------------------------------------------------------------------  Allergies    sulfa (Swelling)    Intolerances    Standing Inpatient Medications  atorvastatin 20 milliGRAM(s) Oral at bedtime  dexAMETHasone  Injectable 6 milliGRAM(s) IV Push daily  heparin   Injectable 5000 Unit(s) SubCutaneous every 8 hours  hydrALAZINE 100 milliGRAM(s) Oral two times a day  pantoprazole    Tablet 40 milliGRAM(s) Oral two times a day  sevelamer carbonate 1600 milliGRAM(s) Oral three times a day with meals  sodium bicarbonate 1300 milliGRAM(s) Oral three times a day    REVIEW OF SYSTEMS  --------------------------------------------------------------------------------  Gen:  no fatigue  Respiratory: no dyspnea  CV: No chest pain  GI: No abdominal pain  MSK: No LE edema  Neuro: No dizziness    All other systems were reviewed and are negative, except as noted.    VITALS/PHYSICAL EXAM  --------------------------------------------------------------------------------  T(C): 36.7 (12-18-20 @ 05:27), Max: 37 (12-17-20 @ 20:37)  HR: 75 (12-18-20 @ 05:27) (75 - 91)  BP: 122/89 (12-18-20 @ 05:27) (122/89 - 131/83)  RR: 18 (12-18-20 @ 05:27) (16 - 18)  SpO2: 99% (12-18-20 @ 05:27) (97% - 99%)  Wt(kg): --    12-17-20 @ 07:01  -  12-18-20 @ 07:00  --------------------------------------------------------  IN: 1105 mL / OUT: 1240 mL / NET: -135 mL    Physical Exam:  	 Gen: NAD, speaking full sentences, on room air  	HEENT: No JVD  	Pulm: Fair air entry on anterior auscultation  	CV: S1S2+  	Abd: Soft, +BS   	Ext: No LE edema B/L  	Neuro: Awake, alert  	Skin: Warm and dry    LABS/STUDIES  --------------------------------------------------------------------------------              8.8    4.40  >-----------<  407      [12-18-20 @ 07:32]              25.6     136  |  101  |  103  ----------------------------<  118      [12-18-20 @ 07:32]  4.5   |  19  |  4.24        Ca     9.0     [12-18-20 @ 07:32]      Mg     2.6     [12-17-20 @ 07:11]      Phos  6.3     [12-17-20 @ 07:11]    Creatinine Trend:  SCr 4.24 [12-18 @ 07:32]  SCr 4.64 [12-17 @ 07:11]  SCr 4.94 [12-16 @ 05:42]  SCr 5.12 [12-15 @ 06:15]  SCr 5.23 [12-14 @ 09:01]

## 2020-12-18 NOTE — PROGRESS NOTE ADULT - PROBLEM SELECTOR PROBLEM 3
Hyperphosphatemia
Hyperphosphatemia
Acute kidney injury
Acute kidney injury
Anemia secondary to renal failure
Diarrhea, unspecified type
Hyperphosphatemia
Renal transplant recipient
Anemia secondary to renal failure

## 2020-12-18 NOTE — PROGRESS NOTE ADULT - PROBLEM SELECTOR PLAN 2
patient with viral sepsis 2/2 COVID present on admission now with worsening acute hypoxic respiratory failure, now weaned to RA, check pulse ox with ambulation  Email to [email or web address] on discharge for close f/u  Concern for concomitant pulmonary edema in setting of worsening renal failure  S/p lasix 80 IVP x1 on 12/10, improved with bumex diuresis, now on po bumex  - Blood and urine cx unremarkable  c/w decadron x10 day course through 12/20. Holding off remdesevir given renal failure  Trend D-dimer, ferritin. Ddimer uptrending to 1857  VTE ppx: HSQ 5000U q8 patient with viral sepsis 2/2 COVID present on admission now with worsening acute hypoxic respiratory failure, now weaned to RA, check pulse ox with ambulation  Email to [email or web address] on discharge for close f/u  Concern for concomitant pulmonary edema in setting of worsening renal failure  S/p lasix 80 IVP x1 on 12/10, improved with bumex diuresis, now on po bumex  - Blood and urine cx unremarkable  c/w decadron x10 day course through 12/20. Holding off remdesevir given renal failure  Trend D-dimer, ferritin. Ddimer uptrending to 1857  VTE ppx: HSQ 5000U q8, on discharge, needs ASA 81 mg qd x30 days for extended DVT ppx

## 2020-12-18 NOTE — PROGRESS NOTE ADULT - PROBLEM SELECTOR PLAN 5
-hsq   regular diet
-hsq   regular diet
-BP controlled  c/w Hydralazine and Nifedipine
-hsq
-hsq   regular diet
-hsq (Hold heparin midnight 12/13, prior to IR procedure on 12/14)  regular diet
-hsq (Hold heparin midnight 12/13, prior to IR procedure on 12/14)  regular diet
-hsq

## 2020-12-18 NOTE — PROGRESS NOTE ADULT - PROBLEM SELECTOR PLAN 4
-BP controlled, avoid hypotension so as not to decrease renal perfusion  c/w Hydralazine  d/c home nifedipine (procardia), d/c on home hydralazine 100 mg bid  and lasix 40 mg qd
-BP controlled, avoid hypotension so as not to decrease renal perfusion  c/w Hydralazine  hold nifedipine, patient now on bumex
-BP low-nml, avoid hypotension so as not to decrease renal perfusion  c/w Hydralazine  hold nifedipine, patient now on IV bumex
-BP low-nml, avoid hypotension so as not to decrease renal perfusion  c/w Hydralazine  hold nifedipine, patient now on bumex
-S/p kidney transplant in 1973 c/b CKD IV working on getting on transplant list at [facility].   - Cont. Prednisone 5 every other day: refused on 12/6? now due to get today 12/8  - off azathioprine given active infection with fevers  renal following
-S/p kidney transplant in 1973 c/b CKD IV working on getting on transplant list at [facility].   - Cont. Prednisone 5 every other day  - off azathioprine given active infection with fevers  renal following
-S/p kidney transplant in 1973 c/b CKD IV working on getting on transplant list at [facility].   - off azathioprine given active infection with fevers  May need to switch from prednisone qod to decadron
-c/w Hydralazine and Nifedipine
BP at target range. Monitor BP on current BP medication. Low salt diet.    If any questions, please feel free to contact me     José Shabazz  Nephrology Fellow  [facility] Pager: [phone]  [facility] Pager: [phone]
-c/w Hydralazine and Nifedipine

## 2020-12-18 NOTE — PROGRESS NOTE ADULT - PROBLEM SELECTOR PLAN 2
Pt. with metabolic acidosis in the setting of ABRIL on CKD. Serum CO2 improved to 19 today. Pt. on oral sodium bicarbonate 1300 mg TID. Monitor CO2.

## 2020-12-21 ENCOUNTER — NON-APPOINTMENT (OUTPATIENT)
Age: 50
End: 2020-12-21

## 2021-01-08 PROBLEM — I10 ESSENTIAL (PRIMARY) HYPERTENSION: Chronic | Status: ACTIVE | Noted: 2020-12-05

## 2021-01-11 ENCOUNTER — APPOINTMENT (OUTPATIENT)
Dept: NEPHROLOGY | Facility: CLINIC | Age: 51
End: 2021-01-11
Payer: COMMERCIAL

## 2021-01-11 ENCOUNTER — APPOINTMENT (OUTPATIENT)
Dept: NEPHROLOGY | Facility: OTHER | Age: 51
End: 2021-01-11

## 2021-01-11 ENCOUNTER — LABORATORY RESULT (OUTPATIENT)
Age: 51
End: 2021-01-11

## 2021-01-11 VITALS
HEART RATE: 119 BPM | BODY MASS INDEX: 26.6 KG/M2 | WEIGHT: 159.83 LBS | TEMPERATURE: 98.06 F | DIASTOLIC BLOOD PRESSURE: 79 MMHG | OXYGEN SATURATION: 98 % | SYSTOLIC BLOOD PRESSURE: 155 MMHG

## 2021-01-11 VITALS — HEART RATE: 106 BPM

## 2021-01-11 PROCEDURE — 96372 THER/PROPH/DIAG INJ SC/IM: CPT

## 2021-01-11 PROCEDURE — 99072 ADDL SUPL MATRL&STAF TM PHE: CPT

## 2021-01-11 RX ORDER — DARBEPOETIN ALFA 100 UG/ML
100 SOLUTION INTRAVENOUS; SUBCUTANEOUS
Qty: 0 | Refills: 0 | Status: COMPLETED | OUTPATIENT
Start: 2021-01-06

## 2021-01-12 ENCOUNTER — INPATIENT (INPATIENT)
Facility: HOSPITAL | Age: 51
LOS: 1 days | Discharge: HOME | DRG: 812 | End: 2021-01-14
Attending: INTERNAL MEDICINE | Admitting: HOSPITALIST
Payer: COMMERCIAL

## 2021-01-12 ENCOUNTER — NON-APPOINTMENT (OUTPATIENT)
Age: 51
End: 2021-01-12

## 2021-01-12 VITALS
DIASTOLIC BLOOD PRESSURE: 82 MMHG | SYSTOLIC BLOOD PRESSURE: 155 MMHG | OXYGEN SATURATION: 99 % | HEIGHT: 65 IN | HEART RATE: 114 BPM | RESPIRATION RATE: 20 BRPM | TEMPERATURE: 99 F | WEIGHT: 156.09 LBS

## 2021-01-12 DIAGNOSIS — Z94.0 KIDNEY TRANSPLANT STATUS: Chronic | ICD-10-CM

## 2021-01-12 DIAGNOSIS — Z29.9 ENCOUNTER FOR PROPHYLACTIC MEASURES, UNSPECIFIED: ICD-10-CM

## 2021-01-12 DIAGNOSIS — K62.5 HEMORRHAGE OF ANUS AND RECTUM: ICD-10-CM

## 2021-01-12 DIAGNOSIS — Z02.9 ENCOUNTER FOR ADMINISTRATIVE EXAMINATIONS, UNSPECIFIED: ICD-10-CM

## 2021-01-12 DIAGNOSIS — D64.9 ANEMIA, UNSPECIFIED: ICD-10-CM

## 2021-01-12 DIAGNOSIS — Z94.0 KIDNEY TRANSPLANT STATUS: ICD-10-CM

## 2021-01-12 DIAGNOSIS — I10 ESSENTIAL (PRIMARY) HYPERTENSION: ICD-10-CM

## 2021-01-12 LAB
ALBUMIN SERPL ELPH-MCNC: 3.8 G/DL — SIGNIFICANT CHANGE UP (ref 3.3–5)
ALP SERPL-CCNC: 87 U/L — SIGNIFICANT CHANGE UP (ref 40–120)
ALT FLD-CCNC: 15 U/L — SIGNIFICANT CHANGE UP (ref 10–45)
ANION GAP SERPL CALC-SCNC: 14 MMOL/L — SIGNIFICANT CHANGE UP (ref 5–17)
APTT BLD: 33.1 SEC — SIGNIFICANT CHANGE UP (ref 27.5–35.5)
AST SERPL-CCNC: 16 U/L — SIGNIFICANT CHANGE UP (ref 10–40)
BASOPHILS # BLD AUTO: 0 K/UL — SIGNIFICANT CHANGE UP (ref 0–0.2)
BASOPHILS NFR BLD AUTO: 0 % — SIGNIFICANT CHANGE UP (ref 0–2)
BILIRUB SERPL-MCNC: 0.4 MG/DL — SIGNIFICANT CHANGE UP (ref 0.2–1.2)
BLD GP AB SCN SERPL QL: NEGATIVE — SIGNIFICANT CHANGE UP
BUN SERPL-MCNC: 42 MG/DL — HIGH (ref 7–23)
CALCIUM SERPL-MCNC: 9.4 MG/DL — SIGNIFICANT CHANGE UP (ref 8.4–10.5)
CHLORIDE SERPL-SCNC: 106 MMOL/L — SIGNIFICANT CHANGE UP (ref 96–108)
CO2 SERPL-SCNC: 20 MMOL/L — LOW (ref 22–31)
CREAT SERPL-MCNC: 3.97 MG/DL — HIGH (ref 0.5–1.3)
EOSINOPHIL # BLD AUTO: 0.12 K/UL — SIGNIFICANT CHANGE UP (ref 0–0.5)
EOSINOPHIL NFR BLD AUTO: 2.6 % — SIGNIFICANT CHANGE UP (ref 0–6)
GAS PNL BLDV: SIGNIFICANT CHANGE UP
GLUCOSE SERPL-MCNC: 108 MG/DL — HIGH (ref 70–99)
HCT VFR BLD CALC: 20.5 % — CRITICAL LOW (ref 39–50)
HGB BLD-MCNC: 6.6 G/DL — CRITICAL LOW (ref 13–17)
INR BLD: 1 RATIO — SIGNIFICANT CHANGE UP (ref 0.88–1.16)
IRON SATN MFR SERPL: 37 % — SIGNIFICANT CHANGE UP (ref 16–55)
IRON SATN MFR SERPL: 84 UG/DL — SIGNIFICANT CHANGE UP (ref 45–165)
LACTATE SERPL-SCNC: 1.2 MMOL/L — SIGNIFICANT CHANGE UP (ref 0.7–2)
LDH SERPL L TO P-CCNC: 140 U/L — SIGNIFICANT CHANGE UP (ref 50–242)
LYMPHOCYTES # BLD AUTO: 0.35 K/UL — LOW (ref 1–3.3)
LYMPHOCYTES # BLD AUTO: 7.8 % — LOW (ref 13–44)
MCHC RBC-ENTMCNC: 32.2 GM/DL — SIGNIFICANT CHANGE UP (ref 32–36)
MCHC RBC-ENTMCNC: 33.8 PG — SIGNIFICANT CHANGE UP (ref 27–34)
MCV RBC AUTO: 105.1 FL — HIGH (ref 80–100)
MONOCYTES # BLD AUTO: 0 K/UL — SIGNIFICANT CHANGE UP (ref 0–0.9)
MONOCYTES NFR BLD AUTO: 0 % — LOW (ref 2–14)
NEUTROPHILS # BLD AUTO: 4.01 K/UL — SIGNIFICANT CHANGE UP (ref 1.8–7.4)
NEUTROPHILS NFR BLD AUTO: 89.6 % — HIGH (ref 43–77)
OB PNL STL: NEGATIVE — SIGNIFICANT CHANGE UP
PLATELET # BLD AUTO: 415 K/UL — HIGH (ref 150–400)
POTASSIUM SERPL-MCNC: 3.7 MMOL/L — SIGNIFICANT CHANGE UP (ref 3.5–5.3)
POTASSIUM SERPL-SCNC: 3.7 MMOL/L — SIGNIFICANT CHANGE UP (ref 3.5–5.3)
PROT SERPL-MCNC: 6.6 G/DL — SIGNIFICANT CHANGE UP (ref 6–8.3)
PROTHROM AB SERPL-ACNC: 12 SEC — SIGNIFICANT CHANGE UP (ref 10.6–13.6)
RBC # BLD: 1.78 M/UL — LOW (ref 4.2–5.8)
RBC # BLD: 1.95 M/UL — LOW (ref 4.2–5.8)
RBC # FLD: 14.9 % — HIGH (ref 10.3–14.5)
RETICS #: 11.9 K/UL — LOW (ref 25–125)
RETICS/RBC NFR: 0.7 % — SIGNIFICANT CHANGE UP (ref 0.5–2.5)
RH IG SCN BLD-IMP: POSITIVE — SIGNIFICANT CHANGE UP
SARS-COV-2 RNA SPEC QL NAA+PROBE: DETECTED
SODIUM SERPL-SCNC: 140 MMOL/L — SIGNIFICANT CHANGE UP (ref 135–145)
TIBC SERPL-MCNC: 229 UG/DL — SIGNIFICANT CHANGE UP (ref 220–430)
UIBC SERPL-MCNC: 145 UG/DL — SIGNIFICANT CHANGE UP (ref 110–370)
WBC # BLD: 4.48 K/UL — SIGNIFICANT CHANGE UP (ref 3.8–10.5)
WBC # FLD AUTO: 4.48 K/UL — SIGNIFICANT CHANGE UP (ref 3.8–10.5)

## 2021-01-12 PROCEDURE — 99223 1ST HOSP IP/OBS HIGH 75: CPT | Mod: AI,GC

## 2021-01-12 PROCEDURE — 93010 ELECTROCARDIOGRAM REPORT: CPT

## 2021-01-12 PROCEDURE — 99222 1ST HOSP IP/OBS MODERATE 55: CPT

## 2021-01-12 PROCEDURE — 99285 EMERGENCY DEPT VISIT HI MDM: CPT

## 2021-01-12 PROCEDURE — 99232 SBSQ HOSP IP/OBS MODERATE 35: CPT

## 2021-01-12 RX ORDER — CHOLECALCIFEROL (VITAMIN D3) 125 MCG
1000 CAPSULE ORAL DAILY
Refills: 0 | Status: DISCONTINUED | OUTPATIENT
Start: 2021-01-12 | End: 2021-01-14

## 2021-01-12 RX ORDER — ATORVASTATIN CALCIUM 80 MG/1
20 TABLET, FILM COATED ORAL AT BEDTIME
Refills: 0 | Status: DISCONTINUED | OUTPATIENT
Start: 2021-01-12 | End: 2021-01-14

## 2021-01-12 RX ORDER — NIFEDIPINE 30 MG
120 TABLET, EXTENDED RELEASE 24 HR ORAL DAILY
Refills: 0 | Status: DISCONTINUED | OUTPATIENT
Start: 2021-01-12 | End: 2021-01-14

## 2021-01-12 RX ORDER — FUROSEMIDE 40 MG
40 TABLET ORAL DAILY
Refills: 0 | Status: DISCONTINUED | OUTPATIENT
Start: 2021-01-12 | End: 2021-01-14

## 2021-01-12 RX ORDER — ASPIRIN/CALCIUM CARB/MAGNESIUM 324 MG
81 TABLET ORAL DAILY
Refills: 0 | Status: DISCONTINUED | OUTPATIENT
Start: 2021-01-12 | End: 2021-01-14

## 2021-01-12 RX ORDER — SODIUM BICARBONATE 1 MEQ/ML
1950 SYRINGE (ML) INTRAVENOUS
Refills: 0 | Status: DISCONTINUED | OUTPATIENT
Start: 2021-01-12 | End: 2021-01-14

## 2021-01-12 RX ORDER — HYDRALAZINE HCL 50 MG
100 TABLET ORAL
Refills: 0 | Status: DISCONTINUED | OUTPATIENT
Start: 2021-01-12 | End: 2021-01-14

## 2021-01-12 RX ORDER — AZATHIOPRINE 100 MG/1
150 TABLET ORAL DAILY
Refills: 0 | Status: DISCONTINUED | OUTPATIENT
Start: 2021-01-12 | End: 2021-01-14

## 2021-01-12 RX ADMIN — Medication 1000 UNIT(S): at 18:41

## 2021-01-12 RX ADMIN — Medication 81 MILLIGRAM(S): at 18:41

## 2021-01-12 RX ADMIN — Medication 1950 MILLIGRAM(S): at 18:41

## 2021-01-12 RX ADMIN — ATORVASTATIN CALCIUM 20 MILLIGRAM(S): 80 TABLET, FILM COATED ORAL at 21:19

## 2021-01-12 RX ADMIN — Medication 100 MILLIGRAM(S): at 18:41

## 2021-01-12 NOTE — H&P ADULT - NSHPLABSRESULTS_GEN_ALL_CORE
6.6    4.48  )-----------( 415      ( 12 Jan 2021 12:53 )             20.5     Hemoglobin: 6.6 g/dL (01-12 @ 12:53)    CBC Full  -  ( 12 Jan 2021 14:59 )  WBC Count : x  RBC Count : 1.78 M/uL  Hemoglobin : x  Hematocrit : x  Platelet Count - Automated : x  Mean Cell Volume : x  Mean Cell Hemoglobin : x  Mean Cell Hemoglobin Concentration : x  Auto Neutrophil # : x  Auto Lymphocyte # : x  Auto Monocyte # : x  Auto Eosinophil # : x  Auto Basophil # : x  Auto Neutrophil % : x  Auto Lymphocyte % : x  Auto Monocyte % : x  Auto Eosinophil % : x  Auto Basophil % : x    01-12    140  |  106  |  42<H>  ----------------------------<  108<H>  3.7   |  20<L>  |  3.97<H>    Ca    9.4      12 Jan 2021 12:53    TPro  6.6  /  Alb  3.8  /  TBili  0.4  /  DBili  x   /  AST  16  /  ALT  15  /  AlkPhos  87  01-12    Creatinine Trend: 3.97<--, 4.24<--, 4.64<--, 4.94<--, 5.12<--, 5.23<--  LIVER FUNCTIONS - ( 12 Jan 2021 12:53 )  Alb: 3.8 g/dL / Pro: 6.6 g/dL / ALK PHOS: 87 U/L / ALT: 15 U/L / AST: 16 U/L / GGT: x           PT/INR - ( 12 Jan 2021 12:53 )   PT: 12.0 sec;   INR: 1.00 ratio         PTT - ( 12 Jan 2021 12:53 )  PTT:33.1 sec    hs Troponin:      12:53 - VBG - pH: 7.38  | pCO2: 38    | pO2: 37    | Lactate: 3.0        CSF:      EKG:   MICROBIOLOGY:    IMAGING:      Labs, imaging, EKG personally reviewed

## 2021-01-12 NOTE — ED PROVIDER NOTE - PHYSICAL EXAMINATION
Gen: AAOx3, non-toxic  Head: NCAT  HEENT: EOMI, oral mucosa moist, normal conjunctiva  Lung: CTAB, no respiratory distress, no wheezes/rhonchi/rales B/L, speaking in full sentences  CV: RRR, no murmurs, rubs or gallops  Abd: soft, NTND, no guarding, no CVA tenderness  Rectal:   MSK: no visible deformities  Neuro: No focal sensory or motor deficits  Skin: Warm, well perfused, no rash  Psych: normal affect.   ~Oscar Cavazos M.D. Resident Gen: AAOx3, non-toxic  Head: NCAT  HEENT: EOMI, oral mucosa moist, normal conjunctiva  Lung: CTAB, no respiratory distress, no wheezes/rhonchi/rales B/L, speaking in full sentences  CV: RRR, no murmurs, rubs or gallops  Abd: soft, NTND, no guarding, no CVA tenderness  Rectal: Chaperoned by Evangelina BETANCUR            Brown stool on glove, no melena noted            No hemorrhoids or masses palpated            enlarged prostate    MSK: no visible deformities  Neuro: No focal sensory or motor deficits  Skin: Warm, well perfused, no rash  Psych: normal affect.   ~Oscar Cavazos M.D. Resident

## 2021-01-12 NOTE — H&P ADULT - ASSESSMENT
50M w PMHx of HTN, renal transplant in 1978 c/b CKD IV, recently admitted for COVID in Dec 2020 c/b ABRIL, presenting with low H&H (6.7/21.2) from OP routine lab on 1/11, admitted for anemia and blood transfusion.

## 2021-01-12 NOTE — ED PROVIDER NOTE - CLINICAL SUMMARY MEDICAL DECISION MAKING FREE TEXT BOX
50yM h/o HTN and renal transplant (1078) c/b CKD IV, Covid + (12/5/2020) presents with low H&H (6.7/21.2) outpatient routine lab yesterday. Patient asymptomatic without any complaints. Will get ekg, labs, coags, fecal occult, type and screen, plan to transfuse if need be and reassess 50yM h/o HTN and renal transplant (1078) c/b CKD IV, Covid + (12/5/2020) presents with low H&H (6.7/21.2) outpatient routine lab yesterday no colonoscopy or endoscopy were done . Patient asymptomatic without any complaints. Will get ekg, labs, coags, fecal occult, type and screen, plan to transfuse if need be and reassess ZR

## 2021-01-12 NOTE — ED PROVIDER NOTE - OBJECTIVE STATEMENT
50yM 50yM h/o HTN and renal transplant (1078) c/b CKD IV, Covid + (12/5/2020) presents with low H&H (6.7/21.2) outpatient routine lab yesterday. Recently admitted  for COVID and ABRIL (12/5-12/18/20). No fever, chest pain, abd pain, nausea, vomiting, weakness, dizziness, hematuria, melena.

## 2021-01-12 NOTE — H&P ADULT - ATTENDING COMMENTS
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 imuran unclear SE of immunosuppresents   c/s renal transplants  transfuse 1uprbc, cbc q8hr  rest of plan as above  place hold parameters on BP meds SBP <100  lactate 3.0- repeat- unclear cause of elevated lactate- no sirs criteria, monitor

## 2021-01-12 NOTE — ED ADULT NURSE NOTE - OBJECTIVE STATEMENT
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 Pt denies headache, dizziness, chest pain, palpitations, cough, SOB, abdominal pain, rectal bleeding, melena, n/v/d, urinary symptoms, fevers, chills, weakness at this time. Safety maintained.

## 2021-01-12 NOTE — H&P ADULT - PROBLEM SELECTOR PLAN 1
Patient asymptomatic, found to have Hgb 6.7 on routine labs (hgb prior adm 8-11)  -Ddx: GI blood loss, folate/B12 deficiency, SE of imuran? vs AOCD; less likely CKD-related anemia as patient had normal values 12/2020  - consider Fecal occult blood sample  - Will send vitamin B12/folate, iron studies  - S/p 1U pRBCs, will transfuse to goal of Hgb > 7  - Will hold on GI consultation until FOBT comes back  - Will monitor CBC; will maintain 2 large bore IVs and active type and screen  - Will fluid resuscitate as needed Patient asymptomatic, found to have Hgb 6.7 on routine labs (hgb prior adm 8-11)  -Ddx: GI blood loss, folate/B12 deficiency, SE of imuran? vs AOCD; less likely CKD-related anemia as patient had normal values 12/2020  - consider Fecal occult blood sample  - Will send vitamin B12/folate, iron studies  - S/p 1U pRBCs, will transfuse to goal of Hgb > 7  - Will hold on GI consultation until FOBT comes back, consider PPI  - Will monitor CBC; will maintain 2 large bore IVs and active type and screen  - Will fluid resuscitate as needed

## 2021-01-12 NOTE — H&P ADULT - NSHPREVIEWOFSYSTEMS_GEN_ALL_CORE
CONSTITUTIONAL: +FATIGUE, no fevers or chills  EYES/ENT: No visual changes;  No dysphagia  NECK: No pain or stiffness  RESPIRATORY: No cough, wheezing, hemoptysis; No shortness of breath  CARDIOVASCULAR: No chest pain or palpitations; No lower extremity edema  GASTROINTESTINAL: No abdominal or epigastric pain. No nausea, vomiting, or hematemesis; No diarrhea or constipation. No melena or hematochezia.  GENITOURINARY: No dysuria, frequency or hematuria  NEUROLOGICAL: No numbness or weakness  HEMATOLOGY: No easy bleeding, no lymphadenopathy  SKIN: No itching, burning, rashes, or lesions   All other review of systems is negative unless indicated above.

## 2021-01-12 NOTE — H&P ADULT - NSHPSOCIALHISTORY_GEN_ALL_CORE
Pt lives with sister and aunt. Mom recently passed away from [employer] in 12/2020.  Denies alcohol, cigarette, and recreational drug hx/use.

## 2021-01-12 NOTE — ED PROVIDER NOTE - NS ED ROS FT
GENERAL: No fever or chills, EYES: no change in vision, HEENT: no trouble swallowing or speaking, CARDIAC: no chest pain, PULMONARY: no cough or SOB, GI: no abdominal pain, no nausea, no vomiting, no diarrhea or constipation, : No changes in urination, SKIN: no rashes, NEURO: no headache,  MSK: No joint pain ~Oscar Cavazos M.D. Resident

## 2021-01-12 NOTE — ED ADULT NURSE REASSESSMENT NOTE - NS ED NURSE REASSESS COMMENT FT1
Pt AAOx4, NAD, resp nonlabored, resting comfortably in bed with call bell at bedside. No changes, pt denies any symptoms at this time. Pt denies headache, dizziness, chest pain, palpitations, SOB, abd pain, n/v/d, fevers, chills, weakness at this time. Awaiting 2nd Type and screen result before blood transfusion, called blood bank and estimated time is 5 more minutes for T&S. Will release blood when results available. Safety maintained.

## 2021-01-12 NOTE — CONSULT NOTE ADULT - SUBJECTIVE AND OBJECTIVE BOX
[facility] DIVISION OF KIDNEY DISEASES AND HYPERTENSION -- INITIAL CONSULT NOTE  --------------------------------------------------------------------------------  HPI:  50M w PMHx of HTN, renal transplant in  c/b CKD IV, recently admitted for COVID in Dec 2020 c/b ABRIL, presenting with low H&H (6.7/21.2) from OP routine lab on .  Pt sent in by his nephrologist Dr. Us.  He received aranesp in the office yesterday.   He was transplanted in [facility] and is on prednisone 5mgs every other day and imuran 150mgs daily for immunosuppression.    Pt denies recent n/v, changes in appetite, c/d, BRBPR, black/tarry stools, urine in blood, recent trauma/fall. Pt denies symptoms of lightheadedness palpitations, SOB. No recent f/c, travel, sick contacts, CP, abd pain, urinary symptoms. Pt had dark stools last admission.  Denies obvious bleeding.  In fact pt overall feels well.  Hb 6.6 on admission.     PAST HISTORY  --------------------------------------------------------------------------------  PAST MEDICAL & SURGICAL HISTORY:  Hypertension    Renal transplant recipient  1978, c/b CKD IV    History of renal transplant      FAMILY HISTORY:  FH: esophageal cancer  Uncle,  in 7th decade    FH: kidney disease      PAST SOCIAL HISTORY:    ALLERGIES & MEDICATIONS  --------------------------------------------------------------------------------  Allergies    sulfa (Swelling)    Intolerances      Standing Inpatient Medications  aspirin enteric coated 81 milliGRAM(s) Oral daily  atorvastatin 20 milliGRAM(s) Oral at bedtime  azaTHIOprine 150 milliGRAM(s) Oral daily  cholecalciferol 1000 Unit(s) Oral daily  furosemide    Tablet 40 milliGRAM(s) Oral daily  hydrALAZINE 100 milliGRAM(s) Oral two times a day  NIFEdipine  milliGRAM(s) Oral daily  sodium bicarbonate 1950 milliGRAM(s) Oral two times a day    PRN Inpatient Medications      REVIEW OF SYSTEMS  --------------------------------------------------------------------------------  Gen: No weight changes, fatigue, fevers/chills, weakness  Skin: No rashes  Head/Eyes/Ears/Mouth: No headache; Normal hearing; Normal vision w/o blurriness; No sinus pain/discomfort, sore throat  Respiratory: No dyspnea, cough, wheezing, hemoptysis  CV: No chest pain, PND, orthopnea  GI: No abdominal pain, diarrhea, constipation, nausea, vomiting, melena, hematochezia  : No increased frequency, dysuria, hematuria, nocturia  MSK: No joint pain/swelling; no back pain; no edema  Neuro: No dizziness/lightheadedness, weakness, seizures, numbness, tingling  Heme: No easy bruising or bleeding  Endo: No heat/cold intolerance  Psych: No significant nervousness, anxiety, stress, depression    All other systems were reviewed and are negative, except as noted.    VITALS/PHYSICAL EXAM  --------------------------------------------------------------------------------  T(C): 37.2 (21 @ 14:55), Max: 37.4 (21 @ 14:35)  HR: 90 (21 @ 14:55) (90 - 114)  BP: 106/62 (21 @ 14:55) (106/62 - 155/82)  RR: 16 (21 @ 14:55) (16 - 20)  SpO2: 100% (21 @ 14:55) (98% - 100%)  Wt(kg): --  Height (cm): 165.1 (21 @ 11:19)  Weight (kg): 70.8 (21 @ 11:19)  BMI (kg/m2): 26 (21 @ 11:19)  BSA (m2): 1.78 (21 @ 11:19)      Physical Exam:  	Gen: NAD, well-appearing  	HEENT: PERRL, supple neck, clear oropharynx  	Pulm: CTA B/L  	CV: RRR, S1S2; no rub  	Back: No spinal or CVA tenderness; no sacral edema  	Abd: +BS, soft, nontender/nondistended                      Transplant: non tender  	: No suprapubic tenderness  	UE: Warm, FROM, no clubbing, intact strength; no edema; no asterixis  	LE: Warm, FROM, no clubbing, intact strength; no edema  	Neuro: No focal deficits, intact gait  	Psych: Normal affect and mood  	Skin: Warm, without rashes    LABS/STUDIES  --------------------------------------------------------------------------------              6.6    4.48  >-----------<  415      [21 @ 12:53]              20.5     140  |  106  |  42  ----------------------------<  108      [21 @ 12:53]  3.7   |  20  |  3.97        Ca     9.4     [21 @ 12:53]    TPro  6.6  /  Alb  3.8  /  TBili  0.4  /  DBili  x   /  AST  16  /  ALT  15  /  AlkPhos  87  [21 @ 12:53]    PT/INR: PT 12.0 , INR 1.00       [21 @ 12:53]  PTT: 33.1       [21 @ 12:53]          [21 @ 14:59]    Creatinine Trend:  SCr 3.97 [ 12:53]  SCr 4.24 [ @ 07:32]  SCr 4.64 [ @ 07:11]  SCr 4.94 [ @ 05:42]  SCr 5.12 [12-15 @ 06:15]    Urinalysis - [20 @ 13:24]      Color Light Yellow / Appearance Clear / SG 1.011 / pH 6.5      Gluc Trace / Ketone Negative  / Bili Negative / Urobili <2 mg/dL       Blood Small / Protein 100 mg/dL / Leuk Est Negative / Nitrite Negative      RBC 6 / WBC 2 / Hyaline  / Gran  / Sq Epi  / Non Sq Epi 2 / Bacteria Negative      Iron 54, TIBC 112, %sat 48      [20 @ 07:50]  Ferritin 3965      [20 @ 07:50]        Tacrolimus  Cyclosporine  Sirolimus  Mycophenolate  BK PCR  CMV PCR  Parvo PCR  EBV PCR

## 2021-01-12 NOTE — H&P ADULT - NSHPPHYSICALEXAM_GEN_ALL_CORE
Vital Signs Last 24 Hrs  T(C): 37.2 (12 Jan 2021 14:55), Max: 37.4 (12 Jan 2021 14:35)  T(F): 98.9 (12 Jan 2021 14:55), Max: 99.4 (12 Jan 2021 14:35)  HR: 90 (12 Jan 2021 14:55) (90 - 114)  BP: 106/62 (12 Jan 2021 14:55) (106/62 - 155/82)  BP(mean): --  RR: 16 (12 Jan 2021 14:55) (16 - 20)  SpO2: 100% (12 Jan 2021 14:55) (98% - 100%)    GENERAL: No acute distress, well-developed  HEAD:  Atraumatic, Normocephalic  ENT: PERRL, conjunctiva and sclera clear, neck supple  CHEST/LUNG: Clear to auscultation bilaterally; No wheeze, equal breath sounds bilaterally, respirations nonlabored  HEART: Regular rate and rhythm; systolic murmur since childhood/birth  ABDOMEN: Soft, nontender, nondistended; Bowel sounds present, no organomegaly  EXTREMITIES:  No clubbing, cyanosis, or edema  PSYCH: Nl behavior, nl affect  NEUROLOGY: AAOx4, non-focal, moves all extremities spontaneously  SKIN: Normal color, No rashes or lesions; multiple nevi over chest and abdomen, and back

## 2021-01-12 NOTE — PATIENT PROFILE ADULT - PATIENT REPRESENTATIVE: ( YOU CAN CHOOSE ANY PERSON THAT CAN ASSIST YOU WITH YOUR HEALTH CARE PREFERENCES, DOES NOT HAVE TO BE A SPOUSE, IMMEDIATE FAMILY OR SIGNIFICANT OTHER/PARTNER)
" he doesn't have balance for last hour and his speech slurred for past hour, he is acting funny" per wife.  declines

## 2021-01-12 NOTE — ED PROVIDER NOTE - CHILD ABUSE FACILITY
Infectious Diseases Daily Progress Note      Patient's Name: Nolvia Loza   Date of Service: 7/9/2018     Date of admission: 7/6/2018                Hospital Day: 4     Admitting Diagnosis:  E13.10 Diabetic ketoacidosis without coma associated with other specified diabetes mellitus (CMS/[facility])  (primary encounter diagnosis)  J93.9 Pneumothorax, unspecified type  M31.30 Wegener's granulomatosis (CMS/[facility])  Z96.89 Chest tube in place  Z46.82 Encounter for chest tube removal                       Scheduled Medications   insulin NPH 34 Units BID AC   polyethylene glycol 17 g Daily   sodium chloride (PF) 2 mL 2 times per day   insulin lispro  TID AC   amitriptyline 75 mg Nightly   melatonin 6 mg Nightly   ceFEPime (MAXIPIME) IVPB for Most Indications 2,000 mg 3 times per day   pantoprazole 40 mg Nightly   sucralfate 1 g TID AC   DULoxetine 20 mg 2 times per day   mirtazapine 15 mg Nightly   citalopram 40 mg Nightly   methylPREDNISolone 40 mg Daily     Infusions:  • dextrose 5 % infusion     • sodium chloride 0.9% infusion     • sodium chloride 0.9% infusion       Past Medical History, Social History, Medications and Allergies reviewed.   Reviewed Pertinent: Laboratory studies, radiographic studies, medications, and recent progress notes.     Subjective:  \" I feel better\"  No fevers. Less cough today     Review of Systems: No fever or chills. No nausea, abdominal pain, or diarrhea. No rashes. No cough or chest pain. No urinary symptoms.     Objective:    VITAL SIGNS  Temp  Min: 97.6 °F (36.4 °C)  Max: 98.4 °F (36.9 °C)  Temp:  [97.6 °F (36.4 °C)-98.4 °F (36.9 °C)] 97.6 °F (36.4 °C)  Pulse:  [] 93  Resp:  [20-26] 20  BP: (131-140)/(70-80) 140/80  PHYSICAL EXAMINATION:  GENERAL:   awake, alert, oriented x3, no acute distress.  HEENT:  Normocephalic, atraumatic.  Pupils are equal.  No scleral icterus.  no thrush.  CARDIOVASCULAR:  Pulses regular.  S1, S2 normal.  No murmurs    PULMONARY:  Diminished, clear. + chest  tube  GASTROINTESTINAL:  Abdomen is soft, nontender, no distention.     Chest tube site appears unremarkable.  EXTREMITIES:  No edema   SKIN:  Intact, no rashes.    LABORATORY DATA:     Recent Labs  Lab 07/08/18 0428 07/07/18  1758 07/07/18  0447 07/06/18 2012   SODIUM 145  --  143 140   POTASSIUM 3.9 4.4 3.6 3.5   BUN 9  --  10 12   CREATININE 0.59  --  0.58 0.54   GFRNA >90  --  >90 >90   GFRA >90  --  >90 >90   GLUCOSE 200*  --  133* 287*   CALCIUM 8.5  --  7.6* 7.7*   ALBUMIN 2.0*  --  2.2*  --    AST 8  --  8  --    GPT 15  --  16  --    ALKPT 56  --  61  --    BILIRUBIN 0.2  --  0.4  --        Recent Labs  Lab 07/08/18  0428 07/06/18  1349   WBC 7.8 21.2*   HGB 10.3* 14.3   HCT 32.3* 44.4    366       Recent Labs  Lab 07/06/18  1604 07/03/18  1153   UWBC NEGATIVE NEGATIVE   UBACTR  --  NONE SEEN   UNITR NEGATIVE NEGATIVE   LEUK  --  1 to 5   URBC NEGATIVE NEGATIVE        Recent Labs      06/14/18   1728   RESR  97*     Patients's last HBA1C reading was Hemoglobin A1C (%)   Date Value   04/19/2018 14.2 (H)                            IMPRESSION AND PLAN:  Spontaneous left-sided pneumothorax s/p  chest tube placement by interventional radiology.    Chronic bronchiectasis. Doubt pneumonia.   Mild diabetic ketoacidosis, noncompliance with Insulin, uncontrolled diabetes mellitus.  Granulomatosis, polyangiitis.  No evidence of exacerbation.  She is chronically on Prednisone and CellCept.  Currently, she is on stress dose steroids, Solu-Medrol 40 mg IV daily.  Oral thrush, candidiasis.    PLAN AND RECOMMENDATION:  CT scan reviewed. Doubt pneumonia. Will DC cefepime  Continue chest tube.    JULIUS Macias MD  Infectious Diseases  [phone] (P)   [phone] (A)  7/9/2018     12:01 PM           [facility]

## 2021-01-12 NOTE — H&P ADULT - HISTORY OF PRESENT ILLNESS
50M w PMHx of HTN, renal transplant in 1978 c/b CKD IV, recently admitted for COVID in Dec 2020 c/b ABRIL, presenting with low H&H (6.7/21.2) from OP routine lab on 1/11. Pt denies recent n/v, changes in appetite, c/d, BRBPR, black/tarry stools, urine in blood, recent trauma/fall. Pt denies symptoms of lightheadedness palpitations, SOB. No recent f/c, travel, sick contacts, CP, abd pain, urinary symptoms. Pt does not have hx of GIB bleed except for dark stools on last admission, not scoped. Pt was due for aranesp, but did not receive it OP.     In ED, initially pt afeb, , /82, and satting well on RA, A&Ox4. Hgb 6.6 and pt receiving first unit pRBC.

## 2021-01-13 LAB
ALBUMIN SERPL ELPH-MCNC: 4.1 G/DL
ANION GAP SERPL CALC-SCNC: 12 MMOL/L — SIGNIFICANT CHANGE UP (ref 5–17)
ANION GAP SERPL CALC-SCNC: 16 MMOL/L
APPEARANCE UR: CLEAR — SIGNIFICANT CHANGE UP
BACTERIA # UR AUTO: NEGATIVE — SIGNIFICANT CHANGE UP
BASOPHILS # BLD AUTO: 0.03 K/UL
BASOPHILS NFR BLD AUTO: 0.6 %
BILIRUB UR-MCNC: NEGATIVE — SIGNIFICANT CHANGE UP
BUN SERPL-MCNC: 42 MG/DL — HIGH (ref 7–23)
BUN SERPL-MCNC: 44 MG/DL
CALCIUM SERPL-MCNC: 9.2 MG/DL
CALCIUM SERPL-MCNC: 9.3 MG/DL — SIGNIFICANT CHANGE UP (ref 8.4–10.5)
CHLORIDE SERPL-SCNC: 108 MMOL/L
CHLORIDE SERPL-SCNC: 108 MMOL/L — SIGNIFICANT CHANGE UP (ref 96–108)
CO2 SERPL-SCNC: 18 MMOL/L
CO2 SERPL-SCNC: 20 MMOL/L — LOW (ref 22–31)
COLOR SPEC: SIGNIFICANT CHANGE UP
CREAT SERPL-MCNC: 3.89 MG/DL
CREAT SERPL-MCNC: 3.9 MG/DL — HIGH (ref 0.5–1.3)
DIFF PNL FLD: NEGATIVE — SIGNIFICANT CHANGE UP
EOSINOPHIL # BLD AUTO: 0.09 K/UL
EOSINOPHIL NFR BLD AUTO: 1.8 %
EPI CELLS # UR: 0 /HPF — SIGNIFICANT CHANGE UP
FERRITIN SERPL-MCNC: 426 NG/ML — HIGH (ref 30–400)
FERRITIN SERPL-MCNC: 454 NG/ML
FOLATE SERPL-MCNC: 9.5 NG/ML — SIGNIFICANT CHANGE UP
GLUCOSE SERPL-MCNC: 100 MG/DL
GLUCOSE SERPL-MCNC: 97 MG/DL — SIGNIFICANT CHANGE UP (ref 70–99)
GLUCOSE UR QL: NEGATIVE — SIGNIFICANT CHANGE UP
HCT VFR BLD CALC: 21.2 %
HCT VFR BLD CALC: 27.2 % — LOW (ref 39–50)
HCT VFR BLD CALC: 27.9 % — LOW (ref 39–50)
HCT VFR BLD CALC: 29.8 % — LOW (ref 39–50)
HGB BLD-MCNC: 6.7 G/DL
HGB BLD-MCNC: 8.9 G/DL — LOW (ref 13–17)
HGB BLD-MCNC: 9.1 G/DL — LOW (ref 13–17)
HGB BLD-MCNC: 9.5 G/DL — LOW (ref 13–17)
HYALINE CASTS # UR AUTO: 0 /LPF — SIGNIFICANT CHANGE UP (ref 0–2)
IMM GRANULOCYTES NFR BLD AUTO: 0.2 %
IRON SATN MFR SERPL: 70 %
IRON SERPL-MCNC: 163 UG/DL
KETONES UR-MCNC: NEGATIVE — SIGNIFICANT CHANGE UP
LDH SERPL L TO P-CCNC: 156 U/L — SIGNIFICANT CHANGE UP (ref 50–242)
LEUKOCYTE ESTERASE UR-ACNC: NEGATIVE — SIGNIFICANT CHANGE UP
LYMPHOCYTES # BLD AUTO: 0.92 K/UL
LYMPHOCYTES NFR BLD AUTO: 18.3 %
MAGNESIUM SERPL-MCNC: 2.2 MG/DL — SIGNIFICANT CHANGE UP (ref 1.6–2.6)
MAN DIFF?: NORMAL
MCHC RBC-ENTMCNC: 30.6 PG — SIGNIFICANT CHANGE UP (ref 27–34)
MCHC RBC-ENTMCNC: 31.2 PG — SIGNIFICANT CHANGE UP (ref 27–34)
MCHC RBC-ENTMCNC: 31.3 PG — SIGNIFICANT CHANGE UP (ref 27–34)
MCHC RBC-ENTMCNC: 31.6 GM/DL
MCHC RBC-ENTMCNC: 31.9 GM/DL — LOW (ref 32–36)
MCHC RBC-ENTMCNC: 32.6 GM/DL — SIGNIFICANT CHANGE UP (ref 32–36)
MCHC RBC-ENTMCNC: 32.7 GM/DL — SIGNIFICANT CHANGE UP (ref 32–36)
MCHC RBC-ENTMCNC: 33.3 PG
MCV RBC AUTO: 105.5 FL
MCV RBC AUTO: 95.5 FL — SIGNIFICANT CHANGE UP (ref 80–100)
MCV RBC AUTO: 95.8 FL — SIGNIFICANT CHANGE UP (ref 80–100)
MCV RBC AUTO: 96.1 FL — SIGNIFICANT CHANGE UP (ref 80–100)
MONOCYTES # BLD AUTO: 0.1 K/UL
MONOCYTES NFR BLD AUTO: 2 %
NEUTROPHILS # BLD AUTO: 3.89 K/UL
NEUTROPHILS NFR BLD AUTO: 77.1 %
NITRITE UR-MCNC: NEGATIVE — SIGNIFICANT CHANGE UP
NRBC # BLD: 0 /100 WBCS — SIGNIFICANT CHANGE UP (ref 0–0)
PH UR: 7.5 — SIGNIFICANT CHANGE UP (ref 5–8)
PHOSPHATE SERPL-MCNC: 2.8 MG/DL
PHOSPHATE SERPL-MCNC: 3.4 MG/DL — SIGNIFICANT CHANGE UP (ref 2.5–4.5)
PLATELET # BLD AUTO: 372 K/UL — SIGNIFICANT CHANGE UP (ref 150–400)
PLATELET # BLD AUTO: 411 K/UL — HIGH (ref 150–400)
PLATELET # BLD AUTO: 412 K/UL — HIGH (ref 150–400)
PLATELET # BLD AUTO: 417 K/UL
POTASSIUM SERPL-MCNC: 4 MMOL/L — SIGNIFICANT CHANGE UP (ref 3.5–5.3)
POTASSIUM SERPL-SCNC: 4 MMOL/L — SIGNIFICANT CHANGE UP (ref 3.5–5.3)
POTASSIUM SERPL-SCNC: 4.3 MMOL/L
PROT UR-MCNC: ABNORMAL
RBC # BLD: 2.01 M/UL
RBC # BLD: 2.82 M/UL — LOW (ref 4.2–5.8)
RBC # BLD: 2.84 M/UL — LOW (ref 4.2–5.8)
RBC # BLD: 2.92 M/UL — LOW (ref 4.2–5.8)
RBC # BLD: 3.1 M/UL — LOW (ref 4.2–5.8)
RBC # FLD: 14.9 %
RBC # FLD: 19.1 % — HIGH (ref 10.3–14.5)
RBC # FLD: 19.4 % — HIGH (ref 10.3–14.5)
RBC # FLD: 19.6 % — HIGH (ref 10.3–14.5)
RBC CASTS # UR COMP ASSIST: 0 /HPF — SIGNIFICANT CHANGE UP (ref 0–4)
RETICS #: 17.8 K/UL — LOW (ref 25–125)
RETICS/RBC NFR: 0.6 % — SIGNIFICANT CHANGE UP (ref 0.5–2.5)
SARS-COV-2 IGG SERPL QL IA: POSITIVE
SARS-COV-2 IGM SERPL IA-ACNC: 13.6 INDEX — HIGH
SODIUM SERPL-SCNC: 140 MMOL/L — SIGNIFICANT CHANGE UP (ref 135–145)
SODIUM SERPL-SCNC: 142 MMOL/L
SP GR SPEC: 1.01 — SIGNIFICANT CHANGE UP (ref 1.01–1.02)
TIBC SERPL-MCNC: 232 UG/DL
UIBC SERPL-MCNC: 69 UG/DL
UROBILINOGEN FLD QL: NEGATIVE — SIGNIFICANT CHANGE UP
VIT B12 SERPL-MCNC: 281 PG/ML — SIGNIFICANT CHANGE UP (ref 232–1245)
WBC # BLD: 3.88 K/UL — SIGNIFICANT CHANGE UP (ref 3.8–10.5)
WBC # BLD: 4.06 K/UL — SIGNIFICANT CHANGE UP (ref 3.8–10.5)
WBC # BLD: 4.45 K/UL — SIGNIFICANT CHANGE UP (ref 3.8–10.5)
WBC # FLD AUTO: 3.88 K/UL — SIGNIFICANT CHANGE UP (ref 3.8–10.5)
WBC # FLD AUTO: 4.06 K/UL — SIGNIFICANT CHANGE UP (ref 3.8–10.5)
WBC # FLD AUTO: 4.45 K/UL — SIGNIFICANT CHANGE UP (ref 3.8–10.5)
WBC # FLD AUTO: 5.04 K/UL
WBC UR QL: 1 /HPF — SIGNIFICANT CHANGE UP (ref 0–5)

## 2021-01-13 PROCEDURE — 99233 SBSQ HOSP IP/OBS HIGH 50: CPT | Mod: GC

## 2021-01-13 RX ORDER — PANTOPRAZOLE SODIUM 20 MG/1
40 TABLET, DELAYED RELEASE ORAL
Refills: 0 | Status: DISCONTINUED | OUTPATIENT
Start: 2021-01-13 | End: 2021-01-14

## 2021-01-13 RX ADMIN — PANTOPRAZOLE SODIUM 40 MILLIGRAM(S): 20 TABLET, DELAYED RELEASE ORAL at 06:54

## 2021-01-13 RX ADMIN — AZATHIOPRINE 150 MILLIGRAM(S): 100 TABLET ORAL at 12:49

## 2021-01-13 RX ADMIN — Medication 1950 MILLIGRAM(S): at 08:39

## 2021-01-13 RX ADMIN — Medication 40 MILLIGRAM(S): at 08:40

## 2021-01-13 RX ADMIN — ATORVASTATIN CALCIUM 20 MILLIGRAM(S): 80 TABLET, FILM COATED ORAL at 22:03

## 2021-01-13 RX ADMIN — Medication 81 MILLIGRAM(S): at 12:10

## 2021-01-13 RX ADMIN — Medication 1950 MILLIGRAM(S): at 22:03

## 2021-01-13 RX ADMIN — Medication 120 MILLIGRAM(S): at 08:40

## 2021-01-13 RX ADMIN — Medication 100 MILLIGRAM(S): at 22:03

## 2021-01-13 RX ADMIN — Medication 1000 UNIT(S): at 12:10

## 2021-01-13 RX ADMIN — Medication 100 MILLIGRAM(S): at 08:40

## 2021-01-13 NOTE — CONSULT NOTE ADULT - ASSESSMENT
50M w PMHx of HTN, renal transplant in 1978 c/b CKD IV, recently admitted for COVID in Dec 2020 c/b ABRIL, presenting with low H&H (6.7/21.2) from OP routine lab on 1/11, admitted for anemia and blood transfusion.    #Anemia  -Differential diagnosis of anemia is broad and includes, but is not limited to, acute blood loss vs. B12/folate deficiency vs. Iron deficiency vs. marrow disorder vs. hemolysis vs. autoimme vs. medication induced  -Medications reviewed  -Anemia is most likely multifactorial in the setting of CKD and recent COVID infection. COVID can suppress the marrow, leading to patient's acute drop. He is close to his baseline following transfusion.  -Please check LDH, retic count, Haptoglobin to r/o hemolysis  -Iron panel WNL, though drawn after transfusion so this is inaccurate  -Folate WNL  -B12 on the low end of normal at 282  -Please send MMA/Homocysteine, Anti-parietal cell Ab, Intrinsic Factor Ab  -Peripheral smear will be reviewed  -Transfuse for Hgb <7    Patito Thurman MD  Hematology/Oncology Fellow, PGY-4  Pager: [phone]  After 5pm and on weekends please page on-call fellow
50M w PMHx of HTN, renal transplant in 1978 c/b CKD IV, recently admitted for COVID in Dec 2020 c/b ABRIL, presenting with low H&H (6.7/21.2) from OP routine lab on 1/11.   1.  Renal transplant - baseline creatinine about 3.8.  Had ABRIL last admission but back at baseline today.  Would continue home meds, pred 5mgs every other day and imuran 150mgs daily.  Imuran can give anemia but never had this problem before.   2.  Immunosuppression - as above  3.  HTN - resume home medications tomorrow.  BP ok today  4.  Anemia - w/u in progress.  Check iron studies, B12, folate, stool guaiac.  If no etiology found will reduce imuran to 100mgs daily.

## 2021-01-13 NOTE — CONSULT NOTE ADULT - SUBJECTIVE AND OBJECTIVE BOX
HPI:  50M w PMHx of HTN, renal transplant in  c/b CKD IV, recently admitted for COVID in Dec 2020 c/b ABRIL, presenting with low H&H (6.7/21.2) from OP routine lab on . Pt denies recent n/v, changes in appetite, c/d, BRBPR, black/tarry stools, urine in blood, recent trauma/fall. Pt denies symptoms of lightheadedness palpitations, SOB. No recent f/c, travel, sick contacts, CP, abd pain, urinary symptoms. Pt does not have hx of GIB bleed except for dark stools on last admission, not scoped. Pt was due for aranesp, but did not receive it OP.     In ED, initially pt afeb, , /82, and satting well on RA, A&Ox4. Hgb 6.6 and pt receiving first unit pRBC.    Patient responded appropriately to transfusion. Hematology has been consulted for anemia w/u.       PAST MEDICAL & SURGICAL HISTORY:  Hypertension    Renal transplant recipient  1978, c/b CKD IV    History of renal transplant        Allergies    sulfa (Swelling)    Intolerances        MEDICATIONS  (STANDING):  aspirin enteric coated 81 milliGRAM(s) Oral daily  atorvastatin 20 milliGRAM(s) Oral at bedtime  azaTHIOprine 150 milliGRAM(s) Oral daily  cholecalciferol 1000 Unit(s) Oral daily  furosemide    Tablet 40 milliGRAM(s) Oral daily  hydrALAZINE 100 milliGRAM(s) Oral two times a day  NIFEdipine  milliGRAM(s) Oral daily  pantoprazole    Tablet 40 milliGRAM(s) Oral before breakfast  predniSONE   Tablet 5 milliGRAM(s) Oral every other day  sodium bicarbonate 1950 milliGRAM(s) Oral two times a day    MEDICATIONS  (PRN):      FAMILY HISTORY:  FH: esophageal cancer  Uncle,  in 7th decade    FH: kidney disease        SOCIAL HISTORY: No EtOH, no tobacco    REVIEW OF SYSTEMS:  12 point review of systems is negative unless indicated above.        T(F): 98.4 (21 @ 12:55), Max: 99.4 (21 @ 14:35)  HR: 88 (21 @ 12:55)  BP: 112/72 (21 @ 12:55)  RR: 18 (21 @ 12:55)  SpO2: 98% (21 @ 12:55)  Wt(kg): --    GENERAL: NAD, well-developed  HEAD:  Atraumatic, Normocephalic  EYES: EOMI, PERRLA, conjunctiva and sclera clear  NECK: Supple, No JVD  CHEST/LUNG: Clear to auscultation bilaterally; No wheeze  HEART: Regular rate and rhythm; No murmurs, rubs, or gallops  ABDOMEN: Soft, Nontender, Nondistended; Bowel sounds present  EXTREMITIES:  2+ Peripheral Pulses, No clubbing, cyanosis, or edema  NEUROLOGY: non-focal  SKIN: No rashes or lesions                          9.5    4.45  )-----------( 412      ( 2021 06:52 )             29.8           140  |  108  |  42<H>  ----------------------------<  97  4.0   |  20<L>  |  3.90<H>    Ca    9.3      2021 06:52  Phos  3.4       Mg     2.2         TPro  6.6  /  Alb  3.8  /  TBili  0.4  /  DBili  x   /  AST  16  /  ALT  15  /  AlkPhos  87        Magnesium, Serum: 2.2 mg/dL ( @ 06:52)  Phosphorus Level, Serum: 3.4 mg/dL ( @ 06:52)  Lactate Dehydrogenase, Serum: 140 U/L ( @ 14:59)      PT/INR - ( 2021 12:53 )   PT: 12.0 sec;   INR: 1.00 ratio         PTT - ( 2021 12:53 )  PTT:33.1 sec    .Stool Feces   @ 15:09   GI PCR Results: NOT detected  *******Please Note:*******  GI panel PCR evaluates for:  Campylobacter, Plesiomonas shigelloides, Salmonella,  Vibrio, Yersinia enterocolitica, Enteroaggregative  Escherichia coli (EAEC), Enteropathogenic E.coli (EPEC),  Enterotoxigenic E. coli (ETEC) lt/st, Shiga-like  toxin-producing E. coli (STEC) stx1/stx2,  Shigella/ Enteroinvasive E. coli (EIEC), Cryptosporidium,  Cyclospora cayetanensis, Entamoeba histolytica,  Giardia lamblia, Adenovirus F 40/41, Astrovirus,  Norovirus GI/GII, Rotavirus A, Sapovirus  --  --      .Blood   @ 02:24   No Growth Final  --  --      .Blood  12 @ 02:00   No Growth Final  --  --       HPI:  50M w PMHx of HTN, renal transplant in  c/b CKD IV, recently admitted for COVID in Dec 2020 c/b ABRIL, presenting with low H&H (6.7/21.2) from OP routine lab on . Pt denies recent n/v, changes in appetite, c/d, BRBPR, black/tarry stools, urine in blood, recent trauma/fall. Pt denies symptoms of lightheadedness palpitations, SOB. No recent f/c, travel, sick contacts, CP, abd pain, urinary symptoms. Pt does not have hx of GIB bleed except for dark stools on last admission, not scoped. Pt was due for aranesp, but did not receive it OP.     In ED, initially pt afeb, , /82, and satting well on RA, A&Ox4. Hgb 6.6 and pt receiving first unit pRBC.    Patient responded appropriately to transfusion. Hematology has been consulted for anemia w/u.       PAST MEDICAL & SURGICAL HISTORY:  Hypertension    Renal transplant recipient  1978, c/b CKD IV    History of renal transplant        Allergies    sulfa (Swelling)    Intolerances        MEDICATIONS  (STANDING):  aspirin enteric coated 81 milliGRAM(s) Oral daily  atorvastatin 20 milliGRAM(s) Oral at bedtime  azaTHIOprine 150 milliGRAM(s) Oral daily  cholecalciferol 1000 Unit(s) Oral daily  furosemide    Tablet 40 milliGRAM(s) Oral daily  hydrALAZINE 100 milliGRAM(s) Oral two times a day  NIFEdipine  milliGRAM(s) Oral daily  pantoprazole    Tablet 40 milliGRAM(s) Oral before breakfast  predniSONE   Tablet 5 milliGRAM(s) Oral every other day  sodium bicarbonate 1950 milliGRAM(s) Oral two times a day    MEDICATIONS  (PRN):      FAMILY HISTORY:  FH: esophageal cancer  Uncle,  in 7th decade    FH: kidney disease        SOCIAL HISTORY: No EtOH, no tobacco    REVIEW OF SYSTEMS:  12 point review of systems is negative unless indicated above.        T(F): 98.4 (21 @ 12:55), Max: 99.4 (21 @ 14:35)  HR: 88 (21 @ 12:55)  BP: 112/72 (21 @ 12:55)  RR: 18 (21 @ 12:55)  SpO2: 98% (21 @ 12:55)  Wt(kg): --    Exam deferred to limit exposure to COVID                          9.5    4.45  )-----------( 412      ( 2021 06:52 )             29.8           140  |  108  |  42<H>  ----------------------------<  97  4.0   |  20<L>  |  3.90<H>    Ca    9.3      2021 06:52  Phos  3.4       Mg     2.2         TPro  6.6  /  Alb  3.8  /  TBili  0.4  /  DBili  x   /  AST  16  /  ALT  15  /  AlkPhos  87        Magnesium, Serum: 2.2 mg/dL ( @ 06:52)  Phosphorus Level, Serum: 3.4 mg/dL ( @ 06:52)  Lactate Dehydrogenase, Serum: 140 U/L ( @ 14:59)      PT/INR - ( 2021 12:53 )   PT: 12.0 sec;   INR: 1.00 ratio         PTT - ( 2021 12:53 )  PTT:33.1 sec    .Stool Feces   @ 15:09   GI PCR Results: NOT detected  *******Please Note:*******  GI panel PCR evaluates for:  Campylobacter, Plesiomonas shigelloides, Salmonella,  Vibrio, Yersinia enterocolitica, Enteroaggregative  Escherichia coli (EAEC), Enteropathogenic E.coli (EPEC),  Enterotoxigenic E. coli (ETEC) lt/st, Shiga-like  toxin-producing E. coli (STEC) stx1/stx2,  Shigella/ Enteroinvasive E. coli (EIEC), Cryptosporidium,  Cyclospora cayetanensis, Entamoeba histolytica,  Giardia lamblia, Adenovirus F 40/41, Astrovirus,  Norovirus GI/GII, Rotavirus A, Sapovirus  --  --      .Blood   @ 02:24   No Growth Final  --  --      .Blood   @ 02:00   No Growth Final  --  --

## 2021-01-13 NOTE — PROGRESS NOTE ADULT - PROBLEM SELECTOR PLAN 1
Patient asymptomatic, found to have Hgb 6.7 on routine labs (hgb prior adm 8-11)  -Ddx: GI blood loss, folate/B12 deficiency, SE of imuran? vs AOCD; less likely CKD-related anemia as patient had normal values 12/2020  - consider Fecal occult blood sample  - Will send vitamin B12/folate, iron studies  - S/p 1U pRBCs, will transfuse to goal of Hgb > 7  - Will hold on GI consultation until FOBT comes back, consider PPI  - Will monitor CBC; will maintain 2 large bore IVs and active type and screen  - Will fluid resuscitate as needed

## 2021-01-13 NOTE — CONSULT NOTE ADULT - ATTENDING COMMENTS
50M w PMHx of HTN, renal transplant in 1978 c/b CKD IV, recently admitted for COVID19 in Dec 2020 c/b ABRIL, presenting with low H&H (6.7/21.2) from OP routine lab on 1/11, admitted for anemia and blood transfusion. Labs drawn post transfusion but he did respond appropriately to transfusion. Suspect a component of infection/[id number] in combo with CKD. Peripheral smear without signs of TMA (relative thrombocytosis, 0-2 schistocytes per hpf, hypochromasia present).   -please check LDH, retic count, Haptoglobin  -check epo level   -Transfuse for Hgb <7

## 2021-01-14 ENCOUNTER — TRANSCRIPTION ENCOUNTER (OUTPATIENT)
Age: 51
End: 2021-01-14

## 2021-01-14 VITALS
SYSTOLIC BLOOD PRESSURE: 126 MMHG | HEART RATE: 77 BPM | RESPIRATION RATE: 18 BRPM | TEMPERATURE: 99 F | DIASTOLIC BLOOD PRESSURE: 79 MMHG | OXYGEN SATURATION: 99 %

## 2021-01-14 LAB
HAPTOGLOB SERPL-MCNC: 237 MG/DL — HIGH (ref 34–200)
HCT VFR BLD CALC: 29.4 % — LOW (ref 39–50)
HGB BLD-MCNC: 9.4 G/DL — LOW (ref 13–17)
MCHC RBC-ENTMCNC: 31.1 PG — SIGNIFICANT CHANGE UP (ref 27–34)
MCHC RBC-ENTMCNC: 32 GM/DL — SIGNIFICANT CHANGE UP (ref 32–36)
MCV RBC AUTO: 97.4 FL — SIGNIFICANT CHANGE UP (ref 80–100)
NRBC # BLD: 0 /100 WBCS — SIGNIFICANT CHANGE UP (ref 0–0)
PCA AB SER-ACNC: ABNORMAL
PLATELET # BLD AUTO: 464 K/UL — HIGH (ref 150–400)
RBC # BLD: 3.02 M/UL — LOW (ref 4.2–5.8)
RBC # FLD: 18.5 % — HIGH (ref 10.3–14.5)
WBC # BLD: 3.68 K/UL — LOW (ref 3.8–10.5)
WBC # FLD AUTO: 3.68 K/UL — LOW (ref 3.8–10.5)

## 2021-01-14 PROCEDURE — 83540 ASSAY OF IRON: CPT

## 2021-01-14 PROCEDURE — 83921 ORGANIC ACID SINGLE QUANT: CPT

## 2021-01-14 PROCEDURE — 99285 EMERGENCY DEPT VISIT HI MDM: CPT | Mod: 25

## 2021-01-14 PROCEDURE — 85014 HEMATOCRIT: CPT

## 2021-01-14 PROCEDURE — 86850 RBC ANTIBODY SCREEN: CPT

## 2021-01-14 PROCEDURE — 80048 BASIC METABOLIC PNL TOTAL CA: CPT

## 2021-01-14 PROCEDURE — 93005 ELECTROCARDIOGRAM TRACING: CPT

## 2021-01-14 PROCEDURE — U0005: CPT

## 2021-01-14 PROCEDURE — 82668 ASSAY OF ERYTHROPOIETIN: CPT

## 2021-01-14 PROCEDURE — 82435 ASSAY OF BLOOD CHLORIDE: CPT

## 2021-01-14 PROCEDURE — U0003: CPT

## 2021-01-14 PROCEDURE — 84132 ASSAY OF SERUM POTASSIUM: CPT

## 2021-01-14 PROCEDURE — P9016: CPT

## 2021-01-14 PROCEDURE — 86923 COMPATIBILITY TEST ELECTRIC: CPT

## 2021-01-14 PROCEDURE — 82803 BLOOD GASES ANY COMBINATION: CPT

## 2021-01-14 PROCEDURE — 86769 SARS-COV-2 COVID-19 ANTIBODY: CPT

## 2021-01-14 PROCEDURE — 83550 IRON BINDING TEST: CPT

## 2021-01-14 PROCEDURE — 86901 BLOOD TYPING SEROLOGIC RH(D): CPT

## 2021-01-14 PROCEDURE — 82607 VITAMIN B-12: CPT

## 2021-01-14 PROCEDURE — 83735 ASSAY OF MAGNESIUM: CPT

## 2021-01-14 PROCEDURE — 83010 ASSAY OF HAPTOGLOBIN QUANT: CPT

## 2021-01-14 PROCEDURE — 83615 LACTATE (LD) (LDH) ENZYME: CPT

## 2021-01-14 PROCEDURE — 85025 COMPLETE CBC W/AUTO DIFF WBC: CPT

## 2021-01-14 PROCEDURE — 82330 ASSAY OF CALCIUM: CPT

## 2021-01-14 PROCEDURE — 86255 FLUORESCENT ANTIBODY SCREEN: CPT

## 2021-01-14 PROCEDURE — 81001 URINALYSIS AUTO W/SCOPE: CPT

## 2021-01-14 PROCEDURE — 82947 ASSAY GLUCOSE BLOOD QUANT: CPT

## 2021-01-14 PROCEDURE — 85027 COMPLETE CBC AUTOMATED: CPT

## 2021-01-14 PROCEDURE — 36430 TRANSFUSION BLD/BLD COMPNT: CPT

## 2021-01-14 PROCEDURE — 85610 PROTHROMBIN TIME: CPT

## 2021-01-14 PROCEDURE — 84295 ASSAY OF SERUM SODIUM: CPT

## 2021-01-14 PROCEDURE — 86340 INTRINSIC FACTOR ANTIBODY: CPT

## 2021-01-14 PROCEDURE — 85730 THROMBOPLASTIN TIME PARTIAL: CPT

## 2021-01-14 PROCEDURE — 85018 HEMOGLOBIN: CPT

## 2021-01-14 PROCEDURE — 99239 HOSP IP/OBS DSCHRG MGMT >30: CPT | Mod: GC

## 2021-01-14 PROCEDURE — 84100 ASSAY OF PHOSPHORUS: CPT

## 2021-01-14 PROCEDURE — 80053 COMPREHEN METABOLIC PANEL: CPT

## 2021-01-14 PROCEDURE — 85045 AUTOMATED RETICULOCYTE COUNT: CPT

## 2021-01-14 PROCEDURE — 82728 ASSAY OF FERRITIN: CPT

## 2021-01-14 PROCEDURE — 83090 ASSAY OF HOMOCYSTEINE: CPT

## 2021-01-14 PROCEDURE — 83605 ASSAY OF LACTIC ACID: CPT

## 2021-01-14 PROCEDURE — 86900 BLOOD TYPING SEROLOGIC ABO: CPT

## 2021-01-14 PROCEDURE — 82272 OCCULT BLD FECES 1-3 TESTS: CPT

## 2021-01-14 PROCEDURE — 82746 ASSAY OF FOLIC ACID SERUM: CPT

## 2021-01-14 RX ORDER — PREGABALIN 225 MG/1
1000 CAPSULE ORAL DAILY
Refills: 0 | Status: DISCONTINUED | OUTPATIENT
Start: 2021-01-14 | End: 2021-01-14

## 2021-01-14 RX ORDER — PREGABALIN 225 MG/1
1 CAPSULE ORAL
Qty: 30 | Refills: 0
Start: 2021-01-14 | End: 2021-02-12

## 2021-01-14 RX ADMIN — Medication 5 MILLIGRAM(S): at 09:11

## 2021-01-14 RX ADMIN — Medication 100 MILLIGRAM(S): at 09:01

## 2021-01-14 RX ADMIN — AZATHIOPRINE 150 MILLIGRAM(S): 100 TABLET ORAL at 09:11

## 2021-01-14 RX ADMIN — Medication 120 MILLIGRAM(S): at 09:01

## 2021-01-14 RX ADMIN — Medication 81 MILLIGRAM(S): at 09:11

## 2021-01-14 RX ADMIN — Medication 1950 MILLIGRAM(S): at 09:01

## 2021-01-14 RX ADMIN — PREGABALIN 1000 MICROGRAM(S): 225 CAPSULE ORAL at 12:12

## 2021-01-14 RX ADMIN — Medication 40 MILLIGRAM(S): at 09:01

## 2021-01-14 RX ADMIN — Medication 1000 UNIT(S): at 09:11

## 2021-01-14 RX ADMIN — PANTOPRAZOLE SODIUM 40 MILLIGRAM(S): 20 TABLET, DELAYED RELEASE ORAL at 05:28

## 2021-01-14 NOTE — DISCHARGE NOTE PROVIDER - NSDCFUADDAPPT_GEN_ALL_CORE_FT
Please follow up with your hematologist,, within 1-2 weeks of hospital discharge to further evaluate and manage your anemia.    Please follow up with your primary care physician, Dr. Hemphill, within 1 week of hospital discharge to further evaluate and manage your anemia. Please have your PCP do a repeat CBC within a week from hospital discharge to check your hemoglobin level.

## 2021-01-14 NOTE — PROGRESS NOTE ADULT - PROBLEM SELECTOR PLAN 1
Patient asymptomatic, found to have Hgb 6.7 on routine labs (hgb prior adm 8-11)  -Ddx: GI blood loss, folate/B12 deficiency, SE of imuran? vs AOCD; less likely CKD-related anemia as patient had normal values 12/2020  - consider Fecal occult blood sample  - Will send vitamin B12/folate, iron studies  - S/p 1U pRBCs, will transfuse to goal of Hgb > 7  - Will hold on GI consultation until FOBT comes back, consider PPI  - Will monitor CBC; will maintain 2 large bore IVs and active type and screen  - Will fluid resuscitate as needed Patient asymptomatic, found to have Hgb 6.7 on routine labs (hgb prior adm 8-11)  -Ddx: GI blood loss, folate/B12 deficiency, SE of imuran? vs AOCD; less likely CKD-related anemia as patient had normal values 12/2020  - consider Fecal occult blood sample  - Will send vitamin B12/folate, iron studies  - S/p 1U pRBCs, will transfuse to goal of Hgb > 7

## 2021-01-14 NOTE — DISCHARGE NOTE PROVIDER - NSDCCPCAREPLAN_GEN_ALL_CORE_FT
PRINCIPAL DISCHARGE DIAGNOSIS  Diagnosis: Anemia  Assessment and Plan of Treatment: You were admitted and monitored for low hemoglobin level of 6.7. You were given two units of packed red blood cells and your hemoglobin level increased and stabilized. The hematologists were consulted and recommended starting a work-up for the anemia.   We were not concerned for a GI bleed as you did not have black or bloody stools or any bloody vomiting.       PRINCIPAL DISCHARGE DIAGNOSIS  Diagnosis: Anemia  Assessment and Plan of Treatment: You were admitted and monitored for low hemoglobin level of 6.7. You were given two units of packed red blood cells and your hemoglobin level increased and stabilized. The hematologists were consulted and recommended starting a work-up for the anemia.   We were not concerned for a GI bleed as you did not have black or bloody stools or any bloody vomiting.  Your iron panel and B12 were roughly normal, however, the values were drawn after the transfusions, so this will likely have to be repeated for a more accurate assessment.       PRINCIPAL DISCHARGE DIAGNOSIS  Diagnosis: Anemia  Assessment and Plan of Treatment: You were admitted and monitored for a low hemoglobin level of 6.7. You were given two units of packed red blood cells and your hemoglobin level increased and stabilized. The hematologists were consulted and we started a work-up for the anemia.   We were not concerned for a GI bleed as you did not have black or bloody stools or any bloody vomiting.  Your iron panel and B12 were roughly normal, however, the values were drawn after the blood transfusions, so this will likely have to be repeated for a more accurate assessment. You were started on oral B12 supplementation.   The hematologist suspect that your anemia is most likely multifactorial in the setting of your chronic kidney disease and recent COVID infection. COVID can suppress the marrow, leading to a drop in hemoglobin.  A few more lab tests were drawn for anemia work-up, which have not resulted yet. Please follow up with your PCP to obtain these results.   Please follow up with your primary care physician, Dr. Hemphill, within 1 week of hospital discharge to further evaluate and manage your anemia. Please have your PCP do a repeat CBC within a week from hospital discharge to check your hemoglobin level.

## 2021-01-14 NOTE — DISCHARGE NOTE PROVIDER - NSDCMRMEDTOKEN_GEN_ALL_CORE_FT
aspirin 81 mg oral delayed release tablet: 1 tab(s) orally once a day   hydrALAZINE 100 mg oral tablet: 1 tab(s) orally 2 times a day  Imuran: 150 milligram(s) orally once a day  Lasix 40 mg oral tablet: 1 tab(s) orally once a day   Lipitor 20 mg oral tablet: 1 tab(s) orally once a day  NIFEdipine 60 mg oral tablet, extended release: 2 tab(s) orally once a day  pantoprazole 40 mg oral delayed release tablet: 1 tab(s) orally once a day  predniSONE 5 mg oral delayed release tablet: 1 tab(s) orally every other day     START 12/21  sodium bicarbonate 650 mg oral tablet: 3 tab(s) orally 2 times a day  Vitamin D3 1000 intl units (25 mcg) oral capsule: orally once a day   aspirin 81 mg oral delayed release tablet: 1 tab(s) orally once a day   cyanocobalamin 1000 mcg oral tablet: 1 tab(s) orally once a day  hydrALAZINE 100 mg oral tablet: 1 tab(s) orally 2 times a day  Imuran: 150 milligram(s) orally once a day  Lasix 40 mg oral tablet: 1 tab(s) orally once a day   Lipitor 20 mg oral tablet: 1 tab(s) orally once a day  NIFEdipine 60 mg oral tablet, extended release: 2 tab(s) orally once a day  pantoprazole 40 mg oral delayed release tablet: 1 tab(s) orally once a day  predniSONE 5 mg oral delayed release tablet: 1 tab(s) orally every other day     START 12/21  sodium bicarbonate 650 mg oral tablet: 3 tab(s) orally 2 times a day  Vitamin D3 1000 intl units (25 mcg) oral capsule: orally once a day

## 2021-01-14 NOTE — PROGRESS NOTE ADULT - PROBLEM SELECTOR PLAN 3
-on hydralazine, furosemide, nifedipine at home  -cont home BP meds
-on hydralazine, furosemide, nifedipine at home  -cont home BP meds

## 2021-01-14 NOTE — PROGRESS NOTE ADULT - SUBJECTIVE AND OBJECTIVE BOX
Internal Medicine Progress Note    Catherine De La Cruz, PGY-1 Resident  [facility] Pager: [phone]  Team 3      Patient is a 50y old  Male who presents with a chief complaint of anemia (2021 14:17)      SUBJECTIVE / OVERNIGHT EVENTS:    MEDICATIONS  (STANDING):  aspirin enteric coated 81 milliGRAM(s) Oral daily  atorvastatin 20 milliGRAM(s) Oral at bedtime  azaTHIOprine 150 milliGRAM(s) Oral daily  cholecalciferol 1000 Unit(s) Oral daily  furosemide    Tablet 40 milliGRAM(s) Oral daily  hydrALAZINE 100 milliGRAM(s) Oral two times a day  NIFEdipine  milliGRAM(s) Oral daily  pantoprazole    Tablet 40 milliGRAM(s) Oral before breakfast  predniSONE   Tablet 5 milliGRAM(s) Oral every other day  sodium bicarbonate 1950 milliGRAM(s) Oral two times a day    MEDICATIONS  (PRN):    Vital Signs Last 24 Hrs  T(C): 36.9 (2021 05:28), Max: 36.9 (2021 12:55)  T(F): 98.5 (2021 05:28), Max: 98.5 (2021 05:28)  HR: 85 (2021 05:28) (81 - 88)  BP: 121/76 (2021 05:28) (112/72 - 123/73)  BP(mean): --  RR: 18 (2021 05:28) (18 - 18)  SpO2: 97% (2021 05:28) (97% - 99%)    CAPILLARY BLOOD GLUCOSE        I&O's Summary    2021 07:01  -  2021 07:00  --------------------------------------------------------  IN: 1060 mL / OUT: 1200 mL / NET: -140 mL        PHYSICAL EXAM  GENERAL: NAD  HEAD:  Atraumatic  EYES: EOMI, PERRLA, conjunctiva and sclera clear  NECK: Supple, No JVD  CHEST/LUNG: Clear to auscultation bilaterally; No wheeze  HEART: Regular rate and rhythm; No murmurs, rubs, or gallops  ABDOMEN: Soft, Nontender, Nondistended; Bowel sounds present  EXTREMITIES:  2+ Peripheral Pulses, No clubbing, cyanosis, or edema  NEURO/PSYCH: AAOx3, nonfocal  SKIN: No rashes or lesions      LABS:                        9.4    3.68  )-----------( 464      ( 2021 06:35 )             29.4     Hemoglobin: 9.4 g/dL ( @ 06:35)  Hemoglobin: 9.1 g/dL ( @ 15:22)  Hemoglobin: 9.5 g/dL ( @ 06:52)  Hemoglobin: 8.9 g/dL ( @ 00:04)  Hemoglobin: 6.6 g/dL ( @ 12:53)    CBC Full  -  ( 2021 06:35 )  WBC Count : 3.68 K/uL  RBC Count : 3.02 M/uL  Hemoglobin : 9.4 g/dL  Hematocrit : 29.4 %  Platelet Count - Automated : 464 K/uL  Mean Cell Volume : 97.4 fl  Mean Cell Hemoglobin : 31.1 pg  Mean Cell Hemoglobin Concentration : 32.0 gm/dL  Auto Neutrophil # : x  Auto Lymphocyte # : x  Auto Monocyte # : x  Auto Eosinophil # : x  Auto Basophil # : x  Auto Neutrophil % : x  Auto Lymphocyte % : x  Auto Monocyte % : x  Auto Eosinophil % : x  Auto Basophil % : x        140  |  108  |  42<H>  ----------------------------<  97  4.0   |  20<L>  |  3.90<H>    Ca    9.3      2021 06:52  Phos  3.4       Mg     2.2         TPro  6.6  /  Alb  3.8  /  TBili  0.4  /  DBili  x   /  AST  16  /  ALT  15  /  AlkPhos  87      Creatinine Trend: 3.90<--, 3.97<--, 4.24<--, 4.64<--, 4.94<--  LIVER FUNCTIONS - ( 2021 12:53 )  Alb: 3.8 g/dL / Pro: 6.6 g/dL / ALK PHOS: 87 U/L / ALT: 15 U/L / AST: 16 U/L / GGT: x           PT/INR - ( 2021 12:53 )   PT: 12.0 sec;   INR: 1.00 ratio         PTT - ( 2021 12:53 )  PTT:33.1 sec    hs Troponin:            Urinalysis Basic - ( 2021 08:26 )    Color: Light Yellow / Appearance: Clear / S.011 / pH: x  Gluc: x / Ketone: Negative  / Bili: Negative / Urobili: Negative   Blood: x / Protein: 30 mg/dL / Nitrite: Negative   Leuk Esterase: Negative / RBC: 0 /hpf / WBC 1 /HPF   Sq Epi: x / Non Sq Epi: 0 /hpf / Bacteria: Negative      CSF:                      EKG:   MICROBIOLOGY:    IMAGING:      Labs, imaging, EKG personally reviewed
Internal Medicine Progress Note    Catherine De La Cruz, PGY-1 Resident  [facility] Pager: [phone]  Team 3      Patient is a 50y old  Male who presents with a chief complaint of anemia (12 Jan 2021 17:39)      SUBJECTIVE / OVERNIGHT EVENTS:    MEDICATIONS  (STANDING):  aspirin enteric coated 81 milliGRAM(s) Oral daily  atorvastatin 20 milliGRAM(s) Oral at bedtime  azaTHIOprine 150 milliGRAM(s) Oral daily  cholecalciferol 1000 Unit(s) Oral daily  furosemide    Tablet 40 milliGRAM(s) Oral daily  hydrALAZINE 100 milliGRAM(s) Oral two times a day  NIFEdipine  milliGRAM(s) Oral daily  pantoprazole    Tablet 40 milliGRAM(s) Oral before breakfast  predniSONE   Tablet 5 milliGRAM(s) Oral every other day  sodium bicarbonate 1950 milliGRAM(s) Oral two times a day    MEDICATIONS  (PRN):    Vital Signs Last 24 Hrs  T(C): 36.9 (13 Jan 2021 04:45), Max: 37.4 (12 Jan 2021 14:35)  T(F): 98.5 (13 Jan 2021 04:45), Max: 99.4 (12 Jan 2021 14:35)  HR: 73 (13 Jan 2021 04:45) (73 - 114)  BP: 129/78 (13 Jan 2021 04:45) (106/62 - 155/82)  BP(mean): --  RR: 18 (13 Jan 2021 04:45) (16 - 20)  SpO2: 99% (13 Jan 2021 04:45) (98% - 100%)    CAPILLARY BLOOD GLUCOSE        I&O's Summary    12 Jan 2021 07:01  -  13 Jan 2021 06:41  --------------------------------------------------------  IN: 240 mL / OUT: 0 mL / NET: 240 mL        PHYSICAL EXAM  GENERAL: NAD  HEAD:  Atraumatic  EYES: EOMI, PERRLA, conjunctiva and sclera clear  NECK: Supple, No JVD  CHEST/LUNG: Clear to auscultation bilaterally; No wheeze  HEART: Regular rate and rhythm; No murmurs, rubs, or gallops  ABDOMEN: Soft, Nontender, Nondistended; Bowel sounds present  EXTREMITIES:  2+ Peripheral Pulses, No clubbing, cyanosis, or edema  NEURO/PSYCH: AAOx3, nonfocal  SKIN: No rashes or lesions      LABS:                        8.9    4.06  )-----------( 372      ( 13 Jan 2021 00:04 )             27.2     Hemoglobin: 8.9 g/dL (01-13 @ 00:04)  Hemoglobin: 6.6 g/dL (01-12 @ 12:53)    CBC Full  -  ( 13 Jan 2021 00:04 )  WBC Count : 4.06 K/uL  RBC Count : 2.84 M/uL  Hemoglobin : 8.9 g/dL  Hematocrit : 27.2 %  Platelet Count - Automated : 372 K/uL  Mean Cell Volume : 95.8 fl  Mean Cell Hemoglobin : 31.3 pg  Mean Cell Hemoglobin Concentration : 32.7 gm/dL  Auto Neutrophil # : x  Auto Lymphocyte # : x  Auto Monocyte # : x  Auto Eosinophil # : x  Auto Basophil # : x  Auto Neutrophil % : x  Auto Lymphocyte % : x  Auto Monocyte % : x  Auto Eosinophil % : x  Auto Basophil % : x    01-12    140  |  106  |  42<H>  ----------------------------<  108<H>  3.7   |  20<L>  |  3.97<H>    Ca    9.4      12 Jan 2021 12:53    TPro  6.6  /  Alb  3.8  /  TBili  0.4  /  DBili  x   /  AST  16  /  ALT  15  /  AlkPhos  87  01-12    Creatinine Trend: 3.97<--, 4.24<--, 4.64<--, 4.94<--, 5.12<--, 5.23<--  LIVER FUNCTIONS - ( 12 Jan 2021 12:53 )  Alb: 3.8 g/dL / Pro: 6.6 g/dL / ALK PHOS: 87 U/L / ALT: 15 U/L / AST: 16 U/L / GGT: x           PT/INR - ( 12 Jan 2021 12:53 )   PT: 12.0 sec;   INR: 1.00 ratio         PTT - ( 12 Jan 2021 12:53 )  PTT:33.1 sec    hs Troponin:        12:53 - VBG - pH: 7.38  | pCO2: 38    | pO2: 37    | Lactate: 3.0          CSF:                      EKG:   MICROBIOLOGY:    IMAGING:      Labs, imaging, EKG personally reviewed

## 2021-01-14 NOTE — PROGRESS NOTE ADULT - ASSESSMENT
50M w PMHx of HTN, renal transplant in 1978 c/b CKD IV, recently admitted for COVID in Dec 2020 c/b ABRIL, presenting with low H&H (6.7/21.2) from OP routine lab on 1/11, admitted for anemia and blood transfusion.
50M w PMHx of HTN, renal transplant in 1978 c/b CKD IV, recently admitted for COVID in Dec 2020 c/b ABRIL, presenting with low H&H (6.7/21.2) from OP routine lab on 1/11, admitted for anemia and blood transfusion.

## 2021-01-14 NOTE — DISCHARGE NOTE NURSING/CASE MANAGEMENT/SOCIAL WORK - PATIENT PORTAL LINK FT
You can access the FollowMyHealth Patient Portal offered by [facility] [place] by registering at the following website: [email or web address]. By joining [employer]’s FollowMyHealth portal, you will also be able to view your health information using other applications (apps) compatible with our system.

## 2021-01-14 NOTE — PROGRESS NOTE ADULT - ATTENDING COMMENTS
touch base w renal transplant in regards w immunosuppressive regimen  heme c/s- is there a role for vit b12 injection for low normal vit b12 level- unclear if that is why is he anemia or aocd- ferritin mildly elevated- iron stores seem ok  either way can repeat cbc and if stable can aim for dc later today
f/u heme onc recs  not hemolyzing  role for vit b12 injections?  dc planning

## 2021-01-14 NOTE — DISCHARGE NOTE PROVIDER - CARE PROVIDER_API CALL
José Miguel Hemphill)  Internal Medicine; Nephrology  [address], [address] [place] [address]  Phone: [phone]  Fax: [phone]  Follow Up Time:

## 2021-01-14 NOTE — DISCHARGE NOTE PROVIDER - HOSPITAL COURSE
HPI:  50M w PMHx of HTN, renal transplant in 1978 c/b CKD IV, recently admitted for COVID in Dec 2020 c/b ABRIL, presenting with low H&H (6.7/21.2) from OP routine lab on 1/11. Pt denies recent n/v, changes in appetite, c/d, BRBPR, black/tarry stools, urine in blood, recent trauma/fall. Pt denies symptoms of lightheadedness palpitations, SOB. No recent f/c, travel, sick contacts, CP, abd pain, urinary symptoms. Pt does not have hx of GIB bleed except for dark stools on last admission, not scoped. Pt was due for aranesp, but did not receive it OP.     In ED, initially pt afeb, , /82, and satting well on RA, A&Ox4. Hgb 6.6 and pt receiving first unit pRBC.    Hospital Course:  Pt received 2 units pRBCs and hgb responded well and stabilized to 9.4. Heme consulted; hemolysis labs neg, sent for pernicious anemia work up. No susp for GIB. B12 and iron roughly wnl, but drawn after transfusions. Pt was continued on home meds.  Pt was hemodynamically stable and asymptomatic on discharge home. HPI:  50M w PMHx of HTN, renal transplant in 1978 c/b CKD IV, recently admitted for COVID in Dec 2020 c/b ABRIL, presenting with low H&H (6.7/21.2) from OP routine lab on 1/11. Pt denies recent n/v, changes in appetite, c/d, BRBPR, black/tarry stools, urine in blood, recent trauma/fall. Pt denies symptoms of lightheadedness palpitations, SOB. No recent f/c, travel, sick contacts, CP, abd pain, urinary symptoms. Pt does not have hx of GIB bleed except for dark stools on last admission, not scoped. Pt was due for aranesp, but did not receive it OP.     In ED, initially pt afeb, , /82, and satting well on RA, A&Ox4. Hgb 6.6 and pt receiving first unit pRBC.    Hospital Course:  Pt received 2 units pRBCs and hgb responded well and stabilized to 9.4. Heme consulted; hemolysis labs neg, sent for pernicious anemia work up. No susp for GIB. B12 and iron roughly wnl, but drawn after transfusions. Pt was continued on home meds.  Pt was hemodynamically stable and asymptomatic on discharge home. Pt will f/u w PCP.

## 2021-01-14 NOTE — DISCHARGE NOTE NURSING/CASE MANAGEMENT/SOCIAL WORK - NSDCFUADDAPPT_GEN_ALL_CORE_FT
Please follow up with your primary care physician, Dr. Hemphill, within 1 week of hospital discharge to further evaluate and manage your anemia. Please have your PCP do a repeat CBC within a week from hospital discharge to check your hemoglobin level.

## 2021-01-15 ENCOUNTER — TRANSCRIPTION ENCOUNTER (OUTPATIENT)
Age: 51
End: 2021-01-15

## 2021-01-15 ENCOUNTER — NON-APPOINTMENT (OUTPATIENT)
Age: 51
End: 2021-01-15

## 2021-01-15 LAB
EPO SERPL-MCNC: 258.8 MIU/ML — HIGH (ref 2.6–18.5)
IF BLOCK AB SER-ACNC: 1 AU/ML — SIGNIFICANT CHANGE UP (ref 0–1.1)

## 2021-01-18 LAB
HOMOCYSTEINE LEVEL: 19.8 UMOL/L — HIGH
METHYLMALONIC ACID LEVEL: 437 NMOL/L — HIGH (ref 87–318)

## 2021-01-19 ENCOUNTER — NON-APPOINTMENT (OUTPATIENT)
Age: 51
End: 2021-01-19

## 2021-01-19 ENCOUNTER — APPOINTMENT (OUTPATIENT)
Dept: INTERNAL MEDICINE | Facility: CLINIC | Age: 51
End: 2021-01-19
Payer: COMMERCIAL

## 2021-01-19 VITALS
HEIGHT: 65 IN | TEMPERATURE: 209.66 F | BODY MASS INDEX: 25.83 KG/M2 | HEART RATE: 126 BPM | OXYGEN SATURATION: 99 % | WEIGHT: 155 LBS

## 2021-01-19 DIAGNOSIS — E53.8 DEFICIENCY OF OTHER SPECIFIED B GROUP VITAMINS: ICD-10-CM

## 2021-01-19 PROBLEM — Z94.0 KIDNEY TRANSPLANT STATUS: Chronic | Status: ACTIVE | Noted: 2020-12-05

## 2021-01-19 PROCEDURE — 99443: CPT

## 2021-01-20 ENCOUNTER — APPOINTMENT (OUTPATIENT)
Dept: PULMONOLOGY | Facility: CLINIC | Age: 51
End: 2021-01-20
Payer: COMMERCIAL

## 2021-01-20 ENCOUNTER — LABORATORY RESULT (OUTPATIENT)
Age: 51
End: 2021-01-20

## 2021-01-20 PROCEDURE — 99442: CPT

## 2021-01-20 NOTE — HISTORY OF PRESENT ILLNESS
[FreeTextEntry1] : Telephonic visist - cell phone not working\par COVID hx, renal tx\par \par 51 y/o man, renal tx 1978\par Admitted with COVID 12/15 and ABRIL\par Did well\par Readmitted 1/12 with GIB, Hb 6. Tx up to 9 and d/c\par Still PCR positive on 1/12\par good IgG response  1/13\par \par Pt feeling well\par afebrile\par Oxygen saturations are 98%.  He has no shortness of breath, no dyspnea with exertion.\par \par He does note a tachycardia, resting heart rate in the 90s when sitting, up to 120s when standing.  Otherwise feeling well.\par \par The phone, he sounds comfortable, no distress speaking clearly.\par \par He is scheduled for labs to be done today.  He was scheduled for a repeat CBC and a B12, ordered by nephrology.  I am also adding on several other post Covid labs.  Last month all type labs were checked in the hospital on December 16, D-dimer was 1857.  CRP had peaked at 175 and was down to 20.\par Repeat today.  Obviously, would not be a candidate for anticoagulation\par \par Currently there is no overwhelming certainly evidence that her renal transplant patient persistently tested +1-month after Covid, but with a good IgG response, represents more of a threat for transmission to others that I know of.

## 2021-01-20 NOTE — HISTORY OF PRESENT ILLNESS
[Home] : at home, [unfilled] , at the time of the visit. [Medical Office: ([facility])___] : at the medical office located in  [Verbal consent obtained from patient] : the patient, [unfilled]

## 2021-01-22 ENCOUNTER — NON-APPOINTMENT (OUTPATIENT)
Age: 51
End: 2021-01-22

## 2021-01-22 LAB
BASOPHILS # BLD AUTO: 0.03 K/UL
BASOPHILS NFR BLD AUTO: 1 %
EOSINOPHIL # BLD AUTO: 0.03 K/UL
EOSINOPHIL NFR BLD AUTO: 1 %
HCT VFR BLD CALC: 30.5 %
HGB BLD-MCNC: 9.4 G/DL
IMM GRANULOCYTES NFR BLD AUTO: 0.3 %
LYMPHOCYTES # BLD AUTO: 0.22 K/UL
LYMPHOCYTES NFR BLD AUTO: 7.7 %
MAN DIFF?: NORMAL
MCHC RBC-ENTMCNC: 30.8 GM/DL
MCHC RBC-ENTMCNC: 31.4 PG
MCV RBC AUTO: 102 FL
MONOCYTES # BLD AUTO: 0.17 K/UL
MONOCYTES NFR BLD AUTO: 5.9 %
NEUTROPHILS # BLD AUTO: 2.41 K/UL
NEUTROPHILS NFR BLD AUTO: 84.1 %
PLATELET # BLD AUTO: 679 K/UL
RBC # BLD: 2.99 M/UL
RBC # FLD: 17.7 %
WBC # FLD AUTO: 2.87 K/UL

## 2021-01-25 ENCOUNTER — OUTPATIENT (OUTPATIENT)
Dept: OUTPATIENT SERVICES | Facility: HOSPITAL | Age: 51
LOS: 1 days | Discharge: HOME | End: 2021-01-25

## 2021-01-25 DIAGNOSIS — D64.9 ANEMIA, UNSPECIFIED: ICD-10-CM

## 2021-01-25 DIAGNOSIS — Z94.0 KIDNEY TRANSPLANT STATUS: Chronic | ICD-10-CM

## 2021-01-26 LAB
25(OH)D3 SERPL-MCNC: 21.5 NG/ML
ALBUMIN SERPL ELPH-MCNC: 4 G/DL
ANION GAP SERPL CALC-SCNC: 14 MMOL/L
BASOPHILS # BLD AUTO: 0.03 K/UL
BASOPHILS NFR BLD AUTO: 0.8 %
BUN SERPL-MCNC: 56 MG/DL
CALCIUM SERPL-MCNC: 9.6 MG/DL
CALCIUM SERPL-MCNC: 9.6 MG/DL
CHLORIDE SERPL-SCNC: 110 MMOL/L
CO2 SERPL-SCNC: 18 MMOL/L
CREAT SERPL-MCNC: 3.92 MG/DL
EOSINOPHIL # BLD AUTO: 0.07 K/UL
EOSINOPHIL NFR BLD AUTO: 1.8 %
FERRITIN SERPL-MCNC: 444 NG/ML
GLUCOSE SERPL-MCNC: 103 MG/DL
HCT VFR BLD CALC: 29.4 %
HGB BLD-MCNC: 9 G/DL
IMM GRANULOCYTES NFR BLD AUTO: 0.3 %
IRON SATN MFR SERPL: NORMAL %
IRON SERPL-MCNC: 190 UG/DL
LYMPHOCYTES # BLD AUTO: 0.59 K/UL
LYMPHOCYTES NFR BLD AUTO: 15.4 %
MAN DIFF?: NORMAL
MCHC RBC-ENTMCNC: 30.6 GM/DL
MCHC RBC-ENTMCNC: 31.7 PG
MCV RBC AUTO: 103.5 FL
MONOCYTES # BLD AUTO: 0.25 K/UL
MONOCYTES NFR BLD AUTO: 6.5 %
NEUTROPHILS # BLD AUTO: 2.87 K/UL
NEUTROPHILS NFR BLD AUTO: 75.2 %
PARATHYROID HORMONE INTACT: 188 PG/ML
PHOSPHATE SERPL-MCNC: 3.5 MG/DL
PLATELET # BLD AUTO: 459 K/UL
POTASSIUM SERPL-SCNC: 4.5 MMOL/L
RBC # BLD: 2.84 M/UL
RBC # FLD: 17.9 %
SODIUM SERPL-SCNC: 141 MMOL/L
TIBC SERPL-MCNC: NORMAL UG/DL
UIBC SERPL-MCNC: <20 UG/DL
WBC # FLD AUTO: 3.82 K/UL

## 2021-01-27 ENCOUNTER — APPOINTMENT (OUTPATIENT)
Dept: NEPHROLOGY | Facility: CLINIC | Age: 51
End: 2021-01-27
Payer: COMMERCIAL

## 2021-01-27 ENCOUNTER — APPOINTMENT (OUTPATIENT)
Dept: HEMATOLOGY ONCOLOGY | Facility: CLINIC | Age: 51
End: 2021-01-27
Payer: COMMERCIAL

## 2021-01-27 VITALS
TEMPERATURE: 98.4 F | DIASTOLIC BLOOD PRESSURE: 86 MMHG | BODY MASS INDEX: 26.96 KG/M2 | OXYGEN SATURATION: 99 % | HEART RATE: 97 BPM | WEIGHT: 162 LBS | SYSTOLIC BLOOD PRESSURE: 144 MMHG

## 2021-01-27 LAB — SARS-COV-2 N GENE NPH QL NAA+PROBE: NOT DETECTED

## 2021-01-27 PROCEDURE — 96372 THER/PROPH/DIAG INJ SC/IM: CPT

## 2021-01-27 PROCEDURE — 99072 ADDL SUPL MATRL&STAF TM PHE: CPT

## 2021-01-27 PROCEDURE — 99204 OFFICE O/P NEW MOD 45 MIN: CPT | Mod: 95

## 2021-01-27 RX ORDER — DARBEPOETIN ALFA 100 UG/ML
100 SOLUTION INTRAVENOUS; SUBCUTANEOUS
Qty: 0 | Refills: 0 | Status: COMPLETED | OUTPATIENT
Start: 2021-01-19

## 2021-01-27 NOTE — HISTORY OF PRESENT ILLNESS
[de-identified] : Paitent with chronic renal insuficiency presnts for the evaluation of a anemia HG 6. [de-identified] : This is a 50 year old man with a history of CKD, history of a transplant at the age of 8 years old in 1978.  Chronic graft rejection, baseline creatinine ~3.  Was admitted with COVID 19 on December 5th.  Hg at baseline in 2018 in the 8-9 range, was more in the range of 9-10 after initiation of Aranesp that has been titrated upwards since April 2019 as Aranesp was titrate upwards over the time period.  ON December 5th patient was admitted to [facility] with a Hg in the 9's went up to 11g/dl with hemoconcentration during the episode.  Patient had not received Aranesp during that 12 day hospital stay.  Patient's hemoglobin dropped down to 7.9 mid week before rising again to 10 then discharged with a hg of 8.8g/dl.  On follow up on January 11th, patient was found to have a profound anemia Hg 6.8, was admitted for transfusion that next day. On discharge follow up a 2nd time Hg stable at 9.4g/dl and more recently 9.0 2 days ago on January 25th.  \par \par Patient feels well following the discharge from the hospital stay for COVID 19, While his Hg was as low as 6.6g/dl at the ER on 2nd admission, patient denies any chest pain shortness of breath or syncope, just felt weak and tired somewhat.  Ferritin 444 at the hospital. B12 was slightly low in the 270's, patient was already asked to start a B12 supplement and will be taking it.  Retic count was only 0.6%.

## 2021-01-27 NOTE — HISTORY OF PRESENT ILLNESS
[de-identified] : Paitent with chronic renal insuficiency presnts for the evaluation of a anemia HG 6. [de-identified] : This is a 50 year old man with a history of CKD, history of a transplant at the age of 8 years old in 1978.  Chronic graft rejection, baseline creatinine ~3.  Was admitted with COVID 19 on December 5th.  Hg at baseline in 2018 in the 8-9 range, was more in the range of 9-10 after initiation of Aranesp that has been titrated upwards since April 2019 as Aranesp was titrate upwards over the time period.  ON December 5th patient was admitted to [facility] with a Hg in the 9's went up to 11g/dl with hemoconcentration during the episode.  Patient had not received Aranesp during that 12 day hospital stay.  Patient's hemoglobin dropped down to 7.9 mid week before rising again to 10 then discharged with a hg of 8.8g/dl.  On follow up on January 11th, patient was found to have a profound anemia Hg 6.8, was admitted for transfusion that next day. On discharge follow up a 2nd time Hg stable at 9.4g/dl and more recently 9.0 2 days ago on January 25th.  \par \par Patient feels well following the discharge from the hospital stay for COVID 19, While his Hg was as low as 6.6g/dl at the ER on 2nd admission, patient denies any chest pain shortness of breath or syncope, just felt weak and tired somewhat.  Ferritin 444 at the hospital. B12 was slightly low in the 270's, patient was already asked to start a B12 supplement and will be taking it.  Retic count was only 0.6%.

## 2021-01-27 NOTE — ASSESSMENT
[FreeTextEntry1] : This is a 50 year old man with a history of renal insufficiency, chronic rejection of a transplant that he received at the age of 8, remains on immunosuppression to this day. baseline creatinine ~3.  Patient has a history of anemia of chronic renal insufficiency was on escalating doses of Aranesp up until December 5th.  Hg was mostly stable at the hospital while he was being treated for COVID 19.  Hg 8-11 during that stay. Discharged with Hg 8.8g/dl.  1/11/21 Hg 6.8g/dl requiring readmission for transfusion of 2 units of PRBC. IN that period patient was still unable to get Aranesp. Ferritn 444, B12 in the 270's, Retic count only 0.6%.  Suspect that primarily the excess anemia from 1/11/21 was from 4 weeks without JOSEPH support.  He will get his first dose later today.  Also a small element of Vitamin B12 deficiency, patient has been started on a supplement already.  \par \par Recommend he go forward with Dr. Us's Aranesp 100mcg injection then recheck CBC, retic count, B12. next week at the lab.  In that week, if my suspicion that the lack of aranesp is the primary  of the witnessed anemia, it should have at least stabilized or started to improve.    Also will check SPEP, ALESSANDRA, along with haptoglobin LDH to rule out other causes of anemia.

## 2021-01-28 RX ADMIN — DARBEPOETIN ALFA 1 MCG/ML: 100 SOLUTION INTRAVENOUS; SUBCUTANEOUS at 00:00

## 2021-02-02 ENCOUNTER — APPOINTMENT (OUTPATIENT)
Dept: NEPHROLOGY | Facility: CLINIC | Age: 51
End: 2021-02-02
Payer: COMMERCIAL

## 2021-02-02 VITALS
BODY MASS INDEX: 27.12 KG/M2 | WEIGHT: 163 LBS | HEART RATE: 97 BPM | TEMPERATURE: 97.7 F | DIASTOLIC BLOOD PRESSURE: 82 MMHG | OXYGEN SATURATION: 98 % | SYSTOLIC BLOOD PRESSURE: 140 MMHG

## 2021-02-02 PROCEDURE — 96372 THER/PROPH/DIAG INJ SC/IM: CPT

## 2021-02-02 PROCEDURE — 99072 ADDL SUPL MATRL&STAF TM PHE: CPT

## 2021-02-02 RX ORDER — DARBEPOETIN ALFA 100 UG/ML
100 SOLUTION INTRAVENOUS; SUBCUTANEOUS
Qty: 0 | Refills: 0 | Status: COMPLETED | OUTPATIENT
Start: 2021-01-28

## 2021-02-03 ENCOUNTER — LABORATORY RESULT (OUTPATIENT)
Age: 51
End: 2021-02-03

## 2021-02-03 ENCOUNTER — NON-APPOINTMENT (OUTPATIENT)
Age: 51
End: 2021-02-03

## 2021-02-03 ENCOUNTER — APPOINTMENT (OUTPATIENT)
Dept: INTERNAL MEDICINE | Facility: CLINIC | Age: 51
End: 2021-02-03
Payer: COMMERCIAL

## 2021-02-03 VITALS — SYSTOLIC BLOOD PRESSURE: 124 MMHG | DIASTOLIC BLOOD PRESSURE: 70 MMHG

## 2021-02-03 VITALS
TEMPERATURE: 97.3 F | BODY MASS INDEX: 27.16 KG/M2 | OXYGEN SATURATION: 99 % | WEIGHT: 163 LBS | HEIGHT: 65 IN | HEART RATE: 97 BPM

## 2021-02-03 VITALS — SYSTOLIC BLOOD PRESSURE: 130 MMHG | DIASTOLIC BLOOD PRESSURE: 70 MMHG

## 2021-02-03 DIAGNOSIS — N18.9 CHRONIC KIDNEY DISEASE, UNSPECIFIED: ICD-10-CM

## 2021-02-03 PROCEDURE — 93000 ELECTROCARDIOGRAM COMPLETE: CPT

## 2021-02-03 PROCEDURE — 99214 OFFICE O/P EST MOD 30 MIN: CPT | Mod: 25

## 2021-02-03 PROCEDURE — 99072 ADDL SUPL MATRL&STAF TM PHE: CPT

## 2021-02-03 RX ORDER — NIFEDIPINE 90 MG/1
90 TABLET, EXTENDED RELEASE ORAL
Qty: 90 | Refills: 3 | Status: DISCONTINUED | COMMUNITY
Start: 2016-12-14 | End: 2021-02-03

## 2021-02-03 NOTE — HISTORY OF PRESENT ILLNESS
[FreeTextEntry1] : This is a 50-year-old male with chronic renal failure who is status post episode of Covid in mid December he was readmitted January 12 with profound anemia [de-identified] : During his Covid episode he did develop worsening of his renal function which did seem to come back to normal\par \par In the interim\par  he has been seen by hematology who felt that it was the lack of her Anasept that led to the anemia and he was restarted\par \par he is weak he states he gets chest pain when he wakes up in the morning no exertional pain he states his heart rate picks up to 106 as soon as he starts to walk

## 2021-02-03 NOTE — PHYSICAL EXAM
[No Respiratory Distress] : no respiratory distress  [Normal Rate] : normal rate  [No Focal Deficits] : no focal deficits [de-identified] : Cachectic

## 2021-02-03 NOTE — ASSESSMENT
[FreeTextEntry1] : A 50-year-old male whose history has been reviewed above\par \par He is status post Covid he is persistently positive apparently or positive the negative and positive.  He is asymptomatic in terms of this disease.  And as per pulmonary not likely to be contagious\par \par He has had profound anemia I did review his hematologic evaluation he was to be noted on erythropoietic medications.\par \par He is profoundly cachectic and I have encouraged him to increase his diet appropriately\par \par He does need to be seen by renal we did repeat a renal profile\par \par He needs to be on disability\par \par I will start Pepcid as I think this is the etiology of his chest pain\par \par Did get his injection

## 2021-02-08 ENCOUNTER — NON-APPOINTMENT (OUTPATIENT)
Age: 51
End: 2021-02-08

## 2021-02-08 ENCOUNTER — TRANSCRIPTION ENCOUNTER (OUTPATIENT)
Age: 51
End: 2021-02-08

## 2021-02-10 ENCOUNTER — APPOINTMENT (OUTPATIENT)
Dept: NEPHROLOGY | Facility: CLINIC | Age: 51
End: 2021-02-10
Payer: COMMERCIAL

## 2021-02-10 VITALS
BODY MASS INDEX: 27.15 KG/M2 | TEMPERATURE: 97.9 F | WEIGHT: 163.14 LBS | SYSTOLIC BLOOD PRESSURE: 134 MMHG | HEART RATE: 75 BPM | DIASTOLIC BLOOD PRESSURE: 85 MMHG | OXYGEN SATURATION: 99 %

## 2021-02-10 PROCEDURE — 99072 ADDL SUPL MATRL&STAF TM PHE: CPT

## 2021-02-10 PROCEDURE — 96372 THER/PROPH/DIAG INJ SC/IM: CPT

## 2021-02-10 RX ORDER — DARBEPOETIN ALFA 100 UG/ML
100 SOLUTION INTRAVENOUS; SUBCUTANEOUS
Qty: 0 | Refills: 0 | Status: COMPLETED | OUTPATIENT
Start: 2021-02-10

## 2021-02-10 RX ADMIN — DARBEPOETIN ALFA 1 MCG/ML: 100 SOLUTION INTRAVENOUS; SUBCUTANEOUS at 00:00

## 2021-02-24 ENCOUNTER — APPOINTMENT (OUTPATIENT)
Dept: NEPHROLOGY | Facility: CLINIC | Age: 51
End: 2021-02-24
Payer: COMMERCIAL

## 2021-02-24 ENCOUNTER — LABORATORY RESULT (OUTPATIENT)
Age: 51
End: 2021-02-24

## 2021-02-24 VITALS — RESPIRATION RATE: 14 BRPM | DIASTOLIC BLOOD PRESSURE: 60 MMHG | SYSTOLIC BLOOD PRESSURE: 142 MMHG | HEART RATE: 72 BPM

## 2021-02-24 PROCEDURE — 99072 ADDL SUPL MATRL&STAF TM PHE: CPT

## 2021-02-24 PROCEDURE — 99213 OFFICE O/P EST LOW 20 MIN: CPT | Mod: 25

## 2021-02-24 PROCEDURE — 96372 THER/PROPH/DIAG INJ SC/IM: CPT

## 2021-02-24 NOTE — ASSESSMENT
[FreeTextEntry1] : Pt is a 50yoM w/ CKD iV and HTN, w/ Hx LRRT 1978, covid19 in Dec with ABRIL  here for follow up for his CKD  CKD, here for f/u: \par \par Hx LRRT with CAN, now with CKD 1V\par - Renal transplant with likely CTG, 38 years+, baseline sCr 3.6-3.9, last sCr 3.66 in Sept 2020\par - no uremic symptoms\par - currently following/listed with [facility] for transplant (awaiting list); insurance not accepted to transfer to [facility]\par - initial evaluation for PD catheter done; pt preferred PD but now states he is thinking about HD\par - following w/ HT, Jie\par - check renal panel\par \par Immunosuppression\par - c/w pred 5 and Imuran 150mg \par - following w/ Derm yearly for skin lesions\par \par Anemia due to CKD \par -  Aranesp to 100 mcg today \par - check CBC, iron study\par \par Hx elevated calcium on prior labs/hypercalcemia\par - last Ca wnl, phos nl, last Vit D wnl\par - off vit D supplement and Rocaltrol currently\par \par metabolic acidosis 2/2 advanced CKD- stable\par - c/w bicarb tabs 650 mg three tabs bid\par - check bicarb level\par \par Benign Essential HTN - controlled\par - private cardiologist d/c atenolol, plan start carvedilol\par - c/w nifedipine 90/30mg and lasix 40mg daily\par - cont monitor BP at home, keep log\par \par HCM \par - prior lipids (jan 2020) LDL 51/ \par - PNA vaccine 2015\par - Influenza vaccine done 10/9/19; Sept 2020\par - HBV series: Dec #1, Jan #2, June 19th #3; Hep B titres done in 12/2018\par - Will see Derm this year- needs to make appointment still. \par  -COVID 19 illness Dec 2020 hospitalized-check ab\par

## 2021-02-24 NOTE — ASSESSMENT
[FreeTextEntry1] : Pt is a 50yoM w/ CKD iV and HTN, w/ Hx LRRT 1978, covid19 in Dec with ABRIL  here for follow up for his CKD  CKD, here for f/u: \par \par Hx LRRT with CAN, now with CKD 1V\par - Renal transplant with likely CTG, 38 years+, baseline sCr 3.6-3.9, last sCr 3.66 in Sept 2020\par - no uremic symptoms\par - currently following/listed with [facility] for transplant (awaiting list); insurance not accepted to transfer to [facility]\par - initial evaluation for PD catheter done; pt preferred PD but now states he is thinking about HD\par - following w/ HT, Jie\par - check renal panel\par \par Immunosuppression\par - c/w pred 5 and Imuran 150mg \par - following w/ Derm yearly for skin lesions\par \par Anemia due to CKD \par -  Aranesp to 100 mcg today \par - check CBC, iron study\par \par Hx elevated calcium on prior labs/hypercalcemia\par - last Ca wnl, phos nl, last Vit D wnl\par - off vit D supplement and Rocaltrol currently\par \par metabolic acidosis 2/2 advanced CKD- stable\par - c/w bicarb tabs 650 mg three tabs bid\par - check bicarb level\par \par Benign Essential HTN - controlled\par - private cardiologist d/c atenolol, plan start carvedilol\par - c/w nifedipine 90/30mg and lasix 40mg daily\par - cont monitor BP at home, keep log\par \par HCM \par - prior lipids (jan 2020) LDL 51/ \par - PNA vaccine 2015\par - Influenza vaccine done 10/9/19; Sept 2020\par - HBV series: Dec #1, Jan #2, June 19th #3; Hep B titres done in 12/2018\par - Will see Derm this year- needs to make appointment still. \par  -COVID 19 illness Dec 2020 hospitalized-check ab\par

## 2021-02-24 NOTE — HISTORY OF PRESENT ILLNESS
[FreeTextEntry1] : Here for follow up\par No complaints\par Back to work\par Patient denies nausea, metallic taste in mouth, sob, leg swelling, change in urine frequency or output\par Denies diarrhea or vomiting\par Denies any pain or burning with urination\par gets tired faster/ sob with exertion\par good appetite othwerwise\par lost weigh due to t: 170 to 156 \par \par went for transplant reevaluation this month to [facility] Feb 11th-has been on lost 4 years\par \par other ros negative

## 2021-02-24 NOTE — HISTORY OF PRESENT ILLNESS
[FreeTextEntry1] : Here for follow up\par No complaints\par Back to work\par Patient denies nausea, metallic taste in mouth, sob, leg swelling, change in urine frequency or output\par Denies diarrhea or vomiting\par Denies any pain or burning with urination\par gets tired faster/ sob with exertion\par good appetite othwerwise\par lost weigh due to t: 170 to 156 \par \par went for transplant reevaluation this month to [facility] Feb 11th-has been on lost 4 years\par \par other ros negative

## 2021-02-25 LAB
25(OH)D3 SERPL-MCNC: 30.8 NG/ML
ALBUMIN SERPL ELPH-MCNC: 4 G/DL
ANION GAP SERPL CALC-SCNC: 15 MMOL/L
BASOPHILS # BLD AUTO: 0.03 K/UL
BASOPHILS NFR BLD AUTO: 0.8 %
BUN SERPL-MCNC: 48 MG/DL
CALCIUM SERPL-MCNC: 9.3 MG/DL
CALCIUM SERPL-MCNC: 9.3 MG/DL
CHLORIDE SERPL-SCNC: 109 MMOL/L
CO2 SERPL-SCNC: 20 MMOL/L
CREAT SERPL-MCNC: 4.26 MG/DL
EOSINOPHIL # BLD AUTO: 0.1 K/UL
EOSINOPHIL NFR BLD AUTO: 2.5 %
ESTIMATED AVERAGE GLUCOSE: 103 MG/DL
FERRITIN SERPL-MCNC: 376 NG/ML
GLUCOSE SERPL-MCNC: 90 MG/DL
HBA1C MFR BLD HPLC: 5.2 %
HCT VFR BLD CALC: 25.6 %
HGB BLD-MCNC: 8 G/DL
IMM GRANULOCYTES NFR BLD AUTO: 0.5 %
IRON SATN MFR SERPL: 80 %
IRON SERPL-MCNC: 163 UG/DL
LYMPHOCYTES # BLD AUTO: 0.88 K/UL
LYMPHOCYTES NFR BLD AUTO: 22.2 %
MAN DIFF?: NORMAL
MCHC RBC-ENTMCNC: 31.3 GM/DL
MCHC RBC-ENTMCNC: 34 PG
MCV RBC AUTO: 108.9 FL
MONOCYTES # BLD AUTO: 0.29 K/UL
MONOCYTES NFR BLD AUTO: 7.3 %
NEUTROPHILS # BLD AUTO: 2.64 K/UL
NEUTROPHILS NFR BLD AUTO: 66.7 %
PARATHYROID HORMONE INTACT: 370 PG/ML
PHOSPHATE SERPL-MCNC: 5 MG/DL
PLATELET # BLD AUTO: 430 K/UL
POTASSIUM SERPL-SCNC: 4.8 MMOL/L
RBC # BLD: 2.35 M/UL
RBC # FLD: 21.7 %
SARS-COV-2 IGG SERPL IA-ACNC: 108 INDEX
SARS-COV-2 IGG SERPL QL IA: POSITIVE
SODIUM SERPL-SCNC: 143 MMOL/L
TIBC SERPL-MCNC: 203 UG/DL
UIBC SERPL-MCNC: 40 UG/DL
WBC # FLD AUTO: 3.96 K/UL

## 2021-03-15 ENCOUNTER — RX RENEWAL (OUTPATIENT)
Age: 51
End: 2021-03-15

## 2021-03-24 LAB
25(OH)D3 SERPL-MCNC: 30.5 NG/ML
CHOLEST SERPL-MCNC: 102 MG/DL
ESTIMATED AVERAGE GLUCOSE: 123 MG/DL
FERRITIN SERPL-MCNC: 476 NG/ML
FOLATE SERPL-MCNC: 10.3 NG/ML
HBA1C MFR BLD HPLC: 5.9 %
HDLC SERPL-MCNC: 33 MG/DL
IRON SATN MFR SERPL: 44 %
IRON SERPL-MCNC: 88 UG/DL
LDLC SERPL CALC-MCNC: 45 MG/DL
M PROTEIN SPEC IFE-MCNC: NORMAL
NONHDLC SERPL-MCNC: 68 MG/DL
PHOSPHATE SERPL-MCNC: 3.1 MG/DL
SAVE SPECIMEN: NORMAL
T3RU NFR SERPL: 1 TBI
T4 SERPL-MCNC: 8.1 UG/DL
TIBC SERPL-MCNC: 203 UG/DL
TRIGL SERPL-MCNC: 117 MG/DL
TSH SERPL-ACNC: 2.36 UIU/ML
UIBC SERPL-MCNC: 114 UG/DL
URATE SERPL-MCNC: 6.9 MG/DL
VIT B12 SERPL-MCNC: 1116 PG/ML

## 2021-04-06 ENCOUNTER — LABORATORY RESULT (OUTPATIENT)
Age: 51
End: 2021-04-06

## 2021-04-08 LAB
25(OH)D3 SERPL-MCNC: 31.9 NG/ML
ALBUMIN SERPL ELPH-MCNC: 4.2 G/DL
ANION GAP SERPL CALC-SCNC: 14 MMOL/L
BASOPHILS # BLD AUTO: 0.03 K/UL
BASOPHILS NFR BLD AUTO: 0.9 %
BUN SERPL-MCNC: 65 MG/DL
CALCIUM SERPL-MCNC: 9.5 MG/DL
CALCIUM SERPL-MCNC: 9.5 MG/DL
CHLORIDE SERPL-SCNC: 109 MMOL/L
CO2 SERPL-SCNC: 20 MMOL/L
CREAT SERPL-MCNC: 4.43 MG/DL
EOSINOPHIL # BLD AUTO: 0.25 K/UL
EOSINOPHIL NFR BLD AUTO: 7 %
FERRITIN SERPL-MCNC: 175 NG/ML
GLUCOSE SERPL-MCNC: 102 MG/DL
HCT VFR BLD CALC: 29.9 %
HGB BLD-MCNC: 9.6 G/DL
IRON SATN MFR SERPL: 37 %
IRON SERPL-MCNC: 69 UG/DL
LYMPHOCYTES # BLD AUTO: 0.78 K/UL
LYMPHOCYTES NFR BLD AUTO: 21.7 %
MAN DIFF?: NORMAL
MCHC RBC-ENTMCNC: 32.1 GM/DL
MCHC RBC-ENTMCNC: 35.4 PG
MCV RBC AUTO: 110.3 FL
MONOCYTES # BLD AUTO: 0.16 K/UL
MONOCYTES NFR BLD AUTO: 4.3 %
NEUTROPHILS # BLD AUTO: 2.32 K/UL
NEUTROPHILS NFR BLD AUTO: 64.4 %
PARATHYROID HORMONE INTACT: 399 PG/ML
PHOSPHATE SERPL-MCNC: 5.4 MG/DL
PLATELET # BLD AUTO: 377 K/UL
POTASSIUM SERPL-SCNC: 4.4 MMOL/L
RBC # BLD: 2.71 M/UL
RBC # FLD: 19.5 %
SODIUM SERPL-SCNC: 142 MMOL/L
TIBC SERPL-MCNC: 188 UG/DL
UIBC SERPL-MCNC: 119 UG/DL
WBC # FLD AUTO: 3.61 K/UL

## 2021-05-05 ENCOUNTER — APPOINTMENT (OUTPATIENT)
Dept: INTERNAL MEDICINE | Facility: CLINIC | Age: 51
End: 2021-05-05
Payer: COMMERCIAL

## 2021-05-05 ENCOUNTER — LABORATORY RESULT (OUTPATIENT)
Age: 51
End: 2021-05-05

## 2021-05-05 VITALS
WEIGHT: 166 LBS | TEMPERATURE: 98 F | SYSTOLIC BLOOD PRESSURE: 120 MMHG | HEIGHT: 65 IN | DIASTOLIC BLOOD PRESSURE: 70 MMHG | BODY MASS INDEX: 27.66 KG/M2

## 2021-05-05 PROCEDURE — 36415 COLL VENOUS BLD VENIPUNCTURE: CPT

## 2021-05-05 PROCEDURE — 99072 ADDL SUPL MATRL&STAF TM PHE: CPT

## 2021-05-05 PROCEDURE — 99214 OFFICE O/P EST MOD 30 MIN: CPT | Mod: 25

## 2021-05-05 NOTE — HISTORY OF PRESENT ILLNESS
[FreeTextEntry1] : This is a 50-year-old male for evaluation and treatment of his chronic renal failure hypertension anemia status post Covid weight loss [de-identified] : Patient is upbeat as usual states he feels well and has gained weight back he has no systemic complaints

## 2021-05-05 NOTE — PHYSICAL EXAM
[No JVD] : no jugular venous distention [No Edema] : there was no peripheral edema [Normal] : no joint swelling and grossly normal strength and tone [No Focal Deficits] : no focal deficits

## 2021-05-05 NOTE — ASSESSMENT
[FreeTextEntry1] : [place] upbeat 50-year-old individual\par \par He has a history of hypertension his blood pressure actually was a little bit on the low side at first but did normalize at 120/70 had no orthostatic changes no medication changes\par \par He has a history of anemia secondary to his renal disease he is now on medication as repeat CBC has been drawn\par \par He has a history of chronic renal disease appropriate blood tests including phosphorus PTH vitamin D were drawn\par \par He is status post Covid he does not appear to have any lasting effects he has no dyspnea his CNS is intact no depression no fatigue.  No intervention\par \par He does have a history of hypercholesterolemia remains on a statin a cholesterol profile has been obtained medication changes predicated on the results\par \par He remains on famotidine for his reflux he is asymptomatic.\par \par He is gaining weight which I think is positive in this case\par \par He will be following up with renal next week

## 2021-05-06 LAB
25(OH)D3 SERPL-MCNC: 30 NG/ML
ALBUMIN SERPL ELPH-MCNC: 4.3 G/DL
ALP BLD-CCNC: 99 U/L
ALT SERPL-CCNC: 18 U/L
ANION GAP SERPL CALC-SCNC: 13 MMOL/L
AST SERPL-CCNC: 21 U/L
BASOPHILS # BLD AUTO: 0.06 K/UL
BASOPHILS NFR BLD AUTO: 1.3 %
BILIRUB SERPL-MCNC: 0.6 MG/DL
BUN SERPL-MCNC: 67 MG/DL
CALCIUM SERPL-MCNC: 9.7 MG/DL
CALCIUM SERPL-MCNC: 9.7 MG/DL
CHLORIDE SERPL-SCNC: 106 MMOL/L
CHOLEST SERPL-MCNC: 106 MG/DL
CO2 SERPL-SCNC: 22 MMOL/L
CREAT SERPL-MCNC: 4.35 MG/DL
EOSINOPHIL # BLD AUTO: 0.07 K/UL
EOSINOPHIL NFR BLD AUTO: 1.5 %
ESTIMATED AVERAGE GLUCOSE: 88 MG/DL
GLUCOSE SERPL-MCNC: 87 MG/DL
HBA1C MFR BLD HPLC: 4.7 %
HCT VFR BLD CALC: 35.2 %
HDLC SERPL-MCNC: 35 MG/DL
HGB BLD-MCNC: 10.7 G/DL
IMM GRANULOCYTES NFR BLD AUTO: 1.1 %
LDLC SERPL CALC-MCNC: 48 MG/DL
LYMPHOCYTES # BLD AUTO: 1.12 K/UL
LYMPHOCYTES NFR BLD AUTO: 23.6 %
MAN DIFF?: NORMAL
MCHC RBC-ENTMCNC: 30.4 GM/DL
MCHC RBC-ENTMCNC: 34.6 PG
MCV RBC AUTO: 113.9 FL
MONOCYTES # BLD AUTO: 0.36 K/UL
MONOCYTES NFR BLD AUTO: 7.6 %
NEUTROPHILS # BLD AUTO: 3.09 K/UL
NEUTROPHILS NFR BLD AUTO: 64.9 %
NONHDLC SERPL-MCNC: 71 MG/DL
PARATHYROID HORMONE INTACT: 399 PG/ML
PHOSPHATE SERPL-MCNC: 4 MG/DL
PLATELET # BLD AUTO: 419 K/UL
POTASSIUM SERPL-SCNC: 4.8 MMOL/L
PROT SERPL-MCNC: 6.8 G/DL
RBC # BLD: 3.09 M/UL
RBC # FLD: 16.8 %
SAVE SPECIMEN: NORMAL
SODIUM SERPL-SCNC: 141 MMOL/L
T3RU NFR SERPL: 1.1 TBI
T4 SERPL-MCNC: 7.2 UG/DL
TRIGL SERPL-MCNC: 114 MG/DL
TSH SERPL-ACNC: 2.35 UIU/ML
URATE SERPL-MCNC: 7.4 MG/DL
WBC # FLD AUTO: 4.75 K/UL

## 2021-05-07 ENCOUNTER — RX RENEWAL (OUTPATIENT)
Age: 51
End: 2021-05-07

## 2021-05-10 ENCOUNTER — RX RENEWAL (OUTPATIENT)
Age: 51
End: 2021-05-10

## 2021-05-12 ENCOUNTER — APPOINTMENT (OUTPATIENT)
Dept: NEPHROLOGY | Facility: CLINIC | Age: 51
End: 2021-05-12
Payer: COMMERCIAL

## 2021-05-12 VITALS
WEIGHT: 167 LBS | HEIGHT: 65 IN | HEART RATE: 73 BPM | TEMPERATURE: 98.3 F | DIASTOLIC BLOOD PRESSURE: 83 MMHG | SYSTOLIC BLOOD PRESSURE: 149 MMHG | BODY MASS INDEX: 27.82 KG/M2 | OXYGEN SATURATION: 98 %

## 2021-05-12 PROCEDURE — 99072 ADDL SUPL MATRL&STAF TM PHE: CPT

## 2021-05-12 PROCEDURE — 99214 OFFICE O/P EST MOD 30 MIN: CPT | Mod: GC

## 2021-05-12 NOTE — REVIEW OF SYSTEMS
[Fever] : no fever [Chills] : no chills [Feeling Poorly] : not feeling poorly [Feeling Tired] : not feeling tired [Chest Pain] : no chest pain [Palpitations] : no palpitations [Lower Ext Edema] : no extremity edema [Shortness Of Breath] : no shortness of breath [Wheezing] : no wheezing [Cough] : no cough [SOB on Exertion] : no shortness of breath during exertion [Abdominal Pain] : no abdominal pain [Vomiting] : no vomiting [Constipation] : no constipation [Diarrhea] : no diarrhea [Melena] : no melena [Itching] : no itching [FreeTextEntry7] : No anorexia

## 2021-05-12 NOTE — ASSESSMENT
[FreeTextEntry1] : Pt is a 50yoM w/ CKD iV and HTN, w/ Hx LRRT 1978 (mom was the donor), covid19 in Dec 2020 with CKD stage 4 here for follow up for his CKD 4, here for f/u: \par \par #Hx LRRT with CAN, now with CKD 4\par - Renal transplant with likely CTG, 42 years+, baseline sCr 3.6-3.9,  Cr 4.3 (May 2021) stable since April\par - no uremic symptoms\par - Follows up with [facility] with transplant. Last visit was last month\par - following w/ HT, Jie\par -last cr stable in May 2021\par - Check UA & UP/C ratio\par \par #Immunosuppression\par - c/w pred 5 and Imuran 150mg \par - following w/ Derm yearly for skin lesions\par \par #Anemia due to CKD \par - Aranesp to 100 mcg weekly- he self injects now\par - CBC with Hg 10.7, iron study with % sat 37 & Ferritin 175\par - WIll need to decrease dose if Hg keeps trending up\par \par #Hx elevated calcium on prior labs/hypercalcemia\par - last Ca wnl, phos nl, last Vit D wnl 30  in May 01924\par - off vit D supplement 1000U qD  \par \par #metabolic acidosis 2/2 advanced CKD- stable\par - c/w bicarb tabs 650 mg three tabs bid\par - Last bicarb level stable\par \par #Benign Essential HTN - controlled\par - private cardiologist d/alicia atenolol. Did not start carvedilol yet\par - c/w nifedipine 60 mg bid and lasix 40mg daily\par -BP in office 149/83--> repeat manual 156/90.  BP log at home with BP ranging from 120//80. advised to  continue to monitor BP at home, keep log; May 5th BP at /70\par Low salt diet\par \par #HCM \par - prior lipids (jan 2020) LDL 51/ \par - PNA vaccine 2015\par - Influenza vaccine done 10/9/19; Sept 2020\par - HBV series: Dec #1, Jan #2, June 19th #3; Hep B titres done in 12/2018\par - Will see Derm this year- needs to make appointment still. \par  -COVID 19 illness Dec 2020 hospitalized- He will schedule appnt for vaccine\par \par \par \par \par \par \par \par check bp at home\par \par \par \par july rtc\par covid vaccine\par \par

## 2021-05-12 NOTE — HISTORY OF PRESENT ILLNESS
[FreeTextEntry1] : Pt is here for f/u.\par \par He last saw Dr Hemphill May 5th, no changes in medications made.He reports he is still with decreased sense of taste & smell since COVID19. Complains of frothy urine. \par Patient denies nausea, metallic taste in mouth, sob, leg swelling, change in urine frequency or output\par Denies diarrhea or vomiting\par Denies any pain or burning with urination\par States he is gaining weight back, 10lbs. 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  \par \par Reports compliance with medications.  He checks his BP at home and reports BP 130s/70-80s\par All other ROS neg\par 156/90

## 2021-05-13 LAB
APPEARANCE: CLEAR
BACTERIA: NEGATIVE
BILIRUBIN URINE: NEGATIVE
BLOOD URINE: NEGATIVE
COLOR: NORMAL
CREAT SPEC-SCNC: 50 MG/DL
CREAT/PROT UR: 1.1 RATIO
GLUCOSE QUALITATIVE U: NORMAL
HYALINE CASTS: 1 /LPF
KETONES URINE: NEGATIVE
LEUKOCYTE ESTERASE URINE: NEGATIVE
MICROSCOPIC-UA: NORMAL
NITRITE URINE: NEGATIVE
PH URINE: 6.5
PROT UR-MCNC: 56 MG/DL
PROTEIN URINE: ABNORMAL
RED BLOOD CELLS URINE: 0 /HPF
SPECIFIC GRAVITY URINE: 1.01
SQUAMOUS EPITHELIAL CELLS: 0 /HPF
UROBILINOGEN URINE: NORMAL
WHITE BLOOD CELLS URINE: 1 /HPF

## 2021-07-07 ENCOUNTER — APPOINTMENT (OUTPATIENT)
Dept: NEPHROLOGY | Facility: CLINIC | Age: 51
End: 2021-07-07
Payer: COMMERCIAL

## 2021-07-07 ENCOUNTER — LABORATORY RESULT (OUTPATIENT)
Age: 51
End: 2021-07-07

## 2021-07-07 PROCEDURE — 99214 OFFICE O/P EST MOD 30 MIN: CPT | Mod: GC

## 2021-07-07 PROCEDURE — 99072 ADDL SUPL MATRL&STAF TM PHE: CPT

## 2021-07-07 NOTE — HISTORY OF PRESENT ILLNESS
[FreeTextEntry1] : Last saw us in May 2021. He has had no other doctor visits since then. Patient denies nausea, metallic taste in mouth, sob, leg swelling, change in urine frequency or change in urine output. Does complain of foamy urine which has been ongoing. Denies diarrhea or vomiting. Denies any pain or burning with urination, Reports compliance with medications. He checks his BP at home and reports BP 130s-140s/70-80s at home\par All other ROS neg\par No medication changes since last visit. \par Does not want to get the covid vaccine. \par \par \par

## 2021-07-07 NOTE — ASSESSMENT
[FreeTextEntry1] : Pt is a 50yoM w/ CKD iV and HTN, w/ Hx LRRT 1978 (mom was the donor), covid19 in Dec 2020 with CKD stage 4 here for follow up for his CKD 4, here for f/u:\par \par  \par #Hx LRRT with CAN, now with CKD 5, with proteinuria stable\par - Renal transplant with likely CTG, 42 years+, baseline sCr 3.6-3.9, Cr 4.3 (May 2021) stable since April\par - no uremic symptoms currently\par - Follows up with [facility] with transplant. \par - following w/ HT, Jie\par -last cr stable at 4.35 with eGFR 15 in May 2021\par - labs on 5/12/21: UA 30mg/ dl  & UP/C ratio 1.1 grams stable since 2018\par \par #Immunosuppression\par - c/w pred 5 and Imuran 150mg\par - following w/ Derm yearly for skin lesions. Has not had a visit since 2 years. WIll follow up this year as per him\par \par \par #Anemia due to CKD V\par - Aranesp to 100 mcg weekly- he self injects now\par - CBC with Hg 10.7, iron study with % sat 37 & Ferritin 175\par - WIll need to decrease dose if Hg keeps trending up\par -Check CBC, iron studies\par \par #Hx elevated calcium on prior labs/hypercalcemia, vitamin d insufficiency\par - last Ca wnl, phos nl, last Vit D wnl 30 in May 13688\par - on vit D supplement 1000U qD\par -Repeat renal panel\par \par #metabolic acidosis 2/2 advanced CKD- stable\par - c/w bicarb tabs 650 mg three tabs bid\par - Last bicarb level stable at 22. Repeat renal panel & adjust accordingly\par \par #Benign Essential HTN - controlled. \par - private cardiologist d/alicia atenolol. Did not start carvedilol yet\par - c/w nifedipine 60 mg bid and lasix 40mg daily\par -BP in office 154/74 manually. BP ranges from 130-140/70-80's at home.  advised to continue to monitor BP at home, keep log, and call us if elevated\par -Low salt diet stressed \par \par  #HCM\par - prior lipids (jan 2020) LDL 51/\par - PNA vaccine 2015\par - Influenza vaccine done 10/9/19; Sept 2020\par - HBV series: Dec #1, Jan #2, June 19th #3; Hep B titres done in 12/2018\par - Will see Derm this year- needs to make appointment still. advised follow up gigi bc of multiple nevi\par  -COVID 19 illness Dec 2020 hospitalized- He will schedule appnt for vaccine. Hesitant to take the vaccine. Counseled about risks vs benefits. \par \par

## 2021-07-07 NOTE — PHYSICAL EXAM
[General Appearance - Alert] : alert [General Appearance - In No Acute Distress] : in no acute distress [General Appearance - Well Nourished] : well nourished [General Appearance - Well Developed] : well developed [General Appearance - Well-Appearing] : healthy appearing [Sclera] : the sclera and conjunctiva were normal [PERRL With Normal Accommodation] : pupils were equal in size, round, and reactive to light [Extraocular Movements] : extraocular movements were intact [Neck Appearance] : the appearance of the neck was normal [Jugular Venous Distention Increased] : there was no jugular-venous distention [] : no respiratory distress [Respiration, Rhythm And Depth] : normal respiratory rhythm and effort [Exaggerated Use Of Accessory Muscles For Inspiration] : no accessory muscle use [Auscultation Breath Sounds / Voice Sounds] : lungs were clear to auscultation bilaterally [Heart Rate And Rhythm] : heart rate was normal and rhythm regular [Heart Sounds] : normal S1 and S2 [Heart Sounds Gallop] : no gallops [Murmurs] : no murmurs [Heart Sounds Pericardial Friction Rub] : no pericardial rub [Bowel Sounds] : normal bowel sounds [Abdomen Soft] : soft [Abdomen Tenderness] : non-tender [No CVA Tenderness] : no ~M costovertebral angle tenderness [No Spinal Tenderness] : no spinal tenderness [No Focal Deficits] : no focal deficits [Oriented To Time, Place, And Person] : oriented to person, place, and time [Impaired Insight] : insight and judgment were intact [Affect] : the affect was normal [Mood] : the mood was normal [Abnormal Walk] : normal gait [FreeTextEntry1] : multiple moles

## 2021-07-07 NOTE — REASON FOR VISIT
Psychotherapy Group Note    PATIENT'S NAME: Souleymane Zurita  MRN:   [id number]  :   1993  ACCT. NUMBER: [id number]  DATE OF SERVICE: 6/10/20  START TIME:  1:00 PM  END TIME:  1:50 PM  FACILITATOR: Olivia Cadet Jeffrey Allen Carlson, LP  TOPIC:  EBP Group: Emotions Management  Adult Dual Diagnosis Day Treatment  TRACK: 3    NUMBER OF PARTICIPANTS: 6    Telemedicine Visit: The patient's condition can be safely assessed and treated via synchronous audio and visual telemedicine encounter.      Reason for Telemedicine Visit: Services only offered telehealth    Originating Site (Patient Location): Patient's home    Distant Site (Provider Location): [facility]    Consent:  The patient/guardian has verbally consented to: the potential risks and benefits of telemedicine (video visit) versus in person care; bill my insurance or make self-payment for services provided; and responsibility for payment of non-covered services.     Mode of Communication:  Video Conference via [employer]    As the provider I attest to compliance with applicable laws and regulations related to telemedicine.     Summary of Group / Topics Discussed:  Emotions Management: Guilt and Shame: Patients explored and shared personal experiences associated with feelings of guilt and shame.  Group explored how these feelings develop, what they mean to each individual, and how to increase acceptance and usefulness of these feelings.  Group members assisted one another to contextualize these concepts and promote healing.     Patient Session Goals / Objectives:    Discuss and review definitions and personal views/experiences with guilt and shame    Understand the differences between guilt and shame    Explore how feelings of guilt and shame impact functioning    Understand and practice strategies to manage difficult emotions and move towards healing    Understand and normalize difficult emotions through group  discussion    Understand the utility of guilt and shame    Target  unwanted  emotions for change      Patient Participation / Response:  Minimally participated, only when prompted / asked.    Demonstrated understanding of topics discussed through group discussion and participation    Treatment Plan:  Patient has a current master individualized treatment plan.  See Epic treatment plan for more information.    Michelle Cadet   [Follow-Up] : a follow-up visit [FreeTextEntry1] : Renal transplant & CKD stage 5.

## 2021-07-09 LAB
25(OH)D3 SERPL-MCNC: 27.5 NG/ML
ALBUMIN SERPL ELPH-MCNC: 4.2 G/DL
ANION GAP SERPL CALC-SCNC: 17 MMOL/L
BASOPHILS # BLD AUTO: 0.05 K/UL
BASOPHILS NFR BLD AUTO: 1 %
BUN SERPL-MCNC: 58 MG/DL
CALCIUM SERPL-MCNC: 9.1 MG/DL
CHLORIDE SERPL-SCNC: 108 MMOL/L
CO2 SERPL-SCNC: 17 MMOL/L
COVID-19 NUCLEOCAPSID  GAM ANTIBODY INTERPRETATION: POSITIVE
COVID-19 SPIKE DOMAIN ANTIBODY INTERPRETATION: POSITIVE
CREAT SERPL-MCNC: 4.09 MG/DL
EOSINOPHIL # BLD AUTO: 0.1 K/UL
EOSINOPHIL NFR BLD AUTO: 1.9 %
ESTIMATED AVERAGE GLUCOSE: 103 MG/DL
FERRITIN SERPL-MCNC: 284 NG/ML
GLUCOSE SERPL-MCNC: 97 MG/DL
HBA1C MFR BLD HPLC: 5.2 %
HCT VFR BLD CALC: 33.4 %
HGB BLD-MCNC: 10.4 G/DL
IMM GRANULOCYTES NFR BLD AUTO: 0.4 %
IRON SATN MFR SERPL: 61 %
IRON SERPL-MCNC: 122 UG/DL
LYMPHOCYTES # BLD AUTO: 0.68 K/UL
LYMPHOCYTES NFR BLD AUTO: 13 %
MAN DIFF?: NORMAL
MCHC RBC-ENTMCNC: 31.1 GM/DL
MCHC RBC-ENTMCNC: 34.6 PG
MCV RBC AUTO: 111 FL
MONOCYTES # BLD AUTO: 0.22 K/UL
MONOCYTES NFR BLD AUTO: 4.2 %
NEUTROPHILS # BLD AUTO: 4.17 K/UL
NEUTROPHILS NFR BLD AUTO: 79.5 %
PHOSPHATE SERPL-MCNC: 4.7 MG/DL
PLATELET # BLD AUTO: 287 K/UL
POTASSIUM SERPL-SCNC: 4.6 MMOL/L
RBC # BLD: 3.01 M/UL
RBC # FLD: 15.9 %
SARS-COV-2 AB SERPL IA-ACNC: >250 U/ML
SARS-COV-2 AB SERPL QL IA: 126 INDEX
SODIUM SERPL-SCNC: 142 MMOL/L
TIBC SERPL-MCNC: 201 UG/DL
UIBC SERPL-MCNC: 79 UG/DL
WBC # FLD AUTO: 5.24 K/UL

## 2021-07-30 ENCOUNTER — RX RENEWAL (OUTPATIENT)
Age: 51
End: 2021-07-30

## 2021-08-06 ENCOUNTER — APPOINTMENT (OUTPATIENT)
Dept: INTERNAL MEDICINE | Facility: CLINIC | Age: 51
End: 2021-08-06
Payer: COMMERCIAL

## 2021-08-06 VITALS
SYSTOLIC BLOOD PRESSURE: 150 MMHG | HEIGHT: 65 IN | WEIGHT: 167 LBS | BODY MASS INDEX: 27.82 KG/M2 | DIASTOLIC BLOOD PRESSURE: 88 MMHG

## 2021-08-06 PROCEDURE — 99214 OFFICE O/P EST MOD 30 MIN: CPT | Mod: 25

## 2021-08-06 PROCEDURE — 36415 COLL VENOUS BLD VENIPUNCTURE: CPT

## 2021-08-06 RX ORDER — FAMOTIDINE 20 MG/1
20 TABLET, FILM COATED ORAL DAILY
Qty: 90 | Refills: 0 | Status: DISCONTINUED | COMMUNITY
Start: 2021-02-03 | End: 2021-08-06

## 2021-08-06 RX ORDER — DARBEPOETIN ALFA 100 UG/.5ML
100 INJECTION, SOLUTION INTRAVENOUS; SUBCUTANEOUS
Qty: 12 | Refills: 6 | Status: DISCONTINUED | COMMUNITY
Start: 2021-02-10 | End: 2021-08-06

## 2021-08-06 NOTE — PHYSICAL EXAM
[No JVD] : no jugular venous distention [Normal] : soft, non-tender, non-distended, no masses palpated, no HSM and normal bowel sounds [No Focal Deficits] : no focal deficits [de-identified] : Mild weight gain [de-identified] : Unchanged

## 2021-08-06 NOTE — HISTORY OF PRESENT ILLNESS
[FreeTextEntry1] : This is a 50-year-old male for evaluation and treatment of his chronic kidney disease secondary to failing transplanted kidney.  In addition he does have anemia hypertension hypercholesterolemia.  He is status post Covid infection [de-identified] : Patient is feeling well good spirits.  He has no cardiovascular complaints and no uremic complaints\par \par

## 2021-08-06 NOTE — ASSESSMENT
[FreeTextEntry1] : This is a 50-year-old male whose history has been reviewed above\par \par He is status post renal transplant approximately 42 years ago he has progressive azotemia however his last creatinine was 4.09 which is essentially unchanged no intervention\par \par He has a history of hypertension his blood pressure is above goal he has gained weight.  He is at maximum doses of his current medications.  We have the options of using a beta-blocker or clonidine neither of which I think would be good for him in terms of his overall status however with his blood pressure remains high we will need to institute a new medication at this point I will ask him to reduce his weight increase his exercise he will be seeing nephrology in several weeks and a decision at that point can be made\par \par His metabolic acidosis is not as well-controlled as it past but again I do not want to increase sodium load at this time\par \par He apparently did have a macrocytosis a B12 level methylmalonic acid folate and homocystine levels were drawn\par \par His anemia has been well controlled on Epogen which will be managed by renal\par \par Cholesterol has been well controlled we will not sure at this point\par \par Was asked to get the Covid vaccine\par \par

## 2021-08-07 LAB
FOLATE SERPL-MCNC: 16.7 NG/ML
HCYS SERPL-MCNC: 16.2 UMOL/L
VIT B12 SERPL-MCNC: 572 PG/ML

## 2021-08-11 LAB — METHYLMALONATE SERPL-SCNC: 380 NMOL/L

## 2021-08-18 ENCOUNTER — APPOINTMENT (OUTPATIENT)
Dept: NEPHROLOGY | Facility: CLINIC | Age: 51
End: 2021-08-18
Payer: COMMERCIAL

## 2021-08-18 ENCOUNTER — LABORATORY RESULT (OUTPATIENT)
Age: 51
End: 2021-08-18

## 2021-08-18 VITALS
WEIGHT: 167.55 LBS | TEMPERATURE: 98.1 F | HEART RATE: 55 BPM | BODY MASS INDEX: 27.88 KG/M2 | SYSTOLIC BLOOD PRESSURE: 133 MMHG | OXYGEN SATURATION: 96 % | DIASTOLIC BLOOD PRESSURE: 82 MMHG

## 2021-08-18 PROCEDURE — 99214 OFFICE O/P EST MOD 30 MIN: CPT | Mod: GC

## 2021-08-19 LAB
25(OH)D3 SERPL-MCNC: 31.8 NG/ML
ALBUMIN SERPL ELPH-MCNC: 4 G/DL
ANION GAP SERPL CALC-SCNC: 18 MMOL/L
BASOPHILS # BLD AUTO: 0.04 K/UL
BASOPHILS NFR BLD AUTO: 0.8 %
BUN SERPL-MCNC: 70 MG/DL
CALCIUM SERPL-MCNC: 9.2 MG/DL
CHLORIDE SERPL-SCNC: 106 MMOL/L
CO2 SERPL-SCNC: 16 MMOL/L
CREAT SERPL-MCNC: 4.2 MG/DL
EOSINOPHIL # BLD AUTO: 0.08 K/UL
EOSINOPHIL NFR BLD AUTO: 1.5 %
FERRITIN SERPL-MCNC: 218 NG/ML
GLUCOSE SERPL-MCNC: 87 MG/DL
HCT VFR BLD CALC: 30.2 %
HGB BLD-MCNC: 9 G/DL
IMM GRANULOCYTES NFR BLD AUTO: 1.5 %
IRON SATN MFR SERPL: 46 %
IRON SERPL-MCNC: 90 UG/DL
LYMPHOCYTES # BLD AUTO: 0.97 K/UL
LYMPHOCYTES NFR BLD AUTO: 18.2 %
MAN DIFF?: NORMAL
MCHC RBC-ENTMCNC: 29.8 GM/DL
MCHC RBC-ENTMCNC: 34.6 PG
MCV RBC AUTO: 116.2 FL
MONOCYTES # BLD AUTO: 0.42 K/UL
MONOCYTES NFR BLD AUTO: 7.9 %
NEUTROPHILS # BLD AUTO: 3.73 K/UL
NEUTROPHILS NFR BLD AUTO: 70.1 %
PHOSPHATE SERPL-MCNC: 6.1 MG/DL
PLATELET # BLD AUTO: 561 K/UL
POTASSIUM SERPL-SCNC: 5.4 MMOL/L
RBC # BLD: 2.6 M/UL
RBC # FLD: 17.5 %
SODIUM SERPL-SCNC: 140 MMOL/L
TIBC SERPL-MCNC: 194 UG/DL
UIBC SERPL-MCNC: 105 UG/DL
WBC # FLD AUTO: 5.32 K/UL

## 2021-08-19 NOTE — HISTORY OF PRESENT ILLNESS
[FreeTextEntry1] : Last saw us in July 2021.\par Since his last visit he has seen Dr. Hemphill, no medication changes made.\par Reports he had COVI19 first Pfizer vaccine last week and had fever and chills and fatigue for two days.\par Patient denies nausea, metallic taste in mouth, sob, leg swelling, change in urine frequency or change in urine output. Does complain of foamy urine which has been ongoing. Denies diarrhea or vomiting. Denies any pain or burning with urination\par Reports compliance with medications.\par He checks his BP at home and reports BP 130s-140s/70-80s at home. \par All other ROS neg\par  \par Got covid vaccine #1 on 8/12/21 (Pfizer)

## 2021-08-19 NOTE — ASSESSMENT
[FreeTextEntry1] : Pt is a 50yoM w/ CKD iV and HTN, w/ Hx LRRT 1978 (mom was the donor), covid19 in Dec 2020 with CKD stage 4 here for follow up for his CKD 4, here for f/u:\par \par  \par #Hx LRRT with CAN, now with CKD1V/V, with proteinuria stable\par - Renal transplant with likely CTG, 42 years+, baseline sCr 3.6-3.9, Cr 4.3 (May 2021) stable since April\par - no uremic symptoms currently\par - [facility] \par - following w/ HT, Jie\par -last cr stable at 4.09 with eGFR 16 in July 2021\par - labs on 5/12/21: UA 30mg/ dl & UP/C ratio 1.1 grams stable since 2018\par \par #Immunosuppression\par - c/w pred 5 and Imuran 150mg\par \par #Anemia due to CKD 1V/V\par - Aranesp to 100 mcg weekly- he self injects now\par - CBC with Hg 10.4, iron study with % sat 67 & Ferritin 284. Iron stores sufficient\par - WIll need to decrease dose if Hg keeps trending up\par -Check CBC, iron studies\par \par #Hx elevated calcium on prior labs/hypercalcemia, vitamin d insufficiency\par - last Ca wnl, phos nl, last Vit D 27 in July 2021\par - on vit D supplement 1000U qD\par -Repeat renal panel\par \par #metabolic acidosis 2/2 advanced CKD- stable\par - c/w bicarb tabs 650 mg three tabs bid\par - Last bicarb level stable at 17. Repeat renal panel & adjust accordingly\par \par #Benign Essential HTN - controlled. \par - private cardiologist d/alicia atenolol. Did not start carvedilol yet\par - c/w nifedipine 60 mg bid and lasix 40mg daily\par -BP in office 154/74 manually. BP ranges from 130-140/70-80's at home. advised to continue to monitor BP at home, keep log, and call us if elevated\par -Low salt diet stressed \par \par  #HCM\par - prior lipids (jan 2020) LDL 51/\par - PNA vaccine 2015\par - Influenza vaccine done 10/9/19; Sept 2020\par - HBV series: Dec #1, Jan #2, June 19th #3; Hep B titres done in 12/2018\par - Will see Derm this year- needs to make appointment still. advised follow up gigi bc of multiple nevi\par  -COVID 19 vaccine #1 on 8/12\par \par

## 2021-08-19 NOTE — PHYSICAL EXAM
[General Appearance - Alert] : alert [General Appearance - In No Acute Distress] : in no acute distress [General Appearance - Well Nourished] : well nourished [General Appearance - Well Developed] : well developed [PERRL With Normal Accommodation] : pupils were equal in size, round, and reactive to light [Sclera] : the sclera and conjunctiva were normal [Extraocular Movements] : extraocular movements were intact [Neck Appearance] : the appearance of the neck was normal [Respiration, Rhythm And Depth] : normal respiratory rhythm and effort [] : no respiratory distress [Exaggerated Use Of Accessory Muscles For Inspiration] : no accessory muscle use [Apical Impulse] : the apical impulse was normal [Auscultation Breath Sounds / Voice Sounds] : lungs were clear to auscultation bilaterally [Heart Rate And Rhythm] : heart rate was normal and rhythm regular [Heart Sounds] : normal S1 and S2 [Heart Sounds Gallop] : no gallops [Murmurs] : no murmurs [Heart Sounds Pericardial Friction Rub] : no pericardial rub [Bowel Sounds] : normal bowel sounds [Abdomen Soft] : soft [Abdomen Tenderness] : non-tender [No CVA Tenderness] : no ~M costovertebral angle tenderness [No Spinal Tenderness] : no spinal tenderness [Abnormal Walk] : normal gait [No Focal Deficits] : no focal deficits [Oriented To Time, Place, And Person] : oriented to person, place, and time [Impaired Insight] : insight and judgment were intact [Affect] : the affect was normal [Mood] : the mood was normal [FreeTextEntry1] : mild pedal edema R > L

## 2021-08-19 NOTE — ASSESSMENT
[FreeTextEntry1] : Pt is a 50yoM w/ CKD iV and HTN, w/ Hx LRRT 1978 (mom was the donor), covid19 in Dec 2020 with CKD stage 4 here for follow up for his CKD 4, here for f/u:\par \par  \par #Hx LRRT with CAN, now with CKD1V/V, with proteinuria stable\par - Renal transplant with likely CTG, 42 years+, baseline sCr 3.6-3.9, Cr 4.3 (May 2021) stable since April\par - no uremic symptoms currently\par - [facility] \par - following w/ HT, Jie\par -last cr stable at 4.09 with eGFR 16 in July 2021\par - labs on 5/12/21: UA 30mg/ dl & UP/C ratio 1.1 grams stable since 2018\par \par #Immunosuppression\par - c/w pred 5 and Imuran 150mg\par \par #Anemia due to CKD 1V/V\par - Aranesp to 100 mcg weekly- he self injects now\par - CBC with Hg 10.4, iron study with % sat 67 & Ferritin 284. Iron stores sufficient\par - WIll need to decrease dose if Hg keeps trending up\par -Check CBC, iron studies\par \par #Hx elevated calcium on prior labs/hypercalcemia, vitamin d insufficiency\par - last Ca wnl, phos nl, last Vit D 27 in July 2021\par - on vit D supplement 1000U qD\par -Repeat renal panel\par \par #metabolic acidosis 2/2 advanced CKD- stable\par - c/w bicarb tabs 650 mg three tabs bid\par - Last bicarb level stable at 17. Repeat renal panel & adjust accordingly\par \par #Benign Essential HTN - controlled. \par - private cardiologist d/alicia atenolol. Did not start carvedilol yet\par - c/w nifedipine 60 mg bid and lasix 40mg daily\par -BP in office 154/74 manually. BP ranges from 130-140/70-80's at home. advised to continue to monitor BP at home, keep log, and call us if elevated\par -Low salt diet stressed \par \par  #HCM\par - prior lipids (jan 2020) LDL 51/\par - PNA vaccine 2015\par - Influenza vaccine done 10/9/19; Sept 2020\par - HBV series: Dec #1, Jan #2, June 19th #3; Hep B titres done in 12/2018\par - Will see Derm this year- needs to make appointment still. advised follow up gigi bc of multiple nevi\par  -COVID 19 vaccine #1 on 8/12\par \par

## 2021-08-19 NOTE — PHYSICAL EXAM
[General Appearance - Alert] : alert [General Appearance - In No Acute Distress] : in no acute distress [General Appearance - Well Nourished] : well nourished [General Appearance - Well Developed] : well developed [PERRL With Normal Accommodation] : pupils were equal in size, round, and reactive to light [Sclera] : the sclera and conjunctiva were normal [Extraocular Movements] : extraocular movements were intact [Neck Appearance] : the appearance of the neck was normal [Respiration, Rhythm And Depth] : normal respiratory rhythm and effort [] : no respiratory distress [Exaggerated Use Of Accessory Muscles For Inspiration] : no accessory muscle use [Apical Impulse] : the apical impulse was normal [Auscultation Breath Sounds / Voice Sounds] : lungs were clear to auscultation bilaterally [Heart Sounds] : normal S1 and S2 [Heart Rate And Rhythm] : heart rate was normal and rhythm regular [Heart Sounds Gallop] : no gallops [Murmurs] : no murmurs [Heart Sounds Pericardial Friction Rub] : no pericardial rub [Bowel Sounds] : normal bowel sounds [Abdomen Soft] : soft [Abdomen Tenderness] : non-tender [No CVA Tenderness] : no ~M costovertebral angle tenderness [No Spinal Tenderness] : no spinal tenderness [Abnormal Walk] : normal gait [No Focal Deficits] : no focal deficits [Oriented To Time, Place, And Person] : oriented to person, place, and time [Affect] : the affect was normal [Impaired Insight] : insight and judgment were intact [Mood] : the mood was normal [FreeTextEntry1] : mild pedal edema R > L

## 2021-09-08 ENCOUNTER — RX RENEWAL (OUTPATIENT)
Age: 51
End: 2021-09-08

## 2021-10-25 ENCOUNTER — LABORATORY RESULT (OUTPATIENT)
Age: 51
End: 2021-10-25

## 2021-10-26 LAB
25(OH)D3 SERPL-MCNC: 33.3 NG/ML
ALBUMIN SERPL ELPH-MCNC: 4.1 G/DL
ANION GAP SERPL CALC-SCNC: 14 MMOL/L
BASOPHILS # BLD AUTO: 0.04 K/UL
BASOPHILS NFR BLD AUTO: 0.7 %
BUN SERPL-MCNC: 59 MG/DL
CALCIUM SERPL-MCNC: 9.3 MG/DL
CHLORIDE SERPL-SCNC: 110 MMOL/L
CO2 SERPL-SCNC: 20 MMOL/L
CREAT SERPL-MCNC: 4.73 MG/DL
EOSINOPHIL # BLD AUTO: 0.08 K/UL
EOSINOPHIL NFR BLD AUTO: 1.4 %
FERRITIN SERPL-MCNC: 226 NG/ML
GLUCOSE SERPL-MCNC: 120 MG/DL
HCT VFR BLD CALC: 30.5 %
HGB BLD-MCNC: 9.6 G/DL
IMM GRANULOCYTES NFR BLD AUTO: 0.5 %
IRON SATN MFR SERPL: 41 %
IRON SERPL-MCNC: 71 UG/DL
LYMPHOCYTES # BLD AUTO: 0.63 K/UL
LYMPHOCYTES NFR BLD AUTO: 10.8 %
MAN DIFF?: NORMAL
MCHC RBC-ENTMCNC: 31.5 GM/DL
MCHC RBC-ENTMCNC: 35.7 PG
MCV RBC AUTO: 113.4 FL
MONOCYTES # BLD AUTO: 0.2 K/UL
MONOCYTES NFR BLD AUTO: 3.4 %
NEUTROPHILS # BLD AUTO: 4.85 K/UL
NEUTROPHILS NFR BLD AUTO: 83.2 %
PHOSPHATE SERPL-MCNC: 4 MG/DL
PLATELET # BLD AUTO: 357 K/UL
POTASSIUM SERPL-SCNC: 4.9 MMOL/L
RBC # BLD: 2.69 M/UL
RBC # FLD: 16.7 %
SODIUM SERPL-SCNC: 144 MMOL/L
TIBC SERPL-MCNC: 174 UG/DL
UIBC SERPL-MCNC: 103 UG/DL
WBC # FLD AUTO: 5.83 K/UL

## 2021-11-12 ENCOUNTER — LABORATORY RESULT (OUTPATIENT)
Age: 51
End: 2021-11-12

## 2021-11-12 ENCOUNTER — NON-APPOINTMENT (OUTPATIENT)
Age: 51
End: 2021-11-12

## 2021-11-12 ENCOUNTER — APPOINTMENT (OUTPATIENT)
Dept: INTERNAL MEDICINE | Facility: CLINIC | Age: 51
End: 2021-11-12
Payer: COMMERCIAL

## 2021-11-12 VITALS
BODY MASS INDEX: 25.84 KG/M2 | HEIGHT: 64.17 IN | SYSTOLIC BLOOD PRESSURE: 142 MMHG | DIASTOLIC BLOOD PRESSURE: 82 MMHG | WEIGHT: 151.38 LBS

## 2021-11-12 DIAGNOSIS — Z94.0 KIDNEY TRANSPLANT STATUS: ICD-10-CM

## 2021-11-12 DIAGNOSIS — U07.1 COVID-19: ICD-10-CM

## 2021-11-12 PROCEDURE — 93000 ELECTROCARDIOGRAM COMPLETE: CPT

## 2021-11-12 PROCEDURE — 90686 IIV4 VACC NO PRSV 0.5 ML IM: CPT

## 2021-11-12 PROCEDURE — G0008: CPT

## 2021-11-12 PROCEDURE — 99396 PREV VISIT EST AGE 40-64: CPT | Mod: 25

## 2021-11-12 PROCEDURE — 36415 COLL VENOUS BLD VENIPUNCTURE: CPT

## 2021-11-12 RX ORDER — NIFEDIPINE 30 MG/1
30 TABLET, FILM COATED, EXTENDED RELEASE ORAL
Qty: 90 | Refills: 3 | Status: DISCONTINUED | COMMUNITY
Start: 2016-12-14 | End: 2021-11-12

## 2021-11-12 NOTE — HEALTH RISK ASSESSMENT
[Fair] :  ~his/her~ mood as fair [No] : No [0] : 1) Little interest or pleasure doing things: Not at all (0) [PHQ-2 Negative - No further assessment needed] : PHQ-2 Negative - No further assessment needed [None] : None [With Family] : lives with family [Single] : single [Feels Safe at Home] : Feels safe at home [Fully functional (bathing, dressing, toileting, transferring, walking, feeding)] : Fully functional (bathing, dressing, toileting, transferring, walking, feeding) [Fully functional (using the telephone, shopping, preparing meals, housekeeping, doing laundry, using] : Fully functional and needs no help or supervision to perform IADLs (using the telephone, shopping, preparing meals, housekeeping, doing laundry, using transportation, managing medications and managing finances) [Smoke Detector] : smoke detector [Carbon Monoxide Detector] : carbon monoxide detector [Seat Belt] :  uses seat belt [Sunscreen] : uses sunscreen [] : No [Change in mental status noted] : No change in mental status noted [Sexually Active] : not sexually active [Reports changes in hearing] : Reports no changes in hearing [Reports changes in vision] : Reports no changes in vision [Reports changes in dental health] : Reports no changes in dental health [Guns at Home] : no guns at home [Safety elements used in home] : no safety elements used in home [Travel to Developing Areas] : does not  travel to developing areas [TB Exposure] : is not being exposed to tuberculosis [Caregiver Concerns] : does not have caregiver concerns

## 2021-11-12 NOTE — HISTORY OF PRESENT ILLNESS
[FreeTextEntry1] : This is a remarkably upbeat 51-year-old male for annual health assessment\par \par Specifically we will address his history of chronic renal failure secondary to failing transplant multiple cutaneous lesions weight and history of Covid [de-identified] : Overall patient is in his usual state of health.  However he is complaining of some blurred vision he apparently saw an optometrist who has sent him to a neuro-ophthalmologist.  He states that he sees fine with his new progressive lenses

## 2021-11-12 NOTE — ASSESSMENT
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  He remains on the transplant list his creatinine has been relatively stable.  We will continue her current regime\par \par He has developed some difficulty with vision but it is unclear if this is truly pathologic he will be seeing neuro-ophthalmology I did emphasize that this should be done promptly.  However his vision is good with his glasses on\par \par His blood pressure is slightly above where I would comfortable I will restart his atenolol at a low dose\par \par He has had anemia he currently is on Arnesp repeat CBC has been obtained\par \par He has a history of hypercholesterolemia he remains on a statin a cholesterol profile has been obtained medication changes predicated on the results\par \par We had discussed colonoscopy versus fit test apparently his nephrology transplant team has advised a colonoscopy but after evaluation by cardiology\par \par Patient has had Covid and has been vaccinated I did explain to him that he needs a booster dose appropriately timed\par \par His dermatologic disease is followed by dermatology\par \par We will give him a flu shot today\par \par We will be seeing nephrology within the next several weeks where his blood pressure will be taken\par \par He will follow up with cardiology at [facility]\par \par

## 2021-11-12 NOTE — PHYSICAL EXAM
[No Acute Distress] : no acute distress [Well Nourished] : well nourished [Well Developed] : well developed [Well-Appearing] : well-appearing [Normal Sclera/Conjunctiva] : normal sclera/conjunctiva [PERRL] : pupils equal round and reactive to light [EOMI] : extraocular movements intact [Normal Outer Ear/Nose] : the outer ears and nose were normal in appearance [Normal Oropharynx] : the oropharynx was normal [No JVD] : no jugular venous distention [No Lymphadenopathy] : no lymphadenopathy [Supple] : supple [Thyroid Normal, No Nodules] : the thyroid was normal and there were no nodules present [No Respiratory Distress] : no respiratory distress  [No Accessory Muscle Use] : no accessory muscle use [Clear to Auscultation] : lungs were clear to auscultation bilaterally [Normal Rate] : normal rate  [Regular Rhythm] : with a regular rhythm [Normal S1, S2] : normal S1 and S2 [No Carotid Bruits] : no carotid bruits [No Abdominal Bruit] : a ~M bruit was not heard ~T in the abdomen [No Varicosities] : no varicosities [Pedal Pulses Present] : the pedal pulses are present [No Edema] : there was no peripheral edema [No Palpable Aorta] : no palpable aorta [No Extremity Clubbing/Cyanosis] : no extremity clubbing/cyanosis [Soft] : abdomen soft [Non Tender] : non-tender [Non-distended] : non-distended [No Masses] : no abdominal mass palpated [No HSM] : no HSM [Normal Bowel Sounds] : normal bowel sounds [Normal Posterior Cervical Nodes] : no posterior cervical lymphadenopathy [Normal Anterior Cervical Nodes] : no anterior cervical lymphadenopathy [No CVA Tenderness] : no CVA  tenderness [No Spinal Tenderness] : no spinal tenderness [No Joint Swelling] : no joint swelling [Grossly Normal Strength/Tone] : grossly normal strength/tone [No Rash] : no rash [Coordination Grossly Intact] : coordination grossly intact [No Focal Deficits] : no focal deficits [Normal Gait] : normal gait [Deep Tendon Reflexes (DTR)] : deep tendon reflexes were 2+ and symmetric [Normal Affect] : the affect was normal [Normal Insight/Judgement] : insight and judgment were intact [de-identified] : Seems to gain some of his weight back [de-identified] : 2/6 systolic ejection murmur [de-identified] : Multiple cutaneous lesions

## 2021-11-15 LAB
25(OH)D3 SERPL-MCNC: 30.1 NG/ML
ALBUMIN SERPL ELPH-MCNC: 4.3 G/DL
ALP BLD-CCNC: 100 U/L
ALT SERPL-CCNC: 10 U/L
ANION GAP SERPL CALC-SCNC: 15 MMOL/L
APPEARANCE: CLEAR
AST SERPL-CCNC: 13 U/L
BASOPHILS # BLD AUTO: 0.03 K/UL
BASOPHILS NFR BLD AUTO: 0.8 %
BILIRUB SERPL-MCNC: 0.6 MG/DL
BILIRUBIN URINE: NEGATIVE
BLOOD URINE: NEGATIVE
BUN SERPL-MCNC: 60 MG/DL
CALCIUM SERPL-MCNC: 9.6 MG/DL
CALCIUM SERPL-MCNC: 9.6 MG/DL
CHLORIDE SERPL-SCNC: 108 MMOL/L
CHOLEST SERPL-MCNC: 97 MG/DL
CO2 SERPL-SCNC: 20 MMOL/L
COLOR: NORMAL
CREAT SERPL-MCNC: 4.66 MG/DL
EOSINOPHIL # BLD AUTO: 0.11 K/UL
EOSINOPHIL NFR BLD AUTO: 3.1 %
ESTIMATED AVERAGE GLUCOSE: 97 MG/DL
GLUCOSE QUALITATIVE U: ABNORMAL
GLUCOSE SERPL-MCNC: 99 MG/DL
HBA1C MFR BLD HPLC: 5 %
HCT VFR BLD CALC: 29.3 %
HDLC SERPL-MCNC: 38 MG/DL
HGB BLD-MCNC: 9.2 G/DL
IMM GRANULOCYTES NFR BLD AUTO: 0.6 %
IRON SATN MFR SERPL: 46 %
IRON SERPL-MCNC: 87 UG/DL
KETONES URINE: NEGATIVE
LDLC SERPL CALC-MCNC: 41 MG/DL
LEUKOCYTE ESTERASE URINE: NEGATIVE
LYMPHOCYTES # BLD AUTO: 0.44 K/UL
LYMPHOCYTES NFR BLD AUTO: 12.3 %
MAN DIFF?: NORMAL
MCHC RBC-ENTMCNC: 31.4 GM/DL
MCHC RBC-ENTMCNC: 35 PG
MCV RBC AUTO: 111.4 FL
MONOCYTES # BLD AUTO: 0.16 K/UL
MONOCYTES NFR BLD AUTO: 4.5 %
NEUTROPHILS # BLD AUTO: 2.83 K/UL
NEUTROPHILS NFR BLD AUTO: 78.7 %
NITRITE URINE: NEGATIVE
NONHDLC SERPL-MCNC: 59 MG/DL
PARATHYROID HORMONE INTACT: 809 PG/ML
PH URINE: 7
PHOSPHATE SERPL-MCNC: 5.4 MG/DL
PLATELET # BLD AUTO: 629 K/UL
POTASSIUM SERPL-SCNC: 5.4 MMOL/L
PROT SERPL-MCNC: 6.7 G/DL
PROTEIN URINE: ABNORMAL
PSA SERPL-MCNC: 1.15 NG/ML
RBC # BLD: 2.63 M/UL
RBC # FLD: 16.9 %
SODIUM SERPL-SCNC: 144 MMOL/L
SPECIFIC GRAVITY URINE: 1.01
T3RU NFR SERPL: 1.1 TBI
T4 SERPL-MCNC: 7.1 UG/DL
TIBC SERPL-MCNC: 191 UG/DL
TRIGL SERPL-MCNC: 89 MG/DL
TSH SERPL-ACNC: 2.73 UIU/ML
UIBC SERPL-MCNC: 104 UG/DL
URATE SERPL-MCNC: 6.4 MG/DL
UROBILINOGEN URINE: NORMAL
WBC # FLD AUTO: 3.59 K/UL

## 2021-11-19 ENCOUNTER — NON-APPOINTMENT (OUTPATIENT)
Age: 51
End: 2021-11-19

## 2021-12-04 ENCOUNTER — RX RENEWAL (OUTPATIENT)
Age: 51
End: 2021-12-04

## 2021-12-08 ENCOUNTER — NON-APPOINTMENT (OUTPATIENT)
Age: 51
End: 2021-12-08

## 2021-12-08 ENCOUNTER — APPOINTMENT (OUTPATIENT)
Dept: NEPHROLOGY | Facility: CLINIC | Age: 51
End: 2021-12-08
Payer: COMMERCIAL

## 2021-12-08 ENCOUNTER — LABORATORY RESULT (OUTPATIENT)
Age: 51
End: 2021-12-08

## 2021-12-08 VITALS
BODY MASS INDEX: 26.98 KG/M2 | HEIGHT: 64.17 IN | DIASTOLIC BLOOD PRESSURE: 60 MMHG | SYSTOLIC BLOOD PRESSURE: 147 MMHG | WEIGHT: 158 LBS | HEART RATE: 60 BPM | OXYGEN SATURATION: 97 % | TEMPERATURE: 97.7 F

## 2021-12-08 VITALS — DIASTOLIC BLOOD PRESSURE: 70 MMHG | SYSTOLIC BLOOD PRESSURE: 140 MMHG

## 2021-12-08 PROCEDURE — 99214 OFFICE O/P EST MOD 30 MIN: CPT | Mod: GC

## 2021-12-08 NOTE — PHYSICAL EXAM
[General Appearance - Alert] : alert [General Appearance - In No Acute Distress] : in no acute distress [General Appearance - Well Nourished] : well nourished [General Appearance - Well Developed] : well developed [General Appearance - Well-Appearing] : healthy appearing [Sclera] : the sclera and conjunctiva were normal [PERRL With Normal Accommodation] : pupils were equal in size, round, and reactive to light [Extraocular Movements] : extraocular movements were intact [Neck Appearance] : the appearance of the neck was normal [Jugular Venous Distention Increased] : there was no jugular-venous distention [] : no respiratory distress [Respiration, Rhythm And Depth] : normal respiratory rhythm and effort [Exaggerated Use Of Accessory Muscles For Inspiration] : no accessory muscle use [Auscultation Breath Sounds / Voice Sounds] : lungs were clear to auscultation bilaterally [Apical Impulse] : the apical impulse was normal [Heart Rate And Rhythm] : heart rate was normal and rhythm regular [Heart Sounds] : normal S1 and S2 [Heart Sounds Gallop] : no gallops [Murmurs] : no murmurs [Heart Sounds Pericardial Friction Rub] : no pericardial rub [Edema] : there was no peripheral edema [Bowel Sounds] : normal bowel sounds [Abdomen Soft] : soft [Abdomen Tenderness] : non-tender [No CVA Tenderness] : no ~M costovertebral angle tenderness [No Spinal Tenderness] : no spinal tenderness [Abnormal Walk] : normal gait [No Focal Deficits] : no focal deficits [Oriented To Time, Place, And Person] : oriented to person, place, and time [Impaired Insight] : insight and judgment were intact [Affect] : the affect was normal [Mood] : the mood was normal [FreeTextEntry1] : left elbow soft tissue- mobile, soft, not on bone; PMD aware and it is decreasing per patient

## 2021-12-08 NOTE — ASSESSMENT
[FreeTextEntry1] : \par Pt is a 51yoM w/ CKD iV and HTN, w/ Hx LRRT 1978 (mom was the donor), covid19 in Dec 2020 with CKD stage 4 here for follow up for his CKD 4/5, here for f/u:\par \par  \par #Hx LRRT with CAN, now with CKD V, with proteinuria stable\par - Renal transplant with likely CTG, 42 years+, uptrending cr last labs\par - no uremic symptoms currently\par - Follows up with [facility] with transplant. \par - following w/ HT, Jie\par -last SCr 4.66 in Nov with eGFR 14\par - labs on 5/12/21: UA 30mg/ dl & UP/C ratio 1.1 grams stable since 2018\par -Hyperkalemia- advised diet low in K. Will repeat BMP today\par \par #Immunosuppression\par - c/w pred 5 and Imuran 150mg\par \par #Anemia due to CKD 1V/V\par - Aranesp to 100 mcg weekly- he self injects now\par - CBC with Hg 9.2, iron study with % sat 67 & Ferritin 284. Iron stores sufficient. Repeat iron studies\par -Check CBC, iron studies, platelets trending up\par \par #Hx elevated calcium on prior labs/hypercalcemia, vitamin d insufficiency\par - last Ca wnl, phos nl, last Vit D 27 in July 2021\par - on vit D supplement 1000U qD\par -Repeat renal panel, vitamin D, iPTH\par \par #metabolic acidosis 2/2 advanced CKD- stable\par - c/w bicarb tabs 650 mg three tabs bid\par - Last bicarb level stable at 20. Repeat renal panel & adjust accordingly\par \par #Benign Essential HTN -slightly elevated still\par He checks his BP at home and reports -140s/70-80s at home. BP here was 147/80 with repeat manually 140/70\par - PCP started atenolol 25mg qD in Nov, HR 55-60 on recent MD visits, hence can not increase Atenolol\par -Increase hydralazine to 100 tid\par - c/w nifedipine 60 mg bid and lasix 40mg daily\par -advised to continue to monitor BP at home, keep log, and call us if elevated\par -Low salt diet stressed \par \par  #HCM\par - prior lipids (jan 2020) LDL 51/\par - PNA vaccine 2015\par - Influenza vaccine done 10/9/19; Sept 2020\par - HBV series: Dec #1, Jan #2, June 19th #3; Hep B titres done in 12/2018\par - Will see Derm this year- needs to make appointment still. advised follow up gigi bc of multiple nevi\par left elbow soft tissue swelling- he will discuss with PMD; swelling decreasing per pt ?lipoma\par  -COVID 19 vaccine #1 on 8/12 (Pfizer). COVID #2 was in Sep. Getting booster March \par \par . \par \par

## 2021-12-08 NOTE — HISTORY OF PRESENT ILLNESS
[FreeTextEntry1] : Last saw us in Aug 2021.\par Since his last visit he has seen Dr. Hemphill, who added atenolol 25mg qD for higher BP - HTN on 11/12/21. Pt lost approx  10 lbs since last 2 visits. Since last labs due to hyperkalemia last month, pt is not eating potatoes & tomatoes. \par Patient denies nausea, metallic taste in mouth, sob, leg swelling, change in urine frequency or change in urine output, foamy urine. Denies diarrhea or vomiting. Denies any pain or burning with urination; Reports taste always changed since covid19 infection\par Reports compliance with medications.\par He checks his BP at home and reports -140s/70-80s at home. BP here was 147/80 with repeat manually 140/70\par All other ROS neg\par  \par Got covid vaccine #1 on 8/12/21 (Pfizer). COVID #2 was in Sep. Getting booster March

## 2021-12-09 ENCOUNTER — EMERGENCY (EMERGENCY)
Facility: HOSPITAL | Age: 51
LOS: 1 days | Discharge: HOME | End: 2021-12-09
Attending: STUDENT IN AN ORGANIZED HEALTH CARE EDUCATION/TRAINING PROGRAM
Payer: COMMERCIAL

## 2021-12-09 VITALS
DIASTOLIC BLOOD PRESSURE: 76 MMHG | RESPIRATION RATE: 17 BRPM | HEART RATE: 57 BPM | SYSTOLIC BLOOD PRESSURE: 109 MMHG | OXYGEN SATURATION: 98 %

## 2021-12-09 VITALS
HEART RATE: 60 BPM | TEMPERATURE: 98 F | HEIGHT: 65 IN | SYSTOLIC BLOOD PRESSURE: 132 MMHG | DIASTOLIC BLOOD PRESSURE: 76 MMHG | RESPIRATION RATE: 18 BRPM | WEIGHT: 151.02 LBS | OXYGEN SATURATION: 100 %

## 2021-12-09 DIAGNOSIS — Z94.0 KIDNEY TRANSPLANT STATUS: Chronic | ICD-10-CM

## 2021-12-09 DIAGNOSIS — D64.9 ANEMIA, UNSPECIFIED: ICD-10-CM

## 2021-12-09 DIAGNOSIS — Z94.0 KIDNEY TRANSPLANT STATUS: ICD-10-CM

## 2021-12-09 LAB
25(OH)D3 SERPL-MCNC: 29.2 NG/ML
ALBUMIN SERPL ELPH-MCNC: 4 G/DL
ALBUMIN SERPL ELPH-MCNC: 4.4 G/DL — SIGNIFICANT CHANGE UP (ref 3.3–5)
ALP SERPL-CCNC: 104 U/L — SIGNIFICANT CHANGE UP (ref 40–120)
ALT FLD-CCNC: 8 U/L — LOW (ref 10–45)
ANION GAP SERPL CALC-SCNC: 15 MMOL/L
ANION GAP SERPL CALC-SCNC: 16 MMOL/L — SIGNIFICANT CHANGE UP (ref 5–17)
APPEARANCE: CLEAR
AST SERPL-CCNC: 18 U/L — SIGNIFICANT CHANGE UP (ref 10–40)
BACTERIA: NEGATIVE
BASOPHILS # BLD AUTO: 0 K/UL — SIGNIFICANT CHANGE UP (ref 0–0.2)
BASOPHILS # BLD AUTO: 0.02 K/UL
BASOPHILS NFR BLD AUTO: 0 % — SIGNIFICANT CHANGE UP (ref 0–2)
BASOPHILS NFR BLD AUTO: 0.7 %
BILIRUB SERPL-MCNC: 0.4 MG/DL — SIGNIFICANT CHANGE UP (ref 0.2–1.2)
BILIRUBIN URINE: NEGATIVE
BLOOD URINE: NEGATIVE
BUN SERPL-MCNC: 67 MG/DL
BUN SERPL-MCNC: 73 MG/DL — HIGH (ref 7–23)
CALCIUM SERPL-MCNC: 9.2 MG/DL
CALCIUM SERPL-MCNC: 9.2 MG/DL
CALCIUM SERPL-MCNC: 9.3 MG/DL — SIGNIFICANT CHANGE UP (ref 8.4–10.5)
CHLORIDE SERPL-SCNC: 110 MMOL/L — HIGH (ref 96–108)
CHLORIDE SERPL-SCNC: 111 MMOL/L
CO2 SERPL-SCNC: 14 MMOL/L — LOW (ref 22–31)
CO2 SERPL-SCNC: 17 MMOL/L
COLOR: NORMAL
COVID-19 NUCLEOCAPSID  GAM ANTIBODY INTERPRETATION: POSITIVE
COVID-19 SPIKE DOMAIN ANTIBODY INTERPRETATION: POSITIVE
CREAT SERPL-MCNC: 5.27 MG/DL — HIGH (ref 0.5–1.3)
CREAT SERPL-MCNC: 5.47 MG/DL
CREAT SPEC-SCNC: 73 MG/DL
CREAT/PROT UR: 0.7 RATIO
EOSINOPHIL # BLD AUTO: 0.05 K/UL — SIGNIFICANT CHANGE UP (ref 0–0.5)
EOSINOPHIL # BLD AUTO: 0.11 K/UL
EOSINOPHIL NFR BLD AUTO: 1.7 % — SIGNIFICANT CHANGE UP (ref 0–6)
EOSINOPHIL NFR BLD AUTO: 3.6 %
FERRITIN SERPL-MCNC: 367 NG/ML
GLUCOSE QUALITATIVE U: NORMAL
GLUCOSE SERPL-MCNC: 92 MG/DL
GLUCOSE SERPL-MCNC: 98 MG/DL — SIGNIFICANT CHANGE UP (ref 70–99)
HCT VFR BLD CALC: 21.7 %
HCT VFR BLD CALC: 24.8 % — LOW (ref 39–50)
HGB BLD-MCNC: 6.7 G/DL
HGB BLD-MCNC: 7.9 G/DL — LOW (ref 13–17)
HYALINE CASTS: 0 /LPF
IMM GRANULOCYTES NFR BLD AUTO: 0.3 %
IRON SATN MFR SERPL: 45 %
IRON SERPL-MCNC: 84 UG/DL
KETONES URINE: NEGATIVE
LEUKOCYTE ESTERASE URINE: NEGATIVE
LYMPHOCYTES # BLD AUTO: 0.78 K/UL
LYMPHOCYTES # BLD AUTO: 0.8 K/UL — LOW (ref 1–3.3)
LYMPHOCYTES # BLD AUTO: 25.2 % — SIGNIFICANT CHANGE UP (ref 13–44)
LYMPHOCYTES NFR BLD AUTO: 25.7 %
MAN DIFF?: NORMAL
MCHC RBC-ENTMCNC: 30.9 GM/DL
MCHC RBC-ENTMCNC: 31.9 GM/DL — LOW (ref 32–36)
MCHC RBC-ENTMCNC: 35.3 PG
MCHC RBC-ENTMCNC: 35.6 PG — HIGH (ref 27–34)
MCV RBC AUTO: 111.7 FL — HIGH (ref 80–100)
MCV RBC AUTO: 114.2 FL
MICROSCOPIC-UA: NORMAL
MONOCYTES # BLD AUTO: 0.12 K/UL
MONOCYTES # BLD AUTO: 0.14 K/UL — SIGNIFICANT CHANGE UP (ref 0–0.9)
MONOCYTES NFR BLD AUTO: 4 %
MONOCYTES NFR BLD AUTO: 4.4 % — SIGNIFICANT CHANGE UP (ref 2–14)
NEUTROPHILS # BLD AUTO: 1.99 K/UL
NEUTROPHILS # BLD AUTO: 2.17 K/UL — SIGNIFICANT CHANGE UP (ref 1.8–7.4)
NEUTROPHILS NFR BLD AUTO: 65.7 %
NEUTROPHILS NFR BLD AUTO: 68.7 % — SIGNIFICANT CHANGE UP (ref 43–77)
NITRITE URINE: NEGATIVE
PARATHYROID HORMONE INTACT: 578 PG/ML
PH URINE: 6.5
PHOSPHATE SERPL-MCNC: 5.5 MG/DL
PLATELET # BLD AUTO: 384 K/UL
PLATELET # BLD AUTO: 465 K/UL — HIGH (ref 150–400)
POTASSIUM SERPL-MCNC: 5.3 MMOL/L — SIGNIFICANT CHANGE UP (ref 3.5–5.3)
POTASSIUM SERPL-SCNC: 5.3 MMOL/L — SIGNIFICANT CHANGE UP (ref 3.5–5.3)
POTASSIUM SERPL-SCNC: 5.4 MMOL/L
PROT SERPL-MCNC: 7.4 G/DL — SIGNIFICANT CHANGE UP (ref 6–8.3)
PROT UR-MCNC: 52 MG/DL
PROTEIN URINE: ABNORMAL
RBC # BLD: 1.9 M/UL
RBC # BLD: 2.22 M/UL — LOW (ref 4.2–5.8)
RBC # FLD: 17.1 % — HIGH (ref 10.3–14.5)
RBC # FLD: 17.2 %
RED BLOOD CELLS URINE: 0 /HPF
SARS-COV-2 AB SERPL IA-ACNC: >250 U/ML
SARS-COV-2 AB SERPL QL IA: 195 INDEX
SODIUM SERPL-SCNC: 140 MMOL/L — SIGNIFICANT CHANGE UP (ref 135–145)
SODIUM SERPL-SCNC: 142 MMOL/L
SPECIFIC GRAVITY URINE: 1.01
SQUAMOUS EPITHELIAL CELLS: 0 /HPF
TIBC SERPL-MCNC: 186 UG/DL
UIBC SERPL-MCNC: 102 UG/DL
UROBILINOGEN URINE: NORMAL
WBC # BLD: 3.16 K/UL — LOW (ref 3.8–10.5)
WBC # FLD AUTO: 3.03 K/UL
WBC # FLD AUTO: 3.16 K/UL — LOW (ref 3.8–10.5)
WHITE BLOOD CELLS URINE: 1 /HPF

## 2021-12-09 PROCEDURE — 99284 EMERGENCY DEPT VISIT MOD MDM: CPT

## 2021-12-09 PROCEDURE — 85025 COMPLETE CBC W/AUTO DIFF WBC: CPT

## 2021-12-09 PROCEDURE — 80053 COMPREHEN METABOLIC PANEL: CPT

## 2021-12-09 NOTE — CONSULT NOTE ADULT - SUBJECTIVE AND OBJECTIVE BOX
[facility] DIVISION OF KIDNEY DISEASES AND HYPERTENSION -- INITIAL CONSULT NOTE  --------------------------------------------------------------------------------    HPI:    Patient is a 51 year old male with PMH of ESRD s/p LRRT in  from his mother, HTN, and Anemia who presents to the hospital today because his outpatient labs from yesterday showed a Hg of 6.7g. The patient reports he has a history of anemia and is on weekly Aranesp injections which he has been adherent with. He denies any history of black tarry stools or noticing any blood in his stool. He denied any chest pain, shortness of breath, nausea, or vomiting. He denied any history of decreased energy. The patient has been following with Dr. Us as his primary outpatient nephrologist. He was evaluated just yesterday and his labs are also consistent with worsening renal function. He reports he has been told that his renal function has been working over a very long time.      PAST HISTORY  --------------------------------------------------------------------------------  PAST MEDICAL & SURGICAL HISTORY:  Renal transplant recipient  1978, c/b CKD IV    Hypertension    History of renal transplant      FAMILY HISTORY:  FH: kidney disease    FH: esophageal cancer  Uncle,  in 7th decade      Social History:        ALLERGIES & MEDICATIONS  --------------------------------------------------------------------------------  Allergies    sulfa (Swelling)    Intolerances      Standing Inpatient Medications    PRN Inpatient Medications      REVIEW OF SYSTEMS  All negative except as mentioned above.         VITALS/PHYSICAL EXAM  --------------------------------------------------------------------------------  T(C): 36.8 (21 @ 15:35), Max: 36.8 (21 @ 15:35)  HR: 65 (21 @ 15:35) (60 - 65)  BP: 143/81 (21 @ 15:35) (132/76 - 143/81)  RR: 16 (21 @ 15:35) (16 - 18)  SpO2: 100% (21 @ 15:35) (100% - 100%)  Wt(kg): --  Height (cm): 165.1 (21 @ 14:26)  Weight (kg): 68.5 (21 @ 14:26)  BMI (kg/m2): 25.1 (21 @ 14:26)  BSA (m2): 1.76 (21 @ 14:26)      Physical Exam:  	Gen: Not in distress, well-appearing  	HEENT: pupils reactive to light, no scleral icterus, moist oral mucosa. No thrush. Supple neck, no JVD  	Pulm: normal respiratory effort, lungs clear to auscultation bilaterally   	CV: regular rate and rhythm, S1 and S2 normal, no murmur   	Abd: normoactive bowel sounds, soft and non distended abdomen. No tenderness, guarding or rigidity                    Transplant non tender, no bruit  	: No suprapubic tenderness          Back: No spinal or CVA tenderness; no pre sacral edema          Extremities: No edema. Distal pulses 2+ bilaterally           Skin: warm no rash, no cyanosis   	Neuro: Alert and oriented to person, place and time. Normal speech. Normal affect.       LABS/STUDIES  --------------------------------------------------------------------------------              7.9    3.16  >-----------<  465      [21 @ 19:30]              24.8       Creatinine Trend:    Urinalysis - [21 @ 08:26]      Color Light Yellow / Appearance Clear / SG 1.011 / pH 7.5      Gluc Negative / Ketone Negative  / Bili Negative / Urobili Negative       Blood Negative / Protein 30 mg/dL / Leuk Est Negative / Nitrite Negative      RBC 0 / WBC 1 / Hyaline 0 / Gran  / Sq Epi  / Non Sq Epi 0 / Bacteria Negative      Iron 84, TIBC 229, %sat 37      [21 @ 18:27]  Ferritin 426      [21 @ 18:08]        Tacrolimus  Cyclosporine  Sirolimus  Mycophenolate  BK PCR  CMV PCR  Parvo PCR  EBV PCR

## 2021-12-09 NOTE — ED PROVIDER NOTE - CLINICAL SUMMARY MEDICAL DECISION MAKING FREE TEXT BOX
ap- 51 M w/ hx of renal transplant presents to the ER w/ abnormal labs after seeing nephrology yesterday for routine visit. Pt states that he has no cp, no sob  Patient denies nausea, metallic taste in mouth, sob, leg swelling, change in urine frequency or change in urine output, foamy urine. Denies diarrhea or vomiting. Denies any pain or burning with urination; Reports taste always changed since covid19 infection  On exam, pt I have reviewed the triage vital signs. Const: no acute distress, Well-developed, Eyes: no conjunctival injection and no scleral icterus ENMT: Moist mucus membranes, CVS: +S1/S2, radial/DP pulse 2+ bilaterally  RESP: Unlabored respiratory effort, Clear to auscultation bilaterally GI: Nontender/Nondistended soft abdomen, no CVA tenderness MSK: Extremities w/o deformity or ttp, Psych: Awake, Alert, & Orientedx3;  Appropriate mood and affect, cooperative Plan for repeat labs and reassessment. unclear eitiology of anemia, pt w/ no hx of bloody stools likely anemia due to renal insufficiency

## 2021-12-09 NOTE — CONSULT NOTE ADULT - PROBLEM SELECTOR RECOMMENDATION 9
s/p renal transplant in 1978 and follows with [facility]    Renal function has been worsening; on 12/8 Scr found to be 5.47   --- trend has been 4.66<4.73<4.2<4.1<3.45<   This is likely worsening of his underlying disease and explained to the patient that he will likely require RRT in the coming future; he understands and is agreeable    Immunosuppression with prednisone 5mg and cyclosporin 150mg   --- continue with same and please check cyclosporin levels 30 minutes prior to AM dose  will follow up with CMP for creatinine today and any electrolyte abnormalities   --- blood work yesterday concerning for hyperkalemia with potassium of 5.4   --- if elevated then can consider lokelma, insulin, dextrose as well as sodium bicarb  - continue with outpatient dose of sodium bicarbonate while inpatient s/p renal transplant in 1978 and follows with [facility]    Renal function has been worsening; on 12/8 Scr found to be 5.47   --- trend has been 4.66<4.73<4.2<4.1<3.45  This is likely worsening of his underlying disease and explained to the patient that he will likely require RRT in the coming future; he understands and is agreeable    Immunosuppression with prednisone 5mg and cyclosporin 150mg   --- continue with same and please check cyclosporin levels 30 minutes prior to AM dose  noted to have CO2 of 14; can increase his home dose of sodium bicarb to 1300 BID

## 2021-12-09 NOTE — ED PROVIDER NOTE - PATIENT PORTAL LINK FT
You can access the FollowMyHealth Patient Portal offered by [facility] by registering at the following website: [email or web address]. By joining [employer]’s FollowMyHealth portal, you will also be able to view your health information using other applications (apps) compatible with our system.

## 2021-12-09 NOTE — ED PROVIDER NOTE - OBJECTIVE STATEMENT
50 y/o male with PMHx of kidney transplant, HLD, HTN presents to the ED sent in by nephrologist for low H/H. Patient states that he has had low Hgb about 1 year ago when he had COVID. Patient states that he has been transfused at that time and then when he had his kidney transplant. Currently states that he feels well and denies any complaints. The last time he was transfused he felt very weak. Patient denies any headache, fever, chills, chest pain, difficulty breathing, cough, abdominal pain, n/v/d, bleeding.

## 2021-12-09 NOTE — ED ADULT NURSE NOTE - OBJECTIVE STATEMENT
Pt is a 50y/o male A&Ox3 speaking coherently ambulates independently ID verified who comes in via self sent in by PCP w/ c/o anemia. Pt states he had routine blood work done yesterday and his doctor told him to come in because his hemoglobin was low (believes about 6). Pt has a hx blood transfusion last December '20 when he "had covid." Hx kidney transplant in 1978. Pt denies any pain. No c/o chest pain or SOB, does not appear to be in any acute distress. Denies headache, vision changes, abdominal pain, nausea, vomiting, diarrhea, fevers, chills, dysuria, hematuria, recent illness travel or fall. Proper fall precautions in place. All needs met. Safety and comfort measures provided. Bed locked and in lowest position, side rails up for safety. Call bell within reach.

## 2021-12-09 NOTE — CONSULT NOTE ADULT - ASSESSMENT
Patient is a 51 year old male with PMH of ESRD s/p LRRT in 1978 from his mother, HTN, and Anemia who presents to the hospital today because his outpatient labs from yesterday showed a Hg of 6.7g.

## 2021-12-09 NOTE — CONSULT NOTE ADULT - PROBLEM SELECTOR RECOMMENDATION 2
Acutely worsened at this time   On aranesp 100mcg weekly and has been adherent  we need to rule out any hemolytic cause of this anemia  please order parvovirus PCR, CMV PCR, EBV PCR, vitmain B12, Folate, LDH, and Haptoglobin   transfuse as needed for goal Hg>7 repeat Hg noted to be 7.9g while in the ED  On aranesp 100mcg weekly and has been adherent; should continue as outpatient   can follow up as outpatient with his primary nephrologist

## 2021-12-09 NOTE — ED PROVIDER NOTE - NSFOLLOWUPINSTRUCTIONS_ED_ALL_ED_FT
Please take your bicarbonate to 1300 twice daily.     You were seen in the emergency department today and we recommend that you return if you develop chest pain, abdominal pain, shortness of breath, lightheadedness, vomiting, leg swelling, fever, headache, slurred speech, blood in his stool, weakness or blurry vision.     Please follow up with your nephrology over the next 2-3 days for further management of your symptoms and to review your blood work to ensure no additional tests, imaging or blood work, need to be performed.

## 2021-12-09 NOTE — ED PROVIDER NOTE - PROGRESS NOTE DETAILS
spoke w/ nephrology ok to dc pt home and change bicarbonate to 1300 twice daily spoke w/ nephrology ok to dc pt home and change bicarbonate to 1300 twice daily; anemia improved from outpt labs

## 2021-12-09 NOTE — ED PROVIDER NOTE - PHYSICAL EXAMINATION
CONSTITUTIONAL: Patient is awake, alert and oriented x 3. Patient is well appearing and in no acute distress.  HEAD: NCAT  NECK: Supple, No LAD,  LUNGS: CTA B/L, no wheezes, rhonci or rales  HEART: RRR.+S1S2 no murmurs,   ABDOMEN: Soft, non-tender to palpation throughout all four quadrants,  EXTREMITY: No edema or calf tenderness b/l, FROM upper and lower ext b/l,   SKIN: No rash or lesions  NEURO: Strength5/5 UE and LE b/l;

## 2021-12-10 ENCOUNTER — RX CHANGE (OUTPATIENT)
Age: 51
End: 2021-12-10

## 2021-12-10 ENCOUNTER — NON-APPOINTMENT (OUTPATIENT)
Age: 51
End: 2021-12-10

## 2021-12-13 ENCOUNTER — LABORATORY RESULT (OUTPATIENT)
Age: 51
End: 2021-12-13

## 2021-12-14 LAB
ALBUMIN SERPL ELPH-MCNC: 3.9 G/DL
ANION GAP SERPL CALC-SCNC: 15 MMOL/L
BASOPHILS # BLD AUTO: 0.02 K/UL
BASOPHILS NFR BLD AUTO: 0.7 %
BUN SERPL-MCNC: 77 MG/DL
CALCIUM SERPL-MCNC: 9.4 MG/DL
CHLORIDE SERPL-SCNC: 111 MMOL/L
CO2 SERPL-SCNC: 17 MMOL/L
CREAT SERPL-MCNC: 5.71 MG/DL
EOSINOPHIL # BLD AUTO: 0.08 K/UL
EOSINOPHIL NFR BLD AUTO: 2.9 %
GLUCOSE SERPL-MCNC: 143 MG/DL
HCT VFR BLD CALC: 22.8 %
HGB BLD-MCNC: 6.9 G/DL
IMM GRANULOCYTES NFR BLD AUTO: 0.4 %
LYMPHOCYTES # BLD AUTO: 0.62 K/UL
LYMPHOCYTES NFR BLD AUTO: 22.2 %
MAN DIFF?: NORMAL
MCHC RBC-ENTMCNC: 30.3 GM/DL
MCHC RBC-ENTMCNC: 34.7 PG
MCV RBC AUTO: 114.6 FL
MONOCYTES # BLD AUTO: 0.1 K/UL
MONOCYTES NFR BLD AUTO: 3.6 %
NEUTROPHILS # BLD AUTO: 1.96 K/UL
NEUTROPHILS NFR BLD AUTO: 70.2 %
PHOSPHATE SERPL-MCNC: 5.3 MG/DL
PLATELET # BLD AUTO: 455 K/UL
POTASSIUM SERPL-SCNC: 4.9 MMOL/L
RBC # BLD: 1.99 M/UL
RBC # FLD: 17.2 %
SODIUM SERPL-SCNC: 143 MMOL/L
WBC # FLD AUTO: 2.79 K/UL

## 2021-12-15 ENCOUNTER — NON-APPOINTMENT (OUTPATIENT)
Age: 51
End: 2021-12-15

## 2021-12-15 ENCOUNTER — APPOINTMENT (OUTPATIENT)
Dept: NEPHROLOGY | Facility: CLINIC | Age: 51
End: 2021-12-15

## 2021-12-15 VITALS
DIASTOLIC BLOOD PRESSURE: 69 MMHG | BODY MASS INDEX: 26.35 KG/M2 | WEIGHT: 154.32 LBS | HEIGHT: 64.17 IN | OXYGEN SATURATION: 100 % | SYSTOLIC BLOOD PRESSURE: 131 MMHG | TEMPERATURE: 98.06 F | HEART RATE: 56 BPM

## 2021-12-20 ENCOUNTER — NON-APPOINTMENT (OUTPATIENT)
Age: 51
End: 2021-12-20

## 2021-12-21 ENCOUNTER — NON-APPOINTMENT (OUTPATIENT)
Age: 51
End: 2021-12-21

## 2021-12-21 ENCOUNTER — LABORATORY RESULT (OUTPATIENT)
Age: 51
End: 2021-12-21

## 2021-12-22 LAB
ALBUMIN SERPL ELPH-MCNC: 4.1 G/DL
ANION GAP SERPL CALC-SCNC: 14 MMOL/L
BASOPHILS # BLD AUTO: 0.02 K/UL
BASOPHILS NFR BLD AUTO: 0.7 %
BUN SERPL-MCNC: 67 MG/DL
CALCIUM SERPL-MCNC: 9.1 MG/DL
CHLORIDE SERPL-SCNC: 105 MMOL/L
CO2 SERPL-SCNC: 21 MMOL/L
CREAT SERPL-MCNC: 5.63 MG/DL
EOSINOPHIL # BLD AUTO: 0.07 K/UL
EOSINOPHIL NFR BLD AUTO: 2.3 %
GLUCOSE SERPL-MCNC: 98 MG/DL
HCT VFR BLD CALC: 19.9 %
HGB BLD-MCNC: 6.3 G/DL
IMM GRANULOCYTES NFR BLD AUTO: 0.3 %
LYMPHOCYTES # BLD AUTO: 0.89 K/UL
LYMPHOCYTES NFR BLD AUTO: 29.4 %
MAN DIFF?: NORMAL
MCHC RBC-ENTMCNC: 31.7 GM/DL
MCHC RBC-ENTMCNC: 35.2 PG
MCV RBC AUTO: 111.2 FL
MONOCYTES # BLD AUTO: 0.12 K/UL
MONOCYTES NFR BLD AUTO: 4 %
NEUTROPHILS # BLD AUTO: 1.92 K/UL
NEUTROPHILS NFR BLD AUTO: 63.3 %
PHOSPHATE SERPL-MCNC: 4.7 MG/DL
PLATELET # BLD AUTO: 356 K/UL
POTASSIUM SERPL-SCNC: 4.6 MMOL/L
RBC # BLD: 1.79 M/UL
RBC # FLD: 16.8 %
SODIUM SERPL-SCNC: 140 MMOL/L
WBC # FLD AUTO: 3.03 K/UL

## 2021-12-28 ENCOUNTER — LABORATORY RESULT (OUTPATIENT)
Age: 51
End: 2021-12-28

## 2021-12-29 ENCOUNTER — EMERGENCY (EMERGENCY)
Facility: HOSPITAL | Age: 51
LOS: 1 days | Discharge: HOME | End: 2021-12-29
Attending: STUDENT IN AN ORGANIZED HEALTH CARE EDUCATION/TRAINING PROGRAM
Payer: COMMERCIAL

## 2021-12-29 VITALS
OXYGEN SATURATION: 99 % | HEIGHT: 65 IN | DIASTOLIC BLOOD PRESSURE: 64 MMHG | RESPIRATION RATE: 19 BRPM | WEIGHT: 151.9 LBS | TEMPERATURE: 98 F | SYSTOLIC BLOOD PRESSURE: 120 MMHG | HEART RATE: 51 BPM

## 2021-12-29 DIAGNOSIS — Z94.0 KIDNEY TRANSPLANT STATUS: Chronic | ICD-10-CM

## 2021-12-29 LAB
ALBUMIN SERPL ELPH-MCNC: 4 G/DL — SIGNIFICANT CHANGE UP (ref 3.3–5)
ALP SERPL-CCNC: 82 U/L — SIGNIFICANT CHANGE UP (ref 40–120)
ALT FLD-CCNC: 8 U/L — LOW (ref 10–45)
ANION GAP SERPL CALC-SCNC: 17 MMOL/L — SIGNIFICANT CHANGE UP (ref 5–17)
APTT BLD: 33.5 SEC — SIGNIFICANT CHANGE UP (ref 27.5–35.5)
AST SERPL-CCNC: 12 U/L — SIGNIFICANT CHANGE UP (ref 10–40)
BASOPHILS # BLD AUTO: 0 K/UL — SIGNIFICANT CHANGE UP (ref 0–0.2)
BASOPHILS NFR BLD AUTO: 0 % — SIGNIFICANT CHANGE UP (ref 0–2)
BILIRUB SERPL-MCNC: 0.4 MG/DL — SIGNIFICANT CHANGE UP (ref 0.2–1.2)
BLD GP AB SCN SERPL QL: NEGATIVE — SIGNIFICANT CHANGE UP
BUN SERPL-MCNC: 69 MG/DL — HIGH (ref 7–23)
CALCIUM SERPL-MCNC: 9.2 MG/DL — SIGNIFICANT CHANGE UP (ref 8.4–10.5)
CHLORIDE SERPL-SCNC: 108 MMOL/L — SIGNIFICANT CHANGE UP (ref 96–108)
CO2 SERPL-SCNC: 19 MMOL/L — LOW (ref 22–31)
CREAT SERPL-MCNC: 5.9 MG/DL — HIGH (ref 0.5–1.3)
EOSINOPHIL # BLD AUTO: 0.13 K/UL — SIGNIFICANT CHANGE UP (ref 0–0.5)
EOSINOPHIL NFR BLD AUTO: 4 % — SIGNIFICANT CHANGE UP (ref 0–6)
FLUAV AG NPH QL: SIGNIFICANT CHANGE UP
FLUBV AG NPH QL: SIGNIFICANT CHANGE UP
GLUCOSE SERPL-MCNC: 94 MG/DL — SIGNIFICANT CHANGE UP (ref 70–99)
HCT VFR BLD CALC: 18.5 % — CRITICAL LOW (ref 39–50)
HGB BLD-MCNC: 6 G/DL — CRITICAL LOW (ref 13–17)
INR BLD: 1 RATIO — SIGNIFICANT CHANGE UP (ref 0.88–1.16)
LYMPHOCYTES # BLD AUTO: 0.85 K/UL — LOW (ref 1–3.3)
LYMPHOCYTES # BLD AUTO: 26 % — SIGNIFICANT CHANGE UP (ref 13–44)
MCHC RBC-ENTMCNC: 32.4 GM/DL — SIGNIFICANT CHANGE UP (ref 32–36)
MCHC RBC-ENTMCNC: 35.5 PG — HIGH (ref 27–34)
MCV RBC AUTO: 109.5 FL — HIGH (ref 80–100)
MONOCYTES # BLD AUTO: 0.03 K/UL — SIGNIFICANT CHANGE UP (ref 0–0.9)
MONOCYTES NFR BLD AUTO: 1 % — LOW (ref 2–14)
NEUTROPHILS # BLD AUTO: 2.25 K/UL — SIGNIFICANT CHANGE UP (ref 1.8–7.4)
NEUTROPHILS NFR BLD AUTO: 68 % — SIGNIFICANT CHANGE UP (ref 43–77)
PLATELET # BLD AUTO: 256 K/UL — SIGNIFICANT CHANGE UP (ref 150–400)
POTASSIUM SERPL-MCNC: 3.9 MMOL/L — SIGNIFICANT CHANGE UP (ref 3.5–5.3)
POTASSIUM SERPL-SCNC: 3.9 MMOL/L — SIGNIFICANT CHANGE UP (ref 3.5–5.3)
PROT SERPL-MCNC: 6.6 G/DL — SIGNIFICANT CHANGE UP (ref 6–8.3)
PROTHROM AB SERPL-ACNC: 12 SEC — SIGNIFICANT CHANGE UP (ref 10.6–13.6)
RBC # BLD: 1.69 M/UL — LOW (ref 4.2–5.8)
RBC # FLD: 17.5 % — HIGH (ref 10.3–14.5)
RH IG SCN BLD-IMP: POSITIVE — SIGNIFICANT CHANGE UP
RSV RNA NPH QL NAA+NON-PROBE: SIGNIFICANT CHANGE UP
SARS-COV-2 RNA SPEC QL NAA+PROBE: SIGNIFICANT CHANGE UP
SODIUM SERPL-SCNC: 144 MMOL/L — SIGNIFICANT CHANGE UP (ref 135–145)
WBC # BLD: 3.26 K/UL — LOW (ref 3.8–10.5)
WBC # FLD AUTO: 3.26 K/UL — LOW (ref 3.8–10.5)

## 2021-12-29 PROCEDURE — 85610 PROTHROMBIN TIME: CPT

## 2021-12-29 PROCEDURE — 36430 TRANSFUSION BLD/BLD COMPNT: CPT

## 2021-12-29 PROCEDURE — 99291 CRITICAL CARE FIRST HOUR: CPT

## 2021-12-29 PROCEDURE — 87637 SARSCOV2&INF A&B&RSV AMP PRB: CPT

## 2021-12-29 PROCEDURE — 80053 COMPREHEN METABOLIC PANEL: CPT

## 2021-12-29 PROCEDURE — 99291 CRITICAL CARE FIRST HOUR: CPT | Mod: 25

## 2021-12-29 PROCEDURE — 85025 COMPLETE CBC W/AUTO DIFF WBC: CPT

## 2021-12-29 PROCEDURE — 36415 COLL VENOUS BLD VENIPUNCTURE: CPT

## 2021-12-29 PROCEDURE — 85730 THROMBOPLASTIN TIME PARTIAL: CPT

## 2021-12-29 PROCEDURE — 86850 RBC ANTIBODY SCREEN: CPT

## 2021-12-29 PROCEDURE — 93005 ELECTROCARDIOGRAM TRACING: CPT

## 2021-12-29 PROCEDURE — 86901 BLOOD TYPING SEROLOGIC RH(D): CPT

## 2021-12-29 PROCEDURE — P9016: CPT

## 2021-12-29 PROCEDURE — 86923 COMPATIBILITY TEST ELECTRIC: CPT

## 2021-12-29 PROCEDURE — 86900 BLOOD TYPING SEROLOGIC ABO: CPT

## 2021-12-29 NOTE — ED ADULT TRIAGE NOTE - CHIEF COMPLAINT QUOTE
routine blood work done yesterday, received phone call hgb is 5.5 today. denies dizziness, denies AC use. hx of anemia, has needed blood transfusions

## 2021-12-29 NOTE — ED PROVIDER NOTE - OBJECTIVE STATEMENT
Attending MD Grey : 51 M PMH of ESRD s/p LRRT in 1978 from his mother, HTN, and Anemia who presents to the hospital today because his outpatient labs from yesterday showed a Hg of 5.5. Attending MD Grey : 51 M PMH of ESRD s/p LRRT in 1978 from his mother, HTN, and Anemia who presents to the hospital today because his outpatient labs from yesterday showed a Hg of 5.5.    PAtient otherwise denies any complaints. Denies LOC, N/V, CP, SOB, fevers. Attending MD Grey : 51 M PMH of ESRD s/p LRRT in 1978 from his mother, HTN, and Anemia who presents to the hospital today because his outpatient labs from yesterday showed a Hg of 5.5.     Patient otherwise denies any complaints. Denies LOC, N/V, CP, SOB, fevers. Attending MD Grey : 51 M PMH of ESRD s/p LRRT in 1978 from his mother, HTN, and Anemia who presents to the hospital today because his outpatient labs from yesterday showed a Hg of 5.5.     Patient otherwise denies any complaints. Denies LOC, N/V, CP, SOB, fevers.    Finn, PGY3 - pt denies hematemesis, melena, bleeding.

## 2021-12-29 NOTE — ED ADULT NURSE REASSESSMENT NOTE - NS ED NURSE REASSESS COMMENT FT1
Report received from PIPE Chatterjee. Pt appears resting comfortably in stretcher, VSS, plan of care discussed with patient for blood transfusion and verbalizes understanding. Safety and comfort maintained.

## 2021-12-29 NOTE — ED PROVIDER NOTE - NSFOLLOWUPINSTRUCTIONS_ED_ALL_ED_FT
Rest and stay well hydrated.   Follow-up with your PMD within 3-5 days for recheck and repeat labs.   Return to the ED for any dizziness, chest pain, palpitations, bleeding, shortness of breath or any new concerning symptoms. Rest and stay well hydrated.   Follow-up with your PMD within approx 3-5 days for recheck and repeat labs.   Return to the ED for any dizziness, chest pain, palpitations, bleeding, shortness of breath or any new concerning symptoms.

## 2021-12-29 NOTE — ED ADULT NURSE NOTE - CAS ELECT INFOMATION PROVIDED
Pt instructed to follow up outpatient primary care, return to ED with worsening s/s, pt verbalized understanding/DC instructions

## 2021-12-29 NOTE — ED ADULT NURSE REASSESSMENT NOTE - NS ED NURSE REASSESS COMMENT FT1
1 unit of PRBC transfusion initiated at 1955 with 2 RN's at bedside for safety protocol. Consent signed and placed in chart, VSS. Pt educated on s/s of transfusion reaction and verbalizes understanding.

## 2021-12-29 NOTE — ED PROVIDER NOTE - PATIENT PORTAL LINK FT
You can access the FollowMyHealth Patient Portal offered by [facility] by registering at the following website: [email or web address]. By joining [employer]s FollowMyHealth portal, you will also be able to view your health information using other applications (apps) compatible with our system.

## 2021-12-29 NOTE — ED PROVIDER NOTE - NSFOLLOWUPCLINICS_GEN_ALL_ED_FT
[facility] - Primary Care  Primary Care  [address] [place] [address]  Phone: [phone]  Fax:

## 2021-12-29 NOTE — ED PROVIDER NOTE - CLINICAL SUMMARY MEDICAL DECISION MAKING FREE TEXT BOX
51 M PMH of ESRD s/p LRRT in 1978 from his mother, HTN, and Anemia who presents to the hospital today because his outpatient labs from yesterday showed a Hg of 5.5. will eval for asymptomatic anemia with rpt hb, will consent for transfusion. will transfuse as needed. will reassess. 51 M PMH of ESRD s/p LRRT in 1978 from his mother, HTN, and Anemia who presents to the hospital today because his outpatient labs from yesterday showed a Hg of 5.5. will eval for asymptomatic anemia with rpt hb, will consent for transfusion. will transfuse as needed. will reassess.    Attending MD Grey: Agree with above.

## 2021-12-29 NOTE — ED PROVIDER NOTE - PROGRESS NOTE DETAILS
Attending MD Grey : Patient's Hgb 6.0. Will transfuse 1U given patient's vitals normal and asymptomatic. Will determine if patient can follow-up closely as an outpatient for repeat Hgb measurement. Finn, PGY3 - transfusion complete, pt reassessed, states feels ok, discussed need for f/u w/ PMD for recheck, return precautions. pt agrees with plan all questions answered Finn, PGY3 - transfusion complete, pt reassessed, states feels ok, discussed need for f/u w/ PMD within two days for recheck, return precautions. pt agrees with plan all questions answered

## 2021-12-29 NOTE — ED ADULT NURSE REASSESSMENT NOTE - NS ED NURSE REASSESS COMMENT FT1
1 unit of PRBC's infusing, pt tolerating well at this time. Denies hives, itching, SOB, fever, dizziness and other signs of adverse reaction at this time, VSS. Safety and comfort maintained.

## 2021-12-30 VITALS
RESPIRATION RATE: 16 BRPM | DIASTOLIC BLOOD PRESSURE: 84 MMHG | HEART RATE: 53 BPM | SYSTOLIC BLOOD PRESSURE: 136 MMHG | TEMPERATURE: 98 F | OXYGEN SATURATION: 98 %

## 2022-01-06 LAB
ALBUMIN SERPL ELPH-MCNC: 4 G/DL
ANION GAP SERPL CALC-SCNC: 16 MMOL/L
BASOPHILS # BLD AUTO: 0.01 K/UL
BASOPHILS NFR BLD AUTO: 0.4 %
BUN SERPL-MCNC: 70 MG/DL
CALCIUM SERPL-MCNC: 9 MG/DL
CHLORIDE SERPL-SCNC: 109 MMOL/L
CO2 SERPL-SCNC: 20 MMOL/L
CREAT SERPL-MCNC: 6.04 MG/DL
EOSINOPHIL # BLD AUTO: 0.08 K/UL
EOSINOPHIL NFR BLD AUTO: 3.1 %
GLUCOSE SERPL-MCNC: 110 MG/DL
HCT VFR BLD CALC: 19 %
HGB BLD-MCNC: 5.9 G/DL
IMM GRANULOCYTES NFR BLD AUTO: 0.4 %
LYMPHOCYTES # BLD AUTO: 0.75 K/UL
LYMPHOCYTES NFR BLD AUTO: 29.1 %
MAN DIFF?: NORMAL
MCHC RBC-ENTMCNC: 31.1 GM/DL
MCHC RBC-ENTMCNC: 35.3 PG
MCV RBC AUTO: 113.8 FL
MONOCYTES # BLD AUTO: 0.08 K/UL
MONOCYTES NFR BLD AUTO: 3.1 %
NEUTROPHILS # BLD AUTO: 1.65 K/UL
NEUTROPHILS NFR BLD AUTO: 63.9 %
PHOSPHATE SERPL-MCNC: 4.7 MG/DL
PLATELET # BLD AUTO: 263 K/UL
POTASSIUM SERPL-SCNC: 4.8 MMOL/L
RBC # BLD: 1.67 M/UL
RBC # FLD: 17.3 %
SODIUM SERPL-SCNC: 145 MMOL/L
WBC # FLD AUTO: 2.58 K/UL

## 2022-01-10 ENCOUNTER — LABORATORY RESULT (OUTPATIENT)
Age: 52
End: 2022-01-10

## 2022-01-10 ENCOUNTER — NON-APPOINTMENT (OUTPATIENT)
Age: 52
End: 2022-01-10

## 2022-01-11 LAB
ALBUMIN SERPL ELPH-MCNC: 4.1 G/DL
ANION GAP SERPL CALC-SCNC: 17 MMOL/L
BASOPHILS # BLD AUTO: 0.02 K/UL
BASOPHILS NFR BLD AUTO: 0.8 %
BUN SERPL-MCNC: 98 MG/DL
CALCIUM SERPL-MCNC: 9.2 MG/DL
CHLORIDE SERPL-SCNC: 111 MMOL/L
CO2 SERPL-SCNC: 19 MMOL/L
CREAT SERPL-MCNC: 6.6 MG/DL
EOSINOPHIL # BLD AUTO: 0.07 K/UL
EOSINOPHIL NFR BLD AUTO: 2.7 %
FERRITIN SERPL-MCNC: 596 NG/ML
GLUCOSE SERPL-MCNC: 95 MG/DL
HCT VFR BLD CALC: 20 %
HGB BLD-MCNC: 6.2 G/DL
IMM GRANULOCYTES NFR BLD AUTO: 0.4 %
IRON SATN MFR SERPL: 58 %
IRON SERPL-MCNC: 111 UG/DL
LYMPHOCYTES # BLD AUTO: 0.62 K/UL
LYMPHOCYTES NFR BLD AUTO: 23.5 %
MAN DIFF?: NORMAL
MCHC RBC-ENTMCNC: 31 GM/DL
MCHC RBC-ENTMCNC: 34.3 PG
MCV RBC AUTO: 110.5 FL
MONOCYTES # BLD AUTO: 0.16 K/UL
MONOCYTES NFR BLD AUTO: 6.1 %
NEUTROPHILS # BLD AUTO: 1.76 K/UL
NEUTROPHILS NFR BLD AUTO: 66.5 %
PHOSPHATE SERPL-MCNC: 4.9 MG/DL
PLATELET # BLD AUTO: 478 K/UL
POTASSIUM SERPL-SCNC: 4.9 MMOL/L
RBC # BLD: 1.81 M/UL
RBC # FLD: 18 %
SODIUM SERPL-SCNC: 146 MMOL/L
TIBC SERPL-MCNC: 193 UG/DL
UIBC SERPL-MCNC: 81 UG/DL
WBC # FLD AUTO: 2.64 K/UL

## 2022-01-12 ENCOUNTER — INPATIENT (INPATIENT)
Facility: HOSPITAL | Age: 52
LOS: 0 days | Discharge: HOME | DRG: 683 | End: 2022-01-13
Attending: HOSPITALIST | Admitting: HOSPITALIST
Payer: COMMERCIAL

## 2022-01-12 VITALS
HEIGHT: 65 IN | TEMPERATURE: 98 F | RESPIRATION RATE: 18 BRPM | OXYGEN SATURATION: 100 % | WEIGHT: 151.9 LBS | DIASTOLIC BLOOD PRESSURE: 65 MMHG | SYSTOLIC BLOOD PRESSURE: 129 MMHG | HEART RATE: 50 BPM

## 2022-01-12 DIAGNOSIS — N17.9 ACUTE KIDNEY FAILURE, UNSPECIFIED: ICD-10-CM

## 2022-01-12 DIAGNOSIS — I10 ESSENTIAL (PRIMARY) HYPERTENSION: ICD-10-CM

## 2022-01-12 DIAGNOSIS — E87.2 ACIDOSIS: ICD-10-CM

## 2022-01-12 DIAGNOSIS — Z94.0 KIDNEY TRANSPLANT STATUS: Chronic | ICD-10-CM

## 2022-01-12 DIAGNOSIS — D64.9 ANEMIA, UNSPECIFIED: ICD-10-CM

## 2022-01-12 DIAGNOSIS — R19.7 DIARRHEA, UNSPECIFIED: ICD-10-CM

## 2022-01-12 DIAGNOSIS — E78.5 HYPERLIPIDEMIA, UNSPECIFIED: ICD-10-CM

## 2022-01-12 DIAGNOSIS — Z29.9 ENCOUNTER FOR PROPHYLACTIC MEASURES, UNSPECIFIED: ICD-10-CM

## 2022-01-12 DIAGNOSIS — Z94.0 KIDNEY TRANSPLANT STATUS: ICD-10-CM

## 2022-01-12 LAB
ALBUMIN SERPL ELPH-MCNC: 4.1 G/DL — SIGNIFICANT CHANGE UP (ref 3.3–5)
ALP SERPL-CCNC: 84 U/L — SIGNIFICANT CHANGE UP (ref 40–120)
ALT FLD-CCNC: 9 U/L — LOW (ref 10–45)
ANION GAP SERPL CALC-SCNC: 14 MMOL/L — SIGNIFICANT CHANGE UP (ref 5–17)
ANION GAP SERPL CALC-SCNC: 17 MMOL/L — SIGNIFICANT CHANGE UP (ref 5–17)
APPEARANCE UR: CLEAR — SIGNIFICANT CHANGE UP
APTT BLD: 35.6 SEC — HIGH (ref 27.5–35.5)
AST SERPL-CCNC: 13 U/L — SIGNIFICANT CHANGE UP (ref 10–40)
BACTERIA # UR AUTO: NEGATIVE — SIGNIFICANT CHANGE UP
BASOPHILS # BLD AUTO: 0.06 K/UL — SIGNIFICANT CHANGE UP (ref 0–0.2)
BASOPHILS NFR BLD AUTO: 2.6 % — HIGH (ref 0–2)
BILIRUB SERPL-MCNC: 0.4 MG/DL — SIGNIFICANT CHANGE UP (ref 0.2–1.2)
BILIRUB UR-MCNC: NEGATIVE — SIGNIFICANT CHANGE UP
BLD GP AB SCN SERPL QL: NEGATIVE — SIGNIFICANT CHANGE UP
BUN SERPL-MCNC: 100 MG/DL — HIGH (ref 7–23)
BUN SERPL-MCNC: 102 MG/DL — HIGH (ref 7–23)
CALCIUM SERPL-MCNC: 8.8 MG/DL — SIGNIFICANT CHANGE UP (ref 8.4–10.5)
CALCIUM SERPL-MCNC: 9.5 MG/DL — SIGNIFICANT CHANGE UP (ref 8.4–10.5)
CHLORIDE SERPL-SCNC: 109 MMOL/L — HIGH (ref 96–108)
CHLORIDE SERPL-SCNC: 110 MMOL/L — HIGH (ref 96–108)
CO2 SERPL-SCNC: 18 MMOL/L — LOW (ref 22–31)
CO2 SERPL-SCNC: 20 MMOL/L — LOW (ref 22–31)
COLOR SPEC: SIGNIFICANT CHANGE UP
CREAT ?TM UR-MCNC: 76 MG/DL — SIGNIFICANT CHANGE UP
CREAT SERPL-MCNC: 6.8 MG/DL — HIGH (ref 0.5–1.3)
CREAT SERPL-MCNC: 7.15 MG/DL — HIGH (ref 0.5–1.3)
CYCLOSPORINE SER-MCNC: <30 NG/ML — LOW (ref 150–400)
DIFF PNL FLD: NEGATIVE — SIGNIFICANT CHANGE UP
EOSINOPHIL # BLD AUTO: 0.06 K/UL — SIGNIFICANT CHANGE UP (ref 0–0.5)
EOSINOPHIL NFR BLD AUTO: 2.6 % — SIGNIFICANT CHANGE UP (ref 0–6)
EPI CELLS # UR: 1 /HPF — SIGNIFICANT CHANGE UP
GLUCOSE SERPL-MCNC: 103 MG/DL — HIGH (ref 70–99)
GLUCOSE SERPL-MCNC: 70 MG/DL — SIGNIFICANT CHANGE UP (ref 70–99)
GLUCOSE UR QL: NEGATIVE — SIGNIFICANT CHANGE UP
HCT VFR BLD CALC: 18.8 % — CRITICAL LOW (ref 39–50)
HCT VFR BLD CALC: 21.1 % — LOW (ref 39–50)
HGB BLD-MCNC: 5.9 G/DL — CRITICAL LOW (ref 13–17)
HGB BLD-MCNC: 6.9 G/DL — CRITICAL LOW (ref 13–17)
INR BLD: 1.02 RATIO — SIGNIFICANT CHANGE UP (ref 0.88–1.16)
KETONES UR-MCNC: NEGATIVE — SIGNIFICANT CHANGE UP
LEUKOCYTE ESTERASE UR-ACNC: NEGATIVE — SIGNIFICANT CHANGE UP
LYMPHOCYTES # BLD AUTO: 0.63 K/UL — LOW (ref 1–3.3)
LYMPHOCYTES # BLD AUTO: 27.8 % — SIGNIFICANT CHANGE UP (ref 13–44)
MAGNESIUM SERPL-MCNC: 2.5 MG/DL — SIGNIFICANT CHANGE UP (ref 1.6–2.6)
MAGNESIUM SERPL-MCNC: 2.5 MG/DL — SIGNIFICANT CHANGE UP (ref 1.6–2.6)
MCHC RBC-ENTMCNC: 31.4 GM/DL — LOW (ref 32–36)
MCHC RBC-ENTMCNC: 32.7 GM/DL — SIGNIFICANT CHANGE UP (ref 32–36)
MCHC RBC-ENTMCNC: 34.2 PG — HIGH (ref 27–34)
MCHC RBC-ENTMCNC: 34.3 PG — HIGH (ref 27–34)
MCV RBC AUTO: 104.5 FL — HIGH (ref 80–100)
MCV RBC AUTO: 109.3 FL — HIGH (ref 80–100)
MONOCYTES # BLD AUTO: 0.12 K/UL — SIGNIFICANT CHANGE UP (ref 0–0.9)
MONOCYTES NFR BLD AUTO: 5.2 % — SIGNIFICANT CHANGE UP (ref 2–14)
NEUTROPHILS # BLD AUTO: 1.4 K/UL — LOW (ref 1.8–7.4)
NEUTROPHILS NFR BLD AUTO: 61.8 % — SIGNIFICANT CHANGE UP (ref 43–77)
NITRITE UR-MCNC: NEGATIVE — SIGNIFICANT CHANGE UP
NRBC # BLD: 0 /100 WBCS — SIGNIFICANT CHANGE UP (ref 0–0)
PH UR: 6.5 — SIGNIFICANT CHANGE UP (ref 5–8)
PHOSPHATE SERPL-MCNC: 5.7 MG/DL — HIGH (ref 2.5–4.5)
PHOSPHATE SERPL-MCNC: 5.8 MG/DL — HIGH (ref 2.5–4.5)
PLATELET # BLD AUTO: 369 K/UL — SIGNIFICANT CHANGE UP (ref 150–400)
PLATELET # BLD AUTO: 384 K/UL — SIGNIFICANT CHANGE UP (ref 150–400)
POTASSIUM SERPL-MCNC: 4.2 MMOL/L — SIGNIFICANT CHANGE UP (ref 3.5–5.3)
POTASSIUM SERPL-MCNC: 4.6 MMOL/L — SIGNIFICANT CHANGE UP (ref 3.5–5.3)
POTASSIUM SERPL-SCNC: 4.2 MMOL/L — SIGNIFICANT CHANGE UP (ref 3.5–5.3)
POTASSIUM SERPL-SCNC: 4.6 MMOL/L — SIGNIFICANT CHANGE UP (ref 3.5–5.3)
PROT SERPL-MCNC: 6.7 G/DL — SIGNIFICANT CHANGE UP (ref 6–8.3)
PROT UR-MCNC: ABNORMAL
PROTHROM AB SERPL-ACNC: 12.2 SEC — SIGNIFICANT CHANGE UP (ref 10.6–13.6)
RBC # BLD: 1.72 M/UL — LOW (ref 4.2–5.8)
RBC # BLD: 2.02 M/UL — LOW (ref 4.2–5.8)
RBC # FLD: 17.8 % — HIGH (ref 10.3–14.5)
RBC # FLD: 19.4 % — HIGH (ref 10.3–14.5)
RBC CASTS # UR COMP ASSIST: 1 /HPF — SIGNIFICANT CHANGE UP (ref 0–4)
RH IG SCN BLD-IMP: POSITIVE — SIGNIFICANT CHANGE UP
SARS-COV-2 RNA SPEC QL NAA+PROBE: SIGNIFICANT CHANGE UP
SODIUM SERPL-SCNC: 144 MMOL/L — SIGNIFICANT CHANGE UP (ref 135–145)
SODIUM SERPL-SCNC: 144 MMOL/L — SIGNIFICANT CHANGE UP (ref 135–145)
SP GR SPEC: 1.01 — SIGNIFICANT CHANGE UP (ref 1.01–1.02)
UROBILINOGEN FLD QL: NEGATIVE — SIGNIFICANT CHANGE UP
WBC # BLD: 2.14 K/UL — LOW (ref 3.8–10.5)
WBC # BLD: 2.26 K/UL — LOW (ref 3.8–10.5)
WBC # FLD AUTO: 2.14 K/UL — LOW (ref 3.8–10.5)
WBC # FLD AUTO: 2.26 K/UL — LOW (ref 3.8–10.5)
WBC UR QL: 1 /HPF — SIGNIFICANT CHANGE UP (ref 0–5)

## 2022-01-12 PROCEDURE — 99233 SBSQ HOSP IP/OBS HIGH 50: CPT | Mod: GC

## 2022-01-12 PROCEDURE — 99223 1ST HOSP IP/OBS HIGH 75: CPT | Mod: GC

## 2022-01-12 PROCEDURE — 99291 CRITICAL CARE FIRST HOUR: CPT

## 2022-01-12 PROCEDURE — 99222 1ST HOSP IP/OBS MODERATE 55: CPT | Mod: GC

## 2022-01-12 PROCEDURE — 93010 ELECTROCARDIOGRAM REPORT: CPT

## 2022-01-12 RX ORDER — NIFEDIPINE 30 MG
120 TABLET, EXTENDED RELEASE 24 HR ORAL EVERY 24 HOURS
Refills: 0 | Status: DISCONTINUED | OUTPATIENT
Start: 2022-01-12 | End: 2022-01-13

## 2022-01-12 RX ORDER — PREGABALIN 225 MG/1
1000 CAPSULE ORAL DAILY
Refills: 0 | Status: DISCONTINUED | OUTPATIENT
Start: 2022-01-12 | End: 2022-01-13

## 2022-01-12 RX ORDER — HYDRALAZINE HCL 50 MG
100 TABLET ORAL EVERY 8 HOURS
Refills: 0 | Status: DISCONTINUED | OUTPATIENT
Start: 2022-01-12 | End: 2022-01-13

## 2022-01-12 RX ORDER — SODIUM BICARBONATE 1 MEQ/ML
3 SYRINGE (ML) INTRAVENOUS
Qty: 0 | Refills: 0 | DISCHARGE

## 2022-01-12 RX ORDER — FOLIC ACID 0.8 MG
1 TABLET ORAL DAILY
Refills: 0 | Status: DISCONTINUED | OUTPATIENT
Start: 2022-01-12 | End: 2022-01-13

## 2022-01-12 RX ORDER — CHOLECALCIFEROL (VITAMIN D3) 125 MCG
1000 CAPSULE ORAL EVERY 24 HOURS
Refills: 0 | Status: DISCONTINUED | OUTPATIENT
Start: 2022-01-12 | End: 2022-01-13

## 2022-01-12 RX ORDER — SODIUM BICARBONATE 1 MEQ/ML
650 SYRINGE (ML) INTRAVENOUS EVERY 8 HOURS
Refills: 0 | Status: DISCONTINUED | OUTPATIENT
Start: 2022-01-12 | End: 2022-01-13

## 2022-01-12 RX ORDER — ATORVASTATIN CALCIUM 80 MG/1
20 TABLET, FILM COATED ORAL AT BEDTIME
Refills: 0 | Status: DISCONTINUED | OUTPATIENT
Start: 2022-01-12 | End: 2022-01-13

## 2022-01-12 RX ORDER — AZATHIOPRINE 100 MG/1
150 TABLET ORAL
Qty: 0 | Refills: 0 | DISCHARGE

## 2022-01-12 RX ORDER — ATENOLOL 25 MG/1
25 TABLET ORAL EVERY 24 HOURS
Refills: 0 | Status: DISCONTINUED | OUTPATIENT
Start: 2022-01-12 | End: 2022-01-12

## 2022-01-12 RX ORDER — HEPARIN SODIUM 5000 [USP'U]/ML
5000 INJECTION INTRAVENOUS; SUBCUTANEOUS EVERY 8 HOURS
Refills: 0 | Status: DISCONTINUED | OUTPATIENT
Start: 2022-01-12 | End: 2022-01-13

## 2022-01-12 RX ORDER — ATENOLOL 25 MG/1
25 TABLET ORAL EVERY 24 HOURS
Refills: 0 | Status: DISCONTINUED | OUTPATIENT
Start: 2022-01-12 | End: 2022-01-13

## 2022-01-12 RX ORDER — HYDRALAZINE HCL 50 MG
1 TABLET ORAL
Qty: 0 | Refills: 0 | DISCHARGE

## 2022-01-12 RX ORDER — AZATHIOPRINE 100 MG/1
150 TABLET ORAL EVERY 24 HOURS
Refills: 0 | Status: DISCONTINUED | OUTPATIENT
Start: 2022-01-12 | End: 2022-01-13

## 2022-01-12 RX ORDER — SODIUM CHLORIDE 9 MG/ML
1000 INJECTION INTRAMUSCULAR; INTRAVENOUS; SUBCUTANEOUS ONCE
Refills: 0 | Status: COMPLETED | OUTPATIENT
Start: 2022-01-12 | End: 2022-01-12

## 2022-01-12 RX ADMIN — Medication 1 MILLIGRAM(S): at 17:44

## 2022-01-12 RX ADMIN — Medication 650 MILLIGRAM(S): at 22:29

## 2022-01-12 RX ADMIN — Medication 100 MILLIGRAM(S): at 22:00

## 2022-01-12 RX ADMIN — HEPARIN SODIUM 5000 UNIT(S): 5000 INJECTION INTRAVENOUS; SUBCUTANEOUS at 22:00

## 2022-01-12 RX ADMIN — ATORVASTATIN CALCIUM 20 MILLIGRAM(S): 80 TABLET, FILM COATED ORAL at 22:00

## 2022-01-12 NOTE — H&P ADULT - ASSESSMENT
Patient is a 51M w PMHx of HTN, renal transplant in 1978 c/b CKD IV (on cyclosporine and prednisone), anemia (on weekly Aranesp) who presents with 1 wk hx of diarrhea and abnormal labs on OP nephrology visit (Cr 6.6, Hb 6.1), admitted for blood transfusion, workup of anemia, and Transplant Nephrology evaluation.

## 2022-01-12 NOTE — H&P ADULT - NSHPSOCIALHISTORY_GEN_ALL_CORE
Alcohol Use:  Cigarette Use:  Illicit Drug Use:   Sick Contacts:  Recent Travel: Alcohol Use: Denies  Cigarette Use: Denies  Illicit Drug Use:  Denies  Sick Contacts: Denies   Recent Travel: Denies

## 2022-01-12 NOTE — H&P ADULT - NSHPPHYSICALEXAM_GEN_ALL_CORE
ICU Vital Signs Last 24 Hrs  T(C): 36.8 (12 Jan 2022 14:50), Max: 36.9 (12 Jan 2022 10:06)  T(F): 98.3 (12 Jan 2022 14:50), Max: 98.4 (12 Jan 2022 10:06)  HR: 64 (12 Jan 2022 14:50) (50 - 70)  BP: 113/62 (12 Jan 2022 14:50) (113/62 - 129/65)  BP(mean): --  ABP: --  ABP(mean): --  RR: 20 (12 Jan 2022 14:50) (18 - 20)  SpO2: 100% (12 Jan 2022 14:50) (100% - 100%)    PHYSICAL EXAM:  GENERAL: NAD, well-groomed, well-developed  HEAD:  Atraumatic, Normocephalic  EYES: EOMI, PERRLA, conjunctiva and sclera clear  ENMT: No tonsillar erythema, exudates, or enlargement; Moist mucous membranes  NECK: Supple, No JVD, Normal thyroid  HEART: Regular rate and rhythm; No murmurs, rubs, or gallops  RESPIRATORY: CTA B/L, No W/R/R  ABDOMEN: Soft, Nontender, Nondistended; Bowel sounds present  NEUROLOGY: A&Ox3, nonfocal, moving all extremities  EXTREMITIES:  2+ Peripheral Pulses, No clubbing, cyanosis, or edema  SKIN: warm, dry, normal color, no rash or abnormal lesions ICU Vital Signs Last 24 Hrs  T(C): 36.8 (12 Jan 2022 14:50), Max: 36.9 (12 Jan 2022 10:06)  T(F): 98.3 (12 Jan 2022 14:50), Max: 98.4 (12 Jan 2022 10:06)  HR: 64 (12 Jan 2022 14:50) (50 - 70)  BP: 113/62 (12 Jan 2022 14:50) (113/62 - 129/65)  BP(mean): --  ABP: --  ABP(mean): --  RR: 20 (12 Jan 2022 14:50) (18 - 20)  SpO2: 100% (12 Jan 2022 14:50) (100% - 100%)    PHYSICAL EXAM:  GENERAL: NAD, well-groomed, well-developed, AOx3  HEAD:  Atraumatic, Normocephalic  EYES: EOMI, PERRLA, conjunctiva and sclera clear  ENMT: No tonsillar erythema, exudates, or enlargement; Moist mucous membranes  NECK: Supple, No JVD, Normal thyroid  HEART: Regular rate and rhythm; No murmurs, rubs, or gallops  RESPIRATORY: CTA B/L, No W/R/R  ABDOMEN: Soft, Nontender, Nondistended; Bowel sounds present  NEUROLOGY: A&Ox3, nonfocal, moving all extremities  EXTREMITIES:  2+ Peripheral Pulses, No clubbing, cyanosis, or edema  SKIN: +keratosis plaques/ + scaling on chest/abdomen, warm, dry, normal color, no rash or abnormal lesions

## 2022-01-12 NOTE — H&P ADULT - PROBLEM SELECTOR PLAN 4
- Patient presenting with 2 week history of 1-2 episodes of watery, brown diarrhea. Denies melena or hematochezia. Denies abdominal pain   - In ED: VSS afebrile, no previous history of diarrhea.  - As patient is not actively toxic-appearing and last episode prior to admission, will hold off on infectious work up at this time. If worsening diarrhea/abdominal pain, will obtain GI PCR/stool cx/stool OP. Consider CT Scan r/o colitis  - F/U: CMV PCR  - Monitor off of antibiotics

## 2022-01-12 NOTE — H&P ADULT - NSHPLABSRESULTS_GEN_ALL_CORE
.  LABS:                         5.9    2.26  )-----------( 384      ( 12 Jan 2022 10:54 )             18.8     01-12    144  |  109<H>  |  102<H>  ----------------------------<  70  4.2   |  18<L>  |  7.15<H>    Ca    9.5      12 Jan 2022 10:54    TPro  6.7  /  Alb  4.1  /  TBili  0.4  /  DBili  x   /  AST  13  /  ALT  9<L>  /  AlkPhos  84  01-12    PT/INR - ( 12 Jan 2022 10:54 )   PT: 12.2 sec;   INR: 1.02 ratio         PTT - ( 12 Jan 2022 10:54 )  PTT:35.6 sec          MICROBIOLOGY    IMAGING    CARDIOLOGY LABS:                         5.9    2.26  )-----------( 384      ( 12 Jan 2022 10:54 )             18.8     01-12    144  |  109<H>  |  102<H>  ----------------------------<  70  4.2   |  18<L>  |  7.15<H>    Ca    9.5      12 Jan 2022 10:54    TPro  6.7  /  Alb  4.1  /  TBili  0.4  /  DBili  x   /  AST  13  /  ALT  9<L>  /  AlkPhos  84  01-12    PT/INR - ( 12 Jan 2022 10:54 )   PT: 12.2 sec;   INR: 1.02 ratio         PTT - ( 12 Jan 2022 10:54 )  PTT:35.6 sec      MICROBIOLOGY    IMAGING    CARDIOLOGY

## 2022-01-12 NOTE — ED PROVIDER NOTE - PHYSICAL EXAMINATION
GEN: Patient awake alert NAD.   HEENT: normocephalic, atraumatic, EOMI, no scleral icterus, + dry MM  CARDIAC: RRR, S1, S2, no murmur.   PULM: CTA B/L no wheeze, rhonchi, rales.   ABD: soft NT, ND, no rebound no guarding,  MSK: Moving all extremities, no edema. 5/5 strength and full ROM in all extremities.    NEURO: A&Ox3, gait normal, no focal neurological deficits, CN 2-12 grossly intact  SKIN: warm, dry, no rash.

## 2022-01-12 NOTE — ED PROVIDER NOTE - NS ED ROS FT
GENERAL: No fever, chills  EYES: no vision changes, no discharge.   ENT: no difficulty swallowing or speaking   CARDIAC: no chest pain/pressure, SOB, lower extremity swelling  PULMONARY: no cough, SOB  GI: no abdominal pain, n/v, + d  : no dysuria, no hematuria  SKIN: no rashes, no ecchymosis  NEURO: no headache, lightheadedness  MSK: No joint pain, myalgia, weakness.

## 2022-01-12 NOTE — CONSULT NOTE ADULT - ASSESSMENT
51 year old male with PMHx of HTN, renal transplant in 1978 c/b CKD IV (on azithioprine and prednisone), anemia (on weekly Aranesp) admitted for low Hb and ABRIL on CKD V.

## 2022-01-12 NOTE — CONSULT NOTE ADULT - ATTENDING COMMENTS
Renal transplant in 1978 with progressive CKD now admitted with diarrhea and anemia.   Currently being transfused.  Hold diuretics, continue imuran 150mgs and pred.   If WBC continues to drop may need to reduce or hold the imuran.   Likely will need dialysis in near future.   Pt relisted for second transplant at [facility].

## 2022-01-12 NOTE — PROVIDER CONTACT NOTE (OTHER) - ASSESSMENT
Pt a&ox4, VSS. Hemoglobin 6.9 after 1 unit PRBC. No critical lab notification from laboratory at this time.

## 2022-01-12 NOTE — H&P ADULT - PROBLEM SELECTOR PLAN 3
- Patient with hx of renal transplant 1978 c/b CKD IV-V (on azithioprine and prednisone). Per patient, on [facility] transplant list  - Nephrology consulted, f/u recs       - Patient is on Prednisone, Azathioprine

## 2022-01-12 NOTE — ED PROVIDER NOTE - OBJECTIVE STATEMENT
52 yo M hx of renal transplant 43 years ago on cyclosporine and prednisone, HTN, anemia, compliant with weekly aranesp, pw elevated cr and low HH in the setting of 1 week of diarrhea. diarrhea is 2x per day, brown watery, no abdominal pain, no fever chills, CP, sob, fatigue, dizziness, no other complaints.

## 2022-01-12 NOTE — CONSULT NOTE ADULT - PROBLEM SELECTOR RECOMMENDATION 2
Hb low on admission. Received 1 unit pRBC in ER. Continue with Aranesp 100 mcg weekly. Monitor Hb daily.

## 2022-01-12 NOTE — H&P ADULT - PROBLEM SELECTOR PLAN 1
- Patient with hx of progressive anemia, now with Hb of 5.9 on presentation. Hg was been steadily decreasing from - Patient with hx of progressive anemia, now with Hb of 5.9 on presentation. Hg was been steadily decreasing from baseline of 10-11 in 2020. Previously seen in ED for Hg 6.0, received PRBC. Now admitted per Transplant Nephrology. Patient has been receiving weekly Aranesp last 3 months without significant improvement. Followed by Heme/Onc.  - In ED: Hb 5.9, WBC 2.76, Reticulyte index indicative of hypoproliferation  - Previously had low-normal B12, in ED MMA/HA elevated, f/u Intrinsic Factor AB  - s/p 1 unit PRBC, follow up post-transfusion CBC tonight  - f/u iron studies  - START Folate  - START Vitamin B12  - f/u Transplant Nephrology recommendations  - Trend CBC, Goal Hb >7

## 2022-01-12 NOTE — CONSULT NOTE ADULT - SUBJECTIVE AND OBJECTIVE BOX
[facility] DIVISION OF KIDNEY DISEASES AND HYPERTENSION -- INITIAL CONSULT NOTE  --------------------------------------------------------------------------------  HPI: Patient is a 51 year old male with PMHx of HTN, renal transplant in  c/b CKD IV (on azithioprine and prednisone), anemia (on weekly Aranesp) who presents to [facility] on 22 with 1 wk hx of diarrhea and abnormal outpatient (elevated Scr to 6.6 with Hb of 6.1). Pt. reported Hx of 1 week of 1-2 episodes of brown watery diarrhea. Transplant nephrology consulted for kidney transplant recipient management.   Scr is elevated to 7.15 on admission. Scr was elevated to 6.04 on 21. Pt. is ESRD s/p LRRT in , currently on azathipurine 150 mg po OD and prednisone 5 mg po OD. Pt. reported of having watery diarrhea for 1 wk.  Denies black/tarry stool, urine in blood, recent trauma/fall. Also denies SOB, lightheadedness, palpitations, abdominal pain, fever, chills, nausea, vomiting, changes in urinary output/consistency.    PAST HISTORY  --------------------------------------------------------------------------------  PAST MEDICAL & SURGICAL HISTORY:  Renal transplant recipient  1978, c/b CKD IV    Hypertension    History of renal transplant    FAMILY HISTORY:  FH: kidney disease    FH: esophageal cancer  Uncle,  in 7th decade    PAST SOCIAL HISTORY:    ALLERGIES & MEDICATIONS  --------------------------------------------------------------------------------  Allergies    sulfa (Swelling)    Intolerances    Standing Inpatient Medications  ATENolol  Tablet 25 milliGRAM(s) Oral every 24 hours  atorvastatin 20 milliGRAM(s) Oral at bedtime  azaTHIOprine 150 milliGRAM(s) Oral every 24 hours  cholecalciferol 1000 Unit(s) Oral every 24 hours  cyanocobalamin Injectable 1000 MICROGram(s) IntraMuscular daily  folic acid 1 milliGRAM(s) Oral daily  heparin   Injectable 5000 Unit(s) SubCutaneous every 8 hours  hydrALAZINE 100 milliGRAM(s) Oral every 8 hours  NIFEdipine  milliGRAM(s) Oral every 24 hours  predniSONE   Tablet 5 milliGRAM(s) Oral every other day  sodium bicarbonate 650 milliGRAM(s) Oral every 8 hours    PRN Inpatient Medications    REVIEW OF SYSTEMS  --------------------------------------------------------------------------------  Gen: No fever/chills   Skin: No rashes  Head/Eyes/Ears/Mouth: No headache, sore throat  Respiratory: No dyspnea  CV: No chest pain, PND, orthopnea  GI: No abdominal pain, diarrhea  : No increased frequency, dysuria, hematuria, nocturia  MSK: Noedema  Neuro: No dizziness/lightheadedness    All other systems were reviewed and are negative, except as noted.    VITALS/PHYSICAL EXAM  --------------------------------------------------------------------------------  T(C): 36.5 (22 @ 17:41), Max: 36.9 (22 @ 10:06)  HR: 60 (22 @ 17:41) (50 - 71)  BP: 105/54 (22 @ 17:41) (105/54 - 129/65)  RR: 18 (22 @ 17:41) (18 - 20)  SpO2: 96% (22 @ 17:41) (96% - 100%)  Wt(kg): --  Height (cm): 165.1 (22 @ 10:06)  Weight (kg): 68.9 (22 @ 10:06)  BMI (kg/m2): 25.3 (22 @ 10:06)  BSA (m2): 1.76 (22 @ 10:06)    Physical Exam:  	Gen: NAD, well-appearing  	HEENT: MMM  	Pulm: CTA B/L, no crackles   	CV: RRR, S1S2+  	Abd: +BS, soft, nontender/nondistended              Transplant: non tender  	: No suprapubic tenderness  	MSK: no edema   	Psych: Normal affect and mood  	Skin: Warm, multiple rash all over the body  	Vascular access:    LABS/STUDIES  --------------------------------------------------------------------------------              5.9    2.26  >-----------<  384      [22 @ 10:54]              18.8     144  |  109  |  102  ----------------------------<  70      [22 @ 10:54]  4.2   |  18  |  7.15        Ca     9.5     [22 @ 10:54]      Mg     2.5     [22 10:54]      Phos  5.8     [22 10:54]    TPro  6.7  /  Alb  4.1  /  TBili  0.4  /  DBili  x   /  AST  13  /  ALT  9   /  AlkPhos  84  [22 @ 10:54]    PT/INR: PT 12.2 , INR 1.02       [22 @ 10:54]  PTT: 35.6       [22 @ 10:54]    Creatinine Trend:  SCr 7.15 [ @ 10:54]  SCr 5.90 [ @ 17:51]    Urinalysis - [21 @ 08:26]      Color Light Yellow / Appearance Clear / SG 1.011 / pH 7.5      Gluc Negative / Ketone Negative  / Bili Negative / Urobili Negative       Blood Negative / Protein 30 mg/dL / Leuk Est Negative / Nitrite Negative      RBC 0 / WBC 1 / Hyaline 0 / Gran  / Sq Epi  / Non Sq Epi 0 / Bacteria Negative    Iron 84, TIBC 229, %sat 37      [21 @ 18:27]  Ferritin 426      [21 @ 18:08]    Tacrolimus  Cyclosporine  Sirolimus  Mycophenolate  BK PCR  CMV PCR  Parvo PCR  EBV PCR

## 2022-01-12 NOTE — ED PROVIDER NOTE - ATTENDING CONTRIBUTION TO CARE
51 M w/ hx of renal transplant presents to the ER w/ abnormal labs after seeing nephrology yesterday for routine visit found to be anemic, w/ rising creatinine, pt w/ chronic anemia on weekly epo shots. Pt states no knew changes to medications, pt w/ no hematemesis, no melontoci stools, having 2 loose BMS daily, that are brown. Pt w/ no nausea no metallic taste in mouth, sob, leg swelling, change in urine frequency or change in urine output, foamy urine. Denies any pain or burning with urination;   On exam, pt I have reviewed the triage vital signs. Const: no acute distress, Well-developed, Eyes: no conjunctival injection and no scleral icterus ENMT: Moist mucus membranes, CVS: +S1/S2, radial/DP pulse 2+ bilaterally RESP: Unlabored respiratory effort, Clear to auscultation bilaterally GI: Nontender/Nondistended soft abdomen, no CVA tenderness MSK: Extremities w/o deformity or ttp, Psych: Awake, Alert, & Orientedx3;  Appropriate mood and affect, cooperative Plan for repeat labs and transplant consult unclear eitiology of anemia, pt w/ no hx of bloody stools likely anemia due to renal insufficiency, Pl 51 M w/ hx of renal transplant presents to the ER w/ abnormal labs after seeing nephrology yesterday for routine visit found to be anemic, w/ rising creatinine, pt w/ chronic anemia on weekly epo shots. Pt states no knew changes to medications, pt w/ no hematemesis, no melontoci stools, having 2 loose BMS daily, that are brown. Pt w/ no nausea no metallic taste in mouth, sob, leg swelling, change in urine frequency or change in urine output, foamy urine. Denies any pain or burning with urination; pt reports weekly aranesp shots, last transfusion this month, On exam, pt I have reviewed the triage vital signs. Const: no acute distress, resting comfortably in bed, Eyes: no conjunctival injection and no scleral icterus +conjunctival pallor ENMT: dry mucus membranes, CVS: +S1/S2, radial/DP pulse 2+ bilaterally RESP: Unlabored respiratory effort, Clear to auscultation bilaterally GI: Nontender/Nondistended soft abdomen, no CVA tenderness MSK: Extremities w/o deformity or ttp, Psych: Awake, Alert, & Orientedx3;  Appropriate mood and affect, cooperative Plan for repeat labs and transplant consult anemia likely 2/2 renal insufficiency in the setting of rising creatinine w/ hx of freq transfusion, pt w/ no hx of blood stools, tba for continued monitoring not hyperK, consented for blood

## 2022-01-12 NOTE — CONSULT NOTE ADULT - PROBLEM SELECTOR RECOMMENDATION 3
Pt. with metabolic acidosis in the setting of advanced CKD. Continue with sodium bicarb 650 mg po TID.

## 2022-01-12 NOTE — H&P ADULT - ATTENDING COMMENTS
51M with PMHx of HTN and renal transplant (40+ years prior) sent in by nephrologist for anemia of 6. Patient with no symptoms such as fatigue, shortness of breath or decrease exercise tolerance. It seems anemia has slowly worsened in the past year after patient got COVID-19. Of note he gets weekly Aranesp (which he self-injections) and prior history of vitamin B12 deficiency with weakly positive anti-parietal. He has been on oral supplements. He has having diarrhea (watery) for several days. No other infectious complaints. Daxa ZEPEDA reviewed and patient called several times to present to the ED. Patient recently in ED for anemia in 2021 where he was transfused and then promptly discharged.    Labs personally reviewed  H.9, Cr: 7.15  ANC: 1400    A/P:  Patient being sent in for anemia with minimal complaints. I suspect anemia is hypoproliferative in nature given reticulocyte index of 0.10. No signs of blood loss and quite chronic. Will start Folic Acid and B12 (IM) supplementation. Recheck B12, methylmalonic acid, and anti-parietal. If indeed pernicious anemia would benefit from weekly B12 injections. Transfuse to goal hemoglobin of 7, maintain active T&S.    Patient with worsening renal function. Does not have indication for urgent dialysis. Patient pending transplant nephrology eval. Monitor renal function, renally dose all medications, continue with home sodium bicarbonate.    For now monitor diarrhea. If continues to worsen would send infectious work up including stool culture & O&P.

## 2022-01-12 NOTE — PROVIDER CONTACT NOTE (OTHER) - ACTION/TREATMENT ORDERED:
Provider made aware. Pt to be given 1 unit PRBC. WIll continue to monitor at this time Provider made aware. Pt to be given another 1 unit PRBC. WIll continue to monitor at this time

## 2022-01-12 NOTE — H&P ADULT - HISTORY OF PRESENT ILLNESS
51M w PMHx of HTN, renal transplant in 1978 c/b CKD IV (on cyclosporine and prednisone), anemia (on weekly Aranesp) who presents with 1 wk hx of diarrhea and abnormal labs on OP nephrology visit (Cr 6.6, Hb 6.1).  He describes 1 week of 1-2 episodes of brown watery diarrhea, decrased PO intake. Denies black/tarry stool, urine in blood, recent trauma/fall. Denies SOB, lightheadedness, palpitations. On previous admission, patient had dark stools, however scope deferred. Denies abdominal pain, fever, chills, nausea, vomiting, changes in urinary output/ consistency     In ED, initially pt afeb, , /82, and satting well on RA, A&Ox4. Hgb 6.6 and pt receiving first unit pRBC. 51M w PMHx of HTN, renal transplant in 1978 c/b CKD IV (on cyclosporine and prednisone), anemia (on weekly Aranesp) who presents with 1 wk hx of diarrhea and abnormal labs on OP nephrology visit (Cr 6.6, Hb 6.1).  He describes 1 week of 1-2 episodes of brown watery diarrhea. Denies black/tarry stool, urine in blood, recent trauma/fall. Denies SOB, lightheadedness, palpitations, fatigue despite recent 1 year history of anemia. On previous admission, patient had dark stools, however scope deferred. Denies abdominal pain, fever, chills, nausea, vomiting, changes in urinary output/consistency.    In ED, initially pt afebrile 36.8, HR 50-70, /60, and satting well on RA (RR 20, 100% O2). Labs significant for leukopenia (WBC 2.76), Hb 5.9 (b/l 10-11 in 12/20, MCV >100), Coags wnl. CMP with metabolic acidosis, BUN/Cr 102/7.15, Cr Cl 12 EDGR 8. VBG pH 7.3. Covid negative. Patient received 1 L NS and 1 U PRBC  51M w PMHx of HTN, renal transplant in 1978 c/b CKD IV (on azithioprine and prednisone), anemia (on weekly Aranesp) who presents with 1 wk hx of diarrhea and abnormal labs on OP nephrology visit (Cr 6.6, Hb 6.1).  He describes 1 week of 1-2 episodes of brown watery diarrhea. Denies black/tarry stool, urine in blood, recent trauma/fall. Denies SOB, lightheadedness, palpitations, fatigue despite recent 1 year history of anemia. On previous admission, patient had dark stools, however scope deferred. Denies abdominal pain, fever, chills, nausea, vomiting, changes in urinary output/consistency.    In ED, initially pt afebrile 36.8, HR 50-70, /60, and satting well on RA (RR 20, 100% O2). Labs significant for leukopenia (WBC 2.76), Hb 5.9 (b/l 10-11 in 12/20, MCV >100), Coags wnl. CMP with metabolic acidosis, BUN/Cr 102/7.15, Cr Cl 12 EDGR 8. VBG pH 7.3. Covid negative. Patient received 1 L NS and 1 U PRBC

## 2022-01-12 NOTE — H&P ADULT - PROBLEM SELECTOR PLAN 2
- Patient presented with Cr 7.15, appears to have baseline of 5-6 per HIE. EGFR: 8 CrCl 12  - Likely 2/2 hypovolemia in setting of recent diarrhea  - f/u FeUrea (on Lasix at home)  - s/p 1 L NS, 1 unit PRBC, monitor off fluids for now  - f/u UA  - HOLD lasix in setting of dehydration  - Trend BUN/Cr  - Renally dose all medications, Avoid Nephrotoxins, NSAID, ACR/ARB, ZACK  - c/w Arenesp weekly, patient states last dose 01/06/21 - Patient presented with Cr 7.15, appears to have baseline of 5-6 per HIE. EGFR: 8 CrCl 12  - Likely 2/2 hypovolemia in setting of recent diarrhea  - f/u FeUrea (on Lasix at home)  - s/p 1 L NS, 1 unit PRBC, monitor off fluids for now  - f/u UA  - HOLD lasix in setting of dehydration  - Trend BUN/Cr  - Renally dose all medications, Avoid Nephrotoxins, NSAID, ACR/ARB, ZACK  - hold Arenesp weekly, patient states last dose 01/06/21. Likely will continue as outpatient  - Start Na Bicarb 650mg TID in setting of metabolic acidosis from CKD

## 2022-01-12 NOTE — H&P ADULT - PROBLEM SELECTOR PLAN 7
DVT: Heparin subQ  Diet: Renal Diet  Dispo: Pending, f/u transplant nephrology recommendations. PT evaluation

## 2022-01-12 NOTE — ED PROVIDER NOTE - CLINICAL SUMMARY MEDICAL DECISION MAKING FREE TEXT BOX
52 yo M hx of renal transplant 43 years ago on cyclosporine and prednisone, HTN, anemia, compliant with weekly aranesp, pw elevated cr and low HH in the setting of 1 week of diarrhea. + dry MM on exam, no other significant findings, no decreased urine output. likely pre-renal ranjan on chronic ESRD in the setting of 1 week of diarrhea. anemia likely secondary to ESRD. check labs, hydrate, replete electrolyte, consult transplant nephrology. Reassess for disposition.

## 2022-01-12 NOTE — ED ADULT NURSE NOTE - OBJECTIVE STATEMENT
51 year old male PMHx of kidney transplant 43 years ago, htn, chf, anemia requiring blood transfusion, presents to the ED sent in by outpatient MD for elevated creatinine and low hemoglobin, complaining of diarrhea x 1 week (1-2 times of watery /brown diarrhea/day). Patient was seen in [facility] ED 12/29 for low hemoglobin and received 1 unit of PRBC and was discharged home. Pt. Patient denies SOB, syncope, lightheadedness, palpitations, nausea/vomiting, melena/hematuria, fever/chills, recent travel/sick contacts. 20g peripheral IV placed in L AC and labs drawn and sent to lab. Patient undressed and placed into gown, call bell in hand and side rails up with bed in lowest position for safety. blanket provided. Comfort and safety provided.

## 2022-01-12 NOTE — ED PROVIDER NOTE - CARE PLAN
1 Principal Discharge DX:	ABRIL (acute kidney injury)  Secondary Diagnosis:	Anemia secondary to renal failure  Secondary Diagnosis:	Chronic renal failure

## 2022-01-12 NOTE — H&P ADULT - NSHPREVIEWOFSYSTEMS_GEN_ALL_CORE

## 2022-01-12 NOTE — CONSULT NOTE ADULT - PROBLEM SELECTOR RECOMMENDATION 4
Pt. is ESRD s/p LRRT in 1978, currently on azathipurine 150 mg po OD and prednisone 5 mg po OD. Continue home immunosuppressive dose.     If you have any questions, please feel free to contact me  Jose David Monae  Nephrology Fellow  [phone]  (After 5pm or on weekends please page the on-call fellow).

## 2022-01-13 ENCOUNTER — TRANSCRIPTION ENCOUNTER (OUTPATIENT)
Age: 52
End: 2022-01-13

## 2022-01-13 VITALS
SYSTOLIC BLOOD PRESSURE: 110 MMHG | DIASTOLIC BLOOD PRESSURE: 63 MMHG | RESPIRATION RATE: 19 BRPM | HEART RATE: 58 BPM | OXYGEN SATURATION: 98 % | TEMPERATURE: 98 F

## 2022-01-13 LAB
ALBUMIN SERPL ELPH-MCNC: 3.9 G/DL — SIGNIFICANT CHANGE UP (ref 3.3–5)
ALP SERPL-CCNC: 84 U/L — SIGNIFICANT CHANGE UP (ref 40–120)
ALT FLD-CCNC: 8 U/L — LOW (ref 10–45)
ANION GAP SERPL CALC-SCNC: 14 MMOL/L — SIGNIFICANT CHANGE UP (ref 5–17)
AST SERPL-CCNC: 14 U/L — SIGNIFICANT CHANGE UP (ref 10–40)
BASOPHILS # BLD AUTO: 0.01 K/UL — SIGNIFICANT CHANGE UP (ref 0–0.2)
BASOPHILS NFR BLD AUTO: 0.5 % — SIGNIFICANT CHANGE UP (ref 0–2)
BILIRUB SERPL-MCNC: 0.7 MG/DL — SIGNIFICANT CHANGE UP (ref 0.2–1.2)
BUN SERPL-MCNC: 103 MG/DL — HIGH (ref 7–23)
CALCIUM SERPL-MCNC: 9.1 MG/DL — SIGNIFICANT CHANGE UP (ref 8.4–10.5)
CHLORIDE SERPL-SCNC: 110 MMOL/L — HIGH (ref 96–108)
CO2 SERPL-SCNC: 17 MMOL/L — LOW (ref 22–31)
CREAT SERPL-MCNC: 6.95 MG/DL — HIGH (ref 0.5–1.3)
EOSINOPHIL # BLD AUTO: 0.09 K/UL — SIGNIFICANT CHANGE UP (ref 0–0.5)
EOSINOPHIL NFR BLD AUTO: 4.2 % — SIGNIFICANT CHANGE UP (ref 0–6)
FERRITIN SERPL-MCNC: 589 NG/ML — HIGH (ref 30–400)
GLUCOSE SERPL-MCNC: 81 MG/DL — SIGNIFICANT CHANGE UP (ref 70–99)
HCT VFR BLD CALC: 25.7 % — LOW (ref 39–50)
HCT VFR BLD CALC: 25.9 % — LOW (ref 39–50)
HGB BLD-MCNC: 8.2 G/DL — LOW (ref 13–17)
HGB BLD-MCNC: 8.6 G/DL — LOW (ref 13–17)
IMM GRANULOCYTES NFR BLD AUTO: 0.9 % — SIGNIFICANT CHANGE UP (ref 0–1.5)
IRON SATN MFR SERPL: 123 UG/DL — SIGNIFICANT CHANGE UP (ref 45–165)
IRON SATN MFR SERPL: 73 % — HIGH (ref 16–55)
LYMPHOCYTES # BLD AUTO: 0.62 K/UL — LOW (ref 1–3.3)
LYMPHOCYTES # BLD AUTO: 28.7 % — SIGNIFICANT CHANGE UP (ref 13–44)
MAGNESIUM SERPL-MCNC: 2.6 MG/DL — SIGNIFICANT CHANGE UP (ref 1.6–2.6)
MCHC RBC-ENTMCNC: 31.7 GM/DL — LOW (ref 32–36)
MCHC RBC-ENTMCNC: 33.3 PG — SIGNIFICANT CHANGE UP (ref 27–34)
MCHC RBC-ENTMCNC: 33.5 GM/DL — SIGNIFICANT CHANGE UP (ref 32–36)
MCHC RBC-ENTMCNC: 34.1 PG — HIGH (ref 27–34)
MCV RBC AUTO: 102 FL — HIGH (ref 80–100)
MCV RBC AUTO: 105.3 FL — HIGH (ref 80–100)
MONOCYTES # BLD AUTO: 0.12 K/UL — SIGNIFICANT CHANGE UP (ref 0–0.9)
MONOCYTES NFR BLD AUTO: 5.6 % — SIGNIFICANT CHANGE UP (ref 2–14)
NEUTROPHILS # BLD AUTO: 1.3 K/UL — LOW (ref 1.8–7.4)
NEUTROPHILS NFR BLD AUTO: 60.1 % — SIGNIFICANT CHANGE UP (ref 43–77)
NRBC # BLD: 0 /100 WBCS — SIGNIFICANT CHANGE UP (ref 0–0)
NRBC # BLD: 1 /100 WBCS — HIGH (ref 0–0)
PCA AB SER-ACNC: SIGNIFICANT CHANGE UP
PHOSPHATE SERPL-MCNC: 5.7 MG/DL — HIGH (ref 2.5–4.5)
PLATELET # BLD AUTO: 351 K/UL — SIGNIFICANT CHANGE UP (ref 150–400)
PLATELET # BLD AUTO: 378 K/UL — SIGNIFICANT CHANGE UP (ref 150–400)
POTASSIUM SERPL-MCNC: 4.9 MMOL/L — SIGNIFICANT CHANGE UP (ref 3.5–5.3)
POTASSIUM SERPL-SCNC: 4.9 MMOL/L — SIGNIFICANT CHANGE UP (ref 3.5–5.3)
PROT SERPL-MCNC: 6.4 G/DL — SIGNIFICANT CHANGE UP (ref 6–8.3)
RBC # BLD: 2.46 M/UL — LOW (ref 4.2–5.8)
RBC # BLD: 2.52 M/UL — LOW (ref 4.2–5.8)
RBC # FLD: 18.7 % — HIGH (ref 10.3–14.5)
RBC # FLD: 18.7 % — HIGH (ref 10.3–14.5)
SODIUM SERPL-SCNC: 141 MMOL/L — SIGNIFICANT CHANGE UP (ref 135–145)
TIBC SERPL-MCNC: 169 UG/DL — LOW (ref 220–430)
UIBC SERPL-MCNC: 46 UG/DL — LOW (ref 110–370)
UUN UR-MCNC: 478 MG/DL — SIGNIFICANT CHANGE UP
VIT B12 SERPL-MCNC: 495 PG/ML — SIGNIFICANT CHANGE UP (ref 232–1245)
WBC # BLD: 1.84 K/UL — LOW (ref 3.8–10.5)
WBC # BLD: 2.16 K/UL — LOW (ref 3.8–10.5)
WBC # FLD AUTO: 1.84 K/UL — LOW (ref 3.8–10.5)
WBC # FLD AUTO: 2.16 K/UL — LOW (ref 3.8–10.5)

## 2022-01-13 PROCEDURE — 83550 IRON BINDING TEST: CPT

## 2022-01-13 PROCEDURE — 82435 ASSAY OF BLOOD CHLORIDE: CPT

## 2022-01-13 PROCEDURE — 85014 HEMATOCRIT: CPT

## 2022-01-13 PROCEDURE — 80048 BASIC METABOLIC PNL TOTAL CA: CPT

## 2022-01-13 PROCEDURE — 99291 CRITICAL CARE FIRST HOUR: CPT | Mod: 25

## 2022-01-13 PROCEDURE — 84132 ASSAY OF SERUM POTASSIUM: CPT

## 2022-01-13 PROCEDURE — 36415 COLL VENOUS BLD VENIPUNCTURE: CPT

## 2022-01-13 PROCEDURE — 84100 ASSAY OF PHOSPHORUS: CPT

## 2022-01-13 PROCEDURE — 84295 ASSAY OF SERUM SODIUM: CPT

## 2022-01-13 PROCEDURE — 85027 COMPLETE CBC AUTOMATED: CPT

## 2022-01-13 PROCEDURE — 85025 COMPLETE CBC W/AUTO DIFF WBC: CPT

## 2022-01-13 PROCEDURE — U0003: CPT

## 2022-01-13 PROCEDURE — 84540 ASSAY OF URINE/UREA-N: CPT

## 2022-01-13 PROCEDURE — 82570 ASSAY OF URINE CREATININE: CPT

## 2022-01-13 PROCEDURE — U0005: CPT

## 2022-01-13 PROCEDURE — 86900 BLOOD TYPING SEROLOGIC ABO: CPT

## 2022-01-13 PROCEDURE — 86901 BLOOD TYPING SEROLOGIC RH(D): CPT

## 2022-01-13 PROCEDURE — 86340 INTRINSIC FACTOR ANTIBODY: CPT

## 2022-01-13 PROCEDURE — P9016: CPT

## 2022-01-13 PROCEDURE — 82607 VITAMIN B-12: CPT

## 2022-01-13 PROCEDURE — 83540 ASSAY OF IRON: CPT

## 2022-01-13 PROCEDURE — 85730 THROMBOPLASTIN TIME PARTIAL: CPT

## 2022-01-13 PROCEDURE — 83605 ASSAY OF LACTIC ACID: CPT

## 2022-01-13 PROCEDURE — 80158 DRUG ASSAY CYCLOSPORINE: CPT

## 2022-01-13 PROCEDURE — 85018 HEMOGLOBIN: CPT

## 2022-01-13 PROCEDURE — 85610 PROTHROMBIN TIME: CPT

## 2022-01-13 PROCEDURE — 80053 COMPREHEN METABOLIC PANEL: CPT

## 2022-01-13 PROCEDURE — 86850 RBC ANTIBODY SCREEN: CPT

## 2022-01-13 PROCEDURE — 86923 COMPATIBILITY TEST ELECTRIC: CPT

## 2022-01-13 PROCEDURE — 82803 BLOOD GASES ANY COMBINATION: CPT

## 2022-01-13 PROCEDURE — 36430 TRANSFUSION BLD/BLD COMPNT: CPT

## 2022-01-13 PROCEDURE — 82330 ASSAY OF CALCIUM: CPT

## 2022-01-13 PROCEDURE — 82947 ASSAY GLUCOSE BLOOD QUANT: CPT

## 2022-01-13 PROCEDURE — 81001 URINALYSIS AUTO W/SCOPE: CPT

## 2022-01-13 PROCEDURE — 86255 FLUORESCENT ANTIBODY SCREEN: CPT

## 2022-01-13 PROCEDURE — 99239 HOSP IP/OBS DSCHRG MGMT >30: CPT | Mod: GC

## 2022-01-13 PROCEDURE — 83735 ASSAY OF MAGNESIUM: CPT

## 2022-01-13 PROCEDURE — 82728 ASSAY OF FERRITIN: CPT

## 2022-01-13 RX ORDER — SODIUM BICARBONATE 1 MEQ/ML
1 SYRINGE (ML) INTRAVENOUS
Qty: 90 | Refills: 0
Start: 2022-01-13 | End: 2022-02-11

## 2022-01-13 RX ORDER — FOLIC ACID 0.8 MG
1 TABLET ORAL
Qty: 30 | Refills: 0
Start: 2022-01-13 | End: 2022-02-11

## 2022-01-13 RX ADMIN — ATENOLOL 25 MILLIGRAM(S): 25 TABLET ORAL at 05:36

## 2022-01-13 RX ADMIN — Medication 1 MILLIGRAM(S): at 13:42

## 2022-01-13 RX ADMIN — Medication 650 MILLIGRAM(S): at 05:36

## 2022-01-13 RX ADMIN — HEPARIN SODIUM 5000 UNIT(S): 5000 INJECTION INTRAVENOUS; SUBCUTANEOUS at 05:34

## 2022-01-13 RX ADMIN — Medication 100 MILLIGRAM(S): at 13:42

## 2022-01-13 RX ADMIN — Medication 5 MILLIGRAM(S): at 13:42

## 2022-01-13 RX ADMIN — Medication 1000 UNIT(S): at 05:35

## 2022-01-13 RX ADMIN — Medication 650 MILLIGRAM(S): at 13:44

## 2022-01-13 RX ADMIN — PREGABALIN 1000 MICROGRAM(S): 225 CAPSULE ORAL at 13:43

## 2022-01-13 RX ADMIN — HEPARIN SODIUM 5000 UNIT(S): 5000 INJECTION INTRAVENOUS; SUBCUTANEOUS at 14:36

## 2022-01-13 RX ADMIN — AZATHIOPRINE 150 MILLIGRAM(S): 100 TABLET ORAL at 05:35

## 2022-01-13 RX ADMIN — Medication 120 MILLIGRAM(S): at 05:35

## 2022-01-13 RX ADMIN — Medication 100 MILLIGRAM(S): at 05:35

## 2022-01-13 NOTE — PROGRESS NOTE ADULT - SUBJECTIVE AND OBJECTIVE BOX
Patient:  KALYAN DANIELSON  [id number]    Progress Note    Interval events: No acute events.  Pertinent ROS (if any):      Administered:  sodium bicarbonate: 650 milliGRAM(s) Oral ( 05:36)  ATENolol  Tablet: 25 milliGRAM(s) Oral ( 05:36)  hydrALAZINE: 100 milliGRAM(s) Oral ( 05:35)  cholecalciferol: 1000 Unit(s) Oral ( 05:35)  azaTHIOprine: 150 milliGRAM(s) Oral ( 05:35)  NIFEdipine XL: 120 milliGRAM(s) Oral ( 05:35)  heparin   Injectable: 5000 Unit(s) SubCutaneous ( 05:34)  sodium bicarbonate: 650 milliGRAM(s) Oral ( 22:29)  hydrALAZINE: 100 milliGRAM(s) Oral ( @ 22:00)  heparin   Injectable: 5000 Unit(s) SubCutaneous ( 22:00)  atorvastatin: 20 milliGRAM(s) Oral ( 22:00)        OBJECTIVE:     @ 07: @ 07:00  --------------------------------------------------------  IN: 540 mL / OUT: 0 mL / NET: 540 mL      CAPILLARY BLOOD GLUCOSE            VITALS:  T(F): 98.2 (22 @ 05:00), Max: 98.4 (22 @ 10:06)  HR: 80 (22 @ 05:00) (50 - 80)  BP: 128/68 (22 @ 05:00) (105/54 - 129/65)  BP(mean): --  ABP: --  ABP(mean): --  RR: 18 (22 @ 05:00) (18 - 20)  SpO2: 99% (22 @ 05:00) (92% - 100%)    PHYSICAL EXAM:  GENERAL: NAD, lying in bed comfortably  HEAD:  Atraumatic, Normocephalic  EYES: EOMI, PERRLA, conjunctiva and sclera clear  ENT: Moist mucous membranes  NECK: Supple, No JVD  CHEST/LUNG: Clear to auscultation bilaterally; No rales, rhonchi, wheezing, or rubs. Unlabored respirations  HEART: Regular rate and rhythm; No murmurs, rubs, or gallops  ABDOMEN: Bowel sounds present; Soft, Nontender, Nondistended. No hepatomegaly  EXTREMITIES:  2+ Peripheral Pulses, brisk capillary refill. No clubbing, cyanosis, or edema  NERVOUS SYSTEM:  Alert & Oriented X3, speech clear. No deficits   MSK: FROM all 4 extremities, full and equal strength  SKIN: No rashes or lesions    HOSPITAL MEDICATIONS:  Standing Meds:  ATENolol  Tablet 25 milliGRAM(s) Oral every 24 hours  atorvastatin 20 milliGRAM(s) Oral at bedtime  azaTHIOprine 150 milliGRAM(s) Oral every 24 hours  cholecalciferol 1000 Unit(s) Oral every 24 hours  cyanocobalamin Injectable 1000 MICROGram(s) IntraMuscular daily  folic acid 1 milliGRAM(s) Oral daily  heparin   Injectable 5000 Unit(s) SubCutaneous every 8 hours  hydrALAZINE 100 milliGRAM(s) Oral every 8 hours  NIFEdipine  milliGRAM(s) Oral every 24 hours  predniSONE   Tablet 5 milliGRAM(s) Oral every other day  sodium bicarbonate 650 milliGRAM(s) Oral every 8 hours      PRN Meds:      LABS:  CBC 22 @ 06:41                        8.2    2.16  )-----------( 378                   25.9       Hgb trend: 8.2 <-- , 6.9 <-- , 5.9 <--   WBC trend: 2.16 <-- , 2.14 <-- , 2.26 <--       CMP 22 @ 06:41    141  |  110<H>  |  103<H>  ----------------------------<  81  4.9   |  17<L>  |  6.95<H>    Ca    9.1      22 @ 06:41  Phos  5.7       Mg     2.6         TPro  6.4  /  Alb  3.9  /  TBili  0.7  /  DBili  x   /  AST  14  /  ALT  8<L>  /  AlkPhos  84        Serum Cr trend: 6.95 <-- , 6.80 <-- , 7.15 <--     PT/INR - ( 2022 10:54 )   PT: 12.2 sec;   INR: 1.02 ratio         PTT - ( 2022 10:54 )  PTT:35.6 sec  Urinalysis Basic - ( 2022 20:26 )    Color: Light Yellow / Appearance: Clear / S.010 / pH: x  Gluc: x / Ketone: Negative  / Bili: Negative / Urobili: Negative   Blood: x / Protein: 30 mg/dL / Nitrite: Negative   Leuk Esterase: Negative / RBC: 1 /hpf / WBC 1 /HPF   Sq Epi: x / Non Sq Epi: 1 /hpf / Bacteria: Negative      ABG Trend:     VBG Trend:   22 @ 10:52 - pH: 7.33  | pCO2: 39    | pO2: 33    | Lactate: 1.3        MICROBIOLOGY:       RADIOLOGY:  [ ] Reviewed and interpreted by me    EKG:   Patient:  KALYAN DANIELSON  [id number]    Progress Note    Interval events: No acute events. States diarrhea resolved. No abdominal pain. Normal urination.  Pertinent ROS (if any):  Denies f/c/n/v/cp/ap/bowel or bladder changes.     Administered:  sodium bicarbonate: 650 milliGRAM(s) Oral ( @ 05:36)  ATENolol  Tablet: 25 milliGRAM(s) Oral ( 05:36)  hydrALAZINE: 100 milliGRAM(s) Oral ( 05:35)  cholecalciferol: 1000 Unit(s) Oral ( 05:35)  azaTHIOprine: 150 milliGRAM(s) Oral ( 05:35)  NIFEdipine XL: 120 milliGRAM(s) Oral ( 05:35)  heparin   Injectable: 5000 Unit(s) SubCutaneous ( 05:34)  sodium bicarbonate: 650 milliGRAM(s) Oral ( 22:29)  hydrALAZINE: 100 milliGRAM(s) Oral ( 22:00)  heparin   Injectable: 5000 Unit(s) SubCutaneous ( 22:00)  atorvastatin: 20 milliGRAM(s) Oral ( 22:00)    OBJECTIVE:     @ 07: @ 07:00  --------------------------------------------------------  IN: 540 mL / OUT: 0 mL / NET: 540 mL      CAPILLARY BLOOD GLUCOSE            VITALS:  T(F): 98.2 (22 @ 05:00), Max: 98.4 (22 @ 10:06)  HR: 80 (22 @ 05:00) (50 - 80)  BP: 128/68 (22 @ 05:00) (105/54 - 129/65)  BP(mean): --  ABP: --  ABP(mean): --  RR: 18 (22 @ 05:00) (18 - 20)  SpO2: 99% (22 @ 05:00) (92% - 100%)    PHYSICAL EXAM:  GENERAL: NAD, well-groomed, well-developed, AOx3  HEAD:  Atraumatic, Normocephalic  EYES: EOMI, PERRLA, conjunctiva and sclera clear  ENMT: No tonsillar erythema, exudates, or enlargement; Moist mucous membranes  NECK: Supple, No JVD, Normal thyroid  HEART: Regular rate and rhythm; No murmurs, rubs, or gallops  RESPIRATORY: CTA B/L, No W/R/R  ABDOMEN: Soft, Nontender, Nondistended; Bowel sounds present  NEUROLOGY: A&Ox3, nonfocal, moving all extremities  EXTREMITIES:  2+ Peripheral Pulses, No clubbing, cyanosis, or edema  SKIN: +keratosis plaques/ + scaling on chest/abdomen, warm, dry, normal color, no rash or abnormal lesions    HOSPITAL MEDICATIONS:  Standing Meds:  ATENolol  Tablet 25 milliGRAM(s) Oral every 24 hours  atorvastatin 20 milliGRAM(s) Oral at bedtime  azaTHIOprine 150 milliGRAM(s) Oral every 24 hours  cholecalciferol 1000 Unit(s) Oral every 24 hours  cyanocobalamin Injectable 1000 MICROGram(s) IntraMuscular daily  folic acid 1 milliGRAM(s) Oral daily  heparin   Injectable 5000 Unit(s) SubCutaneous every 8 hours  hydrALAZINE 100 milliGRAM(s) Oral every 8 hours  NIFEdipine  milliGRAM(s) Oral every 24 hours  predniSONE   Tablet 5 milliGRAM(s) Oral every other day  sodium bicarbonate 650 milliGRAM(s) Oral every 8 hours      PRN Meds:      LABS:  CBC 22 @ 06:41                        8.2    2.16  )-----------( 378                   25.9       Hgb trend: 8.2 <-- , 6.9 <-- , 5.9 <--   WBC trend: 2.16 <-- , 2.14 <-- , 2.26 <--       CMP 22 @ 06:41    141  |  110<H>  |  103<H>  ----------------------------<  81  4.9   |  17<L>  |  6.95<H>    Ca    9.1      22 @ 06:41  Phos  5.7       Mg     2.6         TPro  6.4  /  Alb  3.9  /  TBili  0.7  /  DBili  x   /  AST  14  /  ALT  8<L>  /  AlkPhos  84        Serum Cr trend: 6.95 <-- , 6.80 <-- , 7.15 <--     PT/INR - ( 2022 10:54 )   PT: 12.2 sec;   INR: 1.02 ratio         PTT - ( 2022 10:54 )  PTT:35.6 sec  Urinalysis Basic - ( 2022 20:26 )    Color: Light Yellow / Appearance: Clear / S.010 / pH: x  Gluc: x / Ketone: Negative  / Bili: Negative / Urobili: Negative   Blood: x / Protein: 30 mg/dL / Nitrite: Negative   Leuk Esterase: Negative / RBC: 1 /hpf / WBC 1 /HPF   Sq Epi: x / Non Sq Epi: 1 /hpf / Bacteria: Negative    ABG Trend:     VBG Trend:   22 @ 10:52 - pH: 7.33  | pCO2: 39    | pO2: 33    | Lactate: 1.3        MICROBIOLOGY:       RADIOLOGY:  [ ] Reviewed and interpreted by me    EKG:

## 2022-01-13 NOTE — DISCHARGE NOTE PROVIDER - NSDCMRMEDTOKEN_GEN_ALL_CORE_FT
Aranesp: 125mcg subcutaneously once a week    NOTE: patients fills both 100mcg and 25mcg prescription   atenolol 25 mg oral tablet: 1 tab(s) orally once a day  azaTHIOprine 50 mg oral tablet: 3 tab(s) orally once a day  cyanocobalamin 1000 mcg oral tablet: 1 tab(s) orally once a day  folic acid 1 mg oral tablet: 1 tab(s) orally once a day  hydrALAZINE 100 mg oral tablet: 1 tab(s) orally 3 times a day  Lasix 40 mg oral tablet: 1 tab(s) orally once a day   Lipitor 20 mg oral tablet: 1 tab(s) orally once a day  NIFEdipine 60 mg oral tablet, extended release: 2 tab(s) orally once a day  predniSONE 5 mg oral tablet: 1 tab(s) orally every other day  sodium bicarbonate 650 mg oral tablet: 1 tab(s) orally every 8 hours  Vitamin D3 1000 intl units (25 mcg) oral capsule: orally once a day

## 2022-01-13 NOTE — DISCHARGE NOTE PROVIDER - NSDCCPCAREPLAN_GEN_ALL_CORE_FT
PRINCIPAL DISCHARGE DIAGNOSIS  Diagnosis: ABRIL (acute kidney injury)  Assessment and Plan of Treatment: You came in with worsening kidney damage. We did some urine studies, and they were consistent with intrinsic renal damage. Although your kidney function somewhat improved, it still remains high (your creatinine is about 7). Please follow up with Dr. Us as soon as possible.   -For your transplant, we DID NOT make any changes to your medications.      SECONDARY DISCHARGE DIAGNOSES  Diagnosis: Anemia secondary to renal failure  Assessment and Plan of Treatment: You also came in with low hemoglobin (Anemia). Your hemoglobin was 5.9, which is severely low. This is likely why you felt very fatigued. You received two units of packed red blood cells and your hemoglobin remained stable at 8.6. Please follow up with your primary care doctor and nephrology for further workup.     PRINCIPAL DISCHARGE DIAGNOSIS  Diagnosis: ABRIL (acute kidney injury)  Assessment and Plan of Treatment: You came in with worsening kidney damage. We did some urine studies, and they were consistent with intrinsic renal damage. Although your kidney function somewhat improved, it still remains high (your creatinine is about 7). Please follow up with Dr. Us as soon as possible.   -For your transplant, we DID NOT make any changes to your medications.  -Dr. Us's office will reach out to you to schedule an appointment.      SECONDARY DISCHARGE DIAGNOSES  Diagnosis: Anemia secondary to renal failure  Assessment and Plan of Treatment: You also came in with low hemoglobin (Anemia). Your hemoglobin was 5.9, which is severely low. This is likely why you felt very fatigued. You received two units of packed red blood cells and your hemoglobin remained stable at 8.6. Please follow up with your primary care doctor and nephrology for further workup.

## 2022-01-13 NOTE — PROGRESS NOTE ADULT - ATTENDING COMMENTS
Patient seen and examined at bedside. No acute events overnight. Patient responded well to transfusion of pRBC. Labs consistent with AOCD. B12 borderline low, anti-parietal is negative. I spoke with nephrology and no further work up is being requested on their end. He has worsening renal function, but slowly progressing and has good follow up with our nephrology and at [facility]. No emergent indication for dialysis. He has leukopenia which has been stable. ANC with only mild neutropenia. He can continue to follow this as an outpatient. Nephrology recommending continuing with current immunosuppressive regimen.    Given stability of hypoproliferative anemia will repeat CBC in the afternoon and if stable will discharge.    Discharge Time: 40 minutes

## 2022-01-13 NOTE — DISCHARGE NOTE PROVIDER - CARE PROVIDER_API CALL
Karena Us)  Internal Medicine; Nephrology  [address] [place] [address] [address] [place] [address]  Phone: [phone]  Fax: [phone]  Established Patient  Follow Up Time: 1 week

## 2022-01-13 NOTE — DISCHARGE NOTE NURSING/CASE MANAGEMENT/SOCIAL WORK - PATIENT PORTAL LINK FT
You can access the FollowMyHealth Patient Portal offered by [facility] by registering at the following website: [email or web address]. By joining [employer]’s FollowMyHealth portal, you will also be able to view your health information using other applications (apps) compatible with our system.

## 2022-01-13 NOTE — PROGRESS NOTE ADULT - PROBLEM SELECTOR PLAN 1
Pt. with ABRIL on CKD stage V in the setting diarrhea. Scr is elevated to 7.15 on admission. Scr was elevated to 6.04 on 12/28/21. Scr is elevated at 6.95 today. UA done today showed trace proteins. Pt. with ABRIL likely hemodynamic mediated. Received 2 unit PRBC on 1/12. Pt. non-uremic, not in vol overload. Monitor labs and urine output. Avoid any potential nephrotoxins. Dose medications as per eGFR.

## 2022-01-13 NOTE — PROGRESS NOTE ADULT - PROBLEM SELECTOR PLAN 4
- Patient presenting with 2 week history of 1-2 episodes of watery, brown diarrhea. Denies melena or hematochezia. Denies abdominal pain   - In ED: VSS afebrile, no previous history of diarrhea.  - As patient is not actively toxic-appearing and last episode prior to admission, will hold off on infectious work up at this time. If worsening diarrhea/abdominal pain, will obtain GI PCR/stool cx/stool OP. Consider CT Scan r/o colitis  - F/U: CMV PCR  - Monitor off of antibiotics resolved  - Patient presenting with 2 week history of 1-2 episodes of watery, brown diarrhea. Denies melena or hematochezia. Denies abdominal pain   - In ED: VSS afebrile, no previous history of diarrhea.  - As patient is not actively toxic-appearing and last episode prior to admission, will hold off on infectious work up at this time. If worsening diarrhea/abdominal pain, will obtain GI PCR/stool cx/stool OP. Consider CT Scan r/o colitis  - F/U: CMV PCR  - Monitor off of antibiotics

## 2022-01-13 NOTE — PROGRESS NOTE ADULT - ASSESSMENT
51 year old male with PMHx of HTN, renal transplant in 1978 c/b CKD IV (on azithioprine and prednisone), anemia (on weekly Aranesp) admitted for low Hb and ABRIL on CKD V

## 2022-01-13 NOTE — PROGRESS NOTE ADULT - PROBLEM SELECTOR PLAN 5
- C/W Atenolol, nifedipine, hydralazine  - hold Lasix in setting of recent dehydration/ABRIL - C/W Atenolol, nifedipine, hydralazine  - hold Lasix in setting of recent dehydration/ABRIL. Will restart at discharge.

## 2022-01-13 NOTE — PROGRESS NOTE ADULT - ASSESSMENT
Patient is a 51M w PMHx of HTN, renal transplant in 1978 c/b CKD IV (on cyclosporine and prednisone), anemia (on weekly Aranesp) who presents with 1 wk hx of diarrhea and abnormal labs on OP nephrology visit (Cr 6.6, Hb 6.1), admitted for blood transfusion, workup of anemia, and Transplant Nephrology evaluation.  Patient is a 51M w PMHx of HTN, renal transplant in 1978 c/b CKD IV (on cyclosporine and prednisone), anemia (on weekly Aranesp) who presents with 1 wk hx of diarrhea and abnormal labs on OP nephrology visit (Cr 6.6, Hb 6.1), admitted for blood transfusion, workup of anemia, and Transplant Nephrology evaluation, now cleared for discharge by nephrology.

## 2022-01-13 NOTE — PROGRESS NOTE ADULT - PROBLEM SELECTOR PLAN 1
- Patient with hx of progressive anemia, now with Hb of 5.9 on presentation. Hg was been steadily decreasing from baseline of 10-11 in 2020. Previously seen in ED for Hg 6.0, received PRBC. Now admitted per Transplant Nephrology. Patient has been receiving weekly Aranesp last 3 months without significant improvement. Followed by Heme/Onc.  - In ED: Hb 5.9, WBC 2.76, Reticulyte index indicative of hypoproliferation  - Previously had low-normal B12, in ED MMA/HA elevated, f/u Intrinsic Factor AB  - s/p 1 unit PRBC, follow up post-transfusion CBC tonight  - f/u iron studies  - START Folate  - START Vitamin B12  - f/u Transplant Nephrology recommendations  - Trend CBC, Goal Hb >7 - Patient with hx of progressive anemia, now with Hb of 5.9 on presentation. Hg was been steadily decreasing from baseline of 10-11 in 2020. Previously seen in ED for Hg 6.0, received PRBC. Now admitted per Transplant - Stable  Nephrology. Patient has been receiving weekly Aranesp last 3 months without significant improvement. Followed by Heme/Onc.  - In ED: Hb 5.9, WBC 2.76, Reticulyte index indicative of hypoproliferation  - Previously had low-normal B12, in ED MMA/HA elevated, f/u Intrinsic Factor AB  - s/p 2 unit PRBC  - f/u iron studies: some ACD component present  - c/w Folate  - c/w Vitamin B12  - f/u Transplant Nephrology recommendations: patient cleared for discharge, will f/u OP  - Trend CBC, Goal Hb >7

## 2022-01-13 NOTE — PROGRESS NOTE ADULT - PROBLEM SELECTOR PLAN 2
- Patient presented with Cr 7.15, appears to have baseline of 5-6 per HIE. EGFR: 8 CrCl 12  - Likely 2/2 hypovolemia in setting of recent diarrhea  - f/u FeUrea (on Lasix at home)  - s/p 1 L NS, 1 unit PRBC, monitor off fluids for now  - f/u UA  - HOLD lasix in setting of dehydration  - Trend BUN/Cr  - Renally dose all medications, Avoid Nephrotoxins, NSAID, ACR/ARB, ZACK  - hold Arenesp weekly, patient states last dose 01/06/21. Likely will continue as outpatient  - Start Na Bicarb 650mg TID in setting of metabolic acidosis from CKD - Patient presented with Cr 7.15, appears to have baseline of 5-6 per HIE. EGFR: 8 CrCl 12  - Likely 2/2 hypovolemia in setting of recent diarrhea  - f/u FeUrea (on Lasix at home): intrinsic  - s/p 1 L NS, 1 unit PRBC, monitor off fluids for now  - f/u UA  - will restart lasix upon discharge  - Trend BUN/Cr  - Renally dose all medications, Avoid Nephrotoxins, NSAID, ACR/ARB, ZACK  - hold Arenesp weekly, patient states last dose 01/06/21. Likely will continue as outpatient  - c/w Na Bicarb 650mg TID in setting of metabolic acidosis from CKD. Will continue on discharge.

## 2022-01-13 NOTE — PROGRESS NOTE ADULT - PROBLEM SELECTOR PLAN 3
- Patient with hx of renal transplant 1978 c/b CKD IV-V (on azithioprine and prednisone). Per patient, on [facility] transplant list  - Nephrology consulted, f/u recs       - Patient is on Prednisone, Azathioprine - Patient with hx of renal transplant 1978 c/b CKD IV-V (on azithioprine and prednisone). Per patient, on [facility] transplant list  - Nephrology consulted, f/u recs       - Patient is on Prednisone, Azathioprine       - Plan to f/u OP with nephrology, patient is medically optimized for discharge

## 2022-01-13 NOTE — PROGRESS NOTE ADULT - PROBLEM SELECTOR PLAN 4
Pt. is ESRD s/p LRRT in 1978, currently on azathioprine 150 mg po OD and prednisone 5 mg po OD. Continue home immunosuppressive dose.     If you have any questions, please feel free to contact me  Jose David Monae  Nephrology Fellow  [phone]  (After 5pm or on weekends please page the on-call fellow).

## 2022-01-13 NOTE — PROGRESS NOTE ADULT - SUBJECTIVE AND OBJECTIVE BOX
Patient:  KALYAN DANIELSON  [id number]    Progress Note    Interval events: No acute events.  Pertinent ROS (if any):      Administered:  sodium bicarbonate: 650 milliGRAM(s) Oral ( 05:36)  ATENolol  Tablet: 25 milliGRAM(s) Oral ( 05:36)  hydrALAZINE: 100 milliGRAM(s) Oral ( 05:35)  cholecalciferol: 1000 Unit(s) Oral ( 05:35)  azaTHIOprine: 150 milliGRAM(s) Oral ( 05:35)  NIFEdipine XL: 120 milliGRAM(s) Oral ( 05:35)  heparin   Injectable: 5000 Unit(s) SubCutaneous ( 05:34)  sodium bicarbonate: 650 milliGRAM(s) Oral ( 22:29)  hydrALAZINE: 100 milliGRAM(s) Oral ( @ 22:00)  heparin   Injectable: 5000 Unit(s) SubCutaneous ( 22:00)  atorvastatin: 20 milliGRAM(s) Oral ( 22:00)        OBJECTIVE:     @ 07: @ 07:00  --------------------------------------------------------  IN: 540 mL / OUT: 0 mL / NET: 540 mL      CAPILLARY BLOOD GLUCOSE            VITALS:  T(F): 98.2 (22 @ 05:00), Max: 98.4 (22 @ 10:06)  HR: 80 (22 @ 05:00) (50 - 80)  BP: 128/68 (22 @ 05:00) (105/54 - 129/65)  BP(mean): --  ABP: --  ABP(mean): --  RR: 18 (22 @ 05:00) (18 - 20)  SpO2: 99% (22 @ 05:00) (92% - 100%)    PHYSICAL EXAM:  GENERAL: NAD, lying in bed comfortably  HEAD:  Atraumatic, Normocephalic  EYES: EOMI, PERRLA, conjunctiva and sclera clear  ENT: Moist mucous membranes  NECK: Supple, No JVD  CHEST/LUNG: Clear to auscultation bilaterally; No rales, rhonchi, wheezing, or rubs. Unlabored respirations  HEART: Regular rate and rhythm; No murmurs, rubs, or gallops  ABDOMEN: Bowel sounds present; Soft, Nontender, Nondistended. No hepatomegaly  EXTREMITIES:  2+ Peripheral Pulses, brisk capillary refill. No clubbing, cyanosis, or edema  NERVOUS SYSTEM:  Alert & Oriented X3, speech clear. No deficits   MSK: FROM all 4 extremities, full and equal strength  SKIN: No rashes or lesions    HOSPITAL MEDICATIONS:  Standing Meds:  ATENolol  Tablet 25 milliGRAM(s) Oral every 24 hours  atorvastatin 20 milliGRAM(s) Oral at bedtime  azaTHIOprine 150 milliGRAM(s) Oral every 24 hours  cholecalciferol 1000 Unit(s) Oral every 24 hours  cyanocobalamin Injectable 1000 MICROGram(s) IntraMuscular daily  folic acid 1 milliGRAM(s) Oral daily  heparin   Injectable 5000 Unit(s) SubCutaneous every 8 hours  hydrALAZINE 100 milliGRAM(s) Oral every 8 hours  NIFEdipine  milliGRAM(s) Oral every 24 hours  predniSONE   Tablet 5 milliGRAM(s) Oral every other day  sodium bicarbonate 650 milliGRAM(s) Oral every 8 hours      PRN Meds:      LABS:  CBC 22 @ 06:41                        8.2    2.16  )-----------( 378                   25.9       Hgb trend: 8.2 <-- , 6.9 <-- , 5.9 <--   WBC trend: 2.16 <-- , 2.14 <-- , 2.26 <--       CMP 22 @ 20:20    144  |  110<H>  |  100<H>  ----------------------------<  103<H>  4.6   |  20<L>  |  6.80<H>    Ca    8.8      22 @ 20:20  Phos  5.7       Mg     2.5         TPro  6.7  /  Alb  4.1  /  TBili  0.4  /  DBili  x   /  AST  13  /  ALT  9<L>  /  AlkPhos  84        Serum Cr trend: 6.80 <-- , 7.15 <--     PT/INR - ( 2022 10:54 )   PT: 12.2 sec;   INR: 1.02 ratio         PTT - ( 2022 10:54 )  PTT:35.6 sec  Urinalysis Basic - ( 2022 20:26 )    Color: Light Yellow / Appearance: Clear / S.010 / pH: x  Gluc: x / Ketone: Negative  / Bili: Negative / Urobili: Negative   Blood: x / Protein: 30 mg/dL / Nitrite: Negative   Leuk Esterase: Negative / RBC: 1 /hpf / WBC 1 /HPF   Sq Epi: x / Non Sq Epi: 1 /hpf / Bacteria: Negative      ABG Trend:     VBG Trend:   22 @ 10:52 - pH: 7.33  | pCO2: 39    | pO2: 33    | Lactate: 1.3        MICROBIOLOGY:       RADIOLOGY:  [ ] Reviewed and interpreted by me    EKG:

## 2022-01-13 NOTE — PROGRESS NOTE ADULT - PROBLEM SELECTOR PLAN 7
DVT: Heparin subQ  Diet: Renal Diet  Dispo: Pending, f/u transplant nephrology recommendations. PT evaluation DVT: Heparin subQ  Diet: Renal Diet  Dispo: Medically optimized for discharge, cleared by Nephrology. D/C to home today.

## 2022-01-13 NOTE — PROGRESS NOTE ADULT - PROBLEM SELECTOR PLAN 1
Patient asymptomatic, found to have Hgb 6.7 on routine labs (hgb prior adm 8-11)  -Ddx: GI blood loss, folate/B12 deficiency, SE of imuran? vs AOCD; less likely CKD-related anemia as patient had normal values 12/2020  - consider Fecal occult blood sample  - Will send vitamin B12/folate, iron studies  - S/p 1U pRBCs, will transfuse to goal of Hgb > 7

## 2022-01-13 NOTE — PROGRESS NOTE ADULT - PROBLEM SELECTOR PLAN 2
Hb low on admission. Received 2 unit PRBC on 1/12. Continue with Aranesp 125 mcg IM weekly. Monitor Hb daily.

## 2022-01-13 NOTE — DISCHARGE NOTE PROVIDER - HOSPITAL COURSE
Mr. Siddiqi is a 51M w PMHx of HTN, renal transplant in 1978 c/b CKD IV (on cyclosporine and prednisone), anemia (on weekly Aranesp) who presents with 1 wk hx of diarrhea and abnormal labs on OP nephrology visit (Cr 6.6, Hb 6.1), admitted for anemia, and Transplant Nephrology evaluation. He received 2u PRBC with Hb stable at 8.6. The labs were consistent with anemia of chronic disease and hypoproliferation. In regards to her renal function, transplant nephrology evaluated, and did not recommend any other inpatient workup and patient is listed for transplant with [facility]. Patient was cleared for discharge with close outpatient follow up with Dr. Us.

## 2022-01-13 NOTE — PHYSICAL THERAPY INITIAL EVALUATION ADULT - PERTINENT HX OF CURRENT PROBLEM, REHAB EVAL
51M w PMHx of HTN, renal transplant in 1978 c/b CKD IV (on cyclosporine and prednisone), anemia (on weekly Aranesp) who presents with 1 wk hx of diarrhea and abnormal labs on OP nephrology visit (Cr 6.6, Hb 6.1), admitted for blood transfusion, workup of anemia, and Transplant Nephrology evaluation.

## 2022-01-13 NOTE — PROGRESS NOTE ADULT - SUBJECTIVE AND OBJECTIVE BOX
[facility] DIVISION OF KIDNEY DISEASES AND HYPERTENSION -- FOLLOW UP NOTE  --------------------------------------------------------------------------------  HPI: Patient is a 51 year old male with PMHx of HTN, renal transplant in 1978 c/b CKD IV (on azithioprine and prednisone), anemia (on weekly Aranesp) who presents to [facility] on 1/21/22 with 1 wk hx of diarrhea and abnormal outpatient (elevated Scr to 6.6 with Hb of 6.1). Pt. reported Hx of 1 week of 1-2 episodes of brown watery diarrhea. Transplant nephrology consulted for kidney transplant recipient management.   Scr is elevated to 7.15 on admission. Scr was elevated to 6.04 on 12/28/21. Pt. is ESRD s/p LRRT in 1978, currently on azathioprine 150 mg po OD and prednisone 5 mg po OD. Pt. reported of having watery diarrhea for 1 wk. Pt. received 2 units PRBC.   Pt. seen and examined at bedside. Denies any complaints.     PAST HISTORY  --------------------------------------------------------------------------------  No significant changes to PMH, PSH, FHx, SHx, unless otherwise noted    ALLERGIES & MEDICATIONS  --------------------------------------------------------------------------------  Allergies    sulfa (Swelling)    Intolerances    Standing Inpatient Medications  ATENolol  Tablet 25 milliGRAM(s) Oral every 24 hours  atorvastatin 20 milliGRAM(s) Oral at bedtime  azaTHIOprine 150 milliGRAM(s) Oral every 24 hours  cholecalciferol 1000 Unit(s) Oral every 24 hours  cyanocobalamin Injectable 1000 MICROGram(s) IntraMuscular daily  folic acid 1 milliGRAM(s) Oral daily  heparin   Injectable 5000 Unit(s) SubCutaneous every 8 hours  hydrALAZINE 100 milliGRAM(s) Oral every 8 hours  NIFEdipine  milliGRAM(s) Oral every 24 hours  predniSONE   Tablet 5 milliGRAM(s) Oral every other day  sodium bicarbonate 650 milliGRAM(s) Oral every 8 hours    PRN Inpatient Medications    REVIEW OF SYSTEMS  --------------------------------------------------------------------------------  Gen: No fevers/chills  Respiratory: No dyspnea, cough,   CV: No chest pain, PND, orthopnea  GI: No abdominal pain, diarrhea, constipation, nausea, vomiting  Transplant: No pain  : No increased frequency, dysuria, hematuria   MSK: No edema  Neuro: No dizziness/lightheadedness    All other systems were reviewed and are negative, except as noted.    VITALS/PHYSICAL EXAM  --------------------------------------------------------------------------------  T(C): 36.8 (01-13-22 @ 05:00), Max: 36.9 (01-12-22 @ 10:06)  HR: 80 (01-13-22 @ 05:00) (50 - 80)  BP: 128/68 (01-13-22 @ 05:00) (105/54 - 129/65)  RR: 18 (01-13-22 @ 05:00) (18 - 20)  SpO2: 99% (01-13-22 @ 05:00) (92% - 100%)  Wt(kg): --  Height (cm): 165.1 (01-12-22 @ 10:06)  Weight (kg): 68.9 (01-12-22 @ 10:06)  BMI (kg/m2): 25.3 (01-12-22 @ 10:06)  BSA (m2): 1.76 (01-12-22 @ 10:06)    01-12-22 @ 07:01  -  01-13-22 @ 07:00  --------------------------------------------------------  IN: 540 mL / OUT: 0 mL / NET: 540 mL    Physical Exam:  	Gen: NAD, well-appearing  	HEENT: MMM  	Pulm: CTA B/L, no crackles   	CV: RRR, S1S2+  	Abd: +BS, soft, nontender/nondistended              Transplant: non tender  	: No suprapubic tenderness  	MSK: no edema   	Psych: Normal affect and mood  	Skin: Warm, multiple rash all over the body    LABS/STUDIES  --------------------------------------------------------------------------------              8.2    2.16  >-----------<  378      [01-13-22 @ 06:41]              25.9     141  |  110  |  103  ----------------------------<  81      [01-13-22 @ 06:41]  4.9   |  17  |  6.95        Ca     9.1     [01-13-22 @ 06:41]      Mg     2.6     [01-13-22 @ 06:41]      Phos  5.7     [01-13-22 @ 06:41]    TPro  6.4  /  Alb  3.9  /  TBili  0.7  /  DBili  x   /  AST  14  /  ALT  8   /  AlkPhos  84  [01-13-22 @ 06:41]    PT/INR: PT 12.2 , INR 1.02       [01-12-22 @ 10:54]  PTT: 35.6       [01-12-22 @ 10:54]    Creatinine Trend:  SCr 6.95 [01-13 @ 06:41]  SCr 6.80 [01-12 @ 20:20]  SCr 7.15 [01-12 @ 10:54]  SCr 5.90 [12-29 @ 17:51]    Urinalysis - [01-12-22 @ 20:26]      Color Light Yellow / Appearance Clear / SG 1.010 / pH 6.5      Gluc Negative / Ketone Negative  / Bili Negative / Urobili Negative       Blood Negative / Protein 30 mg/dL / Leuk Est Negative / Nitrite Negative      RBC 1 / WBC 1 / Hyaline  / Gran  / Sq Epi  / Non Sq Epi 1 / Bacteria Negative    Urine Creatinine 76      [01-12-22 @ 20:26]  Urine Urea Nitrogen 478      [01-12-22 @ 23:47]    Iron 123, TIBC 169, %sat 73      [01-13-22 @ 02:31]  Ferritin 589      [01-12-22 @ 23:48]

## 2022-01-13 NOTE — DISCHARGE NOTE PROVIDER - NSDCQMERRANDS_GEN_ALL_CORE
GAVE REPORT TO NIGHTSHIFT NURSE AT BEDSIDE. PATIENT SHOWS NO SIGNS OF RESPIRATORY DISTRESS 
AT THIS TIME. PATIENT IS IN STABLE CONDITION. No

## 2022-01-13 NOTE — DISCHARGE NOTE NURSING/CASE MANAGEMENT/SOCIAL WORK - NSDCPEFALRISK_GEN_ALL_CORE
For information on Fall & Injury Prevention, visit: [email or web address] OR  [email or web address] OR  https://www.cdc.gov/steadi/patient.html

## 2022-01-14 ENCOUNTER — NON-APPOINTMENT (OUTPATIENT)
Age: 52
End: 2022-01-14

## 2022-01-14 LAB
CMV DNA CSF QL NAA+PROBE: SIGNIFICANT CHANGE UP
IF BLOCK AB SER-ACNC: 1 AU/ML — SIGNIFICANT CHANGE UP (ref 0–1.1)

## 2022-01-18 ENCOUNTER — APPOINTMENT (OUTPATIENT)
Dept: NEPHROLOGY | Facility: CLINIC | Age: 52
End: 2022-01-18
Payer: COMMERCIAL

## 2022-01-18 DIAGNOSIS — D84.9 IMMUNODEFICIENCY, UNSPECIFIED: ICD-10-CM

## 2022-01-18 DIAGNOSIS — D63.1 CHRONIC KIDNEY DISEASE, STAGE 4 (SEVERE): ICD-10-CM

## 2022-01-18 DIAGNOSIS — N18.4 CHRONIC KIDNEY DISEASE, STAGE 4 (SEVERE): ICD-10-CM

## 2022-01-18 PROCEDURE — 99214 OFFICE O/P EST MOD 30 MIN: CPT | Mod: 95

## 2022-01-18 NOTE — HISTORY OF PRESENT ILLNESS
[FreeTextEntry1] : TEB \par \par Pt went went to hospital 1/12-1/13/22 for low hb and was given 2 units PRBC; Hb went from 5.9 to 8.6; BUN/Cr 102/7.1 cr to 6.95 on discharge home 1/13\par Patient denies nausea,  sob, leg swelling, change in urine frequency or output\par Denies diarrhea or vomiting\par Denies any pain or burning with urination\par Denies black stool\par + metallic taste in mouth for past two days\par Appetite still up/down\par 128/67 BP today\par Pt checks BP at home and its 128-130s/60s-70s\par \par Med rec done\par

## 2022-01-18 NOTE — PHYSICAL EXAM
[General Appearance - Alert] : alert [Sclera] : the sclera and conjunctiva were normal [Outer Ear] : the ears and nose were normal in appearance [Neck Appearance] : the appearance of the neck was normal [] : no respiratory distress [Apical Impulse] : the apical impulse was normal [Oriented To Time, Place, And Person] : oriented to person, place, and time [Impaired Insight] : insight and judgment were intact [Affect] : the affect was normal

## 2022-01-18 NOTE — ASSESSMENT
[FreeTextEntry1] : \par Pt is a 51yoM w/ CKD iV and HTN, w/ Hx LRRT 1978 (mom was the donor), covid19 in Dec 2020 with CKD stage 4 here for follow up for his CKD 4/5, here for f/u:\par \par  \par #Hx LRRT with CAN, now with CKD V, with proteinuria stable\par - Renal transplant with likely CTG, 42 years+, uptrending cr last labs\par - no uremic symptoms currently, except for metallic taste in mouth\par - Follows up with [facility] with transplant. Sister wants to be potential donor; Told him to tell her to call [facility]\par - following w/ HT, Jie\par -last SCr progressing 6.0s-7.0s\par -pt prefers PD if it reaches the point\par -call Dr Peralta's office for appointment for PD catheter evaluation\par -Will insurance check for PD at [facility]\par \par #Hyperkalemia- advised diet low in K. Will repeat BMP; cont loklema daily\par \par #Immunosuppression\par - c/w pred 5 and Imuran 150mg\par \par #Anemia due to CKD 1V/V\par - cont Aranesp  125mcg weekly\par -Check CBC, iron studies\par \par #Hx elevated calcium on prior labs/hypercalcemia, vitamin d insufficiency\par -  on vit D supplement 1000U qD\par -Repeat vitamin D, iPTH\par \par #metabolic acidosis 2/2 advanced CKD- stable\par - c/w bicarb tabs 650 mg three tabs bid\par -check bicarb level\par \par #Benign Essential HTN -better control\par - PCP started atenolol 25mg qD in Nov, HR 55-60 on recent MD visits, hence can not increase Atenolol\par - hydralazine to 100 tid\par - c/w nifedipine 60 mg bid \par -hold off lasix\par -advised to continue to monitor BP at home, keep log, and call us if elevated\par -Low salt diet stressed \par \par  #HCM\par - prior lipids (jan 2020) LDL 51/\par - PNA vaccine 2015\par - Influenza vaccine done 10/9/19; Sept 2020, 2021\par - HBV series: Dec #1, Jan #2, June 19th #3; Hep B titres done in 12/2018\par - Will see Derm this year- needs to make appointment still. advised follow up gigi bc of multiple nevi\par left elbow soft tissue swelling- he will discuss with PMD; swelling decreasing per pt ?lipoma\par  -COVID 19 vaccine #1 on 8/12 ([employer]). COVID #2 was in Sep. Getting booster March \par \par . \par  spent more than 31 minutes with visit (25) and reviewing notes and filling work form

## 2022-01-18 NOTE — REASON FOR VISIT
[Home] : at home, [unfilled] , at the time of the visit. [Medical Office: ([facility])___] : at the medical office located in  [Verbal consent obtained from patient] : the patient, [unfilled] [Follow-Up] : a follow-up visit [FreeTextEntry1] : CKD Stage V

## 2022-01-19 ENCOUNTER — LABORATORY RESULT (OUTPATIENT)
Age: 52
End: 2022-01-19

## 2022-01-20 LAB
ALBUMIN SERPL ELPH-MCNC: 4.2 G/DL
ANION GAP SERPL CALC-SCNC: 19 MMOL/L
BASOPHILS # BLD AUTO: 0.02 K/UL
BASOPHILS NFR BLD AUTO: 0.9 %
BUN SERPL-MCNC: 110 MG/DL
CALCIUM SERPL-MCNC: 9.1 MG/DL
CHLORIDE SERPL-SCNC: 109 MMOL/L
CO2 SERPL-SCNC: 14 MMOL/L
CREAT SERPL-MCNC: 6.64 MG/DL
EOSINOPHIL # BLD AUTO: 0.04 K/UL
EOSINOPHIL NFR BLD AUTO: 1.7 %
FERRITIN SERPL-MCNC: 918 NG/ML
GLUCOSE SERPL-MCNC: 106 MG/DL
HBV SURFACE AB SER QL: REACTIVE
HBV SURFACE AB SERPL IA-ACNC: 115.3 MIU/ML
HBV SURFACE AG SER QL: NONREACTIVE
HCT VFR BLD CALC: 27.5 %
HCV AB SER QL: NONREACTIVE
HCV S/CO RATIO: 0.13 S/CO
HGB BLD-MCNC: 8.6 G/DL
IRON SATN MFR SERPL: 76 %
IRON SERPL-MCNC: 127 UG/DL
LYMPHOCYTES # BLD AUTO: 0.83 K/UL
LYMPHOCYTES NFR BLD AUTO: 33.6 %
MAN DIFF?: NORMAL
MCHC RBC-ENTMCNC: 31.3 GM/DL
MCHC RBC-ENTMCNC: 33 PG
MCV RBC AUTO: 105.4 FL
MONOCYTES # BLD AUTO: 0.06 K/UL
MONOCYTES NFR BLD AUTO: 2.6 %
NEUTROPHILS # BLD AUTO: 1.38 K/UL
NEUTROPHILS NFR BLD AUTO: 56 %
PHOSPHATE SERPL-MCNC: 6.4 MG/DL
PLATELET # BLD AUTO: 212 K/UL
POTASSIUM SERPL-SCNC: 5.5 MMOL/L
RBC # BLD: 2.61 M/UL
RBC # FLD: 16.8 %
SODIUM SERPL-SCNC: 143 MMOL/L
TIBC SERPL-MCNC: 169 UG/DL
UIBC SERPL-MCNC: 41 UG/DL
WBC # FLD AUTO: 2.47 K/UL

## 2022-01-21 LAB
M TB IFN-G BLD-IMP: ABNORMAL
QUANTIFERON TB PLUS MITOGEN MINUS NIL: -0.15 IU/ML
QUANTIFERON TB PLUS NIL: 0.46 IU/ML
QUANTIFERON TB PLUS TB1 MINUS NIL: -0.22 IU/ML
QUANTIFERON TB PLUS TB2 MINUS NIL: -0.32 IU/ML

## 2022-01-24 ENCOUNTER — APPOINTMENT (OUTPATIENT)
Dept: SURGERY | Facility: CLINIC | Age: 52
End: 2022-01-24
Payer: COMMERCIAL

## 2022-01-24 VITALS
BODY MASS INDEX: 25.95 KG/M2 | TEMPERATURE: 206.96 F | HEART RATE: 52 BPM | DIASTOLIC BLOOD PRESSURE: 70 MMHG | WEIGHT: 152 LBS | SYSTOLIC BLOOD PRESSURE: 153 MMHG | HEIGHT: 64 IN

## 2022-01-24 PROCEDURE — 99203 OFFICE O/P NEW LOW 30 MIN: CPT

## 2022-01-26 ENCOUNTER — LABORATORY RESULT (OUTPATIENT)
Age: 52
End: 2022-01-26

## 2022-02-03 ENCOUNTER — RX CHANGE (OUTPATIENT)
Age: 52
End: 2022-02-03

## 2022-02-07 LAB
ALBUMIN SERPL ELPH-MCNC: 3.9 G/DL
ANION GAP SERPL CALC-SCNC: 17 MMOL/L
BASOPHILS # BLD AUTO: 0.01 K/UL
BASOPHILS NFR BLD AUTO: 0.4 %
BUN SERPL-MCNC: 83 MG/DL
CALCIUM SERPL-MCNC: 8.9 MG/DL
CHLORIDE SERPL-SCNC: 109 MMOL/L
CO2 SERPL-SCNC: 19 MMOL/L
CREAT SERPL-MCNC: 6.13 MG/DL
EOSINOPHIL # BLD AUTO: 0.08 K/UL
EOSINOPHIL NFR BLD AUTO: 3.3 %
GLUCOSE SERPL-MCNC: 118 MG/DL
HCT VFR BLD CALC: 23.4 %
HGB BLD-MCNC: 7.4 G/DL
IMM GRANULOCYTES NFR BLD AUTO: 0.4 %
LYMPHOCYTES # BLD AUTO: 1.12 K/UL
LYMPHOCYTES NFR BLD AUTO: 46.9 %
MAN DIFF?: NORMAL
MCHC RBC-ENTMCNC: 31.6 GM/DL
MCHC RBC-ENTMCNC: 32.6 PG
MCV RBC AUTO: 103.1 FL
MONOCYTES # BLD AUTO: 0.09 K/UL
MONOCYTES NFR BLD AUTO: 3.8 %
NEUTROPHILS # BLD AUTO: 1.08 K/UL
NEUTROPHILS NFR BLD AUTO: 45.2 %
PHOSPHATE SERPL-MCNC: 4.5 MG/DL
PLATELET # BLD AUTO: 178 K/UL
POTASSIUM SERPL-SCNC: 4.6 MMOL/L
RBC # BLD: 2.27 M/UL
RBC # FLD: 16.4 %
SODIUM SERPL-SCNC: 145 MMOL/L
WBC # FLD AUTO: 2.39 K/UL

## 2022-02-08 ENCOUNTER — NON-APPOINTMENT (OUTPATIENT)
Age: 52
End: 2022-02-08

## 2022-02-08 ENCOUNTER — LABORATORY RESULT (OUTPATIENT)
Age: 52
End: 2022-02-08

## 2022-02-09 ENCOUNTER — EMERGENCY (EMERGENCY)
Facility: HOSPITAL | Age: 52
LOS: 1 days | Discharge: HOME | End: 2022-02-09
Attending: STUDENT IN AN ORGANIZED HEALTH CARE EDUCATION/TRAINING PROGRAM
Payer: COMMERCIAL

## 2022-02-09 VITALS
RESPIRATION RATE: 17 BRPM | OXYGEN SATURATION: 98 % | DIASTOLIC BLOOD PRESSURE: 52 MMHG | TEMPERATURE: 98 F | HEIGHT: 65 IN | WEIGHT: 151.9 LBS | HEART RATE: 103 BPM | SYSTOLIC BLOOD PRESSURE: 109 MMHG

## 2022-02-09 DIAGNOSIS — Z94.0 KIDNEY TRANSPLANT STATUS: Chronic | ICD-10-CM

## 2022-02-09 LAB
ALBUMIN SERPL ELPH-MCNC: 3.8 G/DL — SIGNIFICANT CHANGE UP (ref 3.3–5)
ALP SERPL-CCNC: 81 U/L — SIGNIFICANT CHANGE UP (ref 40–120)
ALT FLD-CCNC: 14 U/L — SIGNIFICANT CHANGE UP (ref 10–45)
ANION GAP SERPL CALC-SCNC: 15 MMOL/L — SIGNIFICANT CHANGE UP (ref 5–17)
ANISOCYTOSIS BLD QL: SLIGHT — SIGNIFICANT CHANGE UP
AST SERPL-CCNC: 13 U/L — SIGNIFICANT CHANGE UP (ref 10–40)
BASOPHILS # BLD AUTO: 0 K/UL — SIGNIFICANT CHANGE UP (ref 0–0.2)
BASOPHILS NFR BLD AUTO: 0 % — SIGNIFICANT CHANGE UP (ref 0–2)
BILIRUB SERPL-MCNC: 0.4 MG/DL — SIGNIFICANT CHANGE UP (ref 0.2–1.2)
BLD GP AB SCN SERPL QL: NEGATIVE — SIGNIFICANT CHANGE UP
BUN SERPL-MCNC: 85 MG/DL — HIGH (ref 7–23)
CALCIUM SERPL-MCNC: 9.5 MG/DL — SIGNIFICANT CHANGE UP (ref 8.4–10.5)
CHLORIDE SERPL-SCNC: 107 MMOL/L — SIGNIFICANT CHANGE UP (ref 96–108)
CO2 SERPL-SCNC: 21 MMOL/L — LOW (ref 22–31)
CREAT SERPL-MCNC: 6.51 MG/DL — HIGH (ref 0.5–1.3)
DACRYOCYTES BLD QL SMEAR: SLIGHT — SIGNIFICANT CHANGE UP
ELLIPTOCYTES BLD QL SMEAR: SLIGHT — SIGNIFICANT CHANGE UP
EOSINOPHIL # BLD AUTO: 0.04 K/UL — SIGNIFICANT CHANGE UP (ref 0–0.5)
EOSINOPHIL NFR BLD AUTO: 2 % — SIGNIFICANT CHANGE UP (ref 0–6)
GLUCOSE SERPL-MCNC: 111 MG/DL — HIGH (ref 70–99)
HCT VFR BLD CALC: 17 % — CRITICAL LOW (ref 39–50)
HCT VFR BLD CALC: 23.9 % — LOW (ref 39–50)
HGB BLD-MCNC: 5.4 G/DL — CRITICAL LOW (ref 13–17)
HGB BLD-MCNC: 7.6 G/DL — LOW (ref 13–17)
HYPOCHROMIA BLD QL: SLIGHT — SIGNIFICANT CHANGE UP
LYMPHOCYTES # BLD AUTO: 0.84 K/UL — LOW (ref 1–3.3)
LYMPHOCYTES # BLD AUTO: 48 % — HIGH (ref 13–44)
MACROCYTES BLD QL: SLIGHT — SIGNIFICANT CHANGE UP
MANUAL SMEAR VERIFICATION: SIGNIFICANT CHANGE UP
MCHC RBC-ENTMCNC: 30.6 PG — SIGNIFICANT CHANGE UP (ref 27–34)
MCHC RBC-ENTMCNC: 31.8 GM/DL — LOW (ref 32–36)
MCHC RBC-ENTMCNC: 31.8 GM/DL — LOW (ref 32–36)
MCHC RBC-ENTMCNC: 33.8 PG — SIGNIFICANT CHANGE UP (ref 27–34)
MCV RBC AUTO: 106.3 FL — HIGH (ref 80–100)
MCV RBC AUTO: 96.4 FL — SIGNIFICANT CHANGE UP (ref 80–100)
MONOCYTES # BLD AUTO: 0.05 K/UL — SIGNIFICANT CHANGE UP (ref 0–0.9)
MONOCYTES NFR BLD AUTO: 3 % — SIGNIFICANT CHANGE UP (ref 2–14)
NEUTROPHILS # BLD AUTO: 0.81 K/UL — LOW (ref 1.8–7.4)
NEUTROPHILS NFR BLD AUTO: 46 % — SIGNIFICANT CHANGE UP (ref 43–77)
NRBC # BLD: 0 /100 — SIGNIFICANT CHANGE UP (ref 0–0)
OB PNL STL: NEGATIVE — SIGNIFICANT CHANGE UP
OVALOCYTES BLD QL SMEAR: SLIGHT — SIGNIFICANT CHANGE UP
PLAT MORPH BLD: NORMAL — SIGNIFICANT CHANGE UP
PLATELET # BLD AUTO: 337 K/UL — SIGNIFICANT CHANGE UP (ref 150–400)
PLATELET # BLD AUTO: 359 K/UL — SIGNIFICANT CHANGE UP (ref 150–400)
POIKILOCYTOSIS BLD QL AUTO: SLIGHT — SIGNIFICANT CHANGE UP
POTASSIUM SERPL-MCNC: 5.3 MMOL/L — SIGNIFICANT CHANGE UP (ref 3.5–5.3)
POTASSIUM SERPL-SCNC: 5.3 MMOL/L — SIGNIFICANT CHANGE UP (ref 3.5–5.3)
PROMYELOCYTES # FLD: 1 % — HIGH (ref 0–0)
PROT SERPL-MCNC: 6.5 G/DL — SIGNIFICANT CHANGE UP (ref 6–8.3)
RBC # BLD: 1.6 M/UL — LOW (ref 4.2–5.8)
RBC # BLD: 2.48 M/UL — LOW (ref 4.2–5.8)
RBC # FLD: 16.8 % — HIGH (ref 10.3–14.5)
RBC # FLD: 20.1 % — HIGH (ref 10.3–14.5)
RBC BLD AUTO: ABNORMAL
RH IG SCN BLD-IMP: POSITIVE — SIGNIFICANT CHANGE UP
SARS-COV-2 RNA SPEC QL NAA+PROBE: SIGNIFICANT CHANGE UP
SODIUM SERPL-SCNC: 143 MMOL/L — SIGNIFICANT CHANGE UP (ref 135–145)
WBC # BLD: 1.73 K/UL — LOW (ref 3.8–10.5)
WBC # BLD: 1.75 K/UL — LOW (ref 3.8–10.5)
WBC # FLD AUTO: 1.73 K/UL — LOW (ref 3.8–10.5)
WBC # FLD AUTO: 1.75 K/UL — LOW (ref 3.8–10.5)

## 2022-02-09 PROCEDURE — 99220: CPT

## 2022-02-09 PROCEDURE — 93010 ELECTROCARDIOGRAM REPORT: CPT

## 2022-02-09 RX ORDER — AZATHIOPRINE 100 MG/1
100 TABLET ORAL ONCE
Refills: 0 | Status: COMPLETED | OUTPATIENT
Start: 2022-02-09 | End: 2022-02-10

## 2022-02-09 RX ORDER — SODIUM ZIRCONIUM CYCLOSILICATE 10 G/10G
10 POWDER, FOR SUSPENSION ORAL ONCE
Refills: 0 | Status: COMPLETED | OUTPATIENT
Start: 2022-02-09 | End: 2022-02-10

## 2022-02-09 RX ORDER — AZATHIOPRINE 100 MG/1
50 TABLET ORAL ONCE
Refills: 0 | Status: COMPLETED | OUTPATIENT
Start: 2022-02-09 | End: 2022-02-09

## 2022-02-09 RX ADMIN — AZATHIOPRINE 50 MILLIGRAM(S): 100 TABLET ORAL at 23:37

## 2022-02-09 NOTE — ED CDU PROVIDER INITIAL DAY NOTE - PHYSICAL EXAMINATION
CONSTITUTIONAL: Well appearing and in no apparent distress.  ENT: Airway patent, moist mucous membranes.   EYES: Pupils equal, round and reactive to light. EOMI. Conjunctiva normal appearing.   CARDIAC: Normal rate, regular rhythm.  Heart sounds S1, S2.    RESPIRATORY: Breath sounds clear and equal bilaterally.   GASTROINTESTINAL: Abdomen soft, non-tender, not distended.  MUSCULOSKELETAL: Spine appears normal.  NEUROLOGICAL: Alert and oriented x3, no focal deficits, no motor or sensory deficits. 5/5 muscle strength throughout.  SKIN: Skin normal color, warm, dry and intact.   PSYCHIATRIC: Normal mood and affect.

## 2022-02-09 NOTE — ED PROVIDER NOTE - PHYSICAL EXAMINATION
CONSTITUTIONAL: Well appearing and in no apparent distress.  ENT: Airway patent, moist mucous membranes.   EYES: Pupils equal, round and reactive to light. EOMI. Conjunctiva normal appearing.   CARDIAC: Normal rate, regular rhythm.  Heart sounds S1, S2.    RESPIRATORY: Breath sounds clear and equal bilaterally.   GASTROINTESTINAL: Abdomen soft, non-tender, not distended.  RECTAL: Brown stool. No melena. No blood noted. Nurse Lewis chaperoned.  MUSCULOSKELETAL: Spine appears normal.  NEUROLOGICAL: Alert and oriented x3, no focal deficits, no motor or sensory deficits. 5/5 muscle strength throughout.  SKIN: Skin normal color, warm, dry and intact.   PSYCHIATRIC: Normal mood and affect.

## 2022-02-09 NOTE — ED ADULT NURSE REASSESSMENT NOTE - NS ED NURSE REASSESS COMMENT FT1
Fransisca BETANCUR break coverage [id number]: PRBC transfusion started by Chiki BETANCUR at 1228. Post 15min vital signs stable. Pt tolerating well. Pending CDU acceptance.
Notified by tele room that pt rhythm changed. CHESTER Dodd aware. EKG done at bedside. Pacer pads placed on pt. Pt denies any symptoms and is feeling well.
Patient received with one unit PRBC infusing. Transfusion ended 1615 PM. Patient tolerated well without hypersensitivity noted. Second unit PRBC started at 1645 PM. VS stable except patient's heart rate is 43-50 sinus bradycardia. Patient denies c/o dizziness or nausea. Instructed to notify nursing staff for any sensation of increased, adverse reaction. Patient tolerating transfusion well. Positioned for optimal comfort in bed. Will continue to frequently reassess.
Pt is a&ox3. breathing at ease. No difficulty breathing noted. pt's written consent form verified. 1st PRBC started as per order. Will continue to monitor.
Report received from PIPE Monet. Pt accepted to CDU. 1 unit PRBC transfusing, pt pending second unit. Consent and blood sheet in chart. Pt reports no complaints at this time. Aware of s/s of transfusion rxn to notify RN for. Pt oriented to unit, call bell w/in reach, bed locked and in lowest position.
Pt received from PIPE Oneill. Pt oriented to CDU & plan of care was discussed. Pt received receiving 2nd U of PRBC. Pt aware of S&S of transfusion reaction and will notify RN or PA of any symptoms. Pt denies any dizziness, weakness, fatigue. SOB. Safety & comfort measures maintained. Call bell in reach. Will continue to monitor.

## 2022-02-09 NOTE — ED CDU PROVIDER INITIAL DAY NOTE - MEDICAL DECISION MAKING DETAILS
Attending Eduardo: 52 y/o M w/ PMH of kidney transplant, HTN, CKD, anemia, HLD presenting w/ anemia. Seen in [place]. Was at nephrologist office yesterday for routine follow up. Had blood work done and was called and informed his Hb was 5.2. Pt is denying any complaints at this time, feels in usual state of health. Denies SOB, dyspnea on exertion. Denies bloody vomit, urine, or stool. Denies AC use. Has require transfusions in the past. Does take Epo injections. Pt well appearing on exam, no acute distress. Lungs clear. HR regular. Abd nondistended/soft/nontender. Pt w/ reported worsening anemia on outpatient labs, asymptomatic at this time. Will check labs now and expect pt will require transfusion. Likely CDU candidate for x2 PRBC transfusion. Will reassess the need for additional interventions as clinically warranted. >>> In main ED pt found to be anemic to 5.4. PRBC transfusion initiated and pt accepted to CDU for further transfusion, repeat labs, vital monitoring. Will reassess the need for additional interventions as clinically warranted.

## 2022-02-09 NOTE — ED PROVIDER NOTE - ATTENDING CONTRIBUTION TO CARE
Attending Eduardo: I performed a history and physical exam of the patient and discussed their management with the resident/fellow/ACP/student. I have reviewed the resident/fellow/ACP/student note and agree with the documented findings and plan of care, except as noted. I have personally performed a substantive portion of the visit including all aspects of the medical decision making. My medical decision making and observations are found above. Please refer to any progress notes for updates on clinical course.

## 2022-02-09 NOTE — ED ADULT NURSE NOTE - ED STAT RN HANDOFF DETAILS
Pt transferred to CDU from [facility]. pt endorsed to PIPE Jimenez for continuity of care. Pt's 1st PRBC still in progress. No acute event noted.

## 2022-02-09 NOTE — ED ADULT NURSE NOTE - OBJECTIVE STATEMENT
pt presented to ed with c/o low hemoglobin of 5.2. pt reports that he had a lab test done yesterday and was told by PMD today to come to ED for blood transfusion. pt denies nausea, vomiting, fever, chills, sob, chest pain, headache, dizziness, bloody in urine/stool, generalized weakness. Will continue to monitor.

## 2022-02-09 NOTE — ED PROVIDER NOTE - CLINICAL SUMMARY MEDICAL DECISION MAKING FREE TEXT BOX
Attending Eduardo: 52 y/o M w/ PMH of kidney transplant, HTN, CKD, anemia, HLD presenting w/ anemia. Seen in gold. Was at nephrologist office yesterday for routine follow up. Had blood work done and was called and informed his Hb was 5.2. Pt is denying any complaints at this time, feels in usual state of health. Denies SOB, dyspnea on exertion. Denies bloody vomit, urine, or stool. Denies AC use. Has require transfusions in the past. Does take Epo injections. Pt well appearing on exam, no acute distress. Lungs clear. HR regular. Abd nondistended/soft/nontender. Pt w/ reported worsening anemia on outpatient labs, asymptomatic at this time. Will check labs now and expect pt will require transfusion. Likely CDU candidate for x2 PRBC transfusion. Will reassess the need for additional interventions as clinically warranted. Attending Eduardo: 50 y/o M w/ PMH of kidney transplant, HTN, CKD, anemia, HLD presenting w/ anemia. Seen in [place]. Was at nephrologist office yesterday for routine follow up. Had blood work done and was called and informed his Hb was 5.2. Pt is denying any complaints at this time, feels in usual state of health. Denies SOB, dyspnea on exertion. Denies bloody vomit, urine, or stool. Denies AC use. Has require transfusions in the past. Does take Epo injections. Pt well appearing on exam, no acute distress. Lungs clear. HR regular. Abd nondistended/soft/nontender. Pt w/ reported worsening anemia on outpatient labs, asymptomatic at this time. Will check labs now and expect pt will require transfusion. Likely CDU candidate for x2 PRBC transfusion. Will reassess the need for additional interventions as clinically warranted.    asymptomatic anemia  transfuse to baseline  f/u with nephrology  reassess

## 2022-02-09 NOTE — ED CDU PROVIDER INITIAL DAY NOTE - ATTENDING CONTRIBUTION TO CARE
Attending Eduardo: I have personally performed a face to face diagnostic evaluation on this patient and performed a substantive portion of the visit including all aspects of the medical decision making. I have reviewed the ACP note and agree with the history, exam, and plan of care, except as noted. My medical decision making and observations are found above.

## 2022-02-09 NOTE — ED PROVIDER NOTE - OBJECTIVE STATEMENT
50 yo M with a PMH of HTN, renal transplant in 1978 c/b CKD IV (on cyclosporine and prednisone), anemia (on weekly Aranesp) presents for low Hgb. Pt states he went to a routine f/u with nephro yesterday, had labs drawn and was called today to come to ED for transfusion. Pt states he has had 3 transfusions so far. Asymptomatic, denies any complaints. Denies nausea, vomiting, fever, chills, chest pain, SOB, VILLEGAS, cough, palpitations, abd pain, blood in stool, black stool, weakness, lightheadedness, dizziness, syncope. No AC or ASA use.

## 2022-02-09 NOTE — ED CDU PROVIDER INITIAL DAY NOTE - PROGRESS NOTE DETAILS
Patient signed out to myself. I was told that ED team spoke with nephology and agreed with transfusion then discharge home to follow up outpaitent. Patient seen at bedside, NAD, no complaints. Vitals stable. HR in the 40s sinus elva. Pending repeat CBC. - Ju Mahmood PA-C Call from tele room, concern about 2nd degrees AV block type 1. Repeat EKG (sinus elva) and tele strips discussed with Dr. Smart, concern about 2nd degree type 1 v. possible 3rd degree- recommending placing pads with cardiology consult. Patient in NAD, no complaints. States that he has never been told that he may have this in the past. Takes Atenolol for BP and states that heart rate is low because of it. Cardiologist: At [facility]. Spoke with house cardiology, consult placed. - Ju Mahmood PA-C Repeat H/H 7.6/23.9. Spoke with nephrology. Does not recommend 3rd unit at this time. - NOELLE HoltC Call from tele room, concern about episodes of 2nd degree AV block type 1. Repeat EKG (sinus elva) and tele strips discussed with Dr. Smart, concern about 2nd degree type 1 v. possible 3rd degree- recommending placing pads with cardiology consult. Patient in NAD, no complaints. States that he has never been told that he may have this in the past. Takes Atenolol for BP and states that heart rate is low because of it. Cardiologist: At [facility]. Spoke with house cardiology, consult placed. - Ju Mahmood PA-C Patient signed out to myself. I was told that ED team spoke with nephology and agreed with transfusion then discharge home to follow up outpatient. Patient seen at bedside, NAD, no complaints. Vitals stable. HR in the 40s sinus elva. Pending repeat CBC. - Ju Mahmood PA-C

## 2022-02-09 NOTE — ED ADULT NURSE NOTE - NSIMPLEMENTINTERV_GEN_ALL_ED
Implemented All Universal Safety Interventions:  [place] to call system. Call bell, personal items and telephone within reach. Instruct patient to call for assistance. Room bathroom lighting operational. Non-slip footwear when patient is off stretcher. Physically safe environment: no spills, clutter or unnecessary equipment. Stretcher in lowest position, wheels locked, appropriate side rails in place.

## 2022-02-10 VITALS
OXYGEN SATURATION: 100 % | SYSTOLIC BLOOD PRESSURE: 157 MMHG | RESPIRATION RATE: 18 BRPM | HEART RATE: 48 BPM | DIASTOLIC BLOOD PRESSURE: 76 MMHG

## 2022-02-10 LAB
ANION GAP SERPL CALC-SCNC: 13 MMOL/L — SIGNIFICANT CHANGE UP (ref 5–17)
BASOPHILS # BLD AUTO: 0.01 K/UL — SIGNIFICANT CHANGE UP (ref 0–0.2)
BASOPHILS NFR BLD AUTO: 0.9 % — SIGNIFICANT CHANGE UP (ref 0–2)
BUN SERPL-MCNC: 84 MG/DL — HIGH (ref 7–23)
CALCIUM SERPL-MCNC: 9.2 MG/DL — SIGNIFICANT CHANGE UP (ref 8.4–10.5)
CHLORIDE SERPL-SCNC: 111 MMOL/L — HIGH (ref 96–108)
CO2 SERPL-SCNC: 19 MMOL/L — LOW (ref 22–31)
CREAT SERPL-MCNC: 6.29 MG/DL — HIGH (ref 0.5–1.3)
EOSINOPHIL # BLD AUTO: 0.1 K/UL — SIGNIFICANT CHANGE UP (ref 0–0.5)
EOSINOPHIL NFR BLD AUTO: 6.1 % — HIGH (ref 0–6)
GIANT PLATELETS BLD QL SMEAR: PRESENT — SIGNIFICANT CHANGE UP
GLUCOSE SERPL-MCNC: 84 MG/DL — SIGNIFICANT CHANGE UP (ref 70–99)
HCT VFR BLD CALC: 23.9 % — LOW (ref 39–50)
HGB BLD-MCNC: 7.6 G/DL — LOW (ref 13–17)
LYMPHOCYTES # BLD AUTO: 0.5 K/UL — LOW (ref 1–3.3)
LYMPHOCYTES # BLD AUTO: 30.7 % — SIGNIFICANT CHANGE UP (ref 13–44)
MAGNESIUM SERPL-MCNC: 2.2 MG/DL — SIGNIFICANT CHANGE UP (ref 1.6–2.6)
MANUAL SMEAR VERIFICATION: SIGNIFICANT CHANGE UP
MCHC RBC-ENTMCNC: 31.4 PG — SIGNIFICANT CHANGE UP (ref 27–34)
MCHC RBC-ENTMCNC: 31.8 GM/DL — LOW (ref 32–36)
MCV RBC AUTO: 98.8 FL — SIGNIFICANT CHANGE UP (ref 80–100)
MONOCYTES # BLD AUTO: 0.03 K/UL — SIGNIFICANT CHANGE UP (ref 0–0.9)
MONOCYTES NFR BLD AUTO: 1.8 % — LOW (ref 2–14)
MYELOCYTES NFR BLD: 1.8 % — HIGH (ref 0–0)
NEUTROPHILS # BLD AUTO: 0.73 K/UL — LOW (ref 1.8–7.4)
NEUTROPHILS NFR BLD AUTO: 44.7 % — SIGNIFICANT CHANGE UP (ref 43–77)
NRBC # BLD: 0 /100 WBCS — SIGNIFICANT CHANGE UP (ref 0–0)
PLAT MORPH BLD: ABNORMAL
PLATELET # BLD AUTO: 328 K/UL — SIGNIFICANT CHANGE UP (ref 150–400)
POTASSIUM SERPL-MCNC: 5.7 MMOL/L — HIGH (ref 3.5–5.3)
POTASSIUM SERPL-SCNC: 5.7 MMOL/L — HIGH (ref 3.5–5.3)
RBC # BLD: 2.42 M/UL — LOW (ref 4.2–5.8)
RBC # FLD: 20.6 % — HIGH (ref 10.3–14.5)
RBC BLD AUTO: SIGNIFICANT CHANGE UP
SODIUM SERPL-SCNC: 143 MMOL/L — SIGNIFICANT CHANGE UP (ref 135–145)
TSH SERPL-MCNC: 2.98 UIU/ML — SIGNIFICANT CHANGE UP (ref 0.27–4.2)
VARIANT LYMPHS # BLD: 14 % — HIGH (ref 0–6)
WBC # BLD: 1.64 K/UL — LOW (ref 3.8–10.5)
WBC # FLD AUTO: 1.64 K/UL — LOW (ref 3.8–10.5)

## 2022-02-10 PROCEDURE — 36430 TRANSFUSION BLD/BLD COMPNT: CPT

## 2022-02-10 PROCEDURE — U0005: CPT

## 2022-02-10 PROCEDURE — 85025 COMPLETE CBC W/AUTO DIFF WBC: CPT

## 2022-02-10 PROCEDURE — 84443 ASSAY THYROID STIM HORMONE: CPT

## 2022-02-10 PROCEDURE — P9016: CPT

## 2022-02-10 PROCEDURE — 86850 RBC ANTIBODY SCREEN: CPT

## 2022-02-10 PROCEDURE — 83735 ASSAY OF MAGNESIUM: CPT

## 2022-02-10 PROCEDURE — 80053 COMPREHEN METABOLIC PANEL: CPT

## 2022-02-10 PROCEDURE — 86900 BLOOD TYPING SEROLOGIC ABO: CPT

## 2022-02-10 PROCEDURE — 86923 COMPATIBILITY TEST ELECTRIC: CPT

## 2022-02-10 PROCEDURE — 82272 OCCULT BLD FECES 1-3 TESTS: CPT

## 2022-02-10 PROCEDURE — 99222 1ST HOSP IP/OBS MODERATE 55: CPT

## 2022-02-10 PROCEDURE — U0003: CPT

## 2022-02-10 PROCEDURE — G0378: CPT

## 2022-02-10 PROCEDURE — 36415 COLL VENOUS BLD VENIPUNCTURE: CPT

## 2022-02-10 PROCEDURE — 86901 BLOOD TYPING SEROLOGIC RH(D): CPT

## 2022-02-10 PROCEDURE — 93005 ELECTROCARDIOGRAM TRACING: CPT

## 2022-02-10 PROCEDURE — 99283 EMERGENCY DEPT VISIT LOW MDM: CPT | Mod: 25

## 2022-02-10 PROCEDURE — 80048 BASIC METABOLIC PNL TOTAL CA: CPT

## 2022-02-10 PROCEDURE — 99217: CPT

## 2022-02-10 RX ADMIN — AZATHIOPRINE 100 MILLIGRAM(S): 100 TABLET ORAL at 00:07

## 2022-02-10 RX ADMIN — SODIUM ZIRCONIUM CYCLOSILICATE 10 GRAM(S): 10 POWDER, FOR SUSPENSION ORAL at 02:15

## 2022-02-10 NOTE — ED CDU PROVIDER DISPOSITION NOTE - CLINICAL COURSE
50 yo M with a PMH of HTN, renal transplant in 1978 c/b CKD IV (on cyclosporine and prednisone), anemia (on weekly Aranesp) presents for low Hgb. Pt states he went to a routine f/u with nephro yesterday, had labs drawn and was called today to come to ED for transfusion. Pt states he has had 3 transfusions so far. Asymptomatic, denies any complaints. Denies nausea, vomiting, fever, chills, chest pain, SOB, VILLEGAS, cough, palpitations, abd pain, blood in stool, black stool, weakness, lightheadedness, dizziness, syncope. No AC or ASA use.  ED course: H/H 5.4/17. Plan for two units PRBC and repeat CBC in CDU. In CDU, repeat H/H 7.6/23.9. 50 yo M with a PMH of HTN, renal transplant in 1978 c/b CKD IV (on cyclosporine and prednisone), anemia (on weekly Aranesp) presents for low Hgb. Pt states he went to a routine f/u with nephro yesterday, had labs drawn and was called today to come to ED for transfusion. Pt states he has had 3 transfusions so far. Asymptomatic, denies any complaints. Denies nausea, vomiting, fever, chills, chest pain, SOB, VILLEGAS, cough, palpitations, abd pain, blood in stool, black stool, weakness, lightheadedness, dizziness, syncope. No AC or ASA use.  ED course: H/H 5.4/17. Plan for two units PRBC and repeat CBC in CDU. In CDU, repeat H/H 7.6/23.9.  In the CDU Tele showing sinus elva with occasional mobitz one.  Patient asymptomatic.  EP recommending no further intervention.  Patient tolerated transfusions well.  Hgb 7.6 and stable.  Will have patient hold atenolol and DC home with close outpatient nephrology and cardiology follow up.  Strict return precautions provided.

## 2022-02-10 NOTE — ED CDU PROVIDER SUBSEQUENT DAY NOTE - PROGRESS NOTE DETAILS
HR mid 30s-low 40s, sinus elva. Call from tele room-lowest 29 not sustained. Patient has had no complaints overnight. In NAD. BP stable.   Will repeat electrolytes. Discussed with Dr. García, who recommends admission and obtaining TSH. Patient at this time refusing admission, although OK with continued monitoring in CDU. Will monitor closely. - Ju Mahmood PA-C herbert pt found to have asymptomatic elva - seen by ep this am - isolated mobitz 1 on monitor otherwise sinus elva -- possible hold bblocker- will continue to fu Patient seen at bedside in NAD.  VSS.  Patient resting comfortably without complaints. Tele showing sinus elva with occasional mobitz one.  Patient asymptomatic.  EP recommending no further intervention.  Patient tolerated transfusions well.  Hgb 7.6 and stable.  Will have patient hold atenolol and DC home with close outpatient nephrology and cardiology follow up.  Strict return precautions provided.  -Maldonado Penn PA-C

## 2022-02-10 NOTE — ED CDU PROVIDER SUBSEQUENT DAY NOTE - PHYSICAL EXAMINATION
CONSTITUTIONAL: Well appearing and in no apparent distress.  	ENT: Airway patent, moist mucous membranes.   	EYES: Conjunctiva normal appearing.   	CARDIAC: Bradycardic, regular rhythm.  Heart sounds S1, S2.    	RESPIRATORY: Breath sounds clear and equal bilaterally.   	GASTROINTESTINAL: Abdomen soft, non-tender, not distended.  	MUSCULOSKELETAL: Moving extremities freely   	NEUROLOGICAL: Alert and oriented x3, no focal deficits, steady gait    	SKIN: Skin normal color, warm, no visible rashes   PSYCHIATRIC: Normal mood and affect.

## 2022-02-10 NOTE — CONSULT NOTE ADULT - SUBJECTIVE AND OBJECTIVE BOX
Patient seen and evaluated at bedside  Consult question: bradycardia    Brief HPI:  Mr Siddiqi is a 52 y/o male w/ HTN, ESRD s/p renal transplant (1970s) c/b CKD IV (on cyclosporin and prednisone), anemia with weekly Aranesp, presenting to the hospital from nephrology clinic with Anemia requiring transfusion.  Patient remained asymptomatic, yet when patient went to routine follow up appointment with his nephrologist, blood test was done, and he was called to go to the hospital for pRBC transfusion.     While in CDU, patient was found to have asymptomatic bradycardia on telemetry and EP consulted for heart block.  Upon examination, patient remains asymptomatic. Patient denies CP, palpitation, SOB, visaul disturbance, dizziness, lightheadedness, abdominal pain, n/v/d/c, dysuria/hematuria.   Cardiac FHx significant for father passing away with MI at age of 61. No toxic habits.  He reports taking atenolol at home and has baseline heart rate of 40-60s. Patietn reports no syncopal episode or symptoms from slow heart rate in the past.    PMH:   Renal transplant recipient    Hypertension      PSH:   History of renal transplant      Medications:     Allergies:  sulfa (Swelling)    FAMILY HISTORY:  FH: kidney disease    FH: esophageal cancer  Uncle,  in 7th decade      Social History:  Smoking: na  Alcohol: na   Drugs: na    Review of Systems:  Constitutional: [ ] Fever [ ] Chills [ ] Fatigue [ ] Weight change   HEENT: [ ] Blurred vision [ ] Eye Pain [ ] Headache [ ] Runny nose [ ] Sore Throat   Respiratory: [ ] Cough [ ] Wheezing [ ] Shortness of breath  Cardiovascular: [ ] Chest Pain [ ] Palpitations [ ] VILLEGAS [ ] PND [ ] Orthopnea  Gastrointestinal: [ ] Abdominal Pain [ ] Diarrhea [ ] Constipation [ ] Hemorrhoids [ ] Nausea [ ] Vomiting  Genitourinary: [ ] Nocturia [ ] Dysuria [ ] Incontinence  Extremities: [ ] Swelling [ ] Joint Pain  Neurologic: [ ] Focal deficit [ ] Paresthesias [ ] Syncope  Lymphatic: [ ] Swelling [ ] Lymphadenopathy   Skin: [ ] Rash [ ] Ecchymoses [ ] Wounds [ ] Lesions  Psychiatry: [ ] Depression [ ] Suicidal/Homicidal Ideation [ ] Anxiety [ ] Sleep Disturbances  [x ] 10 point review of systems is otherwise negative except as mentioned above            [ ]Unable to obtain    Physical Exam:  T(C): 36.4 (02-10-22 @ 04:00), Max: 37.2 (22 @ 11:05)  HR: 48 (02-10-22 @ 04:00) (45 - 103)  BP: 167/78 (02-10-22 @ 04:00) (109/52 - 167/78)  RR: 18 (02-10-22 @ 04:00) (16 - 18)  SpO2: 98% (02-10-22 @ 04:00) (96% - 100%)  Wt(kg): --     @ 07:01  -  02-10 @ 05:00  --------------------------------------------------------  IN: 300 mL / OUT: 0 mL / NET: 300 mL      Daily Height in cm: 165.1 (2022 09:10)    Daily     Constitutional: NAD.   HEENT: AT/NC, EOMI, Supple neck;  Respiratory: no wheezing or crackles. no increase in WOB  Cardiovascular: RRR, S1, S2, no M/R/G. 2+ distal pulses. no JVD, no LE edema.  Gastrointestinal: soft; NT/ND, +BS  Extremities: no cyanosis; non-tender to palpation, DP and Radial pulses intact.  Neurological: A&Ox 3;  Psychiatric: normal mood/affect.        Labs:                        7.6    1.73  )-----------( 337      ( 2022 22:32 )             23.9         143  |  107  |  85<H>  ----------------------------<  111<H>  5.3   |  21<L>  |  6.51<H>    Ca    9.5      2022 10:11    TPro  6.5  /  Alb  3.8  /  TBili  0.4  /  DBili  x   /  AST  13  /  ALT  14  /  AlkPhos  81

## 2022-02-10 NOTE — ED CDU PROVIDER DISPOSITION NOTE - NSFOLLOWUPINSTRUCTIONS_ED_ALL_ED_FT
Hydrate.     Please stop taking Atenolol at this time and discuss further use with your providers. Your heart rate was very low and this medication could be contributing to this.      We recommend you follow up with your primary care provider/cardiologist within the next 2-3 days, please bring all of your results with you. You can also follow up with your nephrologist, please call today to make a follow up appointment.     Please return to the Emergency Department with new, worsening, or concerning symptoms, such as:  -Shortness of breath or trouble breathing  -Syncope, dizziness   -Pressure, pain, tightness in chest  -Facial drooping, arm weakness, or speech difficulty   -Head injury or loss of consciousness   -Nonstop bleeding or an open wound     *More detailed information regarding your visit and discharge can be found by reviewing this packet Hydrate.     Please stop taking Atenolol at this time and discuss further use with your providers. Your heart rate was very low and this medication could be contributing to this.     We recommend you follow up with your primary care provider/cardiologist within the next 2-3 days, please bring all of your results with you. You can also follow up with your nephrologist, please call today to make a follow up appointment.     Please return to the Emergency Department with new, worsening, or concerning symptoms, such as:  -Shortness of breath or trouble breathing  -Syncope, dizziness   -Pressure, pain, tightness in chest  -Facial drooping, arm weakness, or speech difficulty   -Head injury or loss of consciousness   -Nonstop bleeding or an open wound

## 2022-02-10 NOTE — CONSULT NOTE ADULT - ASSESSMENT
Mr Siddiqi is a 52 y/o male w/ HTN, ESRD s/p renal transplant (1970s) c/b CKD IV (on cyclosporin and prednisone), anemia with weekly Aranesp, presenting to the hospital from nephrology clinic with Anemia requiring transfusion. Patient was found to have bradycardia and EP consulted for bradycardia/heart block eval.    # Asymptomatic Bradycardia  - Patient remains asymptomatic and hemodynamically stable.  - Patient reports baseline bradycardia of 40-60 without symptoms including syncope, fatigue, or lightheadedness.   - Tele strips and ECG reviewed: Patient with sinus bradycardia, premature complexes, blocked APC causing brief pauses. There was on event of Mobitz I AVB event caught on tele strip.  - patient is currently on atenolol for HTN (atenolol, hydralazine, and lasix)  - no need for EP intervention at this time.  - will discuss with EP team in the AM.    Shadi Ga MD  Cardiology Fellow - PGY 4  Text or Call: [phone]  For all New Consults and Questions:  [email or web address]   Login: Miro
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
## 2022-02-10 NOTE — ED CDU PROVIDER DISPOSITION NOTE - ATTENDING CONTRIBUTION TO CARE
I , Dr Alex Du has seen and evaluated the patient.  The pateient is asymptomatic declined  admisison The patient will follow up with their primary physiacian and hold atenolol and fu with cardiology .

## 2022-02-10 NOTE — ED CDU PROVIDER SUBSEQUENT DAY NOTE - MEDICAL DECISION MAKING DETAILS
herbert - anemia - needs transfusion , found to have asymtpomatic elva - ep recs , tele monitoring - and freq reeval

## 2022-02-10 NOTE — ED CDU PROVIDER SUBSEQUENT DAY NOTE - HISTORY
No interval changes since initial CDU provider note. Pt feels well without complaint. NAD VSS. HR in the 40s, sinus elva, patient without complaints. Patient completed 2 units PRBC. Plan to continue tele monitoring with cardiology consult in the setting of bradycardiac/AV block.  - CHESTER Mahmood No interval changes since initial CDU provider note. Pt feels well without complaint. NAD VSS. HR in the 40s, sinus elva, patient without complaints. Patient completed 2 units PRBC. Plan to continue tele monitoring with cardiology consult in the setting of bradycardia/AV block.  - CHESTER Mahmood

## 2022-02-10 NOTE — ED CDU PROVIDER SUBSEQUENT DAY NOTE - NS ED ROS FT
Constitutional: No fever or chills  Eyes: No visual changes, eye pain   CV: No chest pain or lower extremity edema  Resp: No SOB no cough  GI: No abd pain. No nausea or vomiting. No diarrhea.   : No dysuria, hematuria.   MSK: No musculoskeletal pain  Skin: No rash  Psych: No complaints   Neuro: No headache. No numbness or tingling. No weakness.  Heme: +Anemia

## 2022-02-14 ENCOUNTER — APPOINTMENT (OUTPATIENT)
Dept: HEMATOLOGY ONCOLOGY | Facility: CLINIC | Age: 52
End: 2022-02-14
Payer: COMMERCIAL

## 2022-02-14 ENCOUNTER — RESULT REVIEW (OUTPATIENT)
Age: 52
End: 2022-02-14

## 2022-02-14 ENCOUNTER — LABORATORY RESULT (OUTPATIENT)
Age: 52
End: 2022-02-14

## 2022-02-14 ENCOUNTER — OUTPATIENT (OUTPATIENT)
Dept: OUTPATIENT SERVICES | Facility: HOSPITAL | Age: 52
LOS: 1 days | Discharge: HOME | End: 2022-02-14

## 2022-02-14 ENCOUNTER — NON-APPOINTMENT (OUTPATIENT)
Age: 52
End: 2022-02-14

## 2022-02-14 ENCOUNTER — APPOINTMENT (OUTPATIENT)
Dept: INTERNAL MEDICINE | Facility: CLINIC | Age: 52
End: 2022-02-14
Payer: COMMERCIAL

## 2022-02-14 VITALS
RESPIRATION RATE: 16 BRPM | OXYGEN SATURATION: 99 % | WEIGHT: 147.71 LBS | TEMPERATURE: 96.6 F | HEART RATE: 75 BPM | DIASTOLIC BLOOD PRESSURE: 73 MMHG | BODY MASS INDEX: 25.35 KG/M2 | SYSTOLIC BLOOD PRESSURE: 157 MMHG

## 2022-02-14 VITALS
HEIGHT: 64 IN | BODY MASS INDEX: 24.82 KG/M2 | WEIGHT: 145.4 LBS | DIASTOLIC BLOOD PRESSURE: 70 MMHG | SYSTOLIC BLOOD PRESSURE: 128 MMHG

## 2022-02-14 DIAGNOSIS — D63.1 CHRONIC KIDNEY DISEASE, UNSPECIFIED: ICD-10-CM

## 2022-02-14 DIAGNOSIS — E78.00 PURE HYPERCHOLESTEROLEMIA, UNSPECIFIED: ICD-10-CM

## 2022-02-14 DIAGNOSIS — N18.9 CHRONIC KIDNEY DISEASE, UNSPECIFIED: ICD-10-CM

## 2022-02-14 DIAGNOSIS — Z94.0 KIDNEY TRANSPLANT STATUS: Chronic | ICD-10-CM

## 2022-02-14 DIAGNOSIS — D64.9 ANEMIA, UNSPECIFIED: ICD-10-CM

## 2022-02-14 DIAGNOSIS — D72.819 DECREASED WHITE BLOOD CELL COUNT, UNSPECIFIED: ICD-10-CM

## 2022-02-14 LAB
ANISOCYTOSIS BLD QL: SLIGHT — SIGNIFICANT CHANGE UP
BASOPHILS # BLD AUTO: 0.01 K/UL — SIGNIFICANT CHANGE UP (ref 0–0.2)
BASOPHILS NFR BLD AUTO: 0.4 % — SIGNIFICANT CHANGE UP (ref 0–2)
DACRYOCYTES BLD QL SMEAR: SLIGHT — SIGNIFICANT CHANGE UP
ELLIPTOCYTES BLD QL SMEAR: SLIGHT — SIGNIFICANT CHANGE UP
EOSINOPHIL # BLD AUTO: 0.04 K/UL — SIGNIFICANT CHANGE UP (ref 0–0.5)
EOSINOPHIL NFR BLD AUTO: 1.8 % — SIGNIFICANT CHANGE UP (ref 0–6)
HCT VFR BLD CALC: 24 % — LOW (ref 39–50)
HGB BLD-MCNC: 7.7 G/DL — LOW (ref 13–17)
IMM GRANULOCYTES NFR BLD AUTO: 0.4 % — SIGNIFICANT CHANGE UP (ref 0–1.5)
LYMPHOCYTES # BLD AUTO: 0.82 K/UL — LOW (ref 1–3.3)
LYMPHOCYTES # BLD AUTO: 36.4 % — SIGNIFICANT CHANGE UP (ref 13–44)
MCHC RBC-ENTMCNC: 32.1 G/DL — SIGNIFICANT CHANGE UP (ref 32–36)
MCHC RBC-ENTMCNC: 32.1 PG — SIGNIFICANT CHANGE UP (ref 27–34)
MCV RBC AUTO: 100 FL — SIGNIFICANT CHANGE UP (ref 80–100)
MONOCYTES # BLD AUTO: 0.09 K/UL — SIGNIFICANT CHANGE UP (ref 0–0.9)
MONOCYTES NFR BLD AUTO: 4 % — SIGNIFICANT CHANGE UP (ref 2–14)
NEUTROPHILS # BLD AUTO: 1.28 K/UL — LOW (ref 1.8–7.4)
NEUTROPHILS NFR BLD AUTO: 57 % — SIGNIFICANT CHANGE UP (ref 43–77)
NRBC # BLD: 0 /100 WBCS — SIGNIFICANT CHANGE UP (ref 0–0)
PLAT MORPH BLD: NORMAL — SIGNIFICANT CHANGE UP
PLATELET # BLD AUTO: 264 K/UL — SIGNIFICANT CHANGE UP (ref 150–400)
POIKILOCYTOSIS BLD QL AUTO: SLIGHT — SIGNIFICANT CHANGE UP
RBC # BLD: 2.4 M/UL — LOW (ref 4.2–5.8)
RBC # FLD: 17.2 % — HIGH (ref 10.3–14.5)
RBC BLD AUTO: ABNORMAL
SCHISTOCYTES BLD QL AUTO: SLIGHT — SIGNIFICANT CHANGE UP
WBC # BLD: 2.25 K/UL — LOW (ref 3.8–10.5)
WBC # FLD AUTO: 2.25 K/UL — LOW (ref 3.8–10.5)

## 2022-02-14 PROCEDURE — 99214 OFFICE O/P EST MOD 30 MIN: CPT | Mod: 25

## 2022-02-14 PROCEDURE — 36415 COLL VENOUS BLD VENIPUNCTURE: CPT

## 2022-02-14 PROCEDURE — 93000 ELECTROCARDIOGRAM COMPLETE: CPT

## 2022-02-14 PROCEDURE — 99214 OFFICE O/P EST MOD 30 MIN: CPT

## 2022-02-14 RX ORDER — FOLIC ACID 1 MG/1
1 TABLET ORAL
Qty: 30 | Refills: 0 | Status: ACTIVE | COMMUNITY
Start: 2022-01-13

## 2022-02-14 NOTE — PHYSICAL EXAM
[No JVD] : no jugular venous distention [Normal] : normal gait, coordination grossly intact, no focal deficits and deep tendon reflexes were 2+ and symmetric

## 2022-02-14 NOTE — HISTORY OF PRESENT ILLNESS
[FreeTextEntry1] : Is a 51-year-old male with chronic renal failure.  He has been recently discharged from the hospital because of anemia.  During the course of that hospitalization he was also noted to be bradycardic and his beta-blocker was discontinued.  It is also noteworthy that he was leukopenic which has been gradual in onset.  And he has been refractive methods to bring up his hemoglobin [de-identified] : Remarkably patient feels well

## 2022-02-14 NOTE — ASSESSMENT
[FreeTextEntry1] : Did review the hospital results he was discharged with hyperkalemia leukopenia and anemia\par \par I will repeat his blood tests today I will start him empirically increase his Lokelma will take 2 packets a day until I get a better sense of his potassium\par \par I will try to arrange stat renal and hematologic evaluations\par \par Stated above he clinically is remarkably stable his blood pressure is normal he has no uremic symptoms\par \par His EKG is bradycardic but it only 57 which is not markedly different than his previous no evidence of potassium effects on the heart at this time.  He has been seen by EPS and the hospital and no intervention was undertaken as appropriate.  He remains asymptomatic\par \par Reached out to hematology and to nephrology.  It is possible that he has not adjusted his immunosuppressive doses to his decreasing GFR and decreasing weight\par \par Did get a hold event hematology they will see him today.  He has seen the patient once before but he was not leukopenic at the time I will get a call from the covering nephrologist I did speak to 1 who was not covering but they will get in touch with the covering physician and have him call or her\par \par He probably will need a bone marrow to rule out an intrinsic process\par \par I

## 2022-02-15 LAB
25(OH)D3 SERPL-MCNC: 29 NG/ML
ALBUMIN SERPL ELPH-MCNC: 4.4 G/DL
ALP BLD-CCNC: 115 U/L
ALT SERPL-CCNC: 17 U/L
ANION GAP SERPL CALC-SCNC: 18 MMOL/L
AST SERPL-CCNC: 15 U/L
BASOPHILS # BLD AUTO: 0.01 K/UL
BASOPHILS NFR BLD AUTO: 0.5 %
BILIRUB SERPL-MCNC: 0.4 MG/DL
BUN SERPL-MCNC: 113 MG/DL
CALCIUM SERPL-MCNC: 9.4 MG/DL
CHLORIDE SERPL-SCNC: 108 MMOL/L
CHOLEST SERPL-MCNC: 99 MG/DL
CO2 SERPL-SCNC: 15 MMOL/L
CREAT SERPL-MCNC: 6.99 MG/DL
EOSINOPHIL # BLD AUTO: 0.03 K/UL
EOSINOPHIL NFR BLD AUTO: 1.4 %
ESTIMATED AVERAGE GLUCOSE: 108 MG/DL
GLUCOSE SERPL-MCNC: 91 MG/DL
HBA1C MFR BLD HPLC: 5.4 %
HCT VFR BLD CALC: 24.1 %
HDLC SERPL-MCNC: 38 MG/DL
HGB BLD-MCNC: 7.6 G/DL
IMM GRANULOCYTES NFR BLD AUTO: 0.5 %
LDLC SERPL CALC-MCNC: 51 MG/DL
LYMPHOCYTES # BLD AUTO: 0.75 K/UL
LYMPHOCYTES NFR BLD AUTO: 34.6 %
MAN DIFF?: NORMAL
MCHC RBC-ENTMCNC: 31.5 GM/DL
MCHC RBC-ENTMCNC: 31.7 PG
MCV RBC AUTO: 100.4 FL
MONOCYTES # BLD AUTO: 0.08 K/UL
MONOCYTES NFR BLD AUTO: 3.7 %
NEUTROPHILS # BLD AUTO: 1.29 K/UL
NEUTROPHILS NFR BLD AUTO: 59.3 %
NONHDLC SERPL-MCNC: 61 MG/DL
PLATELET # BLD AUTO: 284 K/UL
POTASSIUM SERPL-SCNC: 5.5 MMOL/L
POTASSIUM SERPL-SCNC: 5.6 MMOL/L
PROT SERPL-MCNC: 7.1 G/DL
RBC # BLD: 2.4 M/UL
RBC # FLD: 17.1 %
SODIUM SERPL-SCNC: 142 MMOL/L
T3RU NFR SERPL: 1.1 TBI
T4 SERPL-MCNC: 8.3 UG/DL
TRIGL SERPL-MCNC: 51 MG/DL
TSH SERPL-ACNC: 3.43 UIU/ML
URATE SERPL-MCNC: 6.6 MG/DL
WBC # FLD AUTO: 2.17 K/UL

## 2022-02-16 NOTE — HISTORY OF PRESENT ILLNESS
[de-identified] : This is a 51 year old man with a history of CKD, history of a transplant at the age of 8 years old in 1978.  Chronic graft rejection, baseline creatinine ~3.  Was admitted with COVID 19 on December 5th.  Hg at baseline in 2018 in the 8-9 range, was more in the range of 9-10 after initiation of Aranesp that has been titrated upwards since April 2019 as Aranesp was titrate upwards over the time period.  ON December 5th patient was admitted to [facility] with a Hg in the 9's went up to 11g/dl with hemoconcentration during the episode.  Patient had not received Aranesp during that 12 day hospital stay.  Patient's hemoglobin dropped down to 7.9 mid week before rising again to 10 then discharged with a hg of 8.8g/dl.  On follow up on January 11th, patient was found to have a profound anemia Hg 6.8, was admitted for transfusion that next day. On discharge follow up a 2nd time Hg stable at 9.4g/dl and more recently 9.0 2 days ago on January 25th.  \par \par Patient feels well following the discharge from the hospital stay for COVID 19, While his Hg was as low as 6.6g/dl at the ER on 2nd admission, patient denies any chest pain shortness of breath or syncope, just felt weak and tired somewhat.  Ferritin 444 at the hospital. B12 was slightly low in the 270's, patient was already asked to start a B12 supplement and will be taking it.  Retic count was only 0.6%. [de-identified] : Over course of most of 2021, patient had a good response to aranesp, Hg 10-11g/dl  however in November onwards, Hg declined into the 8's and more recently down to as low as 5.7 on Feb 8th at 5.7g/dl.  Patient was transfused over this past weekend, on discharge Hg 7.5g/dl.  During the sme time period, WBC went from normal in the 4-5 range down to 3's in November, then 2's and most reentl 1.46 on Feb 8th.  During this time, patient had lost roughly 20 lbs. Has been on azothriprine 150mg daily in divided doses for past 4 years.  This was not adjusted with the renae loss. Was 145 lbs today at PCP office, 160 january last year and 181 the year prior to that.   Ferritin 972, MCV normal.  Platlets were preserved at 178-392 \par \par Noted patient on immunosuppressants, and had a Quantiferon TB that was indeterminant in January 19th. \par \par Patient was asymptomatic, no fevers chills, no covid like symptoms, patient had covid back in 2020.  \par \par patient had the covid vaccinations, booster due next month.  \par \par Patient himself did not notice any weight loss.  Ever since the COVID 19, patient had a change in taste and perhaps a loss of appetite.  \par No alcohol.  No smoking.

## 2022-02-16 NOTE — ASSESSMENT
[FreeTextEntry1] : This is a 50 year old man with a history of renal insufficiency, chronic rejection of a transplant that he received at the age of 8, remains on immunosuppression to this day. baseline creatinine ~3.  Patient has a history of anemia of chronic renal insufficiency was on escalating doses of Aranesp up until December 5th.  Hg was mostly stable at the hospital while he was being treated for COVID 19.  Hg 8-11 during that stay. Discharged with Hg 8.8g/dl.  1/11/21 Hg 6.8g/dl requiring readmission for transfusion of 2 units of PRBC. \par \par Patient on aranesp 100mcg, HG had been stable at roughly 10+ for the previous year since the last follow up.  Hg started to decline more recently along with an increase in creatinine from 5 to 6.  Hg most recently 7.5g/dl at PCP office.  Dr. Vasquez had been in contact with me as patient was being sent here for evaluation. We had agreed to hold the Aziothioprine for a short time then restart at 75mg.  Instructed patient to stop this for 2 days, restart on Friday at 1.5 tablets daily. Follow up next Monday to recheck Hg to see if this rises.\par Reviewed peripheral smear. THere are no schistocytes, no WBC abnormalities to suggest TTP or lymphoproliferative disorder.  Lack of spherocytes suggests this is not hemolytic.  Check flow cytometry.

## 2022-02-18 ENCOUNTER — APPOINTMENT (OUTPATIENT)
Dept: NEPHROLOGY | Facility: CLINIC | Age: 52
End: 2022-02-18
Payer: COMMERCIAL

## 2022-02-18 VITALS
OXYGEN SATURATION: 100 % | BODY MASS INDEX: 25.4 KG/M2 | DIASTOLIC BLOOD PRESSURE: 84 MMHG | WEIGHT: 148 LBS | HEART RATE: 64 BPM | SYSTOLIC BLOOD PRESSURE: 141 MMHG

## 2022-02-18 PROCEDURE — 99215 OFFICE O/P EST HI 40 MIN: CPT

## 2022-02-18 RX ORDER — CARVEDILOL 3.12 MG/1
3.12 TABLET, FILM COATED ORAL
Qty: 180 | Refills: 0 | Status: DISCONTINUED | COMMUNITY
Start: 2021-11-19 | End: 2022-02-18

## 2022-02-18 RX ORDER — ATENOLOL 25 MG/1
25 TABLET ORAL DAILY
Qty: 90 | Refills: 3 | Status: DISCONTINUED | COMMUNITY
Start: 2021-11-12 | End: 2022-02-18

## 2022-02-18 NOTE — ASSESSMENT
[FreeTextEntry1] : Mr. DANIELSON is a 51 year old male w Hx LRRT 1978 (mom was the donor), covid19 in Dec 2020 with CKD stage 5 here for follow up after acute admission for pancytopenia in 2022.\par \par #pancytopenia- this is likely imuran related for his dose and GFR. His dose now reduced last week to 75mg po qd and monitor now. He feels much better with change of dose. He is planned bone marrow but less likely infecitous cause. Check CBC and renal panel in 2 weeks( ordered for march 1)\par \par #Hx LRRT with CAN, now with CKD V, with proteinuria stable\par - Renal transplant with likely CTG, 42 years+, uptrending cr last labs\par - no uremic symptoms currently, except for metallic taste in mouth\par - Follows up with [facility] with transplant. Sister wants to be potential donor; Told him to tell her to call [facility]\par - following w/ HT, Jie\par -last SCr progressing 6.0s-7.0s\par -pt prefers PD if it reaches the point- asked to get eval by PD nurse today\par -needs PD started likely May -June 22 given overall trend but we can get a training date today\par \par #Hyperkalemia- advised diet low in K. on lokelma BID dosing. off all beta blockers- should help\par on 3 tabs TID of na bicarb\par \par #Immunosuppression\par - c/w pred 5 and Imuran 75mg po qd ( can even go to 50mg dose if planning PD soon)\par \par #Anemia due to CKD 1V/V\par - cont Aranesp  125mcg weekly-- but too high of dose and cause of anemia is likely imuran- will adjust once hgb improved to 100mcg q 2 weeks eventually\par \par #Hx elevated calcium on prior labs/hypercalcemia, vitamin d insufficiency\par - stable for now, no changes\par \par #metabolic acidosis 2/2 advanced CKD- stable\par as above\par \par #Benign Essential HTN -better control\par no more meds for now\par cont above meds only\par No more edema- BUN rising. Not sure if needs daily lasix. change to MWF dosing\par \par  #HCM\par - prior lipids (jan 2020) LDL 51/\par - PNA vaccine 2015\par - Influenza vaccine done 10/9/19; Sept 2020, 2021\par - HBV series: Dec #1, Jan #2, June 19th #3; Hep B titres done in 12/2018\par  -COVID 19 vaccine #1 on 8/12 (Pfizer). COVID #2 was in Sep. Getting booster March 2022\par \par f/u with Abeba or me in 1 month and montly going forward

## 2022-02-18 NOTE — HISTORY OF PRESENT ILLNESS
[FreeTextEntry1] : Mr. DANIELSON is a 51 year old male with failed transplant and now CKD 5 here for f.u. Was hospitalized twice since Dec 2021 and then in Feb 2022. Pt went went to hospital 1/12-1/13/22 for low hb and was given 2 units PRBC; Hb went from 5.9 to 8.6; BUN/Cr 102/7.1 cr to 6.95 on discharge home 1/13\par Looking back, Noted pancytopenia- this is likely in setting of high imuran dosing\par Recent ER visit noted, was bradycardic- could be from uremia vs pancytopenia\par Had spoken with PMD and Heme and we decided to cut back imuran to 75mg po qd from 150mg after holding it for Bone marrow planned next week and pt saw Dr Peralta for PD catheter placement already. \par No n/v. ++decrease in appetite, no termors. No edema worsening. no decreased sleep. No foamy urine. no hematuria, no dysuria. no confusion. No SOB, VILLEGAS. No wt loss.\par He has no edema, continues to take lasix daily. He is on aranesp 125mcg SQ weekly at home that he gives himself.\par Wanted PD\par Sister potential donor at [facility] listed\par \par Meds reconciled \par off beta blockers now\par BP well controlled\par \par \par \par

## 2022-02-18 NOTE — QUALITY MEASURES
[PTH level measured within last] : patient's PTH level measured within the last 12 months [Yes] : patient is an appropriate candidate for kidney transplantation evaluation [Patient has started or completed the] : patient has started or completed the process of evaluation for renal transplant [Patient has proteinuria greater than] : patient has proteinuria greater than 1 gm (spot urine/protein creatinine ratio or a 24hr collection)

## 2022-02-18 NOTE — PHYSICAL EXAM
[General Appearance - Alert] : alert [Sclera] : the sclera and conjunctiva were normal [Outer Ear] : the ears and nose were normal in appearance [Neck Appearance] : the appearance of the neck was normal [] : no respiratory distress [Apical Impulse] : the apical impulse was normal [Oriented To Time, Place, And Person] : oriented to person, place, and time [Impaired Insight] : insight and judgment were intact [Affect] : the affect was normal [FreeTextEntry1] : no asterexis

## 2022-02-24 ENCOUNTER — APPOINTMENT (OUTPATIENT)
Dept: HEMATOLOGY ONCOLOGY | Facility: CLINIC | Age: 52
End: 2022-02-24
Payer: COMMERCIAL

## 2022-02-24 ENCOUNTER — RESULT REVIEW (OUTPATIENT)
Age: 52
End: 2022-02-24

## 2022-02-24 VITALS
TEMPERATURE: 97.7 F | OXYGEN SATURATION: 97 % | BODY MASS INDEX: 24.6 KG/M2 | HEART RATE: 107 BPM | WEIGHT: 143.3 LBS | SYSTOLIC BLOOD PRESSURE: 155 MMHG | DIASTOLIC BLOOD PRESSURE: 75 MMHG | RESPIRATION RATE: 16 BRPM

## 2022-02-24 LAB
BASOPHILS # BLD AUTO: 0.02 K/UL — SIGNIFICANT CHANGE UP (ref 0–0.2)
BASOPHILS NFR BLD AUTO: 1 % — SIGNIFICANT CHANGE UP (ref 0–2)
EOSINOPHIL # BLD AUTO: 0.07 K/UL — SIGNIFICANT CHANGE UP (ref 0–0.5)
EOSINOPHIL NFR BLD AUTO: 3.4 % — SIGNIFICANT CHANGE UP (ref 0–6)
HCT VFR BLD CALC: 22.3 % — LOW (ref 39–50)
HGB BLD-MCNC: 7.1 G/DL — LOW (ref 13–17)
IMM GRANULOCYTES NFR BLD AUTO: 1 % — SIGNIFICANT CHANGE UP (ref 0–1.5)
LYMPHOCYTES # BLD AUTO: 0.83 K/UL — LOW (ref 1–3.3)
LYMPHOCYTES # BLD AUTO: 40.1 % — SIGNIFICANT CHANGE UP (ref 13–44)
MCHC RBC-ENTMCNC: 31.7 PG — SIGNIFICANT CHANGE UP (ref 27–34)
MCHC RBC-ENTMCNC: 31.8 G/DL — LOW (ref 32–36)
MCV RBC AUTO: 99.6 FL — SIGNIFICANT CHANGE UP (ref 80–100)
MONOCYTES # BLD AUTO: 0.2 K/UL — SIGNIFICANT CHANGE UP (ref 0–0.9)
MONOCYTES NFR BLD AUTO: 9.7 % — SIGNIFICANT CHANGE UP (ref 2–14)
NEUTROPHILS # BLD AUTO: 0.93 K/UL — LOW (ref 1.8–7.4)
NEUTROPHILS NFR BLD AUTO: 44.8 % — SIGNIFICANT CHANGE UP (ref 43–77)
NRBC # BLD: 0 /100 WBCS — SIGNIFICANT CHANGE UP (ref 0–0)
PLATELET # BLD AUTO: 213 K/UL — SIGNIFICANT CHANGE UP (ref 150–400)
RBC # BLD: 2.24 M/UL — LOW (ref 4.2–5.8)
RBC # FLD: 16.8 % — HIGH (ref 10.3–14.5)
RETICS #: 21.7 K/UL — LOW (ref 25–125)
RETICS/RBC NFR: 1 % — SIGNIFICANT CHANGE UP (ref 0.5–2.5)
WBC # BLD: 2.07 K/UL — LOW (ref 3.8–10.5)
WBC # FLD AUTO: 2.07 K/UL — LOW (ref 3.8–10.5)

## 2022-02-24 PROCEDURE — 99214 OFFICE O/P EST MOD 30 MIN: CPT

## 2022-03-01 ENCOUNTER — NON-APPOINTMENT (OUTPATIENT)
Age: 52
End: 2022-03-01

## 2022-03-01 ENCOUNTER — LABORATORY RESULT (OUTPATIENT)
Age: 52
End: 2022-03-01

## 2022-03-02 LAB
ALBUMIN SERPL ELPH-MCNC: 4 G/DL
ANION GAP SERPL CALC-SCNC: 17 MMOL/L
APPEARANCE: CLEAR
BACTERIA: NEGATIVE
BASOPHILS # BLD AUTO: 0.01 K/UL
BASOPHILS NFR BLD AUTO: 0.4 %
BILIRUBIN URINE: NEGATIVE
BLOOD URINE: NEGATIVE
BUN SERPL-MCNC: 69 MG/DL
CALCIUM SERPL-MCNC: 8.9 MG/DL
CALCIUM SERPL-MCNC: 8.9 MG/DL
CHLORIDE SERPL-SCNC: 109 MMOL/L
CO2 SERPL-SCNC: 19 MMOL/L
COLOR: NORMAL
CREAT SERPL-MCNC: 6.46 MG/DL
CREAT SPEC-SCNC: 73 MG/DL
CREAT/PROT UR: 1.4 RATIO
EGFR: 10 ML/MIN/1.73M2
EOSINOPHIL # BLD AUTO: 0.03 K/UL
EOSINOPHIL NFR BLD AUTO: 1.1 %
GLUCOSE QUALITATIVE U: NORMAL
GLUCOSE SERPL-MCNC: 97 MG/DL
HCT VFR BLD CALC: 20.4 %
HGB BLD-MCNC: 6.4 G/DL
HYALINE CASTS: 0 /LPF
IMM GRANULOCYTES NFR BLD AUTO: 1.8 %
KETONES URINE: NEGATIVE
LEUKOCYTE ESTERASE URINE: NEGATIVE
LYMPHOCYTES # BLD AUTO: 0.86 K/UL
LYMPHOCYTES NFR BLD AUTO: 31.3 %
MAN DIFF?: NORMAL
MCHC RBC-ENTMCNC: 31.4 GM/DL
MCHC RBC-ENTMCNC: 31.7 PG
MCV RBC AUTO: 101 FL
MICROSCOPIC-UA: NORMAL
MONOCYTES # BLD AUTO: 0.35 K/UL
MONOCYTES NFR BLD AUTO: 12.7 %
NEUTROPHILS # BLD AUTO: 1.45 K/UL
NEUTROPHILS NFR BLD AUTO: 52.7 %
NITRITE URINE: NEGATIVE
PARATHYROID HORMONE INTACT: 715 PG/ML
PH URINE: 7.5
PHOSPHATE SERPL-MCNC: 4.2 MG/DL
PLATELET # BLD AUTO: 400 K/UL
POTASSIUM SERPL-SCNC: 4.8 MMOL/L
PROT UR-MCNC: 105 MG/DL
PROTEIN URINE: ABNORMAL
RBC # BLD: 2.02 M/UL
RBC # FLD: 18.2 %
RED BLOOD CELLS URINE: 0 /HPF
SODIUM SERPL-SCNC: 145 MMOL/L
SPECIFIC GRAVITY URINE: 1.01
SQUAMOUS EPITHELIAL CELLS: 0 /HPF
UROBILINOGEN URINE: NORMAL
WBC # FLD AUTO: 2.75 K/UL
WHITE BLOOD CELLS URINE: 1 /HPF

## 2022-03-03 ENCOUNTER — RESULT REVIEW (OUTPATIENT)
Age: 52
End: 2022-03-03

## 2022-03-03 ENCOUNTER — OUTPATIENT (OUTPATIENT)
Dept: OUTPATIENT SERVICES | Facility: HOSPITAL | Age: 52
LOS: 1 days | End: 2022-03-03
Payer: COMMERCIAL

## 2022-03-03 ENCOUNTER — APPOINTMENT (OUTPATIENT)
Dept: HEMATOLOGY ONCOLOGY | Facility: CLINIC | Age: 52
End: 2022-03-03

## 2022-03-03 ENCOUNTER — NON-APPOINTMENT (OUTPATIENT)
Age: 52
End: 2022-03-03

## 2022-03-03 DIAGNOSIS — N18.4 CHRONIC KIDNEY DISEASE, STAGE 4 (SEVERE): ICD-10-CM

## 2022-03-03 DIAGNOSIS — Z94.0 KIDNEY TRANSPLANT STATUS: Chronic | ICD-10-CM

## 2022-03-03 LAB
BASOPHILS # BLD AUTO: 0.01 K/UL — SIGNIFICANT CHANGE UP (ref 0–0.2)
BASOPHILS NFR BLD AUTO: 0.4 % — SIGNIFICANT CHANGE UP (ref 0–2)
EOSINOPHIL # BLD AUTO: 0.03 K/UL — SIGNIFICANT CHANGE UP (ref 0–0.5)
EOSINOPHIL NFR BLD AUTO: 1.1 % — SIGNIFICANT CHANGE UP (ref 0–6)
HCT VFR BLD CALC: 20 % — CRITICAL LOW (ref 39–50)
HGB BLD-MCNC: 6.4 G/DL — CRITICAL LOW (ref 13–17)
IMM GRANULOCYTES NFR BLD AUTO: 2.2 % — HIGH (ref 0–1.5)
LYMPHOCYTES # BLD AUTO: 0.82 K/UL — LOW (ref 1–3.3)
LYMPHOCYTES # BLD AUTO: 30.1 % — SIGNIFICANT CHANGE UP (ref 13–44)
MCHC RBC-ENTMCNC: 32 G/DL — SIGNIFICANT CHANGE UP (ref 32–36)
MCHC RBC-ENTMCNC: 32.5 PG — SIGNIFICANT CHANGE UP (ref 27–34)
MCV RBC AUTO: 101.5 FL — HIGH (ref 80–100)
MONOCYTES # BLD AUTO: 0.24 K/UL — SIGNIFICANT CHANGE UP (ref 0–0.9)
MONOCYTES NFR BLD AUTO: 8.8 % — SIGNIFICANT CHANGE UP (ref 2–14)
NEUTROPHILS # BLD AUTO: 1.56 K/UL — LOW (ref 1.8–7.4)
NEUTROPHILS NFR BLD AUTO: 57.4 % — SIGNIFICANT CHANGE UP (ref 43–77)
NRBC # BLD: 0 /100 WBCS — SIGNIFICANT CHANGE UP (ref 0–0)
PLATELET # BLD AUTO: 433 K/UL — HIGH (ref 150–400)
RBC # BLD: 1.97 M/UL — LOW (ref 4.2–5.8)
RBC # FLD: 18.6 % — HIGH (ref 10.3–14.5)
WBC # BLD: 2.72 K/UL — LOW (ref 3.8–10.5)
WBC # FLD AUTO: 2.72 K/UL — LOW (ref 3.8–10.5)

## 2022-03-03 PROCEDURE — 86900 BLOOD TYPING SEROLOGIC ABO: CPT

## 2022-03-03 PROCEDURE — 86923 COMPATIBILITY TEST ELECTRIC: CPT

## 2022-03-03 PROCEDURE — 86850 RBC ANTIBODY SCREEN: CPT

## 2022-03-03 PROCEDURE — 86901 BLOOD TYPING SEROLOGIC RH(D): CPT

## 2022-03-04 ENCOUNTER — APPOINTMENT (OUTPATIENT)
Dept: INFUSION THERAPY | Facility: HOSPITAL | Age: 52
End: 2022-03-04

## 2022-03-04 RX ORDER — ATENOLOL 25 MG/1
1 TABLET ORAL
Qty: 0 | Refills: 0 | DISCHARGE

## 2022-03-07 ENCOUNTER — NON-APPOINTMENT (OUTPATIENT)
Age: 52
End: 2022-03-07

## 2022-03-07 DIAGNOSIS — Z51.89 ENCOUNTER FOR OTHER SPECIFIED AFTERCARE: ICD-10-CM

## 2022-03-08 LAB
FERRITIN SERPL-MCNC: 981 NG/ML
IRON SATN MFR SERPL: 53 %
IRON SERPL-MCNC: 89 UG/DL
TIBC SERPL-MCNC: 167 UG/DL
UIBC SERPL-MCNC: 79 UG/DL

## 2022-03-09 ENCOUNTER — APPOINTMENT (OUTPATIENT)
Dept: NEPHROLOGY | Facility: CLINIC | Age: 52
End: 2022-03-09
Payer: COMMERCIAL

## 2022-03-09 ENCOUNTER — LABORATORY RESULT (OUTPATIENT)
Age: 52
End: 2022-03-09

## 2022-03-09 VITALS
DIASTOLIC BLOOD PRESSURE: 78 MMHG | TEMPERATURE: 96.6 F | HEIGHT: 64 IN | WEIGHT: 140 LBS | BODY MASS INDEX: 23.9 KG/M2 | OXYGEN SATURATION: 97 % | HEART RATE: 61 BPM | SYSTOLIC BLOOD PRESSURE: 157 MMHG

## 2022-03-09 DIAGNOSIS — N18.5 CHRONIC KIDNEY DISEASE, STAGE 5: ICD-10-CM

## 2022-03-09 PROCEDURE — 99213 OFFICE O/P EST LOW 20 MIN: CPT

## 2022-03-09 NOTE — ED PROVIDER NOTE - NSTIMEPROVIDERCAREINITIATE_GEN_ER
12-Jan-2021 11:43 Complex Repair And Dermal Autograft Text: The defect edges were debeveled with a #15 scalpel blade.  The primary defect was closed partially with a complex linear closure.  Given the location of the defect, shape of the defect and the proximity to free margins an dermal autograft was deemed most appropriate to repair the remaining defect.  The graft was trimmed to fit the size of the remaining defect.  The graft was then placed in the primary defect, oriented appropriately, and sutured into place.

## 2022-03-09 NOTE — HISTORY OF PRESENT ILLNESS
[FreeTextEntry1] : Mr. DANIELSON is a 51 year old male with failed transplant and now CKD 5 here for f.u. Was hospitalized twice since Dec 2021 and then in Feb 2022. Pt went went to hospital 1/12-1/13/22 for low hb and was given 2 units PRBC; Hb went from 5.9 to 8.6; BUN/Cr 102/7.1 cr to 6.95 on discharge home 1/13\par Looking back, Noted pancytopenia- this is likely in setting of high imuran dosing\par Recent ER visit noted, was bradycardic- could be from uremia vs pancytopenia\par Had spoken with PMD and Heme and we decided to cut back imuran to 75mg po qd from 150mg after holding it for \par repeat cbc still same and no changes. bone marrow planned this week\par No n/v. ++decrease in appetite, no termors. No edema worsening. no decreased sleep. No foamy urine. no hematuria, no dysuria. no confusion. No SOB, VILLEGAS. No wt loss.\par He has no edema, continues to take lasix daily. He is on aranesp 125mcg SQ weekly at home that he gives himself.\par Wanted PD\par Sister potential donor at [facility] listed\par \par Meds reconciled \par off beta blockers now\par BP well controlled\par \par \par \par

## 2022-03-09 NOTE — ASSESSMENT
[FreeTextEntry1] : Mr. DANIELSON is a 51 year old male w Hx LRRT 1978 (mom was the donor), covid19 in Dec 2020 with CKD stage 5 here for follow up after acute admission for pancytopenia in 2022.\par \par #pancytopenia- this is likely imuran related for his dose and GFR. His dose now reduced last week to 75mg po qd and but no change, decrease to 50mg today and increase prednisone to 10mg dose. 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 with transplant. Sister wants to be potential donor; Told him to tell her to call [facility]- will know by next week\par - following w/ HT, Jie\par -last SCr progressing 6.0s-7.0s\par -pt prefers PD if it reaches the point- asked to get eval by PD nurse today\par -needs PD started likely May -June 22 given overall trend but we can get a training date today\par -will consider changing to MMF from imuran give his rash worsening and pancytopenia if bone marrow is neg and viral etiologies are neg\par \par #Immunosuppression\par - c/w pred but increase to 10mg dose and Imuran decrease to 50mg dose\par \par #Anemia due to CKD 1V/V\par - aranesp increased by heme, also had a transfusion and bone marrow planned\par Avoid further transfusions given potential transplant coming soon\par \par #Benign Essential HTN -better control\par no more meds for now\par cont above meds only\par \par \par  #HCM\par - prior lipids (jan 2020) LDL 51/\par - PNA vaccine 2015\par - Influenza vaccine done 10/9/19; Sept 2020, 2021\par - HBV series: Dec #1, Jan #2, June 19th #3; Hep B titres done in 12/2018\par  -COVID 19 vaccine #1 on 8/12 (Pfizer). COVID #2 was in Sep. Getting booster March 2022\par \par f/u April-may 2022

## 2022-03-10 LAB
ALBUMIN SERPL ELPH-MCNC: 4.2 G/DL
ANION GAP SERPL CALC-SCNC: 18 MMOL/L
APPEARANCE: CLEAR
BACTERIA: NEGATIVE
BASOPHILS # BLD AUTO: 0.08 K/UL
BASOPHILS NFR BLD AUTO: 1.8 %
BILIRUBIN URINE: NEGATIVE
BLOOD URINE: NEGATIVE
BUN SERPL-MCNC: 67 MG/DL
CALCIUM SERPL-MCNC: 9.2 MG/DL
CALCIUM SERPL-MCNC: 9.2 MG/DL
CHLORIDE SERPL-SCNC: 109 MMOL/L
CO2 SERPL-SCNC: 18 MMOL/L
COLOR: NORMAL
CREAT SERPL-MCNC: 6.98 MG/DL
CREAT SPEC-SCNC: 75 MG/DL
CREAT/PROT UR: 2.1 RATIO
EGFR: 9 ML/MIN/1.73M2
EOSINOPHIL # BLD AUTO: 0.04 K/UL
EOSINOPHIL NFR BLD AUTO: 0.9 %
GLUCOSE QUALITATIVE U: NORMAL
GLUCOSE SERPL-MCNC: 81 MG/DL
HCT VFR BLD CALC: 34.2 %
HGB BLD-MCNC: 10.1 G/DL
HYALINE CASTS: 0 /LPF
KETONES URINE: NEGATIVE
LEUKOCYTE ESTERASE URINE: NEGATIVE
LYMPHOCYTES # BLD AUTO: 1.06 K/UL
LYMPHOCYTES NFR BLD AUTO: 23.9 %
MAN DIFF?: NORMAL
MCHC RBC-ENTMCNC: 29.5 GM/DL
MCHC RBC-ENTMCNC: 31 PG
MCV RBC AUTO: 104.9 FL
MICROSCOPIC-UA: NORMAL
MONOCYTES # BLD AUTO: 0.39 K/UL
MONOCYTES NFR BLD AUTO: 8.8 %
NEUTROPHILS # BLD AUTO: 2.67 K/UL
NEUTROPHILS NFR BLD AUTO: 60.2 %
NITRITE URINE: NEGATIVE
PARATHYROID HORMONE INTACT: 482 PG/ML
PH URINE: 8
PHOSPHATE SERPL-MCNC: 4.8 MG/DL
PLATELET # BLD AUTO: 543 K/UL
POTASSIUM SERPL-SCNC: 5.5 MMOL/L
PROT UR-MCNC: 159 MG/DL
PROTEIN URINE: ABNORMAL
RBC # BLD: 3.26 M/UL
RBC # FLD: 21.4 %
RED BLOOD CELLS URINE: 1 /HPF
SODIUM SERPL-SCNC: 144 MMOL/L
SPECIFIC GRAVITY URINE: 1.01
SQUAMOUS EPITHELIAL CELLS: 1 /HPF
UROBILINOGEN URINE: NORMAL
WBC # FLD AUTO: 4.43 K/UL
WHITE BLOOD CELLS URINE: 4 /HPF

## 2022-03-10 RX ORDER — SODIUM ZIRCONIUM CYCLOSILICATE 10 G/10G
10 POWDER, FOR SUSPENSION ORAL
Qty: 90 | Refills: 3 | Status: ACTIVE | COMMUNITY
Start: 2021-12-09 | End: 1900-01-01

## 2022-03-11 ENCOUNTER — LABORATORY RESULT (OUTPATIENT)
Age: 52
End: 2022-03-11

## 2022-03-11 ENCOUNTER — APPOINTMENT (OUTPATIENT)
Dept: HEMATOLOGY ONCOLOGY | Facility: CLINIC | Age: 52
End: 2022-03-11
Payer: COMMERCIAL

## 2022-03-11 VITALS
HEIGHT: 64 IN | TEMPERATURE: 97.9 F | WEIGHT: 139.97 LBS | OXYGEN SATURATION: 99 % | SYSTOLIC BLOOD PRESSURE: 166 MMHG | DIASTOLIC BLOOD PRESSURE: 75 MMHG | RESPIRATION RATE: 16 BRPM | HEART RATE: 67 BPM | BODY MASS INDEX: 23.9 KG/M2

## 2022-03-11 PROCEDURE — 38221 DX BONE MARROW BIOPSIES: CPT

## 2022-03-11 RX ORDER — CHLORHEXIDINE GLUCONATE 4 %
1000 LIQUID (ML) TOPICAL
Refills: 0 | Status: ACTIVE | COMMUNITY

## 2022-03-11 NOTE — PROCEDURE
[Bone Marrow Aspiration] : bone marrow aspiration  [Bone Marrow Biopsy] : bone marrow biopsy [Patient] : the patient [Verbal Consent Obtained] : verbal consent was obtained prior to the procedure [Patient identification verified] : patient identification verified [Procedure verified and consent obtained] : procedure verified and consent obtained [Laterality verified and correct site marked] : laterality verified and correct site marked [Right] : site: right [Correct positioning] : correct positioning [Prone] : prone [Superior iliac spine was identified] : the superior iliac spine was identified. [The right posterior iliac crest was prepped with betadine and draped, using sterile technique.] : The right posterior iliac crest was prepped with betadine and draped, using sterile technique. [Lidocaine was injected and into the periosteum overlying the site.] : Lidocaine was injected and into the periosteum overlying the site. [Aspirate] : aspirate [Cytogenetics] : cytogenetics [FISH] : FISH [Biopsy] : biopsy [Flow Cytometry] : flow cytometry [] : The patient was instructed to remove the bandage the following AM. The patient may bathe. Acetaminophen may be taken for discomfort, as per package directions.If there are any other problems, the patient was instructed to call the office. The patient verbalized understanding, and is aware of the office contact numbers. [FreeTextEntry1] : h/o anemia [FreeTextEntry2] : 6 cc of lidocaine was used for the procedure. \par \par WBC: 3.26\par Hgb: 10.1\par Hct: 34.2\par Plts: 543\par \par Bone marrow aspiration and biopsy were done. MDS panel requested

## 2022-03-11 NOTE — REASON FOR VISIT
[Bone Marrow Biopsy] : bone marrow biopsy [Bone Marrow Aspiration] : bone marrow aspiration [FreeTextEntry2] : h/o anemia

## 2022-03-15 LAB
B19V DNA FLD QL NAA+PROBE: NOT DETECTED IU/ML
BKV DNA SPEC QL NAA+PROBE: NOT DETECTED COPIES/ML
CMV DNA SPEC QL NAA+PROBE: NOT DETECTED IU/ML
HADV DNA SERPL QL NAA+PROBE: NOT DETECTED COPIES/ML

## 2022-03-18 NOTE — HISTORY OF PRESENT ILLNESS
[de-identified] : This is a 51 year old man with a history of CKD, history of a transplant at the age of 8 years old in 1978.  Chronic graft rejection, baseline creatinine ~3.  Was admitted with COVID 19 on December 5th.  Hg at baseline in 2018 in the 8-9 range, was more in the range of 9-10 after initiation of Aranesp that has been titrated upwards since April 2019 as Aranesp was titrate upwards over the time period.  ON December 5th patient was admitted to [facility] with a Hg in the 9's went up to 11g/dl with hemoconcentration during the episode.  Patient had not received Aranesp during that 12 day hospital stay.  Patient's hemoglobin dropped down to 7.9 mid week before rising again to 10 then discharged with a hg of 8.8g/dl.  On follow up on January 11th, patient was found to have a profound anemia Hg 6.8, was admitted for transfusion that next day. On discharge follow up a 2nd time Hg stable at 9.4g/dl and more recently 9.0 2 days ago on January 25th.  \par \par Patient feels well following the discharge from the hospital stay for COVID 19, While his Hg was as low as 6.6g/dl at the ER on 2nd admission, patient denies any chest pain shortness of breath or syncope, just felt weak and tired somewhat.  Ferritin 444 at the hospital. B12 was slightly low in the 270's, patient was already asked to start a B12 supplement and will be taking it.  Retic count was only 0.6%. [de-identified] : Patient is a match with his sister, could proceed to have the donation done.  WBC 2.9 Hg 7.1g/dl today.  \par Patient receiving aranesp 125mcg at home.  Has been on this for 2-3 weeks.  \par Has a100 and a 25mcg vial.  \par \par Reccomended patient increase aranesp to 150mcg daily.

## 2022-03-28 ENCOUNTER — OUTPATIENT (OUTPATIENT)
Dept: OUTPATIENT SERVICES | Facility: HOSPITAL | Age: 52
LOS: 1 days | Discharge: HOME | End: 2022-03-28

## 2022-03-28 DIAGNOSIS — Z94.0 KIDNEY TRANSPLANT STATUS: Chronic | ICD-10-CM

## 2022-03-28 DIAGNOSIS — D64.9 ANEMIA, UNSPECIFIED: ICD-10-CM

## 2022-03-30 ENCOUNTER — APPOINTMENT (OUTPATIENT)
Dept: CARDIOLOGY | Facility: CLINIC | Age: 52
End: 2022-03-30
Payer: COMMERCIAL

## 2022-03-30 ENCOUNTER — NON-APPOINTMENT (OUTPATIENT)
Age: 52
End: 2022-03-30

## 2022-03-30 VITALS
OXYGEN SATURATION: 98 % | BODY MASS INDEX: 23.69 KG/M2 | HEART RATE: 52 BPM | DIASTOLIC BLOOD PRESSURE: 60 MMHG | WEIGHT: 138 LBS | SYSTOLIC BLOOD PRESSURE: 152 MMHG

## 2022-03-30 DIAGNOSIS — R09.89 OTHER SPECIFIED SYMPTOMS AND SIGNS INVOLVING THE CIRCULATORY AND RESPIRATORY SYSTEMS: ICD-10-CM

## 2022-03-30 PROCEDURE — 99215 OFFICE O/P EST HI 40 MIN: CPT

## 2022-03-30 PROCEDURE — 93000 ELECTROCARDIOGRAM COMPLETE: CPT

## 2022-03-31 ENCOUNTER — NON-APPOINTMENT (OUTPATIENT)
Age: 52
End: 2022-03-31

## 2022-03-31 ENCOUNTER — APPOINTMENT (OUTPATIENT)
Dept: GASTROENTEROLOGY | Facility: CLINIC | Age: 52
End: 2022-03-31
Payer: COMMERCIAL

## 2022-03-31 VITALS
WEIGHT: 138 LBS | HEIGHT: 65 IN | DIASTOLIC BLOOD PRESSURE: 70 MMHG | SYSTOLIC BLOOD PRESSURE: 130 MMHG | BODY MASS INDEX: 22.99 KG/M2

## 2022-03-31 VITALS — HEART RATE: 60 BPM | RESPIRATION RATE: 12 BRPM

## 2022-03-31 DIAGNOSIS — K21.9 GASTRO-ESOPHAGEAL REFLUX DISEASE W/OUT ESOPHAGITIS: ICD-10-CM

## 2022-03-31 DIAGNOSIS — R74.8 ABNORMAL LEVELS OF OTHER SERUM ENZYMES: ICD-10-CM

## 2022-03-31 DIAGNOSIS — Z12.11 ENCOUNTER FOR SCREENING FOR MALIGNANT NEOPLASM OF COLON: ICD-10-CM

## 2022-03-31 DIAGNOSIS — I10 ESSENTIAL (PRIMARY) HYPERTENSION: ICD-10-CM

## 2022-03-31 PROCEDURE — 99203 OFFICE O/P NEW LOW 30 MIN: CPT

## 2022-03-31 NOTE — HISTORY OF PRESENT ILLNESS
[Vomiting] : denies vomiting [Nausea] : denies nausea [Diarrhea] : denies diarrhea [Constipation] : denies constipation [Yellow Skin Or Eyes (Jaundice)] : denies jaundice [Abdominal Pain] : denies abdominal pain [Abdominal Swelling] : denies abdominal swelling [Rectal Pain] : denies rectal pain [Heartburn] : heartburn [Wt Gain ___ Lbs] : no recent weight gain [Wt Loss ___ Lbs] : no recent weight loss [GERD] : no gastroesophageal reflux disease [Hiatus Hernia] : no hiatus hernia [Peptic Ulcer Disease] : no peptic ulcer disease [Pancreatitis] : no pancreatitis [Cholelithiasis] : no cholelithiasis [Kidney Stone] : no kidney stone [Inflammatory Bowel Disease] : no inflammatory bowel disease [Irritable Bowel Syndrome] : no irritable bowel syndrome [Diverticulitis] : no diverticulitis [Alcohol Abuse] : no alcohol abuse [Malignancy] : no malignancy [Abdominal Surgery] : no abdominal surgery [Appendectomy] : no appendectomy [Cholecystectomy] : no cholecystectomy [de-identified] : 52 yo male, hx of CKD , previousl failed tranpslant, previous anemia, now with hb 10. NO sob, or cp. Pending renal transplant with sister as donor. Has minimal gerd symptoms, referred for initial colon cancer screening.Denies change in bowel habits, rectal bleeding.

## 2022-03-31 NOTE — ASSESSMENT
[FreeTextEntry1] : 50 yo male, hx of CKD , previousl failed tranpslant, previous anemia, now with hb 10. NO sob, or cp. Pending renal transplant with sister as donor. Has minimal gerd symptoms, referred for initial colon cancer screening.Denies change in bowel habits, rectal bleeding.\par He  was advised to undergo endoscopy to which he agreed. The procedure will be performed in [facility] with the assistance of an anesthesiologist. The procedure was explained in detail and he understood the risks of the procedure not limited  to infection, bleeding, perforation, risk of anesthesia and risk of IV site problems,emergency surgery, missed lesions  or non-diagnosis of stomach or esophageal  cancer.He/She]  was advised that he could not drive home alone, if the patient chooses to receive sedation. Further diagnostic and treatment recommendations will be based upon the procedure and any biopsies, if they are taken.\par \par He was advised to undergo colonoscopy to which he  agreed. The procedure will be performed in [facility] with the assistance of an anesthesiologist. The procedure was explained in detail and he understood the risks of the procedure not limited  to infection, bleeding, perforation, risk of anesthesia and risk of IV site problems,emergency surgery, missed lesions  or non-diagnosis of colon cancer. He  was advised that he could not drive home alone, if the patient chooses to receive sedation. Further diagnostic and treatment recommendations will be based upon the procedure and any biopsies, if they are taken.\par

## 2022-04-01 ENCOUNTER — RESULT REVIEW (OUTPATIENT)
Age: 52
End: 2022-04-01

## 2022-04-01 ENCOUNTER — APPOINTMENT (OUTPATIENT)
Dept: HEMATOLOGY ONCOLOGY | Facility: CLINIC | Age: 52
End: 2022-04-01
Payer: COMMERCIAL

## 2022-04-01 VITALS
WEIGHT: 139.97 LBS | OXYGEN SATURATION: 99 % | HEART RATE: 84 BPM | RESPIRATION RATE: 14 BRPM | BODY MASS INDEX: 23.32 KG/M2 | SYSTOLIC BLOOD PRESSURE: 145 MMHG | HEIGHT: 65 IN | TEMPERATURE: 97.2 F | DIASTOLIC BLOOD PRESSURE: 76 MMHG

## 2022-04-01 LAB
BASOPHILS # BLD AUTO: 0.06 K/UL — SIGNIFICANT CHANGE UP (ref 0–0.2)
BASOPHILS NFR BLD AUTO: 0.9 % — SIGNIFICANT CHANGE UP (ref 0–2)
EOSINOPHIL # BLD AUTO: 0.12 K/UL — SIGNIFICANT CHANGE UP (ref 0–0.5)
EOSINOPHIL NFR BLD AUTO: 1.9 % — SIGNIFICANT CHANGE UP (ref 0–6)
HCT VFR BLD CALC: 37.1 % — LOW (ref 39–50)
HGB BLD-MCNC: 11.5 G/DL — LOW (ref 13–17)
IMM GRANULOCYTES NFR BLD AUTO: 1.3 % — SIGNIFICANT CHANGE UP (ref 0–1.5)
LYMPHOCYTES # BLD AUTO: 0.57 K/UL — LOW (ref 1–3.3)
LYMPHOCYTES # BLD AUTO: 9 % — LOW (ref 13–44)
MCHC RBC-ENTMCNC: 30.7 G/DL — LOW (ref 32–36)
MCHC RBC-ENTMCNC: 33.9 PG — SIGNIFICANT CHANGE UP (ref 27–34)
MCV RBC AUTO: 110.1 FL — HIGH (ref 80–100)
MONOCYTES # BLD AUTO: 0.56 K/UL — SIGNIFICANT CHANGE UP (ref 0–0.9)
MONOCYTES NFR BLD AUTO: 8.8 % — SIGNIFICANT CHANGE UP (ref 2–14)
NEUTROPHILS # BLD AUTO: 4.96 K/UL — SIGNIFICANT CHANGE UP (ref 1.8–7.4)
NEUTROPHILS NFR BLD AUTO: 78.1 % — HIGH (ref 43–77)
NRBC # BLD: 0 /100 WBCS — SIGNIFICANT CHANGE UP (ref 0–0)
PLATELET # BLD AUTO: 275 K/UL — SIGNIFICANT CHANGE UP (ref 150–400)
RBC # BLD: 3.39 M/UL — LOW (ref 4.2–5.8)
RBC # FLD: 21.6 % — HIGH (ref 10.3–14.5)
RETICS #: 99.7 K/UL — SIGNIFICANT CHANGE UP (ref 25–125)
RETICS/RBC NFR: 2.9 % — HIGH (ref 0.5–2.5)
WBC # BLD: 6.35 K/UL — SIGNIFICANT CHANGE UP (ref 3.8–10.5)
WBC # FLD AUTO: 6.35 K/UL — SIGNIFICANT CHANGE UP (ref 3.8–10.5)

## 2022-04-01 PROCEDURE — 99214 OFFICE O/P EST MOD 30 MIN: CPT

## 2022-04-02 NOTE — REASON FOR VISIT
[CV Risk Factors and Non-Cardiac Disease] : CV risk factors and non-cardiac disease [Arrhythmia/ECG Abnorrmalities] : arrhythmia/ECG abnormalities [FreeTextEntry3] : José Miguel Hemphill MD [FreeTextEntry1] : Mr. Siddiqi was noted to have bradycardia while receiving a blood transfusion. He is here to have the bradycardia evaluated.

## 2022-04-02 NOTE — PHYSICAL EXAM
[Well Developed] : well developed [Well Nourished] : well nourished [No Acute Distress] : no acute distress [Normal Conjunctiva] : normal conjunctiva [Normal Venous Pressure] : normal venous pressure [No Carotid Bruit] : no carotid bruit [Normal S1, S2] : normal S1, S2 [No Rub] : no rub [No Gallop] : no gallop [Bradycardia] : bradycardic [Heart Rate ___] : [unfilled] bpm [II] : a grade 2 [2+] : left 2+ [Right Carotid Bruit] : right carotid bruit heard [Left Carotid Bruit] : left carotid bruit heard [Clear Lung Fields] : clear lung fields [Good Air Entry] : good air entry [No Respiratory Distress] : no respiratory distress  [Soft] : abdomen soft [Non Tender] : non-tender [No Masses/organomegaly] : no masses/organomegaly [Normal Bowel Sounds] : normal bowel sounds [Normal Gait] : normal gait [No Edema] : no edema [No Cyanosis] : no cyanosis [No Clubbing] : no clubbing [No Varicosities] : no varicosities [No Rash] : no rash [No Skin Lesions] : no skin lesions [Moves all extremities] : moves all extremities [No Focal Deficits] : no focal deficits [Normal Speech] : normal speech [Alert and Oriented] : alert and oriented [Normal memory] : normal memory [de-identified] : dragon tattoo on right arm

## 2022-04-02 NOTE — HISTORY OF PRESENT ILLNESS
[de-identified] : This is a 51 year old man with a history of CKD, history of a transplant at the age of 8 years old in 1978.  Chronic graft rejection, baseline creatinine ~3.  Was admitted with COVID 19 on December 5th.  Hg at baseline in 2018 in the 8-9 range, was more in the range of 9-10 after initiation of Aranesp that has been titrated upwards since April 2019 as Aranesp was titrate upwards over the time period.  ON December 5th patient was admitted to [facility] with a Hg in the 9's went up to 11g/dl with hemoconcentration during the episode.  Patient had not received Aranesp during that 12 day hospital stay.  Patient's hemoglobin dropped down to 7.9 mid week before rising again to 10 then discharged with a hg of 8.8g/dl.  On follow up on January 11th, patient was found to have a profound anemia Hg 6.8, was admitted for transfusion that next day. On discharge follow up a 2nd time Hg stable at 9.4g/dl and more recently 9.0 2 days ago on January 25th.  \par \par Patient feels well following the discharge from the hospital stay for COVID 19, While his Hg was as low as 6.6g/dl at the ER on 2nd admission, patient denies any chest pain shortness of breath or syncope, just felt weak and tired somewhat.  Ferritin 444 at the hospital. B12 was slightly low in the 270's, patient was already asked to start a B12 supplement and will be taking it.  Retic count was only 0.6%. [de-identified] : Patient reutrns for follow up of a profound anemia.  Hg did not improve after holding azathioprine for 2 days then restarting at a half dose.  Patient was transfused and since hten it was further decreased to 50mg daily.  Patient Hg started coming up after that.  Hg 11.5g/dl today slightly over the goal range.  Patient feels well with this.  \par Patient already had the syringes for the 150mcg dose,  but no more 100's and 25's. Reccomended he try taking this every 10 days as the way to do the dose reduction.  \par \par Patient had found out that his sister is a match, they are proceeding to pre transplant workup.  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

## 2022-04-02 NOTE — HISTORY OF PRESENT ILLNESS
[FreeTextEntry1] : Patient is a 51 year-old gentleman with congenital renal disease status post renal transplant in 1978, more recently with Covid-19 infection resulting in worsening of his transplant CKD, recently seen to have anemia requiring transfusion of PRBC. During the blood transfusion, he was noted to have sinus bradycardia with heart rate approximately 50 bpm. He had no symptoms of the bradycardia.\par He has no personal history of syncope or presyncope.\par He does not exercise.\par \par Covid-19 infection in December 2020. He has received two doses of Pfizer's Covid-19 infection.

## 2022-04-02 NOTE — ASSESSMENT
[FreeTextEntry1] : This is a 50 year old man with a history of renal insufficiency, chronic rejection of a transplant that he received at the age of 8, remains on immunosuppression to this day. baseline creatinine ~3.  Patient has a history of anemia of chronic renal insufficiency was on escalating doses of Aranesp up until December 5th.  Hg was mostly stable at the hospital while he was being treated for COVID 19.  Hg 8-11 during that stay. Discharged with Hg 8.8g/dl.  1/11/21 Hg 6.8g/dl requiring readmission for transfusion of 2 units of PRBC.  Azathioprine was further decreased to 50mg daily.  Since hten Hg improved now 11.5g/dl.  Recommend patient decrease Aranesp, though he just picked up the 150mcg prefilled syringes. Reccomended he decrease the frequency to once every 10 ways which would mount to just about a 30% dose reduction. Follow up next month.  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 Hg improved now. NO cytogenetic abnormalities or change in karyotype.

## 2022-04-02 NOTE — DISCUSSION/SUMMARY
[FreeTextEntry1] : Patient is a 51 year-old gentleman with complex past medical history as above, including a remote renal transplant, now with anemia that required transfusion, seen to have sinus bradycardia during the transfusion.\par \par Exercise stress test to evaluate for chronotropic competence.\par Echocardiogram to evaluate for structural heart disease.\par Carotid duplex to evaluate for stenosis after bruit heard on exam.

## 2022-04-06 LAB
ALBUMIN SERPL ELPH-MCNC: 4.1 G/DL
ALP BLD-CCNC: 90 U/L
ALT SERPL-CCNC: 7 U/L
ANION GAP SERPL CALC-SCNC: 16 MMOL/L
AST SERPL-CCNC: 11 U/L
B19V IGG SER QL IA: 4.83 INDEX
B19V IGG+IGM SER-IMP: NORMAL
B19V IGG+IGM SER-IMP: POSITIVE
B19V IGM FLD-ACNC: 0.52 INDEX
B19V IGM SER-ACNC: NEGATIVE
BILIRUB SERPL-MCNC: 0.6 MG/DL
BUN SERPL-MCNC: 73 MG/DL
CALCIUM SERPL-MCNC: 9.2 MG/DL
CHLORIDE SERPL-SCNC: 110 MMOL/L
CMV IGG SERPL QL: <0.2 U/ML
CMV IGG SERPL-IMP: NEGATIVE
CMV IGM SERPL QL: <8 AU/ML
CMV IGM SERPL QL: NEGATIVE
CO2 SERPL-SCNC: 20 MMOL/L
CREAT SERPL-MCNC: 8.33 MG/DL
EGFR: 7 ML/MIN/1.73M2
FERRITIN SERPL-MCNC: 534 NG/ML
GLUCOSE SERPL-MCNC: 104 MG/DL
IRON SATN MFR SERPL: 29 %
IRON SERPL-MCNC: 55 UG/DL
POTASSIUM SERPL-SCNC: 5.5 MMOL/L
PROT SERPL-MCNC: 6.2 G/DL
SODIUM SERPL-SCNC: 146 MMOL/L
TIBC SERPL-MCNC: 190 UG/DL
UIBC SERPL-MCNC: 136 UG/DL

## 2022-04-13 ENCOUNTER — RESULT REVIEW (OUTPATIENT)
Age: 52
End: 2022-04-13

## 2022-04-13 ENCOUNTER — APPOINTMENT (OUTPATIENT)
Dept: GASTROENTEROLOGY | Facility: AMBULATORY SURGERY CENTER | Age: 52
End: 2022-04-13
Payer: COMMERCIAL

## 2022-04-13 PROCEDURE — 43239 EGD BIOPSY SINGLE/MULTIPLE: CPT

## 2022-04-13 PROCEDURE — 45385 COLONOSCOPY W/LESION REMOVAL: CPT

## 2022-04-21 ENCOUNTER — LABORATORY RESULT (OUTPATIENT)
Age: 52
End: 2022-04-21

## 2022-04-22 ENCOUNTER — TRANSCRIPTION ENCOUNTER (OUTPATIENT)
Age: 52
End: 2022-04-22

## 2022-04-25 LAB
ALBUMIN SERPL ELPH-MCNC: 3.7 G/DL
ANION GAP SERPL CALC-SCNC: 16 MMOL/L
BASOPHILS # BLD AUTO: 0 K/UL
BASOPHILS NFR BLD AUTO: 0 %
BUN SERPL-MCNC: 100 MG/DL
CALCIUM SERPL-MCNC: 9.1 MG/DL
CHLORIDE SERPL-SCNC: 116 MMOL/L
CO2 SERPL-SCNC: 14 MMOL/L
CREAT SERPL-MCNC: 8 MG/DL
EGFR: 8 ML/MIN/1.73M2
EOSINOPHIL # BLD AUTO: 0 K/UL
EOSINOPHIL NFR BLD AUTO: 0 %
GLUCOSE SERPL-MCNC: 92 MG/DL
HCT VFR BLD CALC: 40.5 %
HGB BLD-MCNC: 11.8 G/DL
LYMPHOCYTES # BLD AUTO: 0.73 K/UL
LYMPHOCYTES NFR BLD AUTO: 13.3 %
MAN DIFF?: NORMAL
MCHC RBC-ENTMCNC: 29.1 GM/DL
MCHC RBC-ENTMCNC: 34.2 PG
MCV RBC AUTO: 117.4 FL
MONOCYTES # BLD AUTO: 0.32 K/UL
MONOCYTES NFR BLD AUTO: 5.8 %
NEUTROPHILS # BLD AUTO: 4.21 K/UL
NEUTROPHILS NFR BLD AUTO: 76.7 %
PHOSPHATE SERPL-MCNC: 7.2 MG/DL
PLATELET # BLD AUTO: 231 K/UL
POTASSIUM SERPL-SCNC: 5.7 MMOL/L
RBC # BLD: 3.45 M/UL
RBC # FLD: 18.4 %
SODIUM SERPL-SCNC: 147 MMOL/L
WBC # FLD AUTO: 5.49 K/UL

## 2022-04-27 ENCOUNTER — APPOINTMENT (OUTPATIENT)
Dept: CARDIOLOGY | Facility: CLINIC | Age: 52
End: 2022-04-27
Payer: COMMERCIAL

## 2022-04-27 PROCEDURE — 93306 TTE W/DOPPLER COMPLETE: CPT

## 2022-04-27 PROCEDURE — 93015 CV STRESS TEST SUPVJ I&R: CPT

## 2022-04-27 PROCEDURE — 93880 EXTRACRANIAL BILAT STUDY: CPT

## 2022-05-02 ENCOUNTER — APPOINTMENT (OUTPATIENT)
Dept: CARDIOLOGY | Facility: CLINIC | Age: 52
End: 2022-05-02
Payer: COMMERCIAL

## 2022-05-02 VITALS
OXYGEN SATURATION: 96 % | SYSTOLIC BLOOD PRESSURE: 197 MMHG | WEIGHT: 138 LBS | BODY MASS INDEX: 22.96 KG/M2 | DIASTOLIC BLOOD PRESSURE: 80 MMHG | HEART RATE: 55 BPM

## 2022-05-02 DIAGNOSIS — I10 ESSENTIAL (PRIMARY) HYPERTENSION: ICD-10-CM

## 2022-05-02 DIAGNOSIS — R00.1 BRADYCARDIA, UNSPECIFIED: ICD-10-CM

## 2022-05-02 PROCEDURE — 99214 OFFICE O/P EST MOD 30 MIN: CPT

## 2022-05-02 RX ORDER — DOXAZOSIN 2 MG/1
2 TABLET ORAL
Qty: 90 | Refills: 0 | Status: ACTIVE | COMMUNITY
Start: 2022-04-25

## 2022-05-10 ENCOUNTER — APPOINTMENT (OUTPATIENT)
Dept: NEPHROLOGY | Facility: CLINIC | Age: 52
End: 2022-05-10
Payer: COMMERCIAL

## 2022-05-10 ENCOUNTER — OUTPATIENT (OUTPATIENT)
Dept: OUTPATIENT SERVICES | Facility: HOSPITAL | Age: 52
LOS: 1 days | Discharge: HOME | End: 2022-05-10

## 2022-05-10 ENCOUNTER — LABORATORY RESULT (OUTPATIENT)
Age: 52
End: 2022-05-10

## 2022-05-10 VITALS
TEMPERATURE: 97.7 F | BODY MASS INDEX: 22.99 KG/M2 | HEIGHT: 65 IN | HEART RATE: 56 BPM | WEIGHT: 138 LBS | OXYGEN SATURATION: 99 %

## 2022-05-10 VITALS — SYSTOLIC BLOOD PRESSURE: 180 MMHG | DIASTOLIC BLOOD PRESSURE: 80 MMHG

## 2022-05-10 DIAGNOSIS — Z94.0 KIDNEY TRANSPLANT STATUS: Chronic | ICD-10-CM

## 2022-05-10 DIAGNOSIS — N18.5 CHRONIC KIDNEY DISEASE, STAGE 5: ICD-10-CM

## 2022-05-10 DIAGNOSIS — D64.9 ANEMIA, UNSPECIFIED: ICD-10-CM

## 2022-05-10 DIAGNOSIS — Z94.0 KIDNEY TRANSPLANT STATUS: ICD-10-CM

## 2022-05-10 DIAGNOSIS — D63.1 CHRONIC KIDNEY DISEASE, STAGE 5: ICD-10-CM

## 2022-05-10 PROCEDURE — 99215 OFFICE O/P EST HI 40 MIN: CPT

## 2022-05-11 ENCOUNTER — NON-APPOINTMENT (OUTPATIENT)
Age: 52
End: 2022-05-11

## 2022-05-11 LAB
25(OH)D3 SERPL-MCNC: 20.2 NG/ML
ALBUMIN SERPL ELPH-MCNC: 3.5 G/DL
ANION GAP SERPL CALC-SCNC: 15 MMOL/L
BASOPHILS # BLD AUTO: 0.03 K/UL
BASOPHILS NFR BLD AUTO: 0.7 %
BUN SERPL-MCNC: 109 MG/DL
CALCIUM SERPL-MCNC: 8.6 MG/DL
CHLORIDE SERPL-SCNC: 113 MMOL/L
CO2 SERPL-SCNC: 17 MMOL/L
CREAT SERPL-MCNC: 8.66 MG/DL
EGFR: 7 ML/MIN/1.73M2
EOSINOPHIL # BLD AUTO: 0.04 K/UL
EOSINOPHIL NFR BLD AUTO: 0.9 %
FERRITIN SERPL-MCNC: 895 NG/ML
GLUCOSE SERPL-MCNC: 111 MG/DL
HBV CORE IGG+IGM SER QL: NONREACTIVE
HBV CORE IGM SER QL: NONREACTIVE
HBV E AB SER QL: NONREACTIVE
HBV E AG SER QL: NONREACTIVE
HBV SURFACE AB SER QL: REACTIVE
HBV SURFACE AB SERPL IA-ACNC: 45.4 MIU/ML
HBV SURFACE AG SER QL: NONREACTIVE
HCT VFR BLD CALC: 30.1 %
HCV AB SER QL: NONREACTIVE
HCV S/CO RATIO: 0.12 S/CO
HGB BLD-MCNC: 9.3 G/DL
IMM GRANULOCYTES NFR BLD AUTO: 2.3 %
IRON SATN MFR SERPL: NORMAL %
IRON SERPL-MCNC: 124 UG/DL
LYMPHOCYTES # BLD AUTO: 0.99 K/UL
LYMPHOCYTES NFR BLD AUTO: 23.2 %
MAN DIFF?: NORMAL
MCHC RBC-ENTMCNC: 30.9 GM/DL
MCHC RBC-ENTMCNC: 33.2 PG
MCV RBC AUTO: 107.5 FL
MONOCYTES # BLD AUTO: 0.35 K/UL
MONOCYTES NFR BLD AUTO: 8.2 %
NEUTROPHILS # BLD AUTO: 2.76 K/UL
NEUTROPHILS NFR BLD AUTO: 64.7 %
PHOSPHATE SERPL-MCNC: 6.7 MG/DL
PLATELET # BLD AUTO: 155 K/UL
POTASSIUM SERPL-SCNC: 4.9 MMOL/L
RBC # BLD: 2.8 M/UL
RBC # FLD: 14.8 %
SODIUM SERPL-SCNC: 145 MMOL/L
TIBC SERPL-MCNC: NORMAL UG/DL
UIBC SERPL-MCNC: <20 UG/DL
WBC # FLD AUTO: 4.27 K/UL

## 2022-05-11 NOTE — PHYSICAL EXAM
[Well Developed] : well developed [Well Nourished] : well nourished [No Acute Distress] : no acute distress [Normal Conjunctiva] : normal conjunctiva [Normal Venous Pressure] : normal venous pressure [No Carotid Bruit] : no carotid bruit [Normal S1, S2] : normal S1, S2 [No Rub] : no rub [No Gallop] : no gallop [Bradycardia] : bradycardic [Heart Rate ___] : [unfilled] bpm [II] : a grade 2 [2+] : left 2+ [Right Carotid Bruit] : right carotid bruit heard [Left Carotid Bruit] : left carotid bruit heard [Clear Lung Fields] : clear lung fields [Good Air Entry] : good air entry [No Respiratory Distress] : no respiratory distress  [Soft] : abdomen soft [Non Tender] : non-tender [No Masses/organomegaly] : no masses/organomegaly [Normal Bowel Sounds] : normal bowel sounds [Normal Gait] : normal gait [No Edema] : no edema [No Cyanosis] : no cyanosis [No Clubbing] : no clubbing [No Varicosities] : no varicosities [No Rash] : no rash [No Skin Lesions] : no skin lesions [Moves all extremities] : moves all extremities [No Focal Deficits] : no focal deficits [Normal Speech] : normal speech [Alert and Oriented] : alert and oriented [Normal memory] : normal memory [de-identified] : dragon tattoo on right arm

## 2022-05-11 NOTE — REASON FOR VISIT
[CV Risk Factors and Non-Cardiac Disease] : CV risk factors and non-cardiac disease [Arrhythmia/ECG Abnorrmalities] : arrhythmia/ECG abnormalities [FreeTextEntry3] : José Miguel Hemphill MD [FreeTextEntry1] : 5/2/2022.\par Sanjiv Siddiqi returns today for followup of asymptomatic bradycardia and to review his recent cardiac testing results. Today he continues to feel well and denies any specific cardiac complaints. He remains on the renal transplant list at [facility] and is planning for peritoneal dialysis catheter placement next month.\par Since his last visit, he has been started on carvedilol 6.25 BID and doxazosin 2 mg daily by his cardiologist, Dr. Avalos.

## 2022-05-11 NOTE — END OF VISIT
[Time Spent: ___ minutes] : I have spent [unfilled] minutes of time on the encounter. [FreeTextEntry3] : I saw the patient with Grace Godinez NP and I agree with the findings and plan as above.

## 2022-05-11 NOTE — DISCUSSION/SUMMARY
[FreeTextEntry1] : Patient is a 51 year-old gentleman with complex past medical history as above, including a remote renal transplant, now with anemia that required transfusion, seen to have sinus bradycardia during the transfusion.\par \par We reviewed his recent exercise stress test which revealed exercise capacity of 7.5 METs and a markedly low peak heart rate. He has no significant ST changes at low peak HR.\par His echocardiogram revealed a decline in left ventricular systolic function with an LVEF of 40-45% (previously LVEF 68% in 2019).\par \par Would favor sending Mr. Siddiqi for electrophysiology evaluation given his chronotropic incompetence per exercise testing; however, will defer to his primary cardiologist for further recommendations.\par Would recommend uptitration of his beta blocker, as allowed.\par \par Follow up as needed.

## 2022-05-11 NOTE — CARDIOLOGY SUMMARY
[de-identified] : 7/24/2019, normal LA size, normal diastolic function, normal pulmonary pressures, normal LV systolic function, LVEF 68%

## 2022-05-11 NOTE — HISTORY OF PRESENT ILLNESS
[FreeTextEntry1] : Patient is a 51 year-old gentleman with congenital renal disease status post renal transplant in 1978, more recently with Covid-19 infection resulting in worsening of his transplant CKD, recently seen to have anemia requiring transfusion of PRBC. During the blood transfusion, he was noted to have sinus bradycardia with heart rate approximately 50 bpm. He had no symptoms of the bradycardia.\par He has no personal history of syncope or presyncope.\par He does not exercise.\par \par Covid-19 infection in December 2020. He has received two doses of Pfizer's Covid-19 infection.\par \par

## 2022-05-12 LAB
M TB IFN-G BLD-IMP: NEGATIVE
QUANTIFERON TB PLUS MITOGEN MINUS NIL: 2.59 IU/ML
QUANTIFERON TB PLUS NIL: 0.3 IU/ML
QUANTIFERON TB PLUS TB1 MINUS NIL: 0 IU/ML
QUANTIFERON TB PLUS TB2 MINUS NIL: -0.02 IU/ML

## 2022-05-13 PROBLEM — N18.5 ANEMIA IN STAGE 5 CHRONIC KIDNEY DISEASE: Status: ACTIVE | Noted: 2021-07-07

## 2022-05-13 PROBLEM — N18.5 CKD (CHRONIC KIDNEY DISEASE) STAGE 5, GFR LESS THAN 15 ML/MIN: Status: ACTIVE | Noted: 2021-07-07

## 2022-05-13 NOTE — HISTORY OF PRESENT ILLNESS
[FreeTextEntry1] : Here for follow up\par \par Since last visit he states his sister was cleared to donate, but he is sensitized and they don’t do desensitization at  [facility]\par Imuran had been decreased to 50mg and Pred 10mg qod. On lasix tiw m/w/f\par Has not been on aranesp 2 months due to increase in hb\par On Hydralazine 100mg tid, Furosemide 40mg tiw, Doxazosin 2mg once a day was started by Dr Avalos CV at [facility] bc of high BP; Coreg 3.125 bid, Nifedipine 60mg two tabs daily\par He is taking Lokelma 2 packets of 5g bid\par Since Feb he has been on disability\par To be Scheduled for PD cath and has appnt with Dr Peralta May 17\par \par Patient denies nausea, metallic taste in mouth, sob, leg swelling, change in urine frequency or output\par Denies diarrhea or vomiting\par Denies any pain or burning with urination\par Stable appetite\par Sleeps well\par Denies headaches, blurred vision, double vision, chest pain, extremity weakness\par Other ROS neg\par

## 2022-05-13 NOTE — PHYSICAL EXAM
[General Appearance - Alert] : alert [Sclera] : the sclera and conjunctiva were normal [Outer Ear] : the ears and nose were normal in appearance [Neck Appearance] : the appearance of the neck was normal [Respiration, Rhythm And Depth] : normal respiratory rhythm and effort [Auscultation Breath Sounds / Voice Sounds] : lungs were clear to auscultation bilaterally [Apical Impulse] : the apical impulse was normal [Heart Rate And Rhythm] : heart rate was normal and rhythm regular [Heart Sounds] : normal S1 and S2 [Bowel Sounds] : normal bowel sounds [Abdomen Soft] : soft [No CVA Tenderness] : no ~M costovertebral angle tenderness [Abnormal Walk] : normal gait [FreeTextEntry1] : left elbow soft tissue  [Skin Color & Pigmentation] : normal skin color and pigmentation [Skin Turgor] : normal skin turgor [] : no rash [Cranial Nerves] : cranial nerves 2-12 were intact [Oriented To Time, Place, And Person] : oriented to person, place, and time [Impaired Insight] : insight and judgment were intact [Affect] : the affect was normal

## 2022-05-13 NOTE — ASSESSMENT
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 Sister  potential donor; but was told  they cant tx him bc he is sensitixed; Pt advised to call [facility]  \par -pt prefers PD if it reaches the point, next appnt with Dr Peralta May 17th, pt will keep me updated on cath placement\par -[facility]\par \par #Hyperkalemia- advised diet low in K.  cont loklema 5g- 2 packets bid; check k today\par \par #Immunosuppression\par - c/w pred10 qod and Imuran 50mg\par \par #Anemia due to CKD  V/ BM suppression from Imuran\par - off  Aranesp  \par -Check CBC, iron studies\par \par #Hx elevated calcium on prior labs/hypercalcemia, vitamin d insufficiency\par - on vit D supplement 1000U qD\par -Repeat vitamin D, iPTH\par \par #metabolic acidosis 2/2 advanced CKD- stable\par - c/w bicarb tabs 650 mg three tabs bid\par -check bicarb level\par \par #Benign Essential HTN -better control\par -On Hydralazine 100mg tid, Furosemide 40mg tiw, Doxazosin 2mg once a day was started by Dr Avalos CV at [facility] of high BP; Coreg 3.125 bid, Nifedipine 60mg two tabs dailyol\par - increase doxazosin to total 4mg daily; increase lasix to daily\par -advised to continue to monitor BP at home, keep log, and call us if elevated\par -Low salt diet stressed \par \par  #HCM\par - prior lipids (jan 2020) LDL 51/\par - PNA vaccine 2015\par - Influenza vaccine done 10/9/19; Sept 2020, 2021\par - HBV series: Dec #1, Jan #2, June 19th #3; Hep B titres done in 12/2018\par - Will see Derm this year- needs to make appointment still. advised follow up gigi bc of multiple nevi\par left elbow soft tissue swelling- he will discuss with PMD; swelling decreasing per pt ?lipoma\par  -COVID 19 vaccine #1 on 8/12 (Pfizer). COVID #2 was in Sep.  \par - April stress test bradycardia and Ech EF 40-45%; Followed with Dr Ponce\par \par \par

## 2022-05-16 ENCOUNTER — RESULT REVIEW (OUTPATIENT)
Age: 52
End: 2022-05-16

## 2022-05-16 ENCOUNTER — APPOINTMENT (OUTPATIENT)
Dept: HEMATOLOGY ONCOLOGY | Facility: CLINIC | Age: 52
End: 2022-05-16
Payer: COMMERCIAL

## 2022-05-16 VITALS
DIASTOLIC BLOOD PRESSURE: 70 MMHG | HEIGHT: 64.57 IN | RESPIRATION RATE: 16 BRPM | SYSTOLIC BLOOD PRESSURE: 125 MMHG | BODY MASS INDEX: 24.17 KG/M2 | TEMPERATURE: 97.3 F | HEART RATE: 75 BPM | WEIGHT: 143.3 LBS | OXYGEN SATURATION: 99 %

## 2022-05-16 LAB
BASOPHILS # BLD AUTO: 0.04 K/UL — SIGNIFICANT CHANGE UP (ref 0–0.2)
BASOPHILS NFR BLD AUTO: 1 % — SIGNIFICANT CHANGE UP (ref 0–2)
EOSINOPHIL # BLD AUTO: 0.04 K/UL — SIGNIFICANT CHANGE UP (ref 0–0.5)
EOSINOPHIL NFR BLD AUTO: 1 % — SIGNIFICANT CHANGE UP (ref 0–6)
HCT VFR BLD CALC: 26.6 % — LOW (ref 39–50)
HGB BLD-MCNC: 8.7 G/DL — LOW (ref 13–17)
IMM GRANULOCYTES NFR BLD AUTO: 1.2 % — SIGNIFICANT CHANGE UP (ref 0–1.5)
LYMPHOCYTES # BLD AUTO: 1.08 K/UL — SIGNIFICANT CHANGE UP (ref 1–3.3)
LYMPHOCYTES # BLD AUTO: 26.9 % — SIGNIFICANT CHANGE UP (ref 13–44)
MCHC RBC-ENTMCNC: 32.3 G/DL — SIGNIFICANT CHANGE UP (ref 32–36)
MCHC RBC-ENTMCNC: 32.7 PG — SIGNIFICANT CHANGE UP (ref 27–34)
MCV RBC AUTO: 100.4 FL — HIGH (ref 80–100)
MONOCYTES # BLD AUTO: 0.32 K/UL — SIGNIFICANT CHANGE UP (ref 0–0.9)
MONOCYTES NFR BLD AUTO: 8 % — SIGNIFICANT CHANGE UP (ref 2–14)
NEUTROPHILS # BLD AUTO: 2.48 K/UL — SIGNIFICANT CHANGE UP (ref 1.8–7.4)
NEUTROPHILS NFR BLD AUTO: 61.9 % — SIGNIFICANT CHANGE UP (ref 43–77)
NRBC # BLD: 0 /100 WBCS — SIGNIFICANT CHANGE UP (ref 0–0)
PLATELET # BLD AUTO: 150 K/UL — SIGNIFICANT CHANGE UP (ref 150–400)
RBC # BLD: 2.63 M/UL — LOW (ref 4.2–5.8)
RBC # FLD: 14.6 % — HIGH (ref 10.3–14.5)
WBC # BLD: 4.01 K/UL — SIGNIFICANT CHANGE UP (ref 3.8–10.5)
WBC # FLD AUTO: 4.01 K/UL — SIGNIFICANT CHANGE UP (ref 3.8–10.5)

## 2022-05-16 PROCEDURE — 99214 OFFICE O/P EST MOD 30 MIN: CPT

## 2022-05-17 NOTE — ASSESSMENT
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 He has a history of anemia of chronic renal insufficiency and was on escalating doses of Aranesp up until December 5th Hg was mostly stable at the hospital while he was being treated for COVID 19. Hg 8-11 during that stay. Discharged with Hg 8.8g/dl. 1/11/21 Hg 6.8g/dl requiring readmission for transfusion of 2 units of PRBC. Given anemia w/ associated neutropenia, a BMBx was performed in 3/2022 which demonstrated some amount of dysplasia particularly in megakaryocytes, however hematopoiesis normal. Np cytogenetic abnormalities or change in karyotype. Pt's Azathioprine was further decreased to 50mg daily and his Hgb improved to 11s in 4/2022. His Aranesp was then decreased from 120mcg every week to 120mcg every 10 days. \par \par # Anemia\par - History as above. Pt's Hgb noted to decrease over the past week (9.3 on 5/10 and 8.7 on 5/16). \par - Pt's Aranesp dose will be reduced to 40mcg every 7 days as per his Nephrologist once he receives the prescribed syringes. Discussed with pt to continue Aranesp as per his Nephrologist. He was advised to notify his doctor if were to develop signs suggestive of worsening anemia (fatigue, dyspnea, etc) in the future. Will plan to follow-up again in 3 months for repeat counts.\par \par Case was discussed with Dr. Castillo\par \par Pino Herrera, PGY6\par Hematology-Oncology Fellow\par \par Attending attestation.  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 baseline creatinine ~3. Patient has a history of anemia of chronic renal insufficiency was on escalating doses of Aranesp up until December 5th. After some time, patient was noted to have been suffering from myelosuppression from Imuran, Anemia improved significantly after the Imuran was decreased to 50mg Daily.\par March 22nd 2022 Bone marrow biopsy was done as a response th the myelosuppression. No clonal pathology identified.  Trilineage hematopoieses with maturation was found with focally increased megakaryocytes, mild increase in interstitial mast cells, renal osteodystrophy with mild reticulin fibrosis and peritrabecular collegen.  Increased iron stores.  \par   Patient HCT improved to mildly supra therapeutic levels at 11.5g/dl, more recently 8.8g/dl which is on the loewr side. Given that he was never really symptomatic for the Hg in the 6's range this is adequate for now. agree with the Aranesp  at 40mcg, but would increases it should the Hg trend lower than the 8's.  Will follow up in 3 months.

## 2022-05-17 NOTE — HISTORY OF PRESENT ILLNESS
[de-identified] : This is a 51 year old man with a history of CKD, history of a transplant at the age of 8 years old in 1978.  Chronic graft rejection, baseline creatinine ~3.  Was admitted with COVID 19 on December 5th.  Hg at baseline in 2018 in the 8-9 range, was more in the range of 9-10 after initiation of Aranesp that has been titrated upwards since April 2019 as Aranesp was titrate upwards over the time period.  ON December 5th patient was admitted to [facility] with a Hg in the 9's went up to 11g/dl with hemoconcentration during the episode.  Patient had not received Aranesp during that 12 day hospital stay.  Patient's hemoglobin dropped down to 7.9 mid week before rising again to 10 then discharged with a hg of 8.8g/dl.  On follow up on January 11th, patient was found to have a profound anemia Hg 6.8, was admitted for transfusion that next day. On discharge follow up a 2nd time Hg stable at 9.4g/dl and more recently 9.0 2 days ago on January 25th.  \par \par Patient feels well following the discharge from the hospital stay for COVID 19, While his Hg was as low as 6.6g/dl at the ER on 2nd admission, patient denies any chest pain shortness of breath or syncope, just felt weak and tired somewhat.  Ferritin 444 at the hospital. B12 was slightly low in the 270's, patient was already asked to start a B12 supplement and will be taking it.  Retic count was only 0.6%.\par \par March 22nd 2022 Bone marrow biopsy was done as a response th the myelosuppression. No clonal pathology identified.  Trilineage hematopoieses with maturation was found with focally increased megakaryocytes, mild increase in interstitial mast cells, renal osteodystrophy with mild reticulin fibrosis and peritrabecular collegen.  Increased iron stores.  \par  [de-identified] : Patient returns for follow-up of anemia. Since his last visit, Aranesp has been reduced to 40mcg weekly.  Hg is on the lower end of goal at 8.8g/dl, patient does feel fine on this. Was never all that symptomatic back when his Hg was in the 6's range either, so this is adequate for him.   He remains on Imuran 50mg QD.  Dose of this was reduced 3 months ago due to myelosuppression.  Feels well right now and has no complaints.

## 2022-05-18 NOTE — DISCHARGE NOTE PROVIDER - NSDCCONDITION_GEN_ALL_CORE
Stable no anemia, no easy bruising, no jaundice, no swollen lymph nodes. (+) beta HcG increase in setting of termination

## 2022-05-20 ENCOUNTER — OUTPATIENT (OUTPATIENT)
Dept: OUTPATIENT SERVICES | Facility: HOSPITAL | Age: 52
LOS: 1 days | Discharge: HOME | End: 2022-05-20
Payer: COMMERCIAL

## 2022-05-20 VITALS
DIASTOLIC BLOOD PRESSURE: 81 MMHG | SYSTOLIC BLOOD PRESSURE: 151 MMHG | RESPIRATION RATE: 17 BRPM | OXYGEN SATURATION: 98 % | TEMPERATURE: 98 F | HEART RATE: 65 BPM | HEIGHT: 64 IN | WEIGHT: 141.32 LBS

## 2022-05-20 DIAGNOSIS — N18.5 CHRONIC KIDNEY DISEASE, STAGE 5: ICD-10-CM

## 2022-05-20 DIAGNOSIS — Z94.0 KIDNEY TRANSPLANT STATUS: Chronic | ICD-10-CM

## 2022-05-20 DIAGNOSIS — E78.5 HYPERLIPIDEMIA, UNSPECIFIED: ICD-10-CM

## 2022-05-20 DIAGNOSIS — Z98.890 OTHER SPECIFIED POSTPROCEDURAL STATES: Chronic | ICD-10-CM

## 2022-05-20 DIAGNOSIS — Z01.818 ENCOUNTER FOR OTHER PREPROCEDURAL EXAMINATION: ICD-10-CM

## 2022-05-20 DIAGNOSIS — I10 ESSENTIAL (PRIMARY) HYPERTENSION: ICD-10-CM

## 2022-05-20 DIAGNOSIS — N18.6 END STAGE RENAL DISEASE: ICD-10-CM

## 2022-05-20 LAB
ANION GAP SERPL CALC-SCNC: 10 MMOL/L — SIGNIFICANT CHANGE UP (ref 5–17)
BASOPHILS # BLD AUTO: 0.03 K/UL — SIGNIFICANT CHANGE UP (ref 0–0.2)
BASOPHILS NFR BLD AUTO: 0.7 % — SIGNIFICANT CHANGE UP (ref 0–2)
BUN SERPL-MCNC: 112 MG/DL — HIGH (ref 7–23)
CALCIUM SERPL-MCNC: 8.5 MG/DL — SIGNIFICANT CHANGE UP (ref 8.5–10.1)
CHLORIDE SERPL-SCNC: 115 MMOL/L — HIGH (ref 96–108)
CO2 SERPL-SCNC: 20 MMOL/L — LOW (ref 22–31)
CREAT SERPL-MCNC: 8.99 MG/DL — HIGH (ref 0.5–1.3)
EGFR: 7 ML/MIN/1.73M2 — LOW
EOSINOPHIL # BLD AUTO: 0.05 K/UL — SIGNIFICANT CHANGE UP (ref 0–0.5)
EOSINOPHIL NFR BLD AUTO: 1.1 % — SIGNIFICANT CHANGE UP (ref 0–6)
FLUAV AG NPH QL: SIGNIFICANT CHANGE UP
FLUBV AG NPH QL: SIGNIFICANT CHANGE UP
GLUCOSE SERPL-MCNC: 79 MG/DL — SIGNIFICANT CHANGE UP (ref 70–99)
HCT VFR BLD CALC: 26.5 % — LOW (ref 39–50)
HGB BLD-MCNC: 8.6 G/DL — LOW (ref 13–17)
IMM GRANULOCYTES NFR BLD AUTO: 2 % — HIGH (ref 0–1.5)
LYMPHOCYTES # BLD AUTO: 0.92 K/UL — LOW (ref 1–3.3)
LYMPHOCYTES # BLD AUTO: 20 % — SIGNIFICANT CHANGE UP (ref 13–44)
MCHC RBC-ENTMCNC: 32.5 G/DL — SIGNIFICANT CHANGE UP (ref 32–36)
MCHC RBC-ENTMCNC: 33.1 PG — SIGNIFICANT CHANGE UP (ref 27–34)
MCV RBC AUTO: 101.9 FL — HIGH (ref 80–100)
MONOCYTES # BLD AUTO: 0.5 K/UL — SIGNIFICANT CHANGE UP (ref 0–0.9)
MONOCYTES NFR BLD AUTO: 10.9 % — SIGNIFICANT CHANGE UP (ref 2–14)
NEUTROPHILS # BLD AUTO: 3 K/UL — SIGNIFICANT CHANGE UP (ref 1.8–7.4)
NEUTROPHILS NFR BLD AUTO: 65.3 % — SIGNIFICANT CHANGE UP (ref 43–77)
NRBC # BLD: 0 /100 WBCS — SIGNIFICANT CHANGE UP (ref 0–0)
PLATELET # BLD AUTO: 202 K/UL — SIGNIFICANT CHANGE UP (ref 150–400)
POTASSIUM SERPL-MCNC: 4.7 MMOL/L — SIGNIFICANT CHANGE UP (ref 3.5–5.3)
POTASSIUM SERPL-SCNC: 4.7 MMOL/L — SIGNIFICANT CHANGE UP (ref 3.5–5.3)
RBC # BLD: 2.6 M/UL — LOW (ref 4.2–5.8)
RBC # FLD: 14.7 % — HIGH (ref 10.3–14.5)
SARS-COV-2 RNA SPEC QL NAA+PROBE: SIGNIFICANT CHANGE UP
SODIUM SERPL-SCNC: 145 MMOL/L — SIGNIFICANT CHANGE UP (ref 135–145)
WBC # BLD: 4.59 K/UL — SIGNIFICANT CHANGE UP (ref 3.8–10.5)
WBC # FLD AUTO: 4.59 K/UL — SIGNIFICANT CHANGE UP (ref 3.8–10.5)

## 2022-05-20 PROCEDURE — 93010 ELECTROCARDIOGRAM REPORT: CPT

## 2022-05-20 NOTE — H&P PST ADULT - NSICDXPASTMEDICALHX_GEN_ALL_CORE_FT
PAST MEDICAL HISTORY:  Hypertension     Renal transplant recipient November 1978, c/b CKD IV     PAST MEDICAL HISTORY:  ESRD with anemia     Hypertension     Renal transplant recipient November 1978, c/b CKD IV

## 2022-05-20 NOTE — H&P PST ADULT - HISTORY OF PRESENT ILLNESS
52 yo male pmh, congenital kidney disease - developed ESRD - s/p kidney transplant in 1978 did well until he had covid19 in 2020 - kidney stopped working  now schedule for peritoneal catheter  insertion   denies recent travels in the past 30 days. No fever, SOB, cough, flu like symptoms or body rash- covid screen  covid vaccine completed

## 2022-05-20 NOTE — H&P PST ADULT - PROBLEM SELECTOR PLAN 4
Preop instructions provided including NPO status. Hibiclens wash for infection control. Patient aware to stop NSAID, OTC herbals  for 7-10 days, needs to be accompanied  by adult upon discharge.  Patient verbalized understanding

## 2022-05-20 NOTE — H&P PST ADULT - ASSESSMENT
ESRD  CAPRINI SCORE    AGE RELATED RISK FACTORS                                                       MOBILITY RELATED FACTORS  [x ] Age 41-60 years                                            (1 Point)                  [ ] Bed rest                                                        (1 Point)  [ ] Age: 61-74 years                                           (2 Points)                [ ] Plaster cast                                                   (2 Points)  [ ] Age= 75 years                                              (3 Points)                 [ ] Bed bound for more than 72 hours                   (2 Points)    DISEASE RELATED RISK FACTORS                                               GENDER SPECIFIC FACTORS  [ ] Edema in the lower extremities                       (1 Point)                  [ ] Pregnancy                                                     (1 Point)  [ ] Varicose veins                                               (1 Point)                  [ ] Post-partum < 6 weeks                                   (1 Point)             [x ] BMI > 25 Kg/m2                                            (1 Point)                  [ ] Hormonal therapy  or oral contraception            (1 Point)                 [ ] Sepsis (in the previous month)                        (1 Point)                  [ ] History of pregnancy complications  [ ] Pneumonia or serious lung disease                                               [ ] Unexplained or recurrent                       (1 Point)           (in the previous month)                               (1 Point)  [ ] Abnormal pulmonary function test                     (1 Point)                 SURGERY RELATED RISK FACTORS  [ ] Acute myocardial infarction                              (1 Point)                 [ ]  Section                                            (1 Point)  [ ] Congestive heart failure (in the previous month)  (1 Point)                 [ ] Minor surgery                                                 (1 Point)   [ ] Inflammatory bowel disease                             (1 Point)                 [ ] Arthroscopic surgery                                        (2 Points)  [ ] Central venous access                                    (2 Points)                [x ] General surgery lasting more than 45 minutes   (2 Points)       [ ] Stroke (in the previous month)                          (5 Points)               [ ] Elective arthroplasty                                        (5 Points)                                                                                                                                               HEMATOLOGY RELATED FACTORS                                                 TRAUMA RELATED RISK FACTORS  [ ] Prior episodes of VTE                                     (3 Points)                 [ ] Fracture of the hip, pelvis, or leg                       (5 Points)  [ ] Positive family history for VTE                         (3 Points)                 [ ] Acute spinal cord injury (in the previous month)  (5 Points)  [ ] Prothrombin 79622 A                                      (3 Points)                 [ ] Paralysis  (less than 1 month)                          (5 Points)  [ ] Factor V Leiden                                             (3 Points)                 [ ] Multiple Trauma within 1 month                         (5 Points)  [ ] Lupus anticoagulants                                     (3 Points)                                                           [ ] Anticardiolipin antibodies                                (3 Points)                                                       [ ] High homocysteine in the blood                      (3 Points)                                             [ ] Other congenital or acquired thrombophilia       (3 Points)                                                [ ] Heparin induced thrombocytopenia                  (3 Points)                                          Total Score [      4    ]  Caprini Score 0-2: Low risk, No VTE Prophylaxis required for most patient's, encourage ambulation  Caprini Score 3-6: At Risk, Pharmacologic VTE prophylaxis is indicated for most patients ( in the absence of a contraindication)  Caprini Score Greater than or = 7: High Risk , pharmacologic VTE is indicated for most patients ( in the absence of a contraindication)    Caprini score indicates that the patient is high risk for VTE event ( score 6 or greater). Surgical patient's in this group will benefit from both pharmacologic prophylaxis and intermittent compression devices . Surgical team will determine the balance between VTE  risk and bleeding risk and other clinical considerations

## 2022-05-20 NOTE — H&P PST ADULT - NSANTHOSAYNRD_GEN_A_CORE
No. MALGORZATA screening performed.  STOP BANG Legend: 0-2 = LOW Risk; 3-4 = INTERMEDIATE Risk; 5-8 = HIGH Risk

## 2022-05-23 ENCOUNTER — NON-APPOINTMENT (OUTPATIENT)
Age: 52
End: 2022-05-23

## 2022-05-23 PROBLEM — N18.6 END STAGE RENAL DISEASE: Chronic | Status: ACTIVE | Noted: 2022-05-20

## 2022-05-24 ENCOUNTER — APPOINTMENT (OUTPATIENT)
Dept: VASCULAR SURGERY | Facility: CLINIC | Age: 52
End: 2022-05-24

## 2022-05-24 ENCOUNTER — APPOINTMENT (OUTPATIENT)
Dept: VASCULAR SURGERY | Facility: CLINIC | Age: 52
End: 2022-05-24
Payer: COMMERCIAL

## 2022-05-24 ENCOUNTER — APPOINTMENT (OUTPATIENT)
Dept: CARDIOLOGY | Facility: CLINIC | Age: 52
End: 2022-05-24

## 2022-05-24 ENCOUNTER — APPOINTMENT (OUTPATIENT)
Dept: SURGERY | Facility: HOSPITAL | Age: 52
End: 2022-05-24

## 2022-05-24 VITALS
HEIGHT: 64.5 IN | DIASTOLIC BLOOD PRESSURE: 59 MMHG | TEMPERATURE: 97.5 F | SYSTOLIC BLOOD PRESSURE: 117 MMHG | BODY MASS INDEX: 24.12 KG/M2 | HEART RATE: 85 BPM | WEIGHT: 143 LBS

## 2022-05-24 PROCEDURE — 93986 DUP-SCAN HEMO COMPL UNI STD: CPT

## 2022-05-24 PROCEDURE — 99203 OFFICE O/P NEW LOW 30 MIN: CPT

## 2022-05-24 NOTE — HISTORY OF PRESENT ILLNESS
[FreeTextEntry1] : 51-year-old gentleman status post kidney transplant in 1978 for a congenital renal issue.  In the past several years patient's kidney function is worsened.  The kidney failure worsened after a COVID-19 infection.  Patient is now approaching hemodialysis.  Patient is right-handed.

## 2022-05-24 NOTE — ASSESSMENT
[FreeTextEntry1] : In the office today patient underwent a left arm vein mapping which shows adequate cephalic vein beginning at the left wrist.  Given the severity of the renal failure and a previous discussion with nephrology will place a permacath at the time of AV fistula creation.

## 2022-05-24 NOTE — PHYSICAL EXAM
[JVD] : no jugular venous distention  [Normal Breath Sounds] : Normal breath sounds [Normal Rate and Rhythm] : normal rate and rhythm [2+] : left 2+ [Ankle Swelling (On Exam)] : not present [Varicose Veins Of Lower Extremities] : not present [] : not present [Abdomen Tenderness] : ~T ~M No abdominal tenderness [No Rash or Lesion] : No rash or lesion [Alert] : alert [Calm] : calm [de-identified] : Appears well

## 2022-06-01 ENCOUNTER — OUTPATIENT (OUTPATIENT)
Dept: OUTPATIENT SERVICES | Facility: HOSPITAL | Age: 52
LOS: 1 days | End: 2022-06-01
Payer: COMMERCIAL

## 2022-06-01 VITALS
TEMPERATURE: 98 F | OXYGEN SATURATION: 97 % | RESPIRATION RATE: 16 BRPM | DIASTOLIC BLOOD PRESSURE: 76 MMHG | HEIGHT: 64 IN | HEART RATE: 77 BPM | SYSTOLIC BLOOD PRESSURE: 135 MMHG | WEIGHT: 141.98 LBS

## 2022-06-01 DIAGNOSIS — Z94.0 KIDNEY TRANSPLANT STATUS: Chronic | ICD-10-CM

## 2022-06-01 DIAGNOSIS — R09.89 OTHER SPECIFIED SYMPTOMS AND SIGNS INVOLVING THE CIRCULATORY AND RESPIRATORY SYSTEMS: ICD-10-CM

## 2022-06-01 DIAGNOSIS — Z29.9 ENCOUNTER FOR PROPHYLACTIC MEASURES, UNSPECIFIED: ICD-10-CM

## 2022-06-01 DIAGNOSIS — N18.6 END STAGE RENAL DISEASE: ICD-10-CM

## 2022-06-01 DIAGNOSIS — Z98.890 OTHER SPECIFIED POSTPROCEDURAL STATES: Chronic | ICD-10-CM

## 2022-06-01 DIAGNOSIS — I10 ESSENTIAL (PRIMARY) HYPERTENSION: ICD-10-CM

## 2022-06-01 DIAGNOSIS — Z01.818 ENCOUNTER FOR OTHER PREPROCEDURAL EXAMINATION: ICD-10-CM

## 2022-06-01 DIAGNOSIS — Z94.0 KIDNEY TRANSPLANT STATUS: ICD-10-CM

## 2022-06-01 LAB
ANION GAP SERPL CALC-SCNC: 21 MMOL/L — HIGH (ref 5–17)
BUN SERPL-MCNC: 122 MG/DL — HIGH (ref 7–23)
CALCIUM SERPL-MCNC: 8.8 MG/DL — SIGNIFICANT CHANGE UP (ref 8.4–10.5)
CHLORIDE SERPL-SCNC: 110 MMOL/L — HIGH (ref 96–108)
CO2 SERPL-SCNC: <10 MMOL/L — CRITICAL LOW (ref 22–31)
CREAT SERPL-MCNC: 9.21 MG/DL — HIGH (ref 0.5–1.3)
EGFR: 6 ML/MIN/1.73M2 — LOW
GLUCOSE SERPL-MCNC: 111 MG/DL — HIGH (ref 70–99)
HCT VFR BLD CALC: 28 % — LOW (ref 39–50)
HGB BLD-MCNC: 8.6 G/DL — LOW (ref 13–17)
MCHC RBC-ENTMCNC: 30.7 GM/DL — LOW (ref 32–36)
MCHC RBC-ENTMCNC: 32.7 PG — SIGNIFICANT CHANGE UP (ref 27–34)
MCV RBC AUTO: 106.5 FL — HIGH (ref 80–100)
NRBC # BLD: 0 /100 WBCS — SIGNIFICANT CHANGE UP (ref 0–0)
PLATELET # BLD AUTO: 270 K/UL — SIGNIFICANT CHANGE UP (ref 150–400)
POTASSIUM SERPL-MCNC: 5.6 MMOL/L — HIGH (ref 3.5–5.3)
POTASSIUM SERPL-SCNC: 5.6 MMOL/L — HIGH (ref 3.5–5.3)
RBC # BLD: 2.63 M/UL — LOW (ref 4.2–5.8)
RBC # FLD: 15.9 % — HIGH (ref 10.3–14.5)
SODIUM SERPL-SCNC: 140 MMOL/L — SIGNIFICANT CHANGE UP (ref 135–145)
WBC # BLD: 4.24 K/UL — SIGNIFICANT CHANGE UP (ref 3.8–10.5)
WBC # FLD AUTO: 4.24 K/UL — SIGNIFICANT CHANGE UP (ref 3.8–10.5)

## 2022-06-01 PROCEDURE — G0463: CPT

## 2022-06-01 PROCEDURE — 85027 COMPLETE CBC AUTOMATED: CPT

## 2022-06-01 PROCEDURE — 80048 BASIC METABOLIC PNL TOTAL CA: CPT

## 2022-06-01 RX ORDER — DARBEPOETIN ALFA IN POLYSORBAT 200MCG/0.4
0 PEN INJECTOR (ML) SUBCUTANEOUS
Qty: 0 | Refills: 0 | DISCHARGE

## 2022-06-01 NOTE — H&P PST ADULT - PROBLEM SELECTOR PLAN 1
Left permacath placement , Left upper extremity radiocephalic AV fistula creation scheduled for 6/13/2022  Presurgical instructions provided including antibacterial wash - Pt stated he understood  Labs collected  * Pt will obtain medical clearance before surgery as per Dr. Chacon request.

## 2022-06-01 NOTE — H&P PST ADULT - FALL HARM RISK - UNIVERSAL INTERVENTIONS
Bed in lowest position, wheels locked, appropriate side rails in place/Call bell, personal items and telephone in reach/Instruct patient to call for assistance before getting out of bed or chair/Non-slip footwear when patient is out of bed/[place] to call system/Physically safe environment - no spills, clutter or unnecessary equipment/Purposeful Proactive Rounding/Room/bathroom lighting operational, light cord in reach

## 2022-06-01 NOTE — H&P PST ADULT - NSICDXPASTMEDICALHX_GEN_ALL_CORE_FT
PAST MEDICAL HISTORY:  2019 novel coronavirus disease (COVID-19) 12/2020    ESRD with anemia     Hypertension     Renal transplant recipient November 1978, c/b CKD IV

## 2022-06-01 NOTE — H&P PST ADULT - OTHER CARE PROVIDERS
Nephro - Abeba FUCHS [phone] last visit 4/2022, Deedee Castillo [phone], Brendan Ponce [phone] last visit 4/2022 (testing)

## 2022-06-01 NOTE — H&P PST ADULT - SPO2 (%)
New PCA syringe set up after tx to room 706 because previous one was unhooked from patient. Old syringe wasted with Sushila MORIN.   97

## 2022-06-01 NOTE — H&P PST ADULT - ASSESSMENT
PARMINDERI VTE 2.0 SCORE [CLOT updated 2019]    AGE RELATED RISK FACTORS                                                       MOBILITY RELATED FACTORS  [x ] Age 41-60 years                                            (1 Point)                    [ ] Bed rest                                                        (1 Point)  [ ] Age: 61-74 years                                           (2 Points)                  [ ] Plaster cast                                                   (2 Points)  [ ] Age= 75 years                                              (3 Points)                    [ ] Bed bound for more than 72 hours                 (2 Points)    DISEASE RELATED RISK FACTORS                                               GENDER SPECIFIC FACTORS  [ ] Edema in the lower extremities                       (1 Point)              [ ] Pregnancy                                                     (1 Point)  [ ] Varicose veins                                               (1 Point)                     [ ] Post-partum < 6 weeks                                   (1 Point)             [x ] BMI > 25 Kg/m2                                            (1 Point)                     [ ] Hormonal therapy  or oral contraception          (1 Point)                 [ ] Sepsis (in the previous month)                        (1 Point)               [ ] History of pregnancy complications                 (1 point)  [ ] Pneumonia or serious lung disease                                               [ ] Unexplained or recurrent                     (1 Point)           (in the previous month)                               (1 Point)  [ ] Abnormal pulmonary function test                     (1 Point)                 SURGERY RELATED RISK FACTORS  [ ] Acute myocardial infarction                              (1 Point)               [ ]  Section                                             (1 Point)  [ ] Congestive heart failure (in the previous month)  (1 Point)      [ ] Minor surgery                                                  (1 Point)   [ ] Inflammatory bowel disease                             (1 Point)               [ ] Arthroscopic surgery                                        (2 Points)  [ ] Central venous access                                      (2 Points)                [x ] General surgery lasting more than 45 minutes (2 points)  [ ] Malignancy- Present or previous                   (2 Points)                [ ] Elective arthroplasty                                         (5 points)    [ ] Stroke (in the previous month)                          (5 Points)                                                                                                                                                           HEMATOLOGY RELATED FACTORS                                                 TRAUMA RELATED RISK FACTORS  [ ] Prior episodes of VTE                                     (3 Points)                [ ] Fracture of the hip, pelvis, or leg                       (5 Points)  [ ] Positive family history for VTE                         (3 Points)             [ ] Acute spinal cord injury (in the previous month)  (5 Points)  [ ] Prothrombin 75147 A                                     (3 Points)               [ ] Paralysis  (less than 1 month)                             (5 Points)  [ ] Factor V Leiden                                             (3 Points)                  [ ] Multiple Trauma within 1 month                        (5 Points)  [ ] Lupus anticoagulants                                     (3 Points)                                                           [ ] Anticardiolipin antibodies                               (3 Points)                                                       [ ] High homocysteine in the blood                      (3 Points)                                             [ ] Other congenital or acquired thrombophilia      (3 Points)                                                [ ] Heparin induced thrombocytopenia                  (3 Points)                                     Total Score [   4       ]

## 2022-06-01 NOTE — H&P PST ADULT - HISTORY OF PRESENT ILLNESS
51M with a pmhx significant for HTN,  kidney transplant in 1978 for congenital renal disease.  In the past four years kidney function has been worsening especially after covid infection in 12/2020, currently BUN/Crit 109/8.6.  Pt in PST today in preparation for left permacath placement, left upper extremity radiocephalic AV fistula creation scheduled on 6/13/2022.      Covid swab scheduled for 6/10/2022 @ [facility]

## 2022-06-06 ENCOUNTER — APPOINTMENT (OUTPATIENT)
Dept: INTERNAL MEDICINE | Facility: CLINIC | Age: 52
End: 2022-06-06
Payer: COMMERCIAL

## 2022-06-06 VITALS
SYSTOLIC BLOOD PRESSURE: 130 MMHG | BODY MASS INDEX: 21.74 KG/M2 | DIASTOLIC BLOOD PRESSURE: 65 MMHG | HEART RATE: 86 BPM | WEIGHT: 125.8 LBS | HEIGHT: 63.75 IN

## 2022-06-06 DIAGNOSIS — Z01.818 ENCOUNTER FOR OTHER PREPROCEDURAL EXAMINATION: ICD-10-CM

## 2022-06-06 PROCEDURE — 99214 OFFICE O/P EST MOD 30 MIN: CPT

## 2022-06-06 RX ORDER — DARBEPOETIN ALFA 40 UG/ML
40 SOLUTION INTRAVENOUS; SUBCUTANEOUS
Qty: 12 | Refills: 3 | Status: DISCONTINUED | COMMUNITY
Start: 2022-05-11 | End: 2022-06-06

## 2022-06-06 RX ORDER — DARBEPOETIN ALFA 25 UG/ML
25 SOLUTION INTRAVENOUS; SUBCUTANEOUS
Qty: 4 | Refills: 3 | Status: DISCONTINUED | COMMUNITY
Start: 2021-12-15 | End: 2022-06-06

## 2022-06-06 RX ORDER — PANTOPRAZOLE 40 MG/1
40 TABLET, DELAYED RELEASE ORAL DAILY
Qty: 90 | Refills: 1 | Status: DISCONTINUED | COMMUNITY
Start: 2022-04-22 | End: 2022-06-06

## 2022-06-06 RX ORDER — DARBEPOETIN ALFA 150 UG/.3ML
150 INJECTION, SOLUTION INTRAVENOUS; SUBCUTANEOUS
Qty: 12 | Refills: 1 | Status: DISCONTINUED | COMMUNITY
Start: 2022-02-24 | End: 2022-06-06

## 2022-06-06 RX ORDER — DARBEPOETIN ALFA 100 UG/ML
100 SOLUTION INTRAVENOUS; SUBCUTANEOUS
Qty: 4 | Refills: 3 | Status: DISCONTINUED | COMMUNITY
Start: 2021-02-26 | End: 2022-06-06

## 2022-06-06 RX ORDER — PANTOPRAZOLE 40 MG/1
40 TABLET, DELAYED RELEASE ORAL
Qty: 30 | Refills: 1 | Status: DISCONTINUED | COMMUNITY
Start: 2022-04-13 | End: 2022-06-06

## 2022-06-06 NOTE — ASSESSMENT
[Patient Optimized for Surgery] : Patient optimized for surgery [FreeTextEntry4] : Patient was seen and examined for medical clearance. A full H and P was performed. Vitals taken.\par \par reviwed previous EKGs and labs

## 2022-06-06 NOTE — PHYSICAL EXAM
[Normal] : affect was normal and insight and judgment were intact [de-identified] : 2/6 systolic murmur

## 2022-06-06 NOTE — HISTORY OF PRESENT ILLNESS
[No Pertinent Cardiac History] : no history of aortic stenosis, atrial fibrillation, coronary artery disease, recent myocardial infarction, or implantable device/pacemaker [No Pertinent Pulmonary History] : no history of asthma, COPD, sleep apnea, or smoking [No Adverse Anesthesia Reaction] : no adverse anesthesia reaction in self or family member [Chronic Kidney Disease] : chronic kidney disease [(Patient denies any chest pain, claudication, dyspnea on exertion, orthopnea, palpitations or syncope)] : Patient denies any chest pain, claudication, dyspnea on exertion, orthopnea, palpitations or syncope [Diabetes] : no diabetes [FreeTextEntry1] : fistula placement [FreeTextEntry2] : 6/13 [FreeTextEntry3] : Dr Chacon [FreeTextEntry4] : 51 year old male with a history of CKD stage 5 and chronic anemia, htn, here today for medical clearance. \par \par He overall feels well with no new issues.

## 2022-06-09 NOTE — PROGRESS NOTE ADULT - REASON FOR ADMISSION
Refill request for   Disp Refills Start End    testosterone 1.62 % gel pump 75 g 0 4/26/2022 5/26/2022    Sig - Route: Place 1 Pump onto the skin daily. (Approx. 1 pump)  Please schedule physical prior to next refill. - Transdermal    Sent to pharmacy as: Testosterone 1.62 % Transdermal Gel    Class: Eprescribe    E-Prescribing Status: Receipt confirmed by pharmacy (4/26/2022  5:49 PM CDT)    Prior authorization: Approved      LOV: 6/08/21  Upcoming appointment:  none  Last Rx: see above    *Please call pt to schedule an OV with Dr. Sampson to f/u on meds/testosterone or a CPX   Last OV 6/08/21  After an appt is scheduled, the refill can be addressed   Please route to Dr. Sampson after an appt is made, thank you     MD notes from last OV:    Follow-up recommendation is made to see us back in clinic for recheck in 4 months and to coordinate further care as necessary.  Patient is welcome to return sooner for worsening symptoms, intolerance of treatment, or any other concerns.    Routed to [facility] reception  
Acute Renal Failure

## 2022-06-10 ENCOUNTER — OUTPATIENT (OUTPATIENT)
Dept: OUTPATIENT SERVICES | Facility: HOSPITAL | Age: 52
LOS: 1 days | End: 2022-06-10
Payer: COMMERCIAL

## 2022-06-10 DIAGNOSIS — Z94.0 KIDNEY TRANSPLANT STATUS: Chronic | ICD-10-CM

## 2022-06-10 DIAGNOSIS — Z11.52 ENCOUNTER FOR SCREENING FOR COVID-19: ICD-10-CM

## 2022-06-10 DIAGNOSIS — Z98.890 OTHER SPECIFIED POSTPROCEDURAL STATES: Chronic | ICD-10-CM

## 2022-06-10 LAB — SARS-COV-2 RNA SPEC QL NAA+PROBE: SIGNIFICANT CHANGE UP

## 2022-06-10 PROCEDURE — U0005: CPT

## 2022-06-10 PROCEDURE — C9803: CPT

## 2022-06-10 PROCEDURE — U0003: CPT

## 2022-06-12 ENCOUNTER — TRANSCRIPTION ENCOUNTER (OUTPATIENT)
Age: 52
End: 2022-06-12

## 2022-06-13 ENCOUNTER — INPATIENT (INPATIENT)
Facility: HOSPITAL | Age: 52
LOS: 7 days | Discharge: HOME | DRG: 673 | End: 2022-06-21
Attending: HOSPITALIST | Admitting: HOSPITALIST
Payer: COMMERCIAL

## 2022-06-13 ENCOUNTER — APPOINTMENT (OUTPATIENT)
Dept: VASCULAR SURGERY | Facility: HOSPITAL | Age: 52
End: 2022-06-13
Payer: COMMERCIAL

## 2022-06-13 VITALS
SYSTOLIC BLOOD PRESSURE: 124 MMHG | RESPIRATION RATE: 16 BRPM | TEMPERATURE: 98 F | HEIGHT: 64 IN | OXYGEN SATURATION: 98 % | WEIGHT: 141.98 LBS | HEART RATE: 80 BPM | DIASTOLIC BLOOD PRESSURE: 67 MMHG

## 2022-06-13 DIAGNOSIS — E78.5 HYPERLIPIDEMIA, UNSPECIFIED: ICD-10-CM

## 2022-06-13 DIAGNOSIS — N18.9 CHRONIC KIDNEY DISEASE, UNSPECIFIED: ICD-10-CM

## 2022-06-13 DIAGNOSIS — R13.10 DYSPHAGIA, UNSPECIFIED: ICD-10-CM

## 2022-06-13 DIAGNOSIS — N18.6 END STAGE RENAL DISEASE: ICD-10-CM

## 2022-06-13 DIAGNOSIS — Z94.0 KIDNEY TRANSPLANT STATUS: Chronic | ICD-10-CM

## 2022-06-13 DIAGNOSIS — Z94.0 KIDNEY TRANSPLANT STATUS: ICD-10-CM

## 2022-06-13 DIAGNOSIS — Z29.9 ENCOUNTER FOR PROPHYLACTIC MEASURES, UNSPECIFIED: ICD-10-CM

## 2022-06-13 DIAGNOSIS — I10 ESSENTIAL (PRIMARY) HYPERTENSION: ICD-10-CM

## 2022-06-13 DIAGNOSIS — E87.2 ACIDOSIS: ICD-10-CM

## 2022-06-13 DIAGNOSIS — Z98.890 OTHER SPECIFIED POSTPROCEDURAL STATES: Chronic | ICD-10-CM

## 2022-06-13 PROBLEM — U07.1 COVID-19: Chronic | Status: ACTIVE | Noted: 2022-06-01

## 2022-06-13 LAB
ANION GAP SERPL CALC-SCNC: 18 MMOL/L — HIGH (ref 5–17)
ANION GAP SERPL CALC-SCNC: 19 MMOL/L — HIGH (ref 5–17)
ANION GAP SERPL CALC-SCNC: SIGNIFICANT CHANGE UP MMOL/L (ref 5–17)
BASE EXCESS BLDV CALC-SCNC: -20.7 MMOL/L — LOW (ref -2–2)
BASE EXCESS BLDV CALC-SCNC: -6.7 MMOL/L — LOW (ref -2–2)
BLD GP AB SCN SERPL QL: NEGATIVE — SIGNIFICANT CHANGE UP
BLOOD GAS VENOUS - CREATININE: SIGNIFICANT CHANGE UP MG/DL (ref 0.5–1.3)
BUN SERPL-MCNC: 150 MG/DL — HIGH (ref 7–23)
BUN SERPL-MCNC: 88 MG/DL — HIGH (ref 7–23)
BUN SERPL-MCNC: 89 MG/DL — HIGH (ref 7–23)
CA-I SERPL-SCNC: 1.17 MMOL/L — SIGNIFICANT CHANGE UP (ref 1.15–1.33)
CA-I SERPL-SCNC: 1.27 MMOL/L — SIGNIFICANT CHANGE UP (ref 1.15–1.33)
CALCIUM SERPL-MCNC: 7.9 MG/DL — LOW (ref 8.4–10.5)
CALCIUM SERPL-MCNC: 8.4 MG/DL — SIGNIFICANT CHANGE UP (ref 8.4–10.5)
CALCIUM SERPL-MCNC: 8.5 MG/DL — SIGNIFICANT CHANGE UP (ref 8.4–10.5)
CHLORIDE BLDV-SCNC: 107 MMOL/L — SIGNIFICANT CHANGE UP (ref 96–108)
CHLORIDE BLDV-SCNC: 117 MMOL/L — HIGH (ref 96–108)
CHLORIDE SERPL-SCNC: 107 MMOL/L — SIGNIFICANT CHANGE UP (ref 96–108)
CHLORIDE SERPL-SCNC: 108 MMOL/L — SIGNIFICANT CHANGE UP (ref 96–108)
CHLORIDE SERPL-SCNC: 116 MMOL/L — HIGH (ref 96–108)
CO2 BLDV-SCNC: 17 MMOL/L — LOW (ref 22–26)
CO2 BLDV-SCNC: 7 MMOL/L — LOW (ref 22–26)
CO2 SERPL-SCNC: 16 MMOL/L — LOW (ref 22–31)
CO2 SERPL-SCNC: 17 MMOL/L — LOW (ref 22–31)
CO2 SERPL-SCNC: <10 MMOL/L — CRITICAL LOW (ref 22–31)
CREAT SERPL-MCNC: 10.21 MG/DL — HIGH (ref 0.5–1.3)
CREAT SERPL-MCNC: 5.98 MG/DL — HIGH (ref 0.5–1.3)
CREAT SERPL-MCNC: 6.3 MG/DL — HIGH (ref 0.5–1.3)
EGFR: 10 ML/MIN/1.73M2 — LOW
EGFR: 11 ML/MIN/1.73M2 — LOW
EGFR: 6 ML/MIN/1.73M2 — LOW
FERRITIN SERPL-MCNC: 786 NG/ML — HIGH (ref 30–400)
FOLATE SERPL-MCNC: >20 NG/ML — SIGNIFICANT CHANGE UP
GAS PNL BLDV: 137 MMOL/L — SIGNIFICANT CHANGE UP (ref 136–145)
GAS PNL BLDV: 140 MMOL/L — SIGNIFICANT CHANGE UP (ref 136–145)
GAS PNL BLDV: SIGNIFICANT CHANGE UP
GLUCOSE BLDC GLUCOMTR-MCNC: 89 MG/DL — SIGNIFICANT CHANGE UP (ref 70–99)
GLUCOSE BLDV-MCNC: 100 MG/DL — HIGH (ref 70–99)
GLUCOSE BLDV-MCNC: 75 MG/DL — SIGNIFICANT CHANGE UP (ref 70–99)
GLUCOSE SERPL-MCNC: 74 MG/DL — SIGNIFICANT CHANGE UP (ref 70–99)
GLUCOSE SERPL-MCNC: 74 MG/DL — SIGNIFICANT CHANGE UP (ref 70–99)
GLUCOSE SERPL-MCNC: 94 MG/DL — SIGNIFICANT CHANGE UP (ref 70–99)
HCO3 BLDV-SCNC: 17 MMOL/L — LOW (ref 22–29)
HCO3 BLDV-SCNC: 7 MMOL/L — CRITICAL LOW (ref 22–29)
HCT VFR BLD CALC: 24.4 % — LOW (ref 39–50)
HCT VFR BLDA CALC: 26 % — LOW (ref 39–51)
HCT VFR BLDA CALC: 28 % — LOW (ref 39–51)
HGB BLD CALC-MCNC: 8.8 G/DL — LOW (ref 12.6–17.4)
HGB BLD CALC-MCNC: 9.2 G/DL — LOW (ref 12.6–17.4)
HGB BLD-MCNC: 7.5 G/DL — LOW (ref 13–17)
IRON SATN MFR SERPL: 134 UG/DL — SIGNIFICANT CHANGE UP (ref 45–165)
IRON SATN MFR SERPL: 86 % — HIGH (ref 16–55)
LACTATE BLDV-MCNC: 0.8 MMOL/L — SIGNIFICANT CHANGE UP (ref 0.7–2)
LACTATE BLDV-MCNC: 1.2 MMOL/L — SIGNIFICANT CHANGE UP (ref 0.7–2)
MAGNESIUM SERPL-MCNC: 2.1 MG/DL — SIGNIFICANT CHANGE UP (ref 1.6–2.6)
MCHC RBC-ENTMCNC: 30.7 GM/DL — LOW (ref 32–36)
MCHC RBC-ENTMCNC: 32.6 PG — SIGNIFICANT CHANGE UP (ref 27–34)
MCV RBC AUTO: 106.1 FL — HIGH (ref 80–100)
NRBC # BLD: 0 /100 WBCS — SIGNIFICANT CHANGE UP (ref 0–0)
PCO2 BLDV: 20 MMHG — LOW (ref 42–55)
PCO2 BLDV: 25 MMHG — LOW (ref 42–55)
PH BLDV: 7.13 — CRITICAL LOW (ref 7.32–7.43)
PH BLDV: 7.43 — SIGNIFICANT CHANGE UP (ref 7.32–7.43)
PHOSPHATE SERPL-MCNC: 8.9 MG/DL — HIGH (ref 2.5–4.5)
PLATELET # BLD AUTO: 180 K/UL — SIGNIFICANT CHANGE UP (ref 150–400)
PO2 BLDV: 64 MMHG — HIGH (ref 25–45)
PO2 BLDV: 85 MMHG — HIGH (ref 25–45)
POTASSIUM BLDV-SCNC: 3.2 MMOL/L — LOW (ref 3.5–5.1)
POTASSIUM BLDV-SCNC: 5.1 MMOL/L — SIGNIFICANT CHANGE UP (ref 3.5–5.1)
POTASSIUM SERPL-MCNC: 3.2 MMOL/L — LOW (ref 3.5–5.3)
POTASSIUM SERPL-MCNC: 3.6 MMOL/L — SIGNIFICANT CHANGE UP (ref 3.5–5.3)
POTASSIUM SERPL-MCNC: 4.6 MMOL/L — SIGNIFICANT CHANGE UP (ref 3.5–5.3)
POTASSIUM SERPL-SCNC: 3.2 MMOL/L — LOW (ref 3.5–5.3)
POTASSIUM SERPL-SCNC: 3.6 MMOL/L — SIGNIFICANT CHANGE UP (ref 3.5–5.3)
POTASSIUM SERPL-SCNC: 4.6 MMOL/L — SIGNIFICANT CHANGE UP (ref 3.5–5.3)
RBC # BLD: 2.3 M/UL — LOW (ref 4.2–5.8)
RBC # FLD: 17.1 % — HIGH (ref 10.3–14.5)
RH IG SCN BLD-IMP: POSITIVE — SIGNIFICANT CHANGE UP
SAO2 % BLDV: 92.5 % — HIGH (ref 67–88)
SAO2 % BLDV: 98.2 % — HIGH (ref 67–88)
SODIUM SERPL-SCNC: 142 MMOL/L — SIGNIFICANT CHANGE UP (ref 135–145)
SODIUM SERPL-SCNC: 143 MMOL/L — SIGNIFICANT CHANGE UP (ref 135–145)
SODIUM SERPL-SCNC: 146 MMOL/L — HIGH (ref 135–145)
TIBC SERPL-MCNC: 155 UG/DL — LOW (ref 220–430)
UIBC SERPL-MCNC: 21 UG/DL — LOW (ref 110–370)
VIT B12 SERPL-MCNC: 586 PG/ML — SIGNIFICANT CHANGE UP (ref 232–1245)
WBC # BLD: 4.18 K/UL — SIGNIFICANT CHANGE UP (ref 3.8–10.5)
WBC # FLD AUTO: 4.18 K/UL — SIGNIFICANT CHANGE UP (ref 3.8–10.5)

## 2022-06-13 PROCEDURE — 99255 IP/OBS CONSLTJ NEW/EST HI 80: CPT | Mod: GC

## 2022-06-13 PROCEDURE — 93010 ELECTROCARDIOGRAM REPORT: CPT

## 2022-06-13 PROCEDURE — 99223 1ST HOSP IP/OBS HIGH 75: CPT

## 2022-06-13 PROCEDURE — 36558 INSERT TUNNELED CV CATH: CPT | Mod: RT

## 2022-06-13 PROCEDURE — 77001 FLUOROGUIDE FOR VEIN DEVICE: CPT | Mod: 26

## 2022-06-13 DEVICE — INTRODUCER SET MICROPUNCTURE ACCESS 21G X 7CM: Type: IMPLANTABLE DEVICE | Site: LEFT | Status: FUNCTIONAL

## 2022-06-13 DEVICE — IMPLANTABLE DEVICE: Type: IMPLANTABLE DEVICE | Site: LEFT | Status: FUNCTIONAL

## 2022-06-13 RX ORDER — ATORVASTATIN CALCIUM 80 MG/1
1 TABLET, FILM COATED ORAL
Qty: 0 | Refills: 0 | DISCHARGE

## 2022-06-13 RX ORDER — PREGABALIN 225 MG/1
1 CAPSULE ORAL
Qty: 0 | Refills: 0 | DISCHARGE

## 2022-06-13 RX ORDER — LIDOCAINE HCL 20 MG/ML
0.2 VIAL (ML) INJECTION ONCE
Refills: 0 | Status: DISCONTINUED | OUTPATIENT
Start: 2022-06-13 | End: 2022-06-13

## 2022-06-13 RX ORDER — SODIUM CHLORIDE 9 MG/ML
3 INJECTION INTRAMUSCULAR; INTRAVENOUS; SUBCUTANEOUS EVERY 8 HOURS
Refills: 0 | Status: DISCONTINUED | OUTPATIENT
Start: 2022-06-13 | End: 2022-06-13

## 2022-06-13 RX ORDER — ONDANSETRON 8 MG/1
4 TABLET, FILM COATED ORAL EVERY 8 HOURS
Refills: 0 | Status: DISCONTINUED | OUTPATIENT
Start: 2022-06-13 | End: 2022-06-14

## 2022-06-13 RX ORDER — FOLIC ACID 0.8 MG
1 TABLET ORAL DAILY
Refills: 0 | Status: DISCONTINUED | OUTPATIENT
Start: 2022-06-13 | End: 2022-06-21

## 2022-06-13 RX ORDER — AZATHIOPRINE 100 MG/1
150 TABLET ORAL DAILY
Refills: 0 | Status: DISCONTINUED | OUTPATIENT
Start: 2022-06-13 | End: 2022-06-13

## 2022-06-13 RX ORDER — DOXAZOSIN MESYLATE 4 MG
1 TABLET ORAL
Qty: 0 | Refills: 0 | DISCHARGE

## 2022-06-13 RX ORDER — ATORVASTATIN CALCIUM 80 MG/1
20 TABLET, FILM COATED ORAL AT BEDTIME
Refills: 0 | Status: DISCONTINUED | OUTPATIENT
Start: 2022-06-13 | End: 2022-06-21

## 2022-06-13 RX ORDER — CHLORHEXIDINE GLUCONATE 213 G/1000ML
1 SOLUTION TOPICAL DAILY
Refills: 0 | Status: DISCONTINUED | OUTPATIENT
Start: 2022-06-13 | End: 2022-06-13

## 2022-06-13 RX ORDER — AZATHIOPRINE 100 MG/1
3 TABLET ORAL
Qty: 0 | Refills: 0 | DISCHARGE

## 2022-06-13 RX ORDER — HEPARIN SODIUM 5000 [USP'U]/ML
5000 INJECTION INTRAVENOUS; SUBCUTANEOUS EVERY 8 HOURS
Refills: 0 | Status: DISCONTINUED | OUTPATIENT
Start: 2022-06-13 | End: 2022-06-17

## 2022-06-13 RX ORDER — NIFEDIPINE 30 MG
2 TABLET, EXTENDED RELEASE 24 HR ORAL
Qty: 0 | Refills: 0 | DISCHARGE

## 2022-06-13 RX ORDER — CHOLECALCIFEROL (VITAMIN D3) 125 MCG
1000 CAPSULE ORAL DAILY
Refills: 0 | Status: DISCONTINUED | OUTPATIENT
Start: 2022-06-13 | End: 2022-06-21

## 2022-06-13 RX ORDER — CARVEDILOL PHOSPHATE 80 MG/1
3.12 CAPSULE, EXTENDED RELEASE ORAL EVERY 12 HOURS
Refills: 0 | Status: DISCONTINUED | OUTPATIENT
Start: 2022-06-13 | End: 2022-06-17

## 2022-06-13 RX ORDER — DARBEPOETIN ALFA IN POLYSORBAT 200MCG/0.4
0 PEN INJECTOR (ML) SUBCUTANEOUS
Qty: 0 | Refills: 0 | DISCHARGE

## 2022-06-13 RX ORDER — SODIUM BICARBONATE 1 MEQ/ML
650 SYRINGE (ML) INTRAVENOUS EVERY 8 HOURS
Refills: 0 | Status: DISCONTINUED | OUTPATIENT
Start: 2022-06-13 | End: 2022-06-15

## 2022-06-13 RX ORDER — PREGABALIN 225 MG/1
1000 CAPSULE ORAL DAILY
Refills: 0 | Status: DISCONTINUED | OUTPATIENT
Start: 2022-06-13 | End: 2022-06-21

## 2022-06-13 RX ORDER — AZATHIOPRINE 100 MG/1
50 TABLET ORAL DAILY
Refills: 0 | Status: DISCONTINUED | OUTPATIENT
Start: 2022-06-13 | End: 2022-06-21

## 2022-06-13 RX ORDER — AZATHIOPRINE 100 MG/1
1 TABLET ORAL
Qty: 0 | Refills: 0 | DISCHARGE

## 2022-06-13 RX ORDER — CHOLECALCIFEROL (VITAMIN D3) 125 MCG
0 CAPSULE ORAL
Qty: 0 | Refills: 0 | DISCHARGE

## 2022-06-13 RX ORDER — FUROSEMIDE 40 MG
40 TABLET ORAL
Refills: 0 | Status: DISCONTINUED | OUTPATIENT
Start: 2022-06-13 | End: 2022-06-21

## 2022-06-13 RX ORDER — NIFEDIPINE 30 MG
60 TABLET, EXTENDED RELEASE 24 HR ORAL DAILY
Refills: 0 | Status: DISCONTINUED | OUTPATIENT
Start: 2022-06-13 | End: 2022-06-21

## 2022-06-13 RX ADMIN — Medication 60 MILLIGRAM(S): at 21:17

## 2022-06-13 RX ADMIN — HEPARIN SODIUM 5000 UNIT(S): 5000 INJECTION INTRAVENOUS; SUBCUTANEOUS at 13:37

## 2022-06-13 RX ADMIN — CARVEDILOL PHOSPHATE 3.12 MILLIGRAM(S): 80 CAPSULE, EXTENDED RELEASE ORAL at 17:59

## 2022-06-13 RX ADMIN — PREGABALIN 1000 MICROGRAM(S): 225 CAPSULE ORAL at 13:36

## 2022-06-13 RX ADMIN — Medication 1 MILLIGRAM(S): at 12:45

## 2022-06-13 RX ADMIN — Medication 1000 UNIT(S): at 12:54

## 2022-06-13 RX ADMIN — HEPARIN SODIUM 5000 UNIT(S): 5000 INJECTION INTRAVENOUS; SUBCUTANEOUS at 21:15

## 2022-06-13 RX ADMIN — Medication 40 MILLIGRAM(S): at 13:36

## 2022-06-13 RX ADMIN — Medication 650 MILLIGRAM(S): at 13:37

## 2022-06-13 RX ADMIN — AZATHIOPRINE 50 MILLIGRAM(S): 100 TABLET ORAL at 12:46

## 2022-06-13 RX ADMIN — ATORVASTATIN CALCIUM 20 MILLIGRAM(S): 80 TABLET, FILM COATED ORAL at 21:17

## 2022-06-13 RX ADMIN — Medication 650 MILLIGRAM(S): at 21:16

## 2022-06-13 NOTE — H&P ADULT - NSHPREVIEWOFSYSTEMS_GEN_ALL_CORE
CONSTITUTIONAL:  + fatigue, no fevers/chills   HEENT:  Eyes:  No visual loss, blurred vision, double vision or yellow sclerae. Ears, Nose, Throat:  No hearing loss, sneezing, congestion, runny nose or sore throat.  SKIN:  No rash or itching.  CARDIOVASCULAR:  No chest pain, chest pressure or chest discomfort. No palpitations.  RESPIRATORY:  No shortness of breath, cough or sputum.  GASTROINTESTINAL:  + n/v, no abd pain, no diarrhea   GENITOURINARY:  Denies hematuria, dysuria.   NEUROLOGICAL:  No headache, dizziness, syncope, paralysis, ataxia, numbness or tingling in the extremities. No change in bowel or bladder control.  MUSCULOSKELETAL:  No muscle, back pain, joint pain or stiffness.  HEMATOLOGIC:  No anemia, bleeding or bruising.  LYMPHATICS:  No enlarged nodes.   PSYCHIATRIC:  No history of depression or anxiety.  ENDOCRINOLOGIC:  No reports of sweating, cold or heat intolerance. No polyuria or polydipsia.  ALLERGIES:  No history of asthma, hives, eczema or rhinitis.

## 2022-06-13 NOTE — H&P ADULT - PROBLEM SELECTOR PLAN 4
In 1978, c/b CKD4 with worsening renal function  Home Meds: Prednisone 10 QOD (recently increased), Azathioprine 50 qd  - c/w home immunosuppression

## 2022-06-13 NOTE — H&P ADULT - ATTENDING COMMENTS
Patient is a 51 y.o M w/ PMHx of HTN, Congenital renal disease s/p transplant (1978) c/b new ESRD (on azathioprine and prednisone), anemia (on weekly Aranesp), HLD (on statin) who presented for AV Fistula placement experiencing nausea, vomiting and fatigue, found to have non-anion gap metabolic acidosis w/ pH 7.11, bicarb <10 due to progression of renal failure, admitted for urgent hemodialysis.    #NAGMA  -At admission, found to have pH 7.13, bicarb <10   -s/p R IJ permacath, HD session on 6/13   -Nephrology following; appreciate recs  -post HD VBG, PH is 7.43   -Pending two more HD sessiosn   -c/w sodium 650 q8h     #s/p Renal transplant now ESRD  -Worsening CKD, now requiring HD   -Initially planned for L AV fistula placement; however, urgent permacath placed for HD for acidosis  -Will inquire about inpatient AV fistula placement to Vascular   -Next HD session tomorrow  -Nephrology following; appreciate recs  -monitor daily electrolytes  -pt making urine, c/w lasix 40 every other day   -Renal transplant patient; c/w prednisone and azathioprine     #HTN  -c/w nifedipine and coregh  -bps soft, holding hyralazine and doxazosin    #Dispo  -pending clinical improvement

## 2022-06-13 NOTE — H&P ADULT - HISTORY OF PRESENT ILLNESS
51M w PMHx of HTN, renal transplant in 1978 c/b CKD IV (on azathioprine and prednisone), anemia (on weekly Aranesp), HLD (on statin) who presented for AV Fistula placement, but was found to acidotic on pre-op VBG, evaluated by renal and had IJ access placed for urgent HD. Patient received renal transplant from his mother in 1978 (when he was 9yo) for congenital renal failure, was doing well on medications and recently had worsening renal function since COVID infection in 2020. Patient reported feeling a bit fatigued, and had some nausea and vomiting. Denied any shortness of breath, leg swelling, or chest pain. Denied any fevers, chills, sick contacts, abdominal pain, diarrhea. Patient had never received HD before.     PACU Course: T 97.5 HR 92 /61 RR 16 O2 98% on RA  IJ Access obtained, started on urgent HD.

## 2022-06-13 NOTE — CONSULT NOTE ADULT - PROBLEM SELECTOR RECOMMENDATION 9
Pt. with progressive CKD and with uremic complaints. Scr ~ 9 with BUN 120s. Pt. agreeable for initiating HD today. Will plan for 2hour HD session today. Plan for 2nd HD tomorrow and 3rd HD session on Wednesday. Will follow up with Vascular surgery about inpatient AVF creation prior to discharge.   Case management follow up for outpatient HD setup.     Monitor labs. Please obtain BMP (pre-HD), calcium, magnesium, phosphorus, iPTH today.

## 2022-06-13 NOTE — PRE-ANESTHESIA EVALUATION ADULT - NSANTHADDINFOFT_GEN_ALL_CORE
Case discussed with Dr. Chacon.  Patient with significant dialysis, in need of urgent dialysis.  Dr. Chacon will place a permacath under local anesthesia so that dialysis can start today.  Discussed with patient.  Agrees, wishes to proceed

## 2022-06-13 NOTE — CONSULT NOTE ADULT - SUBJECTIVE AND OBJECTIVE BOX
[facility] DIVISION OF KIDNEY DISEASES AND HYPERTENSION -- [phone]  -- INITIAL CONSULT NOTE  --------------------------------------------------------------------------------  HPI: 51 year old male with prior history of LDRT in  (from mother) with advanced CKD of the allograft admitted to [facility] after undergoing Tunneled HD catheter placement with Dr. Chacon earlier today. Pt. with CKD progression recently with Scr ~ 9 and BUN 110s. Pt. was originally planned for AVF creation and Tunneled HD catheter insertion today, with plans for initiating HD as an outpatient. However VBG pre-operatively relevant for significant acidemia. Pt. subsequently admitted for inpatient HD initiation. Pt. follows with Nephrologist Dr. Karena Us as outpatient. Pt. currently takes Azathioprine 150mg daily and Prednisone 5mg daily for immunosuppression.    Pt. seen and examined in PACU. Pt. complained of generalized weakness, poor appetite and ongoing weight loss for ~ 3-4 weeks. Pt. agreeable to initiating HD today.    PAST HISTORY  --------------------------------------------------------------------------------  PAST MEDICAL & SURGICAL HISTORY:  Renal transplant recipient  Hypertension  ESRD with anemia  2019 novel coronavirus disease (COVID-19)  History of renal transplant  History of excision of pilonidal cyst    FAMILY HISTORY:  FH: kidney disease  FH: esophageal cancer  Uncle,  in 7th decade    PAST SOCIAL HISTORY: Denies recreational drug use    ALLERGIES & MEDICATIONS  --------------------------------------------------------------------------------  Allergies    shellfish (Rash)  sulfa (Swelling)    Intolerances    Standing Inpatient Medications  atorvastatin 20 milliGRAM(s) Oral at bedtime  azaTHIOprine 150 milliGRAM(s) Oral daily  carvedilol 3.125 milliGRAM(s) Oral every 12 hours  cyanocobalamin 1000 MICROGram(s) Oral daily  folic acid 1 milliGRAM(s) Oral daily  heparin   Injectable 5000 Unit(s) SubCutaneous every 8 hours  NIFEdipine XL 60 milliGRAM(s) Oral daily  predniSONE   Tablet 5 milliGRAM(s) Oral every other day  sodium bicarbonate 650 milliGRAM(s) Oral every 8 hours    REVIEW OF SYSTEMS  --------------------------------------------------------------------------------  All other systems were reviewed and are negative, except as noted.    VITALS/PHYSICAL EXAM  --------------------------------------------------------------------------------  T(C): 36.4 (22 @ 09:45), Max: 36.5 (22 @ 05:49)  HR: 89 (22 @ 11:00) (80 - 103)  BP: 111/58 (22 @ 11:00) (111/58 - 142/72)  RR: 16 (22 @ 11:00) (16 - 16)  SpO2: 98% (22 @ 11:00) (97% - 99%)  Wt(kg): --  Height (cm): 162.6 (22 @ 07:12)  Weight (kg): 64.4 (22 @ 07:12)  BMI (kg/m2): 24.4 (22 @ 07:12)  BSA (m2): 1.69 (22 @ 07:12)    Physical Exam:  	Gen: Ill appearing male  	HEENT: Anicteric, mild pallor  	Pulm: Fair air entry B/L  	CV: S1S2  	Abd: Soft, +BS   	Ext: Trace LE edema B/L  	Neuro: Awake  	Skin: Warm and dry  	Vascular access: RIJ tunneled HD catheter    LABS/STUDIES  --------------------------------------------------------------------------------              7.5    4.18  >-----------<  180      [22 @ 10:12]              24.4     Creatinine Trend:  SCr 9.21 [ @ 19:36]  SCr 8.99 [ @ 14:03]

## 2022-06-13 NOTE — H&P ADULT - NSHPSOCIALHISTORY_GEN_ALL_CORE
Lives with sister, independent, walks without assistance. Not working, on medical disability.     Tobacco, Alcohol, Drugs: Denies.

## 2022-06-13 NOTE — H&P ADULT - PROBLEM SELECTOR PLAN 3
Hb 7.5, baseline seems to be 8-10; MCV   Likely due to ESRD and AOCD  - Iron studies  - B12 and folate since macrocytic  - c/w home EPO injections per renal w HD

## 2022-06-13 NOTE — H&P ADULT - PROBLEM SELECTOR PLAN 2
Renal transplant since 1978, doing well on immunosuppressants until COVID in 2020, worsening CKD   W NAGMA as above, requiring urgent HD  SCr 10.21 on arrival, jet seems like 8-9  Exam with lower lobe crackles.   Home Med: Lasix 40 3x per week  - f/u renal recs  - patient will need HD x3 (6/13 - 6/15)   - will need  assistance for outpatient HD center setup.   - c/w lasix 40 3x per week.   - monitor urine output

## 2022-06-13 NOTE — PATIENT PROFILE ADULT - NSPROPTRIGHTREPPHONE_GEN_A_NUR
Vss, luis po fluids, denies pain, ambulates easily.  States ready to go home.  Discharged from facility with family.  
[phone]

## 2022-06-13 NOTE — CONSULT NOTE ADULT - PROBLEM SELECTOR RECOMMENDATION 2
Pt. currently on Aranesp 60mcq Q weekly at home. Hb below goal at 7.5. Will start retacrit TIW with HD tomorrow. Check Iron stores. Consider PRBCs to optimize discharge and aim for Hb ~ 9.

## 2022-06-13 NOTE — PATIENT PROFILE ADULT - FALL HARM RISK - UNIVERSAL INTERVENTIONS
Bed in lowest position, wheels locked, appropriate side rails in place/Call bell, personal items and telephone in reach/Instruct patient to call for assistance before getting out of bed or chair/Non-slip footwear when patient is out of bed/[place] to call system/Physically safe environment - no spills, clutter or unnecessary equipment/Purposeful Proactive Rounding/Room/bathroom lighting operational, light cord in reach

## 2022-06-13 NOTE — H&P ADULT - PROBLEM SELECTOR PLAN 6
Normotensive inpatient   Home Meds: Nifedipine 120 qd, Hydralazine 100 TID, Coreg 3.125 BID, Doxazosin 4mg qd  - Hold Hydral and Doxazosin inpatient  - c/w Nifedipine and Coreg w hold parameters

## 2022-06-13 NOTE — ASU PREOP CHECKLIST - VERIFY SURGICAL SITE/SIDE WITH PATIENT
Pt notified of surgical consult appointment with Dr. VERNA Pires on 10/15/20 @[id number]. Pt given office contact & location information. Told to bring photo ID, list of prescription & OTC medications, insurance information, must wear a mask. Patient stated she is currently working on getting insurance approved.  Patient was informed of required payment in case insurance is still pending. Reviewed what would be discussed at surgical consult visit, including detailed explanation of pathology report & imaging reports; treatment options.  Encouraged pt to call back or contact surgeon's office with further questions. Pt verbalized understanding.      
done

## 2022-06-13 NOTE — PRE-ANESTHESIA EVALUATION ADULT - NSANTHPMHFT_GEN_ALL_CORE
51M hx of HTN, kidney transplant 1978 for congenital renal dz now w/ worsening kidney function    4/27/22  ECHO: EF 45%  stress: no ischemia 51M hx of HTN, kidney transplant 1978 for congenital renal dz now w/ worsening kidney function. Pt endorses that in the last month, he has lost almost 100 lbs and developed dysphagia, unable to tolerate PO.    4/27/22  ECHO: EF 45%  stress: no ischemia

## 2022-06-13 NOTE — H&P ADULT - NSHPLABSRESULTS_GEN_ALL_CORE
Labs:                          7.5    4.18  )-----------( 180      ( 13 Jun 2022 10:12 )             24.4       Blood Gas Profile - Venous (06.13.22 @ 05:45)    pH, Venous: 7.13: No collection time indicated, please interpret with caution    pCO2, Venous: 20 mmHg    pO2, Venous: 64 mmHg    HCO3, Venous: 7 mmol/L    Base Excess, Venous: -20.7 mmol/L    Oxygen Saturation, Venous: 92.5 %    Total CO2, Venous: 7 mmol/L    Blood Gas Source Venous: Venous    Blood Gas Venous - Lactate: 1.2 mmol/L (06.13.22 @ 05:45)    Micro:      RADIOLOGY & ADDITIONAL TESTS:    EKG:      Echo:      Xray:      US/CT/MRI:

## 2022-06-13 NOTE — CONSULT NOTE ADULT - PROBLEM SELECTOR RECOMMENDATION 3
In the setting of renal failure. pH 7.13. Check BMP / serum CO2. Initiated on HD today. Will do 3HD sessions from 6/13 to 6/15. Will likely not need oral bicarb once on HD. Monitor pH and CO2 post HD    Binu Mendoza  Nephrology Fellow  [phone]  (After 5pm or on weekends please page the on-call fellow)

## 2022-06-13 NOTE — H&P ADULT - ASSESSMENT
51M w PMHx of HTN, renal transplant in 1978 c/b new ESRD (on azathioprine and prednisone), anemia (on weekly Aranesp), HLD (on statin) who presented for AV Fistula placement experiencing nausea, vomiting and fatigue, found to have non-anion gap metabolic acidosis due to renal failure, admitted for urgent hemodialysis.

## 2022-06-13 NOTE — CONSULT NOTE ADULT - ASSESSMENT
Pt. with prior history of LDRT in 1978, now with progressive allograft CKD admitted for HD initiation via RIJ Tunneled HD catheter. Pt. now deemed ESRD.

## 2022-06-13 NOTE — H&P ADULT - PROBLEM SELECTOR PLAN 1
VBG on Arrival: 7.13 / 20 / 64 / 7; Lactate 1.2. Bicarb < 10 on BMP.   Symptoms: Fatigue, n/v  - Urgent IJ access placed, urgent HD first session 6/13  - renal consulted, f/u additional recs and further HD requirements   - monitor VBG post HD

## 2022-06-13 NOTE — H&P ADULT - NSHPPHYSICALEXAM_GEN_ALL_CORE
ICU Vital Signs Last 24 Hrs  T(C): 36.4 (13 Jun 2022 09:45), Max: 36.5 (13 Jun 2022 05:49)  T(F): 97.5 (13 Jun 2022 09:45), Max: 97.7 (13 Jun 2022 05:49)  HR: 89 (13 Jun 2022 11:00) (80 - 103)  BP: 111/58 (13 Jun 2022 11:00) (111/58 - 142/72)  BP(mean): 77 (13 Jun 2022 11:00) (77 - 100)  ABP: --  ABP(mean): --  RR: 16 (13 Jun 2022 11:00) (16 - 16)  SpO2: 98% (13 Jun 2022 11:00) (97% - 99%)    GENERAL APPEARANCE: Thin male, in mild distress.   HEENT:  PERRL, EOMI. hearing grossly intact.  NECK: No JVD appreciated. Neck supple, non-tender no lymphadenopathy, masses or thyromegaly.  CARDIAC: 2/6 holosystolic murmur appreciated. Normal S1 and S2. RRR  LUNGS: Mild crackles in lower lobes b/l. No wheezing or rhonchi appreciated.   ABDOMEN: Soft , NTND, bowel sounds normal. No guarding or rebound.   MUSCULOSKELETAL: ROM intact.  No joint erythema or tenderness.   EXTREMITIES: No edema appreciated. Peripheral pulses intact.   NEUROLOGICAL: Non focal. Strength and sensation symmetric and intact throughout.   SKIN: Warm and dry , Well perfused  PSYCHIATRIC: AOx4 , Normal mood and affect

## 2022-06-14 LAB
ANION GAP SERPL CALC-SCNC: 18 MMOL/L — HIGH (ref 5–17)
BASE EXCESS BLDV CALC-SCNC: -6.1 MMOL/L — LOW (ref -2–2)
BLD GP AB SCN SERPL QL: NEGATIVE — SIGNIFICANT CHANGE UP
BUN SERPL-MCNC: 93 MG/DL — HIGH (ref 7–23)
CA-I SERPL-SCNC: 1.05 MMOL/L — LOW (ref 1.15–1.33)
CALCIUM SERPL-MCNC: 7.5 MG/DL — LOW (ref 8.4–10.5)
CALCIUM SERPL-MCNC: 7.5 MG/DL — LOW (ref 8.4–10.5)
CHLORIDE BLDV-SCNC: 107 MMOL/L — SIGNIFICANT CHANGE UP (ref 96–108)
CHLORIDE SERPL-SCNC: 106 MMOL/L — SIGNIFICANT CHANGE UP (ref 96–108)
CO2 BLDV-SCNC: 19 MMOL/L — LOW (ref 22–26)
CO2 SERPL-SCNC: 17 MMOL/L — LOW (ref 22–31)
CREAT SERPL-MCNC: 6.7 MG/DL — HIGH (ref 0.5–1.3)
EGFR: 9 ML/MIN/1.73M2 — LOW
GAS PNL BLDV: 136 MMOL/L — SIGNIFICANT CHANGE UP (ref 136–145)
GAS PNL BLDV: SIGNIFICANT CHANGE UP
GAS PNL BLDV: SIGNIFICANT CHANGE UP
GLUCOSE BLDV-MCNC: 86 MG/DL — SIGNIFICANT CHANGE UP (ref 70–99)
GLUCOSE SERPL-MCNC: 92 MG/DL — SIGNIFICANT CHANGE UP (ref 70–99)
HAV IGM SER-ACNC: SIGNIFICANT CHANGE UP
HBV CORE IGM SER-ACNC: SIGNIFICANT CHANGE UP
HBV SURFACE AB SER-ACNC: REACTIVE
HBV SURFACE AG SER-ACNC: SIGNIFICANT CHANGE UP
HCO3 BLDV-SCNC: 18 MMOL/L — LOW (ref 22–29)
HCT VFR BLD CALC: 23.5 % — LOW (ref 39–50)
HCT VFR BLDA CALC: 23 % — LOW (ref 39–51)
HCV AB S/CO SERPL IA: 0.11 S/CO — SIGNIFICANT CHANGE UP (ref 0–0.99)
HCV AB SERPL-IMP: SIGNIFICANT CHANGE UP
HGB BLD CALC-MCNC: 7.7 G/DL — LOW (ref 12.6–17.4)
HGB BLD-MCNC: 7.6 G/DL — LOW (ref 13–17)
LACTATE BLDV-MCNC: 1.5 MMOL/L — SIGNIFICANT CHANGE UP (ref 0.7–2)
MAGNESIUM SERPL-MCNC: 1.7 MG/DL — SIGNIFICANT CHANGE UP (ref 1.6–2.6)
MCHC RBC-ENTMCNC: 32.1 PG — SIGNIFICANT CHANGE UP (ref 27–34)
MCHC RBC-ENTMCNC: 32.3 GM/DL — SIGNIFICANT CHANGE UP (ref 32–36)
MCV RBC AUTO: 99.2 FL — SIGNIFICANT CHANGE UP (ref 80–100)
NRBC # BLD: 0 /100 WBCS — SIGNIFICANT CHANGE UP (ref 0–0)
PCO2 BLDV: 31 MMHG — LOW (ref 42–55)
PH BLDV: 7.38 — SIGNIFICANT CHANGE UP (ref 7.32–7.43)
PHOSPHATE SERPL-MCNC: 6.2 MG/DL — HIGH (ref 2.5–4.5)
PLATELET # BLD AUTO: 170 K/UL — SIGNIFICANT CHANGE UP (ref 150–400)
PO2 BLDV: 175 MMHG — HIGH (ref 25–45)
POTASSIUM BLDV-SCNC: 3.8 MMOL/L — SIGNIFICANT CHANGE UP (ref 3.5–5.1)
POTASSIUM SERPL-MCNC: 3.7 MMOL/L — SIGNIFICANT CHANGE UP (ref 3.5–5.3)
POTASSIUM SERPL-SCNC: 3.7 MMOL/L — SIGNIFICANT CHANGE UP (ref 3.5–5.3)
PTH-INTACT FLD-MCNC: 918 PG/ML — HIGH (ref 15–65)
RBC # BLD: 2.37 M/UL — LOW (ref 4.2–5.8)
RBC # FLD: 16.2 % — HIGH (ref 10.3–14.5)
RH IG SCN BLD-IMP: POSITIVE — SIGNIFICANT CHANGE UP
SAO2 % BLDV: 100 % — HIGH (ref 67–88)
SODIUM SERPL-SCNC: 141 MMOL/L — SIGNIFICANT CHANGE UP (ref 135–145)
WBC # BLD: 4.21 K/UL — SIGNIFICANT CHANGE UP (ref 3.8–10.5)
WBC # FLD AUTO: 4.21 K/UL — SIGNIFICANT CHANGE UP (ref 3.8–10.5)

## 2022-06-14 PROCEDURE — 99233 SBSQ HOSP IP/OBS HIGH 50: CPT | Mod: GC

## 2022-06-14 PROCEDURE — 99233 SBSQ HOSP IP/OBS HIGH 50: CPT

## 2022-06-14 PROCEDURE — 71045 X-RAY EXAM CHEST 1 VIEW: CPT | Mod: 26

## 2022-06-14 RX ORDER — CHLORHEXIDINE GLUCONATE 213 G/1000ML
1 SOLUTION TOPICAL DAILY
Refills: 0 | Status: DISCONTINUED | OUTPATIENT
Start: 2022-06-14 | End: 2022-06-21

## 2022-06-14 RX ADMIN — Medication 650 MILLIGRAM(S): at 16:32

## 2022-06-14 RX ADMIN — PREGABALIN 1000 MICROGRAM(S): 225 CAPSULE ORAL at 12:20

## 2022-06-14 RX ADMIN — HEPARIN SODIUM 5000 UNIT(S): 5000 INJECTION INTRAVENOUS; SUBCUTANEOUS at 05:33

## 2022-06-14 RX ADMIN — HEPARIN SODIUM 5000 UNIT(S): 5000 INJECTION INTRAVENOUS; SUBCUTANEOUS at 16:29

## 2022-06-14 RX ADMIN — ATORVASTATIN CALCIUM 20 MILLIGRAM(S): 80 TABLET, FILM COATED ORAL at 22:35

## 2022-06-14 RX ADMIN — HEPARIN SODIUM 5000 UNIT(S): 5000 INJECTION INTRAVENOUS; SUBCUTANEOUS at 22:36

## 2022-06-14 RX ADMIN — Medication 1000 UNIT(S): at 12:20

## 2022-06-14 RX ADMIN — Medication 650 MILLIGRAM(S): at 05:34

## 2022-06-14 RX ADMIN — Medication 1 MILLIGRAM(S): at 12:19

## 2022-06-14 RX ADMIN — AZATHIOPRINE 50 MILLIGRAM(S): 100 TABLET ORAL at 12:27

## 2022-06-14 RX ADMIN — CARVEDILOL PHOSPHATE 3.12 MILLIGRAM(S): 80 CAPSULE, EXTENDED RELEASE ORAL at 05:34

## 2022-06-14 RX ADMIN — Medication 650 MILLIGRAM(S): at 22:35

## 2022-06-14 RX ADMIN — Medication 60 MILLIGRAM(S): at 05:34

## 2022-06-14 RX ADMIN — CARVEDILOL PHOSPHATE 3.12 MILLIGRAM(S): 80 CAPSULE, EXTENDED RELEASE ORAL at 17:04

## 2022-06-14 NOTE — PROGRESS NOTE ADULT - PROBLEM SELECTOR PLAN 2
Renal transplant since 1978, doing well on immunosuppressants until COVID in 2020, worsening CKD   W NAGMA as above, requiring urgent HD  SCr 10.21 on arrival, jet seems like 8-9  Exam with lower lobe crackles.   Home Med: Lasix 40 3x per week  - f/u renal recs  - patient will need HD x3 (6/13 - 6/15)   - will need  assistance for outpatient HD center setup.   - c/w lasix 40 3x per week.   - monitor urine output Renal transplant since 1978, doing well on immunosuppressants until COVID in 2020, worsening CKD   W NAGMA as above, requiring urgent HD  SCr 10.21 on arrival, jet seems like 8-9  Exam with lower lobe crackles.   Home Med: Lasix 40 3x per week  - f/u renal recs  - monitor BUN to assure stable correction to prevent osmotic shifts.   - patient will need HD x3 (6/13 - 6/15)   - will need  assistance for outpatient HD center setup. - ongoing, appreciate assistance.   - c/w lasix 40 3x per week.   - monitor urine output    #AVF Placement  W vascular surgery, tentatively planned for Monday 6/20  Needs COVID swab saturday, HD on sunday before, Labs day of, and medical optimization documentation.   - If patient ready for DC prior to then, can DC and have him pursue outpatient.

## 2022-06-14 NOTE — PROGRESS NOTE ADULT - SUBJECTIVE AND OBJECTIVE BOX
[facility] DIVISION OF KIDNEY DISEASES AND HYPERTENSION -- FOLLOW UP NOTE  --------------------------------------------------------------------------------  Chief Complaint:/subjective: no complaints, feeling better, tolerated hd yesterday    24 hour events: for hd today        PAST HISTORY  --------------------------------------------------------------------------------  No significant changes to PMH, PSH, FHx, SHx, unless otherwise noted    ALLERGIES & MEDICATIONS  --------------------------------------------------------------------------------  Allergies    shellfish (Rash)  sulfa (Swelling)    Intolerances      Standing Inpatient Medications  atorvastatin 20 milliGRAM(s) Oral at bedtime  azaTHIOprine 50 milliGRAM(s) Oral daily  carvedilol 3.125 milliGRAM(s) Oral every 12 hours  cholecalciferol 1000 Unit(s) Oral daily  cyanocobalamin 1000 MICROGram(s) Oral daily  folic acid 1 milliGRAM(s) Oral daily  furosemide    Tablet 40 milliGRAM(s) Oral <User Schedule>  heparin   Injectable 5000 Unit(s) SubCutaneous every 8 hours  NIFEdipine XL 60 milliGRAM(s) Oral daily  predniSONE   Tablet 10 milliGRAM(s) Oral every other day  sodium bicarbonate 650 milliGRAM(s) Oral every 8 hours    PRN Inpatient Medications      REVIEW OF SYSTEMS  --------------------------------------------------------------------------------  Gen: No weight changes, fatigue, fevers/chills, weakness  Skin: No rashes  Head/Eyes/Ears/Mouth: No headache;   Respiratory: No dyspnea, cough  CV: No chest pain, PND, orthopnea  GI: No abdominal pain, diarrhea, constipation, nausea, vomiting  : No increased frequency, dysuria, hematuria, nocturia  MSK: No joint pain/swelling; no back pain; no edema  Neuro: No dizziness/lightheadedness, weakness  Heme: No easy bruising or bleeding  Psych: No significant nervousness, anxiety, stress, depression    All other systems were reviewed and are negative, except as noted.    VITALS/PHYSICAL EXAM  --------------------------------------------------------------------------------  T(C): 36.6 (06-14-22 @ 17:04), Max: 37.3 (06-13-22 @ 18:00)  HR: 77 (06-14-22 @ 17:04) (69 - 89)  BP: 128/75 (06-14-22 @ 17:04) (105/57 - 146/78)  RR: 18 (06-14-22 @ 17:04) (16 - 18)  SpO2: 98% (06-14-22 @ 17:04) (96% - 98%)  Wt(kg): --  Adult Advanced Hemodynamics Last 24 Hrs  ABP: --  ABP(mean): --  CVP(mm Hg): --  CO: --  CI: --  PA: --  PA(mean): --  PCWP: --  SVR: --  SVRI: --  Height (cm): 162.6 (06-13-22 @ 07:12)  Weight (kg): 64.4 (06-13-22 @ 07:12)  BMI (kg/m2): 24.4 (06-13-22 @ 07:12)  BSA (m2): 1.69 (06-13-22 @ 07:12)      06-13-22 @ 07:01  -  06-14-22 @ 07:00  --------------------------------------------------------  IN: 615 mL / OUT: 0 mL / NET: 615 mL    06-14-22 @ 07:01  -  06-14-22 @ 17:07  --------------------------------------------------------  IN: 360 mL / OUT: 0 mL / NET: 360 mL      Physical Exam:  	Gen: NAD,    	HEENT: , no jvp  	Pulm: CTA B/L  	CV: RRR, S1S2; no rub  	Back:  no sacral edema  	Abd: +BS, soft,   	: No suprapubic tenderness  	Ext: no edema  	Neuro: awake  	Psych: alert  	Skin: Warm,    	Vascular access: R PC    LABS/STUDIES  --------------------------------------------------------------------------------              7.6    4.21  >-----------<  170      [06-14-22 @ 07:06]              23.5     Hemoglobin: 7.6 g/dL (06-14-22 @ 07:06)  Hemoglobin: 7.5 g/dL (06-13-22 @ 10:12)    Platelet Count - Automated: 170 K/uL (06-14-22 @ 07:06)  Platelet Count - Automated: 180 K/uL (06-13-22 @ 10:12)    141  |  106  |  93  ----------------------------<  92      [06-14-22 @ 07:05]  3.7   |  17  |  6.70        Ca     7.5     [06-14-22 @ 07:05]      Mg     1.7     [06-14-22 @ 07:05]      Phos  6.2     [06-14-22 @ 07:05]            Creatinine Trend:  SCr 6.70 [06-14 @ 07:05]  SCr 6.30 [06-13 @ 18:06]  SCr 5.98 [06-13 @ 13:28]  SCr 10.21 [06-13 @ 10:12]  SCr 9.21 [06-01 @ 19:36]        Iron 134, TIBC 155, %sat 86      [06-13-22 @ 10:34]  Ferritin 786      [06-13-22 @ 10:34]  PTH -- (Ca 7.5)      [06-14-22 @ 07:07]   918  TSH 2.98      [02-10-22 @ 09:01]

## 2022-06-14 NOTE — PROGRESS NOTE ADULT - SUBJECTIVE AND OBJECTIVE BOX
Subjective:    INTERVAL HPI/OVERNIGHT EVENTS:   - HD #2 out of 3 today.   No acute events overnight  Afebrile, hemodynamically stable     Telemetry:    T(F): 98.7 (06-14-22 @ 05:05), Max: 99.2 (06-13-22 @ 18:00)  HR: 75 (06-14-22 @ 05:05) (71 - 103)  BP: 118/68 (06-14-22 @ 05:05) (101/55 - 146/78)  RR: 18 (06-14-22 @ 05:05) (16 - 18)  SpO2: 97% (06-14-22 @ 05:05) (95% - 99%)  I&O's Summary    13 Jun 2022 07:01  -  14 Jun 2022 07:00  --------------------------------------------------------  IN: 615 mL / OUT: 0 mL / NET: 615 mL      Weight (kg): 64.4 (06-13-22 @ 07:12)    Medications:  atorvastatin 20 milliGRAM(s) Oral at bedtime  azaTHIOprine 50 milliGRAM(s) Oral daily  carvedilol 3.125 milliGRAM(s) Oral every 12 hours  cholecalciferol 1000 Unit(s) Oral daily  cyanocobalamin 1000 MICROGram(s) Oral daily  folic acid 1 milliGRAM(s) Oral daily  furosemide    Tablet 40 milliGRAM(s) Oral <User Schedule>  heparin   Injectable 5000 Unit(s) SubCutaneous every 8 hours  NIFEdipine XL 60 milliGRAM(s) Oral daily  ondansetron Injectable 4 milliGRAM(s) IV Push every 8 hours PRN  predniSONE   Tablet 10 milliGRAM(s) Oral every other day  sodium bicarbonate 650 milliGRAM(s) Oral every 8 hours      PHYSICAL EXAM:  GENERAL APPEARANCE: Thin male, in mild distress.   HEENT:  PERRL, EOMI. hearing grossly intact.  NECK: No JVD appreciated. Neck supple, non-tender no lymphadenopathy, masses or thyromegaly.  CARDIAC: 2/6 holosystolic murmur appreciated. Normal S1 and S2. RRR  LUNGS: Mild crackles in lower lobes b/l. No wheezing or rhonchi appreciated.   ABDOMEN: Soft , NTND, bowel sounds normal. No guarding or rebound.   MUSCULOSKELETAL: ROM intact.  No joint erythema or tenderness.   EXTREMITIES: No edema appreciated. Peripheral pulses intact.   NEUROLOGICAL: Non focal. Strength and sensation symmetric and intact throughout.   SKIN: Warm and dry , Well perfused  PSYCHIATRIC: AOx4 , Normal mood and affect    Notable Labs:    Labs:                          7.6    4.21  )-----------( 170      ( 14 Jun 2022 07:06 )             23.5     06-14    141  |  106  |  93<H>  ----------------------------<  92  3.7   |  17<L>  |  6.70<H>    Ca    7.5<L>      14 Jun 2022 07:05  Phos  6.2     06-14  Mg     1.7     06-14          CAPILLARY BLOOD GLUCOSE      POCT Blood Glucose.: 89 mg/dL (13 Jun 2022 11:46)    Micro:      RADIOLOGY & ADDITIONAL TESTS:    NNI   Subjective:    INTERVAL HPI/OVERNIGHT EVENTS:   - HD #2 out of 3 today.   - patient appears well, feels better than yesterday, no n/v/ today. No fever chills. No HA, confusion.   No acute events overnight  Afebrile, hemodynamically stable     Telemetry:    T(F): 98.7 (06-14-22 @ 05:05), Max: 99.2 (06-13-22 @ 18:00)  HR: 75 (06-14-22 @ 05:05) (71 - 103)  BP: 118/68 (06-14-22 @ 05:05) (101/55 - 146/78)  RR: 18 (06-14-22 @ 05:05) (16 - 18)  SpO2: 97% (06-14-22 @ 05:05) (95% - 99%)  I&O's Summary    13 Jun 2022 07:01  -  14 Jun 2022 07:00  --------------------------------------------------------  IN: 615 mL / OUT: 0 mL / NET: 615 mL      Weight (kg): 64.4 (06-13-22 @ 07:12)    Medications:  atorvastatin 20 milliGRAM(s) Oral at bedtime  azaTHIOprine 50 milliGRAM(s) Oral daily  carvedilol 3.125 milliGRAM(s) Oral every 12 hours  cholecalciferol 1000 Unit(s) Oral daily  cyanocobalamin 1000 MICROGram(s) Oral daily  folic acid 1 milliGRAM(s) Oral daily  furosemide    Tablet 40 milliGRAM(s) Oral <User Schedule>  heparin   Injectable 5000 Unit(s) SubCutaneous every 8 hours  NIFEdipine XL 60 milliGRAM(s) Oral daily  ondansetron Injectable 4 milliGRAM(s) IV Push every 8 hours PRN  predniSONE   Tablet 10 milliGRAM(s) Oral every other day  sodium bicarbonate 650 milliGRAM(s) Oral every 8 hours      PHYSICAL EXAM:  GENERAL APPEARANCE: Thin male, in NAD  HEENT:  PERRL, EOMI. hearing grossly intact.  NECK: R IJ permacath. No JVD appreciated. Neck supple, non-tender no lymphadenopathy, masses or thyromegaly.  CARDIAC: 2/6 holosystolic murmur appreciated. Normal S1 and S2. RRR  LUNGS: Mild crackles in lower lobes b/l. No wheezing or rhonchi appreciated.   ABDOMEN: Soft , NTND, bowel sounds normal. No guarding or rebound.   MUSCULOSKELETAL: ROM intact.  No joint erythema or tenderness.   EXTREMITIES: No edema appreciated. Peripheral pulses intact.   NEUROLOGICAL: Non focal. Strength and sensation symmetric and intact throughout.   SKIN: Warm and dry , Well perfused  PSYCHIATRIC: AOx4 , Normal mood and affect    Notable Labs:    Labs:                          7.6    4.21  )-----------( 170      ( 14 Jun 2022 07:06 )             23.5     06-14    141  |  106  |  93<H>  ----------------------------<  92  3.7   |  17<L>  |  6.70<H>    Ca    7.5<L>      14 Jun 2022 07:05  Phos  6.2     06-14  Mg     1.7     06-14          CAPILLARY BLOOD GLUCOSE      POCT Blood Glucose.: 89 mg/dL (13 Jun 2022 11:46)    Micro:      RADIOLOGY & ADDITIONAL TESTS:    NNI

## 2022-06-14 NOTE — PROGRESS NOTE ADULT - PROBLEM SELECTOR PLAN 8
Diet: Renal, consistency soft, pending speech eval   DVT PPx: SubQ Heparin Diet: Renal, consistency soft, pending speech eval   DVT PPx: SubQ Heparin    Dispo: Inpatient HD, then setup of outpatient HD +/- AVF placement on 6/20 depending if patient is ready for DC prior to then

## 2022-06-14 NOTE — PROGRESS NOTE ADULT - PROBLEM SELECTOR PLAN 1
VBG on Arrival: 7.13 / 20 / 64 / 7; Lactate 1.2. Bicarb < 10 on BMP.   Symptoms: Fatigue, n/v  - Urgent IJ access placed, urgent HD first session 6/13  - renal consulted, f/u additional recs and further HD requirements   - monitor VBG post HD VBG on Arrival: 7.13 / 20 / 64 / 7; Lactate 1.2. Bicarb < 10 on BMP.   VBGs improved after HD.  Symptoms: Fatigue, n/v  - Urgent IJ access placed, urgent HD first session 6/13  - renal consulted, f/u additional recs and further HD requirements

## 2022-06-14 NOTE — SWALLOW BEDSIDE ASSESSMENT ADULT - SWALLOW EVAL: DIAGNOSIS
Attempted to see patient for bedside swallow evaluation. Per discussion with RN, pt currently off the unit. Will re-attempt at a later time/ date.

## 2022-06-14 NOTE — CHART NOTE - NSCHARTNOTEFT_GEN_A_CORE
Tentative plan for LUE AVF placement on Monday.     Appreciate medical management  Please document medical optimization   Protect left arm, no blood draws  COVID test on Saturday  Preop labs On sunday     Vascular Surgery  [id number] Tentative plan for LUE AVF placement on Monday.     Appreciate medical management  Please document medical optimization   Protect left arm, no blood draws  COVID test on Saturday  Preop labs and HD On Sunday     Vascular Surgery  [id number]

## 2022-06-15 ENCOUNTER — APPOINTMENT (OUTPATIENT)
Dept: NEPHROLOGY | Facility: CLINIC | Age: 52
End: 2022-06-15

## 2022-06-15 DIAGNOSIS — Z01.818 ENCOUNTER FOR OTHER PREPROCEDURAL EXAMINATION: ICD-10-CM

## 2022-06-15 LAB
ANION GAP SERPL CALC-SCNC: 11 MMOL/L — SIGNIFICANT CHANGE UP (ref 5–17)
BUN SERPL-MCNC: 52 MG/DL — HIGH (ref 7–23)
CALCIUM SERPL-MCNC: 7.8 MG/DL — LOW (ref 8.4–10.5)
CHLORIDE SERPL-SCNC: 105 MMOL/L — SIGNIFICANT CHANGE UP (ref 96–108)
CO2 SERPL-SCNC: 25 MMOL/L — SIGNIFICANT CHANGE UP (ref 22–31)
CREAT SERPL-MCNC: 4.89 MG/DL — HIGH (ref 0.5–1.3)
EGFR: 14 ML/MIN/1.73M2 — LOW
GLUCOSE SERPL-MCNC: 91 MG/DL — SIGNIFICANT CHANGE UP (ref 70–99)
HCT VFR BLD CALC: 22.2 % — LOW (ref 39–50)
HGB BLD-MCNC: 7.3 G/DL — LOW (ref 13–17)
MAGNESIUM SERPL-MCNC: 1.8 MG/DL — SIGNIFICANT CHANGE UP (ref 1.6–2.6)
MCHC RBC-ENTMCNC: 32.4 PG — SIGNIFICANT CHANGE UP (ref 27–34)
MCHC RBC-ENTMCNC: 32.9 GM/DL — SIGNIFICANT CHANGE UP (ref 32–36)
MCV RBC AUTO: 98.7 FL — SIGNIFICANT CHANGE UP (ref 80–100)
NRBC # BLD: 0 /100 WBCS — SIGNIFICANT CHANGE UP (ref 0–0)
PHOSPHATE SERPL-MCNC: 5.1 MG/DL — HIGH (ref 2.5–4.5)
PLATELET # BLD AUTO: 134 K/UL — LOW (ref 150–400)
POTASSIUM SERPL-MCNC: 3.7 MMOL/L — SIGNIFICANT CHANGE UP (ref 3.5–5.3)
POTASSIUM SERPL-SCNC: 3.7 MMOL/L — SIGNIFICANT CHANGE UP (ref 3.5–5.3)
RBC # BLD: 2.25 M/UL — LOW (ref 4.2–5.8)
RBC # FLD: 15.8 % — HIGH (ref 10.3–14.5)
SODIUM SERPL-SCNC: 141 MMOL/L — SIGNIFICANT CHANGE UP (ref 135–145)
WBC # BLD: 3.41 K/UL — LOW (ref 3.8–10.5)
WBC # FLD AUTO: 3.41 K/UL — LOW (ref 3.8–10.5)

## 2022-06-15 PROCEDURE — 99233 SBSQ HOSP IP/OBS HIGH 50: CPT

## 2022-06-15 PROCEDURE — 99232 SBSQ HOSP IP/OBS MODERATE 35: CPT | Mod: GC

## 2022-06-15 RX ORDER — ERYTHROPOIETIN 10000 [IU]/ML
5000 INJECTION, SOLUTION INTRAVENOUS; SUBCUTANEOUS
Refills: 0 | Status: DISCONTINUED | OUTPATIENT
Start: 2022-06-15 | End: 2022-06-21

## 2022-06-15 RX ADMIN — Medication 1000 UNIT(S): at 11:38

## 2022-06-15 RX ADMIN — AZATHIOPRINE 50 MILLIGRAM(S): 100 TABLET ORAL at 11:39

## 2022-06-15 RX ADMIN — PREGABALIN 1000 MICROGRAM(S): 225 CAPSULE ORAL at 11:39

## 2022-06-15 RX ADMIN — Medication 40 MILLIGRAM(S): at 13:33

## 2022-06-15 RX ADMIN — CHLORHEXIDINE GLUCONATE 1 APPLICATION(S): 213 SOLUTION TOPICAL at 11:40

## 2022-06-15 RX ADMIN — Medication 650 MILLIGRAM(S): at 06:35

## 2022-06-15 RX ADMIN — HEPARIN SODIUM 5000 UNIT(S): 5000 INJECTION INTRAVENOUS; SUBCUTANEOUS at 13:34

## 2022-06-15 RX ADMIN — Medication 10 MILLIGRAM(S): at 11:39

## 2022-06-15 RX ADMIN — CARVEDILOL PHOSPHATE 3.12 MILLIGRAM(S): 80 CAPSULE, EXTENDED RELEASE ORAL at 17:02

## 2022-06-15 RX ADMIN — HEPARIN SODIUM 5000 UNIT(S): 5000 INJECTION INTRAVENOUS; SUBCUTANEOUS at 21:37

## 2022-06-15 RX ADMIN — HEPARIN SODIUM 5000 UNIT(S): 5000 INJECTION INTRAVENOUS; SUBCUTANEOUS at 06:34

## 2022-06-15 RX ADMIN — Medication 60 MILLIGRAM(S): at 06:34

## 2022-06-15 RX ADMIN — Medication 1 MILLIGRAM(S): at 11:39

## 2022-06-15 RX ADMIN — ATORVASTATIN CALCIUM 20 MILLIGRAM(S): 80 TABLET, FILM COATED ORAL at 21:36

## 2022-06-15 NOTE — PROGRESS NOTE ADULT - SUBJECTIVE AND OBJECTIVE BOX
Subjective:    INTERVAL HPI/OVERNIGHT EVENTS:  - HD tomorrow per renal  - will get AVF on Monday per renal and vascular. Otherwise accepted to HD center per SW    No acute events overnight  Afebrile, hemodynamically stable     Telemetry:    T(F): 99.2 (06-15-22 @ 06:02), Max: 99.2 (06-15-22 @ 06:02)  HR: 55 (06-15-22 @ 06:02) (55 - 81)  BP: 131/76 (06-15-22 @ 06:02) (105/60 - 149/79)  RR: 18 (06-15-22 @ 06:02) (16 - 18)  SpO2: 96% (06-15-22 @ 06:02) (96% - 98%)  I&O's Summary    14 Jun 2022 07:01  -  15 Alfonso 2022 07:00  --------------------------------------------------------  IN: 360 mL / OUT: 200 mL / NET: 160 mL      Weight (kg): 64.4 (06-13-22 @ 07:12)    Medications:  atorvastatin 20 milliGRAM(s) Oral at bedtime  azaTHIOprine 50 milliGRAM(s) Oral daily  carvedilol 3.125 milliGRAM(s) Oral every 12 hours  chlorhexidine 4% Liquid 1 Application(s) Topical daily  cholecalciferol 1000 Unit(s) Oral daily  cyanocobalamin 1000 MICROGram(s) Oral daily  folic acid 1 milliGRAM(s) Oral daily  furosemide    Tablet 40 milliGRAM(s) Oral <User Schedule>  heparin   Injectable 5000 Unit(s) SubCutaneous every 8 hours  NIFEdipine XL 60 milliGRAM(s) Oral daily  predniSONE   Tablet 10 milliGRAM(s) Oral every other day  sodium bicarbonate 650 milliGRAM(s) Oral every 8 hours      PHYSICAL EXAM:  GENERAL APPEARANCE: Thin male, in NAD  HEENT:  PERRL, EOMI. hearing grossly intact.  NECK: R IJ permacath. No JVD appreciated. Neck supple, non-tender no lymphadenopathy, masses or thyromegaly.  CARDIAC: 2/6 holosystolic murmur appreciated. Normal S1 and S2. RRR  LUNGS: Mild crackles in lower lobes b/l. No wheezing or rhonchi appreciated.   ABDOMEN: Soft , NTND, bowel sounds normal. No guarding or rebound.   MUSCULOSKELETAL: ROM intact.  No joint erythema or tenderness.   EXTREMITIES: No edema appreciated. Peripheral pulses intact.   NEUROLOGICAL: Non focal. Strength and sensation symmetric and intact throughout.   SKIN: Warm and dry , Well perfused  PSYCHIATRIC: AOx4 , Normal mood and affect    Notable Labs:    Labs:                          7.6    4.21  )-----------( 170      ( 14 Jun 2022 07:06 )             23.5     06-14    141  |  106  |  93<H>  ----------------------------<  92  3.7   |  17<L>  |  6.70<H>    Ca    7.5<L>      14 Jun 2022 07:05  Phos  6.2     06-14  Mg     1.7     06-14          CAPILLARY BLOOD GLUCOSE                    Micro:      RADIOLOGY & ADDITIONAL TESTS:    NNI   Subjective:    INTERVAL HPI/OVERNIGHT EVENTS:  - HD tomorrow per renal  - will get AVF on Monday per renal and vascular. Otherwise accepted to HD center per SW    - pt feels well, eating well asked to increase diet to regular.   - dysphagia resolved .  No acute events overnight  Afebrile, hemodynamically stable     Telemetry:    T(F): 99.2 (06-15-22 @ 06:02), Max: 99.2 (06-15-22 @ 06:02)  HR: 55 (06-15-22 @ 06:02) (55 - 81)  BP: 131/76 (06-15-22 @ 06:02) (105/60 - 149/79)  RR: 18 (06-15-22 @ 06:02) (16 - 18)  SpO2: 96% (06-15-22 @ 06:02) (96% - 98%)  I&O's Summary    14 Jun 2022 07:01  -  15 Alfonso 2022 07:00  --------------------------------------------------------  IN: 360 mL / OUT: 200 mL / NET: 160 mL      Weight (kg): 64.4 (06-13-22 @ 07:12)    Medications:  atorvastatin 20 milliGRAM(s) Oral at bedtime  azaTHIOprine 50 milliGRAM(s) Oral daily  carvedilol 3.125 milliGRAM(s) Oral every 12 hours  chlorhexidine 4% Liquid 1 Application(s) Topical daily  cholecalciferol 1000 Unit(s) Oral daily  cyanocobalamin 1000 MICROGram(s) Oral daily  folic acid 1 milliGRAM(s) Oral daily  furosemide    Tablet 40 milliGRAM(s) Oral <User Schedule>  heparin   Injectable 5000 Unit(s) SubCutaneous every 8 hours  NIFEdipine XL 60 milliGRAM(s) Oral daily  predniSONE   Tablet 10 milliGRAM(s) Oral every other day  sodium bicarbonate 650 milliGRAM(s) Oral every 8 hours      PHYSICAL EXAM:  GENERAL APPEARANCE: Thin male, in NAD  HEENT:  PERRL, EOMI. hearing grossly intact.  NECK: R IJ permacath. No JVD appreciated. Neck supple, non-tender no lymphadenopathy, masses or thyromegaly.  CARDIAC: 2/6 holosystolic murmur appreciated. Normal S1 and S2. RRR  LUNGS: Mild crackles in lower lobes b/l. No wheezing or rhonchi appreciated.   ABDOMEN: Soft , NTND, bowel sounds normal. No guarding or rebound.   MUSCULOSKELETAL: ROM intact.  No joint erythema or tenderness.   EXTREMITIES: No edema appreciated. Peripheral pulses intact.   NEUROLOGICAL: Non focal. Strength and sensation symmetric and intact throughout.   SKIN: Warm and dry , Well perfused  PSYCHIATRIC: AOx4 , Normal mood and affect    Notable Labs:    Labs:                          7.6    4.21  )-----------( 170      ( 14 Jun 2022 07:06 )             23.5     06-14    141  |  106  |  93<H>  ----------------------------<  92  3.7   |  17<L>  |  6.70<H>    Ca    7.5<L>      14 Jun 2022 07:05  Phos  6.2     06-14  Mg     1.7     06-14          CAPILLARY BLOOD GLUCOSE                    Micro:      RADIOLOGY & ADDITIONAL TESTS:    NNI

## 2022-06-15 NOTE — PROGRESS NOTE ADULT - PROBLEM SELECTOR PLAN 2
Renal transplant since 1978, doing well on immunosuppressants until COVID in 2020, worsening CKD   W NAGMA as above, requiring urgent HD  SCr 10.21 on arrival, jet seems like 8-9  Exam with lower lobe crackles.   Home Med: Lasix 40 3x per week  - f/u renal recs  - monitor BUN to assure stable correction to prevent osmotic shifts.   - patient will need HD x3 (6/13 - 6/15)   - will need  assistance for outpatient HD center setup. - ongoing, appreciate assistance.   - c/w lasix 40 3x per week.   - monitor urine output    #AVF Placement  W vascular surgery, tentatively planned for Monday 6/20  Needs COVID swab saturday, HD on sunday before, Labs day of, and medical optimization documentation.   - If patient ready for DC prior to then, can DC and have him pursue outpatient. Renal transplant since 1978, doing well on immunosuppressants until COVID in 2020, worsening CKD   W NAGMA as above, requiring urgent HD  SCr 10.21 on arrival, jet seems like 8-9  Exam with lower lobe crackles.   Home Med: Lasix 40 3x per week  - f/u renal recs  - monitor BUN to assure stable correction to prevent osmotic shifts.   - patient will need HD x3 (6/13 - 6/15)   - will need  assistance for outpatient HD center setup. - ongoing, appreciate assistance.   - c/w lasix 40 3x per week.   - monitor urine output    #AVF Placement  W vascular surgery, tentatively planned for Monday 6/20  Needs COVID swab saturday, HD on sunday before, Labs day of, and medical optimization documentation.   - will verify w vascular to confirm surgery  - pt needs AVF placed before being accepted to HD, but is otherwise accepted. Renal transplant since 1978, doing well on immunosuppressants until COVID in 2020, worsening CKD   W NAGMA as above, requiring urgent HD  SCr 10.21 on arrival, jet seems like 8-9  Exam with lower lobe crackles.   Home Med: Lasix 40 3x per week  - f/u renal recs  - monitor BUN to assure stable correction to prevent osmotic shifts.   - patient will need HD x3 (6/13 - 6/15)   - will need  assistance for outpatient HD center setup. - ongoing, appreciate assistance.   - c/w lasix 40 3x per week.   - monitor urine output    #AVF Placement  W vascular surgery, tentatively planned for Monday 6/20  Needs COVID swab saturday, HD before, Labs day of, and medical optimization documentation.   - will verify w vascular to confirm surgery  - per renal, patient will be ok with HD on Saturday (not sunday) for procedure on Monday since no issues with volume.   - pt needs AVF placed before being accepted to HD, but is otherwise accepted.

## 2022-06-15 NOTE — PROGRESS NOTE ADULT - PROBLEM SELECTOR PLAN 3
Hb 7.5, baseline seems to be 8-10; MCV   Likely due to ESRD and AOCD  - Iron studies  - B12 and folate since macrocytic  - c/w home EPO injections per renal w HD Hb 7.5, baseline seems to be 8-10; MCV   Likely due to ESRD and AOCD  Hemodyn stable, no obvious signs of bleeding.   ? lymphoproliferative syndrome since s/p transplant on long term immunosuppressants   - Iron studies ? AOCD pattern  - B12, folate wnl   - c/w EPO injections per renal w HD  - CBC w diff and retic to eval

## 2022-06-15 NOTE — PROGRESS NOTE ADULT - SUBJECTIVE AND OBJECTIVE BOX
[facility] DIVISION OF KIDNEY DISEASES AND HYPERTENSION -- FOLLOW UP NOTE  --------------------------------------------------------------------------------  Chief Complaint:/subjective: no complaints, feels well, no sob    24 hour events: dialysis #2 yesterday went well        PAST HISTORY  --------------------------------------------------------------------------------  No significant changes to PMH, PSH, FHx, SHx, unless otherwise noted    ALLERGIES & MEDICATIONS  --------------------------------------------------------------------------------  Allergies    shellfish (Rash)  sulfa (Swelling)    Intolerances      Standing Inpatient Medications  atorvastatin 20 milliGRAM(s) Oral at bedtime  azaTHIOprine 50 milliGRAM(s) Oral daily  carvedilol 3.125 milliGRAM(s) Oral every 12 hours  chlorhexidine 4% Liquid 1 Application(s) Topical daily  cholecalciferol 1000 Unit(s) Oral daily  cyanocobalamin 1000 MICROGram(s) Oral daily  folic acid 1 milliGRAM(s) Oral daily  furosemide    Tablet 40 milliGRAM(s) Oral <User Schedule>  heparin   Injectable 5000 Unit(s) SubCutaneous every 8 hours  NIFEdipine XL 60 milliGRAM(s) Oral daily  predniSONE   Tablet 10 milliGRAM(s) Oral every other day  sodium bicarbonate 650 milliGRAM(s) Oral every 8 hours    PRN Inpatient Medications      REVIEW OF SYSTEMS  --------------------------------------------------------------------------------  Gen: No weight changes, fatigue, fevers/chills, weakness  Skin: No rashes  Head/Eyes/Ears/Mouth: No headache;   Respiratory: No dyspnea, cough  CV: No chest pain, PND, orthopnea  GI: No abdominal pain, diarrhea, constipation, nausea, vomiting  : No increased frequency, dysuria, hematuria, nocturia  MSK: No joint pain/swelling; no back pain; no edema  Neuro: No dizziness/lightheadedness, weakness  Heme: No easy bruising or bleeding  Psych: No significant nervousness, anxiety, stress, depression    All other systems were reviewed and are negative, except as noted.    VITALS/PHYSICAL EXAM  --------------------------------------------------------------------------------  T(C): 37.3 (06-15-22 @ 06:02), Max: 37.3 (06-15-22 @ 06:02)  HR: 55 (06-15-22 @ 06:02) (55 - 81)  BP: 131/76 (06-15-22 @ 06:02) (105/60 - 149/79)  RR: 18 (06-15-22 @ 06:02) (16 - 18)  SpO2: 96% (06-15-22 @ 06:02) (96% - 98%)  Wt(kg): --  Adult Advanced Hemodynamics Last 24 Hrs  ABP: --  ABP(mean): --  CVP(mm Hg): --  CO: --  CI: --  PA: --  PA(mean): --  PCWP: --  SVR: --  SVRI: --        06-14-22 @ 07:01  -  06-15-22 @ 07:00  --------------------------------------------------------  IN: 360 mL / OUT: 200 mL / NET: 160 mL      Physical Exam:  	Gen: NAD,    	HEENT:  , no jvp  	Pulm: CTA B/L  	CV: RRR, S1S2; no rub  	Back:  no sacral edema  	Abd: +BS, soft,   	: No suprapubic tenderness  	Ext: no edema  	Neuro: alert  	Psych: awake  	Skin: Warm, w   	Vascular access: R PC    LABS/STUDIES  --------------------------------------------------------------------------------              7.3    3.41  >-----------<  134      [06-15-22 @ 07:50]              22.2     Hemoglobin: 7.3 g/dL (06-15-22 @ 07:50)  Hemoglobin: 7.6 g/dL (06-14-22 @ 07:06)    Platelet Count - Automated: 134 K/uL (06-15-22 @ 07:50)  Platelet Count - Automated: 170 K/uL (06-14-22 @ 07:06)    141  |  105  |  52  ----------------------------<  91      [06-15-22 @ 07:50]  3.7   |  25  |  4.89        Ca     7.8     [06-15-22 @ 07:50]      Mg     1.8     [06-15-22 @ 07:50]      Phos  5.1     [06-15-22 @ 07:50]            Creatinine Trend:  SCr 4.89 [06-15 @ 07:50]  SCr 6.70 [06-14 @ 07:05]  SCr 6.30 [06-13 @ 18:06]  SCr 5.98 [06-13 @ 13:28]  SCr 10.21 [06-13 @ 10:12]        Iron 134, TIBC 155, %sat 86      [06-13-22 @ 10:34]  Ferritin 786      [06-13-22 @ 10:34]  PTH -- (Ca 7.5)      [06-14-22 @ 07:07]   918  TSH 2.98      [02-10-22 @ 09:01]    HBsAb Reactive      [06-14-22 @ 13:52]  HBsAg Nonreact      [06-14-22 @ 13:52]  HCV 0.11, Nonreact      [06-14-22 @ 13:52]

## 2022-06-15 NOTE — PROGRESS NOTE ADULT - PROBLEM SELECTOR PLAN 5
?Recent weight loss  - S/s consult, f/u recs  - soft diet for now Normotensive inpatient   Home Meds: Nifedipine 120 qd, Hydralazine 100 TID, Coreg 3.125 BID, Doxazosin 4mg qd  - Hold Hydral and Doxazosin inpatient  - c/w Nifedipine and Coreg w hold parameters

## 2022-06-15 NOTE — PROGRESS NOTE ADULT - PROBLEM SELECTOR PLAN 8
Diet: Renal, consistency soft, pending speech eval   DVT PPx: SubQ Heparin    Dispo: Inpatient HD, then setup of outpatient HD, AVF placement on 6/20 will remain inpatient till then Patient to go for AVF placement by vascular surgery on Monday June 20, 2022  Patient in need of AVF placement for new ESRD requiring HD    Patient's electrolyte abnormalities and acidosis are now resolved with HD inpatient.     RCRI: 1 point for elevated creatinine, 6.0% risk of 30d post-op death, MI, or cardiac arrest.     Low risk for low risk procedure.    Patient to proceed with procedure, plan for HD day prior, AM labs day of procedure. Patient to go for AVF placement by vascular surgery on Monday June 20, 2022  Patient in need of AVF placement for new ESRD requiring HD    Patient's electrolyte abnormalities and acidosis are now resolved with HD inpatient.     RCRI: 1 point for elevated creatinine, 6.0% risk of 30d post-op death, MI, or cardiac arrest.     Low risk for low risk procedure.    Patient to proceed with procedure, plan for HD on Saturday prior, AM labs day of procedure.

## 2022-06-15 NOTE — PROGRESS NOTE ADULT - PROBLEM SELECTOR PLAN 6
Normotensive inpatient   Home Meds: Nifedipine 120 qd, Hydralazine 100 TID, Coreg 3.125 BID, Doxazosin 4mg qd  - Hold Hydral and Doxazosin inpatient  - c/w Nifedipine and Coreg w hold parameters Home Med: Lipitor 20mg qhs  - c/w lipitor

## 2022-06-15 NOTE — PROGRESS NOTE ADULT - PROBLEM SELECTOR PLAN 1
VBG on Arrival: 7.13 / 20 / 64 / 7; Lactate 1.2. Bicarb < 10 on BMP.   VBGs improved after HD.  Symptoms: Fatigue, n/v  - Urgent IJ access placed, urgent HD first session 6/13  - renal consulted, f/u additional recs and further HD requirements Resolved post urgent HD  VBG on Arrival: 7.13 / 20 / 64 / 7; Lactate 1.2. Bicarb < 10 on BMP.   VBGs improved after HD.  Symptoms: Fatigue, n/v  - Urgent IJ access placed, urgent HD first session 6/13  - renal consulted, f/u additional recs and further HD requirements

## 2022-06-15 NOTE — PROGRESS NOTE ADULT - PROBLEM SELECTOR PLAN 7
Home Med: Lipitor 20mg qhs  - c/w lipitor Diet: Renal, consistency soft, pending speech eval   DVT PPx: SubQ Heparin    Dispo: Inpatient HD, then setup of outpatient HD, AVF placement on 6/20 will remain inpatient till then

## 2022-06-16 LAB
24R-OH-CALCIDIOL SERPL-MCNC: 23.8 NG/ML — LOW (ref 30–80)
ALBUMIN SERPL ELPH-MCNC: 3.3 G/DL — SIGNIFICANT CHANGE UP (ref 3.3–5)
ALP SERPL-CCNC: 77 U/L — SIGNIFICANT CHANGE UP (ref 40–120)
ALT FLD-CCNC: 8 U/L — LOW (ref 10–45)
ANION GAP SERPL CALC-SCNC: 15 MMOL/L — SIGNIFICANT CHANGE UP (ref 5–17)
ANISOCYTOSIS BLD QL: SIGNIFICANT CHANGE UP
APTT BLD: 30.2 SEC — SIGNIFICANT CHANGE UP (ref 27.5–35.5)
AST SERPL-CCNC: 21 U/L — SIGNIFICANT CHANGE UP (ref 10–40)
BASOPHILS # BLD AUTO: 0.04 K/UL — SIGNIFICANT CHANGE UP (ref 0–0.2)
BASOPHILS NFR BLD AUTO: 0.9 % — SIGNIFICANT CHANGE UP (ref 0–2)
BILIRUB DIRECT SERPL-MCNC: 0.1 MG/DL — SIGNIFICANT CHANGE UP (ref 0–0.3)
BILIRUB DIRECT SERPL-MCNC: 0.1 MG/DL — SIGNIFICANT CHANGE UP (ref 0–0.3)
BILIRUB INDIRECT FLD-MCNC: 0.3 MG/DL — SIGNIFICANT CHANGE UP (ref 0.2–1)
BILIRUB INDIRECT FLD-MCNC: 0.3 MG/DL — SIGNIFICANT CHANGE UP (ref 0.2–1)
BILIRUB SERPL-MCNC: 0.4 MG/DL — SIGNIFICANT CHANGE UP (ref 0.2–1.2)
BILIRUB SERPL-MCNC: 0.4 MG/DL — SIGNIFICANT CHANGE UP (ref 0.2–1.2)
BLD GP AB SCN SERPL QL: NEGATIVE — SIGNIFICANT CHANGE UP
BUN SERPL-MCNC: 64 MG/DL — HIGH (ref 7–23)
CALCIUM SERPL-MCNC: 7.6 MG/DL — LOW (ref 8.4–10.5)
CHLORIDE SERPL-SCNC: 104 MMOL/L — SIGNIFICANT CHANGE UP (ref 96–108)
CO2 SERPL-SCNC: 21 MMOL/L — LOW (ref 22–31)
CREAT SERPL-MCNC: 5.31 MG/DL — HIGH (ref 0.5–1.3)
DACRYOCYTES BLD QL SMEAR: SLIGHT — SIGNIFICANT CHANGE UP
DAT POLY-SP REAG RBC QL: NEGATIVE — SIGNIFICANT CHANGE UP
EGFR: 12 ML/MIN/1.73M2 — LOW
ELLIPTOCYTES BLD QL SMEAR: SLIGHT — SIGNIFICANT CHANGE UP
EOSINOPHIL # BLD AUTO: 0.08 K/UL — SIGNIFICANT CHANGE UP (ref 0–0.5)
EOSINOPHIL NFR BLD AUTO: 1.8 % — SIGNIFICANT CHANGE UP (ref 0–6)
GLUCOSE SERPL-MCNC: 93 MG/DL — SIGNIFICANT CHANGE UP (ref 70–99)
HAPTOGLOB SERPL-MCNC: 73 MG/DL — SIGNIFICANT CHANGE UP (ref 34–200)
HBV CORE AB SER-ACNC: SIGNIFICANT CHANGE UP
HCT VFR BLD CALC: 20.3 % — CRITICAL LOW (ref 39–50)
HCT VFR BLD CALC: 21.4 % — LOW (ref 39–50)
HCT VFR BLD CALC: 23.4 % — LOW (ref 39–50)
HGB BLD-MCNC: 6.5 G/DL — CRITICAL LOW (ref 13–17)
HGB BLD-MCNC: 6.9 G/DL — CRITICAL LOW (ref 13–17)
HGB BLD-MCNC: 7.7 G/DL — LOW (ref 13–17)
INR BLD: 1.12 RATIO — SIGNIFICANT CHANGE UP (ref 0.88–1.16)
LDH SERPL L TO P-CCNC: 192 U/L — SIGNIFICANT CHANGE UP (ref 50–242)
LYMPHOCYTES # BLD AUTO: 0.8 K/UL — LOW (ref 1–3.3)
LYMPHOCYTES # BLD AUTO: 17.9 % — SIGNIFICANT CHANGE UP (ref 13–44)
MACROCYTES BLD QL: SIGNIFICANT CHANGE UP
MAGNESIUM SERPL-MCNC: 1.8 MG/DL — SIGNIFICANT CHANGE UP (ref 1.6–2.6)
MANUAL SMEAR VERIFICATION: SIGNIFICANT CHANGE UP
MCHC RBC-ENTMCNC: 31.6 PG — SIGNIFICANT CHANGE UP (ref 27–34)
MCHC RBC-ENTMCNC: 31.9 PG — SIGNIFICANT CHANGE UP (ref 27–34)
MCHC RBC-ENTMCNC: 31.9 PG — SIGNIFICANT CHANGE UP (ref 27–34)
MCHC RBC-ENTMCNC: 32 GM/DL — SIGNIFICANT CHANGE UP (ref 32–36)
MCHC RBC-ENTMCNC: 32.2 GM/DL — SIGNIFICANT CHANGE UP (ref 32–36)
MCHC RBC-ENTMCNC: 32.9 GM/DL — SIGNIFICANT CHANGE UP (ref 32–36)
MCV RBC AUTO: 95.9 FL — SIGNIFICANT CHANGE UP (ref 80–100)
MCV RBC AUTO: 99.1 FL — SIGNIFICANT CHANGE UP (ref 80–100)
MCV RBC AUTO: 99.5 FL — SIGNIFICANT CHANGE UP (ref 80–100)
MICROCYTES BLD QL: SLIGHT — SIGNIFICANT CHANGE UP
MONOCYTES # BLD AUTO: 0.36 K/UL — SIGNIFICANT CHANGE UP (ref 0–0.9)
MONOCYTES NFR BLD AUTO: 8 % — SIGNIFICANT CHANGE UP (ref 2–14)
NEUTROPHILS # BLD AUTO: 3.18 K/UL — SIGNIFICANT CHANGE UP (ref 1.8–7.4)
NEUTROPHILS NFR BLD AUTO: 71.4 % — SIGNIFICANT CHANGE UP (ref 43–77)
NRBC # BLD: 0 /100 WBCS — SIGNIFICANT CHANGE UP (ref 0–0)
NRBC # BLD: 0 /100 WBCS — SIGNIFICANT CHANGE UP (ref 0–0)
NRBC # BLD: 1 /100 — HIGH (ref 0–0)
PHOSPHATE SERPL-MCNC: 5.3 MG/DL — HIGH (ref 2.5–4.5)
PLAT MORPH BLD: NORMAL — SIGNIFICANT CHANGE UP
PLATELET # BLD AUTO: 112 K/UL — LOW (ref 150–400)
PLATELET # BLD AUTO: 117 K/UL — LOW (ref 150–400)
PLATELET # BLD AUTO: 122 K/UL — LOW (ref 150–400)
POIKILOCYTOSIS BLD QL AUTO: SLIGHT — SIGNIFICANT CHANGE UP
POLYCHROMASIA BLD QL SMEAR: SLIGHT — SIGNIFICANT CHANGE UP
POTASSIUM SERPL-MCNC: 3.8 MMOL/L — SIGNIFICANT CHANGE UP (ref 3.5–5.3)
POTASSIUM SERPL-SCNC: 3.8 MMOL/L — SIGNIFICANT CHANGE UP (ref 3.5–5.3)
PROT SERPL-MCNC: 5.6 G/DL — LOW (ref 6–8.3)
PROTHROM AB SERPL-ACNC: 12.9 SEC — SIGNIFICANT CHANGE UP (ref 10.5–13.4)
RBC # BLD: 1.97 M/UL — LOW (ref 4.2–5.8)
RBC # BLD: 2.04 M/UL — LOW (ref 4.2–5.8)
RBC # BLD: 2.04 M/UL — LOW (ref 4.2–5.8)
RBC # BLD: 2.16 M/UL — LOW (ref 4.2–5.8)
RBC # BLD: 2.44 M/UL — LOW (ref 4.2–5.8)
RBC # FLD: 15.3 % — HIGH (ref 10.3–14.5)
RBC # FLD: 15.5 % — HIGH (ref 10.3–14.5)
RBC # FLD: 15.7 % — HIGH (ref 10.3–14.5)
RBC BLD AUTO: ABNORMAL
RETICS #: 22.2 K/UL — LOW (ref 25–125)
RETICS #: 24.8 K/UL — LOW (ref 25–125)
RETICS/RBC NFR: 1.1 % — SIGNIFICANT CHANGE UP (ref 0.5–2.5)
RETICS/RBC NFR: 1.3 % — SIGNIFICANT CHANGE UP (ref 0.5–2.5)
RH IG SCN BLD-IMP: POSITIVE — SIGNIFICANT CHANGE UP
SCHISTOCYTES BLD QL AUTO: SLIGHT — SIGNIFICANT CHANGE UP
SODIUM SERPL-SCNC: 140 MMOL/L — SIGNIFICANT CHANGE UP (ref 135–145)
URATE SERPL-MCNC: 3.9 MG/DL — SIGNIFICANT CHANGE UP (ref 3.4–8.8)
WBC # BLD: 4.14 K/UL — SIGNIFICANT CHANGE UP (ref 3.8–10.5)
WBC # BLD: 4.46 K/UL — SIGNIFICANT CHANGE UP (ref 3.8–10.5)
WBC # BLD: 4.74 K/UL — SIGNIFICANT CHANGE UP (ref 3.8–10.5)
WBC # FLD AUTO: 4.14 K/UL — SIGNIFICANT CHANGE UP (ref 3.8–10.5)
WBC # FLD AUTO: 4.46 K/UL — SIGNIFICANT CHANGE UP (ref 3.8–10.5)
WBC # FLD AUTO: 4.74 K/UL — SIGNIFICANT CHANGE UP (ref 3.8–10.5)

## 2022-06-16 PROCEDURE — 99255 IP/OBS CONSLTJ NEW/EST HI 80: CPT | Mod: GC

## 2022-06-16 PROCEDURE — 99233 SBSQ HOSP IP/OBS HIGH 50: CPT | Mod: GC

## 2022-06-16 PROCEDURE — 99233 SBSQ HOSP IP/OBS HIGH 50: CPT

## 2022-06-16 RX ORDER — PANTOPRAZOLE SODIUM 20 MG/1
40 TABLET, DELAYED RELEASE ORAL
Refills: 0 | Status: DISCONTINUED | OUTPATIENT
Start: 2022-06-16 | End: 2022-06-21

## 2022-06-16 RX ORDER — PANTOPRAZOLE SODIUM 20 MG/1
40 TABLET, DELAYED RELEASE ORAL
Refills: 0 | Status: DISCONTINUED | OUTPATIENT
Start: 2022-06-16 | End: 2022-06-16

## 2022-06-16 RX ADMIN — HEPARIN SODIUM 5000 UNIT(S): 5000 INJECTION INTRAVENOUS; SUBCUTANEOUS at 13:48

## 2022-06-16 RX ADMIN — HEPARIN SODIUM 5000 UNIT(S): 5000 INJECTION INTRAVENOUS; SUBCUTANEOUS at 22:07

## 2022-06-16 RX ADMIN — HEPARIN SODIUM 5000 UNIT(S): 5000 INJECTION INTRAVENOUS; SUBCUTANEOUS at 06:03

## 2022-06-16 RX ADMIN — AZATHIOPRINE 50 MILLIGRAM(S): 100 TABLET ORAL at 13:49

## 2022-06-16 RX ADMIN — Medication 1 MILLIGRAM(S): at 13:49

## 2022-06-16 RX ADMIN — PREGABALIN 1000 MICROGRAM(S): 225 CAPSULE ORAL at 13:49

## 2022-06-16 RX ADMIN — CHLORHEXIDINE GLUCONATE 1 APPLICATION(S): 213 SOLUTION TOPICAL at 13:49

## 2022-06-16 RX ADMIN — ATORVASTATIN CALCIUM 20 MILLIGRAM(S): 80 TABLET, FILM COATED ORAL at 22:07

## 2022-06-16 RX ADMIN — ERYTHROPOIETIN 5000 UNIT(S): 10000 INJECTION, SOLUTION INTRAVENOUS; SUBCUTANEOUS at 11:21

## 2022-06-16 RX ADMIN — Medication 1000 UNIT(S): at 13:48

## 2022-06-16 NOTE — CONSULT NOTE ADULT - ASSESSMENT
****INCOMPLETE****     52 yo M with a PMH of HTN, congenital renal disease (unknown, since birth) s/p renal transplant at age 8 in 1978 c/b CKD IV (on Azathioprine and Prednisone), chronic anemia (on weekly Aranesp), and HLD who presented for AV Fistula placement, but was found to acidotic on pre-op VBG, evaluated by renal and had IJ access placed for urgent HD. Hematology consulted for borderline pancytopenia.    #Bi-cytopenia  - Patient has had longstanding anemia in the setting of CKD    - Peripheral smear personally reviewed, showing normocytic, normochromic anemia, no platelet clumps or dysplastic appearing leukocytes     Patient received renal transplant from his mother in 1978 (when he was 7 yo) for congenital renal failure, was doing well on medications and recently had worsening renal function since COVID infection in 2020. Patient reported feeling a bit fatigued, and had some nausea and vomiting. Denied any shortness of breath, leg swelling, or chest pain. Denied any fevers, chills, sick contacts, abdominal pain, diarrhea. Patient had never received HD before.    Hematology consulted given borderline pancytopenia. He has been following Dr. Elliott Castillo at [facility] for anemia and has been on Aranesp. He has also had medications such as his Azathioprine adjusted.  He had a bone marrow biopsy 03/2022 that showed focal megakaryocytes, mild increase in interstitial mast cells, and renal osteodystrophy, but was mostly unremarkable. 52 yo M with a PMH of HTN, congenital renal disease (unknown, since birth) s/p renal transplant at age 8 in 1978 c/b CKD IV (on Azathioprine and Prednisone), chronic anemia (on weekly Aranesp), and HLD who presented for AV Fistula placement, but was found to acidotic on pre-op VBG, evaluated by renal and had IJ access placed for urgent HD. Hematology consulted for borderline pancytopenia.    #Bi-cytopenia  - Patient has had longstanding anemia in the setting of CKD. He has been following Dr. Elliott [facility]  - Patient also had leukopenia earlier this year, s/p bone marrow biopsy 03/2022 showing that showed focal megakaryocytes, mild increase in interstitial mast cells, and renal osteodystrophy, overall unremarkable  - Consulted this admission in the setting of borderline pancytopenia  - Peripheral smear personally reviewed, showing normocytic, normochromic anemia, no platelet clumps or dysplastic appearing leukocytes  - LDH, hapto, reticulocyte count, Olivia not suggestive of hemolysis. B12/folate normal  - Based on labs (including iron studies) and smear, consistent with anemia of chronic disease. Patient had recent Aranesp decrease to 40 ucg/ml weekly. Borderline leukopenia may be in the setting of Azathioprine as well. Finally, patient may have some thrombocytopenia in the setting of acute illness, including recent AVF placement, can continue to trend. No suspicion for marrow intrinsic disorder at this time  - Can consider increasing Aranesp dose as per renal. Can also consider a dose of IV iron (200 mg) every 3 months to help Hb reach a goal closer to 10 mg/dL (followed as outpatient)  - If Hb still not at goal, may be due to antibodies to EPO, and as an outpatient can try Prednisone trial (already on 10 mg every other day, could increase to 60 mg daily x 5 days +/- IVIG) and if response, can continue as a taper  - Otherwise, supportive therapy for now    #Weight Loss  - Unlikely due to intrinsic marrow process given relatively recent bone marrow biopsy in March  - May be in the setting of worsening renal disease  - Workup by primary team but would not be opposed to CT C/A/P (with IV contrast if possible, with discussion with Nephrology) to r/o malignancy      Aryles Hedjar, MD, PGY-4  Hematology/Oncology Fellow  [facility]  Pager: [phone]  After 5PM and on weekends and holidays, please call the inpatient fellow on call.

## 2022-06-16 NOTE — CONSULT NOTE ADULT - ATTENDING COMMENTS
51-yr-old man seen in consult with h/o congenital kidney disease (Polycystic Kidney?) s/p kidney transplant (donor: mother) at the age of 8, lately developed progressive CKD needing initiation of dialysis in this admission seen in consult for pancytopenia. WBC count is normal. His baseline Hgb is ~8. He has been on JOSEPH (unknown dose) for some time, however, H/H has not improved. Smears shows normocytic, normochromic RBCs, no fragmentation. He has normal WBC and boarder line low platelets. Findings suggestive of AOCD/AORF. Additionally patient is on immunosuppression which may also cause myelosuppression. Will complete the anemia w/u. May consider increasing the JOSEPH dose and using iron infusion (Venofer 200 mg) every 2-3 months if a response is noted. Patient is already on prednisone, otherwise a trial of tapering dose prednisone could be considered for possible Ab against JOSEPH causing PRCA. Platelet count may drop with HD due to trapping of platelets in the dialysis membrane. Supportive.     Also, patient has unintentional weight loss of about 100 lbs over the past year. Now he is gaining back some weight which is reassuring. History and labs not suggestive of malignancy. He likely lost weight due to progressive renal failure.
progessed CKD Stage V admitted from [facility]; was to get PERmacath/AVF today but noted to have low bicarb and ph; got permcath today and admitted for HD     # progressive CKD V----> now ESRD with  uremic symptoms- n/v/poor appetite/weight loss/metallic taste is mouth/fatigue  plan for HD # 1 today  has permacath  plan for HD tomorrow #2 and #3 wednesday  please work with SW fo admission to [facility] for HD  to schedule AVF with Dr Chacon  #metabolic acidosis- HD today; on bicarb tabs; DC when appropriate; labs 3-4 hrs post HD today  # Hx LRRT >42 years  -#Immunosuppression - c/w pred10 qod and Imuran 50mg  # h/o Hyperkalemia- on loklema 5g- 2 packets bid at home; ok to hold now and monitor k on HD  #Anemia due to CKD V/ BM suppression from Imuran- on aranesp 60mcg weekly at home;   #check pth and phos    # Benign Essential HTN -stable today - On Hydralazine 100mg tid, Furosemide 40mg tiw, Doxazosin 4mg once a day ; Coreg 3.125 bid, Nifedipine 60mg two tabs daily; Lasix daily; cont coreg and nifedipine and LAsix; monitor BP with HD

## 2022-06-16 NOTE — PROGRESS NOTE ADULT - PROBLEM SELECTOR PLAN 6
Home Med: Lipitor 20mg qhs  - c/w lipitor Diet: Renal diet   DVT PPx: SubQ Heparin    Dispo: Inpatient HD, then setup of outpatient HD, AVF placement on 6/20 will remain inpatient till then and likely for monitoring post-procedure

## 2022-06-16 NOTE — PROGRESS NOTE ADULT - PROBLEM SELECTOR PLAN 4
In 1978, c/b CKD4 with worsening renal function  Home Meds: Prednisone 10 QOD (recently increased), Azathioprine 50 qd  - c/w home immunosuppression Normotensive inpatient   Home Meds: Nifedipine 120 qd, Hydralazine 100 TID, Coreg 3.125 BID, Doxazosin 4mg qd  - Hold Hydral and Doxazosin inpatient  - c/w Nifedipine and Coreg w hold parameters  - consider need for coreg, since patient has intermittent asymptomatic bradycardia

## 2022-06-16 NOTE — PROGRESS NOTE ADULT - PROBLEM SELECTOR PLAN 2
Renal transplant since 1978, doing well on immunosuppressants until COVID in 2020, worsening CKD   W NAGMA as above, requiring urgent HD  SCr 10.21 on arrival, jet seems like 8-9  Exam with lower lobe crackles.   Home Med: Lasix 40 3x per week  - f/u renal recs  - monitor BUN to assure stable correction to prevent osmotic shifts.   - patient will need HD x3 (6/13 - 6/15)   - will need  assistance for outpatient HD center setup. - ongoing, appreciate assistance.   - c/w lasix 40 3x per week.   - monitor urine output    #AVF Placement  W vascular surgery, tentatively planned for Monday 6/20  Needs COVID swab saturday, HD before, Labs day of, and medical optimization documentation.   - will verify w vascular to confirm surgery  - per renal, patient will be ok with HD on Saturday (not sunday) for procedure on Monday since no issues with volume.   - pt needs AVF placed before being accepted to HD, but is otherwise accepted. 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 Retic hypoproliferative. MCV elevated .   Hemodynamically stable, no obvious signs of bleeding.   ? lymphoproliferative syndrome since s/p transplant on long term immunosuppressants   Follows w heme at [facility], Dr. Castillo  1U pRBC given w HD on 6/16  - Iron studies ? AOCD pattern  - B12, folate wnl   - c/w EPO injections per renal w HD  - hematology consulted on 6/16, given multiple cell line depression and recent weight loss reported, will f/u recs

## 2022-06-16 NOTE — PROGRESS NOTE ADULT - SUBJECTIVE AND OBJECTIVE BOX
Subjective:    INTERVAL HPI/OVERNIGHT EVENTS:   - patient w once episode of bradycardia, asymptomatic to 45-50; BP meds held by NF resident  - HD today  - plan for next HD Saturday AVF placement w vascular on Monday.   No acute events overnight  Afebrile, hemodynamically stable     Telemetry:    T(F): 98.9 (06-16-22 @ 05:55), Max: 99.3 (06-15-22 @ 21:50)  HR: 50 (06-16-22 @ 05:55) (50 - 81)  BP: 152/81 (06-16-22 @ 05:55) (119/71 - 152/81)  RR: 18 (06-16-22 @ 05:55) (18 - 18)  SpO2: 100% (06-16-22 @ 05:55) (95% - 100%)  I&O's Summary    15 Alfonso 2022 07:01  -  16 Jun 2022 07:00  --------------------------------------------------------  IN: 910 mL / OUT: 0 mL / NET: 910 mL    Medications:  atorvastatin 20 milliGRAM(s) Oral at bedtime  azaTHIOprine 50 milliGRAM(s) Oral daily  carvedilol 3.125 milliGRAM(s) Oral every 12 hours  chlorhexidine 4% Liquid 1 Application(s) Topical daily  cholecalciferol 1000 Unit(s) Oral daily  cyanocobalamin 1000 MICROGram(s) Oral daily  epoetin amaury-epbx (RETACRIT) Injectable 5000 Unit(s) IV Push <User Schedule>  folic acid 1 milliGRAM(s) Oral daily  furosemide    Tablet 40 milliGRAM(s) Oral <User Schedule>  heparin   Injectable 5000 Unit(s) SubCutaneous every 8 hours  NIFEdipine XL 60 milliGRAM(s) Oral daily  predniSONE   Tablet 10 milliGRAM(s) Oral every other day      PHYSICAL EXAM:  GENERAL APPEARANCE: Thin male, in NAD  HEENT:  PERRL, EOMI. hearing grossly intact.  NECK: R IJ permacath. No JVD appreciated. Neck supple, non-tender no lymphadenopathy, masses or thyromegaly.  CARDIAC: 2/6 holosystolic murmur appreciated. Normal S1 and S2. RRR  LUNGS: Mild crackles in lower lobes b/l. No wheezing or rhonchi appreciated.   ABDOMEN: Soft , NTND, bowel sounds normal. No guarding or rebound.   MUSCULOSKELETAL: ROM intact.  No joint erythema or tenderness.   EXTREMITIES: No edema appreciated. Peripheral pulses intact.   NEUROLOGICAL: Non focal. Strength and sensation symmetric and intact throughout.   SKIN: Warm and dry , Well perfused  PSYCHIATRIC: AOx4 , Normal mood and affect    Notable Labs:    Labs:                          7.3    3.41  )-----------( 134      ( 15 Alfonso 2022 07:50 )             22.2     06-15    141  |  105  |  52<H>  ----------------------------<  91  3.7   |  25  |  4.89<H>    Ca    7.8<L>      15 Alfonso 2022 07:50  Phos  5.1     06-15  Mg     1.8     06-15          CAPILLARY BLOOD GLUCOSE                    Micro:      RADIOLOGY & ADDITIONAL TESTS:    CXR: personally reviewed, clear lungs.    Subjective:    INTERVAL HPI/OVERNIGHT EVENTS:   - patient w once episode of bradycardia, asymptomatic to 45-50; BP meds held by NF resident  - HD today  - plan for next HD Saturday AVF placement w vascular on Monday.   - patient says eating well, denies dysphagia, anorexia, fevers, chills, dizziness, lightheadedness, CP SOB  - RIJ slightly bothersome but nontender.   No acute events overnight  Afebrile, hemodynamically stable     Telemetry:    T(F): 98.9 (06-16-22 @ 05:55), Max: 99.3 (06-15-22 @ 21:50)  HR: 50 (06-16-22 @ 05:55) (50 - 81)  BP: 152/81 (06-16-22 @ 05:55) (119/71 - 152/81)  RR: 18 (06-16-22 @ 05:55) (18 - 18)  SpO2: 100% (06-16-22 @ 05:55) (95% - 100%)  I&O's Summary    15 Alfonso 2022 07:01  -  16 Jun 2022 07:00  --------------------------------------------------------  IN: 910 mL / OUT: 0 mL / NET: 910 mL    Medications:  atorvastatin 20 milliGRAM(s) Oral at bedtime  azaTHIOprine 50 milliGRAM(s) Oral daily  carvedilol 3.125 milliGRAM(s) Oral every 12 hours  chlorhexidine 4% Liquid 1 Application(s) Topical daily  cholecalciferol 1000 Unit(s) Oral daily  cyanocobalamin 1000 MICROGram(s) Oral daily  epoetin amaury-epbx (RETACRIT) Injectable 5000 Unit(s) IV Push <User Schedule>  folic acid 1 milliGRAM(s) Oral daily  furosemide    Tablet 40 milliGRAM(s) Oral <User Schedule>  heparin   Injectable 5000 Unit(s) SubCutaneous every 8 hours  NIFEdipine XL 60 milliGRAM(s) Oral daily  predniSONE   Tablet 10 milliGRAM(s) Oral every other day      PHYSICAL EXAM:  GENERAL APPEARANCE: Thin male, in NAD  HEENT:  PERRL, EOMI. hearing grossly intact.  NECK: R IJ permacath. No JVD appreciated. Neck supple, non-tender no lymphadenopathy, masses or thyromegaly.  CARDIAC: 2/6 holosystolic murmur appreciated. Normal S1 and S2. RRR  LUNGS: Mild crackles in lower lobes b/l. No wheezing or rhonchi appreciated.   ABDOMEN: Soft , NTND, bowel sounds normal. No guarding or rebound.   MUSCULOSKELETAL: ROM intact.  No joint erythema or tenderness.   EXTREMITIES: No edema appreciated. Peripheral pulses intact.   NEUROLOGICAL: Non focal. Strength and sensation symmetric and intact throughout.   SKIN: Warm and dry , Well perfused  PSYCHIATRIC: AOx4 , Normal mood and affect    Labs:                          6.9    4.74  )-----------( 122      ( 16 Jun 2022 10:30 )             21.4     06-16    140  |  104  |  64<H>  ----------------------------<  93  3.8   |  21<L>  |  5.31<H>    Ca    7.6<L>      16 Jun 2022 07:14  Phos  5.3     06-16  Mg     1.8     06-16    TPro  5.6<L>  /  Alb  3.3  /  TBili  0.4  /  DBili  0.1  /  AST  21  /  ALT  8<L>  /  AlkPhos  77  06-16    LIVER FUNCTIONS - ( 16 Jun 2022 10:28 )  Alb: 3.3 g/dL / Pro: 5.6 g/dL / ALK PHOS: 77 U/L / ALT: 8 U/L / AST: 21 U/L / GGT: x             CAPILLARY BLOOD GLUCOSE    Micro:    RADIOLOGY & ADDITIONAL TESTS:    < from: Xray Chest 1 View AP/PA (06.14.22 @ 09:52) >    IMPRESSION:  Right-sided dialysis catheter terminates in lower SVC.  Clear lungs.    < end of copied text >

## 2022-06-16 NOTE — PROVIDER CONTACT NOTE (CRITICAL VALUE NOTIFICATION) - ACTION/TREATMENT ORDERED:
MD said it was from dialysis they will watch it
decision made by team to place permacath only at this time and initiate dialysis post procedure
Provider notified. Will go over results and order blood if needed.

## 2022-06-16 NOTE — PROGRESS NOTE ADULT - SUBJECTIVE AND OBJECTIVE BOX
[facility] DIVISION OF KIDNEY DISEASES AND HYPERTENSION   FOLLOW UP NOTE    --------------------------------------------------------------------------------  HPI:  Pt. with prior history of LDRT in 1978, now with progressive allograft CKD admitted for HD initiation via RIJ Tunneled HD catheter. Pt. now deemed ESRD. Pt initiated on HD on 6/14/22.     Pt. was seen and examined today. Overnight events noted. Pt tolerated 2nd HD session well.       PAST HISTORY  --------------------------------------------------------------------------------  No significant changes to PMH, PSH, FHx, SHx, unless otherwise noted    ALLERGIES & MEDICATIONS  --------------------------------------------------------------------------------  Allergies    shellfish (Rash)  sulfa (Swelling)    Intolerances      Standing Inpatient Medications  atorvastatin 20 milliGRAM(s) Oral at bedtime  azaTHIOprine 50 milliGRAM(s) Oral daily  carvedilol 3.125 milliGRAM(s) Oral every 12 hours  chlorhexidine 4% Liquid 1 Application(s) Topical daily  cholecalciferol 1000 Unit(s) Oral daily  cyanocobalamin 1000 MICROGram(s) Oral daily  epoetin amaury-epbx (RETACRIT) Injectable 5000 Unit(s) IV Push <User Schedule>  folic acid 1 milliGRAM(s) Oral daily  furosemide    Tablet 40 milliGRAM(s) Oral <User Schedule>  heparin   Injectable 5000 Unit(s) SubCutaneous every 8 hours  NIFEdipine XL 60 milliGRAM(s) Oral daily  pantoprazole    Tablet 40 milliGRAM(s) Oral before breakfast  predniSONE   Tablet 10 milliGRAM(s) Oral every other day    PRN Inpatient Medications      REVIEW OF SYSTEMS  --------------------------------------------------------------------------------  All other systems were reviewed and are negative, except as noted.    VITALS/PHYSICAL EXAM  --------------------------------------------------------------------------------  T(C): 36.6 (06-16-22 @ 10:00), Max: 37.4 (06-15-22 @ 21:50)  HR: 48 (06-16-22 @ 10:00) (48 - 81)  BP: 127/74 (06-16-22 @ 10:00) (119/71 - 152/81)  RR: 18 (06-16-22 @ 10:00) (18 - 18)  SpO2: 98% (06-16-22 @ 10:00) (95% - 100%)  Wt(kg): --      06-15-22 @ 07:01  -  06-16-22 @ 07:00  --------------------------------------------------------  IN: 910 mL / OUT: 0 mL / NET: 910 mL      Physical Exam:  	Gen: NAD  	HEENT: Anicteric  	Pulm: CTA B/L  	CV: S1S2+  	Abd: Soft, +BS   	Ext: No LE edema B/L  	Neuro: Awake  	Skin: Warm and dry  	Dialysis access: Right tunneled PC      LABS/STUDIES  --------------------------------------------------------------------------------              6.5    4.46  >-----------<  117      [06-16-22 @ 07:15]              20.3     140  |  104  |  64  ----------------------------<  93      [06-16-22 @ 07:14]  3.8   |  21  |  5.31        Ca     7.6     [06-16-22 @ 07:14]      Mg     1.8     [06-16-22 @ 07:14]      Phos  5.3     [06-16-22 @ 07:14]    Creatinine Trend:  SCr 5.31 [06-16 @ 07:14]  SCr 4.89 [06-15 @ 07:50]  SCr 6.70 [06-14 @ 07:05]  SCr 6.30 [06-13 @ 18:06]  SCr 5.98 [06-13 @ 13:28]    HBsAb Reactive      [06-14-22 @ 13:52]  HBsAg Nonreact      [06-14-22 @ 13:52]  HCV 0.11, Nonreact      [06-14-22 @ 13:52]

## 2022-06-16 NOTE — PROGRESS NOTE ADULT - PROBLEM SELECTOR PLAN 8
Patient to go for AVF placement by vascular surgery on Monday June 20, 2022  Patient in need of AVF placement for new ESRD requiring HD    Patient's electrolyte abnormalities and acidosis are now resolved with HD inpatient.     RCRI: 1 point for elevated creatinine, 6.0% risk of 30d post-op death, MI, or cardiac arrest.     Low risk for low risk procedure.    Patient to proceed with procedure, plan for HD on Saturday prior, AM labs day of procedure.

## 2022-06-16 NOTE — CONSULT NOTE ADULT - SUBJECTIVE AND OBJECTIVE BOX
52 yo M with a PMH of HTN, congenital renal disease (unknown, since birth) s/p renal transplant at age 8 in  c/b CKD IV (on Azathioprine and Prednisone), chronic anemia (on weekly Aranesp), and HLD who presented for AV Fistula placement, but was found to acidotic on pre-op VBG, evaluated by renal and had IJ access placed for urgent HD. Patient received renal transplant from his mother in  (when he was 9 yo) for congenital renal failure, was doing well on medications and recently had worsening renal function since COVID infection in . Patient reported feeling a bit fatigued, and had some nausea and vomiting. Denied any shortness of breath, leg swelling, or chest pain. Denied any fevers, chills, sick contacts, abdominal pain, diarrhea. Patient had never received HD before.    Hematology consulted given borderline pancytopenia. He has been following Dr. Elliott Castillo at [facility] for anemia and has been on Aranesp, which was recently reduced given an elevated Hb ~11. He has also had medications such as his Azathioprine adjusted (reduced).  He had a bone marrow biopsy 2022 that showed focal megakaryocytes, mild increase in interstitial mast cells, and renal osteodystrophy, but was mostly unremarkable.        Allergies    shellfish (Rash)  sulfa (Swelling)    Intolerances        MEDICATIONS  (STANDING):  atorvastatin 20 milliGRAM(s) Oral at bedtime  azaTHIOprine 50 milliGRAM(s) Oral daily  carvedilol 3.125 milliGRAM(s) Oral every 12 hours  chlorhexidine 4% Liquid 1 Application(s) Topical daily  cholecalciferol 1000 Unit(s) Oral daily  cyanocobalamin 1000 MICROGram(s) Oral daily  epoetin amaury-epbx (RETACRIT) Injectable 5000 Unit(s) IV Push <User Schedule>  folic acid 1 milliGRAM(s) Oral daily  furosemide    Tablet 40 milliGRAM(s) Oral <User Schedule>  heparin   Injectable 5000 Unit(s) SubCutaneous every 8 hours  NIFEdipine XL 60 milliGRAM(s) Oral daily  pantoprazole    Tablet 40 milliGRAM(s) Oral before breakfast  predniSONE   Tablet 10 milliGRAM(s) Oral every other day    MEDICATIONS  (PRN):      PAST MEDICAL & SURGICAL HISTORY:  Renal transplant recipient  1978, c/b CKD IV      Hypertension      ESRD with anemia       novel coronavirus disease (COVID-19)  2020      History of renal transplant      History of excision of pilonidal cyst          FAMILY HISTORY:  FH: kidney disease    FH: esophageal cancer  Uncle,  in 7th decade        SOCIAL HISTORY: No current alcohol, tobacco, or drug use    REVIEW OF SYSTEMS:  CONSTITUTIONAL: + weight loss. No fever  EYES/ENT: No visual changes; no throat pain  NECK: No pain or stiffness  RESPIRATORY: no SOB or cough  CARDIOVASCULAR: No chest pain or palpitations  GASTROINTESTINAL: No abdominal pain. No N/V/D/C  GENITOURINARY: No dysuria or hematuria  NEUROLOGICAL: No numbness or focal weakness  SKIN: No itching, burning, rashes, or lesions  MSK: no joint pain  Allergy: no urticaria        T(F): 98.4 (22 @ 13:59), Max: 99.3 (06-15-22 @ 21:50)  HR: 46 (22 @ 13:59)  BP: 160/73 (22 @ 13:59)  RR: 18 (22 @ 13:59)  SpO2: 97% (22 @ 13:59)  Wt(kg): --    GENERAL: NAD, thin  HEENT: EOMI, MMM, no oropharyngeal lesions or erythema appreciated  Pulm: no increased WOB, CTAB/L  CV: RRR, S1, S2, no m/g/r  ABDOMEN: soft, NT, ND, no masses felt, no HSM  MSK: nl ROM  EXTREMITIES: no appreciable edema in b/l LE  Neuro: A&Ox3, no focal deficits  SKIN: warm and dry, no visible rash                          7.7    4.14  )-----------( 112      ( 2022 14:25 )             23.4       -    140  |  104  |  64<H>  ----------------------------<  93  3.8   |  21<L>  |  5.31<H>    Ca    7.6<L>      2022 07:14  Phos  5.3     -  Mg     1.8         TPro  5.6<L>  /  Alb  3.3  /  TBili  0.4  /  DBili  0.1  /  AST  21  /  ALT  8<L>  /  AlkPhos  77        Lactate Dehydrogenase, Serum: 192 U/L ( @ 10:28)  Uric Acid, Serum: 3.9 mg/dL ( @ 10:28)  Magnesium, Serum: 1.8 mg/dL ( @ 07:14)  Phosphorus Level, Serum: 5.3 mg/dL ( @ 07:14)       52 yo M with a PMH of HTN, congenital renal disease (unknown, since birth) s/p renal transplant at age 8 in  c/b CKD IV (on Azathioprine and Prednisone), chronic anemia (on weekly Aranesp), and HLD who presented for AV Fistula placement, but was found to acidotic on pre-op VBG, evaluated by renal and had IJ access placed for urgent HD. Patient received renal transplant from his mother in  (when he was 7 yo) for congenital renal failure, was doing well on medications and recently had worsening renal function since COVID infection in . Patient reported feeling a bit fatigued, and had some nausea and vomiting. Denied any shortness of breath, leg swelling, or chest pain. Denied any fevers, chills, sick contacts, abdominal pain, diarrhea. Patient had never received HD before.  He does note a significant amount of unintentional weight loss of at least 50 pounds over the last few months.    Hematology consulted given borderline pancytopenia. He has been following Dr. Elliott Castillo at [facility] for anemia and has been on Aranesp, which was recently reduced given an elevated Hb ~11. He has also had medications such as his Azathioprine adjusted (reduced).  He had a bone marrow biopsy 2022 that showed focal megakaryocytes, mild increase in interstitial mast cells, and renal osteodystrophy, but was mostly unremarkable.        Allergies    shellfish (Rash)  sulfa (Swelling)    Intolerances        MEDICATIONS  (STANDING):  atorvastatin 20 milliGRAM(s) Oral at bedtime  azaTHIOprine 50 milliGRAM(s) Oral daily  carvedilol 3.125 milliGRAM(s) Oral every 12 hours  chlorhexidine 4% Liquid 1 Application(s) Topical daily  cholecalciferol 1000 Unit(s) Oral daily  cyanocobalamin 1000 MICROGram(s) Oral daily  epoetin amaury-epbx (RETACRIT) Injectable 5000 Unit(s) IV Push <User Schedule>  folic acid 1 milliGRAM(s) Oral daily  furosemide    Tablet 40 milliGRAM(s) Oral <User Schedule>  heparin   Injectable 5000 Unit(s) SubCutaneous every 8 hours  NIFEdipine XL 60 milliGRAM(s) Oral daily  pantoprazole    Tablet 40 milliGRAM(s) Oral before breakfast  predniSONE   Tablet 10 milliGRAM(s) Oral every other day    MEDICATIONS  (PRN):      PAST MEDICAL & SURGICAL HISTORY:  Renal transplant recipient  1978, c/b CKD IV      Hypertension      ESRD with anemia       novel coronavirus disease (COVID-19)  2020      History of renal transplant      History of excision of pilonidal cyst          FAMILY HISTORY:  FH: kidney disease    FH: esophageal cancer  Uncle,  in 7th decade        SOCIAL HISTORY: No current alcohol, tobacco, or drug use    REVIEW OF SYSTEMS:  CONSTITUTIONAL: + weight loss. No fever  EYES/ENT: No visual changes; no throat pain  NECK: No pain or stiffness  RESPIRATORY: no SOB or cough  CARDIOVASCULAR: No chest pain or palpitations  GASTROINTESTINAL: No abdominal pain. No N/V/D/C  GENITOURINARY: No dysuria or hematuria  NEUROLOGICAL: No numbness or focal weakness  SKIN: No itching, burning, rashes, or lesions  MSK: no joint pain  Allergy: no urticaria        T(F): 98.4 (22 @ 13:59), Max: 99.3 (06-15-22 @ 21:50)  HR: 46 (22 @ 13:59)  BP: 160/73 (22 @ 13:59)  RR: 18 (22 @ 13:59)  SpO2: 97% (22 @ 13:59)  Wt(kg): --    GENERAL: NAD, thin  HEENT: EOMI, MMM, no oropharyngeal lesions or erythema appreciated  Pulm: no increased WOB, CTAB/L  CV: RRR, S1, S2, no m/g/r  ABDOMEN: soft, NT, ND, no masses felt, no HSM  MSK: nl ROM  EXTREMITIES: no appreciable edema in b/l LE  Neuro: A&Ox3, no focal deficits  SKIN: warm and dry, no visible rash                          7.7    4.14  )-----------( 112      ( 2022 14:25 )             23.4       06-16    140  |  104  |  64<H>  ----------------------------<  93  3.8   |  21<L>  |  5.31<H>    Ca    7.6<L>      2022 07:14  Phos  5.3     06-16  Mg     1.8     06-16    TPro  5.6<L>  /  Alb  3.3  /  TBili  0.4  /  DBili  0.1  /  AST  21  /  ALT  8<L>  /  AlkPhos  77        Lactate Dehydrogenase, Serum: 192 U/L ( @ 10:28)  Uric Acid, Serum: 3.9 mg/dL ( @ 10:28)  Magnesium, Serum: 1.8 mg/dL ( @ 07:14)  Phosphorus Level, Serum: 5.3 mg/dL ( @ 07:14)

## 2022-06-16 NOTE — PROGRESS NOTE ADULT - PROBLEM SELECTOR PLAN 1
Pt. with prior history of LDRT in 1978, now with progressive allograft CKD admitted for HD initiation via RIJ Tunneled HD catheter. Pt. now deemed ESRD. Pt initiated on HD on 6/14/22. Plan for 3rd session for HD today. Pt clinically stable. Monitor labs. Plan for AVF and OP HD set up.

## 2022-06-16 NOTE — PROGRESS NOTE ADULT - PROBLEM SELECTOR PLAN 2
In setting of CKD/ESRD of allograft and BM suppression from Immuran. Hb today low at 6.5. Transfuse with HD today. Continue JOSEPH with HD. Monitor H/H.

## 2022-06-16 NOTE — PROGRESS NOTE ADULT - PROBLEM SELECTOR PLAN 7
Diet: Renal diet   DVT PPx: SubQ Heparin    Dispo: Inpatient HD, then setup of outpatient HD, AVF placement on 6/20 will remain inpatient till then and likely for monitoring post-procedure Patient to go for AVF placement by vascular surgery on Monday June 20, 2022  Patient in need of AVF placement for new ESRD requiring HD    Patient's electrolyte abnormalities and acidosis are now resolved with HD inpatient.     RCRI: 1 point for elevated creatinine, 6.0% risk of 30d post-op death, MI, or cardiac arrest.     Low risk for low risk procedure.    Patient to proceed with procedure, plan for HD on Saturday prior, AM labs day of procedure.

## 2022-06-16 NOTE — PROGRESS NOTE ADULT - PROBLEM SELECTOR PLAN 3
Hb 7.5, baseline seems to be 8-10; MCV   Likely due to ESRD and AOCD  Hemodynamically stable, no obvious signs of bleeding.   ? lymphoproliferative syndrome since s/p transplant on long term immunosuppressants   - Iron studies ? AOCD pattern  - B12, folate wnl   - c/w EPO injections per renal w HD  - CBC w diff and retic to eval In 1978, c/b CKD4 with worsening renal function  Home Meds: Prednisone 10 QOD (recently increased), Azathioprine 50 qd  - c/w home immunosuppression

## 2022-06-16 NOTE — PROGRESS NOTE ADULT - PROBLEM SELECTOR PLAN 1
Resolved post urgent HD  VBG on Arrival: 7.13 / 20 / 64 / 7; Lactate 1.2. Bicarb < 10 on BMP.   VBGs improved after HD.  Symptoms: Fatigue, n/v  - Urgent IJ access placed, urgent HD first session 6/13  - renal consulted, f/u additional recs and further HD requirements Renal transplant since 1978, doing well on immunosuppressants until COVID in 2020, worsening CKD   W NAGMA that resolved after urgent HD via R IJ Permacath   SCr 10.21 on arrival, jet seems like 8-9  Exam with lower lobe crackles.   Home Med: Lasix 40 3x per week  - f/u renal recs  - monitor BUN to assure stable correction to prevent osmotic shifts.   - patient will need HD x3 (6/13 - 6/15)   - will need  assistance for outpatient HD center setup. - ongoing, appreciate assistance.   - c/w lasix 40 3x per week.   - monitor urine output    #AVF Placement  W vascular surgery, tentatively planned for Monday 6/20  Needs COVID swab saturday, HD before, Labs day of, and medical optimization documentation (in note)  - per renal, patient will be ok with HD on Saturday (not sunday) for procedure on Monday since no issues with volume--communicated with vascular    - pt needs AVF placed before being accepted to HD, but is otherwise accepted.

## 2022-06-17 LAB
ALBUMIN SERPL ELPH-MCNC: 3.3 G/DL — SIGNIFICANT CHANGE UP (ref 3.3–5)
ALP SERPL-CCNC: 83 U/L — SIGNIFICANT CHANGE UP (ref 40–120)
ALT FLD-CCNC: 25 U/L — SIGNIFICANT CHANGE UP (ref 10–45)
ANION GAP SERPL CALC-SCNC: 13 MMOL/L — SIGNIFICANT CHANGE UP (ref 5–17)
APTT BLD: 32.5 SEC — SIGNIFICANT CHANGE UP (ref 27.5–35.5)
AST SERPL-CCNC: 40 U/L — SIGNIFICANT CHANGE UP (ref 10–40)
BILIRUB SERPL-MCNC: 0.5 MG/DL — SIGNIFICANT CHANGE UP (ref 0.2–1.2)
BUN SERPL-MCNC: 36 MG/DL — HIGH (ref 7–23)
CALCIUM SERPL-MCNC: 8.6 MG/DL — SIGNIFICANT CHANGE UP (ref 8.4–10.5)
CHLORIDE SERPL-SCNC: 101 MMOL/L — SIGNIFICANT CHANGE UP (ref 96–108)
CO2 SERPL-SCNC: 24 MMOL/L — SIGNIFICANT CHANGE UP (ref 22–31)
CREAT SERPL-MCNC: 3.84 MG/DL — HIGH (ref 0.5–1.3)
EGFR: 18 ML/MIN/1.73M2 — LOW
GLUCOSE SERPL-MCNC: 89 MG/DL — SIGNIFICANT CHANGE UP (ref 70–99)
HCT VFR BLD CALC: 25.4 % — LOW (ref 39–50)
HGB BLD-MCNC: 8.3 G/DL — LOW (ref 13–17)
INR BLD: 1.04 RATIO — SIGNIFICANT CHANGE UP (ref 0.88–1.16)
MAGNESIUM SERPL-MCNC: 1.8 MG/DL — SIGNIFICANT CHANGE UP (ref 1.6–2.6)
MCHC RBC-ENTMCNC: 31.9 PG — SIGNIFICANT CHANGE UP (ref 27–34)
MCHC RBC-ENTMCNC: 32.7 GM/DL — SIGNIFICANT CHANGE UP (ref 32–36)
MCV RBC AUTO: 97.7 FL — SIGNIFICANT CHANGE UP (ref 80–100)
NRBC # BLD: 0 /100 WBCS — SIGNIFICANT CHANGE UP (ref 0–0)
OB PNL STL: NEGATIVE — SIGNIFICANT CHANGE UP
PHOSPHATE SERPL-MCNC: 4.3 MG/DL — SIGNIFICANT CHANGE UP (ref 2.5–4.5)
PLATELET # BLD AUTO: 123 K/UL — LOW (ref 150–400)
POTASSIUM SERPL-MCNC: 3.8 MMOL/L — SIGNIFICANT CHANGE UP (ref 3.5–5.3)
POTASSIUM SERPL-SCNC: 3.8 MMOL/L — SIGNIFICANT CHANGE UP (ref 3.5–5.3)
PROT SERPL-MCNC: 5.8 G/DL — LOW (ref 6–8.3)
PROTHROM AB SERPL-ACNC: 12.1 SEC — SIGNIFICANT CHANGE UP (ref 10.5–13.4)
RBC # BLD: 2.6 M/UL — LOW (ref 4.2–5.8)
RBC # FLD: 16.8 % — HIGH (ref 10.3–14.5)
SODIUM SERPL-SCNC: 138 MMOL/L — SIGNIFICANT CHANGE UP (ref 135–145)
TSH SERPL-MCNC: 3.18 UIU/ML — SIGNIFICANT CHANGE UP (ref 0.27–4.2)
WBC # BLD: 5.15 K/UL — SIGNIFICANT CHANGE UP (ref 3.8–10.5)
WBC # FLD AUTO: 5.15 K/UL — SIGNIFICANT CHANGE UP (ref 3.8–10.5)

## 2022-06-17 PROCEDURE — 93010 ELECTROCARDIOGRAM REPORT: CPT

## 2022-06-17 PROCEDURE — 99232 SBSQ HOSP IP/OBS MODERATE 35: CPT | Mod: GC

## 2022-06-17 RX ORDER — HEPARIN SODIUM 5000 [USP'U]/ML
5000 INJECTION INTRAVENOUS; SUBCUTANEOUS EVERY 12 HOURS
Refills: 0 | Status: DISCONTINUED | OUTPATIENT
Start: 2022-06-17 | End: 2022-06-20

## 2022-06-17 RX ORDER — DOXAZOSIN MESYLATE 4 MG
4 TABLET ORAL AT BEDTIME
Refills: 0 | Status: DISCONTINUED | OUTPATIENT
Start: 2022-06-17 | End: 2022-06-21

## 2022-06-17 RX ADMIN — Medication 10 MILLIGRAM(S): at 12:04

## 2022-06-17 RX ADMIN — PREGABALIN 1000 MICROGRAM(S): 225 CAPSULE ORAL at 12:04

## 2022-06-17 RX ADMIN — CHLORHEXIDINE GLUCONATE 1 APPLICATION(S): 213 SOLUTION TOPICAL at 12:05

## 2022-06-17 RX ADMIN — AZATHIOPRINE 50 MILLIGRAM(S): 100 TABLET ORAL at 12:26

## 2022-06-17 RX ADMIN — Medication 1 MILLIGRAM(S): at 12:27

## 2022-06-17 RX ADMIN — HEPARIN SODIUM 5000 UNIT(S): 5000 INJECTION INTRAVENOUS; SUBCUTANEOUS at 17:02

## 2022-06-17 RX ADMIN — ATORVASTATIN CALCIUM 20 MILLIGRAM(S): 80 TABLET, FILM COATED ORAL at 22:01

## 2022-06-17 RX ADMIN — Medication 1000 UNIT(S): at 12:04

## 2022-06-17 RX ADMIN — Medication 4 MILLIGRAM(S): at 22:02

## 2022-06-17 RX ADMIN — Medication 40 MILLIGRAM(S): at 12:04

## 2022-06-17 RX ADMIN — HEPARIN SODIUM 5000 UNIT(S): 5000 INJECTION INTRAVENOUS; SUBCUTANEOUS at 06:12

## 2022-06-17 RX ADMIN — PANTOPRAZOLE SODIUM 40 MILLIGRAM(S): 20 TABLET, DELAYED RELEASE ORAL at 06:12

## 2022-06-17 NOTE — PROGRESS NOTE ADULT - SUBJECTIVE AND OBJECTIVE BOX
Subjective:    INTERVAL HPI/OVERNIGHT EVENTS:   - heme consulted for bicytopenia  - will consider increase EPO if renal agrees   - sinus bradycardia, asymptomatic overnight   No acute events overnight  Afebrile, hemodynamically stable     Telemetry:    T(F): 99.3 (06-17-22 @ 05:30), Max: 100.2 (06-16-22 @ 21:21)  HR: 53 (06-17-22 @ 05:30) (46 - 60)  BP: 152/79 (06-17-22 @ 05:30) (127/74 - 188/98)  RR: 18 (06-17-22 @ 05:30) (16 - 18)  SpO2: 98% (06-17-22 @ 05:30) (97% - 98%)  I&O's Summary    16 Jun 2022 07:01  -  17 Jun 2022 07:00  --------------------------------------------------------  IN: 360 mL / OUT: 975 mL / NET: -615 mL          Medications:  atorvastatin 20 milliGRAM(s) Oral at bedtime  azaTHIOprine 50 milliGRAM(s) Oral daily  carvedilol 3.125 milliGRAM(s) Oral every 12 hours  chlorhexidine 4% Liquid 1 Application(s) Topical daily  cholecalciferol 1000 Unit(s) Oral daily  cyanocobalamin 1000 MICROGram(s) Oral daily  epoetin amaury-epbx (RETACRIT) Injectable 5000 Unit(s) IV Push <User Schedule>  folic acid 1 milliGRAM(s) Oral daily  furosemide    Tablet 40 milliGRAM(s) Oral <User Schedule>  heparin   Injectable 5000 Unit(s) SubCutaneous every 8 hours  NIFEdipine XL 60 milliGRAM(s) Oral daily  pantoprazole    Tablet 40 milliGRAM(s) Oral before breakfast  predniSONE   Tablet 10 milliGRAM(s) Oral every other day      PHYSICAL EXAM:  GENERAL APPEARANCE: Thin male, in NAD  HEENT:  PERRL, EOMI. hearing grossly intact.  NECK: R IJ permacath. No JVD appreciated. Neck supple, non-tender no lymphadenopathy, masses or thyromegaly.  CARDIAC: 2/6 holosystolic murmur appreciated. Normal S1 and S2. RRR  LUNGS: Mild crackles in lower lobes b/l. No wheezing or rhonchi appreciated.   ABDOMEN: Soft , NTND, bowel sounds normal. No guarding or rebound.   MUSCULOSKELETAL: ROM intact.  No joint erythema or tenderness.   EXTREMITIES: No edema appreciated. Peripheral pulses intact.   NEUROLOGICAL: Non focal. Strength and sensation symmetric and intact throughout.   SKIN: Warm and dry , Well perfused  PSYCHIATRIC: AOx4 , Normal mood and affect  Notable Labs:    Labs:                          8.3    5.15  )-----------( 123      ( 17 Jun 2022 07:12 )             25.4     06-17    138  |  101  |  36<H>  ----------------------------<  89  3.8   |  24  |  3.84<H>    Ca    8.6      17 Jun 2022 06:51  Phos  4.3     06-17  Mg     1.8     06-17    TPro  5.8<L>  /  Alb  3.3  /  TBili  0.5  /  DBili  x   /  AST  40  /  ALT  25  /  AlkPhos  83  06-17    LIVER FUNCTIONS - ( 17 Jun 2022 06:51 )  Alb: 3.3 g/dL / Pro: 5.8 g/dL / ALK PHOS: 83 U/L / ALT: 25 U/L / AST: 40 U/L / GGT: x           PT/INR - ( 16 Jun 2022 14:25 )   PT: 12.9 sec;   INR: 1.12 ratio         PTT - ( 16 Jun 2022 14:25 )  PTT:30.2 sec  CAPILLARY BLOOD GLUCOSE                    Micro:      RADIOLOGY & ADDITIONAL TESTS:    NNI   Subjective:    INTERVAL HPI/OVERNIGHT EVENTS:   - heme consulted for bicytopenia  - will consider increase EPO if renal agrees   - sinus bradycardia, asymptomatic overnight   - no complaints today, patient feels well, no CP SOB fevers chills, LE edema   No acute events overnight  Afebrile, hemodynamically stable     Telemetry:    T(F): 99.3 (06-17-22 @ 05:30), Max: 100.2 (06-16-22 @ 21:21)  HR: 53 (06-17-22 @ 05:30) (46 - 60)  BP: 152/79 (06-17-22 @ 05:30) (127/74 - 188/98)  RR: 18 (06-17-22 @ 05:30) (16 - 18)  SpO2: 98% (06-17-22 @ 05:30) (97% - 98%)  I&O's Summary    16 Jun 2022 07:01  -  17 Jun 2022 07:00  --------------------------------------------------------  IN: 360 mL / OUT: 975 mL / NET: -615 mL          Medications:  atorvastatin 20 milliGRAM(s) Oral at bedtime  azaTHIOprine 50 milliGRAM(s) Oral daily  carvedilol 3.125 milliGRAM(s) Oral every 12 hours  chlorhexidine 4% Liquid 1 Application(s) Topical daily  cholecalciferol 1000 Unit(s) Oral daily  cyanocobalamin 1000 MICROGram(s) Oral daily  epoetin amaury-epbx (RETACRIT) Injectable 5000 Unit(s) IV Push <User Schedule>  folic acid 1 milliGRAM(s) Oral daily  furosemide    Tablet 40 milliGRAM(s) Oral <User Schedule>  heparin   Injectable 5000 Unit(s) SubCutaneous every 8 hours  NIFEdipine XL 60 milliGRAM(s) Oral daily  pantoprazole    Tablet 40 milliGRAM(s) Oral before breakfast  predniSONE   Tablet 10 milliGRAM(s) Oral every other day      PHYSICAL EXAM:  GENERAL APPEARANCE: Thin male, in NAD  HEENT:  PERRL, EOMI. hearing grossly intact.  NECK: R IJ permacath. No JVD appreciated. Neck supple, non-tender no lymphadenopathy, masses or thyromegaly.  CARDIAC: 2/6 holosystolic murmur appreciated. Normal S1 and S2. RRR  LUNGS: Mild crackles in lower lobes b/l. No wheezing or rhonchi appreciated.   ABDOMEN: Soft , NTND, bowel sounds normal. No guarding or rebound.   MUSCULOSKELETAL: ROM intact.  No joint erythema or tenderness.   EXTREMITIES: No edema appreciated. Peripheral pulses intact.   NEUROLOGICAL: Non focal. Strength and sensation symmetric and intact throughout.   SKIN: Warm and dry , Well perfused  PSYCHIATRIC: AOx4 , Normal mood and affect  Notable Labs:    Labs:                          8.3    5.15  )-----------( 123      ( 17 Jun 2022 07:12 )             25.4     06-17    138  |  101  |  36<H>  ----------------------------<  89  3.8   |  24  |  3.84<H>    Ca    8.6      17 Jun 2022 06:51  Phos  4.3     06-17  Mg     1.8     06-17    TPro  5.8<L>  /  Alb  3.3  /  TBili  0.5  /  DBili  x   /  AST  40  /  ALT  25  /  AlkPhos  83  06-17    LIVER FUNCTIONS - ( 17 Jun 2022 06:51 )  Alb: 3.3 g/dL / Pro: 5.8 g/dL / ALK PHOS: 83 U/L / ALT: 25 U/L / AST: 40 U/L / GGT: x           PT/INR - ( 16 Jun 2022 14:25 )   PT: 12.9 sec;   INR: 1.12 ratio         PTT - ( 16 Jun 2022 14:25 )  PTT:30.2 sec  CAPILLARY BLOOD GLUCOSE    Micro:      RADIOLOGY & ADDITIONAL TESTS:    NNI

## 2022-06-17 NOTE — PROGRESS NOTE ADULT - PROBLEM SELECTOR PLAN 6
Diet: Renal diet   DVT PPx: SubQ Heparin    Dispo: Inpatient HD, then setup of outpatient HD, AVF placement on 6/20 will remain inpatient till then and likely for monitoring post-procedure

## 2022-06-17 NOTE — PROGRESS NOTE ADULT - PROBLEM SELECTOR PLAN 1
97.9 Renal transplant since 1978, doing well on immunosuppressants until COVID in 2020, worsening CKD   W NAGMA that resolved after urgent HD via R IJ Permacath   SCr 10.21 on arrival, jet seems like 8-9  Exam with lower lobe crackles.   Home Med: Lasix 40 3x per week  - f/u renal recs  - monitor BUN to assure stable correction to prevent osmotic shifts.   - patient will need HD x3 (6/13 - 6/15)   - will need  assistance for outpatient HD center setup. - ongoing, appreciate assistance.   - c/w lasix 40 3x per week.   - monitor urine output    #AVF Placement  W vascular surgery, tentatively planned for Monday 6/20  Needs COVID swab saturday, HD before, Labs day of, and medical optimization documentation (in note)  - per renal, patient will be ok with HD on Saturday (not sunday) for procedure on Monday since no issues with volume--communicated with vascular    - pt needs AVF placed before being accepted to HD, but is otherwise accepted.

## 2022-06-17 NOTE — PROGRESS NOTE ADULT - PROBLEM SELECTOR PLAN 4
Normotensive inpatient   Home Meds: Nifedipine 120 qd, Hydralazine 100 TID, Coreg 3.125 BID, Doxazosin 4mg qd  - Hold Hydral and Doxazosin inpatient  - c/w Nifedipine and Coreg w hold parameters  - consider need for coreg, since patient has intermittent asymptomatic bradycardia Normotensive inpatient   Home Meds: Nifedipine 120 qd, Hydralazine 100 TID, Coreg 3.125 BID, Doxazosin 4mg qd  - Hold Hydral and Doxazosin inpatient  - c/w Nifedipine w hold parameters  - 6/17 DCed coreg since bradycardia, restarted doxazosin   - monitor BPs

## 2022-06-17 NOTE — PROGRESS NOTE ADULT - PROBLEM SELECTOR PLAN 2
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 Retic hypoproliferative. MCV elevated .   Hemodynamically stable, no obvious signs of bleeding.   ? lymphoproliferative syndrome since s/p transplant on long term immunosuppressants   Follows w heme at [facility], Dr. Castillo  1U pRBC given w HD on 6/16  - Iron studies ? AOCD pattern  - B12, folate wnl   - c/w EPO injections per renal w HD  - hematology consulted on 6/16, given multiple cell line depression and recent weight loss reported:  - likely weight loss from worsening renal failure. ?marrow suppression from imuran +/- inusuff EPO dosing  - consider increase in EPO if renal agrees Hb 7.5, baseline seems to be 8-10; MCV   Likely due to ESRD and AOCD, a/w thrombocytopenia. Retic hypoproliferative. MCV elevated .   Hemodynamically stable, no obvious signs of bleeding.   ? lymphoproliferative syndrome since s/p transplant on long term immunosuppressants   Follows w heme at [facility], Dr. Castillo  1U pRBC given w HD on 6/16  - Iron studies ? AOCD pattern  - B12, folate wnl   - c/w EPO injections per renal w HD  - hematology consulted on 6/16, given multiple cell line depression and recent weight loss reported:  - likely weight loss from worsening renal failure. ?marrow suppression from imuran +/- inusuff EPO dosing  - consider increase in EPO if renal agrees, asked renal will f/u

## 2022-06-17 NOTE — PROGRESS NOTE ADULT - SUBJECTIVE AND OBJECTIVE BOX
24h Events:   - Overnight, no acute events    Subjective:   Patient examined at bedside this AM.     Objective:  Vital Signs  T(C): 37.4 (06-17 @ 05:30), Max: 37.9 (06-16 @ 21:21)  HR: 53 (06-17 @ 05:30) (46 - 60)  BP: 152/79 (06-17 @ 05:30) (132/73 - 188/98)  RR: 18 (06-17 @ 05:30) (16 - 18)  SpO2: 98% (06-17 @ 05:30) (97% - 98%)  06-16-22 @ 07:01  -  06-17-22 @ 07:00  --------------------------------------------------------  IN:  Total IN: 0 mL    OUT:    Voided (mL): 975 mL  Total OUT: 975 mL    Total NET: -975 mL          Physical Exam:  GEN: resting in bed comfortably in NAD  RESP: no increased WOB  ABD: soft, non-distended, non-tender   NEURO: AAOx4  EXTREM: palp brachial/radial pulses, good cap reill    Labs:                        8.3    5.15  )-----------( 123      ( 17 Jun 2022 07:12 )             25.4   06-17    138  |  101  |  36<H>  ----------------------------<  89  3.8   |  24  |  3.84<H>    Ca    8.6      17 Jun 2022 06:51  Phos  4.3     06-17  Mg     1.8     06-17    TPro  5.8<L>  /  Alb  3.3  /  TBili  0.5  /  DBili  x   /  AST  40  /  ALT  25  /  AlkPhos  83  06-17    CAPILLARY BLOOD GLUCOSE          Medications:   MEDICATIONS  (STANDING):  atorvastatin 20 milliGRAM(s) Oral at bedtime  azaTHIOprine 50 milliGRAM(s) Oral daily  chlorhexidine 4% Liquid 1 Application(s) Topical daily  cholecalciferol 1000 Unit(s) Oral daily  cyanocobalamin 1000 MICROGram(s) Oral daily  doxazosin 4 milliGRAM(s) Oral at bedtime  epoetin amaury-epbx (RETACRIT) Injectable 5000 Unit(s) IV Push <User Schedule>  folic acid 1 milliGRAM(s) Oral daily  furosemide    Tablet 40 milliGRAM(s) Oral <User Schedule>  heparin   Injectable 5000 Unit(s) SubCutaneous every 12 hours  NIFEdipine XL 60 milliGRAM(s) Oral daily  pantoprazole    Tablet 40 milliGRAM(s) Oral before breakfast  predniSONE   Tablet 10 milliGRAM(s) Oral every other day    MEDICATIONS  (PRN):      Assessment:   51M with progressive CKD now deemed ESRD and initiated on HD on 6/14/22.     Tentative plan for LUE AVF placement on Monday.   Appreciate medical optimization  Appreciate Nephrology for HD coordination  Protect left arm, no blood draws  COVID test on Saturday  Preop labs on Sunday    Vascular Surgery  [id number].

## 2022-06-18 LAB — SARS-COV-2 RNA SPEC QL NAA+PROBE: SIGNIFICANT CHANGE UP

## 2022-06-18 PROCEDURE — 90935 HEMODIALYSIS ONE EVALUATION: CPT | Mod: GC

## 2022-06-18 PROCEDURE — 99232 SBSQ HOSP IP/OBS MODERATE 35: CPT | Mod: GC,25

## 2022-06-18 RX ADMIN — AZATHIOPRINE 50 MILLIGRAM(S): 100 TABLET ORAL at 13:55

## 2022-06-18 RX ADMIN — ATORVASTATIN CALCIUM 20 MILLIGRAM(S): 80 TABLET, FILM COATED ORAL at 21:17

## 2022-06-18 RX ADMIN — HEPARIN SODIUM 5000 UNIT(S): 5000 INJECTION INTRAVENOUS; SUBCUTANEOUS at 05:44

## 2022-06-18 RX ADMIN — PANTOPRAZOLE SODIUM 40 MILLIGRAM(S): 20 TABLET, DELAYED RELEASE ORAL at 05:45

## 2022-06-18 RX ADMIN — CHLORHEXIDINE GLUCONATE 1 APPLICATION(S): 213 SOLUTION TOPICAL at 13:56

## 2022-06-18 RX ADMIN — Medication 60 MILLIGRAM(S): at 05:45

## 2022-06-18 RX ADMIN — HEPARIN SODIUM 5000 UNIT(S): 5000 INJECTION INTRAVENOUS; SUBCUTANEOUS at 17:25

## 2022-06-18 RX ADMIN — Medication 1 MILLIGRAM(S): at 13:55

## 2022-06-18 RX ADMIN — PREGABALIN 1000 MICROGRAM(S): 225 CAPSULE ORAL at 13:55

## 2022-06-18 RX ADMIN — Medication 4 MILLIGRAM(S): at 21:17

## 2022-06-18 RX ADMIN — ERYTHROPOIETIN 5000 UNIT(S): 10000 INJECTION, SOLUTION INTRAVENOUS; SUBCUTANEOUS at 10:50

## 2022-06-18 RX ADMIN — Medication 1000 UNIT(S): at 13:53

## 2022-06-18 NOTE — PROGRESS NOTE ADULT - PROBLEM SELECTOR PLAN 2
In setting of CKD/ESRD of allograft and BM suppression from Immuran. Hb today low at 6.5. Continue JOSEPH with HD will increase dose of Epogen. Monitor H/H.

## 2022-06-18 NOTE — PROGRESS NOTE ADULT - SUBJECTIVE AND OBJECTIVE BOX
Subjective:    INTERVAL HPI/OVERNIGHT EVENTS:   - HD today, plan for AVF on Monday w vascular   No acute events overnight  Afebrile, hemodynamically stable     Telemetry:    T(F): 97.8 (06-18-22 @ 05:18), Max: 99.2 (06-17-22 @ 22:07)  HR: 53 (06-18-22 @ 05:18) (52 - 64)  BP: 147/78 (06-18-22 @ 05:18) (119/77 - 153/77)  RR: 18 (06-18-22 @ 05:18) (18 - 18)  SpO2: 98% (06-18-22 @ 05:18) (98% - 100%)  I&O's Summary    17 Jun 2022 07:01  -  18 Jun 2022 07:00  --------------------------------------------------------  IN: 1120 mL / OUT: 0 mL / NET: 1120 mL          Medications:  atorvastatin 20 milliGRAM(s) Oral at bedtime  azaTHIOprine 50 milliGRAM(s) Oral daily  chlorhexidine 4% Liquid 1 Application(s) Topical daily  cholecalciferol 1000 Unit(s) Oral daily  cyanocobalamin 1000 MICROGram(s) Oral daily  doxazosin 4 milliGRAM(s) Oral at bedtime  epoetin amaury-epbx (RETACRIT) Injectable 5000 Unit(s) IV Push <User Schedule>  folic acid 1 milliGRAM(s) Oral daily  furosemide    Tablet 40 milliGRAM(s) Oral <User Schedule>  heparin   Injectable 5000 Unit(s) SubCutaneous every 12 hours  NIFEdipine XL 60 milliGRAM(s) Oral daily  pantoprazole    Tablet 40 milliGRAM(s) Oral before breakfast  predniSONE   Tablet 10 milliGRAM(s) Oral every other day    PHYSICAL EXAM:  GENERAL APPEARANCE: Thin male, in NAD  HEENT:  PERRL, EOMI. hearing grossly intact.  NECK: R IJ permacath. No JVD appreciated. Neck supple, non-tender no lymphadenopathy, masses or thyromegaly.  CARDIAC: 2/6 holosystolic murmur appreciated. Normal S1 and S2. RRR  LUNGS: Mild crackles in lower lobes b/l. No wheezing or rhonchi appreciated.   ABDOMEN: Soft , NTND, bowel sounds normal. No guarding or rebound.   MUSCULOSKELETAL: ROM intact.  No joint erythema or tenderness.   EXTREMITIES: No edema appreciated. Peripheral pulses intact.   NEUROLOGICAL: Non focal. Strength and sensation symmetric and intact throughout.   SKIN: Warm and dry , Well perfused  PSYCHIATRIC: AOx4 , Normal mood and affect    Notable Labs:    Labs:                          8.3    5.15  )-----------( 123      ( 17 Jun 2022 07:12 )             25.4     06-17    138  |  101  |  36<H>  ----------------------------<  89  3.8   |  24  |  3.84<H>    Ca    8.6      17 Jun 2022 06:51  Phos  4.3     06-17  Mg     1.8     06-17    TPro  5.8<L>  /  Alb  3.3  /  TBili  0.5  /  DBili  x   /  AST  40  /  ALT  25  /  AlkPhos  83  06-17    LIVER FUNCTIONS - ( 17 Jun 2022 06:51 )  Alb: 3.3 g/dL / Pro: 5.8 g/dL / ALK PHOS: 83 U/L / ALT: 25 U/L / AST: 40 U/L / GGT: x           PT/INR - ( 17 Jun 2022 07:18 )   PT: 12.1 sec;   INR: 1.04 ratio         PTT - ( 17 Jun 2022 07:18 )  PTT:32.5 sec  CAPILLARY BLOOD GLUCOSE    Micro:      RADIOLOGY & ADDITIONAL TESTS:    NNI   Subjective:    INTERVAL HPI/OVERNIGHT EVENTS:   - HD today, plan for AVF on Monday w vascular   - feels well this AM, no complaints. No Fever, chills, SOB  - no dysphagia, eating well, no LE swelling or pain. Reports he is ambulating well, normal bathrooming.   No acute events overnight  Afebrile, hemodynamically stable     Telemetry:    T(F): 97.8 (06-18-22 @ 05:18), Max: 99.2 (06-17-22 @ 22:07)  HR: 53 (06-18-22 @ 05:18) (52 - 64)  BP: 147/78 (06-18-22 @ 05:18) (119/77 - 153/77)  RR: 18 (06-18-22 @ 05:18) (18 - 18)  SpO2: 98% (06-18-22 @ 05:18) (98% - 100%)  I&O's Summary    17 Jun 2022 07:01  -  18 Jun 2022 07:00  --------------------------------------------------------  IN: 1120 mL / OUT: 0 mL / NET: 1120 mL          Medications:  atorvastatin 20 milliGRAM(s) Oral at bedtime  azaTHIOprine 50 milliGRAM(s) Oral daily  chlorhexidine 4% Liquid 1 Application(s) Topical daily  cholecalciferol 1000 Unit(s) Oral daily  cyanocobalamin 1000 MICROGram(s) Oral daily  doxazosin 4 milliGRAM(s) Oral at bedtime  epoetin amaury-epbx (RETACRIT) Injectable 5000 Unit(s) IV Push <User Schedule>  folic acid 1 milliGRAM(s) Oral daily  furosemide    Tablet 40 milliGRAM(s) Oral <User Schedule>  heparin   Injectable 5000 Unit(s) SubCutaneous every 12 hours  NIFEdipine XL 60 milliGRAM(s) Oral daily  pantoprazole    Tablet 40 milliGRAM(s) Oral before breakfast  predniSONE   Tablet 10 milliGRAM(s) Oral every other day    PHYSICAL EXAM:  GENERAL APPEARANCE: Thin male, in NAD  HEENT:  PERRL, EOMI. hearing grossly intact.  NECK: R IJ permacath. No JVD appreciated. Neck supple, non-tender no lymphadenopathy, masses or thyromegaly.  CARDIAC: 2/6 holosystolic murmur appreciated. Normal S1 and S2. RRR  LUNGS: Mild crackles in lower lobes b/l. No wheezing or rhonchi appreciated.   ABDOMEN: Soft , NTND, bowel sounds normal. No guarding or rebound.   MUSCULOSKELETAL: ROM intact.  No joint erythema or tenderness.   EXTREMITIES: No edema appreciated. Peripheral pulses intact.   NEUROLOGICAL: Non focal. Strength and sensation symmetric and intact throughout.   SKIN: Warm and dry , Well perfused  PSYCHIATRIC: AOx4 , Normal mood and affect    Notable Labs:    Labs:                          8.3    5.15  )-----------( 123      ( 17 Jun 2022 07:12 )             25.4     06-17    138  |  101  |  36<H>  ----------------------------<  89  3.8   |  24  |  3.84<H>    Ca    8.6      17 Jun 2022 06:51  Phos  4.3     06-17  Mg     1.8     06-17    TPro  5.8<L>  /  Alb  3.3  /  TBili  0.5  /  DBili  x   /  AST  40  /  ALT  25  /  AlkPhos  83  06-17    LIVER FUNCTIONS - ( 17 Jun 2022 06:51 )  Alb: 3.3 g/dL / Pro: 5.8 g/dL / ALK PHOS: 83 U/L / ALT: 25 U/L / AST: 40 U/L / GGT: x           PT/INR - ( 17 Jun 2022 07:18 )   PT: 12.1 sec;   INR: 1.04 ratio         PTT - ( 17 Jun 2022 07:18 )  PTT:32.5 sec  CAPILLARY BLOOD GLUCOSE    Micro:      RADIOLOGY & ADDITIONAL TESTS:    NNI

## 2022-06-18 NOTE — PROGRESS NOTE ADULT - PROBLEM SELECTOR PLAN 1
Renal transplant since 1978, doing well on immunosuppressants until COVID in 2020, worsening CKD   W NAGMA that resolved after urgent HD via R IJ Permacath   SCr 10.21 on arrival, jet seems like 8-9  Exam with lower lobe crackles.   Home Med: Lasix 40 3x per week  - f/u renal recs  - monitor BUN to assure stable correction to prevent osmotic shifts.   - patient will need HD x3 (6/13 - 6/15)   - will need  assistance for outpatient HD center setup. - ongoing, appreciate assistance.   - c/w lasix 40 3x per week.   - monitor urine output    #AVF Placement  W vascular surgery, tentatively planned for Monday 6/20  Needs COVID swab saturday, HD before, Labs day of, and medical optimization documentation (in note)  - per renal, patient will be ok with HD on Saturday (not sunday) for procedure on Monday since no issues with volume--communicated with vascular    - pt needs AVF placed before being accepted to HD, but is otherwise accepted.

## 2022-06-18 NOTE — PROGRESS NOTE ADULT - SUBJECTIVE AND OBJECTIVE BOX
[facility] DIVISION OF KIDNEY DISEASES AND HYPERTENSION   FOLLOW UP NOTE    --------------------------------------------------------------------------------  Chief Complaint: ESRD on HD    24 hour events/subjective: Pt. was seen and examined today, not in distress      PAST HISTORY  --------------------------------------------------------------------------------  No significant changes to PMH, PSH, FHx, SHx, unless otherwise noted    ALLERGIES & MEDICATIONS  --------------------------------------------------------------------------------  Allergies    shellfish (Rash)  sulfa (Swelling)    Intolerances      Standing Inpatient Medications  atorvastatin 20 milliGRAM(s) Oral at bedtime  azaTHIOprine 50 milliGRAM(s) Oral daily  chlorhexidine 4% Liquid 1 Application(s) Topical daily  cholecalciferol 1000 Unit(s) Oral daily  cyanocobalamin 1000 MICROGram(s) Oral daily  doxazosin 4 milliGRAM(s) Oral at bedtime  epoetin amaury-epbx (RETACRIT) Injectable 5000 Unit(s) IV Push <User Schedule>  folic acid 1 milliGRAM(s) Oral daily  furosemide    Tablet 40 milliGRAM(s) Oral <User Schedule>  heparin   Injectable 5000 Unit(s) SubCutaneous every 12 hours  NIFEdipine XL 60 milliGRAM(s) Oral daily  pantoprazole    Tablet 40 milliGRAM(s) Oral before breakfast  predniSONE   Tablet 10 milliGRAM(s) Oral every other day    PRN Inpatient Medications      REVIEW OF SYSTEMS  --------------------------------------------------------------------------------    All other systems were reviewed and are negative, except as noted.    VITALS/PHYSICAL EXAM  --------------------------------------------------------------------------------  T(C): 36.3 (06-18-22 @ 12:55), Max: 37.3 (06-17-22 @ 22:07)  HR: 50 (06-18-22 @ 12:55) (50 - 76)  BP: 93/50 (06-18-22 @ 12:55) (93/50 - 153/77)  RR: 18 (06-18-22 @ 12:55) (17 - 18)  SpO2: 99% (06-18-22 @ 12:55) (98% - 100%)  Wt(kg): --      06-17-22 @ 07:01  -  06-18-22 @ 07:00  --------------------------------------------------------  IN: 1120 mL / OUT: 0 mL / NET: 1120 mL    06-18-22 @ 07:01  -  06-18-22 @ 13:48  --------------------------------------------------------  IN: 240 mL / OUT: 1000 mL / NET: -760 mL        Physical Exam:  	Gen: NAD  	HEENT: Anicteric  	Pulm: CTA B/L  	CV: S1S2+  	Abd: Soft, +BS   	Ext: No LE edema B/L  	Neuro: Awake  	Skin: Warm and dry  	Dialysis access: Right tunneled PC      LABS/STUDIES  --------------------------------------------------------------------------------              8.3    5.15  >-----------<  123      [06-17-22 @ 07:12]              25.4     138  |  101  |  36  ----------------------------<  89      [06-17-22 @ 06:51]  3.8   |  24  |  3.84        Ca     8.6     [06-17-22 @ 06:51]      Mg     1.8     [06-17-22 @ 06:51]      Phos  4.3     [06-17-22 @ 06:51]    Creatinine Trend:  SCr 3.84 [06-17 @ 06:51]  SCr 5.31 [06-16 @ 07:14]  SCr 4.89 [06-15 @ 07:50]  SCr 6.70 [06-14 @ 07:05]  SCr 6.30 [06-13 @ 18:06]      HBsAb Reactive      [06-14-22 @ 13:52]  HBsAg Nonreact      [06-14-22 @ 13:52]  HBcAb Nonreact      [06-16-22 @ 07:15]  HCV 0.11, Nonreact      [06-14-22 @ 13:52]

## 2022-06-18 NOTE — PROGRESS NOTE ADULT - PROBLEM SELECTOR PLAN 2
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 Retic hypoproliferative. MCV elevated .   Hemodynamically stable, no obvious signs of bleeding.   ? lymphoproliferative syndrome since s/p transplant on long term immunosuppressants   Follows w heme at [facility], Dr. Castillo  1U pRBC given w HD on 6/16  - Iron studies ? AOCD pattern  - B12, folate wnl   - c/w EPO injections per renal w HD  - hematology consulted on 6/16, given multiple cell line depression and recent weight loss reported:  - likely weight loss from worsening renal failure. ?marrow suppression from imuran +/- inusuff EPO dosing  - consider increase in EPO if renal agrees, asked renal will f/u

## 2022-06-18 NOTE — PROGRESS NOTE ADULT - PROBLEM SELECTOR PLAN 4
Normotensive inpatient   Home Meds: Nifedipine 120 qd, Hydralazine 100 TID, Coreg 3.125 BID, Doxazosin 4mg qd  - Hold Hydral and Doxazosin inpatient  - c/w Nifedipine w hold parameters  - 6/17 DCed coreg since bradycardia, restarted doxazosin   - monitor BPs

## 2022-06-18 NOTE — PROGRESS NOTE ADULT - PROBLEM SELECTOR PLAN 1
Pt. with prior history of LDRT in 1978, now with progressive allograft CKD admitted for HD initiation via RIJ Tunneled HD catheter. Pt. now deemed ESRD. Pt initiated on HD on 6/14/22. Plan for  HD today. Pt clinically stable. Pt planned for AVF on Monday. Monitor labs.

## 2022-06-19 ENCOUNTER — TRANSCRIPTION ENCOUNTER (OUTPATIENT)
Age: 52
End: 2022-06-19

## 2022-06-19 LAB
ALBUMIN SERPL ELPH-MCNC: 3 G/DL — LOW (ref 3.3–5)
ALP SERPL-CCNC: 89 U/L — SIGNIFICANT CHANGE UP (ref 40–120)
ALT FLD-CCNC: 54 U/L — HIGH (ref 10–45)
ANION GAP SERPL CALC-SCNC: 12 MMOL/L — SIGNIFICANT CHANGE UP (ref 5–17)
APTT BLD: 27.3 SEC — LOW (ref 27.5–35.5)
AST SERPL-CCNC: 43 U/L — HIGH (ref 10–40)
BILIRUB SERPL-MCNC: 0.3 MG/DL — SIGNIFICANT CHANGE UP (ref 0.2–1.2)
BLD GP AB SCN SERPL QL: NEGATIVE — SIGNIFICANT CHANGE UP
BUN SERPL-MCNC: 28 MG/DL — HIGH (ref 7–23)
CALCIUM SERPL-MCNC: 8.2 MG/DL — LOW (ref 8.4–10.5)
CHLORIDE SERPL-SCNC: 104 MMOL/L — SIGNIFICANT CHANGE UP (ref 96–108)
CO2 SERPL-SCNC: 24 MMOL/L — SIGNIFICANT CHANGE UP (ref 22–31)
CREAT SERPL-MCNC: 3.66 MG/DL — HIGH (ref 0.5–1.3)
EGFR: 19 ML/MIN/1.73M2 — LOW
GLUCOSE SERPL-MCNC: 86 MG/DL — SIGNIFICANT CHANGE UP (ref 70–99)
HCT VFR BLD CALC: 24.3 % — LOW (ref 39–50)
HGB BLD-MCNC: 8 G/DL — LOW (ref 13–17)
INR BLD: 1 RATIO — SIGNIFICANT CHANGE UP (ref 0.88–1.16)
MAGNESIUM SERPL-MCNC: 1.8 MG/DL — SIGNIFICANT CHANGE UP (ref 1.6–2.6)
MCHC RBC-ENTMCNC: 32.9 GM/DL — SIGNIFICANT CHANGE UP (ref 32–36)
MCHC RBC-ENTMCNC: 33.5 PG — SIGNIFICANT CHANGE UP (ref 27–34)
MCV RBC AUTO: 101.7 FL — HIGH (ref 80–100)
NRBC # BLD: 0 /100 WBCS — SIGNIFICANT CHANGE UP (ref 0–0)
PHOSPHATE SERPL-MCNC: 2.9 MG/DL — SIGNIFICANT CHANGE UP (ref 2.5–4.5)
PLATELET # BLD AUTO: 141 K/UL — LOW (ref 150–400)
POTASSIUM SERPL-MCNC: 3.9 MMOL/L — SIGNIFICANT CHANGE UP (ref 3.5–5.3)
POTASSIUM SERPL-SCNC: 3.9 MMOL/L — SIGNIFICANT CHANGE UP (ref 3.5–5.3)
PROT SERPL-MCNC: 5.4 G/DL — LOW (ref 6–8.3)
PROTHROM AB SERPL-ACNC: 11.6 SEC — SIGNIFICANT CHANGE UP (ref 10.5–13.4)
RBC # BLD: 2.39 M/UL — LOW (ref 4.2–5.8)
RBC # FLD: 16.4 % — HIGH (ref 10.3–14.5)
RH IG SCN BLD-IMP: POSITIVE — SIGNIFICANT CHANGE UP
SODIUM SERPL-SCNC: 140 MMOL/L — SIGNIFICANT CHANGE UP (ref 135–145)
WBC # BLD: 4.69 K/UL — SIGNIFICANT CHANGE UP (ref 3.8–10.5)
WBC # FLD AUTO: 4.69 K/UL — SIGNIFICANT CHANGE UP (ref 3.8–10.5)

## 2022-06-19 PROCEDURE — 99232 SBSQ HOSP IP/OBS MODERATE 35: CPT | Mod: GC

## 2022-06-19 RX ADMIN — HEPARIN SODIUM 5000 UNIT(S): 5000 INJECTION INTRAVENOUS; SUBCUTANEOUS at 05:56

## 2022-06-19 RX ADMIN — PREGABALIN 1000 MICROGRAM(S): 225 CAPSULE ORAL at 11:29

## 2022-06-19 RX ADMIN — Medication 10 MILLIGRAM(S): at 11:29

## 2022-06-19 RX ADMIN — HEPARIN SODIUM 5000 UNIT(S): 5000 INJECTION INTRAVENOUS; SUBCUTANEOUS at 17:19

## 2022-06-19 RX ADMIN — Medication 1 MILLIGRAM(S): at 11:30

## 2022-06-19 RX ADMIN — ATORVASTATIN CALCIUM 20 MILLIGRAM(S): 80 TABLET, FILM COATED ORAL at 22:17

## 2022-06-19 RX ADMIN — CHLORHEXIDINE GLUCONATE 1 APPLICATION(S): 213 SOLUTION TOPICAL at 11:30

## 2022-06-19 RX ADMIN — Medication 60 MILLIGRAM(S): at 05:56

## 2022-06-19 RX ADMIN — Medication 1000 UNIT(S): at 11:30

## 2022-06-19 RX ADMIN — PANTOPRAZOLE SODIUM 40 MILLIGRAM(S): 20 TABLET, DELAYED RELEASE ORAL at 05:56

## 2022-06-19 RX ADMIN — Medication 4 MILLIGRAM(S): at 22:17

## 2022-06-19 RX ADMIN — AZATHIOPRINE 50 MILLIGRAM(S): 100 TABLET ORAL at 11:29

## 2022-06-19 NOTE — PROGRESS NOTE ADULT - PROBLEM SELECTOR PLAN 2
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 Retic hypoproliferative. MCV elevated .   Hemodynamically stable, no obvious signs of bleeding.   ? lymphoproliferative syndrome since s/p transplant on long term immunosuppressants   Follows w heme at [facility], Dr. Castillo  1U pRBC given w HD on 6/16  - Iron studies ? AOCD pattern  - B12, folate wnl   - c/w EPO injections per renal w HD  - hematology consulted on 6/16, given multiple cell line depression and recent weight loss reported:  - likely weight loss from worsening renal failure. ?marrow suppression from imuran +/- inusuff EPO dosing  - consider increase in EPO if renal agrees, asked renal will f/u

## 2022-06-19 NOTE — PRE-ANESTHESIA EVALUATION ADULT - NSANTHOSAYNRD_GEN_A_CORE
No. MALGORZATA screening performed.  STOP BANG Legend: 0-2 = LOW Risk; 3-4 = INTERMEDIATE Risk; 5-8 = HIGH Risk
No. MALGORZATA screening performed.  STOP BANG Legend: 0-2 = LOW Risk; 3-4 = INTERMEDIATE Risk; 5-8 = HIGH Risk

## 2022-06-19 NOTE — PROGRESS NOTE ADULT - SUBJECTIVE AND OBJECTIVE BOX
51M with progressive CKD now deemed ESRD and initiated on HD on 6/14/22.     Tentative plan for LUE AVF placement on Monday, 6/20   Appreciate medical optimization  Appreciate Nephrology for HD coordination, will likely require dialysis on Sun 6/19  Protect left arm, no blood draws  COVID negative   Preop labs (CBC, BMP w/ Mg&Phos, T+S, coags) @ 4am on Monday   Consent in chart     Vascular Surgery  [id number].

## 2022-06-19 NOTE — PROGRESS NOTE ADULT - SUBJECTIVE AND OBJECTIVE BOX
Patient is a 51y old  Male who presents with a chief complaint of NAGMA (19 Jun 2022 04:21)      SUBJECTIVE / OVERNIGHT EVENTS:  - overnight, SBP 90s during and post-HD; 1L fluid removed; now SBP 100s-130s; other vitals stable  - no events overnight    MEDICATIONS  (STANDING):  atorvastatin 20 milliGRAM(s) Oral at bedtime  azaTHIOprine 50 milliGRAM(s) Oral daily  chlorhexidine 4% Liquid 1 Application(s) Topical daily  cholecalciferol 1000 Unit(s) Oral daily  cyanocobalamin 1000 MICROGram(s) Oral daily  doxazosin 4 milliGRAM(s) Oral at bedtime  epoetin amaury-epbx (RETACRIT) Injectable 5000 Unit(s) IV Push <User Schedule>  folic acid 1 milliGRAM(s) Oral daily  furosemide    Tablet 40 milliGRAM(s) Oral <User Schedule>  heparin   Injectable 5000 Unit(s) SubCutaneous every 12 hours  NIFEdipine XL 60 milliGRAM(s) Oral daily  pantoprazole    Tablet 40 milliGRAM(s) Oral before breakfast  predniSONE   Tablet 10 milliGRAM(s) Oral every other day    MEDICATIONS  (PRN):      PHYSICAL EXAM:  Vital Signs Last 24 Hrs  T(C): 37.2 (19 Jun 2022 05:56), Max: 37.2 (19 Jun 2022 05:56)  T(F): 99 (19 Jun 2022 05:56), Max: 99 (19 Jun 2022 05:56)  HR: 50 (19 Jun 2022 05:56) (50 - 76)  BP: 135/81 (19 Jun 2022 05:56) (93/50 - 135/81)  BP(mean): --  RR: 16 (19 Jun 2022 05:56) (16 - 18)  SpO2: 99% (19 Jun 2022 05:56) (98% - 99%)    CONSTITUTIONAL: NAD, well-developed  EYES: conjunctiva and sclera clear  ENMT: Moist oral mucosa  NECK: Supple, no palpable masses  RESPIRATORY: Normal WOB; lungs are CTA b/l  CARDIOVASCULAR: RRR; S1/S2 present; no m/r/g; No LE edema; Peripheral pulses are 2+ bilaterally  ABDOMEN: Soft, nontender, normoactive BS, no rebound/guarding; No hepatosplenomegaly  MUSCULOSKELETAL:  moving all extremities  NEUROLOGY: awake, A&O to person, place, and time  SKIN: No rashes  ----  I&O's Summary    18 Jun 2022 07:01  -  19 Jun 2022 07:00  --------------------------------------------------------  IN: 1440 mL / OUT: 1000 mL / NET: 440 mL      ----  LABS:                        8.0    4.69  )-----------( 141      ( 19 Jun 2022 07:22 )             24.3     ----  06-19    140  |  104  |  28<H>  ----------------------------<  86  3.9   |  24  |  3.66<H>    Ca    8.2<L>      19 Jun 2022 07:07  Phos  2.9     06-19  Mg     1.8     06-19    TPro  5.4<L>  /  Alb  3.0<L>  /  TBili  0.3  /  DBili  x   /  AST  43<H>  /  ALT  54<H>  /  AlkPhos  89  06-19    ----    ----      ----    ----      RADIOLOGY & ADDITIONAL TESTS:  Results Reviewed:   Imaging Personally Reviewed:  Electrocardiogram Personally Reviewed:

## 2022-06-19 NOTE — PRE-ANESTHESIA EVALUATION ADULT - NSANTHAIRWAYFT_ENT_ALL_CORE
Mouth opening: >2cm  Thyromental distance: >3 FBs  Upper lip bite: adequate  Cervical ROM: grossly intact
Mouth opening: >2cm  Thyromental distance: >3 FBs  Upper lip bite: adequate  Cervical ROM: grossly intact

## 2022-06-19 NOTE — PRE-ANESTHESIA EVALUATION ADULT - NSANTHPMHFT_GEN_ALL_CORE
51M w PMHx of HTN, renal transplant in 1978 c/b new ESRD (on azathioprine and prednisone), anemia (on weekly Aranesp), HLD (on statin) who presented for AV Fistula placement experiencing nausea, vomiting and fatigue, found to have non-anion gap metabolic acidosis due to renal failure, admitted for urgent hemodialysis.  Last dialysis 6/18, net 1L removed.

## 2022-06-20 ENCOUNTER — TRANSCRIPTION ENCOUNTER (OUTPATIENT)
Age: 52
End: 2022-06-20

## 2022-06-20 ENCOUNTER — APPOINTMENT (OUTPATIENT)
Dept: VASCULAR SURGERY | Facility: HOSPITAL | Age: 52
End: 2022-06-20
Payer: COMMERCIAL

## 2022-06-20 LAB
A PHAGOCYTOPH IGG TITR SER IF: SIGNIFICANT CHANGE UP TITER
ALBUMIN SERPL ELPH-MCNC: 3.1 G/DL — LOW (ref 3.3–5)
ALP SERPL-CCNC: 97 U/L — SIGNIFICANT CHANGE UP (ref 40–120)
ALT FLD-CCNC: 42 U/L — SIGNIFICANT CHANGE UP (ref 10–45)
ANION GAP SERPL CALC-SCNC: 13 MMOL/L — SIGNIFICANT CHANGE UP (ref 5–17)
APTT BLD: 26.8 SEC — LOW (ref 27.5–35.5)
AST SERPL-CCNC: 21 U/L — SIGNIFICANT CHANGE UP (ref 10–40)
B BURGDOR AB SER QL IA: POSITIVE — SIGNIFICANT CHANGE UP
B MICROTI IGG TITR SER: SIGNIFICANT CHANGE UP TITER
BASOPHILS # BLD AUTO: 0.03 K/UL — SIGNIFICANT CHANGE UP (ref 0–0.2)
BASOPHILS NFR BLD AUTO: 0.5 % — SIGNIFICANT CHANGE UP (ref 0–2)
BILIRUB SERPL-MCNC: 0.2 MG/DL — SIGNIFICANT CHANGE UP (ref 0.2–1.2)
BLD GP AB SCN SERPL QL: NEGATIVE — SIGNIFICANT CHANGE UP
BUN SERPL-MCNC: 42 MG/DL — HIGH (ref 7–23)
CALCIUM SERPL-MCNC: 8.1 MG/DL — LOW (ref 8.4–10.5)
CHLORIDE SERPL-SCNC: 104 MMOL/L — SIGNIFICANT CHANGE UP (ref 96–108)
CO2 SERPL-SCNC: 23 MMOL/L — SIGNIFICANT CHANGE UP (ref 22–31)
CREAT SERPL-MCNC: 4.72 MG/DL — HIGH (ref 0.5–1.3)
E CHAFFEENSIS IGG TITR SER IF: SIGNIFICANT CHANGE UP TITER
EGFR: 14 ML/MIN/1.73M2 — LOW
EOSINOPHIL # BLD AUTO: 0.03 K/UL — SIGNIFICANT CHANGE UP (ref 0–0.5)
EOSINOPHIL NFR BLD AUTO: 0.5 % — SIGNIFICANT CHANGE UP (ref 0–6)
GLUCOSE SERPL-MCNC: 90 MG/DL — SIGNIFICANT CHANGE UP (ref 70–99)
HCT VFR BLD CALC: 23.9 % — LOW (ref 39–50)
HGB BLD-MCNC: 7.7 G/DL — LOW (ref 13–17)
IMM GRANULOCYTES NFR BLD AUTO: 0.7 % — SIGNIFICANT CHANGE UP (ref 0–1.5)
INR BLD: 1.01 RATIO — SIGNIFICANT CHANGE UP (ref 0.88–1.16)
LYMPHOCYTES # BLD AUTO: 0.89 K/UL — LOW (ref 1–3.3)
LYMPHOCYTES # BLD AUTO: 15.8 % — SIGNIFICANT CHANGE UP (ref 13–44)
MAGNESIUM SERPL-MCNC: 1.8 MG/DL — SIGNIFICANT CHANGE UP (ref 1.6–2.6)
MCHC RBC-ENTMCNC: 32.2 GM/DL — SIGNIFICANT CHANGE UP (ref 32–36)
MCHC RBC-ENTMCNC: 32.6 PG — SIGNIFICANT CHANGE UP (ref 27–34)
MCV RBC AUTO: 101.3 FL — HIGH (ref 80–100)
MONOCYTES # BLD AUTO: 0.46 K/UL — SIGNIFICANT CHANGE UP (ref 0–0.9)
MONOCYTES NFR BLD AUTO: 8.2 % — SIGNIFICANT CHANGE UP (ref 2–14)
NEUTROPHILS # BLD AUTO: 4.19 K/UL — SIGNIFICANT CHANGE UP (ref 1.8–7.4)
NEUTROPHILS NFR BLD AUTO: 74.3 % — SIGNIFICANT CHANGE UP (ref 43–77)
NRBC # BLD: 0 /100 WBCS — SIGNIFICANT CHANGE UP (ref 0–0)
PHOSPHATE SERPL-MCNC: 2.3 MG/DL — LOW (ref 2.5–4.5)
PLATELET # BLD AUTO: 160 K/UL — SIGNIFICANT CHANGE UP (ref 150–400)
POTASSIUM SERPL-MCNC: 4.1 MMOL/L — SIGNIFICANT CHANGE UP (ref 3.5–5.3)
POTASSIUM SERPL-SCNC: 4.1 MMOL/L — SIGNIFICANT CHANGE UP (ref 3.5–5.3)
PROT SERPL-MCNC: 5.4 G/DL — LOW (ref 6–8.3)
PROTHROM AB SERPL-ACNC: 11.7 SEC — SIGNIFICANT CHANGE UP (ref 10.5–13.4)
RBC # BLD: 2.36 M/UL — LOW (ref 4.2–5.8)
RBC # FLD: 16.4 % — HIGH (ref 10.3–14.5)
RH IG SCN BLD-IMP: POSITIVE — SIGNIFICANT CHANGE UP
SODIUM SERPL-SCNC: 140 MMOL/L — SIGNIFICANT CHANGE UP (ref 135–145)
WBC # BLD: 5.64 K/UL — SIGNIFICANT CHANGE UP (ref 3.8–10.5)
WBC # FLD AUTO: 5.64 K/UL — SIGNIFICANT CHANGE UP (ref 3.8–10.5)

## 2022-06-20 PROCEDURE — 99233 SBSQ HOSP IP/OBS HIGH 50: CPT | Mod: GC

## 2022-06-20 PROCEDURE — 36820 AV FUSION/FOREARM VEIN: CPT | Mod: LT,58

## 2022-06-20 DEVICE — SURGIFOAM PAD 8CM X 12.5CM X 10MM (100): Type: IMPLANTABLE DEVICE | Status: FUNCTIONAL

## 2022-06-20 DEVICE — ARISTA 3GR: Type: IMPLANTABLE DEVICE | Status: FUNCTIONAL

## 2022-06-20 RX ORDER — ACETAMINOPHEN 500 MG
1000 TABLET ORAL ONCE
Refills: 0 | Status: DISCONTINUED | OUTPATIENT
Start: 2022-06-20 | End: 2022-06-20

## 2022-06-20 RX ORDER — HYDRALAZINE HCL 50 MG
1 TABLET ORAL
Qty: 0 | Refills: 0 | DISCHARGE

## 2022-06-20 RX ORDER — HEPARIN SODIUM 5000 [USP'U]/ML
5000 INJECTION INTRAVENOUS; SUBCUTANEOUS EVERY 12 HOURS
Refills: 0 | Status: DISCONTINUED | OUTPATIENT
Start: 2022-06-20 | End: 2022-06-21

## 2022-06-20 RX ORDER — CARVEDILOL PHOSPHATE 80 MG/1
1 CAPSULE, EXTENDED RELEASE ORAL
Qty: 0 | Refills: 0 | DISCHARGE

## 2022-06-20 RX ORDER — OXYCODONE HYDROCHLORIDE 5 MG/1
5 TABLET ORAL EVERY 4 HOURS
Refills: 0 | Status: DISCONTINUED | OUTPATIENT
Start: 2022-06-20 | End: 2022-06-21

## 2022-06-20 RX ORDER — PANTOPRAZOLE SODIUM 20 MG/1
1 TABLET, DELAYED RELEASE ORAL
Qty: 90 | Refills: 0
Start: 2022-06-20 | End: 2022-09-17

## 2022-06-20 RX ORDER — ONDANSETRON 8 MG/1
4 TABLET, FILM COATED ORAL ONCE
Refills: 0 | Status: DISCONTINUED | OUTPATIENT
Start: 2022-06-20 | End: 2022-06-20

## 2022-06-20 RX ORDER — SODIUM ZIRCONIUM CYCLOSILICATE 10 G/10G
1 POWDER, FOR SUSPENSION ORAL
Qty: 0 | Refills: 0 | DISCHARGE

## 2022-06-20 RX ORDER — ACETAMINOPHEN 500 MG
650 TABLET ORAL EVERY 6 HOURS
Refills: 0 | Status: DISCONTINUED | OUTPATIENT
Start: 2022-06-20 | End: 2022-06-21

## 2022-06-20 RX ORDER — FOLIC ACID 0.8 MG
1 TABLET ORAL
Qty: 0 | Refills: 0 | DISCHARGE
Start: 2022-06-20

## 2022-06-20 RX ADMIN — PANTOPRAZOLE SODIUM 40 MILLIGRAM(S): 20 TABLET, DELAYED RELEASE ORAL at 05:22

## 2022-06-20 RX ADMIN — Medication 60 MILLIGRAM(S): at 05:22

## 2022-06-20 RX ADMIN — Medication 650 MILLIGRAM(S): at 19:30

## 2022-06-20 RX ADMIN — AZATHIOPRINE 50 MILLIGRAM(S): 100 TABLET ORAL at 13:23

## 2022-06-20 RX ADMIN — Medication 1000 UNIT(S): at 13:23

## 2022-06-20 RX ADMIN — Medication 4 MILLIGRAM(S): at 22:27

## 2022-06-20 RX ADMIN — CHLORHEXIDINE GLUCONATE 1 APPLICATION(S): 213 SOLUTION TOPICAL at 13:23

## 2022-06-20 RX ADMIN — HEPARIN SODIUM 5000 UNIT(S): 5000 INJECTION INTRAVENOUS; SUBCUTANEOUS at 18:29

## 2022-06-20 RX ADMIN — ATORVASTATIN CALCIUM 20 MILLIGRAM(S): 80 TABLET, FILM COATED ORAL at 22:27

## 2022-06-20 RX ADMIN — Medication 1 MILLIGRAM(S): at 13:24

## 2022-06-20 RX ADMIN — HEPARIN SODIUM 5000 UNIT(S): 5000 INJECTION INTRAVENOUS; SUBCUTANEOUS at 05:22

## 2022-06-20 RX ADMIN — Medication 650 MILLIGRAM(S): at 18:29

## 2022-06-20 RX ADMIN — PREGABALIN 1000 MICROGRAM(S): 225 CAPSULE ORAL at 13:24

## 2022-06-20 NOTE — DISCHARGE NOTE PROVIDER - NSDCFUSCHEDAPPT_GEN_ALL_CORE_FT
Manny Chacon  [facility] Physician [facility]  VASCULAR [address]  Scheduled Appointment: 07/05/2022    Elliott Castillo  [facility] Physician [facility]  [facility]  Scheduled Appointment: 08/15/2022

## 2022-06-20 NOTE — DIETITIAN INITIAL EVALUATION ADULT - NS FNS WEIGHT CHANGE REASON
Pt reports gradual weight loss since covid diagnosis in December 2020.  pounds. Per RD note 12/2020 pt 168.2 pounds.     Additionally, pt reports suspected weight loss x 1 month PTA due to decreased PO intake, unsure of what weight was 1 month ago. Per Daxa ZEPEDA, pt 141 pounds on 5/20/2022.     Current dosing weight 141.7 pound (~16.6 % weight loss since covid diagnosis). RD will continue to monitor trends./unintentional

## 2022-06-20 NOTE — DIETITIAN INITIAL EVALUATION ADULT - NSFNSPHYEXAMSKINFT_GEN_A_CORE
Creation of left radiocephalic fistula, surgical incision to right clavicle.   no pressure injuries noted per flow sheets.

## 2022-06-20 NOTE — DIETITIAN INITIAL EVALUATION ADULT - REASON FOR ADMISSION
End-stage renal disease  .  Per chart: "51M w PMHx of HTN, renal transplant in 1978 c/b new ESRD (on azathioprine and prednisone), anemia (on weekly Aranesp), HLD (on statin) who presented for AV Fistula placement experiencing nausea, vomiting and fatigue, found to have non-anion gap metabolic acidosis due to renal failure, admitted for urgent hemodialysis."

## 2022-06-20 NOTE — DIETITIAN INITIAL EVALUATION ADULT - ADD RECOMMEND
1) Will continue to monitor PO intake, weight, labs, skin, GI status and diet 2) Continue vitamin/mineral regimen per team 3) Consider addition of Nepro to aid in increased needs

## 2022-06-20 NOTE — BRIEF OPERATIVE NOTE - NSICDXBRIEFPOSTOP_GEN_ALL_CORE_FT
POST-OP DIAGNOSIS:  ESRD needing dialysis 13-Jun-2022 09:20:04  Edi THOMPSON  
POST-OP DIAGNOSIS:  ESRD needing dialysis 13-Jun-2022 09:20:04  Edi THOMPSON

## 2022-06-20 NOTE — DIETITIAN INITIAL EVALUATION ADULT - PERTINENT MEDS FT
MEDICATIONS  (STANDING):  atorvastatin 20 milliGRAM(s) Oral at bedtime  azaTHIOprine 50 milliGRAM(s) Oral daily  chlorhexidine 4% Liquid 1 Application(s) Topical daily  cholecalciferol 1000 Unit(s) Oral daily  cyanocobalamin 1000 MICROGram(s) Oral daily  doxazosin 4 milliGRAM(s) Oral at bedtime  epoetin amaury-epbx (RETACRIT) Injectable 5000 Unit(s) IV Push <User Schedule>  folic acid 1 milliGRAM(s) Oral daily  furosemide    Tablet 40 milliGRAM(s) Oral <User Schedule>  heparin   Injectable 5000 Unit(s) SubCutaneous every 12 hours  NIFEdipine XL 60 milliGRAM(s) Oral daily  pantoprazole    Tablet 40 milliGRAM(s) Oral before breakfast  predniSONE   Tablet 10 milliGRAM(s) Oral every other day    MEDICATIONS  (PRN):  oxyCODONE    IR 5 milliGRAM(s) Oral every 4 hours PRN Severe Pain (7 - 10)

## 2022-06-20 NOTE — DISCHARGE NOTE PROVIDER - CARE PROVIDERS DIRECT ADDRESSES
[email or web address]

## 2022-06-20 NOTE — BRIEF OPERATIVE NOTE - OPERATION/FINDINGS
Patient acidotic needed emergent dialysis. Only Right IJ Permacath was placed.
Creation of left radiocephalic fistula.

## 2022-06-20 NOTE — DIETITIAN INITIAL EVALUATION ADULT - NS FNS DIET ORDER
Diet, Regular:   For patients receiving Renal Replacement - No Protein Restr, No Conc K, No Conc Phos, Low Sodium (RENAL)  No Shellfish (06-20-22 @ 11:12)

## 2022-06-20 NOTE — PROGRESS NOTE ADULT - PROBLEM SELECTOR PLAN 2
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 Retic hypoproliferative. MCV elevated .   Hemodynamically stable, no obvious signs of bleeding.   ? lymphoproliferative syndrome since s/p transplant on long term immunosuppressants   Follows w heme at [facility], Dr. Castillo  1U pRBC given w HD on 6/16  - Iron studies ? AOCD pattern  - B12, folate wnl   - c/w EPO injections per renal w HD  - hematology consulted on 6/16, given multiple cell line depression and recent weight loss reported:  - likely weight loss from worsening renal failure. ?marrow suppression from imuran +/- inusuff EPO dosing  - consider increase in EPO if renal agrees, asked renal will f/u

## 2022-06-20 NOTE — DIETITIAN INITIAL EVALUATION ADULT - PERTINENT LABORATORY DATA
06-20    140  |  104  |  42<H>  ----------------------------<  90  4.1   |  23  |  4.72<H>    Ca    8.1<L>      20 Jun 2022 05:00  Phos  2.3     06-20  Mg     1.8     06-20    TPro  5.4<L>  /  Alb  3.1<L>  /  TBili  0.2  /  DBili  x   /  AST  21  /  ALT  42  /  AlkPhos  97  06-20

## 2022-06-20 NOTE — BRIEF OPERATIVE NOTE - NSICDXBRIEFPROCEDURE_GEN_ALL_CORE_FT
PROCEDURES:  Creation, AV fistula, radiocephalic 20-Jun-2022 11:09:06  Edi THOMPSON  
PROCEDURES:  Percutaneous insertion of tunneled hemodialysis catheter 13-Jun-2022 09:18:39  Edi THOMPSON

## 2022-06-20 NOTE — DISCHARGE NOTE PROVIDER - NSDCCPCAREPLAN_GEN_ALL_CORE_FT
PRINCIPAL DISCHARGE DIAGNOSIS  Diagnosis: End-stage renal disease (ESRD)  Assessment and Plan of Treatment: When you got to the hospital, you were found to have profound metabolic acidosis (an electrolyte disturbance in your blood) due to failing kidney function. You had a catheter placed in your neck vein to allow for urget dialysis. After this session, your blood levels improved dramatically and you felt better. You continued to receive dialysis in the hospital on a 3 days  per week schedule. Please follow with your nephrologist after discharge, please attend your dialysis sessions as instructed and as scheduled. Please return to ER if you experience severe shortness of breath.      SECONDARY DISCHARGE DIAGNOSES  Diagnosis: HTN (hypertension)  Assessment and Plan of Treatment: Some of your blood pressure medications were changed; please do not take your hydralazine or carvedilol unless otherwise instructed by your physician.    Diagnosis: Renal transplant recipient  Assessment and Plan of Treatment: Please continue taking your azathioprine and prednisone as directed, please follow with your nephrologists.    Diagnosis: Anemia secondary to renal failure  Assessment and Plan of Treatment: You have anemia and were given 1 unit of blood in the hospital, to which you responded well. The hematologists evaluated you, and determined that the anemia is probably from your renal disease. Your immunosuppressant medications might be causing a decrease in your other cell counts, such as white blood cells and paltelets. Please continue medications as instructed, please follow with your hematologist after discharge.

## 2022-06-20 NOTE — DIETITIAN INITIAL EVALUATION ADULT - ORAL INTAKE PTA/DIET HISTORY
visited pt at bedside this morning. poor PO intake x 1 month PTA due to renal failure. is allergic to shellfish. since hospital admission, appetite has much improved.

## 2022-06-20 NOTE — DIETITIAN INITIAL EVALUATION ADULT - NSFNSGIIOFT_GEN_A_CORE
Denies current nausea/vomiting/constipation/diarrhea. Last bowel movement per flow sheets 6/19. some nausea noted PTA.

## 2022-06-20 NOTE — DIETITIAN INITIAL EVALUATION ADULT - PERSON TAUGHT/METHOD
Provided renal diet education reinforcement, pt states to be aware of recommendations. Discussed monitoring sodium, potassium, phosphorus and fluid intake. Pt agrees. Consuming high quality protein at meals. RD remains available./verbal instruction/patient instructed

## 2022-06-20 NOTE — DISCHARGE NOTE PROVIDER - CARE PROVIDER_API CALL
Manny Chacon)  Vascular Surgery  [address] [facility], [address]  [place] [address]  Phone: [phone]  Fax: [phone]  Established Patient  Follow Up Time:     Elliott Castillo)  Hematology; Internal Medicine; Oncology  [address] [place] [id number]  Phone: [phone]  Fax: [phone]  Established Patient  Follow Up Time:

## 2022-06-20 NOTE — DISCHARGE NOTE PROVIDER - NSDCMRMEDTOKEN_GEN_ALL_CORE_FT
Aranesp 60 mcg/0.3 mL injectable solution: injectable once a week  azaTHIOprine 50 mg oral tablet: 1 tab(s) orally once a day  doxazosin 4 mg oral tablet: 1 tab(s) orally once a day  folic acid 1 mg oral tablet: 1 tab(s) orally once a day  Lasix 40 mg oral tablet: 1 tab(s) orally 3 times a week  Mon, Wed, Fri   Lipitor 20 mg oral tablet: 1 tab(s) orally once a day  NIFEdipine 60 mg oral tablet, extended release: 2 tab(s) orally once a day  pantoprazole 40 mg oral delayed release tablet: 1 tab(s) orally once a day (before a meal)  predniSONE 5 mg oral tablet: 2 tab(s) orally every other day  sodium bicarbonate 650 mg oral tablet: 1 tab(s) orally every 8 hours  Vitamin B12 1000 mcg oral tablet: 1 tab(s) orally once a day  Vitamin D3 1000 intl units (25 mcg) oral capsule: orally once a day

## 2022-06-20 NOTE — DIETITIAN INITIAL EVALUATION ADULT - REASON INDICATOR FOR ASSESSMENT
consulted for education/poor po intake/weight loss/new renal failure. Information obtained from patient and EMR.

## 2022-06-20 NOTE — DISCHARGE NOTE PROVIDER - PROVIDER TOKENS
PROVIDER:[TOKEN:[[id number]],ESTABLISHEDPATIENT:[T]],PROVIDER:[TOKEN:[[id number]],ESTABLISHEDPATIENT:[T]]

## 2022-06-20 NOTE — PROGRESS NOTE ADULT - SUBJECTIVE AND OBJECTIVE BOX
Subjective:    INTERVAL HPI/OVERNIGHT EVENTS:   - AVF today  No acute events overnight  Afebrile, hemodynamically stable     Telemetry:    T(F): 98 (06-20-22 @ 05:09), Max: 98.5 (06-19-22 @ 13:24)  HR: 82 (06-20-22 @ 05:09) (82 - 100)  BP: 127/82 (06-20-22 @ 05:09) (110/66 - 127/82)  RR: 18 (06-20-22 @ 05:09) (16 - 18)  SpO2: 100% (06-20-22 @ 05:09) (98% - 100%)  I&O's Summary    19 Jun 2022 07:01  -  20 Jun 2022 07:00  --------------------------------------------------------  IN: 1520 mL / OUT: 0 mL / NET: 1520 mL      Weight (kg): 64.4 (06-19-22 @ 20:15)    Medications:  atorvastatin 20 milliGRAM(s) Oral at bedtime  azaTHIOprine 50 milliGRAM(s) Oral daily  chlorhexidine 4% Liquid 1 Application(s) Topical daily  cholecalciferol 1000 Unit(s) Oral daily  cyanocobalamin 1000 MICROGram(s) Oral daily  doxazosin 4 milliGRAM(s) Oral at bedtime  epoetin amaury-epbx (RETACRIT) Injectable 5000 Unit(s) IV Push <User Schedule>  folic acid 1 milliGRAM(s) Oral daily  furosemide    Tablet 40 milliGRAM(s) Oral <User Schedule>  heparin   Injectable 5000 Unit(s) SubCutaneous every 12 hours  NIFEdipine XL 60 milliGRAM(s) Oral daily  pantoprazole    Tablet 40 milliGRAM(s) Oral before breakfast  predniSONE   Tablet 10 milliGRAM(s) Oral every other day    PHYSICAL EXAM:  GENERAL APPEARANCE: Thin male, in NAD  HEENT:  PERRL, EOMI. hearing grossly intact.  NECK: R IJ permacath. No JVD appreciated. Neck supple, non-tender no lymphadenopathy, masses or thyromegaly.  CARDIAC: 2/6 holosystolic murmur appreciated. Normal S1 and S2. RRR  LUNGS: Mild crackles in lower lobes b/l. No wheezing or rhonchi appreciated.   ABDOMEN: Soft , NTND, bowel sounds normal. No guarding or rebound.   MUSCULOSKELETAL: ROM intact.  No joint erythema or tenderness.   EXTREMITIES: No edema appreciated. Peripheral pulses intact.   NEUROLOGICAL: Non focal. Strength and sensation symmetric and intact throughout.   SKIN: Warm and dry , Well perfused  PSYCHIATRIC: AOx4 , Normal mood and affect    Notable Labs:    Labs:                          7.7    5.64  )-----------( 160      ( 20 Jun 2022 05:00 )             23.9     06-20    140  |  104  |  42<H>  ----------------------------<  90  4.1   |  23  |  4.72<H>    Ca    8.1<L>      20 Jun 2022 05:00  Phos  2.3     06-20  Mg     1.8     06-20    TPro  5.4<L>  /  Alb  3.1<L>  /  TBili  0.2  /  DBili  x   /  AST  21  /  ALT  42  /  AlkPhos  97  06-20    LIVER FUNCTIONS - ( 20 Jun 2022 05:00 )  Alb: 3.1 g/dL / Pro: 5.4 g/dL / ALK PHOS: 97 U/L / ALT: 42 U/L / AST: 21 U/L / GGT: x           PT/INR - ( 20 Jun 2022 05:00 )   PT: 11.7 sec;   INR: 1.01 ratio         PTT - ( 20 Jun 2022 05:00 )  PTT:26.8 sec  CAPILLARY BLOOD GLUCOSE    Micro:      RADIOLOGY & ADDITIONAL TESTS:    Reads of CT pending

## 2022-06-20 NOTE — DISCHARGE NOTE PROVIDER - HOSPITAL COURSE
HPI:  51M w PMHx of HTN, renal transplant in 1978 c/b CKD IV (on azathioprine and prednisone), anemia (on weekly Aranesp), HLD (on statin) who presented for AV Fistula placement, but was found to acidotic on pre-op VBG, evaluated by renal and had IJ access placed for urgent HD. Patient received renal transplant from his mother in 1978 (when he was 9yo) for congenital renal failure, was doing well on medications and recently had worsening renal function since COVID infection in 2020. Patient reported feeling a bit fatigued, and had some nausea and vomiting. Denied any shortness of breath, leg swelling, or chest pain. Denied any fevers, chills, sick contacts, abdominal pain, diarrhea. Patient had never received HD before.     PACU Course: T 97.5 HR 92 /61 RR 16 O2 98% on RA  IJ Access obtained, started on urgent HD.  (13 Jun 2022 10:52)    Hospital Course:     Patient was found to have NAGMA, RIJ permacath placed, urgent HD, acidosis resolved, symptoms improved. Patient received HD, TTSat schedule. Patient received 1U pRBC this admission, responded appropriately. Also given EPO with HD sessions, renal onboard. Of note, imuran and pred continued admission per home regimen. Heme consulted, evaluated for anemia and bi/pancytopenia, likely attributed to imuran use long term as well as ESRD induced EPO deficiency.     Patient received AV fistula placement on L arm, monitored in hospital overnight. Set up with outpatient HD. Pt now optimized for discharge with f/u with vasc surgery, heme, renal outpatient.   HPI:  51M w PMHx of HTN, renal transplant in 1978 c/b CKD IV (on azathioprine and prednisone), anemia (on weekly Aranesp), HLD (on statin) who presented for AV Fistula placement, but was found to acidotic on pre-op VBG, evaluated by renal and had IJ access placed for urgent HD. Patient received renal transplant from his mother in 1978 (when he was 7yo) for congenital renal failure, was doing well on medications and recently had worsening renal function since COVID infection in 2020. Patient reported feeling a bit fatigued, and had some nausea and vomiting. Denied any shortness of breath, leg swelling, or chest pain. Denied any fevers, chills, sick contacts, abdominal pain, diarrhea. Patient had never received HD before.     PACU Course: T 97.5 HR 92 /61 RR 16 O2 98% on RA  IJ Access obtained, started on urgent HD.  (13 Jun 2022 10:52)    Hospital Course:     Patient was found to have NAGMA, RIJ permacath placed, urgent HD, acidosis resolved, symptoms improved. Patient received HD, TTSat schedule. Patient received 1U pRBC this admission, responded appropriately. Also given EPO with HD sessions, renal onboard. Of note, imuran and pred continued on admission per home regimen. Heme consulted, evaluated for anemia and bi/pancytopenia, likely attributed to imuran use long term as well as ESRD induced EPO deficiency.   Of note, patient's home carvedilol and hydralazine were held due to soft pressure during HD and sinus bradycardia. Home nifedipine was continued.     Patient received AV fistula placement on L arm 4/20/22, monitored in hospital overnight. Set up with outpatient HD. Pt now optimized for discharge with f/u with vasc surgery, heme, renal outpatient.

## 2022-06-20 NOTE — DIETITIAN INITIAL EVALUATION ADULT - REASON
pt with incision to right clavicle, s/p Creation of left radiocephalic fistula just prior to RD visit (with dressing). Unable to complete full Nutrition focused physical exam at this time.

## 2022-06-20 NOTE — DISCHARGE NOTE PROVIDER - NSDCCPTREATMENT_GEN_ALL_CORE_FT
PRINCIPAL PROCEDURE  Procedure: Creation, dialysis AV fistula  Findings and Treatment: Operative Findings:  · Operative Findings  Creation of left radiocephalic fistula.  Evidence of Infection or Abscess:  · Evidence of infection or abscess identified at the start or during the surgical procedure:  No  Specimens/Blood Loss/IV/Output/Protocol/VTE:   Specimens/Blood Loss/IV/Output/Protocol/VTE:  · Specimens  none  · Estimated Blood Loss  5 milliLiter(s)  · Antibiotic Protocol  Followed protocol  · Venous Thromboembolism Prophylaxis Therapy  scd  Other Details/Condition Post op/Disposition:  · Comments/Other Details  Pulse over fistula at the of the case  · Condition Post op  Stable  · Disposition  PACU, floor

## 2022-06-20 NOTE — BRIEF OPERATIVE NOTE - NSICDXBRIEFPREOP_GEN_ALL_CORE_FT
PRE-OP DIAGNOSIS:  ESRD needing dialysis 13-Jun-2022 09:19:06  Edi THOMPSON  
PRE-OP DIAGNOSIS:  ESRD needing dialysis 13-Jun-2022 09:19:06  Edi THOMPSON

## 2022-06-20 NOTE — CHART NOTE - NSCHARTNOTEFT_GEN_A_CORE
Vascular Surgery Post op Note    Procedure: sp Creation of L AV fistula, radiocephalic;  Sp supraclavicular nerve block     SUBJECTIVE: Patient seen and evaluated at the bedside. Resting comfortably in bed. Tolerated lunch, VSS reviewed, repeat 102/56 HR 65  SOB:  [ ] YES [X ] NO  Chest Discomfort: [ ] YES [X] NO    Nausea: [ ] YES [ X] NO           Vomiting: [ ] YES [ X] NO    Pain Control Adequate: [X ] YES [ ] NO    Objective:   Vital Signs Last 24 Hrs  T(C): 36.4 (20 Jun 2022 13:10), Max: 36.9 (19 Jun 2022 21:04)  T(F): 97.6 (20 Jun 2022 13:10), Max: 98.4 (19 Jun 2022 21:04)  HR: 54 (20 Jun 2022 13:10) (52 - 100)  BP: 97/57 (20 Jun 2022 13:10) (93/55 - 127/82)  BP(mean): 80 (20 Jun 2022 12:45) (69 - 80)  RR: 18 (20 Jun 2022 13:10) (12 - 18)  SpO2: 96% (20 Jun 2022 13:10) (96% - 100%)  I&O's Summary    19 Jun 2022 07:01  -  20 Jun 2022 07:00  --------------------------------------------------------  IN: 1520 mL / OUT: 0 mL / NET: 1520 mL      I&O's Detail    19 Jun 2022 07:01  -  20 Jun 2022 07:00  --------------------------------------------------------  IN:    Oral Fluid: 1520 mL  Total IN: 1520 mL    OUT:  Total OUT: 0 mL    Total NET: 1520 mL            LABS:                        7.7    5.64  )-----------( 160      ( 20 Jun 2022 05:00 )             23.9     06-20    140  |  104  |  42<H>  ----------------------------<  90  4.1   |  23  |  4.72<H>    Ca    8.1<L>      20 Jun 2022 05:00  Phos  2.3     06-20  Mg     1.8     06-20    TPro  5.4<L>  /  Alb  3.1<L>  /  TBili  0.2  /  DBili  x   /  AST  21  /  ALT  42  /  AlkPhos  97  06-20    PT/INR - ( 20 Jun 2022 05:00 )   PT: 11.7 sec;   INR: 1.01 ratio         PTT - ( 20 Jun 2022 05:00 )  PTT:26.8 sec      MEDICATIONS  (STANDING):  atorvastatin 20 milliGRAM(s) Oral at bedtime  azaTHIOprine 50 milliGRAM(s) Oral daily  chlorhexidine 4% Liquid 1 Application(s) Topical daily  cholecalciferol 1000 Unit(s) Oral daily  cyanocobalamin 1000 MICROGram(s) Oral daily  doxazosin 4 milliGRAM(s) Oral at bedtime  epoetin amaury-epbx (RETACRIT) Injectable 5000 Unit(s) IV Push <User Schedule>  folic acid 1 milliGRAM(s) Oral daily  furosemide    Tablet 40 milliGRAM(s) Oral <User Schedule>  heparin   Injectable 5000 Unit(s) SubCutaneous every 12 hours  NIFEdipine XL 60 milliGRAM(s) Oral daily  pantoprazole    Tablet 40 milliGRAM(s) Oral before breakfast  predniSONE   Tablet 10 milliGRAM(s) Oral every other day    MEDICATIONS  (PRN):  oxyCODONE    IR 5 milliGRAM(s) Oral every 4 hours PRN Severe Pain (7 - 10)      Physical exam  General: lying in bed in nad  HEENT: ncat, trachea midline  Chest: respirations non labored  LUE: sling in place, wwp, gross sensorimotor fxn intact, dressing c/d/i palp radial/ulnar  Neurological: awake alert appropriate     Assessment/Plan: 51y Male 51M with progressive CKD now deemed ESRD and initiated on HD on 6/14/22. 6/20  s/p  Creation of L radiocephalic AV fistula, sp supraclavicular nerve block, recovering appropriately on floors    - Diet as tolerated  - Pain control  - incentive spirometry   - DVT ppx  - Activity- OOB with assistance  - DC planning as per primary team, will need OP f/u with Dr Chacon    vascular [id number]

## 2022-06-21 ENCOUNTER — TRANSCRIPTION ENCOUNTER (OUTPATIENT)
Age: 52
End: 2022-06-21

## 2022-06-21 VITALS
RESPIRATION RATE: 20 BRPM | SYSTOLIC BLOOD PRESSURE: 120 MMHG | DIASTOLIC BLOOD PRESSURE: 70 MMHG | HEART RATE: 72 BPM | OXYGEN SATURATION: 95 % | TEMPERATURE: 99 F

## 2022-06-21 LAB
ANION GAP SERPL CALC-SCNC: 14 MMOL/L — SIGNIFICANT CHANGE UP (ref 5–17)
BUN SERPL-MCNC: 56 MG/DL — HIGH (ref 7–23)
CALCIUM SERPL-MCNC: 7.9 MG/DL — LOW (ref 8.4–10.5)
CHLORIDE SERPL-SCNC: 106 MMOL/L — SIGNIFICANT CHANGE UP (ref 96–108)
CO2 SERPL-SCNC: 21 MMOL/L — LOW (ref 22–31)
CREAT SERPL-MCNC: 5.48 MG/DL — HIGH (ref 0.5–1.3)
EGFR: 12 ML/MIN/1.73M2 — LOW
EPO SERPL-MCNC: 56.6 MIU/ML — HIGH (ref 2.6–18.5)
GLUCOSE SERPL-MCNC: 108 MG/DL — HIGH (ref 70–99)
HCT VFR BLD CALC: 23.9 % — LOW (ref 39–50)
HGB BLD-MCNC: 7.2 G/DL — LOW (ref 13–17)
MAGNESIUM SERPL-MCNC: 1.8 MG/DL — SIGNIFICANT CHANGE UP (ref 1.6–2.6)
MCHC RBC-ENTMCNC: 30.1 GM/DL — LOW (ref 32–36)
MCHC RBC-ENTMCNC: 31.6 PG — SIGNIFICANT CHANGE UP (ref 27–34)
MCV RBC AUTO: 104.8 FL — HIGH (ref 80–100)
NRBC # BLD: 0 /100 WBCS — SIGNIFICANT CHANGE UP (ref 0–0)
PHOSPHATE SERPL-MCNC: 2.9 MG/DL — SIGNIFICANT CHANGE UP (ref 2.5–4.5)
PLATELET # BLD AUTO: 184 K/UL — SIGNIFICANT CHANGE UP (ref 150–400)
POTASSIUM SERPL-MCNC: 4 MMOL/L — SIGNIFICANT CHANGE UP (ref 3.5–5.3)
POTASSIUM SERPL-SCNC: 4 MMOL/L — SIGNIFICANT CHANGE UP (ref 3.5–5.3)
RBC # BLD: 2.28 M/UL — LOW (ref 4.2–5.8)
RBC # FLD: 17 % — HIGH (ref 10.3–14.5)
SODIUM SERPL-SCNC: 141 MMOL/L — SIGNIFICANT CHANGE UP (ref 135–145)
WBC # BLD: 4.79 K/UL — SIGNIFICANT CHANGE UP (ref 3.8–10.5)
WBC # FLD AUTO: 4.79 K/UL — SIGNIFICANT CHANGE UP (ref 3.8–10.5)

## 2022-06-21 PROCEDURE — 86618 LYME DISEASE ANTIBODY: CPT

## 2022-06-21 PROCEDURE — 84550 ASSAY OF BLOOD/URIC ACID: CPT

## 2022-06-21 PROCEDURE — 83615 LACTATE (LD) (LDH) ENZYME: CPT

## 2022-06-21 PROCEDURE — 86850 RBC ANTIBODY SCREEN: CPT

## 2022-06-21 PROCEDURE — U0005: CPT

## 2022-06-21 PROCEDURE — 82435 ASSAY OF BLOOD CHLORIDE: CPT

## 2022-06-21 PROCEDURE — 82330 ASSAY OF CALCIUM: CPT

## 2022-06-21 PROCEDURE — 84295 ASSAY OF SERUM SODIUM: CPT

## 2022-06-21 PROCEDURE — 86617 LYME DISEASE ANTIBODY: CPT

## 2022-06-21 PROCEDURE — 83735 ASSAY OF MAGNESIUM: CPT

## 2022-06-21 PROCEDURE — 84132 ASSAY OF SERUM POTASSIUM: CPT

## 2022-06-21 PROCEDURE — 82565 ASSAY OF CREATININE: CPT

## 2022-06-21 PROCEDURE — 71045 X-RAY EXAM CHEST 1 VIEW: CPT

## 2022-06-21 PROCEDURE — 84443 ASSAY THYROID STIM HORMONE: CPT

## 2022-06-21 PROCEDURE — 99261: CPT

## 2022-06-21 PROCEDURE — 76000 FLUOROSCOPY <1 HR PHYS/QHP: CPT

## 2022-06-21 PROCEDURE — 86901 BLOOD TYPING SEROLOGIC RH(D): CPT

## 2022-06-21 PROCEDURE — 86666 EHRLICHIA ANTIBODY: CPT

## 2022-06-21 PROCEDURE — P9040: CPT

## 2022-06-21 PROCEDURE — 90935 HEMODIALYSIS ONE EVALUATION: CPT

## 2022-06-21 PROCEDURE — 83970 ASSAY OF PARATHORMONE: CPT

## 2022-06-21 PROCEDURE — 86753 PROTOZOA ANTIBODY NOS: CPT

## 2022-06-21 PROCEDURE — 93005 ELECTROCARDIOGRAM TRACING: CPT

## 2022-06-21 PROCEDURE — 86900 BLOOD TYPING SEROLOGIC ABO: CPT

## 2022-06-21 PROCEDURE — 86704 HEP B CORE ANTIBODY TOTAL: CPT

## 2022-06-21 PROCEDURE — 82803 BLOOD GASES ANY COMBINATION: CPT

## 2022-06-21 PROCEDURE — 82728 ASSAY OF FERRITIN: CPT

## 2022-06-21 PROCEDURE — C1894: CPT

## 2022-06-21 PROCEDURE — 82248 BILIRUBIN DIRECT: CPT

## 2022-06-21 PROCEDURE — 83550 IRON BINDING TEST: CPT

## 2022-06-21 PROCEDURE — 36415 COLL VENOUS BLD VENIPUNCTURE: CPT

## 2022-06-21 PROCEDURE — 82668 ASSAY OF ERYTHROPOIETIN: CPT

## 2022-06-21 PROCEDURE — 36430 TRANSFUSION BLD/BLD COMPNT: CPT

## 2022-06-21 PROCEDURE — 83540 ASSAY OF IRON: CPT

## 2022-06-21 PROCEDURE — 80048 BASIC METABOLIC PNL TOTAL CA: CPT

## 2022-06-21 PROCEDURE — 82272 OCCULT BLD FECES 1-3 TESTS: CPT

## 2022-06-21 PROCEDURE — 80076 HEPATIC FUNCTION PANEL: CPT

## 2022-06-21 PROCEDURE — 86706 HEP B SURFACE ANTIBODY: CPT

## 2022-06-21 PROCEDURE — 82310 ASSAY OF CALCIUM: CPT

## 2022-06-21 PROCEDURE — 85018 HEMOGLOBIN: CPT

## 2022-06-21 PROCEDURE — 82607 VITAMIN B-12: CPT

## 2022-06-21 PROCEDURE — 82746 ASSAY OF FOLIC ACID SERUM: CPT

## 2022-06-21 PROCEDURE — 85027 COMPLETE CBC AUTOMATED: CPT

## 2022-06-21 PROCEDURE — 99239 HOSP IP/OBS DSCHRG MGMT >30: CPT | Mod: GC

## 2022-06-21 PROCEDURE — C1750: CPT

## 2022-06-21 PROCEDURE — 82947 ASSAY GLUCOSE BLOOD QUANT: CPT

## 2022-06-21 PROCEDURE — 85025 COMPLETE CBC W/AUTO DIFF WBC: CPT

## 2022-06-21 PROCEDURE — 82247 BILIRUBIN TOTAL: CPT

## 2022-06-21 PROCEDURE — 85014 HEMATOCRIT: CPT

## 2022-06-21 PROCEDURE — 83605 ASSAY OF LACTIC ACID: CPT

## 2022-06-21 PROCEDURE — 85730 THROMBOPLASTIN TIME PARTIAL: CPT

## 2022-06-21 PROCEDURE — U0003: CPT

## 2022-06-21 PROCEDURE — 86923 COMPATIBILITY TEST ELECTRIC: CPT

## 2022-06-21 PROCEDURE — 82306 VITAMIN D 25 HYDROXY: CPT

## 2022-06-21 PROCEDURE — 97161 PT EVAL LOW COMPLEX 20 MIN: CPT

## 2022-06-21 PROCEDURE — 84100 ASSAY OF PHOSPHORUS: CPT

## 2022-06-21 PROCEDURE — 85045 AUTOMATED RETICULOCYTE COUNT: CPT

## 2022-06-21 PROCEDURE — 80074 ACUTE HEPATITIS PANEL: CPT

## 2022-06-21 PROCEDURE — C1889: CPT

## 2022-06-21 PROCEDURE — 86880 COOMBS TEST DIRECT: CPT

## 2022-06-21 PROCEDURE — 83010 ASSAY OF HAPTOGLOBIN QUANT: CPT

## 2022-06-21 PROCEDURE — 85610 PROTHROMBIN TIME: CPT

## 2022-06-21 PROCEDURE — 80053 COMPREHEN METABOLIC PANEL: CPT

## 2022-06-21 PROCEDURE — 82962 GLUCOSE BLOOD TEST: CPT

## 2022-06-21 RX ADMIN — ERYTHROPOIETIN 5000 UNIT(S): 10000 INJECTION, SOLUTION INTRAVENOUS; SUBCUTANEOUS at 10:05

## 2022-06-21 RX ADMIN — Medication 1000 UNIT(S): at 13:27

## 2022-06-21 RX ADMIN — PREGABALIN 1000 MICROGRAM(S): 225 CAPSULE ORAL at 13:27

## 2022-06-21 RX ADMIN — CHLORHEXIDINE GLUCONATE 1 APPLICATION(S): 213 SOLUTION TOPICAL at 13:28

## 2022-06-21 RX ADMIN — HEPARIN SODIUM 5000 UNIT(S): 5000 INJECTION INTRAVENOUS; SUBCUTANEOUS at 17:17

## 2022-06-21 RX ADMIN — HEPARIN SODIUM 5000 UNIT(S): 5000 INJECTION INTRAVENOUS; SUBCUTANEOUS at 05:46

## 2022-06-21 RX ADMIN — PANTOPRAZOLE SODIUM 40 MILLIGRAM(S): 20 TABLET, DELAYED RELEASE ORAL at 05:46

## 2022-06-21 RX ADMIN — Medication 1 MILLIGRAM(S): at 13:27

## 2022-06-21 RX ADMIN — Medication 10 MILLIGRAM(S): at 13:27

## 2022-06-21 RX ADMIN — AZATHIOPRINE 50 MILLIGRAM(S): 100 TABLET ORAL at 13:27

## 2022-06-21 NOTE — PROGRESS NOTE ADULT - ASSESSMENT
51M w PMHx of HTN, renal transplant in 1978 c/b new ESRD (on azathioprine and prednisone), anemia (on weekly Aranesp), HLD (on statin) who presented for AV Fistula placement experiencing nausea, vomiting and fatigue, found to have non-anion gap metabolic acidosis due to renal failure, admitted for urgent hemodialysis.
Pt. with prior history of LDRT in 1978, now with progressive allograft CKD admitted for HD initiation via RIJ Tunneled HD catheter. Pt. now deemed ESRD.
51M w PMHx of HTN, renal transplant in 1978 c/b new ESRD (on azathioprine and prednisone), anemia (on weekly Aranesp), HLD (on statin) who presented for AV Fistula placement experiencing nausea, vomiting and fatigue, found to have non-anion gap metabolic acidosis due to renal failure, admitted for urgent hemodialysis.
Pt. with progressive CKD now deemed ESRD and initiated on HD on 6/14/22. 
Pt. with prior history of LDRT in 1978, now with progressive allograft CKD admitted for HD initiation via RIJ Tunneled HD catheter. Pt. now deemed ESRD.
Pt. with progressive CKD now deemed ESRD and initiated on HD on 6/14/22. 
51M w PMHx of HTN, renal transplant in 1978 c/b new ESRD (on azathioprine and prednisone), anemia (on weekly Aranesp), HLD (on statin) who presented for AV Fistula placement experiencing nausea, vomiting and fatigue, found to have non-anion gap metabolic acidosis due to renal failure, admitted for urgent hemodialysis.
Pt. with progressive CKD now deemed ESRD and initiated on HD on 6/14/22. 
51M w PMHx of HTN, renal transplant in 1978 c/b new ESRD (on azathioprine and prednisone), anemia (on weekly Aranesp), HLD (on statin) who presented for AV Fistula placement experiencing nausea, vomiting and fatigue, found to have non-anion gap metabolic acidosis due to renal failure, admitted for urgent hemodialysis.

## 2022-06-21 NOTE — PROGRESS NOTE ADULT - SUBJECTIVE AND OBJECTIVE BOX
Subjective:    INTERVAL HPI/OVERNIGHT EVENTS:   - S/p AVF LUE yest  - HD today  - awaiting cm confirmation that patient is confirmed for HD session on Thursday 6/23 at HD center, once confirmed, patient can be discharged after HD session.   No acute events overnight  Afebrile, hemodynamically stable     Telemetry:    T(F): 97.8 (06-21-22 @ 05:12), Max: 99.6 (06-20-22 @ 22:09)  HR: 50 (06-21-22 @ 05:12) (50 - 100)  BP: 113/71 (06-21-22 @ 05:12) (93/55 - 113/71)  RR: 18 (06-21-22 @ 05:12) (12 - 18)  SpO2: 98% (06-21-22 @ 05:12) (96% - 100%)  I&O's Summary    20 Jun 2022 07:01  -  21 Jun 2022 07:00  --------------------------------------------------------  IN: 960 mL / OUT: 0 mL / NET: 960 mL      Weight (kg): 64.4 (06-20-22 @ 08:57)    Medications:  acetaminophen     Tablet .. 650 milliGRAM(s) Oral every 6 hours PRN  atorvastatin 20 milliGRAM(s) Oral at bedtime  azaTHIOprine 50 milliGRAM(s) Oral daily  chlorhexidine 4% Liquid 1 Application(s) Topical daily  cholecalciferol 1000 Unit(s) Oral daily  cyanocobalamin 1000 MICROGram(s) Oral daily  doxazosin 4 milliGRAM(s) Oral at bedtime  epoetin amaury-epbx (RETACRIT) Injectable 5000 Unit(s) IV Push <User Schedule>  folic acid 1 milliGRAM(s) Oral daily  furosemide    Tablet 40 milliGRAM(s) Oral <User Schedule>  heparin   Injectable 5000 Unit(s) SubCutaneous every 12 hours  NIFEdipine XL 60 milliGRAM(s) Oral daily  oxyCODONE    IR 5 milliGRAM(s) Oral every 4 hours PRN  pantoprazole    Tablet 40 milliGRAM(s) Oral before breakfast  predniSONE   Tablet 10 milliGRAM(s) Oral every other day      PHYSICAL EXAM:  PHYSICAL EXAM:  GENERAL APPEARANCE: Thin male, in NAD  HEENT:  PERRL, EOMI. hearing grossly intact.  NECK: R IJ permacath. No JVD appreciated. Neck supple, non-tender no lymphadenopathy, masses or thyromegaly.  CARDIAC: 2/6 holosystolic murmur appreciated. Normal S1 and S2. RRR  LUNGS: Mild crackles in lower lobes b/l. No wheezing or rhonchi appreciated.   ABDOMEN: Soft , NTND, bowel sounds normal. No guarding or rebound.   MUSCULOSKELETAL: ROM intact.  No joint erythema or tenderness.   EXTREMITIES: No edema appreciated. Peripheral pulses intact.   NEUROLOGICAL: Non focal. Strength and sensation symmetric and intact throughout.   SKIN: Warm and dry , Well perfused  PSYCHIATRIC: AOx4 , Normal mood and affect    Notable Labs:    Labs:                          7.7    5.64  )-----------( 160      ( 20 Jun 2022 05:00 )             23.9     06-20    140  |  104  |  42<H>  ----------------------------<  90  4.1   |  23  |  4.72<H>    Ca    8.1<L>      20 Jun 2022 05:00  Phos  2.3     06-20  Mg     1.8     06-20    TPro  5.4<L>  /  Alb  3.1<L>  /  TBili  0.2  /  DBili  x   /  AST  21  /  ALT  42  /  AlkPhos  97  06-20    LIVER FUNCTIONS - ( 20 Jun 2022 05:00 )  Alb: 3.1 g/dL / Pro: 5.4 g/dL / ALK PHOS: 97 U/L / ALT: 42 U/L / AST: 21 U/L / GGT: x           PT/INR - ( 20 Jun 2022 05:00 )   PT: 11.7 sec;   INR: 1.01 ratio         PTT - ( 20 Jun 2022 05:00 )  PTT:26.8 sec  CAPILLARY BLOOD GLUCOSE      Micro:  Tick serology negative     RADIOLOGY & ADDITIONAL TESTS:    NNI Subjective:    INTERVAL HPI/OVERNIGHT EVENTS:   - S/p AVF LUE yest  - HD today  - For DC today after HD, has setup outpatient HD for Thurs.   No acute events overnight  Afebrile, hemodynamically stable     Telemetry:    T(F): 97.8 (06-21-22 @ 05:12), Max: 99.6 (06-20-22 @ 22:09)  HR: 50 (06-21-22 @ 05:12) (50 - 100)  BP: 113/71 (06-21-22 @ 05:12) (93/55 - 113/71)  RR: 18 (06-21-22 @ 05:12) (12 - 18)  SpO2: 98% (06-21-22 @ 05:12) (96% - 100%)  I&O's Summary    20 Jun 2022 07:01  -  21 Jun 2022 07:00  --------------------------------------------------------  IN: 960 mL / OUT: 0 mL / NET: 960 mL      Weight (kg): 64.4 (06-20-22 @ 08:57)    Medications:  acetaminophen     Tablet .. 650 milliGRAM(s) Oral every 6 hours PRN  atorvastatin 20 milliGRAM(s) Oral at bedtime  azaTHIOprine 50 milliGRAM(s) Oral daily  chlorhexidine 4% Liquid 1 Application(s) Topical daily  cholecalciferol 1000 Unit(s) Oral daily  cyanocobalamin 1000 MICROGram(s) Oral daily  doxazosin 4 milliGRAM(s) Oral at bedtime  epoetin amaury-epbx (RETACRIT) Injectable 5000 Unit(s) IV Push <User Schedule>  folic acid 1 milliGRAM(s) Oral daily  furosemide    Tablet 40 milliGRAM(s) Oral <User Schedule>  heparin   Injectable 5000 Unit(s) SubCutaneous every 12 hours  NIFEdipine XL 60 milliGRAM(s) Oral daily  oxyCODONE    IR 5 milliGRAM(s) Oral every 4 hours PRN  pantoprazole    Tablet 40 milliGRAM(s) Oral before breakfast  predniSONE   Tablet 10 milliGRAM(s) Oral every other day      PHYSICAL EXAM:  PHYSICAL EXAM:  GENERAL APPEARANCE: Thin male, in NAD  HEENT:  PERRL, EOMI. hearing grossly intact.  NECK: R IJ permacath. No JVD appreciated. Neck supple, non-tender no lymphadenopathy, masses or thyromegaly.  CARDIAC: 2/6 holosystolic murmur appreciated. Normal S1 and S2. RRR  LUNGS: Mild crackles in lower lobes b/l. No wheezing or rhonchi appreciated.   ABDOMEN: Soft , NTND, bowel sounds normal. No guarding or rebound.   MUSCULOSKELETAL: ROM intact.  No joint erythema or tenderness.   EXTREMITIES: No edema appreciated. Peripheral pulses intact.   NEUROLOGICAL: Non focal. Strength and sensation symmetric and intact throughout.   SKIN: Warm and dry , Well perfused  PSYCHIATRIC: AOx4 , Normal mood and affect    Notable Labs:    Labs:                          7.7    5.64  )-----------( 160      ( 20 Jun 2022 05:00 )             23.9     06-20    140  |  104  |  42<H>  ----------------------------<  90  4.1   |  23  |  4.72<H>    Ca    8.1<L>      20 Jun 2022 05:00  Phos  2.3     06-20  Mg     1.8     06-20    TPro  5.4<L>  /  Alb  3.1<L>  /  TBili  0.2  /  DBili  x   /  AST  21  /  ALT  42  /  AlkPhos  97  06-20    LIVER FUNCTIONS - ( 20 Jun 2022 05:00 )  Alb: 3.1 g/dL / Pro: 5.4 g/dL / ALK PHOS: 97 U/L / ALT: 42 U/L / AST: 21 U/L / GGT: x           PT/INR - ( 20 Jun 2022 05:00 )   PT: 11.7 sec;   INR: 1.01 ratio         PTT - ( 20 Jun 2022 05:00 )  PTT:26.8 sec  CAPILLARY BLOOD GLUCOSE      Micro:  Tick serology negative     RADIOLOGY & ADDITIONAL TESTS:    NNI

## 2022-06-21 NOTE — PROGRESS NOTE ADULT - ATTENDING COMMENTS
progessed CKD Stage V admitted from [facility]; was to get PERmacath/AVF  but noted to have low bicarb and ph; got permacath today and admitted for HD initiation    # progressive CKD V----> now ESRD with  uremic symptoms- n/v/poor appetite/weight loss/metallic taste is mouth/fatigue  plan  #3 thursday; will put on Tthsat schedule for now  has permacath  please work with SW for admission to HD unit  AVF with Dr Chacon Monday  #metabolic acidosis-   on bicarb tabs; DC tabs as bicarb improved  # Hx LRRT >42 years  -#Immunosuppression - c/w pred10 qod and Imuran 50mg  # h/o Hyperkalemia- on loklema 5g- 2 packets bid at home; ok to hold now and monitor k on HD  #Anemia due to CKD V/ BM suppression from Imuran-start epo 10K tiw with HD  #check pth and phos    # Benign Essential HTN -stable today - AT HOME On Hydralazine 100mg tid, Furosemide 40mg tiw, Doxazosin 4mg once a day ; Coreg 3.125 bid, Nifedipine 60mg two tabs daily; Lasix daily;   cont coreg and nifedipine and LAsix; monitor BP with HD
progessed CKD Stage V admitted from [facility]; was to get PERmacath/AVF today but noted to have low bicarb and ph; got permacath today and admitted for HD initiation    # progressive CKD V----> now ESRD with  uremic symptoms- n/v/poor appetite/weight loss/metallic taste is mouth/fatigue  HD # 1 yesterday; plan HD today #2; then #3 thursday;   has permacath  [facility]  AVF with Dr Chacon Monday  #metabolic acidosis- HD today; on bicarb tabs; DC when bicarb improves  # Hx LRRT >42 years  -#Immunosuppression - c/w pred10 qod and Imuran 50mg  # h/o Hyperkalemia- on loklema 5g- 2 packets bid at home; ok to hold now and monitor k on HD  #Anemia due to CKD V/ BM suppression from Imuran-start epo  #check pth and phos    # Benign Essential HTN -stable today - On Hydralazine 100mg tid, Furosemide 40mg tiw, Doxazosin 4mg once a day ; Coreg 3.125 bid, Nifedipine 60mg two tabs daily; Lasix daily; cont coreg and nifedipine and LAsix; monitor BP with HD
- Chart reviewed. Events noted. Pt seen and examined. Agree with excellent note above.   - Plan for 3rd HD today as part of initiation protocol  - Orders placed and reviewed
ESRD on dialysis  Seen & examined on dialysis today  No edema  Cont. HD, JOSEPH  Remainder per fellow
-pend avf tomorrow.
-s/p left arm AVF today. -f/u vascular recs.  -Pain control prn.   -tentative plan for HD and DC tomorrow after HD.   -f/u renal recs.
-HD today. -pend AVF Monday.
-Vascular follow up appreciated. AVF with thrill per their exam.   -Outpatient HD confirmed for Thursday per CM.   -Stable for DC today with outpatient renal and vascular follow up. -35 minutes spent on the DC process.
-Heme and renal and vascular recs appreciated.   -Plan for AVF on Monday.   -Hep SQ BID for DVT PPx.   -Stop beta blocker given bradycardia
-F/u renal and vascular recs.   -Planning for outpatient HD.
-F/u renal recs.   -AVF creation with vascular on Monday.   -Outpatient HD planning.   -EPO per renal.
-Hgb dropped to 6.9 today. Patient denies any melena or BRBPR. -Platelets also on the lower side. -Will start empiric PPI given this and history of chronic steroids for renal transplant, so he is at risk of UGIB. -Will check FOBT. -Will give 1 PRBC with HD today. -Will stop heparin SQ if any concerns for bleeding.   -Heme (follows with Dr. Elliott Castillo) f/u regarding macrocytic anemia and drop in H/H. Retics low which suggests inadequate BM response. -As outpatient he was told that the Imuran was contributing and it was thought that perhaps it could be lowered even more to help with this. -F/u with renal transplant regarding this.   -C/w epogen. ?increase dose/frequency?   -Check coags and anemia work up. -Previous B12/folate within normal. Previously high ferritin.   -TSH and EKG for bradycardia. -Can put on telemetry if symptomatic. -Consider stopping beta blocker if persists/worsens.   -Reports weight loss over the past year or so. -Consider CT C/A/P to look for possible ?source of ?bleeding vs ?malignancy. -Says his colonoscopy within the past year or so was within normal.   -Calcium plus D and vitamin D for being on chronic steroids to help mitigate risk of osteoporosis/osteopenia.   -Now off sodium bicarb per renal since pH normalized.   -HD per renal.   -AVF with vascular Monday. -Optimize with HD on Saturday.   -DCP with outpatient HD next week. F/u with CM.

## 2022-06-21 NOTE — PROGRESS NOTE ADULT - PROBLEM SELECTOR PLAN 6
Diet: Renal diet   DVT PPx: SubQ Heparin    Dispo: Once confirmed that patient has spot at HD center for Thurs 6/23, patient can be discharged after HD session on 6/21. Diet: Renal diet   DVT PPx: SubQ Heparin    Dispo: 6/21 after HD, has HD center setup outpatient

## 2022-06-21 NOTE — PROGRESS NOTE ADULT - PROBLEM SELECTOR PLAN 2
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 Retic hypoproliferative. MCV elevated .   Hemodynamically stable, no obvious signs of bleeding.   ? lymphoproliferative syndrome since s/p transplant on long term immunosuppressants   Follows w heme at [facility], Dr. Castillo  1U pRBC given w HD on 6/16  - Iron studies ? AOCD pattern  - B12, folate wnl   - c/w EPO injections per renal w HD  - hematology consulted on 6/16, given multiple cell line depression and recent weight loss reported:  - likely weight loss from worsening renal failure. ?marrow suppression from imuran +/- inusuff EPO dosing  - consider increase in EPO if renal agrees, asked renal will f/u

## 2022-06-21 NOTE — PROGRESS NOTE ADULT - SUBJECTIVE AND OBJECTIVE BOX
24h Events:   - Overnight, no acute events    Subjective:   Patient examined at bedside this AM. Reports appropriate tenderness around incision    Objective:  Vital Signs  T(C): 36.8 (06-21 @ 09:00), Max: 37.6 (06-20 @ 22:09)  HR: 61 (06-21 @ 09:00) (50 - 100)  BP: 128/66 (06-21 @ 09:00) (93/55 - 128/66)  RR: 18 (06-21 @ 09:00) (12 - 18)  SpO2: 97% (06-21 @ 09:00) (96% - 100%)      Physical Exam:  GEN: resting in bed comfortably in NAD  NEURO: AAOx3, follows commands, no gross focal deficits  EXTREM: LUE in sling, no palpable hematoma/fluid collections,  strength preserved, no sensory deficits, palpable thrill, palpable brachial/radial, good cap refill    Labs:                        7.2    4.79  )-----------( 184      ( 21 Jun 2022 09:40 )             23.9   06-21    141  |  106  |  56<H>  ----------------------------<  108<H>  4.0   |  21<L>  |  5.48<H>    Ca    7.9<L>      21 Jun 2022 09:37  Phos  2.9     06-21  Mg     1.8     06-21    TPro  5.4<L>  /  Alb  3.1<L>  /  TBili  0.2  /  DBili  x   /  AST  21  /  ALT  42  /  AlkPhos  97  06-20    CAPILLARY BLOOD GLUCOSE          Medications:   MEDICATIONS  (STANDING):  atorvastatin 20 milliGRAM(s) Oral at bedtime  azaTHIOprine 50 milliGRAM(s) Oral daily  chlorhexidine 4% Liquid 1 Application(s) Topical daily  cholecalciferol 1000 Unit(s) Oral daily  cyanocobalamin 1000 MICROGram(s) Oral daily  doxazosin 4 milliGRAM(s) Oral at bedtime  epoetin amaury-epbx (RETACRIT) Injectable 5000 Unit(s) IV Push <User Schedule>  folic acid 1 milliGRAM(s) Oral daily  furosemide    Tablet 40 milliGRAM(s) Oral <User Schedule>  heparin   Injectable 5000 Unit(s) SubCutaneous every 12 hours  NIFEdipine XL 60 milliGRAM(s) Oral daily  pantoprazole    Tablet 40 milliGRAM(s) Oral before breakfast  predniSONE   Tablet 10 milliGRAM(s) Oral every other day    MEDICATIONS  (PRN):  acetaminophen     Tablet .. 650 milliGRAM(s) Oral every 6 hours PRN Mild Pain (1 - 3)  oxyCODONE    IR 5 milliGRAM(s) Oral every 4 hours PRN Severe Pain (7 - 10)      Assessment:   51M with progressive CKD now deemed ESRD and initiated on HD on 6/14/22. He is now s/p LUE radiocephalic fistula on 6/20/22    Incision with appropriate tenderness, no palpable fluid collections/hematomas, no sensorimotor/pulse deficits, palpable thrill  No further interventions per vascular surgery indicated  Please keep sling in place until patient regains function of extremity s/p block, currently improving  Pain control  Appreciate Nephrology for HD coordination  Protect left arm, no blood draws/BP cuff/IV's   Please have patient follow up with Dr. Chacon in the office for post operative visit in 1 week  Feel free to page with questions or concerns    Vascular Surgery  [phone].

## 2022-06-21 NOTE — PROGRESS NOTE ADULT - PROBLEM SELECTOR PLAN 2
In setting of CKD/ESRD of allograft and BM suppression from Immuran. Hb below goal, but improving. Continue JOSEPH with HD. Monitor H/H.

## 2022-06-21 NOTE — PROGRESS NOTE ADULT - REASON FOR ADMISSION
WINNIE

## 2022-06-21 NOTE — PROGRESS NOTE ADULT - PROVIDER SPECIALTY LIST ADULT
Nephrology
Vascular Surgery
Nephrology
Nephrology
Internal Medicine
Nephrology
Nephrology
Internal Medicine

## 2022-06-21 NOTE — PROGRESS NOTE ADULT - PROBLEM SELECTOR PLAN 1
Renal transplant since 1978, doing well on immunosuppressants until COVID in 2020, worsening CKD   W NAGMA that resolved after urgent HD via R IJ Permacath   SCr 10.21 on arrival, jet seems like 8-9  Exam with lower lobe crackles.   Home Med: Lasix 40 3x per week  - f/u renal recs  - monitor BUN to assure stable correction to prevent osmotic shifts.   - patient will need HD x3 (6/13 - 6/15)   - will need  assistance for outpatient HD center setup. - ongoing, appreciate assistance.   - c/w lasix 40 3x per week.   - monitor urine output    #AVF Placement  S/p LUE AVF placement on 6/20 w vascular surgery  - needs outpatient f/u after DC w vascular.

## 2022-06-21 NOTE — PROGRESS NOTE ADULT - SUBJECTIVE AND OBJECTIVE BOX
[facility] DIVISION OF KIDNEY DISEASES AND HYPERTENSION -- HEMODIALYSIS NOTE  --------------------------------------------------------------------------------  Chief Complaint: ESRD/Ongoing hemodialysis requirement    24 hour events/subjective: seen on maintenance HD        PAST HISTORY  --------------------------------------------------------------------------------  No significant changes to PMH, PSH, FHx, SHx, unless otherwise noted    ALLERGIES & MEDICATIONS  --------------------------------------------------------------------------------  Allergies    shellfish (Rash)  sulfa (Swelling)    Intolerances      Standing Inpatient Medications  atorvastatin 20 milliGRAM(s) Oral at bedtime  azaTHIOprine 50 milliGRAM(s) Oral daily  chlorhexidine 4% Liquid 1 Application(s) Topical daily  cholecalciferol 1000 Unit(s) Oral daily  cyanocobalamin 1000 MICROGram(s) Oral daily  doxazosin 4 milliGRAM(s) Oral at bedtime  epoetin amaury-epbx (RETACRIT) Injectable 5000 Unit(s) IV Push <User Schedule>  folic acid 1 milliGRAM(s) Oral daily  furosemide    Tablet 40 milliGRAM(s) Oral <User Schedule>  heparin   Injectable 5000 Unit(s) SubCutaneous every 12 hours  NIFEdipine XL 60 milliGRAM(s) Oral daily  pantoprazole    Tablet 40 milliGRAM(s) Oral before breakfast  predniSONE   Tablet 10 milliGRAM(s) Oral every other day    PRN Inpatient Medications  acetaminophen     Tablet .. 650 milliGRAM(s) Oral every 6 hours PRN  oxyCODONE    IR 5 milliGRAM(s) Oral every 4 hours PRN      REVIEW OF SYSTEMS  --------------------------------------------------------------------------------  Constitutional: [ ] Fever [ ] Chills [ ] Fatigue [ ] Weight change   HEENT: [ ] Blurred vision [ ] Eye Pain [ ] Headache [ ] Runny nose [ ] Sore Throat   Respiratory: [ ] Cough [ ] Wheezing [ ] Shortness of breath  Cardiovascular: [ ] Chest Pain [ ] Palpitations [ ] VILLEGAS [ ] PND [ ] Orthopnea  Gastrointestinal: [ ] Abdominal Pain [ ] Diarrhea [ ] Constipation [ ] Hemorrhoids [ ] Nausea [ ] Vomiting  Genitourinary: [ ] Nocturia [ ] Dysuria [ ] Incontinence  Extremities: [ ] Swelling [ ] Joint Pain  Neurologic: [ ] Focal deficit [ ] Paresthesias [ ] Syncope  Lymphatic: [ ] Swelling [ ] Lymphadenopathy   Skin: [ ] Rash [ ] Ecchymoses [ ] Wounds [ ] Lesions  Psychiatry: [ ] Depression [ ] Suicidal/Homicidal Ideation [ ] Anxiety [ ] Sleep Disturbances  [x ] 10 point review of systems is otherwise negative except as mentioned above              [ ]Unable to obtain due to   All other systems were reviewed and are negative, except as noted.      VITALS/PHYSICAL EXAM  --------------------------------------------------------------------------------  T(C): 37.6 (06-21-22 @ 13:29), Max: 37.6 (06-20-22 @ 22:09)  HR: 79 (06-21-22 @ 13:29) (50 - 79)  BP: 121/79 (06-21-22 @ 13:29) (102/56 - 128/66)  RR: 18 (06-21-22 @ 13:29) (18 - 18)  SpO2: 98% (06-21-22 @ 13:29) (97% - 100%)  Wt(kg): --  Height (cm): 162.6 (06-20-22 @ 08:57)  Weight (kg): 64.4 (06-20-22 @ 08:57)  BMI (kg/m2): 24.4 (06-20-22 @ 08:57)  BSA (m2): 1.69 (06-20-22 @ 08:57)      06-20-22 @ 07:01  -  06-21-22 @ 07:00  --------------------------------------------------------  IN: 960 mL / OUT: 0 mL / NET: 960 mL    06-21-22 @ 07:01  -  06-21-22 @ 14:10  --------------------------------------------------------  IN: 150 mL / OUT: 1200 mL / NET: -1050 mL      Physical Exam:  	Gen: NAD, well-appearing  	HEENT: on room air  	Pulm: CTA B/L  	CV: normal S1S2; no rub  	Abd: soft                      Back : No sacral edema  	: No weiss  	LE: no edema  	Skin: Warm, without rashes  	Vascular access: RIJ tunneled dialysis catheter connected to the HD machine, LUE AVF in situ with palpable thrill    LABS/STUDIES  --------------------------------------------------------------------------------              7.2    4.79  >-----------<  184      [06-21-22 @ 09:40]              23.9     141  |  106  |  56  ----------------------------<  108      [06-21-22 @ 09:37]  4.0   |  21  |  5.48        Ca     7.9     [06-21-22 @ 09:37]      Mg     1.8     [06-21-22 @ 09:37]      Phos  2.9     [06-21-22 @ 09:37]    TPro  5.4  /  Alb  3.1  /  TBili  0.2  /  DBili  x   /  AST  21  /  ALT  42  /  AlkPhos  97  [06-20-22 @ 05:00]    PT/INR: PT 11.7 , INR 1.01       [06-20-22 @ 05:00]  PTT: 26.8       [06-20-22 @ 05:00]      Iron 134, TIBC 155, %sat 86      [06-13-22 @ 10:34]  Ferritin 786      [06-13-22 @ 10:34]  PTH -- (Ca 7.5)      [06-14-22 @ 07:07]   918  Vitamin D (25OH) 23.8      [06-16-22 @ 10:28]  TSH 3.18      [06-17-22 @ 06:55]    HBsAb Reactive      [06-14-22 @ 13:52]  HBsAg Nonreact      [06-14-22 @ 13:52]  HBcAb Nonreact      [06-16-22 @ 07:15]  HCV 0.11, Nonreact      [06-14-22 @ 13:52]

## 2022-06-21 NOTE — DISCHARGE NOTE NURSING/CASE MANAGEMENT/SOCIAL WORK - PATIENT PORTAL LINK FT
You can access the FollowMyHealth Patient Portal offered by Health system by registering at the following website: [email or web address]. By joining [facility]’s FollowMyHealth portal, you will also be able to view your health information using other applications (apps) compatible with our system.

## 2022-06-22 ENCOUNTER — NON-APPOINTMENT (OUTPATIENT)
Age: 52
End: 2022-06-22

## 2022-06-25 ENCOUNTER — OUTPATIENT (OUTPATIENT)
Dept: OUTPATIENT SERVICES | Facility: HOSPITAL | Age: 52
LOS: 1 days | Discharge: HOME | End: 2022-06-25

## 2022-06-25 DIAGNOSIS — Z94.0 KIDNEY TRANSPLANT STATUS: Chronic | ICD-10-CM

## 2022-06-25 DIAGNOSIS — D64.9 ANEMIA, UNSPECIFIED: ICD-10-CM

## 2022-06-25 DIAGNOSIS — Z98.890 OTHER SPECIFIED POSTPROCEDURAL STATES: Chronic | ICD-10-CM

## 2022-06-29 ENCOUNTER — RESULT REVIEW (OUTPATIENT)
Age: 52
End: 2022-06-29

## 2022-06-29 ENCOUNTER — APPOINTMENT (OUTPATIENT)
Dept: HEMATOLOGY ONCOLOGY | Facility: CLINIC | Age: 52
End: 2022-06-29

## 2022-06-29 VITALS
WEIGHT: 132.94 LBS | BODY MASS INDEX: 23.55 KG/M2 | DIASTOLIC BLOOD PRESSURE: 87 MMHG | TEMPERATURE: 98.1 F | RESPIRATION RATE: 16 BRPM | HEIGHT: 63 IN | HEART RATE: 74 BPM | OXYGEN SATURATION: 99 % | SYSTOLIC BLOOD PRESSURE: 131 MMHG

## 2022-06-29 LAB
BASOPHILS # BLD AUTO: 0.03 K/UL — SIGNIFICANT CHANGE UP (ref 0–0.2)
BASOPHILS NFR BLD AUTO: 0.6 % — SIGNIFICANT CHANGE UP (ref 0–2)
EOSINOPHIL # BLD AUTO: 0.09 K/UL — SIGNIFICANT CHANGE UP (ref 0–0.5)
EOSINOPHIL NFR BLD AUTO: 1.8 % — SIGNIFICANT CHANGE UP (ref 0–6)
HCT VFR BLD CALC: 27 % — LOW (ref 39–50)
HGB BLD-MCNC: 8.4 G/DL — LOW (ref 13–17)
IMM GRANULOCYTES NFR BLD AUTO: 0.8 % — SIGNIFICANT CHANGE UP (ref 0–1.5)
LYMPHOCYTES # BLD AUTO: 0.59 K/UL — LOW (ref 1–3.3)
LYMPHOCYTES # BLD AUTO: 11.9 % — LOW (ref 13–44)
MCHC RBC-ENTMCNC: 31.1 G/DL — LOW (ref 32–36)
MCHC RBC-ENTMCNC: 33.1 PG — SIGNIFICANT CHANGE UP (ref 27–34)
MCV RBC AUTO: 106.3 FL — HIGH (ref 80–100)
MONOCYTES # BLD AUTO: 0.38 K/UL — SIGNIFICANT CHANGE UP (ref 0–0.9)
MONOCYTES NFR BLD AUTO: 7.7 % — SIGNIFICANT CHANGE UP (ref 2–14)
NEUTROPHILS # BLD AUTO: 3.81 K/UL — SIGNIFICANT CHANGE UP (ref 1.8–7.4)
NEUTROPHILS NFR BLD AUTO: 77.2 % — HIGH (ref 43–77)
NRBC # BLD: 0 /100 WBCS — SIGNIFICANT CHANGE UP (ref 0–0)
PLATELET # BLD AUTO: 231 K/UL — SIGNIFICANT CHANGE UP (ref 150–400)
RBC # BLD: 2.54 M/UL — LOW (ref 4.2–5.8)
RBC # FLD: 17.2 % — HIGH (ref 10.3–14.5)
WBC # BLD: 4.94 K/UL — SIGNIFICANT CHANGE UP (ref 3.8–10.5)
WBC # FLD AUTO: 4.94 K/UL — SIGNIFICANT CHANGE UP (ref 3.8–10.5)

## 2022-06-29 PROCEDURE — 99214 OFFICE O/P EST MOD 30 MIN: CPT

## 2022-07-01 NOTE — ASSESSMENT
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 Left arm fistula placed. patient became increasingly anemic.  Hg recovered with transfusions at the hospital.  PRevious episode of anemia refractory to JOSEPH use on Aranesp was resolved after decrease in dose of Imuran and discontinuation of Bactrim. Bone marrow biopsy at the time was unremarkable, patient retained trilineage hematopiesis.  Given patient back on dialysis, and the decrease or discontinuation of these medications, suspect the anemia is now  more prominently one of anemia of chronic renal insufficiency  Given dialysis, he probably receives the JOSEPH support with the dialysis. WIll confirm that he is receiving this and adjust the some.

## 2022-07-01 NOTE — HISTORY OF PRESENT ILLNESS
[de-identified] : Patient returns for follow-up of anemia.  On previous visits patient was severely anemic despite the JOSEPH support, was thought to be Imuran or Bactrim  causing marrow suppression.  Doses of the imuran were decreased, and then patient had been off the Bactrim, Hg recovered, however more recently patient was severely anemic again, requiring blood transfusion emergently at the hospital. Patient now feels well post transfusion.  He is however on dialysis again given worsening renal failure.

## 2022-07-05 ENCOUNTER — APPOINTMENT (OUTPATIENT)
Dept: VASCULAR SURGERY | Facility: CLINIC | Age: 52
End: 2022-07-05

## 2022-07-05 VITALS
HEIGHT: 63 IN | HEART RATE: 70 BPM | WEIGHT: 132 LBS | BODY MASS INDEX: 23.39 KG/M2 | DIASTOLIC BLOOD PRESSURE: 84 MMHG | SYSTOLIC BLOOD PRESSURE: 139 MMHG

## 2022-07-05 PROCEDURE — 93990 DOPPLER FLOW TESTING: CPT

## 2022-07-05 PROCEDURE — 99024 POSTOP FOLLOW-UP VISIT: CPT

## 2022-07-05 NOTE — DISCUSSION/SUMMARY
[FreeTextEntry1] : The patient underwent a left arm duplex which shows a well-functioning left arm fistula.  No need for intervention at this time.  Follow-up in 1 month.

## 2022-07-05 NOTE — REASON FOR VISIT
[de-identified] : Placement of permacath and left AV fistula [de-identified] : Patient is status post placement of a permacath and creation of a left radiocephalic AV fistula.  Patient with no complaints.

## 2022-07-05 NOTE — PHYSICAL EXAM
[JVD] : no jugular venous distention  [Normal Breath Sounds] : Normal breath sounds [Normal Rate and Rhythm] : normal rate and rhythm [de-identified] : Appears well [FreeTextEntry1] : Excellent thrill in fistula

## 2022-08-09 ENCOUNTER — APPOINTMENT (OUTPATIENT)
Dept: VASCULAR SURGERY | Facility: CLINIC | Age: 52
End: 2022-08-09

## 2022-08-09 PROCEDURE — 99024 POSTOP FOLLOW-UP VISIT: CPT

## 2022-08-09 PROCEDURE — 93990 DOPPLER FLOW TESTING: CPT

## 2022-08-09 NOTE — DISCUSSION/SUMMARY
[FreeTextEntry1] : 51-year-old male with a history of end-stage renal disease on dialysis via Right tunneled catheter presents for follow-up status post left upper extremity radiocephalic AV fistula completed about 2 months ago.\par \par Duplex shows patent AV fistula without any significant stenosis and a flow rate of 859 the cephalic vein measures between 4.6 to 7.1 mm in size's with a depth of about 2 to 3 mm\par \par Given the fistula is measuring around 4-1/2 mm in the proximal and mid forearm with a faint thrill will schedule for maturation procedure

## 2022-08-09 NOTE — PHYSICAL EXAM
[2+] : left 2+ [Alert] : alert [Calm] : calm [JVD] : no jugular venous distention  [Normal Breath Sounds] : Normal breath sounds [Normal Rate and Rhythm] : normal rate and rhythm [de-identified] : Appears well [de-identified] : Incision healed palpable thrill that becomes faint over the mid forearm no open wounds

## 2022-08-09 NOTE — REASON FOR VISIT
[de-identified] : Left upper extremity radiocephalic AV fistula [de-identified] : June 28, 2022 [de-identified] : Patient with a history of end-stage renal disease on HD via right-sided tunneled catheter presents for follow-up evaluation post left upper extremity AV fistula.  Patient without any complaints at this time

## 2022-08-15 ENCOUNTER — LABORATORY RESULT (OUTPATIENT)
Age: 52
End: 2022-08-15

## 2022-08-19 ENCOUNTER — APPOINTMENT (OUTPATIENT)
Dept: ENDOVASCULAR SURGERY | Facility: CLINIC | Age: 52
End: 2022-08-19

## 2022-08-19 ENCOUNTER — RESULT REVIEW (OUTPATIENT)
Age: 52
End: 2022-08-19

## 2022-08-19 VITALS
TEMPERATURE: 98.6 F | OXYGEN SATURATION: 100 % | WEIGHT: 140 LBS | DIASTOLIC BLOOD PRESSURE: 65 MMHG | HEART RATE: 52 BPM | BODY MASS INDEX: 22.5 KG/M2 | HEIGHT: 66 IN | SYSTOLIC BLOOD PRESSURE: 132 MMHG | RESPIRATION RATE: 18 BRPM

## 2022-08-19 PROCEDURE — 36902Z: CUSTOM | Mod: 59,58

## 2022-08-19 PROCEDURE — 36909Z: CUSTOM | Mod: 58

## 2022-08-19 RX ORDER — MULTIVIT-MIN/FOLIC/VIT K/LYCOP 400-300MCG
25 MCG TABLET ORAL DAILY
Qty: 90 | Refills: 0 | Status: DISCONTINUED | COMMUNITY
Start: 2019-02-27 | End: 2022-08-19

## 2022-08-19 RX ORDER — CARVEDILOL 6.25 MG/1
6.25 TABLET, FILM COATED ORAL
Qty: 180 | Refills: 0 | Status: DISCONTINUED | COMMUNITY
Start: 2022-04-25 | End: 2022-08-19

## 2022-08-19 RX ORDER — NIFEDIPINE 60 MG/1
60 TABLET, FILM COATED, EXTENDED RELEASE ORAL DAILY
Qty: 180 | Refills: 3 | Status: DISCONTINUED | COMMUNITY
End: 2022-08-19

## 2022-08-19 RX ORDER — HYDRALAZINE HYDROCHLORIDE 100 MG/1
100 TABLET ORAL 3 TIMES DAILY
Qty: 90 | Refills: 3 | Status: DISCONTINUED | COMMUNITY
End: 2022-08-19

## 2022-08-19 NOTE — REASON FOR VISIT
[Other ___] : a [unfilled] visit for [Non-Maturing Fistula] : non-maturing fistula [Family Member] : family member

## 2022-08-19 NOTE — HISTORY OF PRESENT ILLNESS
[] : right internal jugular tunneled catheter [FreeTextEntry1] : Dr Chacon 6/20/22 [FreeTextEntry2] : Dr Chacon 6/13/22 [FreeTextEntry4] : yesterday  [FreeTextEntry5] : yesterday 630pm [FreeTextEntry6] : Dr Galvan

## 2022-08-19 NOTE — PROCEDURE
[D/C IV on discharge] : D/C IV on discharge [Resume diet] : resume diet [Site check for bleeding/hematoma/thrill/bruit] : Site check for bleeding/hematoma/thrill/bruit [Vital signs on admission the q 15 mins x2] : Vital signs on admission the q 15 mins x2 [FreeTextEntry1] : Left fistula, fistulogram

## 2022-08-19 NOTE — PAST MEDICAL HISTORY
[Increasing age ( >40 years old)] : Increasing age ( >40 years old) [No therapy indicated for cases scheduled for less than one hour] : No therapy indicated for cases scheduled for less than one hour. [FreeTextEntry1] : Malignant Hyperthermia Screening Tool and Risk of Bleeding Assessment \par \fletcher DANIELSON  denies family of unexpected death following Anesthesia or Exercise.\par Denies family history of Malignant Hyperthermia, Muscle or Neuromuscular disorder and High Temperature following exercise. \par \par KALYAN DANIELSON Patient denies history of Muscle Spasm, Dark or Chocolate- Colored and unanticipated fever immediately following anesthesia or serious exercise. \par \par KALYAN DANIELSON Patient also denies bleeding tendencies/risks of bleeding.

## 2022-08-30 NOTE — PROGRESS NOTE ADULT - PROBLEM SELECTOR PLAN 2
-ABRIL in setting of COVID infection.   -Has had normal PO intake, was taking Lasix until 1-2 days ago.   - Hold Lasix  - IVF per renal recommendations  - Cont. sodium bicarb tabs  - Strict I/O, avoid nephrotoxic medications  -Renal input appreciated Quality 110: Preventive Care And Screening: Influenza Immunization: Influenza Immunization previously received during influenza season Detail Level: Detailed

## 2022-09-21 ENCOUNTER — OUTPATIENT (OUTPATIENT)
Dept: OUTPATIENT SERVICES | Facility: HOSPITAL | Age: 52
LOS: 1 days | Discharge: HOME | End: 2022-09-21

## 2022-09-21 DIAGNOSIS — D64.9 ANEMIA, UNSPECIFIED: ICD-10-CM

## 2022-09-21 DIAGNOSIS — Z94.0 KIDNEY TRANSPLANT STATUS: Chronic | ICD-10-CM

## 2022-09-21 DIAGNOSIS — Z98.890 OTHER SPECIFIED POSTPROCEDURAL STATES: Chronic | ICD-10-CM

## 2022-09-26 ENCOUNTER — RESULT REVIEW (OUTPATIENT)
Age: 52
End: 2022-09-26

## 2022-09-26 ENCOUNTER — APPOINTMENT (OUTPATIENT)
Dept: HEMATOLOGY ONCOLOGY | Facility: CLINIC | Age: 52
End: 2022-09-26

## 2022-09-26 VITALS
HEART RATE: 61 BPM | WEIGHT: 156.75 LBS | SYSTOLIC BLOOD PRESSURE: 160 MMHG | TEMPERATURE: 98.8 F | BODY MASS INDEX: 25.19 KG/M2 | DIASTOLIC BLOOD PRESSURE: 94 MMHG | HEIGHT: 66 IN | RESPIRATION RATE: 16 BRPM | OXYGEN SATURATION: 98 %

## 2022-09-26 DIAGNOSIS — D75.89 OTHER SPECIFIED DISEASES OF BLOOD AND BLOOD-FORMING ORGANS: ICD-10-CM

## 2022-09-26 DIAGNOSIS — N18.9 CHRONIC KIDNEY DISEASE, UNSPECIFIED: ICD-10-CM

## 2022-09-26 DIAGNOSIS — D63.1 CHRONIC KIDNEY DISEASE, UNSPECIFIED: ICD-10-CM

## 2022-09-26 LAB
BASOPHILS # BLD AUTO: 0.04 K/UL — SIGNIFICANT CHANGE UP (ref 0–0.2)
BASOPHILS NFR BLD AUTO: 0.5 % — SIGNIFICANT CHANGE UP (ref 0–2)
EOSINOPHIL # BLD AUTO: 0.06 K/UL — SIGNIFICANT CHANGE UP (ref 0–0.5)
EOSINOPHIL NFR BLD AUTO: 0.7 % — SIGNIFICANT CHANGE UP (ref 0–6)
HCT VFR BLD CALC: 34.9 % — LOW (ref 39–50)
HGB BLD-MCNC: 10.9 G/DL — LOW (ref 13–17)
IMM GRANULOCYTES NFR BLD AUTO: 0.4 % — SIGNIFICANT CHANGE UP (ref 0–0.9)
LYMPHOCYTES # BLD AUTO: 1.39 K/UL — SIGNIFICANT CHANGE UP (ref 1–3.3)
LYMPHOCYTES # BLD AUTO: 16.6 % — SIGNIFICANT CHANGE UP (ref 13–44)
MCHC RBC-ENTMCNC: 31.2 G/DL — LOW (ref 32–36)
MCHC RBC-ENTMCNC: 34.2 PG — HIGH (ref 27–34)
MCV RBC AUTO: 109.4 FL — HIGH (ref 80–100)
MONOCYTES # BLD AUTO: 0.73 K/UL — SIGNIFICANT CHANGE UP (ref 0–0.9)
MONOCYTES NFR BLD AUTO: 8.7 % — SIGNIFICANT CHANGE UP (ref 2–14)
NEUTROPHILS # BLD AUTO: 6.14 K/UL — SIGNIFICANT CHANGE UP (ref 1.8–7.4)
NEUTROPHILS NFR BLD AUTO: 73.1 % — SIGNIFICANT CHANGE UP (ref 43–77)
NRBC # BLD: 0 /100 WBCS — SIGNIFICANT CHANGE UP (ref 0–0)
PLATELET # BLD AUTO: 209 K/UL — SIGNIFICANT CHANGE UP (ref 150–400)
RBC # BLD: 3.19 M/UL — LOW (ref 4.2–5.8)
RBC # FLD: 12.9 % — SIGNIFICANT CHANGE UP (ref 10.3–14.5)
RETICS #: 55.2 K/UL — SIGNIFICANT CHANGE UP (ref 25–125)
RETICS/RBC NFR: 1.7 % — SIGNIFICANT CHANGE UP (ref 0.5–2.5)
WBC # BLD: 8.39 K/UL — SIGNIFICANT CHANGE UP (ref 3.8–10.5)
WBC # FLD AUTO: 8.39 K/UL — SIGNIFICANT CHANGE UP (ref 3.8–10.5)

## 2022-09-26 PROCEDURE — 99213 OFFICE O/P EST LOW 20 MIN: CPT

## 2022-09-26 RX ORDER — POLYETHYLENE GLYCOL-3350 AND ELECTROLYTES WITH FLAVOR PACK 240; 5.84; 2.98; 6.72; 22.72 G/278.26G; G/278.26G; G/278.26G; G/278.26G; G/278.26G
240 POWDER, FOR SOLUTION ORAL
Qty: 1 | Refills: 0 | Status: COMPLETED | COMMUNITY
Start: 2022-03-31 | End: 2022-09-26

## 2022-09-26 NOTE — ASSESSMENT
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 Patient became increasingly anemic.  Hg recovered with transfusions at the hospital.  Previous episode of anemia refractory to JOSEPH use on Aranesp was resolved after decrease in dose of Imuran and discontinuation of Bactrim. Bone marrow biopsy at the time was unremarkable, patient retained trilineage hematopiesis.  Given patient back on dialysis, Bactrim was discontinued and Imuran reduced to 25mg daily. The anemia is now  more prominently one of anemia of chronic renal insufficiency. He is receiving Aranesp weekly and hgb today is 10.9 g/dL. Will screen for iron, Vitamin B12 and Folate deficiency and replete as needed.\par \par RTC in 6 months\par case and management discussed with Dr. Elliott Castillo

## 2022-09-26 NOTE — HISTORY OF PRESENT ILLNESS
[de-identified] : Patient returns for follow-up of anemia. In the interim, he is being dialyzed Tu/Th/Sa via left AV fistula. He is on weekly Aranesp. Anemia has improved and reportedly hemoglobin has been in the 10s since the last visit. HGB is 10.9g/dL today. He reports feeling well without complaints.

## 2022-09-26 NOTE — PHYSICAL EXAM
[Normal] : affect appropriate [de-identified] : Supple [de-identified] : L [de-identified] : L arm fistula, + bruit. No calf tenderness [de-identified] : no palpable tenderness

## 2022-10-05 LAB
ALBUMIN SERPL ELPH-MCNC: 3.8 G/DL
ALP BLD-CCNC: 96 U/L
ALT SERPL-CCNC: 6 U/L
ANION GAP SERPL CALC-SCNC: 19 MMOL/L
AST SERPL-CCNC: 11 U/L
BILIRUB SERPL-MCNC: 0.3 MG/DL
BUN SERPL-MCNC: 79 MG/DL
CALCIUM SERPL-MCNC: 8.4 MG/DL
CHLORIDE SERPL-SCNC: 110 MMOL/L
CO2 SERPL-SCNC: 21 MMOL/L
CREAT SERPL-MCNC: 9.13 MG/DL
EGFR: 6 ML/MIN/1.73M2
FERRITIN SERPL-MCNC: 387 NG/ML
FOLATE SERPL-MCNC: 14.5 NG/ML
GLUCOSE SERPL-MCNC: 104 MG/DL
IRON SATN MFR SERPL: 50 %
IRON SERPL-MCNC: 101 UG/DL
POTASSIUM SERPL-SCNC: 5.1 MMOL/L
PROT SERPL-MCNC: 6 G/DL
SODIUM SERPL-SCNC: 150 MMOL/L
TIBC SERPL-MCNC: 202 UG/DL
UIBC SERPL-MCNC: 101 UG/DL
VIT B12 SERPL-MCNC: 468 PG/ML

## 2022-10-07 ENCOUNTER — APPOINTMENT (OUTPATIENT)
Dept: ENDOVASCULAR SURGERY | Facility: CLINIC | Age: 52
End: 2022-10-07

## 2022-10-07 PROCEDURE — 36589 REMOVAL TUNNELED CV CATH: CPT

## 2022-11-28 ENCOUNTER — LABORATORY RESULT (OUTPATIENT)
Age: 52
End: 2022-11-28

## 2022-11-28 ENCOUNTER — NON-APPOINTMENT (OUTPATIENT)
Age: 52
End: 2022-11-28

## 2022-11-28 ENCOUNTER — APPOINTMENT (OUTPATIENT)
Dept: INTERNAL MEDICINE | Facility: CLINIC | Age: 52
End: 2022-11-28

## 2022-11-28 VITALS
SYSTOLIC BLOOD PRESSURE: 120 MMHG | HEIGHT: 63.78 IN | WEIGHT: 157.25 LBS | DIASTOLIC BLOOD PRESSURE: 70 MMHG | BODY MASS INDEX: 27.18 KG/M2

## 2022-11-28 DIAGNOSIS — Z00.00 ENCOUNTER FOR GENERAL ADULT MEDICAL EXAMINATION W/OUT ABNORMAL FINDINGS: ICD-10-CM

## 2022-11-28 DIAGNOSIS — K76.0 FATTY (CHANGE OF) LIVER, NOT ELSEWHERE CLASSIFIED: ICD-10-CM

## 2022-11-28 DIAGNOSIS — N18.5 CHRONIC KIDNEY DISEASE, STAGE 5: ICD-10-CM

## 2022-11-28 DIAGNOSIS — D63.1 CHRONIC KIDNEY DISEASE, STAGE 5: ICD-10-CM

## 2022-11-28 PROCEDURE — 36415 COLL VENOUS BLD VENIPUNCTURE: CPT

## 2022-11-28 PROCEDURE — 93000 ELECTROCARDIOGRAM COMPLETE: CPT | Mod: 59

## 2022-11-28 PROCEDURE — 99396 PREV VISIT EST AGE 40-64: CPT | Mod: 25

## 2022-11-28 RX ORDER — CALCIUM ACETATE 667 MG/1
667 CAPSULE ORAL
Qty: 90 | Refills: 0 | Status: ACTIVE | COMMUNITY
Start: 2022-10-15

## 2022-11-28 NOTE — ASSESSMENT
[FreeTextEntry1] : This is a 52-year-old male whose history has been reviewed above\par \par Patient had renal transplant but listed 40 years plus but now is on dialysis awaiting transplantation.  He has a sister who is willing to donate but unfortunately has blocking antibodies.  Desensitization is not done in the institutions that he has applied for transplant\par \par He has a history of bradycardia which precipitated cardiac work-up stress test was negative echo showed some mild decrease in his ejection fraction.  No intervention\par \par He has a history of hypokalemia and metabolic alkalosis he is on Lokelma and sodium bicarbonate\par \par He has a history of hypercholesterolemia cholesterol profile was obtained and medication changes will be predicated on the results\par \par He has not gotten his last COVID vaccination but otherwise is vaccinated.\par \par He no longer is obese\par \par He did have a period of anemia which was probably secondary to bone marrow suppression he has recovered hematology note appreciated\par \par He is up-to-date with colonoscopy\par

## 2022-11-28 NOTE — HEALTH RISK ASSESSMENT
[Fair] :  ~his/her~ mood as fair [No falls in past year] : Patient reported no falls in the past year [0] : 2) Feeling down, depressed, or hopeless: Not at all (0) [PHQ-2 Negative - No further assessment needed] : PHQ-2 Negative - No further assessment needed [None] : None [With Family] : lives with family [On disability] : on disability [High School] : high school [Single] : single [Feels Safe at Home] : Feels safe at home [Fully functional (bathing, dressing, toileting, transferring, walking, feeding)] : Fully functional (bathing, dressing, toileting, transferring, walking, feeding) [Fully functional (using the telephone, shopping, preparing meals, housekeeping, doing laundry, using] : Fully functional and needs no help or supervision to perform IADLs (using the telephone, shopping, preparing meals, housekeeping, doing laundry, using transportation, managing medications and managing finances) [Smoke Detector] : smoke detector [Guns at Home] : guns at home [Seat Belt] :  uses seat belt [Sunscreen] : uses sunscreen [I will adhere to the patient's wishes.] : I will adhere to the patient's wishes. [Change in mental status noted] : No change in mental status noted [Sexually Active] : not sexually active [Reports changes in hearing] : Reports no changes in hearing [Reports changes in vision] : Reports no changes in vision [Reports changes in dental health] : Reports no changes in dental health [Carbon Monoxide Detector] : no carbon monoxide detector [Safety elements used in home] : no safety elements used in home [Travel to Developing Areas] : does not  travel to developing areas [TB Exposure] : is not being exposed to tuberculosis [Caregiver Concerns] : does not have caregiver concerns

## 2022-11-28 NOTE — HISTORY OF PRESENT ILLNESS
[FreeTextEntry1] : This is a resilient 52-year-old male for annual health assessment\par \par Specifically we will address his history of chronic renal failure and renal rejection with concordant dialysis.  He also has had anemia which may have been in part secondary to immunosuppression..  Recent cardiac evaluation showing mild decrease in ejection fraction but no ischemia in addition he has had fatty liver hypertension and COVID. [de-identified] : Patient is remarkably upbeat.  He has no complaints.  He states he is tolerating dialysis well.  He has very little urine output

## 2022-11-29 LAB
25(OH)D3 SERPL-MCNC: 26.2 NG/ML
ALBUMIN SERPL ELPH-MCNC: 3.7 G/DL
ALP BLD-CCNC: 123 U/L
ALT SERPL-CCNC: 14 U/L
ANION GAP SERPL CALC-SCNC: 20 MMOL/L
APPEARANCE: CLEAR
AST SERPL-CCNC: 12 U/L
BASOPHILS # BLD AUTO: 0.05 K/UL
BASOPHILS NFR BLD AUTO: 0.9 %
BILIRUB SERPL-MCNC: 0.3 MG/DL
BILIRUBIN URINE: NEGATIVE
BLOOD URINE: ABNORMAL
BUN SERPL-MCNC: 87 MG/DL
CALCIUM SERPL-MCNC: 9.3 MG/DL
CALCIUM SERPL-MCNC: 9.3 MG/DL
CHLORIDE SERPL-SCNC: 106 MMOL/L
CHOLEST SERPL-MCNC: 113 MG/DL
CO2 SERPL-SCNC: 20 MMOL/L
COLOR: COLORLESS
CREAT SERPL-MCNC: 10.72 MG/DL
EGFR: 5 ML/MIN/1.73M2
EOSINOPHIL # BLD AUTO: 0.17 K/UL
EOSINOPHIL NFR BLD AUTO: 3 %
ESTIMATED AVERAGE GLUCOSE: 103 MG/DL
GLUCOSE QUALITATIVE U: ABNORMAL
GLUCOSE SERPL-MCNC: 95 MG/DL
HBA1C MFR BLD HPLC: 5.2 %
HCT VFR BLD CALC: 34.3 %
HDLC SERPL-MCNC: 34 MG/DL
HGB BLD-MCNC: 10.9 G/DL
IMM GRANULOCYTES NFR BLD AUTO: 0.4 %
KETONES URINE: NEGATIVE
LDLC SERPL CALC-MCNC: 59 MG/DL
LEUKOCYTE ESTERASE URINE: NEGATIVE
LYMPHOCYTES # BLD AUTO: 1.45 K/UL
LYMPHOCYTES NFR BLD AUTO: 25.5 %
MAN DIFF?: NORMAL
MCHC RBC-ENTMCNC: 31.5 PG
MCHC RBC-ENTMCNC: 31.8 GM/DL
MCV RBC AUTO: 99.1 FL
MONOCYTES # BLD AUTO: 0.67 K/UL
MONOCYTES NFR BLD AUTO: 11.8 %
NEUTROPHILS # BLD AUTO: 3.32 K/UL
NEUTROPHILS NFR BLD AUTO: 58.4 %
NITRITE URINE: NEGATIVE
NONHDLC SERPL-MCNC: 79 MG/DL
PARATHYROID HORMONE INTACT: 791 PG/ML
PH URINE: 6.5
PHOSPHATE SERPL-MCNC: 10.8 MG/DL
PLATELET # BLD AUTO: 201 K/UL
POTASSIUM SERPL-SCNC: 4.9 MMOL/L
PROT SERPL-MCNC: 6.4 G/DL
PROTEIN URINE: ABNORMAL
PSA SERPL-MCNC: 0.7 NG/ML
RBC # BLD: 3.46 M/UL
RBC # FLD: 12.6 %
SODIUM SERPL-SCNC: 146 MMOL/L
SPECIFIC GRAVITY URINE: 1.01
T3RU NFR SERPL: 1.1 TBI
T4 SERPL-MCNC: 4.9 UG/DL
TRIGL SERPL-MCNC: 99 MG/DL
TSH SERPL-ACNC: 6.72 UIU/ML
URATE SERPL-MCNC: 5.5 MG/DL
UROBILINOGEN URINE: NORMAL
WBC # FLD AUTO: 5.68 K/UL

## 2022-12-17 ENCOUNTER — RX RENEWAL (OUTPATIENT)
Age: 52
End: 2022-12-17

## 2022-12-22 ENCOUNTER — APPOINTMENT (OUTPATIENT)
Dept: VASCULAR SURGERY | Facility: CLINIC | Age: 52
End: 2022-12-22

## 2022-12-22 VITALS
SYSTOLIC BLOOD PRESSURE: 116 MMHG | DIASTOLIC BLOOD PRESSURE: 67 MMHG | HEIGHT: 64 IN | BODY MASS INDEX: 26.8 KG/M2 | WEIGHT: 157 LBS | TEMPERATURE: 98.2 F | HEART RATE: 64 BPM

## 2022-12-22 PROCEDURE — 99213 OFFICE O/P EST LOW 20 MIN: CPT

## 2022-12-22 PROCEDURE — 93990 DOPPLER FLOW TESTING: CPT

## 2022-12-22 NOTE — HISTORY OF PRESENT ILLNESS
[FreeTextEntry1] : 52-year-old gentleman currently on hemodialysis via left radiocephalic AV fistula.  Patient reports no problems at this time.  He presents today for routine follow-up. [] : left radiocephalic fistula

## 2022-12-22 NOTE — PHYSICAL EXAM
[Thrill] : thrill [Pulsatile Thrill] : no pulsatile thrill [Aneurysm] : no aneurysm [Bleeding] : no bleeding [Hand well perfused] : hand well perfused [2+] : left 2+ [Normal] : normoactive bowel sounds, soft and nontender, no hepatosplenomegaly or masses appreciated [FreeTextEntry1] : In the office today patient underwent a duplex study which shows a well-functioning fistula with minimal stenosis.  At this time no need for intervention.  Patient to follow-up in 3 to 4 months.

## 2023-01-01 NOTE — ED PROVIDER NOTE - PRINCIPAL DIAGNOSIS
Pt not gaining weight.  Pt of Dr. Ni to be admitted for NGT placement.  + po and diapers, but not eating as much.  No F/V/D.   
Anemia secondary to renal failure

## 2023-01-31 ENCOUNTER — RX RENEWAL (OUTPATIENT)
Age: 53
End: 2023-01-31

## 2023-03-23 ENCOUNTER — APPOINTMENT (OUTPATIENT)
Dept: VASCULAR SURGERY | Facility: CLINIC | Age: 53
End: 2023-03-23

## 2023-03-27 PROBLEM — N18.6 ESRD (END STAGE RENAL DISEASE) ON DIALYSIS: Status: ACTIVE | Noted: 2023-03-27

## 2023-03-28 ENCOUNTER — OUTPATIENT (OUTPATIENT)
Dept: OUTPATIENT SERVICES | Facility: HOSPITAL | Age: 53
LOS: 1 days | Discharge: HOME | End: 2023-03-28

## 2023-03-28 ENCOUNTER — APPOINTMENT (OUTPATIENT)
Dept: ENDOVASCULAR SURGERY | Facility: CLINIC | Age: 53
End: 2023-03-28
Payer: COMMERCIAL

## 2023-03-28 ENCOUNTER — RESULT REVIEW (OUTPATIENT)
Age: 53
End: 2023-03-28

## 2023-03-28 VITALS
SYSTOLIC BLOOD PRESSURE: 178 MMHG | WEIGHT: 157 LBS | BODY MASS INDEX: 26.8 KG/M2 | TEMPERATURE: 98 F | RESPIRATION RATE: 100 BRPM | HEIGHT: 64 IN | HEART RATE: 50 BPM | DIASTOLIC BLOOD PRESSURE: 75 MMHG

## 2023-03-28 DIAGNOSIS — Z99.2 END STAGE RENAL DISEASE: ICD-10-CM

## 2023-03-28 DIAGNOSIS — D64.9 ANEMIA, UNSPECIFIED: ICD-10-CM

## 2023-03-28 DIAGNOSIS — T82.49XA OTHER COMPLICATION OF VASCULAR DIALYSIS CATHETER, INITIAL ENCOUNTER: ICD-10-CM

## 2023-03-28 DIAGNOSIS — N18.6 END STAGE RENAL DISEASE: ICD-10-CM

## 2023-03-28 DIAGNOSIS — Z94.0 KIDNEY TRANSPLANT STATUS: Chronic | ICD-10-CM

## 2023-03-28 DIAGNOSIS — Z98.890 OTHER SPECIFIED POSTPROCEDURAL STATES: Chronic | ICD-10-CM

## 2023-03-28 PROCEDURE — 76937 US GUIDE VASCULAR ACCESS: CPT

## 2023-03-28 PROCEDURE — 36909Z: CUSTOM

## 2023-03-28 PROCEDURE — 36215Z: CUSTOM | Mod: 59

## 2023-03-28 PROCEDURE — 36902Z: CUSTOM | Mod: 59

## 2023-03-28 PROCEDURE — 36011Z: CUSTOM | Mod: 59

## 2023-03-28 PROCEDURE — 99213 OFFICE O/P EST LOW 20 MIN: CPT | Mod: 25

## 2023-03-28 RX ORDER — SODIUM BICARBONATE 650 MG/1
650 TABLET ORAL 3 TIMES DAILY
Qty: 540 | Refills: 3 | Status: DISCONTINUED | COMMUNITY
Start: 2019-07-03 | End: 2023-03-28

## 2023-03-28 NOTE — HISTORY OF PRESENT ILLNESS
[] : right internal jugular tunneled catheter [FreeTextEntry1] : Dr Chacon 6/20/22 [FreeTextEntry2] : Dr Chacon 6/13/22-removed 10/7/2022 [FreeTextEntry4] : Saturday 3/25/203 [FreeTextEntry5] : yesterday 430pm [FreeTextEntry6] : Dr Galvan

## 2023-03-28 NOTE — REASON FOR VISIT
[Other ___] : a [unfilled] visit for [Family Member] : family member [Inadequate Flow within AVF] : inadequate flow within AVF [Infiltration] : infiltration

## 2023-03-28 NOTE — PROCEDURE
[Resume diet] : resume diet [Site check for bleeding/hematoma/thrill/bruit] : Site check for bleeding/hematoma/thrill/bruit [Vital signs on admission the q 15 mins x2] : Vital signs on admission the q 15 mins x2 [FreeTextEntry1] : Left fistula fistulogram/angioplasty

## 2023-03-28 NOTE — ASSESSMENT
[Other: _____] : [unfilled] [FreeTextEntry1] : pulsatile  left arm fistula-plan for fistulogram and possible intervention

## 2023-04-03 NOTE — PHYSICAL EXAM
The catheter was inserted into the  distal suprarenal abdominal aorta. [General Appearance - Alert] : alert [General Appearance - In No Acute Distress] : in no acute distress [General Appearance - Well Nourished] : well nourished [General Appearance - Well Developed] : well developed [Sclera] : the sclera and conjunctiva were normal [Respiration, Rhythm And Depth] : normal respiratory rhythm and effort [Heart Rate And Rhythm] : heart rate was normal and rhythm regular [Auscultation Breath Sounds / Voice Sounds] : lungs were clear to auscultation bilaterally [Heart Sounds] : normal S1 and S2 [Edema] : there was no peripheral edema [Bowel Sounds] : normal bowel sounds [Abdomen Soft] : soft [] : no rash [Abdomen Tenderness] : non-tender [No Focal Deficits] : no focal deficits [Oriented To Time, Place, And Person] : oriented to person, place, and time [Affect] : the affect was normal [Mood] : the mood was normal [No CVA Tenderness] : no ~M costovertebral angle tenderness [Abnormal Walk] : normal gait [Musculoskeletal - Swelling] : no joint swelling seen

## 2023-04-11 NOTE — ED POST DISCHARGE NOTE - ADDITIONAL DOCUMENTATION
cbc seen by ED/nephrology based off of documentation however, diff may not have been back yet. diff similar to past diff. pt instructed to f/up outpt with nephro .

## 2023-04-17 ENCOUNTER — RESULT REVIEW (OUTPATIENT)
Age: 53
End: 2023-04-17

## 2023-04-17 ENCOUNTER — APPOINTMENT (OUTPATIENT)
Dept: HEMATOLOGY ONCOLOGY | Facility: CLINIC | Age: 53
End: 2023-04-17

## 2023-04-17 ENCOUNTER — APPOINTMENT (OUTPATIENT)
Dept: HEMATOLOGY ONCOLOGY | Facility: CLINIC | Age: 53
End: 2023-04-17
Payer: COMMERCIAL

## 2023-04-17 VITALS
HEIGHT: 64 IN | RESPIRATION RATE: 16 BRPM | BODY MASS INDEX: 27.89 KG/M2 | DIASTOLIC BLOOD PRESSURE: 85 MMHG | WEIGHT: 163.34 LBS | TEMPERATURE: 98.6 F | SYSTOLIC BLOOD PRESSURE: 156 MMHG | HEART RATE: 58 BPM | OXYGEN SATURATION: 99 %

## 2023-04-17 LAB
BASOPHILS # BLD AUTO: 0.06 K/UL — SIGNIFICANT CHANGE UP (ref 0–0.2)
BASOPHILS NFR BLD AUTO: 1 % — SIGNIFICANT CHANGE UP (ref 0–2)
EOSINOPHIL # BLD AUTO: 0.17 K/UL — SIGNIFICANT CHANGE UP (ref 0–0.5)
EOSINOPHIL NFR BLD AUTO: 2.7 % — SIGNIFICANT CHANGE UP (ref 0–6)
HCT VFR BLD CALC: 35.2 % — LOW (ref 39–50)
HGB BLD-MCNC: 11.1 G/DL — LOW (ref 13–17)
IMM GRANULOCYTES NFR BLD AUTO: 0.6 % — SIGNIFICANT CHANGE UP (ref 0–0.9)
LYMPHOCYTES # BLD AUTO: 1.59 K/UL — SIGNIFICANT CHANGE UP (ref 1–3.3)
LYMPHOCYTES # BLD AUTO: 25.7 % — SIGNIFICANT CHANGE UP (ref 13–44)
MCHC RBC-ENTMCNC: 30.4 PG — SIGNIFICANT CHANGE UP (ref 27–34)
MCHC RBC-ENTMCNC: 31.5 G/DL — LOW (ref 32–36)
MCV RBC AUTO: 96.4 FL — SIGNIFICANT CHANGE UP (ref 80–100)
MONOCYTES # BLD AUTO: 0.74 K/UL — SIGNIFICANT CHANGE UP (ref 0–0.9)
MONOCYTES NFR BLD AUTO: 12 % — SIGNIFICANT CHANGE UP (ref 2–14)
NEUTROPHILS # BLD AUTO: 3.59 K/UL — SIGNIFICANT CHANGE UP (ref 1.8–7.4)
NEUTROPHILS NFR BLD AUTO: 58 % — SIGNIFICANT CHANGE UP (ref 43–77)
NRBC # BLD: 0 /100 WBCS — SIGNIFICANT CHANGE UP (ref 0–0)
PLATELET # BLD AUTO: 275 K/UL — SIGNIFICANT CHANGE UP (ref 150–400)
RBC # BLD: 3.65 M/UL — LOW (ref 4.2–5.8)
RBC # FLD: 12.8 % — SIGNIFICANT CHANGE UP (ref 10.3–14.5)
RETICS #: 52.6 K/UL — SIGNIFICANT CHANGE UP (ref 25–125)
RETICS/RBC NFR: 1.4 % — SIGNIFICANT CHANGE UP (ref 0.5–2.5)
WBC # BLD: 6.19 K/UL — SIGNIFICANT CHANGE UP (ref 3.8–10.5)
WBC # FLD AUTO: 6.19 K/UL — SIGNIFICANT CHANGE UP (ref 3.8–10.5)

## 2023-04-17 PROCEDURE — 99214 OFFICE O/P EST MOD 30 MIN: CPT

## 2023-04-17 NOTE — ASSESSMENT
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 Left arm fistula placed. patient became increasingly anemic.  Hg recovered with transfusions at the hospital.  Bone marrow biopsy was largely unremarkable.  Hg recovered after tapering Imuran to off. Patient remains on aranesp for JOSEPH support, dose decreased to 45mcg.  WiIl recheck hemolysis and plasma cell dyscrasia screen.  FOllow up in 6 months. Hg stable on lower dose of JOSEPH currently.

## 2023-04-17 NOTE — HISTORY OF PRESENT ILLNESS
[de-identified] : This is a 52 year old man with a history of CKD, history of a transplant at the age of 8 years old in 1978.  Chronic graft rejection, baseline creatinine ~3.  Was admitted with COVID 19 on December 5th.  Hg at baseline in 2018 in the 8-9 range, was more in the range of 9-10 after initiation of Aranesp that has been titrated upwards since April 2019 as Aranesp was titrate upwards over the time period.  ON December 5th patient was admitted to [facility] with a Hg in the 9's went up to 11g/dl with hemoconcentration during the episode.  Patient had not received Aranesp during that 12 day hospital stay.  Patient's hemoglobin dropped down to 7.9 mid week before rising again to 10 then discharged with a hg of 8.8g/dl.  On follow up on January 11th, patient was found to have a profound anemia Hg 6.8, was admitted for transfusion that next day. On discharge follow up a 2nd time Hg stable at 9.4g/dl and more recently 9.0 2 days ago on January 25th.  \par \par Patient feels well following the discharge from the hospital stay for COVID 19, While his Hg was as low as 6.6g/dl at the ER on 2nd admission, patient denies any chest pain shortness of breath or syncope, just felt weak and tired somewhat.  Ferritin 444 at the hospital. B12 was slightly low in the 270's, patient was already asked to start a B12 supplement and will be taking it.  Retic count was only 0.6%.\par \par March 22nd 2022 Bone marrow biopsy was done as a response th the myelosuppression. No clonal pathology identified.  Trilineage hematopoieses with maturation was found with focally increased megakaryocytes, mild increase in interstitial mast cells, renal osteodystrophy with mild reticulin fibrosis and peritrabecular collegen.  Increased iron stores.  \par \par After the bone marrow, patient's Imuran was decreased and tapered to off. Hg recovered while still on aranesp.  By patient's reccoleciton the dose of aranesp was actually lowered to 45mcg.  Hg 10.3g/dl\par  [de-identified] : \par Hg 10.3g/dl at dialysis 4/2023\par Lab report only showed abnormals, no WBC so this must have been normal.  \par From record appears to be on aranesp to 45 mcg by his recollection.  \par [facility].  \par [address] [place] [address]\par Dr. Srivastava.

## 2023-04-19 LAB
ALBUMIN SERPL ELPH-MCNC: 4 G/DL
ALP BLD-CCNC: 139 U/L
ALT SERPL-CCNC: 12 U/L
ANION GAP SERPL CALC-SCNC: 23 MMOL/L
AST SERPL-CCNC: 16 U/L
BILIRUB SERPL-MCNC: 0.3 MG/DL
BUN SERPL-MCNC: 70 MG/DL
CALCIUM SERPL-MCNC: 9.3 MG/DL
CHLORIDE SERPL-SCNC: 104 MMOL/L
CO2 SERPL-SCNC: 18 MMOL/L
CREAT SERPL-MCNC: 12.97 MG/DL
EGFR: 4 ML/MIN/1.73M2
FERRITIN SERPL-MCNC: 324 NG/ML
FOLATE SERPL-MCNC: 6.5 NG/ML
GLUCOSE SERPL-MCNC: 83 MG/DL
HAPTOGLOB SERPL-MCNC: 185 MG/DL
IRON SATN MFR SERPL: 32 %
IRON SERPL-MCNC: 61 UG/DL
LDH SERPL-CCNC: 163 U/L
POTASSIUM SERPL-SCNC: 5.8 MMOL/L
PROT SERPL-MCNC: 6.5 G/DL
SODIUM SERPL-SCNC: 145 MMOL/L
TIBC SERPL-MCNC: 192 UG/DL
UIBC SERPL-MCNC: 132 UG/DL
VIT B12 SERPL-MCNC: 550 PG/ML

## 2023-04-19 NOTE — PROVIDER CONTACT NOTE (OTHER) - RECOMMENDATIONS
Provider recommended giving tylenol and reassessing in 1 hour Assistance with ambulation/Assistance OOB with selected safe patient handling equipment/Communicate Risk of Fall with Harm to all staff/Monitor gait and stability/Reinforce activity limits and safety measures with patient and family/Sit up slowly, dangle for a short time, stand at bedside before walking/Tailored Fall Risk Interventions/Use of alarms - bed, chair and/or voice tab/Visual Cue: Yellow wristband and red socks/Bed in lowest position, wheels locked, appropriate side rails in place/Call bell, personal items and telephone in reach/Instruct patient to call for assistance before getting out of bed or chair/Non-slip footwear when patient is out of bed/[place] to call system/Physically safe environment - no spills, clutter or unnecessary equipment/Purposeful Proactive Rounding/Room/bathroom lighting operational, light cord in reach

## 2023-04-26 LAB
ALBUMIN MFR SERPL ELPH: 57.7 %
ALBUMIN SERPL-MCNC: 3.8 G/DL
ALBUMIN/GLOB SERPL: 1.4 RATIO
ALPHA1 GLOB MFR SERPL ELPH: 4.2 %
ALPHA1 GLOB SERPL ELPH-MCNC: 0.3 G/DL
ALPHA2 GLOB MFR SERPL ELPH: 11.1 %
ALPHA2 GLOB SERPL ELPH-MCNC: 0.7 G/DL
B-GLOBULIN MFR SERPL ELPH: 10 %
B-GLOBULIN SERPL ELPH-MCNC: 0.6 G/DL
DEPRECATED KAPPA LC FREE/LAMBDA SER: 1.22 RATIO
GAMMA GLOB FLD ELPH-MCNC: 1.1 G/DL
GAMMA GLOB MFR SERPL ELPH: 17 %
IGA SER QL IEP: 216 MG/DL
IGG SER QL IEP: 1278 MG/DL
IGM SER QL IEP: 83 MG/DL
INTERPRETATION SERPL IEP-IMP: NORMAL
KAPPA LC CSF-MCNC: 12.1 MG/DL
KAPPA LC SERPL-MCNC: 14.81 MG/DL
M PROTEIN SPEC IFE-MCNC: NORMAL
PROT SERPL-MCNC: 6.5 G/DL
PROT SERPL-MCNC: 6.5 G/DL

## 2023-05-12 NOTE — PROGRESS NOTE ADULT - PROBLEM SELECTOR PROBLEM 7
1. \"Have you been to the ER, urgent care clinic since your last visit? Hospitalized since your last visit? \" No    2. \"Have you seen or consulted any other health care providers outside of the [address] [place] [address] since your last visit? \" No     3. For patients aged 39-70: Has the patient had a colonoscopy / FIT/ Cologuard?  NA - based on age Need for prophylactic measure

## 2023-06-05 ENCOUNTER — APPOINTMENT (OUTPATIENT)
Dept: INTERNAL MEDICINE | Facility: CLINIC | Age: 53
End: 2023-06-05
Payer: COMMERCIAL

## 2023-06-05 VITALS
DIASTOLIC BLOOD PRESSURE: 72 MMHG | BODY MASS INDEX: 26.66 KG/M2 | SYSTOLIC BLOOD PRESSURE: 130 MMHG | WEIGHT: 160 LBS | HEIGHT: 65 IN

## 2023-06-05 DIAGNOSIS — I42.9 CARDIOMYOPATHY, UNSPECIFIED: ICD-10-CM

## 2023-06-05 DIAGNOSIS — N18.4 CHRONIC KIDNEY DISEASE, STAGE 4 (SEVERE): ICD-10-CM

## 2023-06-05 PROCEDURE — 99213 OFFICE O/P EST LOW 20 MIN: CPT

## 2023-06-05 NOTE — PHYSICAL EXAM
[Normal] : no respiratory distress, lungs were clear to auscultation bilaterally and no accessory muscle use [de-identified] : 2/6 systolic ejection murmur

## 2023-06-05 NOTE — HISTORY OF PRESENT ILLNESS
[FreeTextEntry1] : This is a 52-year-old male for evaluation and treatment of his chronic renal failure cysts status post failing graft [de-identified] : Patient comes in he looks robust.  He is upbeat.  He is switched to hemodialysis.  He has no systemic complaints

## 2023-06-05 NOTE — ASSESSMENT
[FreeTextEntry1] : Patient is now on dialysis.  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  I did review his echocardiogram which showed a decreased ejection fraction.  He has no symptoms his lungs are clear heart sounds are normal.  This was followed by a normal stress test.\par \par I asked him to follow-up with cardiology he has an appointment next month\par \par I again told him that he needed to see a urologist because of the blood in the urine he remembered being told but did not follow-up he states he will now

## 2023-06-27 ENCOUNTER — APPOINTMENT (OUTPATIENT)
Dept: VASCULAR SURGERY | Facility: CLINIC | Age: 53
End: 2023-06-27
Payer: COMMERCIAL

## 2023-06-27 PROCEDURE — 93990 DOPPLER FLOW TESTING: CPT

## 2023-07-12 NOTE — H&P PST ADULT - MUSCULOSKELETAL
negative Render Risk Assessment In Note?: no Additional Notes: Patient consent was obtained to proceed with the visit and recommended plan of care after discussion of all risks and benefits including the risks of Covid-19 exposure. Detail Level: Simple No joint pain, swelling or deformity; no limitation of movement

## 2023-08-16 NOTE — ED ADULT NURSE NOTE - NSIMPLEMENTINTERV_GEN_ALL_ED
Called Patient to remind them to get KUB prior to appointment.  Spoke with Patient.  If patient calls back it is ok for the HUB to tell the pt the message.  
Implemented All Universal Safety Interventions:  [place] to call system. Call bell, personal items and telephone within reach. Instruct patient to call for assistance. Room bathroom lighting operational. Non-slip footwear when patient is off stretcher. Physically safe environment: no spills, clutter or unnecessary equipment. Stretcher in lowest position, wheels locked, appropriate side rails in place.

## 2023-10-13 ENCOUNTER — OUTPATIENT (OUTPATIENT)
Dept: OUTPATIENT SERVICES | Facility: HOSPITAL | Age: 53
LOS: 1 days | Discharge: HOME | End: 2023-10-13

## 2023-10-13 DIAGNOSIS — D64.9 ANEMIA, UNSPECIFIED: ICD-10-CM

## 2023-10-13 DIAGNOSIS — Z98.890 OTHER SPECIFIED POSTPROCEDURAL STATES: Chronic | ICD-10-CM

## 2023-10-13 DIAGNOSIS — Z94.0 KIDNEY TRANSPLANT STATUS: Chronic | ICD-10-CM

## 2023-10-16 ENCOUNTER — RESULT REVIEW (OUTPATIENT)
Age: 53
End: 2023-10-16

## 2023-10-16 ENCOUNTER — APPOINTMENT (OUTPATIENT)
Dept: HEMATOLOGY ONCOLOGY | Facility: CLINIC | Age: 53
End: 2023-10-16
Payer: COMMERCIAL

## 2023-10-16 VITALS
WEIGHT: 190.99 LBS | HEART RATE: 98 BPM | DIASTOLIC BLOOD PRESSURE: 92 MMHG | RESPIRATION RATE: 16 BRPM | BODY MASS INDEX: 31.82 KG/M2 | SYSTOLIC BLOOD PRESSURE: 167 MMHG | HEIGHT: 65 IN | OXYGEN SATURATION: 99 % | TEMPERATURE: 97.2 F

## 2023-10-16 LAB
BASOPHILS # BLD AUTO: 0 K/UL — SIGNIFICANT CHANGE UP (ref 0–0.2)
BASOPHILS NFR BLD AUTO: 0 % — SIGNIFICANT CHANGE UP (ref 0–2)
DACRYOCYTES BLD QL SMEAR: SLIGHT — SIGNIFICANT CHANGE UP
ELLIPTOCYTES BLD QL SMEAR: SLIGHT — SIGNIFICANT CHANGE UP
EOSINOPHIL # BLD AUTO: 0.06 K/UL — SIGNIFICANT CHANGE UP (ref 0–0.5)
EOSINOPHIL NFR BLD AUTO: 1 % — SIGNIFICANT CHANGE UP (ref 0–6)
HCT VFR BLD CALC: 35.7 % — LOW (ref 39–50)
HGB BLD-MCNC: 11.3 G/DL — LOW (ref 13–17)
LYMPHOCYTES # BLD AUTO: 0.83 K/UL — LOW (ref 1–3.3)
LYMPHOCYTES # BLD AUTO: 15 % — SIGNIFICANT CHANGE UP (ref 13–44)
MCHC RBC-ENTMCNC: 30.5 PG — SIGNIFICANT CHANGE UP (ref 27–34)
MCHC RBC-ENTMCNC: 31.7 G/DL — LOW (ref 32–36)
MCV RBC AUTO: 96.2 FL — SIGNIFICANT CHANGE UP (ref 80–100)
METAMYELOCYTES # FLD: 7 % — HIGH (ref 0–0)
MONOCYTES # BLD AUTO: 0.11 K/UL — SIGNIFICANT CHANGE UP (ref 0–0.9)
MONOCYTES NFR BLD AUTO: 2 % — SIGNIFICANT CHANGE UP (ref 2–14)
MYELOCYTES NFR BLD: 9 % — HIGH (ref 0–0)
NEUTROPHILS # BLD AUTO: 3.65 K/UL — SIGNIFICANT CHANGE UP (ref 1.8–7.4)
NEUTROPHILS NFR BLD AUTO: 66 % — SIGNIFICANT CHANGE UP (ref 43–77)
NRBC # BLD: 0 /100 — SIGNIFICANT CHANGE UP (ref 0–0)
NRBC # BLD: SIGNIFICANT CHANGE UP /100 WBCS (ref 0–0)
PLAT MORPH BLD: NORMAL — SIGNIFICANT CHANGE UP
PLATELET # BLD AUTO: 387 K/UL — SIGNIFICANT CHANGE UP (ref 150–400)
POIKILOCYTOSIS BLD QL AUTO: SLIGHT — SIGNIFICANT CHANGE UP
RBC # BLD: 3.71 M/UL — LOW (ref 4.2–5.8)
RBC # FLD: 12.7 % — SIGNIFICANT CHANGE UP (ref 10.3–14.5)
RBC BLD AUTO: ABNORMAL
RETICS #: 53.1 K/UL — SIGNIFICANT CHANGE UP (ref 25–125)
RETICS/RBC NFR: 1.4 % — SIGNIFICANT CHANGE UP (ref 0.5–2.5)
WBC # BLD: 5.53 K/UL — SIGNIFICANT CHANGE UP (ref 3.8–10.5)
WBC # FLD AUTO: 5.53 K/UL — SIGNIFICANT CHANGE UP (ref 3.8–10.5)

## 2023-10-16 PROCEDURE — 99214 OFFICE O/P EST MOD 30 MIN: CPT

## 2023-10-16 RX ORDER — MYCOPHENOLATE MOFETIL 500 MG/1
500 TABLET, FILM COATED ORAL TWICE DAILY
Refills: 0 | Status: ACTIVE | COMMUNITY
Start: 2023-10-16

## 2023-10-16 RX ORDER — PREDNISONE 5 MG/1
5 TABLET ORAL DAILY
Qty: 90 | Refills: 1 | Status: ACTIVE | COMMUNITY
Start: 2023-10-16

## 2023-10-16 RX ORDER — TACROLIMUS 1 MG/1
1 CAPSULE, GELATIN COATED ORAL
Refills: 0 | Status: ACTIVE | COMMUNITY
Start: 2023-10-16

## 2023-10-16 RX ORDER — METOPROLOL TARTRATE 25 MG/1
25 TABLET, FILM COATED ORAL
Refills: 0 | Status: ACTIVE | COMMUNITY
Start: 2023-10-16

## 2023-10-16 RX ORDER — CARVEDILOL 6.25 MG/1
6.25 TABLET, FILM COATED ORAL
Refills: 0 | Status: DISCONTINUED | COMMUNITY
End: 2023-10-16

## 2023-10-16 RX ORDER — NIFEDIPINE 30 MG/1
30 TABLET, EXTENDED RELEASE ORAL
Refills: 0 | Status: ACTIVE | COMMUNITY
Start: 2023-10-16

## 2023-10-19 LAB
ALBUMIN SERPL ELPH-MCNC: 4.2 G/DL
ALP BLD-CCNC: 96 U/L
ALT SERPL-CCNC: 19 U/L
ANION GAP SERPL CALC-SCNC: 11 MMOL/L
AST SERPL-CCNC: 19 U/L
BILIRUB SERPL-MCNC: 0.2 MG/DL
BUN SERPL-MCNC: 27 MG/DL
CALCIUM SERPL-MCNC: 9.8 MG/DL
CHLORIDE SERPL-SCNC: 114 MMOL/L
CO2 SERPL-SCNC: 22 MMOL/L
CREAT SERPL-MCNC: 1.8 MG/DL
EGFR: 44 ML/MIN/1.73M2
FERRITIN SERPL-MCNC: 425 NG/ML
FOLATE SERPL-MCNC: 14.5 NG/ML
GLUCOSE SERPL-MCNC: 127 MG/DL
IRON SATN MFR SERPL: 51 %
IRON SERPL-MCNC: 124 UG/DL
LDH SERPL-CCNC: 354 U/L
POTASSIUM SERPL-SCNC: 4.3 MMOL/L
PROT SERPL-MCNC: 6.4 G/DL
SODIUM SERPL-SCNC: 146 MMOL/L
TIBC SERPL-MCNC: 243 UG/DL
UIBC SERPL-MCNC: 119 UG/DL
VIT B12 SERPL-MCNC: 495 PG/ML

## 2023-10-30 NOTE — PATIENT PROFILE ADULT - SURGICAL SITE INCISION
Midwest Orthopedic Specialty Disease Brother    • Cancer Brother         pancreatic cancer   • * Daughter         well   • * Daughter         well   • * Daughter         well     ALLERGIES:  No Known Allergies    Social History     Social History Narrative     to [place]; lives in [facility]; owns insurance office; nonsmoker; occasional wine;  Exercise:  No regular but active; 2 sons (1 )/ 4 daughters;       Current Outpatient Medications   Medication Sig Dispense Refill   • Misc. Devices (Pill Box 7 Day) Misc use as directed 1 each 0   • loperamide (IMODIUM A-D) 2 MG tablet Take 4 mg (2 tablets) by mouth at the first sign of diarrhea, followed by 2 mg (1 tablet) after each loose stool. Max 8 tabs/day 24 tablet 0   • abiraterone acetate (ZYTIGA) 250 MG Tab Take 4 tablets by mouth daily on an empty stomach. 120 tablet 5   • predniSONE (DELTASONE) 5 MG tablet Take 1 tablet by mouth daily. 30 tablet 5   • MAGnesium-Oxide 400 (240 Mg) MG tablet Take 1 tablet by mouth in the morning and 1 tablet in the evening. 180 tablet 1   • levothyroxine 50 MCG tablet Take 1 tablet by mouth daily. 90 tablet 1   • lovastatin (MEVACOR) 40 MG tablet Take 2 tablets by mouth nightly. 180 tablet 1   • metoPROLOL succinate (TOPROL-XL) 25 MG 24 hr tablet Take 1 tablet by mouth daily. 90 tablet 1   • furosemide (LASIX) 20 MG tablet Take 2 tablets by mouth daily. 180 tablet 1   • metFORMIN (GLUCOPHAGE-XR) 500 MG 24 hr tablet Take 1 tablet by mouth daily (with breakfast). 90 tablet 1   • Cholecalciferol (Vitamin D) 125 MCG (5000 UT) Cap      • aspirin 81 MG EC tablet 3/15/2023 - Take 1 tablet twice a day until  (5 MORE WEEKS FROM TODAY), AFTER THAT CONTINUE 1 TABLET ONCE A DAY     • ibuprofen (MOTRIN) 200 MG tablet Take 2 tablets by mouth every 12 hours as needed for Pain.     • acetaminophen (TYLENOL) 325 MG tablet Take 2 tablets by mouth every 6 hours as needed for Pain or Fever.     • Daily Multiple Vitamins TABS Take 1 tablet by  mouth daily.       Current Facility-Administered Medications   Medication Dose Route Frequency Provider Last Rate Last Admin   • sodium chloride (NORMAL SALINE) 0.9 % bolus 1,000 mL  1,000 mL Intravenous Once PRN Fernando Mackay MD       • sodium chloride (NORMAL SALINE) 0.9 % bolus 1,000 mL  1,000 mL Intravenous Once PRN Fernando Mackay MD       • EPINEPHrine (ADRENALIN) injection 0.3 mg  0.3 mg Intramuscular Q5 Min PRN Fernando Mackay MD       • diphenhydrAMINE (BENADRYL) injection 50 mg  50 mg Intravenous Once PRN Fernando Mackay MD       • famotidine (PEPCID) injection 20 mg  20 mg Intravenous Once PRN Fernando Mackay MD       • methylPREDNISolone (SOLU-Medrol) injection 125 mg  125 mg Intravenous Once PRN Fernando Mackay MD       • albuterol inhaler 2 puff  2 puff Inhalation Q20 Min PRN Fernando Mackay MD       • albuterol (VENTOLIN) nebulizer 5 mg  5 mg Nebulization Q20 Min PRN Fernando Mackay MD           REVIEW OF SYSTEMS:  Reviewed from nurse’s notes and concur.      PHYSICAL EXAMINATION:   Oncology Encounter Vitals [10/30/23 1012]   ONC OP Encounter Vitals Group      BP (!) 150/70      Heart Rate 75      Resp       Temp 98.2 °F (36.8 °C)      Temp src       SpO2 99 %      Weight 172 lb 9.9 oz (78.3 kg)      Height       Pain Score  0      Pain Location       Pain Education?       BSA (Calculated - m2) - [employer]       BSA (Calculated - sq m)       BMI (Calculated)    ECOG 1  General Appearance - Alert, well appearing, and in no distress.  Mental Status - Alert, oriented to person, place, and time.   Eyes - Pupils equal and reactive, extraocular eye movements intact.   Remainder of exam deferred, previous exam elements as follows:  Mouth - Mucous membranes moist, pharynx normal without lesions.  Neck - Supple, no significant adenopathy.   Lymphatics - No palpable lymphadenopathy.  Chest - Clear to auscultation, no wheezes, rales or rhonchi, symmetric air entry.  Heart - Normal rate, regular rhythm,  Fernando Mackay MD      no

## 2023-12-22 NOTE — ED ADULT NURSE NOTE - BRAND OF COVID-19 VACCINATION
Joesph Clay is a 52 year old male presenting to the walk-in clinic today for a left eyelid swollen, for three days.      Patient states using hot and cold compresses and flushing eye has not helped.      Patient denies history of this problem.     Denies known Latex allergy or symptoms of Latex sensitivity.  Medications reviewed and updated.  Patient would like communication of their results via:    [facility]    
Pfizer dose 1 and 2

## 2023-12-27 ENCOUNTER — RX RENEWAL (OUTPATIENT)
Age: 53
End: 2023-12-27

## 2024-01-11 ENCOUNTER — OUTPATIENT (OUTPATIENT)
Dept: OUTPATIENT SERVICES | Facility: HOSPITAL | Age: 54
LOS: 1 days | Discharge: HOME | End: 2024-01-11

## 2024-01-11 DIAGNOSIS — Z94.0 KIDNEY TRANSPLANT STATUS: Chronic | ICD-10-CM

## 2024-01-11 DIAGNOSIS — D64.9 ANEMIA, UNSPECIFIED: ICD-10-CM

## 2024-01-11 DIAGNOSIS — Z98.890 OTHER SPECIFIED POSTPROCEDURAL STATES: Chronic | ICD-10-CM

## 2024-01-17 ENCOUNTER — RX RENEWAL (OUTPATIENT)
Age: 54
End: 2024-01-17

## 2024-01-18 ENCOUNTER — APPOINTMENT (OUTPATIENT)
Dept: HEMATOLOGY ONCOLOGY | Facility: CLINIC | Age: 54
End: 2024-01-18
Payer: COMMERCIAL

## 2024-01-18 ENCOUNTER — RESULT REVIEW (OUTPATIENT)
Age: 54
End: 2024-01-18

## 2024-01-18 VITALS
WEIGHT: 200.84 LBS | DIASTOLIC BLOOD PRESSURE: 90 MMHG | HEART RATE: 95 BPM | TEMPERATURE: 97.3 F | HEIGHT: 65 IN | RESPIRATION RATE: 16 BRPM | BODY MASS INDEX: 33.46 KG/M2 | OXYGEN SATURATION: 98 % | SYSTOLIC BLOOD PRESSURE: 160 MMHG

## 2024-01-18 DIAGNOSIS — Z86.39 PERSONAL HISTORY OF OTHER ENDOCRINE, NUTRITIONAL AND METABOLIC DISEASE: ICD-10-CM

## 2024-01-18 DIAGNOSIS — Z86.69 PERSONAL HISTORY OF OTHER DISEASES OF THE NERVOUS SYSTEM AND SENSE ORGANS: ICD-10-CM

## 2024-01-18 DIAGNOSIS — Z87.898 PERSONAL HISTORY OF OTHER SPECIFIED CONDITIONS: ICD-10-CM

## 2024-01-18 DIAGNOSIS — D64.9 ANEMIA, UNSPECIFIED: ICD-10-CM

## 2024-01-18 LAB
BASOPHILS # BLD AUTO: 0 K/UL — SIGNIFICANT CHANGE UP (ref 0–0.2)
BASOPHILS NFR BLD AUTO: 0 % — SIGNIFICANT CHANGE UP (ref 0–2)
DACRYOCYTES BLD QL SMEAR: SLIGHT — SIGNIFICANT CHANGE UP
ELLIPTOCYTES BLD QL SMEAR: SLIGHT — SIGNIFICANT CHANGE UP
EOSINOPHIL # BLD AUTO: 0.29 K/UL — SIGNIFICANT CHANGE UP (ref 0–0.5)
EOSINOPHIL NFR BLD AUTO: 3 % — SIGNIFICANT CHANGE UP (ref 0–6)
HCT VFR BLD CALC: 37.9 % — LOW (ref 39–50)
HGB BLD-MCNC: 12.3 G/DL — LOW (ref 13–17)
LYMPHOCYTES # BLD AUTO: 1.28 K/UL — SIGNIFICANT CHANGE UP (ref 1–3.3)
LYMPHOCYTES # BLD AUTO: 13 % — SIGNIFICANT CHANGE UP (ref 13–44)
MCHC RBC-ENTMCNC: 31.1 PG — SIGNIFICANT CHANGE UP (ref 27–34)
MCHC RBC-ENTMCNC: 32.5 G/DL — SIGNIFICANT CHANGE UP (ref 32–36)
MCV RBC AUTO: 95.7 FL — SIGNIFICANT CHANGE UP (ref 80–100)
METAMYELOCYTES # FLD: 6 % — HIGH (ref 0–0)
MONOCYTES # BLD AUTO: 1.08 K/UL — HIGH (ref 0–0.9)
MONOCYTES NFR BLD AUTO: 11 % — SIGNIFICANT CHANGE UP (ref 2–14)
MYELOCYTES NFR BLD: 6 % — HIGH (ref 0–0)
NEUTROPHILS # BLD AUTO: 6 K/UL — SIGNIFICANT CHANGE UP (ref 1.8–7.4)
NEUTROPHILS NFR BLD AUTO: 60 % — SIGNIFICANT CHANGE UP (ref 43–77)
NEUTS BAND # BLD: 1 % — SIGNIFICANT CHANGE UP (ref 0–8)
NRBC # BLD: 0 /100 WBCS — SIGNIFICANT CHANGE UP (ref 0–0)
NRBC # BLD: SIGNIFICANT CHANGE UP /100 WBCS (ref 0–0)
PLAT MORPH BLD: NORMAL — SIGNIFICANT CHANGE UP
PLATELET # BLD AUTO: 322 K/UL — SIGNIFICANT CHANGE UP (ref 150–400)
POIKILOCYTOSIS BLD QL AUTO: SLIGHT — SIGNIFICANT CHANGE UP
RBC # BLD: 3.96 M/UL — LOW (ref 4.2–5.8)
RBC # FLD: 13 % — SIGNIFICANT CHANGE UP (ref 10.3–14.5)
RBC BLD AUTO: ABNORMAL
WBC # BLD: 9.83 K/UL — SIGNIFICANT CHANGE UP (ref 3.8–10.5)
WBC # FLD AUTO: 9.83 K/UL — SIGNIFICANT CHANGE UP (ref 3.8–10.5)

## 2024-01-18 PROCEDURE — 99214 OFFICE O/P EST MOD 30 MIN: CPT

## 2024-01-18 NOTE — H&P PST ADULT - NS MD HP HEP C STATUS
Negative
Patient with laceration to eyebrow s/p mechanical fall from standing, will update tetanus, CT reviewed- negative for ICH, no othre traumatic injuries noted. Laceration superficial- not requiring repair at this time. Spoke with [facility]- will accept patient back .

## 2024-01-19 NOTE — ASSESSMENT
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  Patient just received his 2nd renal transplant this July 2023.  Feeling well, off dialysis again.  NO new labs since the transplant. Will repeat CBC today along with iron studies and B12. With the successful transplanted kidney, his Hgb has nearly normalized as expected.  Follow up annually now.  Patient seen and examined with Dr. Castillo.  Donald Landis M.D. Hematology/Oncology Fellow PGY-5 [facility]   I discussed this patient in a pre-clinic session with the fellow including review of clinical status and last labs.  I also saw the patient and discussed history, completed an exam and discussed the plan together with the fellow.  Total time spent today on this patient    32  minutes.  This is a 53 year old man with a history of renal insufficiency on HD, patient just had a repeat renal transplant this past Jul 2023.  CBC nearly normal. Hg 12.5g/dl without JOSEPH support. With corrected renal function, Anemia dramatically improved. Follow up annually.   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  Improved after discontinuation. Despite renal transplant would still Lavoid this medication, patient remains on Prednisone for prophylaxis, would keep on Mepron rather than bactrim for PCP prophylaxis in this setting.

## 2024-01-19 NOTE — HISTORY OF PRESENT ILLNESS
[de-identified] : This is a 53 year old man with a history of CKD, history of a transplant at the age of 8 years old in 1978.  Chronic graft rejection, baseline creatinine ~3.  Was admitted with COVID 19 on December 5th.  Hg at baseline in 2018 in the 8-9 range, was more in the range of 9-10 after initiation of Aranesp that has been titrated upwards since April 2019 as Aranesp was titrate upwards over the time period.  ON December 5th patient was admitted to [facility] with a Hg in the 9's went up to 11g/dl with hemoconcentration during the episode.  Patient had not received Aranesp during that 12 day hospital stay.  Patient's hemoglobin dropped down to 7.9 mid week before rising again to 10 then discharged with a hg of 8.8g/dl.  On follow up on January 11th, patient was found to have a profound anemia Hg 6.8, was admitted for transfusion that next day. On discharge follow up a 2nd time Hg stable at 9.4g/dl and more recently 9.0 2 days ago on January 25th.    Patient feels well following the discharge from the hospital stay for COVID 19, While his Hg was as low as 6.6g/dl at the ER on 2nd admission, patient denies any chest pain shortness of breath or syncope, just felt weak and tired somewhat.  Ferritin 444 at the hospital. B12 was slightly low in the 270's, patient was already asked to start a B12 supplement and will be taking it.  Retic count was only 0.6%.  March 22nd 2022 Bone marrow biopsy was done as a response th the myelosuppression. No clonal pathology identified.  Trilineage hematopoiesis with maturation was found with focally increased megakaryocytes, mild increase in interstitial mast cells, renal osteodystrophy with mild reticulin fibrosis and peritrabecular collagen.  Increased iron stores.    After the bone marrow, patient's Imuran was decreased and tapered to off. Hg recovered while still on aranesp.  By patient's recollection the dose of aranesp was actually lowered to 45mcg.  Hg 10.3g/dl  [de-identified] : Patient had a second renal transplant June 11th 2023.  Patient now on prednisone, tacro, Cellcept for transplant rejection. Hgb has approached normal. No symptoms of anemia.

## 2024-01-23 LAB
ALBUMIN SERPL ELPH-MCNC: 4.5 G/DL
ALP BLD-CCNC: 101 U/L
ALT SERPL-CCNC: 21 U/L
ANION GAP SERPL CALC-SCNC: 15 MMOL/L
AST SERPL-CCNC: 25 U/L
BILIRUB SERPL-MCNC: 0.3 MG/DL
BUN SERPL-MCNC: 26 MG/DL
CALCIUM SERPL-MCNC: 10 MG/DL
CHLORIDE SERPL-SCNC: 110 MMOL/L
CO2 SERPL-SCNC: 19 MMOL/L
CREAT SERPL-MCNC: 1.59 MG/DL
EGFR: 52 ML/MIN/1.73M2
FERRITIN SERPL-MCNC: 394 NG/ML
FOLATE SERPL-MCNC: 16.4 NG/ML
GLUCOSE SERPL-MCNC: 112 MG/DL
IRON SATN MFR SERPL: 45 %
IRON SERPL-MCNC: 124 UG/DL
POTASSIUM SERPL-SCNC: 4.3 MMOL/L
PROT SERPL-MCNC: 6.7 G/DL
SODIUM SERPL-SCNC: 144 MMOL/L
TIBC SERPL-MCNC: 274 UG/DL
UIBC SERPL-MCNC: 149 UG/DL
VIT B12 SERPL-MCNC: 421 PG/ML

## 2024-02-03 NOTE — CONSULT NOTE ADULT - PROBLEM SELECTOR RECOMMENDATION 9
Pt. with ABRIL on CKD stage V in the setting diarrhea. Scr is elevated to 7.15 on admission. Scr was elevated to 6.04 on 12/28/21. UA done today showed trace proteins. Pt. with ABRIL likely hemodynamic mediated. Received 1 unit PRBC. Monitor labs and urine output. Avoid any potential nephrotoxins. Dose medications as per eGFR.
No

## 2024-02-23 NOTE — PROGRESS NOTE ADULT - ATTENDING SUPERVISION STATEMENT
Social work placed call to the main line at [facility]. Social work was informed that Sarah Espinal [phone] is pts in home staff from [facility]. Pts SSA is Latesha Jay [phone]. Latesha reports pts house was treated for bedbugs on Weds 2/21/24 and pt became upset that his mother for not being placed in a hotel due to seeing dead bugs. Latesha shared the pt does have a good support team and they do interact with pt almost daily.   
Fellow
Resident

## 2024-05-09 NOTE — PATIENT PROFILE ADULT - FUNCTIONAL ASSESSMENT - DAILY ACTIVITY 4.
[History reviewed] : History reviewed. [Medications and Allergies reviewed] : Medications and allergies reviewed. 4 = No assist / stand by assistance

## 2024-09-23 ENCOUNTER — NON-APPOINTMENT (OUTPATIENT)
Age: 54
End: 2024-09-23

## 2024-09-23 ENCOUNTER — LABORATORY RESULT (OUTPATIENT)
Age: 54
End: 2024-09-23

## 2024-09-23 ENCOUNTER — APPOINTMENT (OUTPATIENT)
Dept: INTERNAL MEDICINE | Facility: CLINIC | Age: 54
End: 2024-09-23
Payer: MEDICARE

## 2024-09-23 VITALS
BODY MASS INDEX: 34.99 KG/M2 | HEIGHT: 65 IN | SYSTOLIC BLOOD PRESSURE: 120 MMHG | WEIGHT: 210 LBS | DIASTOLIC BLOOD PRESSURE: 72 MMHG

## 2024-09-23 PROCEDURE — 36415 COLL VENOUS BLD VENIPUNCTURE: CPT

## 2024-09-23 PROCEDURE — 90656 IIV3 VACC NO PRSV 0.5 ML IM: CPT

## 2024-09-23 PROCEDURE — G0439: CPT

## 2024-09-23 PROCEDURE — G0008: CPT

## 2024-09-23 PROCEDURE — 93000 ELECTROCARDIOGRAM COMPLETE: CPT

## 2024-09-23 RX ORDER — FAMOTIDINE 20 MG/1
20 TABLET, FILM COATED ORAL DAILY
Qty: 90 | Refills: 3 | Status: ACTIVE | COMMUNITY
Start: 2024-09-23 | End: 1900-01-01

## 2024-09-23 RX ORDER — SODIUM BICARBONATE 650 MG/1
650 TABLET ORAL 3 TIMES DAILY
Refills: 0 | Status: ACTIVE | COMMUNITY

## 2024-09-23 RX ORDER — TAMSULOSIN HYDROCHLORIDE 0.4 MG/1
0.4 CAPSULE ORAL
Qty: 90 | Refills: 3 | Status: ACTIVE | COMMUNITY
Start: 2024-09-23 | End: 1900-01-01

## 2024-09-23 RX ORDER — ASPIRIN 81 MG
81 TABLET, DELAYED RELEASE (ENTERIC COATED) ORAL DAILY
Refills: 0 | Status: ACTIVE | COMMUNITY

## 2024-09-23 RX ORDER — TAMSULOSIN HYDROCHLORIDE 0.4 MG/1
0.4 CAPSULE ORAL EVERY MORNING
Refills: 0 | Status: DISCONTINUED | COMMUNITY

## 2024-09-23 NOTE — ASSESSMENT
[FreeTextEntry1] :  this is an upbeat 54-year-old male whose history has been reviewed above  Patient is status post his second transplant first.  Was quite successful I believe in excess of 40 years.  He has a history of multiple cutaneous lesions he was following with dermatology has dermatologist left I am obstructed him to find a new one within the group and to follow-up also avoid sun exposure and use suntan lotion  He did have a mildly decreased ejection fraction. he did have a negative stress test at the same time in 2022.  I will order an echocardiogram and also ask him to follow-up with cardiology   he will be getting his COVID vaccinations at his center (transplant) we will give him a flu shot here   he seems to be up-to-date with Prevnar/Pneumovax  He did have a recent colonoscopy   he was seen by hematology for marrow suppression which was thought to be due to Bactrim.    We will get a bone density as he is on prednisone

## 2024-09-23 NOTE — HISTORY OF PRESENT ILLNESS
[FreeTextEntry1] :   This is a 54-year-old male for annual health assessment  Specifically we will address his status post transplant.  He did in the past have a mildly decreased ejection fraction he does have a history of fatty liver and hypertension [de-identified] :   Patient remains remarkably upbeat.  He considers himself at 10 when  asked about his health and a 10+ when asked about his mood.  He is 1 year status post transplant.  He is retired.  He has no systemic complaints on direct questioning he does have nocturia x 2.

## 2024-09-23 NOTE — PHYSICAL EXAM
[No Acute Distress] : no acute distress [Well Nourished] : well nourished [Well Developed] : well developed [Well-Appearing] : well-appearing [Normal Sclera/Conjunctiva] : normal sclera/conjunctiva [PERRL] : pupils equal round and reactive to light [EOMI] : extraocular movements intact [Normal Outer Ear/Nose] : the outer ears and nose were normal in appearance [Normal Oropharynx] : the oropharynx was normal [No JVD] : no jugular venous distention [No Lymphadenopathy] : no lymphadenopathy [Supple] : supple [Thyroid Normal, No Nodules] : the thyroid was normal and there were no nodules present [No Respiratory Distress] : no respiratory distress  [No Accessory Muscle Use] : no accessory muscle use [Clear to Auscultation] : lungs were clear to auscultation bilaterally [Normal Rate] : normal rate  [Regular Rhythm] : with a regular rhythm [Normal S1, S2] : normal S1 and S2 [No Murmur] : no murmur heard [No Carotid Bruits] : no carotid bruits [No Abdominal Bruit] : a ~M bruit was not heard ~T in the abdomen [No Varicosities] : no varicosities [Pedal Pulses Present] : the pedal pulses are present [No Edema] : there was no peripheral edema [No Palpable Aorta] : no palpable aorta [No Extremity Clubbing/Cyanosis] : no extremity clubbing/cyanosis [Soft] : abdomen soft [Non Tender] : non-tender [Non-distended] : non-distended [No Masses] : no abdominal mass palpated [No HSM] : no HSM [Normal Bowel Sounds] : normal bowel sounds [Normal Posterior Cervical Nodes] : no posterior cervical lymphadenopathy [Normal Anterior Cervical Nodes] : no anterior cervical lymphadenopathy [No CVA Tenderness] : no CVA  tenderness [No Spinal Tenderness] : no spinal tenderness [No Joint Swelling] : no joint swelling [Grossly Normal Strength/Tone] : grossly normal strength/tone [No Rash] : no rash [Coordination Grossly Intact] : coordination grossly intact [No Focal Deficits] : no focal deficits [Normal Gait] : normal gait [Deep Tendon Reflexes (DTR)] : deep tendon reflexes were 2+ and symmetric [Normal Affect] : the affect was normal [Normal Insight/Judgement] : insight and judgment were intact [de-identified] :   Overweight

## 2024-09-23 NOTE — HEALTH RISK ASSESSMENT
[Excellent] : ~his/her~  mood as  excellent [No] : No [0] : 2) Feeling down, depressed, or hopeless: Not at all (0) [Never] : Never [None] : None [With Family] : lives with family [On disability] : on disability [High School] : high school [Single] : single [Feels Safe at Home] : Feels safe at home [Smoke Detector] : smoke detector [Carbon Monoxide Detector] : carbon monoxide detector [Seat Belt] :  uses seat belt [Sunscreen] : uses sunscreen [I will adhere to the patient's wishes.] : I will adhere to the patient's wishes. [Change in mental status noted] : No change in mental status noted [Sexually Active] : not sexually active [Reports changes in hearing] : Reports no changes in hearing [Reports changes in vision] : Reports no changes in vision [Reports changes in dental health] : Reports no changes in dental health [Safety elements used in home] : no safety elements used in home [TB Exposure] : is not being exposed to tuberculosis [Caregiver Concerns] : does not have caregiver concerns

## 2024-09-24 LAB
25(OH)D3 SERPL-MCNC: 31.3 NG/ML
ALBUMIN SERPL ELPH-MCNC: 4.1 G/DL
ALP BLD-CCNC: 105 U/L
ALT SERPL-CCNC: 27 U/L
ANION GAP SERPL CALC-SCNC: 10 MMOL/L
APPEARANCE: CLEAR
AST SERPL-CCNC: 21 U/L
BASOPHILS # BLD AUTO: 0.04 K/UL
BASOPHILS NFR BLD AUTO: 0.7 %
BILIRUB SERPL-MCNC: 0.3 MG/DL
BILIRUBIN URINE: NEGATIVE
BLOOD URINE: NEGATIVE
BUN SERPL-MCNC: 26 MG/DL
CALCIUM SERPL-MCNC: 10.5 MG/DL
CHLORIDE SERPL-SCNC: 108 MMOL/L
CHOLEST SERPL-MCNC: 115 MG/DL
CO2 SERPL-SCNC: 22 MMOL/L
COLOR: YELLOW
CREAT SERPL-MCNC: 1.7 MG/DL
EGFR: 47 ML/MIN/1.73M2
EOSINOPHIL # BLD AUTO: 0.05 K/UL
EOSINOPHIL NFR BLD AUTO: 0.8 %
ESTIMATED AVERAGE GLUCOSE: 140 MG/DL
GLUCOSE QUALITATIVE U: NEGATIVE MG/DL
GLUCOSE SERPL-MCNC: 214 MG/DL
HBA1C MFR BLD HPLC: 6.5 %
HCT VFR BLD CALC: 39.1 %
HDLC SERPL-MCNC: 34 MG/DL
HGB BLD-MCNC: 12.1 G/DL
IMM GRANULOCYTES NFR BLD AUTO: 1.8 %
KETONES URINE: NEGATIVE MG/DL
LDLC SERPL CALC-MCNC: 34 MG/DL
LEUKOCYTE ESTERASE URINE: NEGATIVE
LYMPHOCYTES # BLD AUTO: 1.93 K/UL
LYMPHOCYTES NFR BLD AUTO: 31.4 %
MAN DIFF?: NORMAL
MCHC RBC-ENTMCNC: 29.7 PG
MCHC RBC-ENTMCNC: 30.9 GM/DL
MCV RBC AUTO: 95.8 FL
MONOCYTES # BLD AUTO: 0.78 K/UL
MONOCYTES NFR BLD AUTO: 12.7 %
NEUTROPHILS # BLD AUTO: 3.24 K/UL
NEUTROPHILS NFR BLD AUTO: 52.6 %
NITRITE URINE: NEGATIVE
NONHDLC SERPL-MCNC: 80 MG/DL
PH URINE: 7
PLATELET # BLD AUTO: 306 K/UL
POTASSIUM SERPL-SCNC: 4.9 MMOL/L
PROT SERPL-MCNC: 8 G/DL
PROTEIN URINE: NEGATIVE MG/DL
PSA SERPL-MCNC: 1.17 NG/ML
RBC # BLD: 4.08 M/UL
RBC # FLD: 14 %
SODIUM SERPL-SCNC: 140 MMOL/L
SPECIFIC GRAVITY URINE: 1.02
T3RU NFR SERPL: 1 TBI
T4 SERPL-MCNC: 9 UG/DL
TRIGL SERPL-MCNC: 311 MG/DL
TSH SERPL-ACNC: 2.55 UIU/ML
URATE SERPL-MCNC: 8.5 MG/DL
UROBILINOGEN URINE: 0.2 MG/DL
WBC # FLD AUTO: 6.15 K/UL

## 2024-12-23 ENCOUNTER — APPOINTMENT (OUTPATIENT)
Dept: INTERNAL MEDICINE | Facility: CLINIC | Age: 54
End: 2024-12-23
Payer: MEDICARE

## 2024-12-23 ENCOUNTER — LABORATORY RESULT (OUTPATIENT)
Age: 54
End: 2024-12-23

## 2024-12-23 VITALS — DIASTOLIC BLOOD PRESSURE: 70 MMHG | SYSTOLIC BLOOD PRESSURE: 110 MMHG

## 2024-12-23 VITALS — HEIGHT: 65 IN

## 2024-12-23 DIAGNOSIS — E78.00 PURE HYPERCHOLESTEROLEMIA, UNSPECIFIED: ICD-10-CM

## 2024-12-23 DIAGNOSIS — I42.9 CARDIOMYOPATHY, UNSPECIFIED: ICD-10-CM

## 2024-12-23 DIAGNOSIS — I10 ESSENTIAL (PRIMARY) HYPERTENSION: ICD-10-CM

## 2024-12-23 PROCEDURE — 99214 OFFICE O/P EST MOD 30 MIN: CPT

## 2024-12-23 PROCEDURE — G2211 COMPLEX E/M VISIT ADD ON: CPT

## 2024-12-23 PROCEDURE — 36415 COLL VENOUS BLD VENIPUNCTURE: CPT

## 2024-12-24 LAB
ALBUMIN SERPL ELPH-MCNC: 4 G/DL
ALP BLD-CCNC: 120 U/L
ALT SERPL-CCNC: 18 U/L
ANION GAP SERPL CALC-SCNC: 16 MMOL/L
AST SERPL-CCNC: 14 U/L
BASOPHILS # BLD AUTO: 0.06 K/UL
BASOPHILS NFR BLD AUTO: 0.7 %
BILIRUB SERPL-MCNC: 0.4 MG/DL
BUN SERPL-MCNC: 30 MG/DL
CALCIUM SERPL-MCNC: 10.1 MG/DL
CHLORIDE SERPL-SCNC: 100 MMOL/L
CHOLEST SERPL-MCNC: 118 MG/DL
CO2 SERPL-SCNC: 17 MMOL/L
CREAT SERPL-MCNC: 1.5 MG/DL
EGFR: 55 ML/MIN/1.73M2
EOSINOPHIL # BLD AUTO: 0.02 K/UL
EOSINOPHIL NFR BLD AUTO: 0.2 %
ESTIMATED AVERAGE GLUCOSE: 229 MG/DL
GLUCOSE SERPL-MCNC: 578 MG/DL
HBA1C MFR BLD HPLC: 9.6 %
HCT VFR BLD CALC: 40 %
HDLC SERPL-MCNC: 33 MG/DL
HGB BLD-MCNC: 12.7 G/DL
IMM GRANULOCYTES NFR BLD AUTO: 2 %
LDLC SERPL CALC-MCNC: 42 MG/DL
LYMPHOCYTES # BLD AUTO: 1.73 K/UL
LYMPHOCYTES NFR BLD AUTO: 18.8 %
MAN DIFF?: NORMAL
MCHC RBC-ENTMCNC: 28.6 PG
MCHC RBC-ENTMCNC: 31.8 G/DL
MCV RBC AUTO: 90.1 FL
MONOCYTES # BLD AUTO: 0.64 K/UL
MONOCYTES NFR BLD AUTO: 6.9 %
NEUTROPHILS # BLD AUTO: 6.59 K/UL
NEUTROPHILS NFR BLD AUTO: 71.4 %
NONHDLC SERPL-MCNC: 85 MG/DL
PLATELET # BLD AUTO: 300 K/UL
POTASSIUM SERPL-SCNC: 5 MMOL/L
PROT SERPL-MCNC: 7.6 G/DL
RBC # BLD: 4.44 M/UL
RBC # FLD: 13 %
SODIUM SERPL-SCNC: 134 MMOL/L
T3RU NFR SERPL: 0.9 TBI
T4 SERPL-MCNC: 7.5 UG/DL
TRIGL SERPL-MCNC: 286 MG/DL
TSH SERPL-ACNC: 2.11 UIU/ML
URATE SERPL-MCNC: 6.8 MG/DL
WBC # FLD AUTO: 9.22 K/UL

## 2025-01-03 NOTE — PROGRESS NOTE ADULT - PROBLEM SELECTOR PLAN 1
Left lower extremity angiogram Pt. with prior history of LDRT in 1978, now with progressive allograft CKD admitted for HD initiation via RIJ Tunneled HD catheter. Pt. now deemed ESRD. Pt initiated on HD on 6/14/22. Seen on maintenance HD today. RIJ tunneled dialysis catheter functioning well and BP stable during HD. Plan for next maintenance HD On Thursday. Monitor BMP

## 2025-01-21 ENCOUNTER — OUTPATIENT (OUTPATIENT)
Dept: OUTPATIENT SERVICES | Facility: HOSPITAL | Age: 55
LOS: 1 days | Discharge: HOME | End: 2025-01-21

## 2025-01-21 DIAGNOSIS — D64.9 ANEMIA, UNSPECIFIED: ICD-10-CM

## 2025-01-21 DIAGNOSIS — Z98.890 OTHER SPECIFIED POSTPROCEDURAL STATES: Chronic | ICD-10-CM

## 2025-01-21 DIAGNOSIS — Z94.0 KIDNEY TRANSPLANT STATUS: Chronic | ICD-10-CM

## 2025-01-24 ENCOUNTER — RESULT REVIEW (OUTPATIENT)
Age: 55
End: 2025-01-24

## 2025-01-24 ENCOUNTER — APPOINTMENT (OUTPATIENT)
Dept: HEMATOLOGY ONCOLOGY | Facility: CLINIC | Age: 55
End: 2025-01-24
Payer: COMMERCIAL

## 2025-01-24 ENCOUNTER — NON-APPOINTMENT (OUTPATIENT)
Age: 55
End: 2025-01-24

## 2025-01-24 VITALS
TEMPERATURE: 96.8 F | DIASTOLIC BLOOD PRESSURE: 82 MMHG | HEART RATE: 101 BPM | OXYGEN SATURATION: 98 % | SYSTOLIC BLOOD PRESSURE: 136 MMHG | BODY MASS INDEX: 35.26 KG/M2 | WEIGHT: 211.64 LBS | RESPIRATION RATE: 16 BRPM | HEIGHT: 65 IN

## 2025-01-24 LAB
BASOPHILS # BLD AUTO: 0.04 K/UL — SIGNIFICANT CHANGE UP (ref 0–0.2)
BASOPHILS NFR BLD AUTO: 0.6 % — SIGNIFICANT CHANGE UP (ref 0–2)
EOSINOPHIL # BLD AUTO: 0.03 K/UL — SIGNIFICANT CHANGE UP (ref 0–0.5)
EOSINOPHIL NFR BLD AUTO: 0.4 % — SIGNIFICANT CHANGE UP (ref 0–6)
FERRITIN SERPL-MCNC: 534 NG/ML
FOLATE SERPL-MCNC: 16.3 NG/ML
HCT VFR BLD CALC: 41 % — SIGNIFICANT CHANGE UP (ref 39–50)
HGB BLD-MCNC: 13.6 G/DL — SIGNIFICANT CHANGE UP (ref 13–17)
IMM GRANULOCYTES NFR BLD AUTO: 1.7 % — HIGH (ref 0–0.9)
IRON SATN MFR SERPL: 29 %
IRON SERPL-MCNC: 73 UG/DL
LYMPHOCYTES # BLD AUTO: 2.59 K/UL — SIGNIFICANT CHANGE UP (ref 1–3.3)
LYMPHOCYTES # BLD AUTO: 36.4 % — SIGNIFICANT CHANGE UP (ref 13–44)
MCHC RBC-ENTMCNC: 28.4 PG — SIGNIFICANT CHANGE UP (ref 27–34)
MCHC RBC-ENTMCNC: 33.2 G/DL — SIGNIFICANT CHANGE UP (ref 32–36)
MCV RBC AUTO: 85.6 FL — SIGNIFICANT CHANGE UP (ref 80–100)
MONOCYTES # BLD AUTO: 0.66 K/UL — SIGNIFICANT CHANGE UP (ref 0–0.9)
MONOCYTES NFR BLD AUTO: 9.3 % — SIGNIFICANT CHANGE UP (ref 2–14)
NEUTROPHILS # BLD AUTO: 3.68 K/UL — SIGNIFICANT CHANGE UP (ref 1.8–7.4)
NEUTROPHILS NFR BLD AUTO: 51.6 % — SIGNIFICANT CHANGE UP (ref 43–77)
NRBC # BLD: 0 /100 WBCS — SIGNIFICANT CHANGE UP (ref 0–0)
NRBC BLD-RTO: 0 /100 WBCS — SIGNIFICANT CHANGE UP (ref 0–0)
PLATELET # BLD AUTO: 282 K/UL — SIGNIFICANT CHANGE UP (ref 150–400)
RBC # BLD: 4.79 M/UL — SIGNIFICANT CHANGE UP (ref 4.2–5.8)
RBC # FLD: 12.4 % — SIGNIFICANT CHANGE UP (ref 10.3–14.5)
RETICS #: 122.1 K/UL — SIGNIFICANT CHANGE UP (ref 25–125)
RETICS/RBC NFR: 2.6 % — HIGH (ref 0.5–2.5)
TIBC SERPL-MCNC: 251 UG/DL
UIBC SERPL-MCNC: 179 UG/DL
VIT B12 SERPL-MCNC: 817 PG/ML
WBC # BLD: 7.12 K/UL — SIGNIFICANT CHANGE UP (ref 3.8–10.5)
WBC # FLD AUTO: 7.12 K/UL — SIGNIFICANT CHANGE UP (ref 3.8–10.5)

## 2025-01-24 PROCEDURE — 99214 OFFICE O/P EST MOD 30 MIN: CPT

## 2025-02-26 ENCOUNTER — INPATIENT (INPATIENT)
Facility: HOSPITAL | Age: 55
LOS: 1 days | Discharge: HOME HEALTH | DRG: 641 | End: 2025-02-28
Attending: STUDENT IN AN ORGANIZED HEALTH CARE EDUCATION/TRAINING PROGRAM | Admitting: STUDENT IN AN ORGANIZED HEALTH CARE EDUCATION/TRAINING PROGRAM
Payer: MEDICARE

## 2025-02-26 VITALS
SYSTOLIC BLOOD PRESSURE: 125 MMHG | OXYGEN SATURATION: 99 % | RESPIRATION RATE: 20 BRPM | HEIGHT: 64 IN | DIASTOLIC BLOOD PRESSURE: 75 MMHG | HEART RATE: 84 BPM | TEMPERATURE: 98 F | WEIGHT: 197.98 LBS

## 2025-02-26 DIAGNOSIS — R73.9 HYPERGLYCEMIA, UNSPECIFIED: ICD-10-CM

## 2025-02-26 DIAGNOSIS — Z98.890 OTHER SPECIFIED POSTPROCEDURAL STATES: Chronic | ICD-10-CM

## 2025-02-26 DIAGNOSIS — Z94.0 KIDNEY TRANSPLANT STATUS: Chronic | ICD-10-CM

## 2025-02-26 LAB
ALBUMIN SERPL ELPH-MCNC: 3.8 G/DL — SIGNIFICANT CHANGE UP (ref 3.3–5)
ALP SERPL-CCNC: 124 U/L — HIGH (ref 40–120)
ALT FLD-CCNC: 17 U/L — SIGNIFICANT CHANGE UP (ref 10–45)
ANION GAP SERPL CALC-SCNC: 15 MMOL/L — SIGNIFICANT CHANGE UP (ref 5–17)
ANION GAP SERPL CALC-SCNC: 18 MMOL/L — HIGH (ref 5–17)
APPEARANCE UR: CLEAR — SIGNIFICANT CHANGE UP
AST SERPL-CCNC: 18 U/L — SIGNIFICANT CHANGE UP (ref 10–40)
B-OH-BUTYR SERPL-SCNC: 1.8 MMOL/L — HIGH
B-OH-BUTYR SERPL-SCNC: 2.5 MMOL/L — HIGH
BACTERIA # UR AUTO: NEGATIVE /HPF — SIGNIFICANT CHANGE UP
BASOPHILS # BLD AUTO: 0.02 K/UL — SIGNIFICANT CHANGE UP (ref 0–0.2)
BASOPHILS NFR BLD AUTO: 0.2 % — SIGNIFICANT CHANGE UP (ref 0–2)
BILIRUB SERPL-MCNC: 0.5 MG/DL — SIGNIFICANT CHANGE UP (ref 0.2–1.2)
BILIRUB UR-MCNC: NEGATIVE — SIGNIFICANT CHANGE UP
BUN SERPL-MCNC: 26 MG/DL — HIGH (ref 7–23)
BUN SERPL-MCNC: 28 MG/DL — HIGH (ref 7–23)
CALCIUM SERPL-MCNC: 10.3 MG/DL — SIGNIFICANT CHANGE UP (ref 8.4–10.5)
CALCIUM SERPL-MCNC: 9.5 MG/DL — SIGNIFICANT CHANGE UP (ref 8.4–10.5)
CAST: 2 /LPF — SIGNIFICANT CHANGE UP (ref 0–4)
CHLORIDE SERPL-SCNC: 100 MMOL/L — SIGNIFICANT CHANGE UP (ref 96–108)
CHLORIDE SERPL-SCNC: 95 MMOL/L — LOW (ref 96–108)
CO2 SERPL-SCNC: 16 MMOL/L — LOW (ref 22–31)
CO2 SERPL-SCNC: 17 MMOL/L — LOW (ref 22–31)
COLOR SPEC: YELLOW — SIGNIFICANT CHANGE UP
CREAT SERPL-MCNC: 1.35 MG/DL — HIGH (ref 0.5–1.3)
CREAT SERPL-MCNC: 1.5 MG/DL — HIGH (ref 0.5–1.3)
DIFF PNL FLD: NEGATIVE — SIGNIFICANT CHANGE UP
EGFR: 55 ML/MIN/1.73M2 — LOW
EGFR: 62 ML/MIN/1.73M2 — SIGNIFICANT CHANGE UP
EOSINOPHIL # BLD AUTO: 0.01 K/UL — SIGNIFICANT CHANGE UP (ref 0–0.5)
EOSINOPHIL NFR BLD AUTO: 0.1 % — SIGNIFICANT CHANGE UP (ref 0–6)
GAS PNL BLDV: SIGNIFICANT CHANGE UP
GLUCOSE BLDC GLUCOMTR-MCNC: 363 MG/DL — HIGH (ref 70–99)
GLUCOSE BLDC GLUCOMTR-MCNC: 449 MG/DL — HIGH (ref 70–99)
GLUCOSE BLDC GLUCOMTR-MCNC: 457 MG/DL — CRITICAL HIGH (ref 70–99)
GLUCOSE SERPL-MCNC: 474 MG/DL — CRITICAL HIGH (ref 70–99)
GLUCOSE SERPL-MCNC: 576 MG/DL — CRITICAL HIGH (ref 70–99)
GLUCOSE UR QL: >=1000 MG/DL
HCT VFR BLD CALC: 37.6 % — LOW (ref 39–50)
HGB BLD-MCNC: 12.5 G/DL — LOW (ref 13–17)
IMM GRANULOCYTES NFR BLD AUTO: 1.9 % — HIGH (ref 0–0.9)
KETONES UR-MCNC: ABNORMAL MG/DL
LACTATE SERPL-SCNC: 1 MMOL/L — SIGNIFICANT CHANGE UP (ref 0.5–2)
LEUKOCYTE ESTERASE UR-ACNC: NEGATIVE — SIGNIFICANT CHANGE UP
LYMPHOCYTES # BLD AUTO: 1.95 K/UL — SIGNIFICANT CHANGE UP (ref 1–3.3)
LYMPHOCYTES # BLD AUTO: 21 % — SIGNIFICANT CHANGE UP (ref 13–44)
MAGNESIUM SERPL-MCNC: 1.4 MG/DL — LOW (ref 1.6–2.6)
MCHC RBC-ENTMCNC: 27.8 PG — SIGNIFICANT CHANGE UP (ref 27–34)
MCHC RBC-ENTMCNC: 33.2 G/DL — SIGNIFICANT CHANGE UP (ref 32–36)
MCV RBC AUTO: 83.6 FL — SIGNIFICANT CHANGE UP (ref 80–100)
MONOCYTES # BLD AUTO: 0.48 K/UL — SIGNIFICANT CHANGE UP (ref 0–0.9)
MONOCYTES NFR BLD AUTO: 5.2 % — SIGNIFICANT CHANGE UP (ref 2–14)
NEUTROPHILS # BLD AUTO: 6.64 K/UL — SIGNIFICANT CHANGE UP (ref 1.8–7.4)
NEUTROPHILS NFR BLD AUTO: 71.6 % — SIGNIFICANT CHANGE UP (ref 43–77)
NITRITE UR-MCNC: NEGATIVE — SIGNIFICANT CHANGE UP
NRBC BLD AUTO-RTO: 0 /100 WBCS — SIGNIFICANT CHANGE UP (ref 0–0)
PH UR: 5 — SIGNIFICANT CHANGE UP (ref 5–8)
PHOSPHATE SERPL-MCNC: 2.4 MG/DL — LOW (ref 2.5–4.5)
PLATELET # BLD AUTO: 325 K/UL — SIGNIFICANT CHANGE UP (ref 150–400)
POTASSIUM SERPL-MCNC: 4.2 MMOL/L — SIGNIFICANT CHANGE UP (ref 3.5–5.3)
POTASSIUM SERPL-MCNC: 4.5 MMOL/L — SIGNIFICANT CHANGE UP (ref 3.5–5.3)
POTASSIUM SERPL-SCNC: 4.2 MMOL/L — SIGNIFICANT CHANGE UP (ref 3.5–5.3)
POTASSIUM SERPL-SCNC: 4.5 MMOL/L — SIGNIFICANT CHANGE UP (ref 3.5–5.3)
PROT SERPL-MCNC: 7.4 G/DL — SIGNIFICANT CHANGE UP (ref 6–8.3)
PROT UR-MCNC: NEGATIVE MG/DL — SIGNIFICANT CHANGE UP
RBC # BLD: 4.5 M/UL — SIGNIFICANT CHANGE UP (ref 4.2–5.8)
RBC # FLD: 12.9 % — SIGNIFICANT CHANGE UP (ref 10.3–14.5)
RBC CASTS # UR COMP ASSIST: 0 /HPF — SIGNIFICANT CHANGE UP (ref 0–4)
REVIEW: SIGNIFICANT CHANGE UP
SODIUM SERPL-SCNC: 129 MMOL/L — LOW (ref 135–145)
SODIUM SERPL-SCNC: 132 MMOL/L — LOW (ref 135–145)
SP GR SPEC: 1.03 — SIGNIFICANT CHANGE UP (ref 1–1.03)
SQUAMOUS # UR AUTO: 0 /HPF — SIGNIFICANT CHANGE UP (ref 0–5)
UROBILINOGEN FLD QL: 0.2 MG/DL — SIGNIFICANT CHANGE UP (ref 0.2–1)
WBC # BLD: 9.28 K/UL — SIGNIFICANT CHANGE UP (ref 3.8–10.5)
WBC # FLD AUTO: 9.28 K/UL — SIGNIFICANT CHANGE UP (ref 3.8–10.5)
WBC UR QL: 0 /HPF — SIGNIFICANT CHANGE UP (ref 0–5)

## 2025-02-26 PROCEDURE — 99285 EMERGENCY DEPT VISIT HI MDM: CPT

## 2025-02-26 RX ORDER — MAGNESIUM SULFATE 500 MG/ML
2 SYRINGE (ML) INJECTION ONCE
Refills: 0 | Status: COMPLETED | OUTPATIENT
Start: 2025-02-26 | End: 2025-02-26

## 2025-02-26 RX ORDER — INSULIN LISPRO 100 U/ML
6 INJECTION, SOLUTION INTRAVENOUS; SUBCUTANEOUS ONCE
Refills: 0 | Status: COMPLETED | OUTPATIENT
Start: 2025-02-26 | End: 2025-02-26

## 2025-02-26 RX ORDER — INSULIN GLARGINE-YFGN 100 [IU]/ML
12 INJECTION, SOLUTION SUBCUTANEOUS AT BEDTIME
Refills: 0 | Status: DISCONTINUED | OUTPATIENT
Start: 2025-02-26 | End: 2025-02-27

## 2025-02-26 RX ADMIN — INSULIN LISPRO 6 UNIT(S): 100 INJECTION, SOLUTION INTRAVENOUS; SUBCUTANEOUS at 22:12

## 2025-02-26 RX ADMIN — Medication 1000 MILLILITER(S): at 19:56

## 2025-02-26 RX ADMIN — INSULIN GLARGINE-YFGN 12 UNIT(S): 100 INJECTION, SOLUTION SUBCUTANEOUS at 20:00

## 2025-02-26 RX ADMIN — Medication 1000 MILLILITER(S): at 22:14

## 2025-02-26 RX ADMIN — Medication 25 GRAM(S): at 19:56

## 2025-02-26 RX ADMIN — Medication 1000 MILLILITER(S): at 18:26

## 2025-02-26 NOTE — H&P ADULT - NSHPSOCIALHISTORY_GEN_ALL_CORE
single, lives with sister  no smoking or alcohol  retired [place]   ambulate without assistance, independent

## 2025-02-26 NOTE — PATIENT PROFILE ADULT - FALL HARM RISK - RISK INTERVENTIONS

## 2025-02-26 NOTE — ED ADULT TRIAGE NOTE - PAIN RATING/NUMBER SCALE (0-10): ACTIVITY
0 (no pain/absence of nonverbal indicators of pain)
Significant decrease of oral intake greater than 5 days prior to admission

## 2025-02-26 NOTE — ED ADULT TRIAGE NOTE - CHIEF COMPLAINT QUOTE
Pt reports having general weakness. MD sent to further evaluation.  HA1c 14Hx Kidney transplant 2023

## 2025-02-26 NOTE — ED PROVIDER NOTE - OBJECTIVE STATEMENT
The patient is a 55yo male with PMHx of HTN, ESRD (s/p L-renal transplant ate age 8, complicated by graft rejection and s/p new L-renal transplant in 2023) presenting from his nephrologist's office for hyperglycemia. The patient reported that he went to see one of his nephrologists at [facility] (Dr. Tana Johnson) for a routine visit. After coming home, he received a call from the office advising him to come to the ED because his glucose was 490 and hemoglobin A1c was 14%. The patient reported that he has never had high glucose like this in the past and denied any previous diagnoses of DM. He reports that he has felt some weakness in his legs with exertion ("walking like 3-4 steps up the stairs) for the past couple of months. He reports increased urinary frequency, but says this is his baseline. He denied CP, SOB, abdominal pain, N/V, numbness/tingling in extremities. Reported most recent illness was in Dec for COVID. Most recent med change was this morning after nephrologist visit -- no other reported med changes in the past few months. Patient is on Prednisone 5mg qd (see other meds below).     Patient's home meds: Tacrolimus 1mg BID (1 in am + 1 in pm), Mycophenolate 500mg TID (2 in am + 1 in pm), Prednisone 5mg qd, Famotidine 20mg qd, ASA 81mg qd, Sodium bicarb 650mg, Atorvastatin 20mg qd, Tamsulosin 0.4mg qd, Nifedipine 30mg TID (2 in am + 1 in pm), Metoprolol tartrate 50mg BID

## 2025-02-26 NOTE — H&P ADULT - CONVERSATION DETAILS
I personally spent 16 minutes face-to-face time in discussion of advance directives including but not limited to DNR/DNI and plan of care in details.  Patient is FULL CODE and his sister, will be his HCP I personally spent 16 minutes face-to-face time in discussion of advance directives including but not limited to DNR/DNI and plan of care in details.  Patient is FULL CODE and his sister, Cayla [phone], is his HCP.

## 2025-02-26 NOTE — H&P ADULT - ADDITIONAL PE
T(F): 97.7 (27 Feb 2025 04:28), Max: 98.3 (26 Feb 2025 20:06)  HR: 73 (27 Feb 2025 04:28) (70 - 94)  BP: 130/84 (27 Feb 2025 04:28) (117/76 - 157/89)  RR: 18 (27 Feb 2025 04:28) (18 - 20)  SpO2: 96% (27 Feb 2025 04:28) (96% - 99%): room air

## 2025-02-26 NOTE — ED ADULT NURSE REASSESSMENT NOTE - NS ED NURSE REASSESS COMMENT FT1
Assumed care from Ena BETANCUR. Pt resting in stretcher comfortably,  MD Tobar aware, vitals stable, denies chest pain, shortness of breath, repeat labs sent, pending admission bed

## 2025-02-26 NOTE — ED ADULT NURSE NOTE - OBJECTIVE STATEMENT
Pt is a 54y M A&O3 PMHx ESRD with renal transplant x2 (last transplant 2 years ago), HTN compliant with meds sent to the ED after labwork showed elevated BG to 400s and A1C 14%. Pt denies history of DM. Pt denies lightheadedness, shortness of breath, headache, chest pain. Pt denies pain. Pt breathing spontaneously and unlabored.

## 2025-02-26 NOTE — ED ADULT TRIAGE NOTE - CCCP TRG CHIEF CMPLNT
hyperglycemia [Follow-Up Visit] : a follow-up visit for [FreeTextEntry2] : left plantar foot laceration

## 2025-02-26 NOTE — H&P ADULT - PROBLEM SELECTOR PLAN 1
with glucose 600 on presentation  - s/p 3L IVF and Lantus 12 units QHS and short acting 6 units, now fingersticks are in 100-170 this morning  - will continue Lantus 12 u QHS and will cover with insulin sliding scales  - Endo consult later today (ED contacted overnight)

## 2025-02-26 NOTE — PATIENT PROFILE ADULT - NSPROHMDIABETMGMTSTRAT_GEN_A_NUR
activity/adequate rest/blood glucose testing/diet modification/exercise/insulin therapy/medication therapy/routine screenings/weight management

## 2025-02-26 NOTE — H&P ADULT - NSICDXFAMILYHX_GEN_ALL_CORE_FT
FAMILY HISTORY:  FH: esophageal cancer, Uncle,  in 7th decade  FH: kidney disease     FAMILY HISTORY:  FH: esophageal cancer, Uncle,  in 7th decade  FH: kidney disease    Mother  Still living? Unknown  FH: diabetes mellitus, Age at diagnosis: Age Unknown    Grandparent  Still living? Unknown  FH: diabetes mellitus, Age at diagnosis: Age Unknown

## 2025-02-26 NOTE — H&P ADULT - HISTORY OF PRESENT ILLNESS
55yo male with PMHx of HTN, ESRD (s/p L-renal transplant ate age 8, complicated by graft rejection and s/p new L-renal transplant in 2023) presenting from his nephrologist's office for hyperglycemia. The patient reported that he went to see one of his nephrologists at [facility] (Dr. Tana Johnson) for a routine visit. After coming home, he received a call from the office advising him to come to the ED because his glucose was 490 and hemoglobin A1c was 14%. The patient reported that he has never had high glucose like this in the past and denied any previous diagnoses of DM. He reports that he has felt some weakness in his legs with exertion ("walking like 3-4 steps up the stairs) for the past couple of months. He reports increased urinary frequency, but says this is his baseline. He denied CP, SOB, abdominal pain, N/V, numbness/tingling in extremities. Reported most recent illness was in Dec for COVID. Most recent med change was this morning after nephrologist visit -- no other reported med changes in the past few months. Patient is on Prednisone 5mg qd (see other meds below).     Patient's home meds: Tacrolimus 1mg BID (1 in am + 1 in pm), Mycophenolate 500mg TID (2 in am + 1 in pm), Prednisone 5mg qd, Famotidine 20mg qd, ASA 81mg qd, Sodium bicarb 650mg, Atorvastatin 20mg qd, Tamsulosin 0.4mg qd, Nifedipine 30mg TID (2 in am + 1 in pm), Metoprolol tartrate 50mg BID 54 year-old male with PMHx of HTN, ESRD (s/p L-renal transplant ate age 8, complicated by graft rejection and s/p new L-renal transplant in 2023) presents from his transplant nephrologist's office for hyperglycemia of 490 and A1C of 14%.  . Patient denies high glucose like this in the past or any previous diagnoses of DM, but on HIE chart review from PMD visit post COVID on 12/2024, patient was advised to go to ED for glucose of 600.  He did not follow up since. He reports that he has felt some weakness in his legs with exertion ("walking like 3-4 steps up the stairs) for the past couple of months, increased thirst, but denies increase urination, CP, SOB, abdominal pain, N/V, numbness/tingling in extremities. No recent medication change except Tacrolimus was decrease during yesterday visit from 2mg BID to 1mg BID.  Currently, feels well 54 year-old male with PMHx of HTN, ESRD (s/p L-renal transplant ate age 8, complicated by graft rejection and s/p new L-renal transplant in 2023) presents from his transplant nephrologist's office for hyperglycemia of 490 and A1C of 14%.  . Patient denies high glucose like this in the past or any previous diagnoses of DM, but on HIE chart review from PMD visit post COVID on 12/2024, patient was advised to go to ED for glucose of 600.  He did not follow up since. He reports that he has felt some weakness in his legs with exertion for the past couple of months, increased thirst, but denies increase urination, CP, SOB, abdominal pain, N/V, numbness/tingling in extremities. No recent medication change except Tacrolimus was decrease during yesterday visit from 2mg BID to 1mg BID.  Currently, he feels well

## 2025-02-26 NOTE — ED PROVIDER NOTE - PHYSICAL EXAMINATION
GENERAL: Well-appearing, no apparent distress. Cooperative with all questions asked.   EYES: PERRL, EOMI, no scleral icterus  NECK: Supple, symmetric, no cervical LAD  RESP: No respiratory distress, no accessory muscle use; CTA b/l, no wheezing or crackles  CV: RRR, no murmurs/rubs/gallops  VASCULAR: extremities warm and well-perfused; R-leg edematous (non-pitting; R > L; no erythema/warmth/tenderness); palpable bruit in distal LUE  GI: Soft, nontender, nondistended; L-renal transplant scars intact  MSK: Full ROM without pain  SKIN: No rashes or ulcers noted  NEURO: sensation to light touch intact in b/l lower extremities   PSYCH: Appropriate insight/judgment; A&O x3, recent/remote memory intact GENERAL: Well-appearing, no apparent distress. Cooperative with all questions asked.   EYES: PERRL, EOMI, no scleral icterus  NECK: Supple, symmetric, no cervical LAD  RESP: No respiratory distress, no accessory muscle use; CTA b/l, no wheezing or crackles  CV: RRR, no murmurs/rubs/gallops  VASCULAR: extremities warm and well-perfused; R-leg edematous (non-pitting; R > L; no erythema/warmth/tenderness)- at baseline; palpable bruit in distal LUE  GI: Soft, nontender, nondistended; L-renal transplant scars intact  MSK: Full ROM without pain  SKIN: No rashes or ulcers noted  NEURO: sensation to light touch intact in b/l lower extremities   PSYCH: Appropriate insight/judgment; A&O x3,

## 2025-02-26 NOTE — ED PROVIDER NOTE - ATTENDING CONTRIBUTION TO CARE
54-year-old male history of hypertension end-stage renal disease on prednisone 5 mg daily no no change presents to the emergency department with elevated glucose patient saw the transplant nephrologist today who works at [facility] who recommended he come to the hospital because he had elevated glucose and hemoglobin A1c of 14% patient states that he has generalized weakness and has increased urination but no burning no fevers no chills no chest pain no shortness of breath.  Patient has no headaches or vision changes.  Patient at baseline has right greater than left lower leg swelling he has no leg pain.  No surrounding erythema.  Patient's findings likely consistent with new onset diabetes versus hyperglycemia induced from glucocorticoid use which is secondary to avoid rejection in the setting of his kidney transplant.  Patient will have labs evaluate for possible DKA versus [facility] labs reviewed patient with slightly elevated anion gap will likely correct with IV fluids will reassess and admit for further management of his hyperglycemia and initiation of insulin with insulin teaching

## 2025-02-26 NOTE — H&P ADULT - ASSESSMENT
54 year-old male with PMHx of HTN, ESRD (s/p L-renal transplant ate age 8, complicated by graft rejection and s/p new L-renal transplant in 2023) presents with hyperglycemia 600 admitted with newly diagnosed diabetes with hyperosmolar state

## 2025-02-27 DIAGNOSIS — E11.00 TYPE 2 DIABETES MELLITUS WITH HYPEROSMOLARITY WITHOUT NONKETOTIC HYPERGLYCEMIC-HYPEROSMOLAR COMA (NKHHC): ICD-10-CM

## 2025-02-27 DIAGNOSIS — E11.9 TYPE 2 DIABETES MELLITUS WITHOUT COMPLICATIONS: ICD-10-CM

## 2025-02-27 DIAGNOSIS — E87.1 HYPO-OSMOLALITY AND HYPONATREMIA: ICD-10-CM

## 2025-02-27 DIAGNOSIS — Z29.9 ENCOUNTER FOR PROPHYLACTIC MEASURES, UNSPECIFIED: ICD-10-CM

## 2025-02-27 DIAGNOSIS — I10 ESSENTIAL (PRIMARY) HYPERTENSION: ICD-10-CM

## 2025-02-27 DIAGNOSIS — Z94.0 KIDNEY TRANSPLANT STATUS: ICD-10-CM

## 2025-02-27 DIAGNOSIS — K21.9 GASTRO-ESOPHAGEAL REFLUX DISEASE WITHOUT ESOPHAGITIS: ICD-10-CM

## 2025-02-27 LAB
A1C WITH ESTIMATED AVERAGE GLUCOSE RESULT: >15.5 % — HIGH (ref 4–5.6)
ANION GAP SERPL CALC-SCNC: 17 MMOL/L — SIGNIFICANT CHANGE UP (ref 5–17)
BUN SERPL-MCNC: 14 MG/DL — SIGNIFICANT CHANGE UP (ref 7–23)
CALCIUM SERPL-MCNC: 9.7 MG/DL — SIGNIFICANT CHANGE UP (ref 8.4–10.5)
CHLORIDE SERPL-SCNC: 105 MMOL/L — SIGNIFICANT CHANGE UP (ref 96–108)
CO2 SERPL-SCNC: 18 MMOL/L — LOW (ref 22–31)
CREAT SERPL-MCNC: 0.99 MG/DL — SIGNIFICANT CHANGE UP (ref 0.5–1.3)
EGFR: 91 ML/MIN/1.73M2 — SIGNIFICANT CHANGE UP
ESTIMATED AVERAGE GLUCOSE: >398 MG/DL — HIGH (ref 68–114)
GLUCOSE BLDC GLUCOMTR-MCNC: 101 MG/DL — HIGH (ref 70–99)
GLUCOSE BLDC GLUCOMTR-MCNC: 170 MG/DL — HIGH (ref 70–99)
GLUCOSE BLDC GLUCOMTR-MCNC: 178 MG/DL — HIGH (ref 70–99)
GLUCOSE BLDC GLUCOMTR-MCNC: 251 MG/DL — HIGH (ref 70–99)
GLUCOSE BLDC GLUCOMTR-MCNC: 267 MG/DL — HIGH (ref 70–99)
GLUCOSE BLDC GLUCOMTR-MCNC: 328 MG/DL — HIGH (ref 70–99)
GLUCOSE BLDC GLUCOMTR-MCNC: 331 MG/DL — HIGH (ref 70–99)
GLUCOSE BLDC GLUCOMTR-MCNC: 331 MG/DL — HIGH (ref 70–99)
GLUCOSE BLDC GLUCOMTR-MCNC: 342 MG/DL — HIGH (ref 70–99)
GLUCOSE BLDC GLUCOMTR-MCNC: 462 MG/DL — CRITICAL HIGH (ref 70–99)
GLUCOSE BLDC GLUCOMTR-MCNC: 508 MG/DL — CRITICAL HIGH (ref 70–99)
GLUCOSE SERPL-MCNC: 203 MG/DL — HIGH (ref 70–99)
HCT VFR BLD CALC: 40.6 % — SIGNIFICANT CHANGE UP (ref 39–50)
HGB BLD-MCNC: 13.3 G/DL — SIGNIFICANT CHANGE UP (ref 13–17)
MCHC RBC-ENTMCNC: 28.2 PG — SIGNIFICANT CHANGE UP (ref 27–34)
MCHC RBC-ENTMCNC: 32.8 G/DL — SIGNIFICANT CHANGE UP (ref 32–36)
MCV RBC AUTO: 86.2 FL — SIGNIFICANT CHANGE UP (ref 80–100)
NRBC BLD AUTO-RTO: 0 /100 WBCS — SIGNIFICANT CHANGE UP (ref 0–0)
PLATELET # BLD AUTO: 242 K/UL — SIGNIFICANT CHANGE UP (ref 150–400)
POTASSIUM SERPL-MCNC: 4.2 MMOL/L — SIGNIFICANT CHANGE UP (ref 3.5–5.3)
POTASSIUM SERPL-SCNC: 4.2 MMOL/L — SIGNIFICANT CHANGE UP (ref 3.5–5.3)
RBC # BLD: 4.71 M/UL — SIGNIFICANT CHANGE UP (ref 4.2–5.8)
RBC # FLD: 13 % — SIGNIFICANT CHANGE UP (ref 10.3–14.5)
SODIUM SERPL-SCNC: 140 MMOL/L — SIGNIFICANT CHANGE UP (ref 135–145)
TACROLIMUS SERPL-MCNC: 9.4 NG/ML — SIGNIFICANT CHANGE UP
WBC # BLD: 7.02 K/UL — SIGNIFICANT CHANGE UP (ref 3.8–10.5)
WBC # FLD AUTO: 7.02 K/UL — SIGNIFICANT CHANGE UP (ref 3.8–10.5)

## 2025-02-27 PROCEDURE — 99497 ADVNCD CARE PLAN 30 MIN: CPT | Mod: 25

## 2025-02-27 PROCEDURE — 99223 1ST HOSP IP/OBS HIGH 75: CPT

## 2025-02-27 RX ORDER — SODIUM CHLORIDE 9 G/1000ML
1000 INJECTION, SOLUTION INTRAVENOUS
Refills: 0 | Status: DISCONTINUED | OUTPATIENT
Start: 2025-02-27 | End: 2025-02-28

## 2025-02-27 RX ORDER — NIFEDIPINE 30 MG
60 TABLET, EXTENDED RELEASE 24 HR ORAL
Refills: 0 | Status: DISCONTINUED | OUTPATIENT
Start: 2025-02-27 | End: 2025-02-28

## 2025-02-27 RX ORDER — ASPIRIN 325 MG
81 TABLET ORAL DAILY
Refills: 0 | Status: DISCONTINUED | OUTPATIENT
Start: 2025-02-27 | End: 2025-02-28

## 2025-02-27 RX ORDER — NIFEDIPINE 30 MG
30 TABLET, EXTENDED RELEASE 24 HR ORAL DAILY
Refills: 0 | Status: DISCONTINUED | OUTPATIENT
Start: 2025-02-27 | End: 2025-02-27

## 2025-02-27 RX ORDER — INSULIN LISPRO 100 U/ML
INJECTION, SOLUTION INTRAVENOUS; SUBCUTANEOUS AT BEDTIME
Refills: 0 | Status: DISCONTINUED | OUTPATIENT
Start: 2025-02-27 | End: 2025-02-28

## 2025-02-27 RX ORDER — ATORVASTATIN CALCIUM 80 MG/1
20 TABLET, FILM COATED ORAL AT BEDTIME
Refills: 0 | Status: DISCONTINUED | OUTPATIENT
Start: 2025-02-27 | End: 2025-02-28

## 2025-02-27 RX ORDER — SODIUM BICARBONATE 1 MEQ/ML
1300 SYRINGE (ML) INTRAVENOUS THREE TIMES A DAY
Refills: 0 | Status: DISCONTINUED | OUTPATIENT
Start: 2025-02-27 | End: 2025-02-28

## 2025-02-27 RX ORDER — GLUCAGON 3 MG/1
1 POWDER NASAL ONCE
Refills: 0 | Status: DISCONTINUED | OUTPATIENT
Start: 2025-02-27 | End: 2025-02-28

## 2025-02-27 RX ORDER — MYCOPHENOLATE MOFETIL 500 MG/1
1000 TABLET, FILM COATED ORAL
Refills: 0 | Status: DISCONTINUED | OUTPATIENT
Start: 2025-02-27 | End: 2025-02-28

## 2025-02-27 RX ORDER — NIFEDIPINE 30 MG
30 TABLET, EXTENDED RELEASE 24 HR ORAL AT BEDTIME
Refills: 0 | Status: DISCONTINUED | OUTPATIENT
Start: 2025-02-27 | End: 2025-02-28

## 2025-02-27 RX ORDER — MYCOPHENOLATE MOFETIL 500 MG/1
500 TABLET, FILM COATED ORAL AT BEDTIME
Refills: 0 | Status: DISCONTINUED | OUTPATIENT
Start: 2025-02-27 | End: 2025-02-28

## 2025-02-27 RX ORDER — INSULIN LISPRO 100 U/ML
5 INJECTION, SOLUTION INTRAVENOUS; SUBCUTANEOUS
Refills: 0 | Status: DISCONTINUED | OUTPATIENT
Start: 2025-02-27 | End: 2025-02-28

## 2025-02-27 RX ORDER — INSULIN GLARGINE-YFGN 100 [IU]/ML
14 INJECTION, SOLUTION SUBCUTANEOUS AT BEDTIME
Refills: 0 | Status: DISCONTINUED | OUTPATIENT
Start: 2025-02-27 | End: 2025-02-28

## 2025-02-27 RX ORDER — DEXTROSE 50 % IN WATER 50 %
15 SYRINGE (ML) INTRAVENOUS ONCE
Refills: 0 | Status: DISCONTINUED | OUTPATIENT
Start: 2025-02-27 | End: 2025-02-28

## 2025-02-27 RX ORDER — DEXTROSE 50 % IN WATER 50 %
12.5 SYRINGE (ML) INTRAVENOUS ONCE
Refills: 0 | Status: DISCONTINUED | OUTPATIENT
Start: 2025-02-27 | End: 2025-02-28

## 2025-02-27 RX ORDER — PREDNISONE 20 MG/1
5 TABLET ORAL DAILY
Refills: 0 | Status: DISCONTINUED | OUTPATIENT
Start: 2025-02-27 | End: 2025-02-28

## 2025-02-27 RX ORDER — DEXTROSE 50 % IN WATER 50 %
25 SYRINGE (ML) INTRAVENOUS ONCE
Refills: 0 | Status: DISCONTINUED | OUTPATIENT
Start: 2025-02-27 | End: 2025-02-28

## 2025-02-27 RX ORDER — MYCOPHENOLATE MOFETIL 500 MG/1
1000 TABLET, FILM COATED ORAL DAILY
Refills: 0 | Status: DISCONTINUED | OUTPATIENT
Start: 2025-02-27 | End: 2025-02-27

## 2025-02-27 RX ORDER — METOPROLOL SUCCINATE 50 MG/1
50 TABLET, EXTENDED RELEASE ORAL
Refills: 0 | Status: DISCONTINUED | OUTPATIENT
Start: 2025-02-27 | End: 2025-02-28

## 2025-02-27 RX ORDER — INSULIN LISPRO 100 U/ML
INJECTION, SOLUTION INTRAVENOUS; SUBCUTANEOUS
Refills: 0 | Status: DISCONTINUED | OUTPATIENT
Start: 2025-02-27 | End: 2025-02-28

## 2025-02-27 RX ORDER — ACETAMINOPHEN 500 MG/5ML
650 LIQUID (ML) ORAL EVERY 6 HOURS
Refills: 0 | Status: DISCONTINUED | OUTPATIENT
Start: 2025-02-27 | End: 2025-02-28

## 2025-02-27 RX ORDER — TACROLIMUS 0.5 MG/1
1 CAPSULE ORAL
Refills: 0 | Status: DISCONTINUED | OUTPATIENT
Start: 2025-02-27 | End: 2025-02-28

## 2025-02-27 RX ORDER — TAMSULOSIN HYDROCHLORIDE 0.4 MG/1
0.4 CAPSULE ORAL AT BEDTIME
Refills: 0 | Status: DISCONTINUED | OUTPATIENT
Start: 2025-02-27 | End: 2025-02-28

## 2025-02-27 RX ADMIN — MYCOPHENOLATE MOFETIL 500 MILLIGRAM(S): 500 TABLET, FILM COATED ORAL at 21:46

## 2025-02-27 RX ADMIN — Medication 1300 MILLIGRAM(S): at 21:42

## 2025-02-27 RX ADMIN — INSULIN LISPRO 4: 100 INJECTION, SOLUTION INTRAVENOUS; SUBCUTANEOUS at 17:21

## 2025-02-27 RX ADMIN — ATORVASTATIN CALCIUM 20 MILLIGRAM(S): 80 TABLET, FILM COATED ORAL at 21:46

## 2025-02-27 RX ADMIN — MYCOPHENOLATE MOFETIL 1000 MILLIGRAM(S): 500 TABLET, FILM COATED ORAL at 12:07

## 2025-02-27 RX ADMIN — Medication 1300 MILLIGRAM(S): at 16:04

## 2025-02-27 RX ADMIN — INSULIN LISPRO 3: 100 INJECTION, SOLUTION INTRAVENOUS; SUBCUTANEOUS at 12:09

## 2025-02-27 RX ADMIN — Medication 30 MILLIGRAM(S): at 21:46

## 2025-02-27 RX ADMIN — INSULIN LISPRO 1: 100 INJECTION, SOLUTION INTRAVENOUS; SUBCUTANEOUS at 08:47

## 2025-02-27 RX ADMIN — Medication 81 MILLIGRAM(S): at 12:08

## 2025-02-27 RX ADMIN — TACROLIMUS 1 MILLIGRAM(S): 0.5 CAPSULE ORAL at 08:47

## 2025-02-27 RX ADMIN — PREDNISONE 5 MILLIGRAM(S): 20 TABLET ORAL at 08:47

## 2025-02-27 RX ADMIN — Medication 20 MILLIGRAM(S): at 12:08

## 2025-02-27 RX ADMIN — INSULIN GLARGINE-YFGN 14 UNIT(S): 100 INJECTION, SOLUTION SUBCUTANEOUS at 23:37

## 2025-02-27 RX ADMIN — Medication 60 MILLIGRAM(S): at 12:08

## 2025-02-27 RX ADMIN — INSULIN LISPRO 2: 100 INJECTION, SOLUTION INTRAVENOUS; SUBCUTANEOUS at 21:40

## 2025-02-27 RX ADMIN — TACROLIMUS 1 MILLIGRAM(S): 0.5 CAPSULE ORAL at 21:46

## 2025-02-27 RX ADMIN — TAMSULOSIN HYDROCHLORIDE 0.4 MILLIGRAM(S): 0.4 CAPSULE ORAL at 21:46

## 2025-02-27 RX ADMIN — METOPROLOL SUCCINATE 50 MILLIGRAM(S): 50 TABLET, EXTENDED RELEASE ORAL at 17:21

## 2025-02-27 NOTE — PROGRESS NOTE ADULT - PROBLEM SELECTOR PLAN 1
DKA   with glucose 600 on presentation. Elevated BHB and anion gap.   - s/p 3L IVF and Lantus 12 units QHS and short acting 6 units, now fingersticks are in 100-170 this morning  - will continue Lantus 12 u QHS and will cover with insulin sliding scales  - Endocrine consult for discharge regimen recommendations

## 2025-02-27 NOTE — CHART NOTE - NSCHARTNOTEFT_GEN_A_CORE
54 year-old male with PMHx of DM2, HTN, ESRD (s/p L-renal transplant ate age 8, complicated by graft rejection and s/p new L-renal transplant in 2023) presents from his transplant nephrologist's office for hyperglycemia of 490 and A1C of 14%. Patient denies high glucose like this in the past or any previous diagnoses of DM2, but on HIE chart review from PMD visit post COVID on 12/2024, patient was advised to go to ED for glucose of 600.  He did not follow up since. He reports that he has felt some weakness in his legs with exertion for the past couple of months, increased thirst, but denies increase urination, CP, SOB, abdominal pain, N/V, numbness/tingling in extremities. No recent medication change except Tacrolimus was decrease during yesterday visit from 2mg BID to 1mg BID. Patient takes prednisone 5 mg daily. A1c in ED >15.5%. Initial presentation inconsistent with DKA (pH 7.34, bicarb 16 but BHB 1.8). Currently ordered for Lantus 12 units qhs + low dose Admelog scale with meals and low dose Admelog scale at bedtime. Endocrine consulted for further management.     #Uncontrolled DM2 with hyperglycemia  - Likely new onset DM after transplant (NODAT)  - No DKA on labs  - A1c 9/2024 6.5% from previously 4.7-5.9% --> 9.5% in 12/2024  - A1c 14% at PCP office and >15.5% in ED  - s/p 3L NS in ED with improvement in BGs from 500s to 300s  - BGs currently uncontrolled, mostly in 300s  - Weight 89.8, BMI 34  - eGFR 91    Recommendations:  - Increase Lantus to 14 units QHS  - Start Admelog 5 units TIDAC (HOLD if NPO)  - c/w low dose correctional insulin with meals and low dose correctional insulin at bedtime  - Check FS before meals and at bedtime - if NPO, check q6 hours  - Hypoglycemia protocol  - Goal -180  - Please obtain nutrition consult  - Please obtain lipid panel in AM  - Full consult to follow in AM    Carol Chang MD  Endocrinology Fellow  Can be reached via [employer] Teams    For follow up questions, discharge recommendations, or new consults, please email [email or web address] ([email or web address] ([email or web address]) or call answering service at [phone] (weekdays); [phone] (nights/weekends).  For emergencies please page fellow on call.

## 2025-02-27 NOTE — PROGRESS NOTE ADULT - SUBJECTIVE AND OBJECTIVE BOX
[facility] Division of Hospital Medicine  Kristine Sahni DO  MS Teams PREFERRED      SUBJECTIVE / OVERNIGHT EVENTS: Denies fever, chills, chest pain, dyspnea, N/V, abdominal pain.  ADDITIONAL REVIEW OF SYSTEMS:    MEDICATIONS  (STANDING):  aspirin enteric coated 81 milliGRAM(s) Oral daily  atorvastatin 20 milliGRAM(s) Oral at bedtime  dextrose 5%. 1000 milliLiter(s) (50 mL/Hr) IV Continuous <Continuous>  dextrose 5%. 1000 milliLiter(s) (100 mL/Hr) IV Continuous <Continuous>  dextrose 50% Injectable 25 Gram(s) IV Push once  dextrose 50% Injectable 12.5 Gram(s) IV Push once  dextrose 50% Injectable 25 Gram(s) IV Push once  famotidine    Tablet 20 milliGRAM(s) Oral daily  glucagon  Injectable 1 milliGRAM(s) IntraMuscular once  insulin glargine Injectable (LANTUS) 12 Unit(s) SubCutaneous at bedtime  insulin lispro (ADMELOG) corrective regimen sliding scale   SubCutaneous three times a day before meals  insulin lispro (ADMELOG) corrective regimen sliding scale   SubCutaneous at bedtime  metoprolol tartrate 50 milliGRAM(s) Oral two times a day  mycophenolate mofetil 500 milliGRAM(s) Oral at bedtime  mycophenolate mofetil 1000 milliGRAM(s) Oral <User Schedule>  NIFEdipine XL 30 milliGRAM(s) Oral at bedtime  NIFEdipine XL 60 milliGRAM(s) Oral with breakfast  predniSONE   Tablet 5 milliGRAM(s) Oral daily  sodium bicarbonate 1300 milliGRAM(s) Oral three times a day  tacrolimus 1 milliGRAM(s) Oral two times a day  tamsulosin 0.4 milliGRAM(s) Oral at bedtime    MEDICATIONS  (PRN):  acetaminophen     Tablet .. 650 milliGRAM(s) Oral every 6 hours PRN Temp greater or equal to 38C (100.4F), Mild Pain (1 - 3)  dextrose Oral Gel 15 Gram(s) Oral once PRN Blood Glucose LESS THAN 70 milliGRAM(s)/deciliter      I&O's Summary      PHYSICAL EXAM:  Vital Signs Last 24 Hrs  T(C): 36.7 (27 Feb 2025 12:52), Max: 36.8 (26 Feb 2025 20:06)  T(F): 98.1 (27 Feb 2025 12:52), Max: 98.3 (26 Feb 2025 20:06)  HR: 81 (27 Feb 2025 12:52) (70 - 94)  BP: 134/83 (27 Feb 2025 12:52) (117/76 - 157/89)  BP(mean): --  RR: 18 (27 Feb 2025 12:52) (18 - 20)  SpO2: 98% (27 Feb 2025 12:52) (96% - 99%)    Parameters below as of 27 Feb 2025 12:52  Patient On (Oxygen Delivery Method): room air    CONSTITUTIONAL: NAD, well-developed, well-groomed  EYES: Conjunctiva and sclera clear  ENMT: Moist oral mucosa, no pharyngeal injection or exudates   NECK: Supple, midline  RESPIRATORY: Normal respiratory effort; lungs are clear to auscultation bilaterally  CARDIOVASCULAR: Regular rate and rhythm, normal S1 and S2.   ABDOMEN: Nontender to palpation, no rebound/guarding  PSYCH: A+O to person, place, and time; affect appropriate        LABS:                        13.3   7.02  )-----------( 242      ( 27 Feb 2025 09:36 )             40.6     02-27    140  |  105  |  14  ----------------------------<  203[H]  4.2   |  18[L]  |  0.99    Ca    9.7      27 Feb 2025 09:36  Phos  2.4     02-26  Mg     1.4     02-26    TPro  7.4  /  Alb  3.8  /  TBili  0.5  /  DBili  x   /  AST  18  /  ALT  17  /  AlkPhos  124[H]  02-26          Urinalysis Basic - ( 27 Feb 2025 09:36 )    Color: x / Appearance: x / SG: x / pH: x  Gluc: 203 mg/dL / Ketone: x  / Bili: x / Urobili: x   Blood: x / Protein: x / Nitrite: x   Leuk Esterase: x / RBC: x / WBC x   Sq Epi: x / Non Sq Epi: x / Bacteria: x          RADIOLOGY & ADDITIONAL TESTS:  Results Reviewed:   Imaging Personally Reviewed:  Electrocardiogram Personally Reviewed:    COORDINATION OF CARE:  Care Discussed with Consultants/Other Providers [Y/N]: Y  Prior or Outpatient Records Reviewed [Y/N]: Y

## 2025-02-28 ENCOUNTER — TRANSCRIPTION ENCOUNTER (OUTPATIENT)
Age: 55
End: 2025-02-28

## 2025-02-28 VITALS
TEMPERATURE: 98 F | RESPIRATION RATE: 18 BRPM | DIASTOLIC BLOOD PRESSURE: 80 MMHG | OXYGEN SATURATION: 97 % | HEART RATE: 95 BPM | SYSTOLIC BLOOD PRESSURE: 145 MMHG

## 2025-02-28 DIAGNOSIS — R73.9 HYPERGLYCEMIA, UNSPECIFIED: ICD-10-CM

## 2025-02-28 DIAGNOSIS — E13.9 OTHER SPECIFIED DIABETES MELLITUS WITHOUT COMPLICATIONS: ICD-10-CM

## 2025-02-28 DIAGNOSIS — E78.5 HYPERLIPIDEMIA, UNSPECIFIED: ICD-10-CM

## 2025-02-28 LAB
ADD ON TEST-SPECIMEN IN LAB: SIGNIFICANT CHANGE UP
ANION GAP SERPL CALC-SCNC: 14 MMOL/L — SIGNIFICANT CHANGE UP (ref 5–17)
BUN SERPL-MCNC: 14 MG/DL — SIGNIFICANT CHANGE UP (ref 7–23)
CALCIUM SERPL-MCNC: 9.6 MG/DL — SIGNIFICANT CHANGE UP (ref 8.4–10.5)
CHLORIDE SERPL-SCNC: 107 MMOL/L — SIGNIFICANT CHANGE UP (ref 96–108)
CHOLEST SERPL-MCNC: 110 MG/DL — SIGNIFICANT CHANGE UP
CO2 SERPL-SCNC: 18 MMOL/L — LOW (ref 22–31)
CREAT SERPL-MCNC: 1.12 MG/DL — SIGNIFICANT CHANGE UP (ref 0.5–1.3)
EGFR: 78 ML/MIN/1.73M2 — SIGNIFICANT CHANGE UP
GLUCOSE BLDC GLUCOMTR-MCNC: 196 MG/DL — HIGH (ref 70–99)
GLUCOSE BLDC GLUCOMTR-MCNC: 228 MG/DL — HIGH (ref 70–99)
GLUCOSE BLDC GLUCOMTR-MCNC: 245 MG/DL — HIGH (ref 70–99)
GLUCOSE SERPL-MCNC: 208 MG/DL — HIGH (ref 70–99)
HDLC SERPL-MCNC: 33 MG/DL — LOW
LIPID PNL WITH DIRECT LDL SERPL: 50 MG/DL — SIGNIFICANT CHANGE UP
NON HDL CHOLESTEROL: 77 MG/DL — SIGNIFICANT CHANGE UP
POTASSIUM SERPL-MCNC: 3.8 MMOL/L — SIGNIFICANT CHANGE UP (ref 3.5–5.3)
POTASSIUM SERPL-SCNC: 3.8 MMOL/L — SIGNIFICANT CHANGE UP (ref 3.5–5.3)
SODIUM SERPL-SCNC: 139 MMOL/L — SIGNIFICANT CHANGE UP (ref 135–145)
TRIGL SERPL-MCNC: 156 MG/DL — HIGH

## 2025-02-28 PROCEDURE — 82962 GLUCOSE BLOOD TEST: CPT

## 2025-02-28 PROCEDURE — 99285 EMERGENCY DEPT VISIT HI MDM: CPT | Mod: 25

## 2025-02-28 PROCEDURE — 96374 THER/PROPH/DIAG INJ IV PUSH: CPT

## 2025-02-28 PROCEDURE — 80061 LIPID PANEL: CPT

## 2025-02-28 PROCEDURE — 83036 HEMOGLOBIN GLYCOSYLATED A1C: CPT

## 2025-02-28 PROCEDURE — 99223 1ST HOSP IP/OBS HIGH 75: CPT

## 2025-02-28 PROCEDURE — 84295 ASSAY OF SERUM SODIUM: CPT

## 2025-02-28 PROCEDURE — 82330 ASSAY OF CALCIUM: CPT

## 2025-02-28 PROCEDURE — 82947 ASSAY GLUCOSE BLOOD QUANT: CPT

## 2025-02-28 PROCEDURE — 83735 ASSAY OF MAGNESIUM: CPT

## 2025-02-28 PROCEDURE — 82803 BLOOD GASES ANY COMBINATION: CPT

## 2025-02-28 PROCEDURE — 83605 ASSAY OF LACTIC ACID: CPT

## 2025-02-28 PROCEDURE — 84132 ASSAY OF SERUM POTASSIUM: CPT

## 2025-02-28 PROCEDURE — 85014 HEMATOCRIT: CPT

## 2025-02-28 PROCEDURE — 80048 BASIC METABOLIC PNL TOTAL CA: CPT

## 2025-02-28 PROCEDURE — 82435 ASSAY OF BLOOD CHLORIDE: CPT

## 2025-02-28 PROCEDURE — 85018 HEMOGLOBIN: CPT

## 2025-02-28 PROCEDURE — 85027 COMPLETE CBC AUTOMATED: CPT

## 2025-02-28 PROCEDURE — 81001 URINALYSIS AUTO W/SCOPE: CPT

## 2025-02-28 PROCEDURE — 80197 ASSAY OF TACROLIMUS: CPT

## 2025-02-28 PROCEDURE — 85025 COMPLETE CBC W/AUTO DIFF WBC: CPT

## 2025-02-28 PROCEDURE — 99239 HOSP IP/OBS DSCHRG MGMT >30: CPT

## 2025-02-28 PROCEDURE — 82010 KETONE BODYS QUAN: CPT

## 2025-02-28 PROCEDURE — 84100 ASSAY OF PHOSPHORUS: CPT

## 2025-02-28 PROCEDURE — 80053 COMPREHEN METABOLIC PANEL: CPT

## 2025-02-28 RX ORDER — INSULIN LISPRO 100 U/ML
7 INJECTION, SOLUTION INTRAVENOUS; SUBCUTANEOUS
Refills: 0 | Status: DISCONTINUED | OUTPATIENT
Start: 2025-02-28 | End: 2025-02-28

## 2025-02-28 RX ORDER — INSULIN GLARGINE-YFGN 100 [IU]/ML
20 INJECTION, SOLUTION SUBCUTANEOUS
Qty: 5 | Refills: 0
Start: 2025-02-28 | End: 2025-03-29

## 2025-02-28 RX ORDER — INSULIN LISPRO 100 U/ML
7 INJECTION, SOLUTION INTRAVENOUS; SUBCUTANEOUS
Qty: 5 | Refills: 0
Start: 2025-02-28 | End: 2025-03-29

## 2025-02-28 RX ORDER — SODIUM BICARBONATE 1 MEQ/ML
2 SYRINGE (ML) INTRAVENOUS
Qty: 0 | Refills: 0 | DISCHARGE
Start: 2025-02-28

## 2025-02-28 RX ORDER — SODIUM BICARBONATE 1 MEQ/ML
5 SYRINGE (ML) INTRAVENOUS
Refills: 0 | DISCHARGE

## 2025-02-28 RX ORDER — INSULIN GLARGINE-YFGN 100 [IU]/ML
20 INJECTION, SOLUTION SUBCUTANEOUS AT BEDTIME
Refills: 0 | Status: DISCONTINUED | OUTPATIENT
Start: 2025-02-28 | End: 2025-02-28

## 2025-02-28 RX ORDER — ISOPROPYL ALCOHOL, BENZOCAINE .7; .06 ML/ML; ML/ML
0 SWAB TOPICAL
Qty: 100 | Refills: 1
Start: 2025-02-28

## 2025-02-28 RX ADMIN — INSULIN LISPRO 5 UNIT(S): 100 INJECTION, SOLUTION INTRAVENOUS; SUBCUTANEOUS at 12:52

## 2025-02-28 RX ADMIN — INSULIN LISPRO 7 UNIT(S): 100 INJECTION, SOLUTION INTRAVENOUS; SUBCUTANEOUS at 17:37

## 2025-02-28 RX ADMIN — Medication 81 MILLIGRAM(S): at 12:51

## 2025-02-28 RX ADMIN — PREDNISONE 5 MILLIGRAM(S): 20 TABLET ORAL at 05:06

## 2025-02-28 RX ADMIN — MYCOPHENOLATE MOFETIL 1000 MILLIGRAM(S): 500 TABLET, FILM COATED ORAL at 08:44

## 2025-02-28 RX ADMIN — INSULIN LISPRO 5 UNIT(S): 100 INJECTION, SOLUTION INTRAVENOUS; SUBCUTANEOUS at 08:44

## 2025-02-28 RX ADMIN — INSULIN LISPRO 2: 100 INJECTION, SOLUTION INTRAVENOUS; SUBCUTANEOUS at 08:43

## 2025-02-28 RX ADMIN — Medication 60 MILLIGRAM(S): at 08:44

## 2025-02-28 RX ADMIN — INSULIN LISPRO 2: 100 INJECTION, SOLUTION INTRAVENOUS; SUBCUTANEOUS at 12:52

## 2025-02-28 RX ADMIN — TACROLIMUS 1 MILLIGRAM(S): 0.5 CAPSULE ORAL at 08:45

## 2025-02-28 RX ADMIN — Medication 1300 MILLIGRAM(S): at 05:06

## 2025-02-28 RX ADMIN — METOPROLOL SUCCINATE 50 MILLIGRAM(S): 50 TABLET, EXTENDED RELEASE ORAL at 05:06

## 2025-02-28 RX ADMIN — Medication 1300 MILLIGRAM(S): at 17:37

## 2025-02-28 RX ADMIN — INSULIN LISPRO 1: 100 INJECTION, SOLUTION INTRAVENOUS; SUBCUTANEOUS at 17:37

## 2025-02-28 RX ADMIN — Medication 20 MILLIGRAM(S): at 12:51

## 2025-02-28 RX ADMIN — METOPROLOL SUCCINATE 50 MILLIGRAM(S): 50 TABLET, EXTENDED RELEASE ORAL at 17:37

## 2025-02-28 NOTE — DISCHARGE NOTE PROVIDER - NSFOLLOWUPCLINICSTOKEN_GEN_ALL_ED_FT
510504:1 week|| ||00\01||False;587270: || ||00\01||False; [id number]: || ||00\01||False;532230:1 week|| ||00\01||False;[id number]: || ||00\01||False;517631: || ||00\01||False;

## 2025-02-28 NOTE — DISCHARGE NOTE PROVIDER - ATTENDING DISCHARGE PHYSICAL EXAMINATION:
Vital Signs Last 24 Hrs  T(C): 36.9 (28 Feb 2025 10:35), Max: 37.1 (27 Feb 2025 19:59)  T(F): 98.4 (28 Feb 2025 10:35), Max: 98.8 (27 Feb 2025 19:59)  HR: 75 (28 Feb 2025 10:35) (63 - 80)  BP: 121/82 (28 Feb 2025 10:35) (120/75 - 132/83)  BP(mean): 98 (27 Feb 2025 20:36) (98 - 98)  RR: 18 (28 Feb 2025 10:35) (17 - 18)  SpO2: 97% (28 Feb 2025 10:35) (96% - 98%)    Parameters below as of 28 Feb 2025 10:35  Patient On (Oxygen Delivery Method): room air      CONSTITUTIONAL: NAD, well-developed, well-groomed  EYES: Conjunctiva and sclera clear  ENMT: Moist oral mucosa, no pharyngeal injection or exudates   NECK: Supple, midline  RESPIRATORY: Normal respiratory effort; lungs are clear to auscultation bilaterally  CARDIOVASCULAR: Regular rate and rhythm, normal S1 and S2.   ABDOMEN: Nontender to palpation, no rebound/guarding  PSYCH: A+O to person, place, and time; affect appropriate

## 2025-02-28 NOTE — DIETITIAN INITIAL EVALUATION ADULT - OTHER INFO
Physical Therapy    Discharge Summary    Name: Mary Ann Haro  MRN: [id number]  : 1978  Date: 23    Discharge Summary: PT    Discharge Information: Date of discharge 2023, Date of last visit 10/09/2023, Date of evaluation 2023, Number of attended visits 6, Referred by Mary Ann Rowland, and Referred for Painful coitus    Therapy Summary: Pt attended PT to address pain with intercourse, increased urinary urgency, urge/stress urinary incontinence, tightness/pain in pelvic floor musculature, decreased BLE flexibility, tightness in Lumbar ROM, and generalized weakness.    Discharge Status: All goals met, pt in agreement to D/c     Rehab Discharge Reason: Achieved all and/or the most significant goals(s)   Weight: weight per previous admission 142lbs (6/20/22). Current dosing weight is 197.56lbs. Weight gain noted.

## 2025-02-28 NOTE — DISCHARGE NOTE PROVIDER - NSDCFUADDAPPT_GEN_ALL_CORE_FT
APPTS ARE READY TO BE MADE: [x] YES    Best Family or Patient Contact (if needed):    Additional Information about above appointments (if needed):    1: Endocrinology  2: PCP  3: Nephrology    Other comments or requests:    APPTS ARE READY TO BE MADE: [x] YES    Best Family or Patient Contact (if needed):    Additional Information about above appointments (if needed):    1: Endocrinology  2: Your PCP  3: Your Nephrologist    Other comments or requests:    APPTS ARE READY TO BE MADE: [x] YES    Best Family or Patient Contact (if needed):    Additional Information about above appointments (if needed):    1: Endocrinology  2: Your PCP  3: Your Nephrologist    Other comments or requests:     Prior to outreaching the patient, it was visible that the patient has secured a follow up appointment which was not scheduled by our team. Patient is scheduled to see Dr. Hemphill on 6/10 at [facility] [place] [address]    (Nephro)  Patient informed us they already have secured a follow up appointment which is not visible on So[facility] and declined to provide appointment details.      APPTS ARE READY TO BE MADE: [x] YES    Best Family or Patient Contact (if needed):    Additional Information about above appointments (if needed):    1: Endocrinology  2: Your PCP  3: Your Nephrologist    Other comments or requests:     An appointment was made on So[facility]. Patient is scheduled to see Dr. Lassiter on 3/5 at [address] [place] [address]    Prior to outreaching the patient, it was visible that the patient has secured a follow up appointment which was not scheduled by our team. Patient is scheduled to see Dr. Hemphill on 3/14 at [address] [place] [address]    An appointment was made on So[facility]. Patient is scheduled to see Dr. Gambino on 3/19 at [address] [facility] [address]    (Nephro)  Patient informed us they already have secured a follow up appointment which is not visible on So[facility] and declined to provide appointment details.

## 2025-02-28 NOTE — DISCHARGE NOTE PROVIDER - PROVIDER TOKENS
FREE:[LAST:[Your Primary Care Doctor],PHONE:[(   )    -],FAX:[(   )    -],FOLLOWUP:[1 week]],FREE:[LAST:[Your Nephrologist],PHONE:[(   )    -],FAX:[(   )    -],FOLLOWUP:[1 week]]

## 2025-02-28 NOTE — DIETITIAN INITIAL EVALUATION ADULT - ADD RECOMMEND
1) Continue current diet as tolerated. 2) Diet education provided, reinforce as needed. 3) RD to remain available and follow-up as medically appropriate.

## 2025-02-28 NOTE — DIETITIAN INITIAL EVALUATION ADULT - ORAL INTAKE PTA/DIET HISTORY
Pt reports good PO intake and appetite, consumes regular diet. Does enjoy consuming rice, pasta, bread. Drinks mostly water but does have a glass of orange juice in the morning. Confirms allergy to shellfish. Pt denies chewing/swallowing difficulty, nausea, vomiting, diarrhea, constipation.

## 2025-02-28 NOTE — DIETITIAN INITIAL EVALUATION ADULT - NS FNS DIET ORDER
Diet, Regular:   Consistent Carbohydrate {No Snacks} (CSTCHO)  DASH/TLC {Sodium & Cholesterol Restricted} (DASH) (02-27-25 @ 06:51) [Active]

## 2025-02-28 NOTE — DIETITIAN INITIAL EVALUATION ADULT - REASON FOR ADMISSION
Hyperglycemia    Chart reviewed, events noted. This is a "54 year-old male with PMHx of HTN, ESRD (s/p L-renal transplant ate age 8, complicated by graft rejection and s/p new L-renal transplant in 2023) presents from his transplant nephrologist's office for hyperglycemia of 490 and A1C of 14%"

## 2025-02-28 NOTE — CONSULT NOTE ADULT - SUBJECTIVE AND OBJECTIVE BOX
HPI:  54 year-old male with PMHx of HTN, ESRD (s/p L-renal transplant ate age 8, complicated by graft rejection and s/p new L-renal transplant in ) presents from his transplant nephrologist's office for hyperglycemia of 490 and A1C of 14%.  . Patient denies high glucose like this in the past or any previous diagnoses of DM, but on HIE chart review from PMD visit post COVID on 2024, patient was advised to go to ED for glucose of 600.  He did not follow up since. He reports that he has felt some weakness in his legs with exertion for the past couple of months, increased thirst, but denies increase urination, CP, SOB, abdominal pain, N/V, numbness/tingling in extremities. No recent medication change except Tacrolimus was decrease from 2mg BID to 1mg BID.      Endocrine consulted for severe hyperglycemia and uncontrolled diabetes with A1c > 15.5% Pt. denies any formal diagnosis of diabetes. He reports he was told of prediabetes over the past year. Mother with hx of Type 2 DM. Denies any changes in weight, blurry vision, paresthesias, nausea, vomiting, polyuria, polydipsia. No reported hypoglycemia or symptoms of hypoglycemia. Eats a lot of high carb meals. No soda or juice. Mostly drinks water. Has been on prednisone and immunosuppressants since transplant.       PAST MEDICAL & SURGICAL HISTORY:  Renal transplant recipient  1978, c/b CKD IV      Hypertension      ESRD with anemia       novel coronavirus disease (COVID-19)  2020      History of renal transplant      History of excision of pilonidal cyst          FAMILY HISTORY:  FH: kidney disease    FH: esophageal cancer  Uncle,  in 7th decade    FH: diabetes mellitus Type 2 (Mother, Grandparent)        Social History: No tobacco or alcohol abuse    Outpatient Medications:  · 	tamsulosin 0.4 mg oral capsule: Last Dose Taken:  , 1 cap(s) orally once a day (at bedtime)  · 	atorvastatin 20 mg oral tablet: Last Dose Taken:  , 1 tab(s) orally once a day (at bedtime)  · 	mycophenolate mofetil 500 mg oral tablet: Last Dose Taken:  , 1 tab(s) orally once a day (at bedtime)  · 	tacrolimus 1 mg oral capsule: Last Dose Taken:  , 1 cap(s) orally 2 times a day  · 	sodium bicarbonate 650 mg oral tablet: Last Dose Taken:  , 5 tab(s) orally 3 times a day  · 	aspirin 81 mg oral delayed release tablet: Last Dose Taken:  , 1 tab(s) orally once a day  · 	metoprolol tartrate 50 mg oral tablet: Last Dose Taken:  , 1 tab(s) orally 2 times a day  · 	NIFEdipine (Eqv-Procardia XL) 30 mg oral tablet, extended release: Last Dose Taken:  , 1 tab(s) orally once a day  · 	CellCept 500 mg oral tablet: Last Dose Taken:  , 2 tab(s) orally once a day (in the morning)  · 	predniSONE 5 mg oral tablet: Last Dose Taken:  , 1 tab(s) orally once a day  · 	Pepcid 20 mg oral tablet: Last Dose Taken:  , 1 tab(s) orally once a day  · 	NIFEdipine 30 mg oral tablet, extended release: Last Dose Taken:  , 2 tab(s) orally once a day (in the morning)    MEDICATIONS  (STANDING):  aspirin enteric coated 81 milliGRAM(s) Oral daily  atorvastatin 20 milliGRAM(s) Oral at bedtime  dextrose 5%. 1000 milliLiter(s) (50 mL/Hr) IV Continuous <Continuous>  dextrose 5%. 1000 milliLiter(s) (100 mL/Hr) IV Continuous <Continuous>  dextrose 50% Injectable 25 Gram(s) IV Push once  dextrose 50% Injectable 12.5 Gram(s) IV Push once  dextrose 50% Injectable 25 Gram(s) IV Push once  famotidine    Tablet 20 milliGRAM(s) Oral daily  glucagon  Injectable 1 milliGRAM(s) IntraMuscular once  insulin glargine Injectable (LANTUS) 20 Unit(s) SubCutaneous at bedtime  insulin lispro (ADMELOG) corrective regimen sliding scale   SubCutaneous three times a day before meals  insulin lispro (ADMELOG) corrective regimen sliding scale   SubCutaneous at bedtime  insulin lispro Injectable (ADMELOG) 7 Unit(s) SubCutaneous three times a day before meals  metoprolol tartrate 50 milliGRAM(s) Oral two times a day  mycophenolate mofetil 500 milliGRAM(s) Oral at bedtime  mycophenolate mofetil 1000 milliGRAM(s) Oral <User Schedule>  NIFEdipine XL 60 milliGRAM(s) Oral with breakfast  NIFEdipine XL 30 milliGRAM(s) Oral at bedtime  predniSONE   Tablet 5 milliGRAM(s) Oral daily  sodium bicarbonate 1300 milliGRAM(s) Oral three times a day  tacrolimus 1 milliGRAM(s) Oral two times a day  tamsulosin 0.4 milliGRAM(s) Oral at bedtime    MEDICATIONS  (PRN):  acetaminophen     Tablet .. 650 milliGRAM(s) Oral every 6 hours PRN Temp greater or equal to 38C (100.4F), Mild Pain (1 - 3)  dextrose Oral Gel 15 Gram(s) Oral once PRN Blood Glucose LESS THAN 70 milliGRAM(s)/deciliter      Allergies    sulfa (Swelling)  shellfish (Rash)    Intolerances      Review of Systems:  Constitutional: No fever, No change in weight  Eyes: No blurry vision  Neuro: No headache, No paresthesias  HEENT: No throat pain  Cardiovascular: No chest pain  Respiratory: No SOB  GI: No nausea or vomiting  : No polyuria  Skin: no rash  Psych: no depression  Endocrine: No polydipsia, No heat or cold intolerance, rest as noted in HPI  Hem/lymph: no swelling    All other review of systems negative      PHYSICAL EXAM:  VITALS: T(C): 36.9 (25 @ 10:35)  T(F): 98.4 (25 @ 10:35), Max: 98.8 (25 @ 19:59)  HR: 75 (25 @ 10:35) (63 - 80)  BP: 121/82 (25 @ 10:35) (120/75 - 132/83)  RR:  (17 - 18)  SpO2:  (96% - 98%)  Wt(kg): --  GENERAL: NAD at this time  EYES: No proptosis, EOMI  HEENT:  Atraumatic, Normocephalic,   THYROID: Normal size, no palpable nodules  RESPIRATORY: Clear to auscultation bilaterally, full excursion, non-labored  CARDIOVASCULAR: Regular rhythm; No murmurs; no peripheral edema  GI: Soft, nontender, non distended, normal bowel sounds  SKIN: Dry, intact, No rashes or lesions  MUSCULOSKELETAL: normal strength  NEURO: follows commands  PSYCH: Alert and oriented x 3, normal affect, normal mood  CUSHING'S SIGNS: no striae      POCT Blood Glucose.: 245 mg/dL (25 @ 12:16)  POCT Blood Glucose.: 228 mg/dL (25 @ 08:13)  POCT Blood Glucose.: 251 mg/dL (25 @ 23:34)  POCT Blood Glucose.: 328 mg/dL (25 @ 21:29)  POCT Blood Glucose.: 331 mg/dL (25 @ 17:03)  POCT Blood Glucose.: 267 mg/dL (25 @ 12:02)  POCT Blood Glucose.: 178 mg/dL (25 @ 08:30)  POCT Blood Glucose.: 170 mg/dL (25 @ 06:54)  POCT Blood Glucose.: 101 mg/dL (25 @ 01:11)  POCT Blood Glucose.: 342 mg/dL (25 @ 22:40)  POCT Blood Glucose.: 331 mg/dL (25 @ 22:39)  POCT Blood Glucose.: 363 mg/dL (25 @ 22:06)  POCT Blood Glucose.: 457 mg/dL (25 @ 19:55)  POCT Blood Glucose.: 449 mg/dL (25 @ 19:54)  POCT Blood Glucose.: 462 mg/dL (25 @ 19:49)  POCT Blood Glucose.: 508 mg/dL (25 @ 19:47)  POCT Blood Glucose.: 578 mg/dL (25 @ 16:29)  POCT Blood Glucose.: >600 mg/dL (25 @ 16:28)                              13.3   7.02  )-----------( 242      ( 2025 09:36 )             40.6           139  |  107  |  14  ----------------------------<  208[H]  3.8   |  18[L]  |  1.12    eGFR: 78    Ca    9.6        Mg     1.4       Phos  2.4         TPro  7.4  /  Alb  3.8  /  TBili  0.5  /  DBili  x   /  AST  18  /  ALT  17  /  AlkPhos  124[H]      Thyroid Function Tests:           Chol 110 Direct LDL -- LDL calculated 50 HDL 33[L] Trig 156[H]  Radiology:

## 2025-02-28 NOTE — CONSULT NOTE ADULT - ASSESSMENT
55 y/o M w/ uncontrolled post transplant related diabetes w/ severe hyperglycemia and A1c > 15.5% (high risk patient with high medical complexity and high level decision-making).

## 2025-02-28 NOTE — CONSULT NOTE ADULT - PROBLEM SELECTOR RECOMMENDATION 9
Diabetes Education and Nutrition Eval  Based on current insulin requirements recommend:  Start Lantus 20 units qhs  Start Admelog 7 units qac  Low correction scale qac + bedtime  Goal glucose 100-180  Outpt. endo follow-up  Outpt. optho, podiatry, nephrology  Plan to d/c on basal bolus Lantus 20 units qhs and Admelog 7 units with meals. Insulin and diabetes education performed by the nurse. Will set up outpatient appointment for him.

## 2025-02-28 NOTE — DIETITIAN INITIAL EVALUATION ADULT - NSFNSADHERENCEPTAFT_GEN_A_CORE
Pt with HbA1c: >15.5%, denies prior history of diabetes. Endocrine following for hyperglycemia, new diabetes     Pt s/p renal transplant in 2023, on tacrolimus, Cellcept, prednisone

## 2025-02-28 NOTE — DISCHARGE NOTE NURSING/CASE MANAGEMENT/SOCIAL WORK - PATIENT PORTAL LINK FT
You can access the FollowMyHealth Patient Portal offered by [facility] by registering at the following website: [email or web address]. By joining [facility]’s FollowMyHealth portal, you will also be able to view your health information using other applications (apps) compatible with our system.

## 2025-02-28 NOTE — DISCHARGE NOTE PROVIDER - NSDCFUSCHEDAPPT_GEN_ALL_CORE_FT
James Lassiter  [facility] Physician Replaced by [facility]  ENDOCRIN [address]  Scheduled Appointment: 03/05/2025    José Miguel Hemphill  [facility] Physician Replaced by [facility]  [facility] [address] [facility]  Scheduled Appointment: 03/14/2025    [facility] Physician Replaced by [facility]  [facility]M [address] [facility]  Scheduled Appointment: 03/19/2025

## 2025-02-28 NOTE — DISCHARGE NOTE NURSING/CASE MANAGEMENT/SOCIAL WORK - FINANCIAL ASSISTANCE
[facility] provides services at a reduced cost to those who are determined to be eligible through [facility]’s financial assistance program. Information regarding [facility]’s financial assistance program can be found by going to [email or web address] or by calling [phone].

## 2025-02-28 NOTE — DIETITIAN INITIAL EVALUATION ADULT - EDUCATION DIETARY MODIFICATIONS
Provided education on Carbohydrate Consistent diet including sources of carbohydrates, portion sizes, pairing protein with carbohydrates, limiting sugar sweetened beverages in diet and the importance of consistent eating pattern to help optimize glycemic control. Review My Plate Method and briefly discussed carbohydrate counting/teach back/(2) meets goals/outcomes/verbalization

## 2025-02-28 NOTE — DIETITIAN INITIAL EVALUATION ADULT - PERTINENT MEDS FT
MEDICATIONS  (STANDING):  aspirin enteric coated 81 milliGRAM(s) Oral daily  atorvastatin 20 milliGRAM(s) Oral at bedtime  dextrose 5%. 1000 milliLiter(s) (50 mL/Hr) IV Continuous <Continuous>  dextrose 5%. 1000 milliLiter(s) (100 mL/Hr) IV Continuous <Continuous>  dextrose 50% Injectable 25 Gram(s) IV Push once  dextrose 50% Injectable 12.5 Gram(s) IV Push once  dextrose 50% Injectable 25 Gram(s) IV Push once  famotidine    Tablet 20 milliGRAM(s) Oral daily  glucagon  Injectable 1 milliGRAM(s) IntraMuscular once  insulin glargine Injectable (LANTUS) 20 Unit(s) SubCutaneous at bedtime  insulin lispro (ADMELOG) corrective regimen sliding scale   SubCutaneous three times a day before meals  insulin lispro (ADMELOG) corrective regimen sliding scale   SubCutaneous at bedtime  insulin lispro Injectable (ADMELOG) 7 Unit(s) SubCutaneous three times a day before meals  metoprolol tartrate 50 milliGRAM(s) Oral two times a day  mycophenolate mofetil 500 milliGRAM(s) Oral at bedtime  mycophenolate mofetil 1000 milliGRAM(s) Oral <User Schedule>  NIFEdipine XL 60 milliGRAM(s) Oral with breakfast  NIFEdipine XL 30 milliGRAM(s) Oral at bedtime  predniSONE   Tablet 5 milliGRAM(s) Oral daily  sodium bicarbonate 1300 milliGRAM(s) Oral three times a day  tacrolimus 1 milliGRAM(s) Oral two times a day  tamsulosin 0.4 milliGRAM(s) Oral at bedtime    MEDICATIONS  (PRN):  acetaminophen     Tablet .. 650 milliGRAM(s) Oral every 6 hours PRN Temp greater or equal to 38C (100.4F), Mild Pain (1 - 3)  dextrose Oral Gel 15 Gram(s) Oral once PRN Blood Glucose LESS THAN 70 milliGRAM(s)/deciliter

## 2025-02-28 NOTE — DISCHARGE NOTE PROVIDER - NSDCCPCAREPLAN_GEN_ALL_CORE_FT
PRINCIPAL DISCHARGE DIAGNOSIS  Diagnosis: Hyperglycemia  Assessment and Plan of Treatment: HgA1C this admission 15.5%  Make sure you get your HgA1c checked every three months.  Check your blood glucose before meals and at bedtime.  It's important not to skip any meals.  Keep a log of your blood glucose results and always take it with you to your doctor appointments.  Keep a list of your current medications including injectables and over the counter medications and bring this medication list with you to all your doctor appointments.  If you have not seen your ophthalmologist this year call for appointment.  Check your feet daily for redness, sores, or openings. Do not self treat. If no improvement in two days call your primary care physician for an appointment.  Low blood sugar (hypoglycemia) is a blood sugar below 70mg/dl. Check your blood sugar if you feel signs/symptoms of hypoglycemia. If your blood sugar is below 70 take 15 grams of carbohydrates (ex 4 oz of apple juice, 3-4 glucose tablets, or 4-6 oz of regular soda) wait 15 minutes and repeat blood sugar to make sure it comes up above 70.  If your blood sugar is above 70 and you are due for a meal, have a meal.  If you are not due for a meal have a snack.  This snack helps keeps your blood sugar at a safe range.     PRINCIPAL DISCHARGE DIAGNOSIS  Diagnosis: Hyperglycemia  Assessment and Plan of Treatment: HgA1C this admission 15.5%  Continue short-acting insulin 7 units prior to meals and long-acting insulin, Lantus 20 units at bedtime  Follow-up with endocrinology  Make sure you get your HgA1c checked every three months.  Check your blood glucose before meals and at bedtime.  It's important not to skip any meals.  Keep a log of your blood glucose results and always take it with you to your doctor appointments.  Keep a list of your current medications including injectables and over the counter medications and bring this medication list with you to all your doctor appointments.  If you have not seen your ophthalmologist this year call for appointment.  Check your feet daily for redness, sores, or openings. Do not self treat. If no improvement in two days call your primary care physician for an appointment.  Low blood sugar (hypoglycemia) is a blood sugar below 70mg/dl. Check your blood sugar if you feel signs/symptoms of hypoglycemia. If your blood sugar is below 70 take 15 grams of carbohydrates (ex 4 oz of apple juice, 3-4 glucose tablets, or 4-6 oz of regular soda) wait 15 minutes and repeat blood sugar to make sure it comes up above 70.  If your blood sugar is above 70 and you are due for a meal, have a meal.  If you are not due for a meal have a snack.  This snack helps keeps your blood sugar at a safe range.     PRINCIPAL DISCHARGE DIAGNOSIS  Diagnosis: Hyperglycemia  Assessment and Plan of Treatment: HgA1C this admission 15.5%  Continue short-acting insulin 7 units prior to meals and long-acting insulin, Lantus 20 units at bedtime  Follow-up with endocrinology within 1 week of discharge.  Make sure you get your HgA1c checked every three months.  Check your blood glucose before meals and at bedtime.  It's important not to skip any meals.  Keep a log of your blood glucose results and always take it with you to your doctor appointments.  Keep a list of your current medications including injectables and over the counter medications and bring this medication list with you to all your doctor appointments.  If you have not seen your ophthalmologist this year call for appointment.  Check your feet daily for redness, sores, or openings. Do not self treat. If no improvement in two days call your primary care physician for an appointment.  Low blood sugar (hypoglycemia) is a blood sugar below 70mg/dl. Check your blood sugar if you feel signs/symptoms of hypoglycemia. If your blood sugar is below 70 take 15 grams of carbohydrates (ex 4 oz of apple juice, 3-4 glucose tablets, or 4-6 oz of regular soda) wait 15 minutes and repeat blood sugar to make sure it comes up above 70.  If your blood sugar is above 70 and you are due for a meal, have a meal.  If you are not due for a meal have a snack.  This snack helps keeps your blood sugar at a safe range. Contact your primary care doctor or endocrinologist if BG <70 x 1, >400 x 1 or consistently >180s.

## 2025-02-28 NOTE — DISCHARGE NOTE NURSING/CASE MANAGEMENT/SOCIAL WORK - NSDCPEFALRISK_GEN_ALL_CORE
For information on Fall & Injury Prevention, visit: [email or web address] OR  [email or web address] OR  https://www.cdc.gov/steadi/patient.html

## 2025-02-28 NOTE — DISCHARGE NOTE PROVIDER - NSFOLLOWUPCLINICS_GEN_ALL_ED_FT
[facility] Endocrinology  Endocrinology  [address] [place] [address]  Phone: [phone]  Fax:   Follow Up Time: 1 week    [facility] - Primary Care  Primary Care  [address] [place] [address]  Phone: [phone]  Fax:      [facility] - Primary Care  Primary Care  [address] [place] [address]  Phone: [phone]  Fax:     [facility] Endocrinology  Endocrinology  [address] [place] [address]  Phone: [phone]  Fax:   Follow Up Time: 1 week    [facility]  Podiatry  [address] [place] [address]  Phone: [phone]  Fax:     [facility] Ophthalmology  Ophthalmology  [address], [address]  [place] [address]  Phone: [phone]  Fax:

## 2025-02-28 NOTE — DISCHARGE NOTE PROVIDER - CARE PROVIDER_API CALL
Your Primary Care Doctor,   Phone: (   )    -  Fax: (   )    -  Follow Up Time: 1 week    Your Nephrologist,   Phone: (   )    -  Fax: (   )    -  Follow Up Time: 1 week

## 2025-02-28 NOTE — DISCHARGE NOTE PROVIDER - HOSPITAL COURSE
HPI:  54 year-old male with PMHx of HTN, ESRD (s/p L-renal transplant ate age 8, complicated by graft rejection and s/p new L-renal transplant in 2023) presents from his transplant nephrologist's office for hyperglycemia of 490 and A1C of 14%.  . Patient denies high glucose like this in the past or any previous diagnoses of DM, but on HIE chart review from PMD visit post COVID on 12/2024, patient was advised to go to ED for glucose of 600.  He did not follow up since. He reports that he has felt some weakness in his legs with exertion for the past couple of months, increased thirst, but denies increase urination, CP, SOB, abdominal pain, N/V, numbness/tingling in extremities. No recent medication change except Tacrolimus was decrease during yesterday visit from 2mg BID to 1mg BID.  Currently, he feels well (26 Feb 2025 23:57)    Hospital Course:  The patient was admitted for management of his uncontrolled hyperglycemia. His A1c on admission was >15.5%. Initial presentation was inconsistent with DKA (pH 7.34, bicarbonate 16 mEq/L, BHB 1.8 mmol/L). He received 3L of normal saline in the ED, which improved his blood glucose from the 500s to the 300s. He was started on a basal-bolus insulin regimen with Lantus and Admelog. Per endocrinology recommendations, his Lantus dose was increased to 14 units QHS and Admelog was started at 5 units TID AC. He was also placed on a low-dose correctional insulin scale for both meals and bedtime. Frequent finger stick glucose monitoring was implemented. A nutrition consult was obtained for diabetes dietary education. A lipid panel was obtained.    Important Medication Changes and Reason:  Lantus 14 units subcutaneous QHS  Admelog 5 units TID AC (hold if NPO)    Active or Pending Issues Requiring Follow-up:  Outpatient follow up with endocrinology, nephrology and PCP    Advanced Directives:   [x] Full code  [ ] DNR  [ ] Hospice    Discharge Diagnoses:  Hyperosmolar hyperglycemic state (HHS)  Newly diagnosed diabetes  Hypertension  End Stage Renal Disease (ESRD) s/p two left kidney transplants       HPI:  54 year-old male with PMHx of HTN, ESRD (s/p L-renal transplant ate age 8, complicated by graft rejection and s/p new L-renal transplant in 2023) presents from his transplant nephrologist's office for hyperglycemia of 490 and A1C of 14%.  . Patient denies high glucose like this in the past or any previous diagnoses of DM, but on HIE chart review from PMD visit post COVID on 12/2024, patient was advised to go to ED for glucose of 600.  He did not follow up since. He reports that he has felt some weakness in his legs with exertion for the past couple of months, increased thirst, but denies increase urination, CP, SOB, abdominal pain, N/V, numbness/tingling in extremities. No recent medication change except Tacrolimus was decrease during yesterday visit from 2mg BID to 1mg BID.  Currently, he feels well (26 Feb 2025 23:57)    Hospital Course:  The patient was admitted for management of his uncontrolled hyperglycemia. His A1c on admission was >15.5%. Initial presentation was inconsistent with DKA (pH 7.34, bicarbonate 16 mEq/L, BHB 1.8 mmol/L). He received 3L of normal saline in the ED, which improved his blood glucose from the 500s to the 300s. He was started on a basal-bolus insulin regimen with Lantus and Admelog. Per endocrinology recommendations, his Lantus dose was increased to 20 units QHS and Admelog was started at 7 units TID AC. He was also placed on a low-dose correctional insulin scale for both meals and bedtime. Frequent finger stick glucose monitoring was implemented. A nutrition consult was obtained for diabetes dietary education.     Important Medication Changes and Reason:  Lantus 14 units subcutaneous QHS  Admelog 5 units TID AC (hold if NPO)    Active or Pending Issues Requiring Follow-up:  Outpatient follow up with endocrinology, nephrology and PCP    Advanced Directives:   [x] Full code  [ ] DNR  [ ] Hospice    Discharge Diagnoses:  Hyperosmolar hyperglycemic state (HHS)  Newly diagnosed diabetes  Hypertension  End Stage Renal Disease (ESRD) s/p two left kidney transplants       HPI:  54 year-old male with PMHx of HTN, ESRD (s/p L-renal transplant ate age 8, complicated by graft rejection and s/p new L-renal transplant in 2023) presents from his transplant nephrologist's office for hyperglycemia of 490 and A1C of 14%.  . Patient denies high glucose like this in the past or any previous diagnoses of DM, but on HIE chart review from PMD visit post COVID on 12/2024, patient was advised to go to ED for glucose of 600.  He did not follow up since. He reports that he has felt some weakness in his legs with exertion for the past couple of months, increased thirst, but denies increase urination, CP, SOB, abdominal pain, N/V, numbness/tingling in extremities. No recent medication change except Tacrolimus was decrease during yesterday visit from 2mg BID to 1mg BID.  Currently, he feels well (26 Feb 2025 23:57)    Hospital Course:  The patient was admitted for management of his uncontrolled hyperglycemia. His A1c on admission was >15.5%. Initial presentation was inconsistent with DKA (pH 7.34, bicarbonate 16 mEq/L, BHB 1.8 mmol/L). He received 3L of normal saline in the ED, which improved his blood glucose from the 500s to the 300s. He was started on a basal-bolus insulin regimen with Lantus and Admelog. Per endocrinology recommendations, his Lantus dose was increased to 20 units QHS and Admelog was started at 7 units TID AC. He was also placed on a low-dose correctional insulin scale for both meals and bedtime. Frequent finger stick glucose monitoring was implemented. A nutrition consult was obtained for diabetes dietary education. Insulin teaching was provided to patient    Patient symptomatically improved and hemodynamically stable for discharge.    Important Medication Changes and Reason:  Lantus 20 units subcutaneous QHS  Admelog 7 units TID AC (hold if NPO)    Active or Pending Issues Requiring Follow-up:  Outpatient follow up with endocrinology, nephrology and PCP    Advanced Directives:   [x] Full code  [ ] DNR  [ ] Hospice    Discharge Diagnoses:  DKA  Newly diagnosed diabetes  Hypertension  End Stage Renal Disease (ESRD) s/p two left kidney transplants  Hyponatremia    HPI:  54 year-old male with PMHx of HTN, ESRD (s/p L-renal transplant ate age 8, complicated by graft rejection and s/p new L-renal transplant in 2023) presents from his transplant nephrologist's office for hyperglycemia of 490 and A1C of 14%.  . Patient denies high glucose like this in the past or any previous diagnoses of DM, but on HIE chart review from PMD visit post COVID on 12/2024, patient was advised to go to ED for glucose of 600.  He did not follow up since. He reports that he has felt some weakness in his legs with exertion for the past couple of months, increased thirst, but denies increase urination, CP, SOB, abdominal pain, N/V, numbness/tingling in extremities. No recent medication change except Tacrolimus was decrease during yesterday visit from 2mg BID to 1mg BID.  Currently, he feels well (26 Feb 2025 23:57)    Hospital Course:  The patient was admitted for management of his uncontrolled hyperglycemia. His A1c on admission was >15.5%. Initial presentation was inconsistent with DKA (pH 7.34, bicarbonate 16 mEq/L, BHB 1.8 mmol/L). He received 3L of normal saline in the ED, which improved his blood glucose from the 500s to the 300s. He was started on a basal-bolus insulin regimen with Lantus and Admelog. Per endocrinology recommendations, his Lantus dose was increased to 20 units QHS and Admelog was started at 7 units TID AC. He was also placed on a low-dose correctional insulin scale for both meals and bedtime. Frequent finger stick glucose monitoring was implemented. A nutrition consult was obtained for diabetes dietary education. Insulin teaching was provided to patient.    Patient symptomatically improved and hemodynamically stable for discharge.    Important Medication Changes and Reason:  Lantus 20 units subcutaneous QHS  Admelog 7 units TID AC (hold if NPO)    Active or Pending Issues Requiring Follow-up:  Outpatient follow up with endocrinology, nephrology and PCP    Advanced Directives:   [x] Full code  [ ] DNR  [ ] Hospice    Discharge Diagnoses:  DKA  Newly diagnosed diabetes  Hypertension  End Stage Renal Disease (ESRD) s/p two left kidney transplants  Hyponatremia

## 2025-02-28 NOTE — DISCHARGE NOTE NURSING/CASE MANAGEMENT/SOCIAL WORK - NSDCFUADDAPPT_GEN_ALL_CORE_FT
APPTS ARE READY TO BE MADE: [x] YES    Best Family or Patient Contact (if needed):    Additional Information about above appointments (if needed):    1: Endocrinology  2: Your PCP  3: Your Nephrologist    Other comments or requests:

## 2025-02-28 NOTE — DISCHARGE NOTE PROVIDER - NSDCMRMEDTOKEN_GEN_ALL_CORE_FT
aspirin 81 mg oral delayed release tablet: 1 tab(s) orally once a day  atorvastatin 20 mg oral tablet: 1 tab(s) orally once a day (at bedtime)  CellCept 500 mg oral tablet: 2 tab(s) orally once a day (in the morning)  metoprolol tartrate 50 mg oral tablet: 1 tab(s) orally 2 times a day  mycophenolate mofetil 500 mg oral tablet: 1 tab(s) orally once a day (at bedtime)  NIFEdipine (Eqv-Procardia XL) 30 mg oral tablet, extended release: 1 tab(s) orally once a day  NIFEdipine 30 mg oral tablet, extended release: 2 tab(s) orally once a day (in the morning)  Pepcid 20 mg oral tablet: 1 tab(s) orally once a day  predniSONE 5 mg oral tablet: 1 tab(s) orally once a day  sodium bicarbonate 650 mg oral tablet: 2 tab(s) orally 3 times a day  tacrolimus 1 mg oral capsule: 1 cap(s) orally 2 times a day  tamsulosin 0.4 mg oral capsule: 1 cap(s) orally once a day (at bedtime)   alcohol swabs: Apply topically to affected area 4 times a day  aspirin 81 mg oral delayed release tablet: 1 tab(s) orally once a day  atorvastatin 20 mg oral tablet: 1 tab(s) orally once a day (at bedtime)  CellCept 500 mg oral tablet: 2 tab(s) orally once a day (in the morning)  Freestyle Ciara 3 Brodhead: Dispense 1 Brodhead  Freestyle Ciara 3 Sensors: Please change every 14 days  glucometer (per patient&#x27;s insurance): Test blood sugars four times a day. Dispense #1 glucometer.  HumaLOG KwikPen 100 units/mL injectable solution: 7 unit(s) subcutaneous 3 times a day (with meals)  Insulin Pen Needles, 4mm: 1 application subcutaneously 4 times a day. ** Use with insulin pen **  lancets: 1 application subcutaneously 4 times a day  Lantus Solostar Pen 100 units/mL subcutaneous solution: 20 unit(s) subcutaneous once a day (at bedtime)  metoprolol tartrate 50 mg oral tablet: 1 tab(s) orally 2 times a day  mycophenolate mofetil 500 mg oral tablet: 1 tab(s) orally once a day (at bedtime)  NIFEdipine (Eqv-Procardia XL) 30 mg oral tablet, extended release: 1 tab(s) orally once a day  NIFEdipine 30 mg oral tablet, extended release: 2 tab(s) orally once a day (in the morning)  Pepcid 20 mg oral tablet: 1 tab(s) orally once a day  predniSONE 5 mg oral tablet: 1 tab(s) orally once a day  sodium bicarbonate 650 mg oral tablet: 2 tab(s) orally 3 times a day  tacrolimus 1 mg oral capsule: 1 cap(s) orally 2 times a day  tamsulosin 0.4 mg oral capsule: 1 cap(s) orally once a day (at bedtime)  test strips (per patient&#x27;s insurance): 1 application subcutaneously 4 times a day. ** Compatible with patient&#x27;s glucometer **

## 2025-03-05 ENCOUNTER — APPOINTMENT (OUTPATIENT)
Dept: ENDOCRINOLOGY | Facility: CLINIC | Age: 55
End: 2025-03-05

## 2025-03-14 ENCOUNTER — APPOINTMENT (OUTPATIENT)
Dept: INTERNAL MEDICINE | Facility: CLINIC | Age: 55
End: 2025-03-14
Payer: MEDICARE

## 2025-03-14 ENCOUNTER — LABORATORY RESULT (OUTPATIENT)
Age: 55
End: 2025-03-14

## 2025-03-14 VITALS
DIASTOLIC BLOOD PRESSURE: 80 MMHG | HEIGHT: 65 IN | HEART RATE: 70 BPM | BODY MASS INDEX: 32.99 KG/M2 | WEIGHT: 198 LBS | SYSTOLIC BLOOD PRESSURE: 120 MMHG | OXYGEN SATURATION: 98 %

## 2025-03-14 DIAGNOSIS — E11.9 TYPE 2 DIABETES MELLITUS W/OUT COMPLICATIONS: ICD-10-CM

## 2025-03-14 PROCEDURE — 36415 COLL VENOUS BLD VENIPUNCTURE: CPT

## 2025-03-14 PROCEDURE — 99495 TRANSJ CARE MGMT MOD F2F 14D: CPT

## 2025-03-17 LAB
ALBUMIN SERPL ELPH-MCNC: 4.3 G/DL
ALP BLD-CCNC: 88 U/L
ALT SERPL-CCNC: 20 U/L
ANION GAP SERPL CALC-SCNC: 17 MMOL/L
AST SERPL-CCNC: 21 U/L
BASOPHILS # BLD AUTO: 0.06 K/UL
BASOPHILS NFR BLD AUTO: 0.5 %
BILIRUB SERPL-MCNC: 0.5 MG/DL
BUN SERPL-MCNC: 25 MG/DL
CALCIUM SERPL-MCNC: 10.2 MG/DL
CHLORIDE SERPL-SCNC: 104 MMOL/L
CHOLEST SERPL-MCNC: 128 MG/DL
CO2 SERPL-SCNC: 18 MMOL/L
CREAT SERPL-MCNC: 1.48 MG/DL
EGFRCR SERPLBLD CKD-EPI 2021: 56 ML/MIN/1.73M2
EOSINOPHIL # BLD AUTO: 0.03 K/UL
EOSINOPHIL NFR BLD AUTO: 0.2 %
ESTIMATED AVERAGE GLUCOSE: 341 MG/DL
GLUCOSE SERPL-MCNC: 90 MG/DL
HBA1C MFR BLD HPLC: 13.5 %
HCT VFR BLD CALC: 38 %
HDLC SERPL-MCNC: 40 MG/DL
HGB BLD-MCNC: 12.4 G/DL
IMM GRANULOCYTES NFR BLD AUTO: 1 %
LDLC SERPL CALC-MCNC: 59 MG/DL
LYMPHOCYTES # BLD AUTO: 3.37 K/UL
LYMPHOCYTES NFR BLD AUTO: 26.7 %
MAN DIFF?: NORMAL
MCHC RBC-ENTMCNC: 29 PG
MCHC RBC-ENTMCNC: 32.6 G/DL
MCV RBC AUTO: 89 FL
MONOCYTES # BLD AUTO: 1.03 K/UL
MONOCYTES NFR BLD AUTO: 8.1 %
NEUTROPHILS # BLD AUTO: 8.02 K/UL
NEUTROPHILS NFR BLD AUTO: 63.5 %
NONHDLC SERPL-MCNC: 88 MG/DL
PHOSPHATE SERPL-MCNC: 1.8 MG/DL
PLATELET # BLD AUTO: 360 K/UL
POTASSIUM SERPL-SCNC: 4.1 MMOL/L
PROT SERPL-MCNC: 7.3 G/DL
RBC # BLD: 4.27 M/UL
RBC # FLD: 14.1 %
SODIUM SERPL-SCNC: 140 MMOL/L
T3RU NFR SERPL: 0.8 TBI
T4 SERPL-MCNC: 9.9 UG/DL
TRIGL SERPL-MCNC: 169 MG/DL
TSH SERPL-ACNC: 2.27 UIU/ML
URATE SERPL-MCNC: 7.4 MG/DL
WBC # FLD AUTO: 12.64 K/UL

## 2025-03-19 ENCOUNTER — NON-APPOINTMENT (OUTPATIENT)
Age: 55
End: 2025-03-19

## 2025-03-19 ENCOUNTER — APPOINTMENT (OUTPATIENT)
Dept: OPHTHALMOLOGY | Facility: CLINIC | Age: 55
End: 2025-03-19
Payer: MEDICARE

## 2025-03-19 PROCEDURE — 92004 COMPRE OPH EXAM NEW PT 1/>: CPT | Mod: PD

## 2025-03-19 PROCEDURE — 92250 FUNDUS PHOTOGRAPHY W/I&R: CPT | Mod: PD

## 2025-03-20 ENCOUNTER — INPATIENT (INPATIENT)
Facility: HOSPITAL | Age: 55
LOS: 1 days | Discharge: HOME | DRG: 310 | End: 2025-03-22
Attending: STUDENT IN AN ORGANIZED HEALTH CARE EDUCATION/TRAINING PROGRAM | Admitting: STUDENT IN AN ORGANIZED HEALTH CARE EDUCATION/TRAINING PROGRAM
Payer: MEDICARE

## 2025-03-20 VITALS
HEART RATE: 92 BPM | OXYGEN SATURATION: 99 % | RESPIRATION RATE: 18 BRPM | TEMPERATURE: 100 F | SYSTOLIC BLOOD PRESSURE: 119 MMHG | WEIGHT: 197.98 LBS | HEIGHT: 64 IN | DIASTOLIC BLOOD PRESSURE: 81 MMHG

## 2025-03-20 DIAGNOSIS — Z94.0 KIDNEY TRANSPLANT STATUS: ICD-10-CM

## 2025-03-20 DIAGNOSIS — E11.9 TYPE 2 DIABETES MELLITUS WITHOUT COMPLICATIONS: ICD-10-CM

## 2025-03-20 DIAGNOSIS — I10 ESSENTIAL (PRIMARY) HYPERTENSION: ICD-10-CM

## 2025-03-20 DIAGNOSIS — Z98.890 OTHER SPECIFIED POSTPROCEDURAL STATES: Chronic | ICD-10-CM

## 2025-03-20 DIAGNOSIS — Z94.0 KIDNEY TRANSPLANT STATUS: Chronic | ICD-10-CM

## 2025-03-20 DIAGNOSIS — Z75.8 OTHER PROBLEMS RELATED TO MEDICAL FACILITIES AND OTHER HEALTH CARE: ICD-10-CM

## 2025-03-20 DIAGNOSIS — Z29.9 ENCOUNTER FOR PROPHYLACTIC MEASURES, UNSPECIFIED: ICD-10-CM

## 2025-03-20 DIAGNOSIS — H47.10 UNSPECIFIED PAPILLEDEMA: ICD-10-CM

## 2025-03-20 LAB
ALBUMIN SERPL ELPH-MCNC: 3.8 G/DL — SIGNIFICANT CHANGE UP (ref 3.3–5)
ALP SERPL-CCNC: 85 U/L — SIGNIFICANT CHANGE UP (ref 40–120)
ALT FLD-CCNC: 15 U/L — SIGNIFICANT CHANGE UP (ref 10–45)
ANION GAP SERPL CALC-SCNC: 14 MMOL/L — SIGNIFICANT CHANGE UP (ref 5–17)
APTT BLD: 29 SEC — SIGNIFICANT CHANGE UP (ref 24.5–35.6)
AST SERPL-CCNC: 18 U/L — SIGNIFICANT CHANGE UP (ref 10–40)
BASOPHILS # BLD AUTO: 0.05 K/UL — SIGNIFICANT CHANGE UP (ref 0–0.2)
BASOPHILS NFR BLD AUTO: 0.5 % — SIGNIFICANT CHANGE UP (ref 0–2)
BILIRUB SERPL-MCNC: 0.8 MG/DL — SIGNIFICANT CHANGE UP (ref 0.2–1.2)
BUN SERPL-MCNC: 22 MG/DL — SIGNIFICANT CHANGE UP (ref 7–23)
CALCIUM SERPL-MCNC: 10.2 MG/DL — SIGNIFICANT CHANGE UP (ref 8.4–10.5)
CHLORIDE SERPL-SCNC: 104 MMOL/L — SIGNIFICANT CHANGE UP (ref 96–108)
CO2 SERPL-SCNC: 21 MMOL/L — LOW (ref 22–31)
CREAT SERPL-MCNC: 1.45 MG/DL — HIGH (ref 0.5–1.3)
EGFR: 57 ML/MIN/1.73M2 — LOW
EGFR: 57 ML/MIN/1.73M2 — LOW
EOSINOPHIL # BLD AUTO: 0.06 K/UL — SIGNIFICANT CHANGE UP (ref 0–0.5)
EOSINOPHIL NFR BLD AUTO: 0.6 % — SIGNIFICANT CHANGE UP (ref 0–6)
GLUCOSE BLDC GLUCOMTR-MCNC: 131 MG/DL — HIGH (ref 70–99)
GLUCOSE SERPL-MCNC: 116 MG/DL — HIGH (ref 70–99)
HCT VFR BLD CALC: 37.9 % — LOW (ref 39–50)
HGB BLD-MCNC: 12.1 G/DL — LOW (ref 13–17)
IMM GRANULOCYTES NFR BLD AUTO: 1.2 % — HIGH (ref 0–0.9)
INR BLD: 1.07 RATIO — SIGNIFICANT CHANGE UP (ref 0.85–1.16)
LYMPHOCYTES # BLD AUTO: 1.29 K/UL — SIGNIFICANT CHANGE UP (ref 1–3.3)
LYMPHOCYTES # BLD AUTO: 12.5 % — LOW (ref 13–44)
MCHC RBC-ENTMCNC: 28.5 PG — SIGNIFICANT CHANGE UP (ref 27–34)
MCHC RBC-ENTMCNC: 31.9 G/DL — LOW (ref 32–36)
MCV RBC AUTO: 89.2 FL — SIGNIFICANT CHANGE UP (ref 80–100)
MONOCYTES # BLD AUTO: 1.19 K/UL — HIGH (ref 0–0.9)
MONOCYTES NFR BLD AUTO: 11.5 % — SIGNIFICANT CHANGE UP (ref 2–14)
NEUTROPHILS # BLD AUTO: 7.62 K/UL — HIGH (ref 1.8–7.4)
NEUTROPHILS NFR BLD AUTO: 73.7 % — SIGNIFICANT CHANGE UP (ref 43–77)
NRBC BLD AUTO-RTO: 0 /100 WBCS — SIGNIFICANT CHANGE UP (ref 0–0)
PLATELET # BLD AUTO: 254 K/UL — SIGNIFICANT CHANGE UP (ref 150–400)
POTASSIUM SERPL-MCNC: 3.8 MMOL/L — SIGNIFICANT CHANGE UP (ref 3.5–5.3)
POTASSIUM SERPL-SCNC: 3.8 MMOL/L — SIGNIFICANT CHANGE UP (ref 3.5–5.3)
PROT SERPL-MCNC: 7.3 G/DL — SIGNIFICANT CHANGE UP (ref 6–8.3)
PROTHROM AB SERPL-ACNC: 12.2 SEC — SIGNIFICANT CHANGE UP (ref 9.9–13.4)
RBC # BLD: 4.25 M/UL — SIGNIFICANT CHANGE UP (ref 4.2–5.8)
RBC # FLD: 13.2 % — SIGNIFICANT CHANGE UP (ref 10.3–14.5)
SODIUM SERPL-SCNC: 139 MMOL/L — SIGNIFICANT CHANGE UP (ref 135–145)
WBC # BLD: 10.33 K/UL — SIGNIFICANT CHANGE UP (ref 3.8–10.5)
WBC # FLD AUTO: 10.33 K/UL — SIGNIFICANT CHANGE UP (ref 3.8–10.5)

## 2025-03-20 PROCEDURE — 70545 MR ANGIOGRAPHY HEAD W/DYE: CPT | Mod: 26,59

## 2025-03-20 PROCEDURE — 93010 ELECTROCARDIOGRAM REPORT: CPT

## 2025-03-20 PROCEDURE — 70551 MRI BRAIN STEM W/O DYE: CPT | Mod: 26

## 2025-03-20 PROCEDURE — 70450 CT HEAD/BRAIN W/O DYE: CPT | Mod: 26

## 2025-03-20 PROCEDURE — 99497 ADVNCD CARE PLAN 30 MIN: CPT | Mod: 25

## 2025-03-20 PROCEDURE — 70540 MRI ORBIT/FACE/NECK W/O DYE: CPT | Mod: 26

## 2025-03-20 PROCEDURE — 71045 X-RAY EXAM CHEST 1 VIEW: CPT | Mod: 26

## 2025-03-20 PROCEDURE — 99223 1ST HOSP IP/OBS HIGH 75: CPT

## 2025-03-20 PROCEDURE — 99285 EMERGENCY DEPT VISIT HI MDM: CPT | Mod: GC

## 2025-03-20 RX ORDER — NIFEDIPINE 30 MG
30 TABLET, EXTENDED RELEASE 24 HR ORAL AT BEDTIME
Refills: 0 | Status: DISCONTINUED | OUTPATIENT
Start: 2025-03-20 | End: 2025-03-22

## 2025-03-20 RX ORDER — PREDNISONE 20 MG/1
1 TABLET ORAL
Refills: 0 | DISCHARGE

## 2025-03-20 RX ORDER — GLUCAGON 3 MG/1
1 POWDER NASAL ONCE
Refills: 0 | Status: DISCONTINUED | OUTPATIENT
Start: 2025-03-20 | End: 2025-03-22

## 2025-03-20 RX ORDER — HEPARIN SODIUM 1000 [USP'U]/ML
5000 INJECTION INTRAVENOUS; SUBCUTANEOUS EVERY 8 HOURS
Refills: 0 | Status: DISCONTINUED | OUTPATIENT
Start: 2025-03-20 | End: 2025-03-21

## 2025-03-20 RX ORDER — TACROLIMUS 0.5 MG/1
1 CAPSULE ORAL
Refills: 0 | DISCHARGE

## 2025-03-20 RX ORDER — ASPIRIN 325 MG
1 TABLET ORAL
Refills: 0 | DISCHARGE

## 2025-03-20 RX ORDER — INSULIN LISPRO 100 U/ML
INJECTION, SOLUTION INTRAVENOUS; SUBCUTANEOUS
Refills: 0 | Status: DISCONTINUED | OUTPATIENT
Start: 2025-03-20 | End: 2025-03-22

## 2025-03-20 RX ORDER — NIFEDIPINE 30 MG
60 TABLET, EXTENDED RELEASE 24 HR ORAL
Refills: 0 | Status: DISCONTINUED | OUTPATIENT
Start: 2025-03-20 | End: 2025-03-22

## 2025-03-20 RX ORDER — SODIUM CHLORIDE 9 G/1000ML
1000 INJECTION, SOLUTION INTRAVENOUS
Refills: 0 | Status: DISCONTINUED | OUTPATIENT
Start: 2025-03-20 | End: 2025-03-22

## 2025-03-20 RX ORDER — TACROLIMUS 0.5 MG/1
1 CAPSULE ORAL
Refills: 0 | Status: DISCONTINUED | OUTPATIENT
Start: 2025-03-20 | End: 2025-03-22

## 2025-03-20 RX ORDER — TAMSULOSIN HYDROCHLORIDE 0.4 MG/1
1 CAPSULE ORAL
Refills: 0 | DISCHARGE

## 2025-03-20 RX ORDER — METOPROLOL SUCCINATE 50 MG/1
50 TABLET, EXTENDED RELEASE ORAL
Refills: 0 | Status: DISCONTINUED | OUTPATIENT
Start: 2025-03-20 | End: 2025-03-22

## 2025-03-20 RX ORDER — MYCOPHENOLATE MOFETIL 500 MG/1
2 TABLET, FILM COATED ORAL
Refills: 0 | DISCHARGE

## 2025-03-20 RX ORDER — MYCOPHENOLATE MOFETIL 500 MG/1
1 TABLET, FILM COATED ORAL
Refills: 0 | DISCHARGE

## 2025-03-20 RX ORDER — SODIUM CHLORIDE 9 G/1000ML
500 INJECTION, SOLUTION INTRAVENOUS ONCE
Refills: 0 | Status: COMPLETED | OUTPATIENT
Start: 2025-03-20 | End: 2025-03-20

## 2025-03-20 RX ORDER — MYCOPHENOLATE MOFETIL 500 MG/1
1000 TABLET, FILM COATED ORAL
Refills: 0 | Status: DISCONTINUED | OUTPATIENT
Start: 2025-03-21 | End: 2025-03-22

## 2025-03-20 RX ORDER — NIFEDIPINE 30 MG
1 TABLET, EXTENDED RELEASE 24 HR ORAL
Refills: 0 | DISCHARGE

## 2025-03-20 RX ORDER — NIFEDIPINE 30 MG
2 TABLET, EXTENDED RELEASE 24 HR ORAL
Refills: 0 | DISCHARGE

## 2025-03-20 RX ORDER — MYCOPHENOLATE MOFETIL 500 MG/1
500 TABLET, FILM COATED ORAL AT BEDTIME
Refills: 0 | Status: DISCONTINUED | OUTPATIENT
Start: 2025-03-20 | End: 2025-03-22

## 2025-03-20 RX ORDER — ATORVASTATIN CALCIUM 80 MG/1
1 TABLET, FILM COATED ORAL
Refills: 0 | DISCHARGE

## 2025-03-20 RX ORDER — DEXTROSE 50 % IN WATER 50 %
25 SYRINGE (ML) INTRAVENOUS ONCE
Refills: 0 | Status: DISCONTINUED | OUTPATIENT
Start: 2025-03-20 | End: 2025-03-22

## 2025-03-20 RX ORDER — DEXTROSE 50 % IN WATER 50 %
12.5 SYRINGE (ML) INTRAVENOUS ONCE
Refills: 0 | Status: DISCONTINUED | OUTPATIENT
Start: 2025-03-20 | End: 2025-03-22

## 2025-03-20 RX ORDER — SODIUM BICARBONATE 1 MEQ/ML
1300 SYRINGE (ML) INTRAVENOUS THREE TIMES A DAY
Refills: 0 | Status: DISCONTINUED | OUTPATIENT
Start: 2025-03-20 | End: 2025-03-22

## 2025-03-20 RX ORDER — ATORVASTATIN CALCIUM 80 MG/1
20 TABLET, FILM COATED ORAL AT BEDTIME
Refills: 0 | Status: DISCONTINUED | OUTPATIENT
Start: 2025-03-20 | End: 2025-03-22

## 2025-03-20 RX ORDER — INSULIN GLARGINE-YFGN 100 [IU]/ML
18 INJECTION, SOLUTION SUBCUTANEOUS AT BEDTIME
Refills: 0 | Status: DISCONTINUED | OUTPATIENT
Start: 2025-03-21 | End: 2025-03-22

## 2025-03-20 RX ORDER — ASPIRIN 325 MG
81 TABLET ORAL DAILY
Refills: 0 | Status: DISCONTINUED | OUTPATIENT
Start: 2025-03-21 | End: 2025-03-22

## 2025-03-20 RX ORDER — INSULIN LISPRO 100 U/ML
INJECTION, SOLUTION INTRAVENOUS; SUBCUTANEOUS AT BEDTIME
Refills: 0 | Status: DISCONTINUED | OUTPATIENT
Start: 2025-03-20 | End: 2025-03-22

## 2025-03-20 RX ORDER — TAMSULOSIN HYDROCHLORIDE 0.4 MG/1
0.4 CAPSULE ORAL AT BEDTIME
Refills: 0 | Status: DISCONTINUED | OUTPATIENT
Start: 2025-03-20 | End: 2025-03-22

## 2025-03-20 RX ORDER — METOPROLOL SUCCINATE 50 MG/1
1 TABLET, EXTENDED RELEASE ORAL
Refills: 0 | DISCHARGE

## 2025-03-20 RX ORDER — PREDNISONE 20 MG/1
5 TABLET ORAL DAILY
Refills: 0 | Status: DISCONTINUED | OUTPATIENT
Start: 2025-03-21 | End: 2025-03-22

## 2025-03-20 RX ORDER — DEXTROSE 50 % IN WATER 50 %
15 SYRINGE (ML) INTRAVENOUS ONCE
Refills: 0 | Status: DISCONTINUED | OUTPATIENT
Start: 2025-03-20 | End: 2025-03-22

## 2025-03-20 RX ADMIN — Medication 1300 MILLIGRAM(S): at 23:08

## 2025-03-20 RX ADMIN — MYCOPHENOLATE MOFETIL 500 MILLIGRAM(S): 500 TABLET, FILM COATED ORAL at 23:14

## 2025-03-20 RX ADMIN — ATORVASTATIN CALCIUM 20 MILLIGRAM(S): 80 TABLET, FILM COATED ORAL at 23:08

## 2025-03-20 RX ADMIN — TAMSULOSIN HYDROCHLORIDE 0.4 MILLIGRAM(S): 0.4 CAPSULE ORAL at 23:08

## 2025-03-20 RX ADMIN — SODIUM CHLORIDE 500 MILLILITER(S): 9 INJECTION, SOLUTION INTRAVENOUS at 16:49

## 2025-03-20 RX ADMIN — METOPROLOL SUCCINATE 50 MILLIGRAM(S): 50 TABLET, EXTENDED RELEASE ORAL at 23:45

## 2025-03-20 RX ADMIN — Medication 30 MILLIGRAM(S): at 23:07

## 2025-03-20 RX ADMIN — TACROLIMUS 1 MILLIGRAM(S): 0.5 CAPSULE ORAL at 23:07

## 2025-03-20 RX ADMIN — HEPARIN SODIUM 5000 UNIT(S): 1000 INJECTION INTRAVENOUS; SUBCUTANEOUS at 23:09

## 2025-03-21 ENCOUNTER — TRANSCRIPTION ENCOUNTER (OUTPATIENT)
Age: 55
End: 2025-03-21

## 2025-03-21 DIAGNOSIS — I82.409 ACUTE EMBOLISM AND THROMBOSIS OF UNSPECIFIED DEEP VEINS OF UNSPECIFIED LOWER EXTREMITY: ICD-10-CM

## 2025-03-21 DIAGNOSIS — L57.0 ACTINIC KERATOSIS: ICD-10-CM

## 2025-03-21 LAB
A1C WITH ESTIMATED AVERAGE GLUCOSE RESULT: 12.9 % — HIGH (ref 4–5.6)
ANION GAP SERPL CALC-SCNC: 16 MMOL/L — SIGNIFICANT CHANGE UP (ref 5–17)
APTT BLD: 29 SEC — SIGNIFICANT CHANGE UP (ref 24.5–35.6)
APTT BLD: 64.8 SEC — HIGH (ref 24.5–35.6)
BASOPHILS # BLD AUTO: 0.03 K/UL — SIGNIFICANT CHANGE UP (ref 0–0.2)
BASOPHILS NFR BLD AUTO: 0.4 % — SIGNIFICANT CHANGE UP (ref 0–2)
BUN SERPL-MCNC: 23 MG/DL — SIGNIFICANT CHANGE UP (ref 7–23)
CALCIUM SERPL-MCNC: 9.7 MG/DL — SIGNIFICANT CHANGE UP (ref 8.4–10.5)
CHLORIDE SERPL-SCNC: 104 MMOL/L — SIGNIFICANT CHANGE UP (ref 96–108)
CHOLEST SERPL-MCNC: 113 MG/DL — SIGNIFICANT CHANGE UP
CO2 SERPL-SCNC: 19 MMOL/L — LOW (ref 22–31)
CREAT ?TM UR-MCNC: 82 MG/DL — SIGNIFICANT CHANGE UP
CREAT SERPL-MCNC: 1.56 MG/DL — HIGH (ref 0.5–1.3)
EGFR: 52 ML/MIN/1.73M2 — LOW
EGFR: 52 ML/MIN/1.73M2 — LOW
EOSINOPHIL # BLD AUTO: 0.09 K/UL — SIGNIFICANT CHANGE UP (ref 0–0.5)
EOSINOPHIL NFR BLD AUTO: 1.2 % — SIGNIFICANT CHANGE UP (ref 0–6)
ERYTHROCYTE [SEDIMENTATION RATE] IN BLOOD: 71 MM/HR — HIGH (ref 0–20)
ESTIMATED AVERAGE GLUCOSE: 324 MG/DL — HIGH (ref 68–114)
GLUCOSE BLDC GLUCOMTR-MCNC: 138 MG/DL — HIGH (ref 70–99)
GLUCOSE BLDC GLUCOMTR-MCNC: 146 MG/DL — HIGH (ref 70–99)
GLUCOSE BLDC GLUCOMTR-MCNC: 152 MG/DL — HIGH (ref 70–99)
GLUCOSE BLDC GLUCOMTR-MCNC: 172 MG/DL — HIGH (ref 70–99)
GLUCOSE SERPL-MCNC: 113 MG/DL — HIGH (ref 70–99)
HCT VFR BLD CALC: 34.5 % — LOW (ref 39–50)
HDLC SERPL-MCNC: 37 MG/DL — LOW
HGB BLD-MCNC: 10.7 G/DL — LOW (ref 13–17)
IMM GRANULOCYTES NFR BLD AUTO: 1.1 % — HIGH (ref 0–0.9)
LDLC SERPL-MCNC: 49 MG/DL — SIGNIFICANT CHANGE UP
LIPID PNL WITH DIRECT LDL SERPL: 49 MG/DL — SIGNIFICANT CHANGE UP
LYMPHOCYTES # BLD AUTO: 1.51 K/UL — SIGNIFICANT CHANGE UP (ref 1–3.3)
LYMPHOCYTES # BLD AUTO: 19.9 % — SIGNIFICANT CHANGE UP (ref 13–44)
MCHC RBC-ENTMCNC: 28.2 PG — SIGNIFICANT CHANGE UP (ref 27–34)
MCHC RBC-ENTMCNC: 31 G/DL — LOW (ref 32–36)
MCV RBC AUTO: 90.8 FL — SIGNIFICANT CHANGE UP (ref 80–100)
MONOCYTES # BLD AUTO: 0.75 K/UL — SIGNIFICANT CHANGE UP (ref 0–0.9)
MONOCYTES NFR BLD AUTO: 9.9 % — SIGNIFICANT CHANGE UP (ref 2–14)
NEUTROPHILS # BLD AUTO: 5.12 K/UL — SIGNIFICANT CHANGE UP (ref 1.8–7.4)
NEUTROPHILS NFR BLD AUTO: 67.5 % — SIGNIFICANT CHANGE UP (ref 43–77)
NONHDLC SERPL-MCNC: 76 MG/DL — SIGNIFICANT CHANGE UP
NRBC BLD AUTO-RTO: 0 /100 WBCS — SIGNIFICANT CHANGE UP (ref 0–0)
PLATELET # BLD AUTO: 254 K/UL — SIGNIFICANT CHANGE UP (ref 150–400)
POTASSIUM SERPL-MCNC: 4.6 MMOL/L — SIGNIFICANT CHANGE UP (ref 3.5–5.3)
POTASSIUM SERPL-SCNC: 4.6 MMOL/L — SIGNIFICANT CHANGE UP (ref 3.5–5.3)
PROT ?TM UR-MCNC: 8 MG/DL — SIGNIFICANT CHANGE UP (ref 0–12)
PROT/CREAT UR-RTO: 0.1 RATIO — SIGNIFICANT CHANGE UP (ref 0–0.2)
RBC # BLD: 3.8 M/UL — LOW (ref 4.2–5.8)
RBC # FLD: 13.4 % — SIGNIFICANT CHANGE UP (ref 10.3–14.5)
SODIUM SERPL-SCNC: 139 MMOL/L — SIGNIFICANT CHANGE UP (ref 135–145)
TACROLIMUS SERPL-MCNC: 5.4 NG/ML — SIGNIFICANT CHANGE UP
TRIGL SERPL-MCNC: 154 MG/DL — HIGH
TSH SERPL-MCNC: 2.22 UIU/ML — SIGNIFICANT CHANGE UP (ref 0.27–4.2)
WBC # BLD: 7.58 K/UL — SIGNIFICANT CHANGE UP (ref 3.8–10.5)
WBC # FLD AUTO: 7.58 K/UL — SIGNIFICANT CHANGE UP (ref 3.8–10.5)

## 2025-03-21 PROCEDURE — 74177 CT ABD & PELVIS W/CONTRAST: CPT | Mod: 26

## 2025-03-21 PROCEDURE — 99222 1ST HOSP IP/OBS MODERATE 55: CPT | Mod: GC

## 2025-03-21 PROCEDURE — 93970 EXTREMITY STUDY: CPT | Mod: 26

## 2025-03-21 PROCEDURE — 99233 SBSQ HOSP IP/OBS HIGH 50: CPT

## 2025-03-21 PROCEDURE — 99223 1ST HOSP IP/OBS HIGH 75: CPT | Mod: GC

## 2025-03-21 RX ORDER — APIXABAN 2.5 MG/1
1 TABLET, FILM COATED ORAL
Qty: 60 | Refills: 0
Start: 2025-03-21 | End: 2025-04-19

## 2025-03-21 RX ORDER — HEPARIN SODIUM 1000 [USP'U]/ML
INJECTION INTRAVENOUS; SUBCUTANEOUS
Qty: 25000 | Refills: 0 | Status: DISCONTINUED | OUTPATIENT
Start: 2025-03-21 | End: 2025-03-22

## 2025-03-21 RX ORDER — APIXABAN 2.5 MG/1
1 TABLET, FILM COATED ORAL
Qty: 74 | Refills: 0
Start: 2025-03-21 | End: 2025-04-19

## 2025-03-21 RX ADMIN — ATORVASTATIN CALCIUM 20 MILLIGRAM(S): 80 TABLET, FILM COATED ORAL at 21:44

## 2025-03-21 RX ADMIN — Medication 1300 MILLIGRAM(S): at 14:15

## 2025-03-21 RX ADMIN — METOPROLOL SUCCINATE 50 MILLIGRAM(S): 50 TABLET, EXTENDED RELEASE ORAL at 17:17

## 2025-03-21 RX ADMIN — HEPARIN SODIUM 1600 UNIT(S)/HR: 1000 INJECTION INTRAVENOUS; SUBCUTANEOUS at 09:53

## 2025-03-21 RX ADMIN — TACROLIMUS 1 MILLIGRAM(S): 0.5 CAPSULE ORAL at 09:27

## 2025-03-21 RX ADMIN — MYCOPHENOLATE MOFETIL 500 MILLIGRAM(S): 500 TABLET, FILM COATED ORAL at 21:44

## 2025-03-21 RX ADMIN — MYCOPHENOLATE MOFETIL 1000 MILLIGRAM(S): 500 TABLET, FILM COATED ORAL at 09:28

## 2025-03-21 RX ADMIN — Medication 1300 MILLIGRAM(S): at 21:43

## 2025-03-21 RX ADMIN — INSULIN LISPRO 1: 100 INJECTION, SOLUTION INTRAVENOUS; SUBCUTANEOUS at 11:25

## 2025-03-21 RX ADMIN — INSULIN LISPRO 1: 100 INJECTION, SOLUTION INTRAVENOUS; SUBCUTANEOUS at 08:43

## 2025-03-21 RX ADMIN — Medication 1300 MILLIGRAM(S): at 05:40

## 2025-03-21 RX ADMIN — TAMSULOSIN HYDROCHLORIDE 0.4 MILLIGRAM(S): 0.4 CAPSULE ORAL at 21:43

## 2025-03-21 RX ADMIN — PREDNISONE 5 MILLIGRAM(S): 20 TABLET ORAL at 05:41

## 2025-03-21 RX ADMIN — Medication 60 MILLIGRAM(S): at 05:41

## 2025-03-21 RX ADMIN — HEPARIN SODIUM 1600 UNIT(S)/HR: 1000 INJECTION INTRAVENOUS; SUBCUTANEOUS at 17:17

## 2025-03-21 RX ADMIN — TACROLIMUS 1 MILLIGRAM(S): 0.5 CAPSULE ORAL at 21:43

## 2025-03-21 RX ADMIN — HEPARIN SODIUM 5000 UNIT(S): 1000 INJECTION INTRAVENOUS; SUBCUTANEOUS at 05:41

## 2025-03-21 RX ADMIN — INSULIN GLARGINE-YFGN 18 UNIT(S): 100 INJECTION, SOLUTION SUBCUTANEOUS at 21:42

## 2025-03-21 RX ADMIN — METOPROLOL SUCCINATE 50 MILLIGRAM(S): 50 TABLET, EXTENDED RELEASE ORAL at 05:41

## 2025-03-21 RX ADMIN — Medication 30 MILLIGRAM(S): at 21:43

## 2025-03-21 RX ADMIN — HEPARIN SODIUM 1600 UNIT(S)/HR: 1000 INJECTION INTRAVENOUS; SUBCUTANEOUS at 19:24

## 2025-03-21 RX ADMIN — Medication 81 MILLIGRAM(S): at 11:25

## 2025-03-22 ENCOUNTER — TRANSCRIPTION ENCOUNTER (OUTPATIENT)
Age: 55
End: 2025-03-22

## 2025-03-22 VITALS
TEMPERATURE: 98 F | SYSTOLIC BLOOD PRESSURE: 112 MMHG | RESPIRATION RATE: 16 BRPM | DIASTOLIC BLOOD PRESSURE: 63 MMHG | OXYGEN SATURATION: 96 % | HEART RATE: 63 BPM

## 2025-03-22 LAB
ANION GAP SERPL CALC-SCNC: 18 MMOL/L — HIGH (ref 5–17)
APTT BLD: 68.2 SEC — HIGH (ref 24.5–35.6)
BUN SERPL-MCNC: 22 MG/DL — SIGNIFICANT CHANGE UP (ref 7–23)
CALCIUM SERPL-MCNC: 9.7 MG/DL — SIGNIFICANT CHANGE UP (ref 8.4–10.5)
CHLORIDE SERPL-SCNC: 101 MMOL/L — SIGNIFICANT CHANGE UP (ref 96–108)
CO2 SERPL-SCNC: 17 MMOL/L — LOW (ref 22–31)
CREAT SERPL-MCNC: 1.36 MG/DL — HIGH (ref 0.5–1.3)
EGFR: 62 ML/MIN/1.73M2 — SIGNIFICANT CHANGE UP
EGFR: 62 ML/MIN/1.73M2 — SIGNIFICANT CHANGE UP
GLUCOSE BLDC GLUCOMTR-MCNC: 157 MG/DL — HIGH (ref 70–99)
GLUCOSE BLDC GLUCOMTR-MCNC: 174 MG/DL — HIGH (ref 70–99)
GLUCOSE SERPL-MCNC: 238 MG/DL — HIGH (ref 70–99)
HCT VFR BLD CALC: 35.3 % — LOW (ref 39–50)
HGB BLD-MCNC: 10.9 G/DL — LOW (ref 13–17)
MCHC RBC-ENTMCNC: 28.5 PG — SIGNIFICANT CHANGE UP (ref 27–34)
MCHC RBC-ENTMCNC: 30.9 G/DL — LOW (ref 32–36)
MCV RBC AUTO: 92.2 FL — SIGNIFICANT CHANGE UP (ref 80–100)
NRBC BLD AUTO-RTO: 0 /100 WBCS — SIGNIFICANT CHANGE UP (ref 0–0)
PLATELET # BLD AUTO: 315 K/UL — SIGNIFICANT CHANGE UP (ref 150–400)
POTASSIUM SERPL-MCNC: 3.9 MMOL/L — SIGNIFICANT CHANGE UP (ref 3.5–5.3)
POTASSIUM SERPL-SCNC: 3.9 MMOL/L — SIGNIFICANT CHANGE UP (ref 3.5–5.3)
RBC # BLD: 3.83 M/UL — LOW (ref 4.2–5.8)
RBC # FLD: 13.3 % — SIGNIFICANT CHANGE UP (ref 10.3–14.5)
SODIUM SERPL-SCNC: 136 MMOL/L — SIGNIFICANT CHANGE UP (ref 135–145)
TACROLIMUS SERPL-MCNC: 4.7 NG/ML — SIGNIFICANT CHANGE UP
WBC # BLD: 7.02 K/UL — SIGNIFICANT CHANGE UP (ref 3.8–10.5)
WBC # FLD AUTO: 7.02 K/UL — SIGNIFICANT CHANGE UP (ref 3.8–10.5)

## 2025-03-22 PROCEDURE — 71045 X-RAY EXAM CHEST 1 VIEW: CPT

## 2025-03-22 PROCEDURE — 80053 COMPREHEN METABOLIC PANEL: CPT

## 2025-03-22 PROCEDURE — 70543 MRI ORBT/FAC/NCK W/O &W/DYE: CPT | Mod: MC

## 2025-03-22 PROCEDURE — 85027 COMPLETE CBC AUTOMATED: CPT

## 2025-03-22 PROCEDURE — 70553 MRI BRAIN STEM W/O & W/DYE: CPT | Mod: MC

## 2025-03-22 PROCEDURE — 74177 CT ABD & PELVIS W/CONTRAST: CPT | Mod: MC

## 2025-03-22 PROCEDURE — 93970 EXTREMITY STUDY: CPT

## 2025-03-22 PROCEDURE — 84156 ASSAY OF PROTEIN URINE: CPT

## 2025-03-22 PROCEDURE — 99239 HOSP IP/OBS DSCHRG MGMT >30: CPT

## 2025-03-22 PROCEDURE — 85730 THROMBOPLASTIN TIME PARTIAL: CPT

## 2025-03-22 PROCEDURE — 80061 LIPID PANEL: CPT

## 2025-03-22 PROCEDURE — 82962 GLUCOSE BLOOD TEST: CPT

## 2025-03-22 PROCEDURE — 83036 HEMOGLOBIN GLYCOSYLATED A1C: CPT

## 2025-03-22 PROCEDURE — 86140 C-REACTIVE PROTEIN: CPT

## 2025-03-22 PROCEDURE — 70545 MR ANGIOGRAPHY HEAD W/DYE: CPT | Mod: MC

## 2025-03-22 PROCEDURE — 85025 COMPLETE CBC W/AUTO DIFF WBC: CPT

## 2025-03-22 PROCEDURE — 70450 CT HEAD/BRAIN W/O DYE: CPT | Mod: MC

## 2025-03-22 PROCEDURE — 93005 ELECTROCARDIOGRAM TRACING: CPT

## 2025-03-22 PROCEDURE — 80197 ASSAY OF TACROLIMUS: CPT

## 2025-03-22 PROCEDURE — A9585: CPT

## 2025-03-22 PROCEDURE — 84443 ASSAY THYROID STIM HORMONE: CPT

## 2025-03-22 PROCEDURE — 85610 PROTHROMBIN TIME: CPT

## 2025-03-22 PROCEDURE — 82570 ASSAY OF URINE CREATININE: CPT

## 2025-03-22 PROCEDURE — 85652 RBC SED RATE AUTOMATED: CPT

## 2025-03-22 PROCEDURE — 80048 BASIC METABOLIC PNL TOTAL CA: CPT

## 2025-03-22 PROCEDURE — 99285 EMERGENCY DEPT VISIT HI MDM: CPT | Mod: 25

## 2025-03-22 RX ORDER — SODIUM BICARBONATE 1 MEQ/ML
2 SYRINGE (ML) INTRAVENOUS
Qty: 0 | Refills: 0 | DISCHARGE

## 2025-03-22 RX ORDER — APIXABAN 2.5 MG/1
10 TABLET, FILM COATED ORAL EVERY 12 HOURS
Refills: 0 | Status: DISCONTINUED | OUTPATIENT
Start: 2025-03-22 | End: 2025-03-22

## 2025-03-22 RX ORDER — APIXABAN 2.5 MG/1
1 TABLET, FILM COATED ORAL
Qty: 74 | Refills: 0
Start: 2025-03-22 | End: 2025-04-20

## 2025-03-22 RX ADMIN — Medication 81 MILLIGRAM(S): at 11:39

## 2025-03-22 RX ADMIN — INSULIN LISPRO 1: 100 INJECTION, SOLUTION INTRAVENOUS; SUBCUTANEOUS at 11:39

## 2025-03-22 RX ADMIN — MYCOPHENOLATE MOFETIL 1000 MILLIGRAM(S): 500 TABLET, FILM COATED ORAL at 11:37

## 2025-03-22 RX ADMIN — Medication 1300 MILLIGRAM(S): at 05:46

## 2025-03-22 RX ADMIN — INSULIN LISPRO 1: 100 INJECTION, SOLUTION INTRAVENOUS; SUBCUTANEOUS at 07:57

## 2025-03-22 RX ADMIN — HEPARIN SODIUM 1600 UNIT(S)/HR: 1000 INJECTION INTRAVENOUS; SUBCUTANEOUS at 01:01

## 2025-03-22 RX ADMIN — APIXABAN 10 MILLIGRAM(S): 2.5 TABLET, FILM COATED ORAL at 11:55

## 2025-03-22 RX ADMIN — HEPARIN SODIUM 1600 UNIT(S)/HR: 1000 INJECTION INTRAVENOUS; SUBCUTANEOUS at 07:40

## 2025-03-22 RX ADMIN — PREDNISONE 5 MILLIGRAM(S): 20 TABLET ORAL at 05:46

## 2025-03-22 RX ADMIN — METOPROLOL SUCCINATE 50 MILLIGRAM(S): 50 TABLET, EXTENDED RELEASE ORAL at 05:46

## 2025-03-22 RX ADMIN — TACROLIMUS 1 MILLIGRAM(S): 0.5 CAPSULE ORAL at 07:57

## 2025-03-22 RX ADMIN — Medication 60 MILLIGRAM(S): at 05:46

## 2025-03-26 ENCOUNTER — NON-APPOINTMENT (OUTPATIENT)
Age: 55
End: 2025-03-26

## 2025-03-26 ENCOUNTER — APPOINTMENT (OUTPATIENT)
Dept: OPHTHALMOLOGY | Facility: CLINIC | Age: 55
End: 2025-03-26
Payer: MEDICARE

## 2025-03-26 PROCEDURE — 92083 EXTENDED VISUAL FIELD XM: CPT

## 2025-03-26 PROCEDURE — 92133 CPTRZD OPH DX IMG PST SGM ON: CPT

## 2025-03-26 PROCEDURE — 99204 OFFICE O/P NEW MOD 45 MIN: CPT

## 2025-04-02 ENCOUNTER — NON-APPOINTMENT (OUTPATIENT)
Age: 55
End: 2025-04-02

## 2025-04-02 ENCOUNTER — APPOINTMENT (OUTPATIENT)
Dept: OPHTHALMOLOGY | Facility: CLINIC | Age: 55
End: 2025-04-02
Payer: MEDICARE

## 2025-04-02 PROCEDURE — 92012 INTRM OPH EXAM EST PATIENT: CPT

## 2025-04-04 ENCOUNTER — APPOINTMENT (OUTPATIENT)
Dept: INTERNAL MEDICINE | Facility: CLINIC | Age: 55
End: 2025-04-04

## 2025-04-04 VITALS
BODY MASS INDEX: 32.99 KG/M2 | OXYGEN SATURATION: 98 % | DIASTOLIC BLOOD PRESSURE: 80 MMHG | WEIGHT: 198 LBS | HEART RATE: 80 BPM | SYSTOLIC BLOOD PRESSURE: 112 MMHG | HEIGHT: 65 IN | TEMPERATURE: 98.5 F

## 2025-04-04 DIAGNOSIS — Z94.0 KIDNEY TRANSPLANT STATUS: ICD-10-CM

## 2025-04-04 DIAGNOSIS — I82.402 ACUTE EMBOLISM AND THROMBOSIS OF UNSPECIFIED DEEP VEINS OF LEFT LOWER EXTREMITY: ICD-10-CM

## 2025-04-04 DIAGNOSIS — I82.4Z2 ACUTE EMBOLISM AND THROMBOSIS OF UNSPECIFIED DEEP VEINS OF LEFT DISTAL LOWER EXTREMITY: ICD-10-CM

## 2025-04-04 PROCEDURE — 99495 TRANSJ CARE MGMT MOD F2F 14D: CPT

## 2025-04-04 RX ORDER — APIXABAN 5 MG/1
5 TABLET, FILM COATED ORAL
Qty: 180 | Refills: 0 | Status: ACTIVE | COMMUNITY
Start: 1900-01-01 | End: 1900-01-01

## 2025-04-07 ENCOUNTER — APPOINTMENT (OUTPATIENT)
Dept: ULTRASOUND IMAGING | Facility: CLINIC | Age: 55
End: 2025-04-07
Payer: MEDICARE

## 2025-04-07 PROCEDURE — 76770 US EXAM ABDO BACK WALL COMP: CPT

## 2025-04-25 ENCOUNTER — APPOINTMENT (OUTPATIENT)
Dept: INTERNAL MEDICINE | Facility: CLINIC | Age: 55
End: 2025-04-25
Payer: MEDICARE

## 2025-04-25 ENCOUNTER — LABORATORY RESULT (OUTPATIENT)
Age: 55
End: 2025-04-25

## 2025-04-25 VITALS
OXYGEN SATURATION: 97 % | SYSTOLIC BLOOD PRESSURE: 120 MMHG | HEART RATE: 89 BPM | DIASTOLIC BLOOD PRESSURE: 70 MMHG | HEIGHT: 65 IN | BODY MASS INDEX: 33.66 KG/M2 | WEIGHT: 202 LBS | RESPIRATION RATE: 15 BRPM

## 2025-04-25 DIAGNOSIS — E11.9 TYPE 2 DIABETES MELLITUS W/OUT COMPLICATIONS: ICD-10-CM

## 2025-04-25 DIAGNOSIS — I10 ESSENTIAL (PRIMARY) HYPERTENSION: ICD-10-CM

## 2025-04-25 DIAGNOSIS — E78.00 PURE HYPERCHOLESTEROLEMIA, UNSPECIFIED: ICD-10-CM

## 2025-04-25 PROCEDURE — 99214 OFFICE O/P EST MOD 30 MIN: CPT

## 2025-04-25 PROCEDURE — 36415 COLL VENOUS BLD VENIPUNCTURE: CPT

## 2025-04-25 PROCEDURE — G2211 COMPLEX E/M VISIT ADD ON: CPT

## 2025-04-26 DIAGNOSIS — E83.52 HYPERCALCEMIA: ICD-10-CM

## 2025-04-30 LAB
ALBUMIN SERPL ELPH-MCNC: 4.5 G/DL
ALP BLD-CCNC: 92 U/L
ALT SERPL-CCNC: 16 U/L
ANION GAP SERPL CALC-SCNC: 15 MMOL/L
AST SERPL-CCNC: 21 U/L
BASOPHILS # BLD AUTO: 0.04 K/UL
BASOPHILS NFR BLD AUTO: 0.5 %
BILIRUB SERPL-MCNC: 0.5 MG/DL
BUN SERPL-MCNC: 26 MG/DL
CALCIUM SERPL-MCNC: 10.9 MG/DL
CALCIUM SERPL-MCNC: 11 MG/DL
CHLORIDE SERPL-SCNC: 107 MMOL/L
CHOLEST SERPL-MCNC: 141 MG/DL
CO2 SERPL-SCNC: 20 MMOL/L
CREAT SERPL-MCNC: 1.57 MG/DL
EGFRCR SERPLBLD CKD-EPI 2021: 52 ML/MIN/1.73M2
EOSINOPHIL # BLD AUTO: 0.02 K/UL
EOSINOPHIL NFR BLD AUTO: 0.2 %
ESTIMATED AVERAGE GLUCOSE: 174 MG/DL
GLUCOSE SERPL-MCNC: 152 MG/DL
HBA1C MFR BLD HPLC: 7.7 %
HCT VFR BLD CALC: 43.2 %
HDLC SERPL-MCNC: 35 MG/DL
HGB BLD-MCNC: 13.3 G/DL
IMM GRANULOCYTES NFR BLD AUTO: 0.6 %
LDLC SERPL-MCNC: 71 MG/DL
LYMPHOCYTES # BLD AUTO: 1.79 K/UL
LYMPHOCYTES NFR BLD AUTO: 21 %
MAN DIFF?: NORMAL
MCHC RBC-ENTMCNC: 28.7 PG
MCHC RBC-ENTMCNC: 30.8 G/DL
MCV RBC AUTO: 93.1 FL
MONOCYTES # BLD AUTO: 0.64 K/UL
MONOCYTES NFR BLD AUTO: 7.5 %
NEUTROPHILS # BLD AUTO: 6 K/UL
NEUTROPHILS NFR BLD AUTO: 70.2 %
NONHDLC SERPL-MCNC: 105 MG/DL
PARATHYROID HORMONE INTACT: 83 PG/ML
PLATELET # BLD AUTO: 339 K/UL
POTASSIUM SERPL-SCNC: 5 MMOL/L
PROT SERPL-MCNC: 7.9 G/DL
RBC # BLD: 4.64 M/UL
RBC # FLD: 14 %
SODIUM SERPL-SCNC: 142 MMOL/L
T3RU NFR SERPL: 1 TBI
T4 SERPL-MCNC: 9.2 UG/DL
TRIGL SERPL-MCNC: 210 MG/DL
TSH SERPL-ACNC: 2.53 UIU/ML
URATE SERPL-MCNC: 8.1 MG/DL
WBC # FLD AUTO: 8.54 K/UL

## 2025-04-30 RX ORDER — INSULIN LISPRO 100 [IU]/ML
(75-25) 100 INJECTION, SUSPENSION SUBCUTANEOUS
Refills: 0 | Status: ACTIVE | COMMUNITY

## 2025-04-30 RX ORDER — INSULIN GLARGINE-YFGN 100 [IU]/ML
100 INJECTION, SOLUTION SUBCUTANEOUS
Refills: 0 | Status: ACTIVE | COMMUNITY

## 2025-05-23 ENCOUNTER — APPOINTMENT (OUTPATIENT)
Dept: ENDOCRINOLOGY | Facility: CLINIC | Age: 55
End: 2025-05-23

## 2025-05-23 VITALS
HEART RATE: 69 BPM | HEIGHT: 65 IN | OXYGEN SATURATION: 98 % | DIASTOLIC BLOOD PRESSURE: 70 MMHG | SYSTOLIC BLOOD PRESSURE: 120 MMHG | WEIGHT: 200 LBS | BODY MASS INDEX: 33.32 KG/M2

## 2025-05-23 DIAGNOSIS — I10 ESSENTIAL (PRIMARY) HYPERTENSION: ICD-10-CM

## 2025-05-23 DIAGNOSIS — E78.00 PURE HYPERCHOLESTEROLEMIA, UNSPECIFIED: ICD-10-CM

## 2025-05-23 DIAGNOSIS — N18.9 CHRONIC KIDNEY DISEASE, UNSPECIFIED: ICD-10-CM

## 2025-05-23 DIAGNOSIS — E11.9 TYPE 2 DIABETES MELLITUS W/OUT COMPLICATIONS: ICD-10-CM

## 2025-05-23 PROCEDURE — G2211 COMPLEX E/M VISIT ADD ON: CPT

## 2025-05-23 PROCEDURE — 99215 OFFICE O/P EST HI 40 MIN: CPT

## 2025-05-26 RX ORDER — INSULIN LISPRO 100 [IU]/ML
100 INJECTION, SOLUTION INTRAVENOUS; SUBCUTANEOUS
Qty: 2 | Refills: 1 | Status: ACTIVE | COMMUNITY
Start: 2025-05-26 | End: 1900-01-01

## 2025-05-26 RX ORDER — PEN NEEDLE, DIABETIC 32GX 5/32"
32G X 4 MM NEEDLE, DISPOSABLE MISCELLANEOUS
Qty: 4 | Refills: 3 | Status: ACTIVE | COMMUNITY
Start: 2025-05-26 | End: 1900-01-01

## 2025-05-26 RX ORDER — INSULIN GLARGINE-YFGN 100 [IU]/ML
100 INJECTION, SOLUTION SUBCUTANEOUS
Qty: 2 | Refills: 1 | Status: ACTIVE | COMMUNITY
Start: 2025-05-26 | End: 1900-01-01

## 2025-05-27 RX ORDER — BLOOD SUGAR DIAGNOSTIC
STRIP MISCELLANEOUS
Qty: 300 | Refills: 3 | Status: ACTIVE | COMMUNITY
Start: 2025-05-27 | End: 1900-01-01

## 2025-05-27 RX ORDER — LANCETS 28 GAUGE
EACH MISCELLANEOUS
Qty: 3 | Refills: 3 | Status: ACTIVE | COMMUNITY
Start: 2025-05-27 | End: 1900-01-01

## 2025-05-30 LAB
24R-OH-CALCIDIOL SERPL-MCNC: 43.6 PG/ML
25(OH)D3 SERPL-MCNC: 26 NG/ML
ALBUMIN SERPL ELPH-MCNC: 4.3 G/DL
ALP BLD-CCNC: 85 U/L
ALT SERPL-CCNC: 26 U/L
ANION GAP SERPL CALC-SCNC: 13 MMOL/L
AST SERPL-CCNC: 27 U/L
BILIRUB SERPL-MCNC: 0.4 MG/DL
BUN SERPL-MCNC: 31 MG/DL
C PEPTIDE SERPL-MCNC: 2.6 NG/ML
CALCIUM SERPL-MCNC: 10.4 MG/DL
CALCIUM SERPL-MCNC: 10.4 MG/DL
CHLORIDE SERPL-SCNC: 107 MMOL/L
CO2 SERPL-SCNC: 20 MMOL/L
CREAT SERPL-MCNC: 1.41 MG/DL
EGFRCR SERPLBLD CKD-EPI 2021: 59 ML/MIN/1.73M2
GLUCOSE SERPL-MCNC: 131 MG/DL
PARATHYROID HORMONE INTACT: 82 PG/ML
PHOSPHATE SERPL-MCNC: 2.7 MG/DL
POTASSIUM SERPL-SCNC: 4.6 MMOL/L
PROT SERPL-MCNC: 7.4 G/DL
SODIUM SERPL-SCNC: 140 MMOL/L
VIT B12 SERPL-MCNC: 378 PG/ML

## 2025-06-06 ENCOUNTER — APPOINTMENT (OUTPATIENT)
Dept: INTERNAL MEDICINE | Facility: CLINIC | Age: 55
End: 2025-06-06

## 2025-06-06 VITALS
WEIGHT: 200 LBS | DIASTOLIC BLOOD PRESSURE: 70 MMHG | HEIGHT: 65 IN | BODY MASS INDEX: 33.32 KG/M2 | SYSTOLIC BLOOD PRESSURE: 120 MMHG

## 2025-06-06 PROCEDURE — 99214 OFFICE O/P EST MOD 30 MIN: CPT

## 2025-06-06 PROCEDURE — G2211 COMPLEX E/M VISIT ADD ON: CPT

## 2025-06-12 ENCOUNTER — OUTPATIENT (OUTPATIENT)
Dept: OUTPATIENT SERVICES | Facility: HOSPITAL | Age: 55
LOS: 1 days | Discharge: HOME | End: 2025-06-12

## 2025-06-12 DIAGNOSIS — Z94.0 KIDNEY TRANSPLANT STATUS: Chronic | ICD-10-CM

## 2025-06-12 DIAGNOSIS — Z98.890 OTHER SPECIFIED POSTPROCEDURAL STATES: Chronic | ICD-10-CM

## 2025-06-12 DIAGNOSIS — D64.9 ANEMIA, UNSPECIFIED: ICD-10-CM

## 2025-06-13 ENCOUNTER — RESULT REVIEW (OUTPATIENT)
Age: 55
End: 2025-06-13

## 2025-06-13 ENCOUNTER — APPOINTMENT (OUTPATIENT)
Dept: HEMATOLOGY ONCOLOGY | Facility: CLINIC | Age: 55
End: 2025-06-13

## 2025-06-13 VITALS
BODY MASS INDEX: 33.32 KG/M2 | SYSTOLIC BLOOD PRESSURE: 126 MMHG | TEMPERATURE: 98.2 F | HEIGHT: 65 IN | RESPIRATION RATE: 16 BRPM | OXYGEN SATURATION: 98 % | DIASTOLIC BLOOD PRESSURE: 80 MMHG | HEART RATE: 68 BPM | WEIGHT: 200 LBS

## 2025-06-13 LAB
ALBUMIN SERPL ELPH-MCNC: 4.1 G/DL
ALP BLD-CCNC: 79 U/L
ALT SERPL-CCNC: 25 U/L
ANION GAP SERPL CALC-SCNC: 18 MMOL/L
AST SERPL-CCNC: 21 U/L
BASOPHILS # BLD AUTO: 0.03 K/UL — SIGNIFICANT CHANGE UP (ref 0–0.2)
BASOPHILS NFR BLD AUTO: 0.3 % — SIGNIFICANT CHANGE UP (ref 0–2)
BILIRUB SERPL-MCNC: 0.4 MG/DL
BUN SERPL-MCNC: 34 MG/DL
CALCIUM SERPL-MCNC: 10.1 MG/DL
CHLORIDE SERPL-SCNC: 103 MMOL/L
CO2 SERPL-SCNC: 20 MMOL/L
CREAT SERPL-MCNC: 1.64 MG/DL
EGFRCR SERPLBLD CKD-EPI 2021: 49 ML/MIN/1.73M2
EOSINOPHIL # BLD AUTO: 0.03 K/UL — SIGNIFICANT CHANGE UP (ref 0–0.5)
EOSINOPHIL NFR BLD AUTO: 0.3 % — SIGNIFICANT CHANGE UP (ref 0–6)
FERRITIN SERPL-MCNC: 132 NG/ML
GLUCOSE SERPL-MCNC: 188 MG/DL
HCT VFR BLD CALC: 41.9 % — SIGNIFICANT CHANGE UP (ref 39–50)
HGB BLD-MCNC: 13.5 G/DL — SIGNIFICANT CHANGE UP (ref 13–17)
IMM GRANULOCYTES NFR BLD AUTO: 0.7 % — SIGNIFICANT CHANGE UP (ref 0–0.9)
IRON SATN MFR SERPL: 30 %
IRON SERPL-MCNC: 88 UG/DL
LYMPHOCYTES # BLD AUTO: 1.56 K/UL — SIGNIFICANT CHANGE UP (ref 1–3.3)
LYMPHOCYTES # BLD AUTO: 16.4 % — SIGNIFICANT CHANGE UP (ref 13–44)
MCHC RBC-ENTMCNC: 28.7 PG — SIGNIFICANT CHANGE UP (ref 27–34)
MCHC RBC-ENTMCNC: 32.2 G/DL — SIGNIFICANT CHANGE UP (ref 32–36)
MCV RBC AUTO: 89.1 FL — SIGNIFICANT CHANGE UP (ref 80–100)
MONOCYTES # BLD AUTO: 0.81 K/UL — SIGNIFICANT CHANGE UP (ref 0–0.9)
MONOCYTES NFR BLD AUTO: 8.5 % — SIGNIFICANT CHANGE UP (ref 2–14)
NEUTROPHILS # BLD AUTO: 7.03 K/UL — SIGNIFICANT CHANGE UP (ref 1.8–7.4)
NEUTROPHILS NFR BLD AUTO: 73.8 % — SIGNIFICANT CHANGE UP (ref 43–77)
NRBC BLD AUTO-RTO: 0 /100 WBCS — SIGNIFICANT CHANGE UP (ref 0–0)
PLATELET # BLD AUTO: 306 K/UL — SIGNIFICANT CHANGE UP (ref 150–400)
POTASSIUM SERPL-SCNC: 4.3 MMOL/L
PROT SERPL-MCNC: 7.2 G/DL
RBC # BLD: 4.7 M/UL — SIGNIFICANT CHANGE UP (ref 4.2–5.8)
RBC # FLD: 13.1 % — SIGNIFICANT CHANGE UP (ref 10.3–14.5)
SODIUM SERPL-SCNC: 141 MMOL/L
TIBC SERPL-MCNC: 291 UG/DL
UIBC SERPL-MCNC: 203 UG/DL
WBC # BLD: 9.53 K/UL — SIGNIFICANT CHANGE UP (ref 3.8–10.5)
WBC # FLD AUTO: 9.53 K/UL — SIGNIFICANT CHANGE UP (ref 3.8–10.5)

## 2025-06-28 NOTE — PROGRESS NOTE ADULT - ASSESSMENT
54 year-old male with PMHx of HTN, ESRD (s/p L-renal transplant ate age 8, complicated by graft rejection and s/p new L-renal transplant in 2023) presents with hyperglycemia 600 admitted with newly diagnosed diabetes with hyperosmolar state
no

## 2025-07-10 ENCOUNTER — RX RENEWAL (OUTPATIENT)
Age: 55
End: 2025-07-10

## 2025-07-25 ENCOUNTER — RESULT REVIEW (OUTPATIENT)
Age: 55
End: 2025-07-25

## 2025-07-25 ENCOUNTER — APPOINTMENT (OUTPATIENT)
Dept: HEMATOLOGY ONCOLOGY | Facility: CLINIC | Age: 55
End: 2025-07-25
Payer: COMMERCIAL

## 2025-07-25 VITALS
OXYGEN SATURATION: 96 % | HEART RATE: 57 BPM | TEMPERATURE: 98.6 F | HEIGHT: 64.17 IN | RESPIRATION RATE: 16 BRPM | BODY MASS INDEX: 36.13 KG/M2 | SYSTOLIC BLOOD PRESSURE: 123 MMHG | WEIGHT: 211.64 LBS | DIASTOLIC BLOOD PRESSURE: 79 MMHG

## 2025-07-25 DIAGNOSIS — I82.521 CHRONIC EMBOLISM AND THROMBOSIS OF RIGHT ILIAC VEIN: ICD-10-CM

## 2025-07-25 LAB
BASOPHILS # BLD AUTO: 0.03 K/UL — SIGNIFICANT CHANGE UP (ref 0–0.2)
BASOPHILS NFR BLD AUTO: 0.4 % — SIGNIFICANT CHANGE UP (ref 0–2)
EOSINOPHIL # BLD AUTO: 0.06 K/UL — SIGNIFICANT CHANGE UP (ref 0–0.5)
EOSINOPHIL NFR BLD AUTO: 0.7 % — SIGNIFICANT CHANGE UP (ref 0–6)
FERRITIN SERPL-MCNC: 150 NG/ML
HCT VFR BLD CALC: 39.7 % — SIGNIFICANT CHANGE UP (ref 39–50)
HGB BLD-MCNC: 13 G/DL — SIGNIFICANT CHANGE UP (ref 13–17)
IMM GRANULOCYTES NFR BLD AUTO: 0.7 % — SIGNIFICANT CHANGE UP (ref 0–0.9)
IRON SATN MFR SERPL: 33 %
IRON SERPL-MCNC: 88 UG/DL
LYMPHOCYTES # BLD AUTO: 1.89 K/UL — SIGNIFICANT CHANGE UP (ref 1–3.3)
LYMPHOCYTES # BLD AUTO: 23.1 % — SIGNIFICANT CHANGE UP (ref 13–44)
MCHC RBC-ENTMCNC: 28.6 PG — SIGNIFICANT CHANGE UP (ref 27–34)
MCHC RBC-ENTMCNC: 32.7 G/DL — SIGNIFICANT CHANGE UP (ref 32–36)
MCV RBC AUTO: 87.3 FL — SIGNIFICANT CHANGE UP (ref 80–100)
MONOCYTES # BLD AUTO: 0.9 K/UL — SIGNIFICANT CHANGE UP (ref 0–0.9)
MONOCYTES NFR BLD AUTO: 11 % — SIGNIFICANT CHANGE UP (ref 2–14)
NEUTROPHILS # BLD AUTO: 5.24 K/UL — SIGNIFICANT CHANGE UP (ref 1.8–7.4)
NEUTROPHILS NFR BLD AUTO: 64.1 % — SIGNIFICANT CHANGE UP (ref 43–77)
NRBC BLD AUTO-RTO: 0 /100 WBCS — SIGNIFICANT CHANGE UP (ref 0–0)
PLATELET # BLD AUTO: 291 K/UL — SIGNIFICANT CHANGE UP (ref 150–400)
RBC # BLD: 4.55 M/UL — SIGNIFICANT CHANGE UP (ref 4.2–5.8)
RBC # FLD: 13.1 % — SIGNIFICANT CHANGE UP (ref 10.3–14.5)
RETICS #: 76.4 K/UL — SIGNIFICANT CHANGE UP (ref 25–125)
RETICS/RBC NFR: 1.7 % — SIGNIFICANT CHANGE UP (ref 0.5–2.5)
TIBC SERPL-MCNC: 272 UG/DL
UIBC SERPL-MCNC: 183 UG/DL
WBC # BLD: 8.18 K/UL — SIGNIFICANT CHANGE UP (ref 3.8–10.5)
WBC # FLD AUTO: 8.18 K/UL — SIGNIFICANT CHANGE UP (ref 3.8–10.5)

## 2025-07-25 PROCEDURE — 99214 OFFICE O/P EST MOD 30 MIN: CPT

## 2025-08-06 ENCOUNTER — APPOINTMENT (OUTPATIENT)
Dept: OPHTHALMOLOGY | Facility: CLINIC | Age: 55
End: 2025-08-06
Payer: MEDICARE

## 2025-08-06 ENCOUNTER — APPOINTMENT (OUTPATIENT)
Dept: OPHTHALMOLOGY | Facility: CLINIC | Age: 55
End: 2025-08-06
Payer: SELF-PAY

## 2025-08-06 ENCOUNTER — NON-APPOINTMENT (OUTPATIENT)
Age: 55
End: 2025-08-06

## 2025-08-06 PROCEDURE — 92015 DETERMINE REFRACTIVE STATE: CPT

## 2025-08-06 PROCEDURE — 92012 INTRM OPH EXAM EST PATIENT: CPT

## 2025-08-06 PROCEDURE — 92285 EXTERNAL OCULAR PHOTOGRAPHY: CPT

## 2025-08-25 ENCOUNTER — OUTPATIENT (OUTPATIENT)
Dept: OUTPATIENT SERVICES | Facility: HOSPITAL | Age: 55
LOS: 1 days | End: 2025-08-25

## 2025-08-25 ENCOUNTER — APPOINTMENT (OUTPATIENT)
Dept: ULTRASOUND IMAGING | Facility: IMAGING CENTER | Age: 55
End: 2025-08-25
Payer: COMMERCIAL

## 2025-08-25 ENCOUNTER — OUTPATIENT (OUTPATIENT)
Dept: OUTPATIENT SERVICES | Facility: HOSPITAL | Age: 55
LOS: 1 days | End: 2025-08-25
Payer: MEDICARE

## 2025-08-25 DIAGNOSIS — Z98.890 OTHER SPECIFIED POSTPROCEDURAL STATES: Chronic | ICD-10-CM

## 2025-08-25 DIAGNOSIS — Z00.8 ENCOUNTER FOR OTHER GENERAL EXAMINATION: ICD-10-CM

## 2025-08-25 DIAGNOSIS — Z94.0 KIDNEY TRANSPLANT STATUS: Chronic | ICD-10-CM

## 2025-08-25 DIAGNOSIS — I82.521 CHRONIC EMBOLISM AND THROMBOSIS OF RIGHT ILIAC VEIN: ICD-10-CM

## 2025-08-25 PROCEDURE — 93971 EXTREMITY STUDY: CPT

## 2025-08-25 PROCEDURE — 93971 EXTREMITY STUDY: CPT | Mod: 26,RT

## 2025-09-03 ENCOUNTER — APPOINTMENT (OUTPATIENT)
Dept: OPHTHALMOLOGY | Facility: CLINIC | Age: 55
End: 2025-09-03
Payer: MEDICARE

## 2025-09-03 ENCOUNTER — NON-APPOINTMENT (OUTPATIENT)
Age: 55
End: 2025-09-03

## 2025-09-03 PROCEDURE — 92012 INTRM OPH EXAM EST PATIENT: CPT

## 2025-09-12 ENCOUNTER — LABORATORY RESULT (OUTPATIENT)
Age: 55
End: 2025-09-12

## 2025-09-12 ENCOUNTER — APPOINTMENT (OUTPATIENT)
Dept: INTERNAL MEDICINE | Facility: CLINIC | Age: 55
End: 2025-09-12
Payer: MEDICARE

## 2025-09-12 VITALS
WEIGHT: 217 LBS | DIASTOLIC BLOOD PRESSURE: 78 MMHG | SYSTOLIC BLOOD PRESSURE: 120 MMHG | BODY MASS INDEX: 37.05 KG/M2 | HEIGHT: 64 IN

## 2025-09-12 DIAGNOSIS — D84.9 IMMUNODEFICIENCY, UNSPECIFIED: ICD-10-CM

## 2025-09-12 DIAGNOSIS — Z00.00 ENCOUNTER FOR GENERAL ADULT MEDICAL EXAMINATION W/OUT ABNORMAL FINDINGS: ICD-10-CM

## 2025-09-12 DIAGNOSIS — K76.0 FATTY (CHANGE OF) LIVER, NOT ELSEWHERE CLASSIFIED: ICD-10-CM

## 2025-09-12 DIAGNOSIS — E78.00 PURE HYPERCHOLESTEROLEMIA, UNSPECIFIED: ICD-10-CM

## 2025-09-12 DIAGNOSIS — I10 ESSENTIAL (PRIMARY) HYPERTENSION: ICD-10-CM

## 2025-09-12 DIAGNOSIS — E66.9 OBESITY, UNSPECIFIED: ICD-10-CM

## 2025-09-12 DIAGNOSIS — Z94.0 KIDNEY TRANSPLANT STATUS: ICD-10-CM

## 2025-09-12 PROCEDURE — 99213 OFFICE O/P EST LOW 20 MIN: CPT | Mod: 25

## 2025-09-12 PROCEDURE — 36415 COLL VENOUS BLD VENIPUNCTURE: CPT

## 2025-09-12 PROCEDURE — 93000 ELECTROCARDIOGRAM COMPLETE: CPT

## 2025-09-12 PROCEDURE — G0439: CPT

## 2025-09-14 LAB
25(OH)D3 SERPL-MCNC: 17.2 NG/ML
ALBUMIN SERPL ELPH-MCNC: 4.1 G/DL
ALBUMIN, RANDOM URINE: 2.7 MG/DL
ALP BLD-CCNC: 84 U/L
ALT SERPL-CCNC: 68 U/L
ANION GAP SERPL CALC-SCNC: 19 MMOL/L
APPEARANCE: CLEAR
AST SERPL-CCNC: 50 U/L
BASOPHILS # BLD AUTO: 0.06 K/UL
BASOPHILS NFR BLD AUTO: 0.8 %
BILIRUB SERPL-MCNC: 0.4 MG/DL
BILIRUBIN URINE: NEGATIVE
BLOOD URINE: NEGATIVE
BUN SERPL-MCNC: 14 MG/DL
CALCIUM SERPL-MCNC: 9.9 MG/DL
CHLORIDE SERPL-SCNC: 105 MMOL/L
CHOLEST SERPL-MCNC: 108 MG/DL
CO2 SERPL-SCNC: 18 MMOL/L
COLOR: YELLOW
CREAT SERPL-MCNC: 1.61 MG/DL
CREAT SPEC-SCNC: 264 MG/DL
EGFRCR SERPLBLD CKD-EPI 2021: 50 ML/MIN/1.73M2
EOSINOPHIL # BLD AUTO: 0.1 K/UL
EOSINOPHIL NFR BLD AUTO: 1.3 %
ESTIMATED AVERAGE GLUCOSE: 148 MG/DL
GLUCOSE QUALITATIVE U: NEGATIVE MG/DL
GLUCOSE SERPL-MCNC: 148 MG/DL
HBA1C MFR BLD HPLC: 6.8 %
HCT VFR BLD CALC: 40.2 %
HDLC SERPL-MCNC: 25 MG/DL
HGB BLD-MCNC: 12.8 G/DL
IMM GRANULOCYTES NFR BLD AUTO: 1.1 %
KETONES URINE: NEGATIVE MG/DL
LDLC SERPL-MCNC: 54 MG/DL
LEUKOCYTE ESTERASE URINE: NEGATIVE
LYMPHOCYTES # BLD AUTO: 1.89 K/UL
LYMPHOCYTES NFR BLD AUTO: 24.9 %
MAN DIFF?: NORMAL
MCHC RBC-ENTMCNC: 27.8 PG
MCHC RBC-ENTMCNC: 31.8 G/DL
MCV RBC AUTO: 87.4 FL
MICROALBUMIN/CREAT 24H UR-RTO: 10 MG/G
MONOCYTES # BLD AUTO: 1.04 K/UL
MONOCYTES NFR BLD AUTO: 13.7 %
NEUTROPHILS # BLD AUTO: 4.42 K/UL
NEUTROPHILS NFR BLD AUTO: 58.2 %
NITRITE URINE: NEGATIVE
NONHDLC SERPL-MCNC: 83 MG/DL
PH URINE: 6
PLATELET # BLD AUTO: 399 K/UL
POTASSIUM SERPL-SCNC: 4.3 MMOL/L
PROT SERPL-MCNC: 7.2 G/DL
PROTEIN URINE: NORMAL MG/DL
PSA SERPL-MCNC: 0.56 NG/ML
RBC # BLD: 4.6 M/UL
RBC # FLD: 13.7 %
SODIUM SERPL-SCNC: 141 MMOL/L
SPECIFIC GRAVITY URINE: 1.02
T3RU NFR SERPL: 1 TBI
T4 SERPL-MCNC: 8.3 UG/DL
TRIGL SERPL-MCNC: 166 MG/DL
TSH SERPL-ACNC: 3.22 UIU/ML
URATE SERPL-MCNC: 8.3 MG/DL
UROBILINOGEN URINE: 0.2 MG/DL
WBC # FLD AUTO: 7.59 K/UL

## 2025-09-19 ENCOUNTER — RESULT REVIEW (OUTPATIENT)
Age: 55
End: 2025-09-19

## 2025-09-19 ENCOUNTER — APPOINTMENT (OUTPATIENT)
Dept: HEMATOLOGY ONCOLOGY | Facility: CLINIC | Age: 55
End: 2025-09-19

## 2025-09-19 VITALS
SYSTOLIC BLOOD PRESSURE: 119 MMHG | RESPIRATION RATE: 16 BRPM | OXYGEN SATURATION: 98 % | BODY MASS INDEX: 37.84 KG/M2 | DIASTOLIC BLOOD PRESSURE: 79 MMHG | HEART RATE: 74 BPM | WEIGHT: 220.46 LBS | TEMPERATURE: 98 F

## (undated) DEVICE — SUT PROLENE 6-0 4-30" BV-1

## (undated) DEVICE — GLV 7 PROTEXIS (WHITE)

## (undated) DEVICE — SUCTION YANKAUER NO CONTROL VENT

## (undated) DEVICE — GLV 8.5 PROTEXIS (WHITE)

## (undated) DEVICE — DRAPE ULTRASOUND PROBE COVER 15X244CM

## (undated) DEVICE — DRSG TEGADERM 6"X8"

## (undated) DEVICE — SOL INJ NS 0.9% 500ML 1-PORT

## (undated) DEVICE — PREP CHLORAPREP HI-LITE ORANGE 26ML

## (undated) DEVICE — CLAMP BULLDOG MIDI STRAIGHT (YELLOW) DISP

## (undated) DEVICE — BLADE SCALPEL SAFETYLOCK #11

## (undated) DEVICE — NDL SPINAL 22G X 3.5" (BLACK)

## (undated) DEVICE — SYR LUER LOK 10CC

## (undated) DEVICE — SOL IRR POUR H2O 250ML

## (undated) DEVICE — VESSEL LOOP MINI-BLUE 0.075" X 16"

## (undated) DEVICE — SUT BIOSYN 4-0 18" P-12

## (undated) DEVICE — DRSG COBAN 6"

## (undated) DEVICE — DRAPE ULTRASOUND PROBE COVER W ULTRA GEL 18X120CM

## (undated) DEVICE — FOLEY TRAY 16FR 5CC LTX UMETER CLOSED

## (undated) DEVICE — DRAPE MINOR PROCEDURE

## (undated) DEVICE — POSITIONER FOAM EGG CRATE ULNAR 2PCS (PINK)

## (undated) DEVICE — WARMING BLANKET LOWER ADULT

## (undated) DEVICE — GLV 6.5 PROTEXIS (WHITE)

## (undated) DEVICE — SPECIMEN CONTAINER 100ML

## (undated) DEVICE — GLV 8 PROTEXIS (WHITE)

## (undated) DEVICE — DRAPE IOBAN 23" X 23"

## (undated) DEVICE — VESSEL LOOP MAXI-BLUE 0.120" X 16"

## (undated) DEVICE — NDL HYPO REGULAR BEVEL 22G X 1.5" (TURQUOISE)

## (undated) DEVICE — MEDICATION LABELS W MARKER

## (undated) DEVICE — SUT SOFSILK 2-0 18" TIES

## (undated) DEVICE — CLAMP BULLDOG MINI STRAIGHT (GREEN) DISP

## (undated) DEVICE — PACK GENERAL MINOR

## (undated) DEVICE — DRAPE INSTRUMENT POUCH 6.75" X 11"

## (undated) DEVICE — BLADE SCALPEL SAFETYLOCK #15

## (undated) DEVICE — DRAPE C ARM UNIVERSAL

## (undated) DEVICE — DRSG STERISTRIPS 0.5 X 4"

## (undated) DEVICE — SOL IRR POUR NS 0.9% 500ML

## (undated) DEVICE — SUT SOFSILK 4-0 18" TIES

## (undated) DEVICE — VENODYNE/SCD SLEEVE CALF LARGE

## (undated) DEVICE — DRSG OPSITE 13.75 X 4"

## (undated) DEVICE — STAPLER SKIN VISI-STAT 35 WIDE

## (undated) DEVICE — NDL HYPO SAFE 25G X 5/8" (ORANGE)

## (undated) DEVICE — WARMING BLANKET UPPER ADULT

## (undated) DEVICE — CLAMP BULLDOG MIDI 45 DEGREE (GREEN) DISP

## (undated) DEVICE — PACK AV FISTULA

## (undated) DEVICE — GOWN XL

## (undated) DEVICE — DRAPE TOWEL BLUE 17" X 24"

## (undated) DEVICE — GLV 7.5 PROTEXIS (WHITE)

## (undated) DEVICE — GOWN TRIMAX LG

## (undated) DEVICE — DRAPE MAYO STAND 30"

## (undated) DEVICE — SUT POLYSORB 3-0 30" V-20 UNDYED

## (undated) DEVICE — DRAPE LIGHT HANDLE COVER (BLUE)

## (undated) DEVICE — SUT SILK 3-0 18" TIES